# Patient Record
Sex: MALE | Race: WHITE | NOT HISPANIC OR LATINO | Employment: OTHER | URBAN - METROPOLITAN AREA
[De-identification: names, ages, dates, MRNs, and addresses within clinical notes are randomized per-mention and may not be internally consistent; named-entity substitution may affect disease eponyms.]

---

## 2019-05-13 ENCOUNTER — OFFICE VISIT (OUTPATIENT)
Dept: RETAIL CLINIC | Facility: CLINIC | Age: 69
End: 2019-05-13

## 2019-05-13 VITALS
TEMPERATURE: 98.6 F | DIASTOLIC BLOOD PRESSURE: 70 MMHG | OXYGEN SATURATION: 96 % | SYSTOLIC BLOOD PRESSURE: 118 MMHG | HEART RATE: 105 BPM

## 2019-05-13 DIAGNOSIS — B02.9 HERPES ZOSTER WITHOUT COMPLICATION: ICD-10-CM

## 2019-05-13 DIAGNOSIS — J40 BRONCHITIS: Primary | ICD-10-CM

## 2019-05-13 PROCEDURE — 94640 AIRWAY INHALATION TREATMENT: CPT | Performed by: NURSE PRACTITIONER

## 2019-05-13 PROCEDURE — 99213 OFFICE O/P EST LOW 20 MIN: CPT | Performed by: NURSE PRACTITIONER

## 2019-05-13 RX ORDER — IPRATROPIUM BROMIDE AND ALBUTEROL SULFATE 2.5; .5 MG/3ML; MG/3ML
3 SOLUTION RESPIRATORY (INHALATION)
Status: DISCONTINUED | OUTPATIENT
Start: 2019-05-13 | End: 2020-07-15

## 2019-05-13 RX ORDER — ACYCLOVIR 400 MG/1
400 TABLET ORAL 3 TIMES DAILY
Qty: 21 TABLET | Refills: 0 | Status: SHIPPED | OUTPATIENT
Start: 2019-05-13 | End: 2019-05-20

## 2019-05-13 RX ORDER — PREDNISONE 20 MG/1
20 TABLET ORAL 2 TIMES DAILY
Qty: 10 TABLET | Refills: 0 | Status: SHIPPED | OUTPATIENT
Start: 2019-05-13 | End: 2019-05-30

## 2019-05-13 RX ORDER — DOXYCYCLINE 100 MG/1
100 CAPSULE ORAL 2 TIMES DAILY
Qty: 10 CAPSULE | Refills: 0 | Status: SHIPPED | OUTPATIENT
Start: 2019-05-13 | End: 2019-05-30

## 2019-05-13 RX ORDER — GUAIFENESIN 600 MG/1
600 TABLET, EXTENDED RELEASE ORAL 2 TIMES DAILY
Qty: 28 TABLET | Refills: 0 | Status: SHIPPED | OUTPATIENT
Start: 2019-05-13 | End: 2019-05-27

## 2019-05-13 RX ADMIN — IPRATROPIUM BROMIDE AND ALBUTEROL SULFATE 3 ML: 2.5; .5 SOLUTION RESPIRATORY (INHALATION) at 16:25

## 2019-05-13 NOTE — PATIENT INSTRUCTIONS
Shingles  Shingles is an infection. It gives you a painful skin rash and blisters that have fluid in them. Shingles is caused by the same germ (virus) that causes chickenpox.  Shingles only happens in people who:  · Have had chickenpox.  · Have been given a shot of medicine (vaccine) to protect against chickenpox. Shingles is rare in this group.    The first symptoms of shingles may be itching, tingling, or pain in an area on your skin. A rash will show on your skin a few days or weeks later. The rash is likely to be on one side of your body. The rash usually has a shape like a belt or a band. Over time, the rash turns into fluid-filled blisters. The blisters will break open, change into scabs, and dry up. Medicines may:  · Help with pain and itching.  · Help you get better sooner.  · Help to prevent long-term problems.    Follow these instructions at home:  Medicines  · Take over-the-counter and prescription medicines only as told by your doctor.  · Put on an anti-itch cream or numbing cream where you have a rash, blisters, or scabs. Do this as told by your doctor.  Helping with itching and discomfort  · Put cold, wet cloths (cold compresses) on the area of the rash or blisters as told by your doctor.  · Cool baths can help you feel better. Try adding baking soda or dry oatmeal to the water to lessen itching. Do not bathe in hot water.  Blister and rash care  · Keep your rash covered with a loose bandage (dressing).  · Wear loose clothing that does not rub on your rash.  · Keep your rash and blisters clean. To do this, wash the area with mild soap and cool water as told by your doctor.  · Check your rash every day for signs of infection. Check for:  ? More redness, swelling, or pain.  ? Fluid or blood.  ? Warmth.  ? Pus or a bad smell.  · Do not scratch your rash. Do not pick at your blisters. To help you to not scratch:  ? Keep your fingernails clean and cut short.  ? Wear gloves or mittens when you sleep, if  scratching is a problem.  General instructions  · Rest as told by your doctor.  · Keep all follow-up visits as told by your doctor. This is important.  · Wash your hands often with soap and water. If soap and water are not available, use hand . Doing this lowers your chance of getting a skin infection caused by germs (bacteria).  · Your infection can cause chickenpox in people who have never had chickenpox or never got a shot of chickenpox vaccine. If you have blisters that did not change into scabs yet, try not to touch other people or be around other people, especially:  ? Babies.  ? Pregnant women.  ? Children who have areas of red, itchy, or rough skin (eczema).  ? Very old people who have transplants.  ? People who have a long-term (chronic) sickness, like cancer or AIDS.  Contact a doctor if:  · Your pain does not get better with medicine.  · Your pain does not get better after the rash heals.  · You have any signs of infection in the rash area. These signs include:  ? More redness, swelling, or pain around the rash.  ? Fluid or blood coming from the rash.  ? The rash area feeling warm to the touch.  ? Pus or a bad smell coming from the rash.  Get help right away if:  · The rash is on your face or nose.  · You have pain in your face or pain by your eye.  · You lose feeling on one side of your face.  · You have trouble seeing.  · You have ear pain, or you have ringing in your ear.  · You have a loss of taste.  · Your condition gets worse.  Summary  · Shingles gives you a painful skin rash and blisters that have fluid in them.  · Shingles is an infection. It is caused by the same germ (virus) that causes chickenpox.  · Keep your rash covered with a loose bandage (dressing). Wear loose clothing that does not rub on your rash.  · If you have blisters that did not change into scabs yet, try not to touch other people or be around people.  This information is not intended to replace advice given to you by  your health care provider. Make sure you discuss any questions you have with your health care provider.  Document Released: 06/05/2009 Document Revised: 08/22/2018 Document Reviewed: 08/22/2018  [employer] Interactive Patient Education © 2019 [employer]  Acute Bronchitis, Adult  Acute bronchitis is sudden (acute) swelling of the air tubes (bronchi) in the lungs. Acute bronchitis causes these tubes to fill with mucus, which can make it hard to breathe. It can also cause coughing or wheezing.  In adults, acute bronchitis usually goes away within 2 weeks. A cough caused by bronchitis may last up to 3 weeks. Smoking, allergies, and asthma can make the condition worse. Repeated episodes of bronchitis may cause further lung problems, such as chronic obstructive pulmonary disease (COPD).  What are the causes?  This condition can be caused by germs and by substances that irritate the lungs, including:  · Cold and flu viruses. This condition is most often caused by the same virus that causes a cold.  · Bacteria.  · Exposure to tobacco smoke, dust, fumes, and air pollution.    What increases the risk?  This condition is more likely to develop in people who:  · Have close contact with someone with acute bronchitis.  · Are exposed to lung irritants, such as tobacco smoke, dust, fumes, and vapors.  · Have a weak immune system.  · Have a respiratory condition such as asthma.    What are the signs or symptoms?  Symptoms of this condition include:  · A cough.  · Coughing up clear, yellow, or green mucus.  · Wheezing.  · Chest congestion.  · Shortness of breath.  · A fever.  · Body aches.  · Chills.  · A sore throat.    How is this diagnosed?  This condition is usually diagnosed with a physical exam. During the exam, your health care provider may order tests, such as chest X-rays, to rule out other conditions. He or she may also:  · Test a sample of your mucus for bacterial infection.  · Check the level of oxygen in your blood.  This is done to check for pneumonia.  · Do a chest X-ray or lung function testing to rule out pneumonia and other conditions.  · Perform blood tests.    Your health care provider will also ask about your symptoms and medical history.  How is this treated?  Most cases of acute bronchitis clear up over time without treatment. Your health care provider may recommend:  · Drinking more fluids. Drinking more makes your mucus thinner, which may make it easier to breathe.  · Taking a medicine for a fever or cough.  · Taking an antibiotic medicine.  · Using an inhaler to help improve shortness of breath and to control a cough.  · Using a cool mist vaporizer or humidifier to make it easier to breathe.    Follow these instructions at home:  Medicines  · Take over-the-counter and prescription medicines only as told by your health care provider.  · If you were prescribed an antibiotic, take it as told by your health care provider. Do not stop taking the antibiotic even if you start to feel better.  General instructions  · Get plenty of rest.  · Drink enough fluids to keep your urine pale yellow.  · Avoid smoking and secondhand smoke. Exposure to cigarette smoke or irritating chemicals will make bronchitis worse. If you smoke and you need help quitting, ask your health care provider. Quitting smoking will help your lungs heal faster.  · Use an inhaler, cool mist vaporizer, or humidifier as told by your health care provider.  · Keep all follow-up visits as told by your health care provider. This is important.  How is this prevented?  To lower your risk of getting this condition again:  · Wash your hands often with soap and water. If soap and water are not available, use hand .  · Avoid contact with people who have cold symptoms.  · Try not to touch your hands to your mouth, nose, or eyes.  · Make sure to get the flu shot every year.    Contact a health care provider if:  · Your symptoms do not improve in 2 weeks of  treatment.  Get help right away if:  · You cough up blood.  · You have chest pain.  · You have severe shortness of breath.  · You become dehydrated.  · You faint or keep feeling like you are going to faint.  · You keep vomiting.  · You have a severe headache.  · Your fever or chills gets worse.  This information is not intended to replace advice given to you by your health care provider. Make sure you discuss any questions you have with your health care provider.  Document Released: 01/25/2006 Document Revised: 08/01/2018 Document Reviewed: 06/07/2017  [employer] Interactive Patient Education © 2019 [employer]

## 2019-05-30 ENCOUNTER — OFFICE VISIT (OUTPATIENT)
Dept: FAMILY MEDICINE CLINIC | Facility: CLINIC | Age: 69
End: 2019-05-30

## 2019-05-30 VITALS
HEIGHT: 71 IN | WEIGHT: 179 LBS | BODY MASS INDEX: 25.06 KG/M2 | RESPIRATION RATE: 24 BRPM | OXYGEN SATURATION: 99 % | HEART RATE: 101 BPM | SYSTOLIC BLOOD PRESSURE: 118 MMHG | DIASTOLIC BLOOD PRESSURE: 66 MMHG

## 2019-05-30 DIAGNOSIS — R05.3 CHRONIC COUGH: ICD-10-CM

## 2019-05-30 DIAGNOSIS — E66.3 OVERWEIGHT: ICD-10-CM

## 2019-05-30 DIAGNOSIS — Z12.11 SCREENING FOR COLORECTAL CANCER: ICD-10-CM

## 2019-05-30 DIAGNOSIS — R04.2 HEMOPTYSIS: ICD-10-CM

## 2019-05-30 DIAGNOSIS — Z12.5 ENCOUNTER FOR SCREENING FOR MALIGNANT NEOPLASM OF PROSTATE: ICD-10-CM

## 2019-05-30 DIAGNOSIS — R63.4 WEIGHT LOSS, UNINTENTIONAL: ICD-10-CM

## 2019-05-30 DIAGNOSIS — Z72.0 TOBACCO ABUSE: Primary | ICD-10-CM

## 2019-05-30 DIAGNOSIS — Z12.12 SCREENING FOR COLORECTAL CANCER: ICD-10-CM

## 2019-05-30 DIAGNOSIS — R93.89 ABNORMAL CHEST X-RAY: ICD-10-CM

## 2019-05-30 PROCEDURE — 99204 OFFICE O/P NEW MOD 45 MIN: CPT | Performed by: NURSE PRACTITIONER

## 2019-05-30 NOTE — PROGRESS NOTES
Subjective   Julia Smith is a 69 y.o. male.     History of Present Illness   Julia Smith 69 y.o. male who presents today for a new patient appointment.    he has a history of There is no problem list on file for this patient.  .  he is here to [facility] care and to f/u from [facility] I reviewed the PFSH recorded today by my MA/LPN staff.   he   He has been feeling tired.    Patient was seen at [facility] a couple of weeks ago for bronchitis. He was given doxycycline, oral steroid and cough suppressant.  He states symptoms improved but he is still fatigue, having cough with occasional blood tinged sputum and has lost 30 lbs since December without trying.     Patient is current smoker. Has smoked 1.5 PPD for 40 years.    Denies FH of lung cancer, colorectal cancer or prostate cancer.   His father had COPD.   The following portions of the patient's history were reviewed and updated as appropriate: allergies, current medications, past family history, past medical history, past social history, past surgical history and problem list.    Review of Systems   Constitutional: Positive for fatigue and unexpected weight change. Negative for chills and fever.   HENT: Positive for congestion, postnasal drip and sinus pressure.    Eyes: Negative for visual disturbance.   Respiratory: Positive for cough and shortness of breath. Negative for chest tightness and wheezing.        Objective   Physical Exam   Constitutional: He is oriented to person, place, and time. He appears well-developed and well-nourished.   HENT:   Right Ear: Tympanic membrane, external ear and ear canal normal.   Left Ear: Tympanic membrane, external ear and ear canal normal.   Nose: Rhinorrhea present. Right sinus exhibits no maxillary sinus tenderness and no frontal sinus tenderness. Left sinus exhibits no maxillary sinus tenderness and no frontal sinus tenderness.   Mouth/Throat: Uvula is midline and oropharynx is clear and moist.   Cardiovascular:  Normal rate and regular rhythm.   Pulmonary/Chest: Effort normal and breath sounds normal.   Neurological: He is oriented to person, place, and time.   Skin: Skin is warm and dry.   Psychiatric: He has a normal mood and affect. His behavior is normal. Judgment and thought content normal.   Nursing note and vitals reviewed.  2 view chest xray ordered and reviewed by me. Right hilar fullness with probable mass in right middle lobe.  Also noted was widened mediastinum.  No comparison on file.     Assessment/Plan   Julia was seen today for establish care, follow-up, cough and weight loss.    Diagnoses and all orders for this visit:    Tobacco abuse  -     XR Chest PA & Lateral (In Office)  -     CT Chest With Contrast  -     Comprehensive metabolic panel  -     Lipid panel  -     CBC and Differential  -     TSH  -     PSA Screen  -     Ambulatory Referral to Pulmonology    Weight loss, unintentional  -     XR Chest PA & Lateral (In Office)  -     CT Chest With Contrast  -     Comprehensive metabolic panel  -     Lipid panel  -     CBC and Differential  -     TSH  -     PSA Screen  -     Ambulatory Referral to Pulmonology    Chronic cough  -     XR Chest PA & Lateral (In Office)  -     CT Chest With Contrast  -     Comprehensive metabolic panel  -     Lipid panel  -     CBC and Differential  -     TSH  -     PSA Screen  -     Ambulatory Referral to Pulmonology    Hemoptysis  -     CT Chest With Contrast  -     Comprehensive metabolic panel  -     Lipid panel  -     CBC and Differential  -     TSH  -     PSA Screen  -     Ambulatory Referral to Pulmonology    Screening for colorectal cancer  -     Ambulatory Referral For Screening Colonoscopy    Overweight   -     Lipid panel    Encounter for screening for malignant neoplasm of prostate   -     PSA Screen    Abnormal chest x-ray  -     Ambulatory Referral to Pulmonology

## 2019-05-31 LAB
ALBUMIN SERPL-MCNC: 3.9 G/DL (ref 3.5–5.2)
ALBUMIN/GLOB SERPL: 1 G/DL
ALP SERPL-CCNC: 83 U/L (ref 39–117)
ALT SERPL-CCNC: 20 U/L (ref 1–41)
AST SERPL-CCNC: 22 U/L (ref 1–40)
BASOPHILS # BLD AUTO: 0.08 10*3/MM3 (ref 0–0.2)
BASOPHILS NFR BLD AUTO: 0.9 % (ref 0–1.5)
BILIRUB SERPL-MCNC: 0.6 MG/DL (ref 0.2–1.2)
BUN SERPL-MCNC: 10 MG/DL (ref 8–23)
BUN/CREAT SERPL: 10.6 (ref 7–25)
CALCIUM SERPL-MCNC: 11.1 MG/DL (ref 8.6–10.5)
CHLORIDE SERPL-SCNC: 96 MMOL/L (ref 98–107)
CHOLEST SERPL-MCNC: 144 MG/DL (ref 0–200)
CO2 SERPL-SCNC: 26.2 MMOL/L (ref 22–29)
CREAT SERPL-MCNC: 0.94 MG/DL (ref 0.76–1.27)
EOSINOPHIL # BLD AUTO: 0.04 10*3/MM3 (ref 0–0.4)
EOSINOPHIL NFR BLD AUTO: 0.5 % (ref 0.3–6.2)
ERYTHROCYTE [DISTWIDTH] IN BLOOD BY AUTOMATED COUNT: 16.2 % (ref 12.3–15.4)
GLOBULIN SER CALC-MCNC: 4.1 GM/DL
GLUCOSE SERPL-MCNC: 92 MG/DL (ref 65–99)
HCT VFR BLD AUTO: 48 % (ref 37.5–51)
HDLC SERPL-MCNC: 44 MG/DL (ref 40–60)
HGB BLD-MCNC: 14.7 G/DL (ref 13–17.7)
IMM GRANULOCYTES # BLD AUTO: 0.05 10*3/MM3 (ref 0–0.05)
IMM GRANULOCYTES NFR BLD AUTO: 0.6 % (ref 0–0.5)
LDLC SERPL CALC-MCNC: 89 MG/DL (ref 0–100)
LYMPHOCYTES # BLD AUTO: 1.27 10*3/MM3 (ref 0.7–3.1)
LYMPHOCYTES NFR BLD AUTO: 14.5 % (ref 19.6–45.3)
MCH RBC QN AUTO: 28.3 PG (ref 26.6–33)
MCHC RBC AUTO-ENTMCNC: 30.6 G/DL (ref 31.5–35.7)
MCV RBC AUTO: 92.3 FL (ref 79–97)
MONOCYTES # BLD AUTO: 1.11 10*3/MM3 (ref 0.1–0.9)
MONOCYTES NFR BLD AUTO: 12.7 % (ref 5–12)
NEUTROPHILS # BLD AUTO: 6.22 10*3/MM3 (ref 1.7–7)
NEUTROPHILS NFR BLD AUTO: 70.8 % (ref 42.7–76)
NRBC BLD AUTO-RTO: 0 /100 WBC (ref 0–0.2)
PLATELET # BLD AUTO: 419 10*3/MM3 (ref 140–450)
POTASSIUM SERPL-SCNC: 5.6 MMOL/L (ref 3.5–5.2)
PROT SERPL-MCNC: 8 G/DL (ref 6–8.5)
PSA SERPL-MCNC: 3.27 NG/ML (ref 0–4)
RBC # BLD AUTO: 5.2 10*6/MM3 (ref 4.14–5.8)
SODIUM SERPL-SCNC: 136 MMOL/L (ref 136–145)
TRIGL SERPL-MCNC: 57 MG/DL (ref 0–150)
TSH SERPL DL<=0.005 MIU/L-ACNC: 0.92 MIU/ML (ref 0.27–4.2)
VLDLC SERPL CALC-MCNC: 11.4 MG/DL
WBC # BLD AUTO: 8.77 10*3/MM3 (ref 3.4–10.8)

## 2019-06-03 ENCOUNTER — PREP FOR SURGERY (OUTPATIENT)
Dept: OTHER | Facility: HOSPITAL | Age: 69
End: 2019-06-03

## 2019-06-03 DIAGNOSIS — Z12.11 ENCOUNTER FOR SCREENING FOR MALIGNANT NEOPLASM OF COLON: Primary | ICD-10-CM

## 2019-06-03 RX ORDER — SODIUM CHLORIDE, SODIUM LACTATE, POTASSIUM CHLORIDE, CALCIUM CHLORIDE 600; 310; 30; 20 MG/100ML; MG/100ML; MG/100ML; MG/100ML
30 INJECTION, SOLUTION INTRAVENOUS CONTINUOUS
Status: CANCELLED | OUTPATIENT
Start: 2019-07-02

## 2019-06-04 ENCOUNTER — HOSPITAL ENCOUNTER (OUTPATIENT)
Dept: CT IMAGING | Facility: HOSPITAL | Age: 69
Discharge: HOME | End: 2019-06-04
Admitting: NURSE PRACTITIONER

## 2019-06-04 ENCOUNTER — HOSPITAL ENCOUNTER (OUTPATIENT)
Facility: HOSPITAL | Age: 69
Setting detail: HOSPITAL OUTPATIENT SURGERY
End: 2019-06-04
Attending: INTERNAL MEDICINE | Admitting: INTERNAL MEDICINE

## 2019-06-04 ENCOUNTER — TELEPHONE (OUTPATIENT)
Dept: FAMILY MEDICINE CLINIC | Facility: CLINIC | Age: 69
End: 2019-06-04

## 2019-06-04 DIAGNOSIS — R91.8 LUNG MASS: Primary | ICD-10-CM

## 2019-06-04 PROBLEM — Z12.11 ENCOUNTER FOR SCREENING FOR MALIGNANT NEOPLASM OF COLON: Status: ACTIVE | Noted: 2019-06-04

## 2019-06-04 LAB — CREAT BLDA-MCNC: 0.9 MG/DL (ref 0.6–1.3)

## 2019-06-04 PROCEDURE — 82565 ASSAY OF CREATININE: CPT

## 2019-06-04 PROCEDURE — 25010000002 IOPAMIDOL 61 % SOLUTION: Performed by: NURSE PRACTITIONER

## 2019-06-04 PROCEDURE — 71260 CT THORAX DX C+: CPT

## 2019-06-04 RX ADMIN — IOPAMIDOL 75 ML: 612 INJECTION, SOLUTION INTRAVENOUS at 14:02

## 2019-06-04 NOTE — TELEPHONE ENCOUNTER
Spoke with radiologist Dr. Crow about patient's chest CT.  He has large chest mass that extends into RML and RLL. It also appears to be pressing on his SVC.  Need to make sure that patient gets in to see pulmonology ASAP and he is to go to ER if any worsening symptoms prior to appt. Can we see how soon her will be able to be seen as it does not appear appt has yet been scheduled?

## 2019-06-19 ENCOUNTER — ANESTHESIA (OUTPATIENT)
Dept: GASTROENTEROLOGY | Facility: HOSPITAL | Age: 69
End: 2019-06-19

## 2019-06-19 ENCOUNTER — ANESTHESIA EVENT (OUTPATIENT)
Dept: GASTROENTEROLOGY | Facility: HOSPITAL | Age: 69
End: 2019-06-19

## 2019-06-19 ENCOUNTER — HOSPITAL ENCOUNTER (OUTPATIENT)
Facility: HOSPITAL | Age: 69
Setting detail: HOSPITAL OUTPATIENT SURGERY
Discharge: HOME | End: 2019-06-19
Attending: INTERNAL MEDICINE | Admitting: INTERNAL MEDICINE

## 2019-06-19 VITALS
WEIGHT: 179.06 LBS | RESPIRATION RATE: 18 BRPM | TEMPERATURE: 98.1 F | HEART RATE: 89 BPM | OXYGEN SATURATION: 95 % | DIASTOLIC BLOOD PRESSURE: 71 MMHG | BODY MASS INDEX: 25.63 KG/M2 | SYSTOLIC BLOOD PRESSURE: 122 MMHG | HEIGHT: 70 IN

## 2019-06-19 DIAGNOSIS — R91.8 LUNG MASS: ICD-10-CM

## 2019-06-19 PROCEDURE — 88108 CYTOPATH CONCENTRATE TECH: CPT | Performed by: INTERNAL MEDICINE

## 2019-06-19 PROCEDURE — 88112 CYTOPATH CELL ENHANCE TECH: CPT | Performed by: INTERNAL MEDICINE

## 2019-06-19 PROCEDURE — 25010000002 PROPOFOL 10 MG/ML EMULSION: Performed by: NURSE ANESTHETIST, CERTIFIED REGISTERED

## 2019-06-19 PROCEDURE — 87102 FUNGUS ISOLATION CULTURE: CPT | Performed by: INTERNAL MEDICINE

## 2019-06-19 PROCEDURE — 87206 SMEAR FLUORESCENT/ACID STAI: CPT | Performed by: INTERNAL MEDICINE

## 2019-06-19 PROCEDURE — 88342 IMHCHEM/IMCYTCHM 1ST ANTB: CPT | Performed by: INTERNAL MEDICINE

## 2019-06-19 PROCEDURE — 87116 MYCOBACTERIA CULTURE: CPT | Performed by: INTERNAL MEDICINE

## 2019-06-19 PROCEDURE — C1726 CATH, BAL DIL, NON-VASCULAR: HCPCS | Performed by: INTERNAL MEDICINE

## 2019-06-19 PROCEDURE — 88360 TUMOR IMMUNOHISTOCHEM/MANUAL: CPT | Performed by: INTERNAL MEDICINE

## 2019-06-19 PROCEDURE — 88341 IMHCHEM/IMCYTCHM EA ADD ANTB: CPT | Performed by: INTERNAL MEDICINE

## 2019-06-19 PROCEDURE — 87070 CULTURE OTHR SPECIMN AEROBIC: CPT | Performed by: INTERNAL MEDICINE

## 2019-06-19 PROCEDURE — 88305 TISSUE EXAM BY PATHOLOGIST: CPT | Performed by: INTERNAL MEDICINE

## 2019-06-19 PROCEDURE — 25010000002 PROPOFOL 1000 MG/ML EMULSION: Performed by: NURSE ANESTHETIST, CERTIFIED REGISTERED

## 2019-06-19 PROCEDURE — 88173 CYTOPATH EVAL FNA REPORT: CPT | Performed by: INTERNAL MEDICINE

## 2019-06-19 PROCEDURE — 87205 SMEAR GRAM STAIN: CPT | Performed by: INTERNAL MEDICINE

## 2019-06-19 RX ORDER — PROPOFOL 10 MG/ML
VIAL (ML) INTRAVENOUS AS NEEDED
Status: DISCONTINUED | OUTPATIENT
Start: 2019-06-19 | End: 2019-06-19 | Stop reason: SURG

## 2019-06-19 RX ORDER — SODIUM CHLORIDE, SODIUM LACTATE, POTASSIUM CHLORIDE, CALCIUM CHLORIDE 600; 310; 30; 20 MG/100ML; MG/100ML; MG/100ML; MG/100ML
1000 INJECTION, SOLUTION INTRAVENOUS CONTINUOUS
Status: DISCONTINUED | OUTPATIENT
Start: 2019-06-19 | End: 2019-06-19 | Stop reason: HOSPADM

## 2019-06-19 RX ORDER — LIDOCAINE HYDROCHLORIDE 10 MG/ML
0.5 INJECTION, SOLUTION INFILTRATION; PERINEURAL ONCE AS NEEDED
Status: DISCONTINUED | OUTPATIENT
Start: 2019-06-19 | End: 2019-06-19 | Stop reason: HOSPADM

## 2019-06-19 RX ORDER — LIDOCAINE HYDROCHLORIDE 20 MG/ML
INJECTION, SOLUTION INFILTRATION; PERINEURAL AS NEEDED
Status: DISCONTINUED | OUTPATIENT
Start: 2019-06-19 | End: 2019-06-19 | Stop reason: SURG

## 2019-06-19 RX ORDER — SODIUM CHLORIDE 0.9 % (FLUSH) 0.9 %
3 SYRINGE (ML) INJECTION AS NEEDED
Status: DISCONTINUED | OUTPATIENT
Start: 2019-06-19 | End: 2019-06-19 | Stop reason: HOSPADM

## 2019-06-19 RX ORDER — LIDOCAINE HYDROCHLORIDE 10 MG/ML
INJECTION, SOLUTION EPIDURAL; INFILTRATION; INTRACAUDAL; PERINEURAL AS NEEDED
Status: DISCONTINUED | OUTPATIENT
Start: 2019-06-19 | End: 2019-06-19 | Stop reason: HOSPADM

## 2019-06-19 RX ADMIN — PROPOFOL 150 MG: 10 INJECTION, EMULSION INTRAVENOUS at 08:21

## 2019-06-19 RX ADMIN — PROPOFOL 140 MCG/KG/MIN: 10 INJECTION, EMULSION INTRAVENOUS at 08:21

## 2019-06-19 RX ADMIN — LIDOCAINE HYDROCHLORIDE 100 MG: 20 INJECTION, SOLUTION INFILTRATION; PERINEURAL at 08:21

## 2019-06-19 RX ADMIN — SODIUM CHLORIDE, POTASSIUM CHLORIDE, SODIUM LACTATE AND CALCIUM CHLORIDE 1000 ML: 600; 310; 30; 20 INJECTION, SOLUTION INTRAVENOUS at 07:44

## 2019-06-19 NOTE — ANESTHESIA POSTPROCEDURE EVALUATION
"Patient: Julia Ocasiokins III    Procedure Summary     Date:  06/19/19 Room / Location:  [facility] /  [facility]    Anesthesia Start:  0813 Anesthesia Stop:  0900    Procedure:  BRONCHOSCOPY WITH WASHINGS AND BIOPSY AND ENDOBRONCHIAL ULTRASOUND WITH FNA (N/A Bronchus) Diagnosis:      Surgeon:  Arslan Marquez MD Provider:  Liliya Gomez MD    Anesthesia Type:  general ASA Status:  2          Anesthesia Type: general  Last vitals  BP   122/71 (06/19/19 0919)   Temp   36.7 °C (98.1 °F) (06/19/19 0908)   Pulse   89 (06/19/19 0919)   Resp   18 (06/19/19 0919)     SpO2   95 % (06/19/19 0919)     Post Anesthesia Care and Evaluation    Patient location during evaluation: PACU  Patient participation: complete - patient participated  Level of consciousness: awake  Pain score: 0  Pain management: adequate  Airway patency: patent  Anesthetic complications: No anesthetic complications    Cardiovascular status: acceptable  Respiratory status: acceptable  Hydration status: acceptable    Comments: Blood pressure 122/71, pulse 89, temperature 36.7 °C (98.1 °F), temperature source Oral, resp. rate 18, height 177.8 cm (70\"), weight 81.2 kg (179 lb 1 oz), SpO2 95 %.    No anesthesia care post op    "

## 2019-06-19 NOTE — OP NOTE
Bronchoscopy Procedure Note    Procedure:  1. Bronchoscopy, Diagnostic    Pre-Operative Diagnosis: lung mass and lymphadenopathy    Post-Operative Diagnosis: Same    Indication: lung mass and lymphadenopathy    Anesthesia: Monitored Anesthesia Care (MAC)    Procedure Details: Patient was consented for the procedure with all risk and benefit of the procedure explained in detail.  Patient was given the opportunity to ask questions and all concerns were answered.  The bronchocope was inserted into the main airway via the LMA. An anatomical survey was done of the main airways and the subsegmental bronchus to at least the first subsegmental level of all five lobes of both lungs.  The findings are reported below.  A bronchialalveolar lavage was performed using aliquots of normal saline instilled into the airways then aspirated back.    Findings:  Bronchoscope passed through LMA to the level of the vocal cords.  Lidocaine used for local anesthetic over vocal cords.  Bronchoscope was passed between the vocal cords into the trachea.  All airways were visualized to at least the first subsegment level of all 5 lobes of both lungs.  Endobronchial lesion noted in right mainstem.      Endobronchial biopsies of endobronchial lesion x 3.    Bronchial washing of right mainstem performed with 120cc saline instilled and 65cc blood tinged return.    Standard white light bronchoscope removed and replaced with endobronchial ultrasound scope in order to accomplish maneuver of lymph node station visualization and to help with FNA biopsy.    EBUS guided transbronchial FNA biopsy of station 7 and 10R with 3 passes each.      JOVON: adequate cellularity with suspicion for NSCLCA    Estimated Blood Loss:  Minimal           Specimens:  As above                Complications:  None; patient tolerated the procedure well.           Disposition: PACU - hemodynamically stable.      Patient tolerated the procedure well.    Arslan Marquez,  MD  6/19/2019  8:39 AM

## 2019-06-19 NOTE — ANESTHESIA PROCEDURE NOTES
Airway  Urgency: elective    Airway not difficult    General Information and Staff    Patient location during procedure: OR  Anesthesiologist: Liliya Gomez MD  CRNA: Iveth Oden CRNA    Indications and Patient Condition    Preoxygenated: yes  Mask difficulty assessment: 0 - not attempted    Final Airway Details  Final airway type: supraglottic airway      Successful airway: classic  Size 4    Number of attempts at approach: 1    Additional Comments  Pt to Endo:. Monitors applied. PreO2: SIVI and easy insertion LMA. ETCO2 +, Equal and bilateral chest rise

## 2019-06-19 NOTE — ANESTHESIA PREPROCEDURE EVALUATION
Anesthesia Evaluation     Patient summary reviewed and Nursing notes reviewed   NPO Solid Status: > 8 hours  NPO Liquid Status: > 8 hours           Airway   Mallampati: IV  TM distance: >3 FB  Possible difficult intubation  Dental    (+) poor dentition    Pulmonary - normal exam   (+) a smoker Current Smoked day of surgery,     ROS comment: Chest Ct  mediastinal mass  Cardiovascular - normal exam        Neuro/Psych  GI/Hepatic/Renal/Endo      Musculoskeletal     Abdominal    Substance History      OB/GYN          Other                        Anesthesia Plan    ASA 2     general     Anesthetic plan, all risks, benefits, and alternatives have been provided, discussed and informed consent has been obtained with: patient and spouse/significant other.

## 2019-06-20 LAB
CYTO UR: NORMAL
LAB AP CASE REPORT: NORMAL
LAB AP DIAGNOSIS COMMENT: NORMAL
LAB AP NON-GYN SPECIMEN ADEQUACY: NORMAL
LAB AP SPECIAL STAINS: NORMAL
PATH REPORT.FINAL DX SPEC: NORMAL
PATH REPORT.GROSS SPEC: NORMAL

## 2019-06-21 LAB
BACTERIA SPEC RESP CULT: NORMAL
GRAM STN SPEC: NORMAL
GRAM STN SPEC: NORMAL

## 2019-06-26 ENCOUNTER — TRANSCRIBE ORDERS (OUTPATIENT)
Dept: ADMINISTRATIVE | Facility: HOSPITAL | Age: 69
End: 2019-06-26

## 2019-06-26 DIAGNOSIS — C34.11 SMALL CELL LUNG CANCER, RIGHT UPPER LOBE (HCC): Primary | ICD-10-CM

## 2019-06-28 ENCOUNTER — TELEPHONE (OUTPATIENT)
Dept: GASTROENTEROLOGY | Facility: CLINIC | Age: 69
End: 2019-06-28

## 2019-06-28 NOTE — TELEPHONE ENCOUNTER
Patient called canceled colonoscopy for 7-2-19. He has some other more serious health issues right now. He will call back if he wants to reschedule.

## 2019-07-01 ENCOUNTER — HOSPITAL ENCOUNTER (OUTPATIENT)
Dept: PET IMAGING | Facility: HOSPITAL | Age: 69
Discharge: HOME | End: 2019-07-01
Admitting: INTERNAL MEDICINE

## 2019-07-01 ENCOUNTER — HOSPITAL ENCOUNTER (OUTPATIENT)
Dept: PET IMAGING | Facility: HOSPITAL | Age: 69
Discharge: HOME | End: 2019-07-01

## 2019-07-01 DIAGNOSIS — C34.11 SMALL CELL LUNG CANCER, RIGHT UPPER LOBE (HCC): ICD-10-CM

## 2019-07-01 LAB — GLUCOSE BLDC GLUCOMTR-MCNC: 110 MG/DL (ref 70–130)

## 2019-07-01 PROCEDURE — 78815 PET IMAGE W/CT SKULL-THIGH: CPT

## 2019-07-01 PROCEDURE — A9552 F18 FDG: HCPCS | Performed by: INTERNAL MEDICINE

## 2019-07-01 PROCEDURE — 82962 GLUCOSE BLOOD TEST: CPT

## 2019-07-01 PROCEDURE — 0 FLUDEOXYGLUCOSE F18 SOLUTION: Performed by: INTERNAL MEDICINE

## 2019-07-01 RX ADMIN — FLUDEOXYGLUCOSE F18 1 DOSE: 300 INJECTION INTRAVENOUS at 12:57

## 2019-07-03 ENCOUNTER — APPOINTMENT (OUTPATIENT)
Dept: OTHER | Facility: HOSPITAL | Age: 69
End: 2019-07-03

## 2019-07-03 ENCOUNTER — OFFICE VISIT (OUTPATIENT)
Dept: OTHER | Facility: HOSPITAL | Age: 69
End: 2019-07-03

## 2019-07-03 ENCOUNTER — PREP FOR SURGERY (OUTPATIENT)
Dept: OTHER | Facility: HOSPITAL | Age: 69
End: 2019-07-03

## 2019-07-03 VITALS
DIASTOLIC BLOOD PRESSURE: 75 MMHG | WEIGHT: 173.7 LBS | HEIGHT: 70 IN | RESPIRATION RATE: 16 BRPM | HEART RATE: 95 BPM | BODY MASS INDEX: 24.87 KG/M2 | SYSTOLIC BLOOD PRESSURE: 126 MMHG | OXYGEN SATURATION: 96 % | TEMPERATURE: 97.6 F

## 2019-07-03 DIAGNOSIS — C34.90 SMALL CELL LUNG CANCER (HCC): Primary | ICD-10-CM

## 2019-07-03 DIAGNOSIS — R79.1 ABNORMAL COAGULATION PROFILE: ICD-10-CM

## 2019-07-03 DIAGNOSIS — C80.1 SMALL CELL CARCINOMA (HCC): Primary | ICD-10-CM

## 2019-07-03 PROCEDURE — G0463 HOSPITAL OUTPT CLINIC VISIT: HCPCS | Performed by: INTERNAL MEDICINE

## 2019-07-03 PROCEDURE — 99204 OFFICE O/P NEW MOD 45 MIN: CPT | Performed by: THORACIC SURGERY (CARDIOTHORACIC VASCULAR SURGERY)

## 2019-07-03 PROCEDURE — 99205 OFFICE O/P NEW HI 60 MIN: CPT | Performed by: INTERNAL MEDICINE

## 2019-07-03 RX ORDER — CEFAZOLIN SODIUM 2 G/100ML
2 INJECTION, SOLUTION INTRAVENOUS ONCE
Status: CANCELLED | OUTPATIENT
Start: 2019-07-09 | End: 2019-07-03

## 2019-07-03 RX ORDER — SODIUM CHLORIDE 0.9 % (FLUSH) 0.9 %
3 SYRINGE (ML) INJECTION EVERY 12 HOURS SCHEDULED
Status: CANCELLED | OUTPATIENT
Start: 2019-07-09

## 2019-07-03 RX ORDER — SODIUM CHLORIDE 0.9 % (FLUSH) 0.9 %
3-10 SYRINGE (ML) INJECTION AS NEEDED
Status: CANCELLED | OUTPATIENT
Start: 2019-07-09

## 2019-07-03 NOTE — PROGRESS NOTES
Subjective   Patient ID: Julia Smith III is a 69 y.o. male is being seen for consultation today at the request of Arslan Marquez MD    History of Present Illness  Dear Colleague,  Julia Smith III was seen in our multidisciplinary lung cancer clinic today consultation for newly diagnosed small cell lung cancer.  Mr. Smith is a pleasant 69-year-old gentleman who initially presented with some hemoptysis and was found to have a significant hilar mass on chest x-ray.  He was evaluated by Dr. Marquez who performed a bronchoscopy with biopsy which was consistent with small cell lung cancer.  Mr. Ochoa has had an approximately 30 pound weight loss since December.  He denies significant shortness of breath or dyspnea on exertion.    Mr. Smith is a current smoker has an approximately 60-pack-year history of tobacco use.  The following portions of the patient's history were reviewed and updated as appropriate: allergies, current medications, past family history, past medical history, past social history, past surgical history and problem list.  Review of Systems   Constitution: Negative.   HENT: Negative.    Eyes: Negative.    Cardiovascular: Negative.    Respiratory: Negative.    Endocrine: Negative.    Hematologic/Lymphatic: Negative.    Skin: Negative.    Musculoskeletal: Negative.    Gastrointestinal: Negative.    Genitourinary: Negative.    Neurological: Negative.    Psychiatric/Behavioral: Negative.    Allergic/Immunologic: Negative.      Patient Active Problem List   Diagnosis   • Encounter for screening for malignant neoplasm of colon   • Small cell lung cancer (CMS/HCC)   • Small cell carcinoma (CMS/HCC)     Past Medical History:   Diagnosis Date   • Hearing loss    • Mastoiditis      Past Surgical History:   Procedure Laterality Date   • BRONCHOSCOPY N/A 6/19/2019    Procedure: BRONCHOSCOPY WITH WASHINGS AND BIOPSY AND ENDOBRONCHIAL ULTRASOUND WITH FNA;  Surgeon: Arslan Marquez MD;  Location: [facility]  ENDOSCOPY;  Service: Pulmonary   • INNER EAR SURGERY Left    • MASTOIDECTOMY Left 1994     Family History   Problem Relation Age of Onset   • No Known Problems Mother    • COPD Father    • No Known Problems Sister    • Heart disease Brother    • No Known Problems Brother    • No Known Problems Brother    • No Known Problems Brother      Social History     Socioeconomic History   • Marital status:      Spouse name: Not on file   • Number of children: Not on file   • Years of education: Not on file   • Highest education level: Not on file   Tobacco Use   • Smoking status: Current Every Day Smoker     Packs/day: 1.50     Years: 40.00     Pack years: 60.00   • Smokeless tobacco: Never Used   Substance and Sexual Activity   • Alcohol use: Yes     Alcohol/week: 2.4 - 4.2 oz     Types: 4 - 7 Cans of beer per week     Comment: daily   • Drug use: Yes     Types: Marijuana   • Sexual activity: Not Currently     Partners: Female       Current Outpatient Medications:   •  aspirin 81 MG EC tablet, Take 81 mg by mouth Daily., Disp: , Rfl:     Current Facility-Administered Medications:   •  ipratropium-albuterol (DUO-NEB) nebulizer solution 3 mL, 3 mL, Nebulization, 4x Daily - RT, Roma Costa, APRN, 3 mL at 05/13/19 1625  No Known Allergies     Objective   There were no vitals filed for this visit.  Physical Exam   Constitutional: He is oriented to person, place, and time. He appears well-developed and well-nourished.   HENT:   Head: Normocephalic and atraumatic.   Nose: Nose normal.   Mouth/Throat: Oropharynx is clear and moist.   Eyes: Conjunctivae and EOM are normal. Pupils are equal, round, and reactive to light.   Neck: Normal range of motion. Neck supple.   Cardiovascular: Normal rate, regular rhythm, normal heart sounds and intact distal pulses.   Pulmonary/Chest: Effort normal and breath sounds normal.   Abdominal: Soft. Bowel sounds are normal.   Musculoskeletal: Normal range of motion.   Neurological: He is  alert and oriented to person, place, and time.   Skin: Skin is warm and dry. Capillary refill takes less than 2 seconds.   Psychiatric: He has a normal mood and affect. His behavior is normal. Judgment and thought content normal.   Nursing note and vitals reviewed.    Independent Review of Radiographic Studies:    Have independently reviewed the PET CT scan performed on 7/1/2019 which demonstrates a large perihilar mass with invasion into the mediastinum, right hilum and left hilum narrowing the right mainstem bronchus measuring approximately 11 cm that is intensely hypermetabolic.  There are multiple subcentimeter pulmonary nodules throughout the right lung.  There is a 1.1 cm nodule in the left parotid gland.  Assessment/Plan   Mr. Smith is a pleasant 69-year-old gentleman with a newly diagnosed small cell lung cancer.  He was seen in conjunction with Dr. Thai Morel today in multidisciplinary clinic.  He will need a port placement to facilitate chemotherapy treatment for this small cell.  We will plan to do that next week.  Risks and benefits of the port were discussed with . and Ms. Smith today.    Diagnoses and all orders for this visit:    Small cell lung cancer (CMS/HCC)

## 2019-07-03 NOTE — PROGRESS NOTES
REFERRING PROVIDER:    Paty Saldana, APRN  [address]  [address]  [place] [address]    REASON FOR CONSULTATION: Small cell lung cancer    HISTORY OF PRESENT ILLNESS:  Julia Smith III is a 69 y.o. male who is referred today small cell lung cancer.    He has a history of hearing loss at least in part secondary to the history of mastoiditis.  This is bilateral but left greater than right.  He has a history of some numbness in his toes dating back to the fall 2018.  Right greater than left.  Unknown etiology.    He developed worsening cough since around October 2018 that previously was associated with some hemoptysis but this has improved.  He has had worsening shortness of breath since that time as well.  He has lost about 30 pounds unintentionally during this time.  Previously he was smoking 1 to 2 packs/day but currently is smoking less than 1 pack/day.  He notes some mild blurred vision within the past few weeks but he denies any other neurologic symptoms.    He had a chest x-ray on 5/30/2019 that was abnormal showing widening of the mid lower mediastinum with masslike enlargement of the right hilum and right basilar pulmonary opacification.  Follow-up CT imaging was performed on 6/4/2019 showing a 13 cm right hilar/mediastinal mass with complete occlusion of the right interlobar bronchus as well as the right middle and lower lobe bronchi.  There is extensive lymphadenopathy extending to the left hilum as well as atelectasis of the right lower lobe and a 3 cm masslike airspace consolidation there as well.  Other smaller opacities were present and felt to be indeterminate.  Bulky mediastinal lymphadenopathy with mass-effect on the SVC, significantly narrowed.  Right chest wall collaterals were noted.    He was seen by pulmonology and on 6/19/2019 had bronchoscopy performed with pathology of a right mainstem endobronchial lesion showing small cell carcinoma.    A PET scan was performed  confirming a large FDG avid mass within the right perihilar lung with invasion of the mediastinum and left hilum.  FDG avid left infrahilar lymphadenopathy as well.  Scattered subcentimeter pulmonary nodules concerning but not FDG avid.  1.1 cm nodule within the left parotid gland.  Mild FDG avid nodule in the right parotid gland.        Past Medical History:   Diagnosis Date   • Hearing loss    • Mastoiditis        Past Surgical History:   Procedure Laterality Date   • BRONCHOSCOPY N/A 6/19/2019    Procedure: BRONCHOSCOPY WITH WASHINGS AND BIOPSY AND ENDOBRONCHIAL ULTRASOUND WITH FNA;  Surgeon: Arslan Marquez MD;  Location: [facility] ENDOSCOPY;  Service: Pulmonary   • INNER EAR SURGERY Left    • MASTOIDECTOMY Left 1994       SOCIAL HISTORY:   reports that he has been smoking.  He has a 60.00 pack-year smoking history. He has never used smokeless tobacco. He reports that he drinks about 2.4 - 4.2 oz of alcohol per week. He reports that he uses drugs. Drug: Marijuana.    FAMILY HISTORY:  family history includes COPD in his father; Heart disease in his brother; No Known Problems in his brother, brother, brother, mother, and sister.    ALLERGIES:  No Known Allergies    MEDICATIONS:  The medication list has been reviewed with the patient by the medical assistant, and the list has been updated in the electronic medical record, which I reviewed.  Medication dosages and frequencies were confirmed to be accurate.    Review of Systems   Review of Systems   Constitutional: Positive for fatigue. Negative for appetite change, chills, fever and unexpected weight change.   HENT: Negative for congestion, dental problem, ear pain, hearing loss, mouth sores, nosebleeds, postnasal drip, rhinorrhea, tinnitus and trouble swallowing.    Eyes: Negative.    Respiratory: Positive for cough and shortness of breath. Negative for chest tightness, wheezing and stridor.    Cardiovascular: Negative for chest pain, palpitations and leg  "swelling.   Gastrointestinal: Negative for abdominal distention, abdominal pain, blood in stool, constipation, diarrhea, nausea and vomiting.   Endocrine: Negative.    Genitourinary: Negative for decreased urine volume, difficulty urinating, dysuria, flank pain, frequency, hematuria, scrotal swelling, testicular pain and urgency.   Musculoskeletal: Negative for arthralgias, back pain and joint swelling.   Allergic/Immunologic: Negative.    Neurological: Negative for dizziness, seizures, syncope, weakness, light-headedness, numbness and headaches.   Hematological: Negative for adenopathy. Does not bruise/bleed easily.   Psychiatric/Behavioral: Negative for confusion and sleep disturbance. The patient is not nervous/anxious.    All other systems reviewed and are negative.      Vitals:    07/03/19 1030   BP: 126/75   Pulse: 95   Resp: 16   Temp: 97.6 °F (36.4 °C)   TempSrc: Oral   SpO2: 96%  Comment: room air   Weight: 78.8 kg (173 lb 11.2 oz)   Height: 177.8 cm (70\")       Physical Exam   Constitutional: He is oriented to person, place, and time. He appears well-developed and well-nourished. No distress.   HENT:   Head: Normocephalic and atraumatic. Hair is normal.   Mouth/Throat: Oropharynx is clear and moist.   Eyes: Conjunctivae, EOM and lids are normal. Pupils are equal.   Neck: Neck supple. No JVD present. No thyroid mass and no thyromegaly present.   Cardiovascular: Normal rate and regular rhythm. Exam reveals no gallop and no friction rub.   No murmur heard.  Pulmonary/Chest: Effort normal and breath sounds normal. No respiratory distress. He has no wheezes. He has no rales.   Abdominal: Soft. He exhibits no distension and no mass. There is no splenomegaly or hepatomegaly. There is no tenderness. There is no guarding.   Musculoskeletal: Normal range of motion. He exhibits no edema, tenderness or deformity.   Lymphadenopathy:     He has no cervical adenopathy.     He has no axillary adenopathy.        Right: No " inguinal and no supraclavicular adenopathy present.        Left: No inguinal and no supraclavicular adenopathy present.   Neurological: He is alert and oriented to person, place, and time. He has normal strength.   Skin: Skin is warm and dry. No rash noted.   Psychiatric: He has a normal mood and affect. His behavior is normal. Judgment and thought content normal. Cognition and memory are normal.   Nursing note and vitals reviewed.      DIAGNOSTIC DATA:    Results for orders placed or performed during the hospital encounter of 07/01/19   POC Glucose Once   Result Value Ref Range    Glucose 110 70 - 130 mg/dL       IMAGING:    PET scan from 7/1/2019 images personally reviewed.    IMPRESSION:  1.  Large intensely FDG avid mass centered within the right perihilar  lung with associated invasion at the mediastinum and left hilum and  causes severe to complete narrowing of portions of the entire right  bronchial tree as well as the proximal left main bronchus as above.  Intensely FDG avid left perihilar lung nodule likely represents  metastatic disease. Moderate to intensely FDG avid left infrahilar lymph  node also likely represents metastatic disease.  2.  Scattered subcentimeter pulmonary nodules are present within the  right lung and highly concerning for metastatic disease. There is  irregular pulmonary opacification extending from the mass into the right  middle and lower lobes which has worsened since 06/04/2019. Findings  represent atelectasis and/or lymphangitic spread of malignancy.  Continued close attention on follow-up is recommended. Pneumonia is felt  to be less likely given the relative lack of FDG uptake within these  areas.  3.  Intensely FDG avid 1.1 cm nodule within the left parotid gland.  Unfortunately these finding are nonspecific as they could represent  metastatic disease versus primary parotid neoplasm. This lesion is  amenable to percutaneous biopsy if clinically indicated. At least  continued  close attention on follow-up is recommended.   4.  Mild FDG avid subcentimeter nodule within the right parotid gland is  present and while findings are favored to represent a low-grade primary  parotid lesion, they are nonspecific and continued close attention on  follow-up of this area is recommended.  5.  Other findings as above.    ASSESSMENT:  This is a 69 y.o. male with:  1.  Small cell lung cancer originating in the right hilum: He has an extremely large mass there with concern for SVC compression on CT imaging from 6/4/2019.  There is some left hilar adenopathy.  No obvious distant metastatic disease but there are some parotid lesions which are concerning but indeterminate.  He will need a Mediport.  We will discuss his case at our multidisciplinary thoracic conference next week.  I will refer him to radiation oncology to see if radiation is appropriate due to the SVC narrowing.  Outside of the fact that his SVC is narrowed, my thought was to proceed with chemotherapy first and consider adding radiation after couple of cycles of therapy with carboplatin and etoposide.  We would certainly need to keep an eye on the parotid lesions and he has some other small areas in the lungs as well that are concerning.  Alternatively, if radiation is not going to be possible, we would then proceed with therapy with carboplatin, etoposide, and atezolizumab.    2.  Chronic hearing loss: History of mastoiditis.  He is not a candidate for cisplatin because of this.    PLAN:   1.  I have asked Dr. Gallardo to place a Mediport for us.  2.  Chemotherapy education at this point for carboplatin and etoposide but if he is not going to be a candidate for radiation we will add atezolizumab as well.  3.  Refer to radiation oncology  4.  I would like to get started with therapy soon, hopefully within 2 weeks.

## 2019-07-05 ENCOUNTER — HOSPITAL ENCOUNTER (INPATIENT)
Facility: HOSPITAL | Age: 69
LOS: 5 days | Discharge: HOME | End: 2019-07-10
Attending: EMERGENCY MEDICINE | Admitting: INTERNAL MEDICINE

## 2019-07-05 ENCOUNTER — APPOINTMENT (OUTPATIENT)
Dept: GENERAL RADIOLOGY | Facility: HOSPITAL | Age: 69
End: 2019-07-05

## 2019-07-05 ENCOUNTER — APPOINTMENT (OUTPATIENT)
Dept: CT IMAGING | Facility: HOSPITAL | Age: 69
End: 2019-07-05

## 2019-07-05 ENCOUNTER — APPOINTMENT (OUTPATIENT)
Dept: PREADMISSION TESTING | Facility: HOSPITAL | Age: 69
End: 2019-07-05

## 2019-07-05 VITALS
SYSTOLIC BLOOD PRESSURE: 110 MMHG | DIASTOLIC BLOOD PRESSURE: 68 MMHG | WEIGHT: 175 LBS | HEIGHT: 71 IN | OXYGEN SATURATION: 97 % | BODY MASS INDEX: 24.5 KG/M2 | TEMPERATURE: 98.2 F | HEART RATE: 92 BPM | RESPIRATION RATE: 20 BRPM

## 2019-07-05 DIAGNOSIS — C34.90 SMALL CELL LUNG CANCER (HCC): Chronic | ICD-10-CM

## 2019-07-05 DIAGNOSIS — C80.1 SMALL CELL CARCINOMA (HCC): ICD-10-CM

## 2019-07-05 DIAGNOSIS — R91.8 LUNG MASS: ICD-10-CM

## 2019-07-05 DIAGNOSIS — R79.1 ABNORMAL COAGULATION PROFILE: ICD-10-CM

## 2019-07-05 DIAGNOSIS — I48.91 ATRIAL FIBRILLATION WITH RVR (HCC): Primary | ICD-10-CM

## 2019-07-05 DIAGNOSIS — J18.9 PNEUMONIA OF RIGHT LUNG DUE TO INFECTIOUS ORGANISM, UNSPECIFIED PART OF LUNG: ICD-10-CM

## 2019-07-05 DIAGNOSIS — J40 BRONCHITIS: ICD-10-CM

## 2019-07-05 PROBLEM — J44.9 COPD (CHRONIC OBSTRUCTIVE PULMONARY DISEASE) (HCC): Chronic | Status: ACTIVE | Noted: 2019-07-05

## 2019-07-05 LAB
ABO GROUP BLD: NORMAL
ALBUMIN SERPL-MCNC: 3.5 G/DL (ref 3.5–5.2)
ALBUMIN/GLOB SERPL: 0.8 G/DL
ALP SERPL-CCNC: 69 U/L (ref 39–117)
ALT SERPL W P-5'-P-CCNC: 41 U/L (ref 1–41)
ANION GAP SERPL CALCULATED.3IONS-SCNC: 11.7 MMOL/L (ref 5–15)
ANION GAP SERPL CALCULATED.3IONS-SCNC: 12.6 MMOL/L (ref 5–15)
APTT PPP: 33 SECONDS (ref 22.7–35.4)
AST SERPL-CCNC: 31 U/L (ref 1–40)
BASOPHILS # BLD AUTO: 0.06 10*3/MM3 (ref 0–0.2)
BASOPHILS # BLD AUTO: 0.1 10*3/MM3 (ref 0–0.2)
BASOPHILS NFR BLD AUTO: 0.7 % (ref 0–1.5)
BASOPHILS NFR BLD AUTO: 1.2 % (ref 0–1.5)
BILIRUB SERPL-MCNC: 0.4 MG/DL (ref 0.2–1.2)
BLD GP AB SCN SERPL QL: NEGATIVE
BUN BLD-MCNC: 11 MG/DL (ref 8–23)
BUN BLD-MCNC: 11 MG/DL (ref 8–23)
BUN/CREAT SERPL: 11.3 (ref 7–25)
BUN/CREAT SERPL: 12.4 (ref 7–25)
CALCIUM SPEC-SCNC: 10.7 MG/DL (ref 8.6–10.5)
CALCIUM SPEC-SCNC: 10.7 MG/DL (ref 8.6–10.5)
CHLORIDE SERPL-SCNC: 94 MMOL/L (ref 98–107)
CHLORIDE SERPL-SCNC: 94 MMOL/L (ref 98–107)
CO2 SERPL-SCNC: 24.4 MMOL/L (ref 22–29)
CO2 SERPL-SCNC: 26.3 MMOL/L (ref 22–29)
CREAT BLD-MCNC: 0.89 MG/DL (ref 0.76–1.27)
CREAT BLD-MCNC: 0.97 MG/DL (ref 0.76–1.27)
DEPRECATED RDW RBC AUTO: 48.2 FL (ref 37–54)
DEPRECATED RDW RBC AUTO: 48.9 FL (ref 37–54)
EOSINOPHIL # BLD AUTO: 0.07 10*3/MM3 (ref 0–0.4)
EOSINOPHIL # BLD AUTO: 0.09 10*3/MM3 (ref 0–0.4)
EOSINOPHIL NFR BLD AUTO: 0.9 % (ref 0.3–6.2)
EOSINOPHIL NFR BLD AUTO: 1.1 % (ref 0.3–6.2)
ERYTHROCYTE [DISTWIDTH] IN BLOOD BY AUTOMATED COUNT: 15 % (ref 12.3–15.4)
ERYTHROCYTE [DISTWIDTH] IN BLOOD BY AUTOMATED COUNT: 15.1 % (ref 12.3–15.4)
GFR SERPL CREATININE-BSD FRML MDRD: 77 ML/MIN/1.73
GFR SERPL CREATININE-BSD FRML MDRD: 85 ML/MIN/1.73
GLOBULIN UR ELPH-MCNC: 4.6 GM/DL
GLUCOSE BLD-MCNC: 111 MG/DL (ref 65–99)
GLUCOSE BLD-MCNC: 150 MG/DL (ref 65–99)
HCT VFR BLD AUTO: 44.2 % (ref 37.5–51)
HCT VFR BLD AUTO: 45.5 % (ref 37.5–51)
HGB BLD-MCNC: 14.1 G/DL (ref 13–17.7)
HGB BLD-MCNC: 14.2 G/DL (ref 13–17.7)
HOLD SPECIMEN: NORMAL
HOLD SPECIMEN: NORMAL
IMM GRANULOCYTES # BLD AUTO: 0.03 10*3/MM3 (ref 0–0.05)
IMM GRANULOCYTES # BLD AUTO: 0.04 10*3/MM3 (ref 0–0.05)
IMM GRANULOCYTES NFR BLD AUTO: 0.4 % (ref 0–0.5)
IMM GRANULOCYTES NFR BLD AUTO: 0.5 % (ref 0–0.5)
INR PPP: 1.05 (ref 0.9–1.1)
LYMPHOCYTES # BLD AUTO: 1.02 10*3/MM3 (ref 0.7–3.1)
LYMPHOCYTES # BLD AUTO: 1.14 10*3/MM3 (ref 0.7–3.1)
LYMPHOCYTES NFR BLD AUTO: 12.6 % (ref 19.6–45.3)
LYMPHOCYTES NFR BLD AUTO: 13.8 % (ref 19.6–45.3)
MAGNESIUM SERPL-MCNC: 2.1 MG/DL (ref 1.6–2.4)
MCH RBC QN AUTO: 27.3 PG (ref 26.6–33)
MCH RBC QN AUTO: 28.3 PG (ref 26.6–33)
MCHC RBC AUTO-ENTMCNC: 31 G/DL (ref 31.5–35.7)
MCHC RBC AUTO-ENTMCNC: 32.1 G/DL (ref 31.5–35.7)
MCV RBC AUTO: 88 FL (ref 79–97)
MCV RBC AUTO: 88.2 FL (ref 79–97)
MONOCYTES # BLD AUTO: 0.97 10*3/MM3 (ref 0.1–0.9)
MONOCYTES # BLD AUTO: 1.3 10*3/MM3 (ref 0.1–0.9)
MONOCYTES NFR BLD AUTO: 11.7 % (ref 5–12)
MONOCYTES NFR BLD AUTO: 16 % (ref 5–12)
NEUTROPHILS # BLD AUTO: 5.63 10*3/MM3 (ref 1.7–7)
NEUTROPHILS # BLD AUTO: 5.93 10*3/MM3 (ref 1.7–7)
NEUTROPHILS NFR BLD AUTO: 69.4 % (ref 42.7–76)
NEUTROPHILS NFR BLD AUTO: 71.7 % (ref 42.7–76)
NRBC BLD AUTO-RTO: 0 /100 WBC (ref 0–0.2)
NRBC BLD AUTO-RTO: 0 /100 WBC (ref 0–0.2)
NT-PROBNP SERPL-MCNC: 545.7 PG/ML (ref 5–900)
PLATELET # BLD AUTO: 365 10*3/MM3 (ref 140–450)
PLATELET # BLD AUTO: 377 10*3/MM3 (ref 140–450)
PMV BLD AUTO: 10.2 FL (ref 6–12)
PMV BLD AUTO: 9.4 FL (ref 6–12)
POTASSIUM BLD-SCNC: 4.4 MMOL/L (ref 3.5–5.2)
POTASSIUM BLD-SCNC: 4.9 MMOL/L (ref 3.5–5.2)
PROCALCITONIN SERPL-MCNC: 0.06 NG/ML (ref 0.1–0.25)
PROT SERPL-MCNC: 8.1 G/DL (ref 6–8.5)
PROTHROMBIN TIME: 13.5 SECONDS (ref 11.7–14.2)
RBC # BLD AUTO: 5.02 10*6/MM3 (ref 4.14–5.8)
RBC # BLD AUTO: 5.16 10*6/MM3 (ref 4.14–5.8)
RH BLD: NEGATIVE
SODIUM BLD-SCNC: 131 MMOL/L (ref 136–145)
SODIUM BLD-SCNC: 132 MMOL/L (ref 136–145)
T&S EXPIRATION DATE: NORMAL
TROPONIN T SERPL-MCNC: <0.01 NG/ML (ref 0–0.03)
TROPONIN T SERPL-MCNC: <0.01 NG/ML (ref 0–0.03)
WBC NRBC COR # BLD: 8.11 10*3/MM3 (ref 3.4–10.8)
WBC NRBC COR # BLD: 8.27 10*3/MM3 (ref 3.4–10.8)
WHOLE BLOOD HOLD SPECIMEN: NORMAL
WHOLE BLOOD HOLD SPECIMEN: NORMAL

## 2019-07-05 PROCEDURE — 93005 ELECTROCARDIOGRAM TRACING: CPT | Performed by: INTERNAL MEDICINE

## 2019-07-05 PROCEDURE — 93010 ELECTROCARDIOGRAM REPORT: CPT | Performed by: INTERNAL MEDICINE

## 2019-07-05 PROCEDURE — 85025 COMPLETE CBC W/AUTO DIFF WBC: CPT | Performed by: THORACIC SURGERY (CARDIOTHORACIC VASCULAR SURGERY)

## 2019-07-05 PROCEDURE — 80053 COMPREHEN METABOLIC PANEL: CPT | Performed by: THORACIC SURGERY (CARDIOTHORACIC VASCULAR SURGERY)

## 2019-07-05 PROCEDURE — 87040 BLOOD CULTURE FOR BACTERIA: CPT | Performed by: EMERGENCY MEDICINE

## 2019-07-05 PROCEDURE — 93005 ELECTROCARDIOGRAM TRACING: CPT | Performed by: EMERGENCY MEDICINE

## 2019-07-05 PROCEDURE — 83735 ASSAY OF MAGNESIUM: CPT | Performed by: INTERNAL MEDICINE

## 2019-07-05 PROCEDURE — 25010000002 VANCOMYCIN 10 G RECONSTITUTED SOLUTION: Performed by: INTERNAL MEDICINE

## 2019-07-05 PROCEDURE — 86900 BLOOD TYPING SEROLOGIC ABO: CPT | Performed by: THORACIC SURGERY (CARDIOTHORACIC VASCULAR SURGERY)

## 2019-07-05 PROCEDURE — 71275 CT ANGIOGRAPHY CHEST: CPT

## 2019-07-05 PROCEDURE — 71046 X-RAY EXAM CHEST 2 VIEWS: CPT

## 2019-07-05 PROCEDURE — 85730 THROMBOPLASTIN TIME PARTIAL: CPT | Performed by: THORACIC SURGERY (CARDIOTHORACIC VASCULAR SURGERY)

## 2019-07-05 PROCEDURE — 93005 ELECTROCARDIOGRAM TRACING: CPT

## 2019-07-05 PROCEDURE — 25010000002 CEFTRIAXONE PER 250 MG: Performed by: EMERGENCY MEDICINE

## 2019-07-05 PROCEDURE — 25010000002 PIPERACILLIN SOD-TAZOBACTAM PER 1 G: Performed by: INTERNAL MEDICINE

## 2019-07-05 PROCEDURE — 36415 COLL VENOUS BLD VENIPUNCTURE: CPT

## 2019-07-05 PROCEDURE — 86901 BLOOD TYPING SEROLOGIC RH(D): CPT | Performed by: THORACIC SURGERY (CARDIOTHORACIC VASCULAR SURGERY)

## 2019-07-05 PROCEDURE — 87081 CULTURE SCREEN ONLY: CPT | Performed by: INTERNAL MEDICINE

## 2019-07-05 PROCEDURE — 85610 PROTHROMBIN TIME: CPT | Performed by: THORACIC SURGERY (CARDIOTHORACIC VASCULAR SURGERY)

## 2019-07-05 PROCEDURE — 86850 RBC ANTIBODY SCREEN: CPT | Performed by: THORACIC SURGERY (CARDIOTHORACIC VASCULAR SURGERY)

## 2019-07-05 PROCEDURE — 83880 ASSAY OF NATRIURETIC PEPTIDE: CPT | Performed by: EMERGENCY MEDICINE

## 2019-07-05 PROCEDURE — 84484 ASSAY OF TROPONIN QUANT: CPT | Performed by: INTERNAL MEDICINE

## 2019-07-05 PROCEDURE — 84145 PROCALCITONIN (PCT): CPT | Performed by: INTERNAL MEDICINE

## 2019-07-05 PROCEDURE — 85025 COMPLETE CBC W/AUTO DIFF WBC: CPT | Performed by: EMERGENCY MEDICINE

## 2019-07-05 PROCEDURE — 82565 ASSAY OF CREATININE: CPT | Performed by: INTERNAL MEDICINE

## 2019-07-05 PROCEDURE — 0 IOPAMIDOL PER 1 ML: Performed by: EMERGENCY MEDICINE

## 2019-07-05 PROCEDURE — 84484 ASSAY OF TROPONIN QUANT: CPT | Performed by: EMERGENCY MEDICINE

## 2019-07-05 PROCEDURE — 99285 EMERGENCY DEPT VISIT HI MDM: CPT

## 2019-07-05 RX ORDER — CALCIUM CARBONATE 200(500)MG
1 TABLET,CHEWABLE ORAL 2 TIMES DAILY PRN
Status: DISCONTINUED | OUTPATIENT
Start: 2019-07-05 | End: 2019-07-10 | Stop reason: HOSPADM

## 2019-07-05 RX ORDER — ALBUTEROL SULFATE 2.5 MG/3ML
2.5 SOLUTION RESPIRATORY (INHALATION) ONCE AS NEEDED
Status: COMPLETED | OUTPATIENT
Start: 2019-07-05 | End: 2019-07-10

## 2019-07-05 RX ORDER — SODIUM CHLORIDE 0.9 % (FLUSH) 0.9 %
3 SYRINGE (ML) INJECTION EVERY 12 HOURS SCHEDULED
Status: DISCONTINUED | OUTPATIENT
Start: 2019-07-05 | End: 2019-07-09

## 2019-07-05 RX ORDER — ALBUTEROL SULFATE 2.5 MG/3ML
2.5 SOLUTION RESPIRATORY (INHALATION) EVERY 4 HOURS PRN
Status: DISCONTINUED | OUTPATIENT
Start: 2019-07-05 | End: 2019-07-10 | Stop reason: HOSPADM

## 2019-07-05 RX ORDER — ONDANSETRON 4 MG/1
4 TABLET, FILM COATED ORAL EVERY 6 HOURS PRN
Status: DISCONTINUED | OUTPATIENT
Start: 2019-07-05 | End: 2019-07-10 | Stop reason: HOSPADM

## 2019-07-05 RX ORDER — SODIUM CHLORIDE 9 MG/ML
125 INJECTION, SOLUTION INTRAVENOUS CONTINUOUS
Status: ACTIVE | OUTPATIENT
Start: 2019-07-05 | End: 2019-07-06

## 2019-07-05 RX ORDER — CEFTRIAXONE SODIUM 1 G/50ML
1 INJECTION, SOLUTION INTRAVENOUS ONCE
Status: COMPLETED | OUTPATIENT
Start: 2019-07-05 | End: 2019-07-05

## 2019-07-05 RX ORDER — BISACODYL 10 MG
10 SUPPOSITORY, RECTAL RECTAL DAILY PRN
Status: DISCONTINUED | OUTPATIENT
Start: 2019-07-05 | End: 2019-07-10 | Stop reason: HOSPADM

## 2019-07-05 RX ORDER — SODIUM CHLORIDE 0.9 % (FLUSH) 0.9 %
3-10 SYRINGE (ML) INJECTION AS NEEDED
Status: DISCONTINUED | OUTPATIENT
Start: 2019-07-05 | End: 2019-07-10 | Stop reason: HOSPADM

## 2019-07-05 RX ORDER — DOCUSATE SODIUM 100 MG/1
100 CAPSULE, LIQUID FILLED ORAL 2 TIMES DAILY PRN
Status: DISCONTINUED | OUTPATIENT
Start: 2019-07-05 | End: 2019-07-10 | Stop reason: HOSPADM

## 2019-07-05 RX ORDER — IPRATROPIUM BROMIDE AND ALBUTEROL SULFATE 2.5; .5 MG/3ML; MG/3ML
3 SOLUTION RESPIRATORY (INHALATION)
Status: DISCONTINUED | OUTPATIENT
Start: 2019-07-05 | End: 2019-07-10 | Stop reason: HOSPADM

## 2019-07-05 RX ORDER — ONDANSETRON 2 MG/ML
4 INJECTION INTRAMUSCULAR; INTRAVENOUS EVERY 6 HOURS PRN
Status: DISCONTINUED | OUTPATIENT
Start: 2019-07-05 | End: 2019-07-10 | Stop reason: HOSPADM

## 2019-07-05 RX ORDER — NITROGLYCERIN 0.4 MG/1
0.4 TABLET SUBLINGUAL
Status: DISCONTINUED | OUTPATIENT
Start: 2019-07-05 | End: 2019-07-10 | Stop reason: HOSPADM

## 2019-07-05 RX ORDER — ACETAMINOPHEN 325 MG/1
650 TABLET ORAL EVERY 6 HOURS PRN
Status: DISCONTINUED | OUTPATIENT
Start: 2019-07-05 | End: 2019-07-10 | Stop reason: HOSPADM

## 2019-07-05 RX ORDER — NICOTINE 21 MG/24HR
1 PATCH, TRANSDERMAL 24 HOURS TRANSDERMAL EVERY 24 HOURS
Status: DISCONTINUED | OUTPATIENT
Start: 2019-07-05 | End: 2019-07-10 | Stop reason: HOSPADM

## 2019-07-05 RX ADMIN — METOPROLOL TARTRATE 25 MG: 25 TABLET ORAL at 15:37

## 2019-07-05 RX ADMIN — IOPAMIDOL 100 ML: 755 INJECTION, SOLUTION INTRAVENOUS at 12:37

## 2019-07-05 RX ADMIN — SODIUM CHLORIDE 1000 ML: 9 INJECTION, SOLUTION INTRAVENOUS at 11:36

## 2019-07-05 RX ADMIN — SODIUM CHLORIDE, PRESERVATIVE FREE 3 ML: 5 INJECTION INTRAVENOUS at 20:25

## 2019-07-05 RX ADMIN — CEFTRIAXONE SODIUM 1 G: 1 INJECTION, SOLUTION INTRAVENOUS at 15:38

## 2019-07-05 RX ADMIN — TAZOBACTAM SODIUM AND PIPERACILLIN SODIUM 3.38 G: 375; 3 INJECTION, SOLUTION INTRAVENOUS at 20:26

## 2019-07-05 RX ADMIN — VANCOMYCIN HYDROCHLORIDE 1500 MG: 10 INJECTION, POWDER, LYOPHILIZED, FOR SOLUTION INTRAVENOUS at 20:26

## 2019-07-05 RX ADMIN — SODIUM CHLORIDE 125 ML/HR: 9 INJECTION, SOLUTION INTRAVENOUS at 20:24

## 2019-07-05 RX ADMIN — METOPROLOL TARTRATE 5 MG: 5 INJECTION INTRAVENOUS at 11:26

## 2019-07-05 RX ADMIN — NICOTINE 1 PATCH: 21 PATCH, EXTENDED RELEASE TRANSDERMAL at 20:24

## 2019-07-05 NOTE — PROGRESS NOTES
"Pharmacokinetic Consult - Vancomycin & Zosyn Dosing (Initial Note)    Julia Smith III is a 69 y.o. male who is on day 1 pharmacy to dose vancomycin & Zosyn for PNA.  Pharmacy dosing per Dr. Garcia's request.   Goal vancomycin trough: 15-20 mg/L    Relevant clinical data and objective history reviewed:  172.7 cm (68\")  77.8 kg (171 lb 9.6 oz)  Body mass index is 26.09 kg/m².     He has a past medical history of Cancer (CMS/HCC) (06/2019), Chronic cough, Hearing loss, History of shingles, Lower back pain, and Mastoiditis.    Allergies as of 07/05/2019   • (No Known Allergies)     Vital Signs (last 24 hours)       07/04 0700  -  07/05 0659 07/05 0700  -  07/05 1833   Most Recent    Temp (°F)   97.6 -  98.2     97.7 (36.5)    Heart Rate   73 -  (!)179     74    Resp   14 -  20     16    BP   (!)77/51 -  120/72     120/71    SpO2 (%)   95 -  98     98        Estimated Creatinine Clearance: 88.6 mL/min (by C-G formula based on SCr of 0.89 mg/dL).  Results from last 7 days   Lab Units 07/05/19  1117 07/05/19  1028   CREATININE mg/dL 0.89 0.97     Results from last 7 days   Lab Units 07/05/19  1117 07/05/19  1028   WBC 10*3/mm3 8.11 8.27     Baseline culture/source/susceptibility:   SCx x1, BCx x2 ordered     Assessment/Plan  1) Vancomycin 1500 mg (20 mg/kg) IV q12h. Trough previously ordered for 30 minutes prior to the 4th overall dose on 7/7 at 0700.   2) Zosyn 3.375 g IV q8h infused over 4 hours.    3) Will monitor serum creatinine every 24 hours for the first 3 days then at least every 48 hours per dosing recommendations. Would recommend daily monitoring of SCr while on both vancomycin and Zosyn due to increased risk of nephrotoxicity while on both antibiotics.   4) Encourage hydration as allowed by MD to help prevent toxic accumulation; monitor for s/sxn of toxicity including increase in SCr and decrease in UOP.    Pharmacy will continue to follow daily while on vancomycin & Zosyn and adjust as needed.     Thank " you for this consult,    Ramsey Adam, PharmD, GUI, BCPS

## 2019-07-05 NOTE — DISCHARGE INSTRUCTIONS
Take the following medications the morning of surgery with a small sip of water:  NONE    ARRIVAL TIME 7:30 AM    General Instructions:  • Do not eat solid food after midnight the night before surgery.  • You may drink clear liquids day of surgery but must stop at least one hour before your hospital arrival time.  • It is beneficial for you to have a clear drink that contains carbohydrates the day of surgery.  We suggest a 12 to 20 ounce bottle of Gatorade or Powerade for non-diabetic patients or a 12 to 20 ounce bottle of G2 or Powerade Zero for diabetic patients. (Pediatric patients, are not advised to drink a 12 to 20 ounce carbohydrate drink)    Clear liquids are liquids you can see through.  Nothing red in color.     Plain water                               Sports drinks  Sodas                                   Gelatin (Jell-O)  Fruit juices without pulp such as white grape juice and apple juice  Popsicles that contain no fruit or yogurt  Tea or coffee (no cream or milk added)  Gatorade / Powerade  G2 / Powerade Zero    • Infants may have breast milk up to four hours before surgery.  • Infants drinking formula may drink formula up to six hours before surgery.   • Patients who avoid smoking, chewing tobacco and alcohol for 4 weeks prior to surgery have a reduced risk of post-operative complications.  Quit smoking as many days before surgery as you can.  • Do not smoke, use chewing tobacco or drink alcohol the day of surgery.   • If applicable bring your C-PAP/ BI-PAP machine.  • Bring any papers given to you in the doctor’s office.  • Wear clean comfortable clothes and socks.  • Do not wear contact lenses, false eyelashes or make-up.  Bring a case for your glasses.   • Bring crutches or walker if applicable.  • Remove all piercings.  Leave jewelry and any other valuables at home.  • Hair extensions with metal clips must be removed prior to surgery.  • The Pre-Admission Testing nurse will instruct you to bring  medications if unable to obtain an accurate list in Pre-Admission Testing.        If you were given a blood bank ID arm band remember to bring it with you the day of surgery.    Preventing a Surgical Site Infection:  • For 2 to 3 days before surgery, avoid shaving with a razor because the razor can irritate skin and make it easier to develop an infection.    • Any areas of open skin can increase the risk of a post-operative wound infection by allowing bacteria to enter and travel throughout the body.  Notify your surgeon if you have any skin wounds / rashes even if it is not near the expected surgical site.  The area will need assessed to determine if surgery should be delayed until it is healed.  • The night prior to surgery sleep in a clean bed with clean clothing.  Do not allow pets to sleep with you.  • Shower on the morning of surgery using a fresh bar of anti-bacterial soap (such as Dial) and clean washcloth.  Dry with a clean towel and dress in clean clothing.  • Ask your surgeon if you will be receiving antibiotics prior to surgery.  • Make sure you, your family, and all healthcare providers clean their hands with soap and water or an alcohol based hand  before caring for you or your wound.    Day of surgery:  Upon arrival, a Pre-op nurse and Anesthesiologist will review your health history, obtain vital signs, and answer questions you may have.  The only belongings needed at this time will be a list of your home medications and if applicable your C-PAP/BI-PAP machine.  If you are staying overnight your family can leave the rest of your belongings in the car and bring them to your room later.  A Pre-op nurse will start an IV and you may receive medication in preparation for surgery, including something to help you relax.  Your family will be able to see you in the Pre-op area.  While you are in surgery your family should notify the waiting room  if they leave the waiting room area and  provide a contact phone number.    Please be aware that surgery does come with discomfort.  We want to make every effort to control your discomfort so please discuss any uncontrolled symptoms with your nurse.   Your doctor will most likely have prescribed pain medications.      If you are going home after surgery you will receive individualized written care instructions before being discharged.  A responsible adult must drive you to and from the hospital on the day of your surgery and stay with you for 24 hours.    If you are staying overnight following surgery, you will be transported to your hospital room following the recovery period.  [facility] has all private rooms.    You have received a list of surgical assistants for your reference.  If you have any questions please call Pre-Admission Testing at [phone].  Deductibles and co-payments are collected on the day of service. Please be prepared to pay the required co-pay, deductible or deposit on the day of service as defined by your plan.

## 2019-07-05 NOTE — ED PROVIDER NOTES
EMERGENCY DEPARTMENT ENCOUNTER    Room Number:  11/11  PCP: Paty Saldana APRN  Historian: patient  History Limited By: the patient is a poor historian.      HPI  Chief Complaint: palpitations  Context: Julia Smith III is a 69 y.o. male who presents to the ED c/o palpitations (rapid HR) which nursing staff noticed while he was at [facility] for preoperative testing. The patient reports he has known hx of metastatic lung cancer and is scheduled to have a cath placed next week. The patient also complains of shortness of breath for the past 1.5 months but reports he was unaware of any palpitations or rapid heart rate earlier today. The patient denies chest pain, chest pressure, or chest tightness. The patient denies hx of hypertension, hyperlipidemia, and diabetes. The patient admits to smoking. There are no other complaints at this time. The patient's HR is 172 currently.      Location: cardiac  Character: palpitations (rapid HR)  Duration: noticed PTA  Severity: moderate  Progression: not specified  Aggravating Factors: none specified  Alleviating Factors: none specified        MEDICAL RECORD REVIEW    The patient has no pertinent recent records in [facility].          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     Resolved Ambulatory Problems     Diagnosis Date Noted   • No Resolved Ambulatory Problems     Past Medical History:   Diagnosis Date   • Cancer (CMS/HCC) 06/2019   • Chronic cough    • Hearing loss    • History of shingles    • Lower back pain    • Mastoiditis          PAST SURGICAL HISTORY  Past Surgical History:   Procedure Laterality Date   • BRONCHOSCOPY N/A 6/19/2019    Procedure: BRONCHOSCOPY WITH WASHINGS AND BIOPSY AND ENDOBRONCHIAL ULTRASOUND WITH FNA;  Surgeon: Arslan Marquez MD;  Location: [facility]  ENDOSCOPY;  Service: Pulmonary   • INNER EAR SURGERY Left    • MASTOIDECTOMY Left          FAMILY HISTORY  Family History   Problem Relation Age of Onset   • No Known Problems Mother    • COPD Father            • No Known Problems Sister    • Heart disease Brother    • No Known Problems Brother    • No Known Problems Brother    • No Known Problems Brother          SOCIAL HISTORY  Social History     Socioeconomic History   • Marital status:      Spouse name: Krystle   • Number of children: 2   • Years of education: Not on file   • Highest education level: Not on file   Occupational History   • Occupation: [employer]   Tobacco Use   • Smoking status: Current Every Day Smoker     Packs/day: 1.50     Years: 40.00     Pack years: 60.00     Types: Cigarettes   • Smokeless tobacco: Never Used   • Tobacco comment: Since age 32   Substance and Sexual Activity   • Alcohol use: Yes     Alcohol/week: 2.4 - 4.2 oz     Types: 4 - 7 Cans of beer per week     Comment: daily   • Drug use: Yes     Types: Marijuana   • Sexual activity: Not Currently     Partners: Female         ALLERGIES  Patient has no known allergies.        REVIEW OF SYSTEMS  Review of Systems   Constitutional: Negative for activity change, appetite change and fever.   HENT: Negative for congestion and sore throat.    Eyes: Negative.    Respiratory: Positive for shortness of breath. Negative for cough and chest tightness.    Cardiovascular: Positive for chest pain (rapid HR). Negative for leg swelling.   Gastrointestinal: Negative for abdominal pain, diarrhea and vomiting.   Endocrine: Negative.    Genitourinary: Negative for decreased urine volume and dysuria.   Musculoskeletal: Negative for neck pain.   Skin: Negative for rash and wound.   Allergic/Immunologic: Negative.    Neurological: Negative for weakness, numbness and headaches.   Hematological: Negative.    Psychiatric/Behavioral: Negative.    All other systems reviewed and are  negative.           PHYSICAL EXAM  ED Triage Vitals [07/05/19 1106]   Temp Heart Rate Resp BP SpO2   97.6 °F (36.4 °C) 82 18 -- 98 %      Temp src Heart Rate Source Patient Position BP Location FiO2 (%)   Tympanic Monitor -- -- --       Physical Exam   Constitutional: He is oriented to person, place, and time. No distress.   HENT:   Head: Normocephalic and atraumatic.   Eyes: EOM are normal. Pupils are equal, round, and reactive to light.   Neck: Normal range of motion. Neck supple.   Cardiovascular: Normal heart sounds. An irregular rhythm present. Tachycardia present. Exam reveals no gallop and no friction rub.   No murmur heard.  Pulmonary/Chest: Effort normal and breath sounds normal. No respiratory distress. He has no decreased breath sounds. He has no wheezes. He has no rhonchi. He has no rales. He exhibits no tenderness.   Abdominal: Soft. Bowel sounds are normal. He exhibits no distension and no mass. There is no tenderness. There is no rebound and no guarding.   Musculoskeletal: Normal range of motion. He exhibits no edema.   Neurological: He is alert and oriented to person, place, and time. He has normal sensation and normal strength.   Skin: Skin is warm and dry.   Psychiatric: Mood and affect normal.   Nursing note and vitals reviewed.          LAB RESULTS  Recent Results (from the past 24 hour(s))   Basic metabolic panel    Collection Time: 07/05/19 10:28 AM   Result Value Ref Range    Glucose 150 (H) 65 - 99 mg/dL    BUN 11 8 - 23 mg/dL    Creatinine 0.97 0.76 - 1.27 mg/dL    Sodium 131 (L) 136 - 145 mmol/L    Potassium 4.9 3.5 - 5.2 mmol/L    Chloride 94 (L) 98 - 107 mmol/L    CO2 24.4 22.0 - 29.0 mmol/L    Calcium 10.7 (H) 8.6 - 10.5 mg/dL    eGFR Non African Amer 77 >60 mL/min/1.73    BUN/Creatinine Ratio 11.3 7.0 - 25.0    Anion Gap 12.6 5.0 - 15.0 mmol/L   APTT    Collection Time: 07/05/19 10:28 AM   Result Value Ref Range    PTT 33.0 22.7 - 35.4 seconds   Protime-INR    Collection Time: 07/05/19  10:28 AM   Result Value Ref Range    Protime 13.5 11.7 - 14.2 Seconds    INR 1.05 0.90 - 1.10   Type and screen    Collection Time: 07/05/19 10:28 AM   Result Value Ref Range    ABO Type O     RH type Negative     Antibody Screen Negative     T&S Expiration Date 7/19/2019 11:59:00 PM    CBC Auto Differential    Collection Time: 07/05/19 10:28 AM   Result Value Ref Range    WBC 8.27 3.40 - 10.80 10*3/mm3    RBC 5.16 4.14 - 5.80 10*6/mm3    Hemoglobin 14.1 13.0 - 17.7 g/dL    Hematocrit 45.5 37.5 - 51.0 %    MCV 88.2 79.0 - 97.0 fL    MCH 27.3 26.6 - 33.0 pg    MCHC 31.0 (L) 31.5 - 35.7 g/dL    RDW 15.1 12.3 - 15.4 %    RDW-SD 48.9 37.0 - 54.0 fl    MPV 10.2 6.0 - 12.0 fL    Platelets 377 140 - 450 10*3/mm3    Neutrophil % 71.7 42.7 - 76.0 %    Lymphocyte % 13.8 (L) 19.6 - 45.3 %    Monocyte % 11.7 5.0 - 12.0 %    Eosinophil % 1.1 0.3 - 6.2 %    Basophil % 1.2 0.0 - 1.5 %    Immature Grans % 0.5 0.0 - 0.5 %    Neutrophils, Absolute 5.93 1.70 - 7.00 10*3/mm3    Lymphocytes, Absolute 1.14 0.70 - 3.10 10*3/mm3    Monocytes, Absolute 0.97 (H) 0.10 - 0.90 10*3/mm3    Eosinophils, Absolute 0.09 0.00 - 0.40 10*3/mm3    Basophils, Absolute 0.10 0.00 - 0.20 10*3/mm3    Immature Grans, Absolute 0.04 0.00 - 0.05 10*3/mm3    nRBC 0.0 0.0 - 0.2 /100 WBC   Light Blue Top    Collection Time: 07/05/19 11:17 AM   Result Value Ref Range    Extra Tube hold for add-on    Green Top (Gel)    Collection Time: 07/05/19 11:17 AM   Result Value Ref Range    Extra Tube Hold for add-ons.    Lavender Top    Collection Time: 07/05/19 11:17 AM   Result Value Ref Range    Extra Tube hold for add-on    Gold Top - SST    Collection Time: 07/05/19 11:17 AM   Result Value Ref Range    Extra Tube Hold for add-ons.    Comprehensive Metabolic Panel    Collection Time: 07/05/19 11:17 AM   Result Value Ref Range    Glucose 111 (H) 65 - 99 mg/dL    BUN 11 8 - 23 mg/dL    Creatinine 0.89 0.76 - 1.27 mg/dL    Sodium 132 (L) 136 - 145 mmol/L    Potassium 4.4 3.5  - 5.2 mmol/L    Chloride 94 (L) 98 - 107 mmol/L    CO2 26.3 22.0 - 29.0 mmol/L    Calcium 10.7 (H) 8.6 - 10.5 mg/dL    Total Protein 8.1 6.0 - 8.5 g/dL    Albumin 3.50 3.50 - 5.20 g/dL    ALT (SGPT) 41 1 - 41 U/L    AST (SGOT) 31 1 - 40 U/L    Alkaline Phosphatase 69 39 - 117 U/L    Total Bilirubin 0.4 0.2 - 1.2 mg/dL    eGFR Non African Amer 85 >60 mL/min/1.73    Globulin 4.6 gm/dL    A/G Ratio 0.8 g/dL    BUN/Creatinine Ratio 12.4 7.0 - 25.0    Anion Gap 11.7 5.0 - 15.0 mmol/L   Troponin    Collection Time: 07/05/19 11:17 AM   Result Value Ref Range    Troponin T <0.010 0.000 - 0.030 ng/mL   BNP    Collection Time: 07/05/19 11:17 AM   Result Value Ref Range    proBNP 545.7 5.0 - 900.0 pg/mL   CBC Auto Differential    Collection Time: 07/05/19 11:17 AM   Result Value Ref Range    WBC 8.11 3.40 - 10.80 10*3/mm3    RBC 5.02 4.14 - 5.80 10*6/mm3    Hemoglobin 14.2 13.0 - 17.7 g/dL    Hematocrit 44.2 37.5 - 51.0 %    MCV 88.0 79.0 - 97.0 fL    MCH 28.3 26.6 - 33.0 pg    MCHC 32.1 31.5 - 35.7 g/dL    RDW 15.0 12.3 - 15.4 %    RDW-SD 48.2 37.0 - 54.0 fl    MPV 9.4 6.0 - 12.0 fL    Platelets 365 140 - 450 10*3/mm3    Neutrophil % 69.4 42.7 - 76.0 %    Lymphocyte % 12.6 (L) 19.6 - 45.3 %    Monocyte % 16.0 (H) 5.0 - 12.0 %    Eosinophil % 0.9 0.3 - 6.2 %    Basophil % 0.7 0.0 - 1.5 %    Immature Grans % 0.4 0.0 - 0.5 %    Neutrophils, Absolute 5.63 1.70 - 7.00 10*3/mm3    Lymphocytes, Absolute 1.02 0.70 - 3.10 10*3/mm3    Monocytes, Absolute 1.30 (H) 0.10 - 0.90 10*3/mm3    Eosinophils, Absolute 0.07 0.00 - 0.40 10*3/mm3    Basophils, Absolute 0.06 0.00 - 0.20 10*3/mm3    Immature Grans, Absolute 0.03 0.00 - 0.05 10*3/mm3    nRBC 0.0 0.0 - 0.2 /100 WBC       Ordered the above labs and reviewed the results.        RADIOLOGY  XR Chest 2 View   Final Result   Minimal likely atelectasis at the right base.   Redemonstration of mediastinal/right hilar mass.       This report was finalized on 7/5/2019 12:25 PM by Dr. Umang WONG  KAELA Manuel          CT Angiogram Chest With Contrast    (Results Pending)        Ordered the above noted radiological studies. Reviewed by me in PACS.  Spoke with Dr. Oanh Crow (radiologist) regarding CT/MRI scan results.          PROCEDURES  Procedures      EKG:          EKG time: 1054  Rhythm/Rate: atrial flutter, 178  QRS, axis: RBBB   ST and T waves: ST depression in V1-V3     Interpreted Contemporaneously by me, independently viewed.  There is no prior for comparison.      EKG          EKG time: 1151  Rhythm/Rate: NSR, 74  P waves and AZ: nml; nml  QRS, axis: RBBB   ST and T waves: ST depression in V1-V3     Interpreted Contemporaneously by me, independently viewed  A flutter has resolved when compared to EKG from earlier today.      MEDICATIONS GIVEN IN ER  Medications   metoprolol tartrate (LOPRESSOR) tablet 25 mg (not administered)   cefTRIAXone (ROCEPHIN) IVPB 1 g (not administered)   azithromycin (ZITHROMAX) 500 mg 0.9% NaCl (Add-vantage) 250 mL (not administered)   metoprolol tartrate (LOPRESSOR) injection 5 mg (5 mg Intravenous Given 7/5/19 1126)   sodium chloride 0.9 % bolus 1,000 mL (0 mL Intravenous Stopped 7/5/19 1521)   iopamidol (ISOVUE-370) 76 % injection 100 mL (100 mL Intravenous Given by Other 7/5/19 1237)             PROGRESS AND CONSULTS  1122 Ordered blood work and CXR for further evaluation. Ordered 5 mg of Metoprolol to treat tachycardia.    1136 Ordered IV Fluids for hydration. Also ordered EKG for further evaluation.    1207 Ordered CTA Chest for further evaluation.    1219 The patient has received Metoprolol. HR is currently 73.    1356 Received a call from Dr. Oanh Crow (Radiology) who stated the CTA Chest shows a tumor which is probably new metastatic disease.    1357 Placed call to Cardiology for consult.    1442 Rechecked the patient who is resting comfortably and in NAD. Vital signs are stable.  BP- 98/64 HR- 78 Temp- 97.6 °F (36.4 °C) (Tympanic) O2 sat- 95%. Informed the  patient of his CTA Chest which shows a tumor that is probably new metastatic disease. Discussed the plan for admission for further evaluation and management. Pt understands and agrees with the plan, all questions answered.    1445 Received a call from Dr. Robel Aiken (Cardiology) and discussed pt's case. Dr. Robel Aiken recommended that I consult Oncology/Hematology.    1448 Placed call to Oncology/Hematology for consult.    1510 Received a call from Dr. Morel (Oncology) and discussed pt's case. Dr. Morel agreed with plan to admit.  Discussed with Dr. Garcia who will admit..      MEDICAL DECISION MAKING      MDM  Number of Diagnoses or Management Options     Amount and/or Complexity of Data Reviewed  Clinical lab tests: ordered and reviewed (Unremarkable. Troponin is <0.010.)  Tests in the radiology section of CPT®: ordered and reviewed (CXR shows minimal likely atelectasis at the right base. Redemonstration of mediastinal/right hilar mass. CTA Chest shows a tumor which is probably new metastatic disease.)  Tests in the medicine section of CPT®: ordered and reviewed (See procedure notes for EKG interpretation.)  Discussion of test results with the performing providers: yes (Dr. Oanh Crow (Radiology))  Decide to obtain previous medical records or to obtain history from someone other than the patient: yes (Epic)  Review and summarize past medical records: yes (The patient has no pertinent recent records in Epic.)  Discuss the patient with other providers: yes (Dr. Robel Aiken (Cardiology))  Independent visualization of images, tracings, or specimens: yes    Patient Progress  Patient progress: stable             DIAGNOSIS  Final diagnoses:   Atrial fibrillation with RVR (CMS/HCC)   Lung mass   Pneumonia of right lung due to infectious organism, unspecified part of lung           DISPOSITION  Admit        Latest Documented Vital Signs:  As of 3:25 PM  BP- 101/59 HR- 76 Temp- 97.6 °F (36.4 °C) (Tympanic) O2 sat-  95%        --  Documentation assistance provided by casey Handy for Dr. Marisol MD.  Information recorded by the scribe was done at my direction and has been verified and validated by me.       Lizzeth Handy  07/05/19 3524       Lorenzo Damon MD  07/05/19 5980

## 2019-07-05 NOTE — H&P
PCP: Paty Saldana APRN    Chief complaint   Chief Complaint   Patient presents with   • Rapid Heart Rate     Pt was supposed to have mediport placed next week and was having PAT. EKG shows rapid heart rate. Pt complains of SOA.       HPI  Patient is a 69 y.o. male presents with with a recent diagnosis of small cell lung cancer who was in an outpatient getting labs for a port placement where he is found to have a heart rate of 170 with blood pressures in the 70s. Transferred to the ER and given IV metoprolol where the patient converted to normal sinus rhythm.  Blood pressures improved when heart rate improved.  Patient states he did not even feel his heart rate going that fast.    Patient had a  bronchoscopy a couple weeks ago due to hemoptysis.  He also had a PET scan about a week ago.  Patient's wife says that he has a chronic cough but is been worse over the last week or so.  It had been bloody but also some yellow and thickened tinge.  Has not noticed himself wheezing but did go to the urgent care back in mid May and received steroids and a prescription for  mini nebs however he does not own a nebulizer machine.      PAST MEDICAL HISTORY  Past Medical History:   Diagnosis Date   • Cancer (CMS/HCC) 2019    Right lung   • Chronic cough    • Hearing loss    • History of shingles    • Lower back pain    • Mastoiditis        PAST SURGICAL HISTORY  Past Surgical History:   Procedure Laterality Date   • BRONCHOSCOPY N/A 2019    Procedure: BRONCHOSCOPY WITH WASHINGS AND BIOPSY AND ENDOBRONCHIAL ULTRASOUND WITH FNA;  Surgeon: Arslan Marquez MD;  Location: [facility] ENDOSCOPY;  Service: Pulmonary   • INNER EAR SURGERY Left    • MASTOIDECTOMY Left        FAMILY HISTORY  Family History   Problem Relation Age of Onset   • No Known Problems Mother    • COPD Father            • No Known Problems Sister    • Heart disease Brother    • No Known Problems Brother    • No Known Problems Brother   "  • No Known Problems Brother        SOCIAL HISTORY  Social History     Tobacco Use   • Smoking status: Current Every Day Smoker     Packs/day: 1.50     Years: 40.00     Pack years: 60.00     Types: Cigarettes   • Smokeless tobacco: Never Used   • Tobacco comment: Since age 32   Substance Use Topics   • Alcohol use: Yes     Alcohol/week: 2.4 - 4.2 oz     Types: 4 - 7 Cans of beer per week     Comment: daily   • Drug use: Yes     Types: Marijuana       MEDICATIONS:  Facility-Administered Medications Prior to Admission   Medication Dose Route Frequency Provider Last Rate Last Dose   • ipratropium-albuterol (DUO-NEB) nebulizer solution 3 mL  3 mL Nebulization 4x Daily - RT Igor RomaBERTO Durham   3 mL at 05/13/19 1625     No medications prior to admission.       Allergies:  Patient has no known allergies.    Review of Systems:  fatigue Negative for following (except as per HPI):  Constitution: chills, fevers,   Eyes: change of vision, loss of vision and discharge  ENT: ear drainage, ear ringing and facial trauma  Respiratory: cough, pleuritic pain, shortness of air  Cardiovascular: chest pressure, pain, lower extremity edema, palpitations  Gastrointestinal: constipation, diarrhea, nausea, vomiting, pain    Integument: rash and wound  Hematologic / Lymphatic: excessive bleeding and easy bruising  Musculoskeletal: joint pain, joint stiffness, joint swelling and muscle pain  Neurological: headaches, numbness, seizures and tremors  Behavioral / Psych: anxiety, depression and hallucinations         Vital Signs  Temp:  [97.6 °F (36.4 °C)-98.2 °F (36.8 °C)] 97.7 °F (36.5 °C)  Heart Rate:  [] 74  Resp:  [14-20] 16  BP: ()/(51-72) 120/71  Flowsheet Rows      First Filed Value   Admission Height  180.3 cm (71\") Documented at 07/05/2019 1106   Admission Weight  80.7 kg (178 lb) Documented at 07/05/2019 1121         Body mass index is 26.09 kg/m².    Physical Exam:  General Appearance:    Alert, cooperative, in no acute " distress   Head:    Normocephalic, without obvious abnormality, atraumatic   Eyes:         conjunctivae and sclerae normal, no icterus, PERRLA   ENT:    Ears grossly intact, oral mucosa moist,   Neck:   No adenopathy, supple, trachea midline,    Back:     Normal to inspection, range of motion normal   Lungs:     Decreased BS right, occas rhonichi respirations regular but tachypnic, even and                   unlabored    Heart:    Regular rhythm and normal rate currently,  no murmur, normal S1 and SPLIT S2,   Abdomen:     Normal bowel sounds, no masses,  soft non-tender, non-distended,    Extremities:   Moves all extremities well, no cyanosis, no edema,             Pulses:   Pulses palpable and equal bilaterally   Skin:   No bleeding, rash, bruising ; lipoma on back   Neurologic:    Psych:   Cranial nerves 2 - 12 grossly intact, sensation grossly intact,     Moves all extremities well, equal bilateral strength    Alert and Oriented x 3, Normal Affect   LABS:  Admission on 07/05/2019   Component Date Value Ref Range Status   • Extra Tube 07/05/2019 hold for add-on   Final    Auto resulted   • Extra Tube 07/05/2019 Hold for add-ons.   Final    Auto resulted.   • Extra Tube 07/05/2019 hold for add-on   Final    Auto resulted   • Extra Tube 07/05/2019 Hold for add-ons.   Final    Auto resulted.   • Glucose 07/05/2019 111* 65 - 99 mg/dL Final   • BUN 07/05/2019 11  8 - 23 mg/dL Final   • Creatinine 07/05/2019 0.89  0.76 - 1.27 mg/dL Final   • Sodium 07/05/2019 132* 136 - 145 mmol/L Final   • Potassium 07/05/2019 4.4  3.5 - 5.2 mmol/L Final   • Chloride 07/05/2019 94* 98 - 107 mmol/L Final   • CO2 07/05/2019 26.3  22.0 - 29.0 mmol/L Final   • Calcium 07/05/2019 10.7* 8.6 - 10.5 mg/dL Final   • Total Protein 07/05/2019 8.1  6.0 - 8.5 g/dL Final   • Albumin 07/05/2019 3.50  3.50 - 5.20 g/dL Final   • ALT (SGPT) 07/05/2019 41  1 - 41 U/L Final   • AST (SGOT) 07/05/2019 31  1 - 40 U/L Final   • Alkaline Phosphatase 07/05/2019  69  39 - 117 U/L Final   • Total Bilirubin 07/05/2019 0.4  0.2 - 1.2 mg/dL Final   • eGFR Non African Amer 07/05/2019 85  >60 mL/min/1.73 Final   • Globulin 07/05/2019 4.6  gm/dL Final   • A/G Ratio 07/05/2019 0.8  g/dL Final   • BUN/Creatinine Ratio 07/05/2019 12.4  7.0 - 25.0 Final   • Anion Gap 07/05/2019 11.7  5.0 - 15.0 mmol/L Final   • Troponin T 07/05/2019 <0.010  0.000 - 0.030 ng/mL Final   • proBNP 07/05/2019 545.7  5.0 - 900.0 pg/mL Final   • WBC 07/05/2019 8.11  3.40 - 10.80 10*3/mm3 Final   • RBC 07/05/2019 5.02  4.14 - 5.80 10*6/mm3 Final   • Hemoglobin 07/05/2019 14.2  13.0 - 17.7 g/dL Final   • Hematocrit 07/05/2019 44.2  37.5 - 51.0 % Final   • MCV 07/05/2019 88.0  79.0 - 97.0 fL Final   • MCH 07/05/2019 28.3  26.6 - 33.0 pg Final   • MCHC 07/05/2019 32.1  31.5 - 35.7 g/dL Final   • RDW 07/05/2019 15.0  12.3 - 15.4 % Final   • RDW-SD 07/05/2019 48.2  37.0 - 54.0 fl Final   • MPV 07/05/2019 9.4  6.0 - 12.0 fL Final   • Platelets 07/05/2019 365  140 - 450 10*3/mm3 Final   • Neutrophil % 07/05/2019 69.4  42.7 - 76.0 % Final   • Lymphocyte % 07/05/2019 12.6* 19.6 - 45.3 % Final   • Monocyte % 07/05/2019 16.0* 5.0 - 12.0 % Final   • Eosinophil % 07/05/2019 0.9  0.3 - 6.2 % Final   • Basophil % 07/05/2019 0.7  0.0 - 1.5 % Final   • Immature Grans % 07/05/2019 0.4  0.0 - 0.5 % Final   • Neutrophils, Absolute 07/05/2019 5.63  1.70 - 7.00 10*3/mm3 Final   • Lymphocytes, Absolute 07/05/2019 1.02  0.70 - 3.10 10*3/mm3 Final   • Monocytes, Absolute 07/05/2019 1.30* 0.10 - 0.90 10*3/mm3 Final   • Eosinophils, Absolute 07/05/2019 0.07  0.00 - 0.40 10*3/mm3 Final   • Basophils, Absolute 07/05/2019 0.06  0.00 - 0.20 10*3/mm3 Final   • Immature Grans, Absolute 07/05/2019 0.03  0.00 - 0.05 10*3/mm3 Final   • nRBC 07/05/2019 0.0  0.0 - 0.2 /100 WBC Final   Appointment on 07/05/2019   Component Date Value Ref Range Status   • Glucose 07/05/2019 150* 65 - 99 mg/dL Final   • BUN 07/05/2019 11  8 - 23 mg/dL Final   •  Creatinine 07/05/2019 0.97  0.76 - 1.27 mg/dL Final   • Sodium 07/05/2019 131* 136 - 145 mmol/L Final   • Potassium 07/05/2019 4.9  3.5 - 5.2 mmol/L Final   • Chloride 07/05/2019 94* 98 - 107 mmol/L Final   • CO2 07/05/2019 24.4  22.0 - 29.0 mmol/L Final   • Calcium 07/05/2019 10.7* 8.6 - 10.5 mg/dL Final   • eGFR Non African Amer 07/05/2019 77  >60 mL/min/1.73 Final   • BUN/Creatinine Ratio 07/05/2019 11.3  7.0 - 25.0 Final   • Anion Gap 07/05/2019 12.6  5.0 - 15.0 mmol/L Final   • PTT 07/05/2019 33.0  22.7 - 35.4 seconds Final   • Protime 07/05/2019 13.5  11.7 - 14.2 Seconds Final   • INR 07/05/2019 1.05  0.90 - 1.10 Final   • ABO Type 07/05/2019 O   Final   • RH type 07/05/2019 Negative   Final   • Antibody Screen 07/05/2019 Negative   Final   • T&S Expiration Date 07/05/2019 7/19/2019 11:59:00 PM   Final   • WBC 07/05/2019 8.27  3.40 - 10.80 10*3/mm3 Final   • RBC 07/05/2019 5.16  4.14 - 5.80 10*6/mm3 Final   • Hemoglobin 07/05/2019 14.1  13.0 - 17.7 g/dL Final   • Hematocrit 07/05/2019 45.5  37.5 - 51.0 % Final   • MCV 07/05/2019 88.2  79.0 - 97.0 fL Final   • MCH 07/05/2019 27.3  26.6 - 33.0 pg Final   • MCHC 07/05/2019 31.0* 31.5 - 35.7 g/dL Final   • RDW 07/05/2019 15.1  12.3 - 15.4 % Final   • RDW-SD 07/05/2019 48.9  37.0 - 54.0 fl Final   • MPV 07/05/2019 10.2  6.0 - 12.0 fL Final   • Platelets 07/05/2019 377  140 - 450 10*3/mm3 Final   • Neutrophil % 07/05/2019 71.7  42.7 - 76.0 % Final   • Lymphocyte % 07/05/2019 13.8* 19.6 - 45.3 % Final   • Monocyte % 07/05/2019 11.7  5.0 - 12.0 % Final   • Eosinophil % 07/05/2019 1.1  0.3 - 6.2 % Final   • Basophil % 07/05/2019 1.2  0.0 - 1.5 % Final   • Immature Grans % 07/05/2019 0.5  0.0 - 0.5 % Final   • Neutrophils, Absolute 07/05/2019 5.93  1.70 - 7.00 10*3/mm3 Final   • Lymphocytes, Absolute 07/05/2019 1.14  0.70 - 3.10 10*3/mm3 Final   • Monocytes, Absolute 07/05/2019 0.97* 0.10 - 0.90 10*3/mm3 Final   • Eosinophils, Absolute 07/05/2019 0.09  0.00 - 0.40  10*3/mm3 Final   • Basophils, Absolute 07/05/2019 0.10  0.00 - 0.20 10*3/mm3 Final   • Immature Grans, Absolute 07/05/2019 0.04  0.00 - 0.05 10*3/mm3 Final   • nRBC 07/05/2019 0.0  0.0 - 0.2 /100 WBC Final       DIAGNOSTICS:  Xr Chest 2 View    Result Date: 7/5/2019  Minimal likely atelectasis at the right base. Redemonstration of mediastinal/right hilar mass.  This report was finalized on 7/5/2019 12:25 PM by Dr. Umang Manuel M.D.      Ct Angiogram Chest With Contrast    Result Date: 7/5/2019  1. There is no convincing evidence for pulmonary thromboemboli. 2. There is no significant change in the approximately 12 cm right hilar/mediastinal mass narrowing the right mainstem bronchus, occluding the right intermediate bronchus, right middle and lower lobe bronchi. Narrowing of the chiquita and the proximal aspect of the left mainstem bronchus appears largely unchanged. The airspace consolidations at the right lower lobe and mild volume loss within the right middle lobe and right lower lobe appear largely unchanged. There are new small patchy ground glass opacities within the right middle lobe and right lower lobe and there has been increase in size of a 3.4 cm ground glass opacity at the right upper lobe. The ground glass opacities are of uncertain etiology. The appearance is not suggestive of postobstructive pneumonia or aspiration. The ground glass opacities may represent metastases. 3. The 1.5 cm perihilar left lower lobe pulmonary nodule is stable. The left hilar and mediastinal lymphadenopathy is stable as well.  Discussed with Dr. Damon.             Results Review:   I reviewed the patient's new clinical results.  Discussed with ER physician  I personally viewed and interpreted the patient's EKG/Telemetry data-A. fib with rapid ventricular rate, now normal sinus rhythm  Old records reviewed see above.    ASSESSMENT AND PLAN     + NEW onsetAtrial fibrillation with RVR with secondary hypotension  -Patient  has become stabilized.  Cardiology was called and recommended no anti-coagulation at this time and a low-dose beta-blocker for rate control.  --We will do serial cardiac enzymes and EKGs  -Telemetry  -Consult cardiology  -Check TSH in the morning and electrolytes  -Order echocardiogram    + small cell lung cancer that narrowing the right mainstem bronchus/Narrowing of the chiquita and the proximal aspect of the left mainstem bronchus/ atelectasis;   -Concerning due to it being close to the atria and possibly causing irritability/early SVC syndrome  -Consult oncology  -They are aware and have been in discussions with radiation oncology  -Needs a port placed.  Dr. Morel had scheduled for the ninth     +  Pneumonia  -I was told that the patient had postobstructive pneumonia and that there was a desire to treat with antibiotics-the patient was originally started on azithromycin and Rocephin however due to the patient having a bronchoscopy as well as having a history of COPD the patient is at risk for Pseudomonas.  -Bld cultures,  sputum cultures, IV antibiotics,    -Incentive spirometer  -pulm eval  -Final read on CT says that may be more mets than post obstructive    +COPD-patient's O2 sats around 94%, will start scheduled mini nebs.  Patient will need a walking oximetry before discharge, will likely need to have inhalers (or need to get a nebulizer machine beofre d/c)  -pulm can weight in on further tx    +DVT proph: scd's  +Medical decision maker:    I discussed the patients findings and my recommendations with the patient and/or family.  Please reference all orders placed.  Critical care time spend 42 min  Milka Garcia MD  07/05/19  6:26 PM

## 2019-07-06 ENCOUNTER — APPOINTMENT (OUTPATIENT)
Dept: CARDIOLOGY | Facility: HOSPITAL | Age: 69
End: 2019-07-06

## 2019-07-06 LAB
ANION GAP SERPL CALCULATED.3IONS-SCNC: 11 MMOL/L (ref 5–15)
AORTIC DIMENSIONLESS INDEX: 0.5 (DI)
BASOPHILS # BLD AUTO: 0.06 10*3/MM3 (ref 0–0.2)
BASOPHILS NFR BLD AUTO: 1 % (ref 0–1.5)
BH CV ECHO MEAS - ACS: 1.2 CM
BH CV ECHO MEAS - AO MAX PG (FULL): 9.6 MMHG
BH CV ECHO MEAS - AO MAX PG: 13.2 MMHG
BH CV ECHO MEAS - AO MEAN PG (FULL): 5 MMHG
BH CV ECHO MEAS - AO MEAN PG: 7 MMHG
BH CV ECHO MEAS - AO ROOT AREA (BSA CORRECTED): 1.8
BH CV ECHO MEAS - AO ROOT AREA: 10.2 CM^2
BH CV ECHO MEAS - AO ROOT DIAM: 3.6 CM
BH CV ECHO MEAS - AO V2 MAX: 182 CM/SEC
BH CV ECHO MEAS - AO V2 MEAN: 119 CM/SEC
BH CV ECHO MEAS - AO V2 VTI: 36.3 CM
BH CV ECHO MEAS - AVA(I,A): 1.7 CM^2
BH CV ECHO MEAS - AVA(I,D): 1.7 CM^2
BH CV ECHO MEAS - AVA(V,A): 1.8 CM^2
BH CV ECHO MEAS - AVA(V,D): 1.8 CM^2
BH CV ECHO MEAS - BSA(HAYCOCK): 2 M^2
BH CV ECHO MEAS - BSA: 2 M^2
BH CV ECHO MEAS - BZI_BMI: 27.4 KILOGRAMS/M^2
BH CV ECHO MEAS - BZI_METRIC_HEIGHT: 172.7 CM
BH CV ECHO MEAS - BZI_METRIC_WEIGHT: 81.6 KG
BH CV ECHO MEAS - EDV(CUBED): 140.6 ML
BH CV ECHO MEAS - EDV(MOD-SP2): 110 ML
BH CV ECHO MEAS - EDV(MOD-SP4): 140 ML
BH CV ECHO MEAS - EDV(TEICH): 129.5 ML
BH CV ECHO MEAS - EF(CUBED): 74.4 %
BH CV ECHO MEAS - EF(MOD-BP): 52 %
BH CV ECHO MEAS - EF(MOD-SP2): 51.8 %
BH CV ECHO MEAS - EF(MOD-SP4): 53.6 %
BH CV ECHO MEAS - EF(TEICH): 65.9 %
BH CV ECHO MEAS - ESV(CUBED): 35.9 ML
BH CV ECHO MEAS - ESV(MOD-SP2): 53 ML
BH CV ECHO MEAS - ESV(MOD-SP4): 65 ML
BH CV ECHO MEAS - ESV(TEICH): 44.1 ML
BH CV ECHO MEAS - FS: 36.5 %
BH CV ECHO MEAS - IVS/LVPW: 1
BH CV ECHO MEAS - IVSD: 1.2 CM
BH CV ECHO MEAS - LAT PEAK E' VEL: 14 CM/SEC
BH CV ECHO MEAS - LV DIASTOLIC VOL/BSA (35-75): 71.6 ML/M^2
BH CV ECHO MEAS - LV MASS(C)D: 248.8 GRAMS
BH CV ECHO MEAS - LV MASS(C)DI: 127.3 GRAMS/M^2
BH CV ECHO MEAS - LV MAX PG: 3.7 MMHG
BH CV ECHO MEAS - LV MEAN PG: 2 MMHG
BH CV ECHO MEAS - LV SYSTOLIC VOL/BSA (12-30): 33.3 ML/M^2
BH CV ECHO MEAS - LV V1 MAX: 96.1 CM/SEC
BH CV ECHO MEAS - LV V1 MEAN: 55 CM/SEC
BH CV ECHO MEAS - LV V1 VTI: 18 CM
BH CV ECHO MEAS - LVIDD: 5.2 CM
BH CV ECHO MEAS - LVIDS: 3.3 CM
BH CV ECHO MEAS - LVLD AP2: 8.1 CM
BH CV ECHO MEAS - LVLD AP4: 8.1 CM
BH CV ECHO MEAS - LVLS AP2: 7 CM
BH CV ECHO MEAS - LVLS AP4: 7.5 CM
BH CV ECHO MEAS - LVOT AREA (M): 3.5 CM^2
BH CV ECHO MEAS - LVOT AREA: 3.5 CM^2
BH CV ECHO MEAS - LVOT DIAM: 2.1 CM
BH CV ECHO MEAS - LVPWD: 1.2 CM
BH CV ECHO MEAS - MED PEAK E' VEL: 9 CM/SEC
BH CV ECHO MEAS - MV A DUR: 0.18 SEC
BH CV ECHO MEAS - MV A MAX VEL: 111 CM/SEC
BH CV ECHO MEAS - MV DEC SLOPE: 1154 CM/SEC^2
BH CV ECHO MEAS - MV DEC TIME: 0.16 SEC
BH CV ECHO MEAS - MV E MAX VEL: 122 CM/SEC
BH CV ECHO MEAS - MV E/A: 1.1
BH CV ECHO MEAS - MV MAX PG: 6.9 MMHG
BH CV ECHO MEAS - MV MEAN PG: 3 MMHG
BH CV ECHO MEAS - MV P1/2T MAX VEL: 131 CM/SEC
BH CV ECHO MEAS - MV P1/2T: 33.2 MSEC
BH CV ECHO MEAS - MV V2 MAX: 131 CM/SEC
BH CV ECHO MEAS - MV V2 MEAN: 87.1 CM/SEC
BH CV ECHO MEAS - MV V2 VTI: 30.5 CM
BH CV ECHO MEAS - MVA P1/2T LCG: 1.7 CM^2
BH CV ECHO MEAS - MVA(P1/2T): 6.6 CM^2
BH CV ECHO MEAS - MVA(VTI): 2 CM^2
BH CV ECHO MEAS - PA ACC TIME: 0.1 SEC
BH CV ECHO MEAS - PA MAX PG (FULL): 2.8 MMHG
BH CV ECHO MEAS - PA MAX PG: 4.1 MMHG
BH CV ECHO MEAS - PA PR(ACCEL): 36.3 MMHG
BH CV ECHO MEAS - PA V2 MAX: 101 CM/SEC
BH CV ECHO MEAS - PULM A REVS DUR: 0.13 SEC
BH CV ECHO MEAS - PULM A REVS VEL: 40.7 CM/SEC
BH CV ECHO MEAS - PULM DIAS VEL: 90.7 CM/SEC
BH CV ECHO MEAS - PULM S/D: 1.1
BH CV ECHO MEAS - PULM SYS VEL: 100 CM/SEC
BH CV ECHO MEAS - PVA(V,A): 3.2 CM^2
BH CV ECHO MEAS - PVA(V,D): 3.2 CM^2
BH CV ECHO MEAS - QP/QS: 0.8
BH CV ECHO MEAS - RAP SYSTOLE: 3 MMHG
BH CV ECHO MEAS - RV MAX PG: 1.3 MMHG
BH CV ECHO MEAS - RV MEAN PG: 1 MMHG
BH CV ECHO MEAS - RV V1 MAX: 56.9 CM/SEC
BH CV ECHO MEAS - RV V1 MEAN: 33.1 CM/SEC
BH CV ECHO MEAS - RV V1 VTI: 8.7 CM
BH CV ECHO MEAS - RVOT AREA: 5.7 CM^2
BH CV ECHO MEAS - RVOT DIAM: 2.7 CM
BH CV ECHO MEAS - RVSP: 18.5 MMHG
BH CV ECHO MEAS - SI(AO): 189.1 ML/M^2
BH CV ECHO MEAS - SI(CUBED): 53.6 ML/M^2
BH CV ECHO MEAS - SI(LVOT): 31.9 ML/M^2
BH CV ECHO MEAS - SI(MOD-SP2): 29.2 ML/M^2
BH CV ECHO MEAS - SI(MOD-SP4): 38.4 ML/M^2
BH CV ECHO MEAS - SI(TEICH): 43.7 ML/M^2
BH CV ECHO MEAS - SV(AO): 369.5 ML
BH CV ECHO MEAS - SV(CUBED): 104.7 ML
BH CV ECHO MEAS - SV(LVOT): 62.3 ML
BH CV ECHO MEAS - SV(MOD-SP2): 57 ML
BH CV ECHO MEAS - SV(MOD-SP4): 75 ML
BH CV ECHO MEAS - SV(RVOT): 49.9 ML
BH CV ECHO MEAS - SV(TEICH): 85.4 ML
BH CV ECHO MEAS - TAPSE (>1.6): 2 CM2
BH CV ECHO MEAS - TR MAX VEL: 197 CM/SEC
BH CV ECHO MEASUREMENTS AVERAGE E/E' RATIO: 10.61
BH CV VAS BP RIGHT ARM: NORMAL MMHG
BH CV XLRA - RV BASE: 2.9 CM
BH CV XLRA - TDI S': 12 CM/SEC
BUN BLD-MCNC: 11 MG/DL (ref 8–23)
BUN/CREAT SERPL: 14.7 (ref 7–25)
CALCIUM SPEC-SCNC: 9.4 MG/DL (ref 8.6–10.5)
CHLORIDE SERPL-SCNC: 102 MMOL/L (ref 98–107)
CO2 SERPL-SCNC: 26 MMOL/L (ref 22–29)
CREAT BLD-MCNC: 0.71 MG/DL (ref 0.76–1.27)
CREAT BLD-MCNC: 0.75 MG/DL (ref 0.76–1.27)
DEPRECATED RDW RBC AUTO: 48.1 FL (ref 37–54)
EOSINOPHIL # BLD AUTO: 0.17 10*3/MM3 (ref 0–0.4)
EOSINOPHIL NFR BLD AUTO: 2.7 % (ref 0.3–6.2)
ERYTHROCYTE [DISTWIDTH] IN BLOOD BY AUTOMATED COUNT: 15 % (ref 12.3–15.4)
GFR SERPL CREATININE-BSD FRML MDRD: 104 ML/MIN/1.73
GFR SERPL CREATININE-BSD FRML MDRD: 111 ML/MIN/1.73
GLUCOSE BLD-MCNC: 89 MG/DL (ref 65–99)
HCT VFR BLD AUTO: 38.1 % (ref 37.5–51)
HGB BLD-MCNC: 12 G/DL (ref 13–17.7)
IMM GRANULOCYTES # BLD AUTO: 0.03 10*3/MM3 (ref 0–0.05)
IMM GRANULOCYTES NFR BLD AUTO: 0.5 % (ref 0–0.5)
LEFT ATRIUM VOLUME INDEX: 28 ML/M2
LEFT ATRIUM VOLUME: 53 CM3
LYMPHOCYTES # BLD AUTO: 1.04 10*3/MM3 (ref 0.7–3.1)
LYMPHOCYTES NFR BLD AUTO: 16.5 % (ref 19.6–45.3)
MAGNESIUM SERPL-MCNC: 2.2 MG/DL (ref 1.6–2.4)
MAXIMAL PREDICTED HEART RATE: 153 BPM
MCH RBC QN AUTO: 27.6 PG (ref 26.6–33)
MCHC RBC AUTO-ENTMCNC: 31.5 G/DL (ref 31.5–35.7)
MCV RBC AUTO: 87.6 FL (ref 79–97)
MONOCYTES # BLD AUTO: 0.89 10*3/MM3 (ref 0.1–0.9)
MONOCYTES NFR BLD AUTO: 14.1 % (ref 5–12)
NEUTROPHILS # BLD AUTO: 4.12 10*3/MM3 (ref 1.7–7)
NEUTROPHILS NFR BLD AUTO: 65.2 % (ref 42.7–76)
NRBC BLD AUTO-RTO: 0 /100 WBC (ref 0–0.2)
PLATELET # BLD AUTO: 310 10*3/MM3 (ref 140–450)
PMV BLD AUTO: 9.7 FL (ref 6–12)
POTASSIUM BLD-SCNC: 4.7 MMOL/L (ref 3.5–5.2)
RBC # BLD AUTO: 4.35 10*6/MM3 (ref 4.14–5.8)
SODIUM BLD-SCNC: 139 MMOL/L (ref 136–145)
STRESS TARGET HR: 130 BPM
TROPONIN T SERPL-MCNC: <0.01 NG/ML (ref 0–0.03)
WBC NRBC COR # BLD: 6.31 10*3/MM3 (ref 3.4–10.8)

## 2019-07-06 PROCEDURE — 99232 SBSQ HOSP IP/OBS MODERATE 35: CPT | Performed by: INTERNAL MEDICINE

## 2019-07-06 PROCEDURE — 25010000002 VANCOMYCIN 10 G RECONSTITUTED SOLUTION: Performed by: INTERNAL MEDICINE

## 2019-07-06 PROCEDURE — 83735 ASSAY OF MAGNESIUM: CPT | Performed by: INTERNAL MEDICINE

## 2019-07-06 PROCEDURE — 94640 AIRWAY INHALATION TREATMENT: CPT

## 2019-07-06 PROCEDURE — 84484 ASSAY OF TROPONIN QUANT: CPT | Performed by: INTERNAL MEDICINE

## 2019-07-06 PROCEDURE — 93306 TTE W/DOPPLER COMPLETE: CPT | Performed by: INTERNAL MEDICINE

## 2019-07-06 PROCEDURE — 87205 SMEAR GRAM STAIN: CPT | Performed by: INTERNAL MEDICINE

## 2019-07-06 PROCEDURE — 93306 TTE W/DOPPLER COMPLETE: CPT

## 2019-07-06 PROCEDURE — 99222 1ST HOSP IP/OBS MODERATE 55: CPT | Performed by: INTERNAL MEDICINE

## 2019-07-06 PROCEDURE — 25010000002 PIPERACILLIN SOD-TAZOBACTAM PER 1 G: Performed by: INTERNAL MEDICINE

## 2019-07-06 PROCEDURE — 85025 COMPLETE CBC W/AUTO DIFF WBC: CPT | Performed by: INTERNAL MEDICINE

## 2019-07-06 PROCEDURE — 80048 BASIC METABOLIC PNL TOTAL CA: CPT | Performed by: INTERNAL MEDICINE

## 2019-07-06 PROCEDURE — 87070 CULTURE OTHR SPECIMN AEROBIC: CPT | Performed by: INTERNAL MEDICINE

## 2019-07-06 PROCEDURE — 93005 ELECTROCARDIOGRAM TRACING: CPT | Performed by: INTERNAL MEDICINE

## 2019-07-06 PROCEDURE — 94799 UNLISTED PULMONARY SVC/PX: CPT

## 2019-07-06 PROCEDURE — 93010 ELECTROCARDIOGRAM REPORT: CPT | Performed by: INTERNAL MEDICINE

## 2019-07-06 RX ORDER — AMOXICILLIN AND CLAVULANATE POTASSIUM 875; 125 MG/1; MG/1
1 TABLET, FILM COATED ORAL EVERY 12 HOURS SCHEDULED
Status: DISCONTINUED | OUTPATIENT
Start: 2019-07-06 | End: 2019-07-10 | Stop reason: HOSPADM

## 2019-07-06 RX ADMIN — SODIUM CHLORIDE, PRESERVATIVE FREE 3 ML: 5 INJECTION INTRAVENOUS at 09:03

## 2019-07-06 RX ADMIN — SODIUM CHLORIDE, PRESERVATIVE FREE 3 ML: 5 INJECTION INTRAVENOUS at 20:07

## 2019-07-06 RX ADMIN — SODIUM CHLORIDE 125 ML/HR: 9 INJECTION, SOLUTION INTRAVENOUS at 19:35

## 2019-07-06 RX ADMIN — AMOXICILLIN AND CLAVULANATE POTASSIUM 1 TABLET: 875; 125 TABLET, FILM COATED ORAL at 20:07

## 2019-07-06 RX ADMIN — IPRATROPIUM BROMIDE AND ALBUTEROL SULFATE 3 ML: 2.5; .5 SOLUTION RESPIRATORY (INHALATION) at 11:16

## 2019-07-06 RX ADMIN — NICOTINE 1 PATCH: 21 PATCH, EXTENDED RELEASE TRANSDERMAL at 17:21

## 2019-07-06 RX ADMIN — VANCOMYCIN HYDROCHLORIDE 1500 MG: 10 INJECTION, POWDER, LYOPHILIZED, FOR SOLUTION INTRAVENOUS at 06:37

## 2019-07-06 RX ADMIN — METOPROLOL TARTRATE 12.5 MG: 25 TABLET ORAL at 09:00

## 2019-07-06 RX ADMIN — TAZOBACTAM SODIUM AND PIPERACILLIN SODIUM 3.38 G: 375; 3 INJECTION, SOLUTION INTRAVENOUS at 01:07

## 2019-07-06 RX ADMIN — IPRATROPIUM BROMIDE AND ALBUTEROL SULFATE 3 ML: 2.5; .5 SOLUTION RESPIRATORY (INHALATION) at 19:51

## 2019-07-06 RX ADMIN — AMOXICILLIN AND CLAVULANATE POTASSIUM 1 TABLET: 875; 125 TABLET, FILM COATED ORAL at 14:03

## 2019-07-06 RX ADMIN — IPRATROPIUM BROMIDE AND ALBUTEROL SULFATE 3 ML: 2.5; .5 SOLUTION RESPIRATORY (INHALATION) at 07:48

## 2019-07-06 RX ADMIN — TAZOBACTAM SODIUM AND PIPERACILLIN SODIUM 3.38 G: 375; 3 INJECTION, SOLUTION INTRAVENOUS at 09:01

## 2019-07-06 RX ADMIN — SODIUM CHLORIDE 125 ML/HR: 9 INJECTION, SOLUTION INTRAVENOUS at 06:38

## 2019-07-06 RX ADMIN — METOPROLOL TARTRATE 12.5 MG: 25 TABLET ORAL at 20:07

## 2019-07-06 RX ADMIN — IPRATROPIUM BROMIDE AND ALBUTEROL SULFATE 3 ML: 2.5; .5 SOLUTION RESPIRATORY (INHALATION) at 16:09

## 2019-07-06 NOTE — PROGRESS NOTES
Subjective     REASON FOR CONSULTATION:     Small cell lung cancer      Provide an opinion on any further workup or treatment                             REQUESTING PHYSICIAN: Dr. Rosas    RECORDS OBTAINED:  Records of the patients history including those obtained from the referring provider were reviewed and summarized in detail.    HISTORY OF PRESENT ILLNESS:  The patient is a 69 y.o. year old male who is here for an opinion about the above issue.    History of Present Illness   HISTORY OF PRESENT ILLNESS:  Julia Smith III is a 69 y.o. male who is referred today small cell lung cancer.     He has a history of hearing loss at least in part secondary to the history of mastoiditis.  This is bilateral but left greater than right.  He has a history of some numbness in his toes dating back to the fall 2018.  Right greater than left.  Unknown etiology.     He developed worsening cough since around October 2018 that previously was associated with some hemoptysis but this has improved.  He has had worsening shortness of breath since that time as well.  He has lost about 30 pounds unintentionally during this time.  Previously he was smoking 1 to 2 packs/day but currently is smoking less than 1 pack/day.  He notes some mild blurred vision within the past few weeks but he denies any other neurologic symptoms.     He had a chest x-ray on 5/30/2019 that was abnormal showing widening of the mid lower mediastinum with masslike enlargement of the right hilum and right basilar pulmonary opacification.  Follow-up CT imaging was performed on 6/4/2019 showing a 13 cm right hilar/mediastinal mass with complete occlusion of the right interlobar bronchus as well as the right middle and lower lobe bronchi.  There is extensive lymphadenopathy extending to the left hilum as well as atelectasis of the right lower lobe and a 3 cm masslike airspace consolidation there as well.  Other smaller opacities were present and felt to be  indeterminate.  Bulky mediastinal lymphadenopathy with mass-effect on the SVC, significantly narrowed.  Right chest wall collaterals were noted.     He was seen by pulmonology and on 6/19/2019 had bronchoscopy performed with pathology of a right mainstem endobronchial lesion showing small cell carcinoma.     A PET scan was performed confirming a large FDG avid mass within the right perihilar lung with invasion of the mediastinum and left hilum.  FDG avid left infrahilar lymphadenopathy as well.  Scattered subcentimeter pulmonary nodules concerning but not FDG avid.  1.1 cm nodule within the left parotid gland.  Mild FDG avid nodule in the right parotid gland.    Patient had gone in the outpatient setting for port placement but he had a heart rate of 170 and blood pressure of 70 that is why he was transferred to the emergency room.  He was given metoprolol and subsequently converted into normal sinus rhythm.  He had come in with atrial fibrillation.  Agent also requires port placed.  Patient had a postobstructive pneumonia and is currently on azithromycin and Rocephin.  Cultures have been drawn.  Her oxygen saturation was 95% and she was started on mini nebs.     Dr. Leal  saw the patient and is considering switching to oral antibiotics.  Currently patient is on Augmentin and Zosyn    Once patient better we may need to transfer to [facility] to start chemotherapy      Past Medical History:   Diagnosis Date   • Cancer (CMS/HCC) 06/2019    Right lung   • Chronic cough    • Hearing loss    • History of shingles    • Lower back pain    • Mastoiditis         Past Surgical History:   Procedure Laterality Date   • BRONCHOSCOPY N/A 6/19/2019    Procedure: BRONCHOSCOPY WITH WASHINGS AND BIOPSY AND ENDOBRONCHIAL ULTRASOUND WITH FNA;  Surgeon: Arslan Marquez MD;  Location: [facility] ENDOSCOPY;  Service: Pulmonary   • INNER EAR SURGERY Left    • MASTOIDECTOMY Left 1994        No current facility-administered medications on file  prior to encounter.      No current outpatient medications on file prior to encounter.        ALLERGIES:  No Known Allergies     Social History     Socioeconomic History   • Marital status:      Spouse name: Krystle   • Number of children: 2   • Years of education: Not on file   • Highest education level: Not on file   Occupational History   • Occupation: [employer]   Tobacco Use   • Smoking status: Current Every Day Smoker     Packs/day: 1.50     Years: 40.00     Pack years: 60.00     Types: Cigarettes   • Smokeless tobacco: Never Used   • Tobacco comment: Since age 32   Substance and Sexual Activity   • Alcohol use: Yes     Alcohol/week: 2.4 - 4.2 oz     Types: 4 - 7 Cans of beer per week     Comment: daily   • Drug use: Yes     Types: Marijuana   • Sexual activity: Not Currently     Partners: Female        Family History   Problem Relation Age of Onset   • No Known Problems Mother    • COPD Father            • No Known Problems Sister    • Heart disease Brother    • No Known Problems Brother    • No Known Problems Brother    • No Known Problems Brother         Review of Systems   Constitutional: Positive for fatigue and unexpected weight change. Negative for appetite change, chills, diaphoresis and fever.   HENT: Negative for hearing loss, sore throat and trouble swallowing.    Respiratory: Positive for shortness of breath. Negative for cough, chest tightness and wheezing.    Cardiovascular: Negative for chest pain, palpitations and leg swelling.   Gastrointestinal: Negative for abdominal distention, abdominal pain, constipation, diarrhea, nausea and vomiting.   Genitourinary: Negative for dysuria, frequency, hematuria and urgency.   Musculoskeletal: Negative for joint swelling.        No muscle weakness.   Skin: Negative for rash and wound.   Neurological: Negative for seizures, syncope, speech difficulty, weakness, numbness and headaches.   Hematological: Negative for adenopathy. Does not  bruise/bleed easily.   Psychiatric/Behavioral: Negative for behavioral problems, confusion and suicidal ideas.        Objective     Vitals:    07/06/19 1123 07/06/19 1423 07/06/19 1609 07/06/19 1615   BP:  133/73     BP Location:  Right arm     Patient Position:  Lying     Pulse: 80 86 87 87   Resp: 16 16 16 16   Temp:  97.9 °F (36.6 °C)     TempSrc:  Oral     SpO2: 100% 98% 97% 100%   Weight:       Height:         No flowsheet data found.    Physical Exam      GENERAL:  Well-developed, well-nourished in no acute distress.   NECK:  Supple with good range of motion; no thyromegaly or masses, no JVD.  LYMPHATICS:  No cervical, supraclavicular, axillary or inguinal adenopathy.  CHEST:  Lungs clear to auscultation. Good airflow.  CARDIAC:  Regular rate and rhythm without murmurs, rubs or gallops. Normal S1,S2.  ABDOMEN:  Soft, nontender with no hepatosplenomegaly or masses.  EXTREMITIES:  No clubbing, cyanosis or edema.  NEUROLOGICAL:  Cranial Nerves II-XII grossly intact.  No focal neurological deficits.  PSYCHIATRIC:  Normal affect and mood.      RECENT LABS:  Hematology WBC   Date Value Ref Range Status   07/06/2019 6.31 3.40 - 10.80 10*3/mm3 Final     RBC   Date Value Ref Range Status   07/06/2019 4.35 4.14 - 5.80 10*6/mm3 Final     Hemoglobin   Date Value Ref Range Status   07/06/2019 12.0 (L) 13.0 - 17.7 g/dL Final     Hematocrit   Date Value Ref Range Status   07/06/2019 38.1 37.5 - 51.0 % Final     Platelets   Date Value Ref Range Status   07/06/2019 310 140 - 450 10*3/mm3 Final      CT angiogram of the chest  FINDINGS: There is no significant change in the approximately 12 x 9 cm  right hilar/mediastinal mass which markedly narrows the right mainstem  bronchus and occludes the right intermediate bronchus, right middle and  lower lobe bronchi. There is also narrowing of the chiquita and the  proximal aspect of the left mainstem bronchus. The mass encases and  narrows the right pulmonary artery. There is no  convincing evidence for  pulmonary thromboemboli. The bulky mass has mass effect on the left  atrium and has significant mass effect on the right pulmonary venous  trunks and the left lower lobe pulmonary vein. The opacified bronchi  within the right middle lobe and right lower lobe appear largely  unchanged. The peribronchial airspace consolidations at the right lower  lobe appear unchanged. There are a few new ill-defined ground glass  opacities within the right middle and lower lobes and there is a  perihilar ground glass opacity at the right upper lobe which is larger  than previously measuring approximately 3.4 cm, previously approximately  2 cm. There is a central nodular density within this ground glass  opacity which is not definitively seen previously. There is no  significant change in the cavitary 1.5 cm nodule in the perihilar region  of the left lower lobe. The left mediastinal and hilar lymphadenopathy  appear largely unchanged.      Assessment/Plan   1.  Small cell lung carcinoma,  originating in the right hilum: He has an extremely large mass there with concern for SVC compression on CT imaging from 6/4/2019.  There is some left hilar adenopathy.  No obvious distant metastatic disease but there are some parotid lesions which are concerning but indeterminate.  No evidence of distant mets.  Patient was seen July 3, 2019 for evaluation by Dr. Morel.  He had referred patient to radiation oncology to see if it is appropriate due to SVC narrowing.  Dr. Morel wanted to start chemotherapy with 2 cycles of etoposide carboplatinum.  Patient did have parotid lesions which were indeterminate.  He wanted to also offer radiation starting after couple of cycles of therapy.  But if radiation is not possible then he was considering carboplatinum etoposide and Tecentriq.  · Patient had gone for port placement at which time he was found to be having atrial fibrillation and got admitted to the hospital.  · Dr. Morel was  planning to give him carboplatinum and etoposide for a couple of cycles before adding radiation.  · We will need to transfer him to [facility] once he is more stable    2.  Chronic hearing loss, history of mastoiditis, not a candidate for cisplatin.    3.  Port placement Dr. Gallardo  was consulted for port placement    4.  Atrial fibrillation now under better control    5.  Pneumonia right lower lobe could be postobstructive on antibiotics, Zosyn and Augmentin    6.  SVC syndrome    Plan 1.  Continue metoprolol for A. Fib    2.  Port placement on Monday    3.  Continue antibiotics    4.  Transfer patient to [facility] when stable so he can get started on chemotherapy with carboplatinum etoposide.    5.  Patient to receive 2 cycles of chemotherapy upfront prior to adding radiation.    6.  We may need to consider doing MRI of the brain when more more stable    Ernestine Parmar MD

## 2019-07-06 NOTE — PROGRESS NOTES
"Pharmacokinetic Consult - Vancomycin    Julia Smith III is a 69 y.o. male who is on full day 1 pharmacy to dose vancomycin for PNA.  Pharmacy dosing per Dr. Garcia's request.   Goal vancomycin trough: 15-20 mg/L  Other antimicrobials: Azithromycin 500mg IV q24h & Zosyn 3.375 gram IV q8h EI    HPI per Dr. Garcia:  Patient is a 69 y.o. male presents with with a recent diagnosis of small cell lung cancer who was in an outpatient getting labs for a port placement where he is found to have a heart rate of 170 with blood pressures in the 70s. Transferred to the ER and given IV metoprolol where the patient converted to normal sinus rhythm.  Blood pressures improved when heart rate improved.  Patient states he did not even feel his heart rate going that fast.     Patient had a  bronchoscopy a couple weeks ago due to hemoptysis.  He also had a PET scan about a week ago.  Patient's wife says that he has a chronic cough but is been worse over the last week or so.  It had been bloody but also some yellow and thickened tinge.  Has not noticed himself wheezing but did go to the urgent care back in mid May and received steroids and a prescription for  mini nebs however he does not own a nebulizer machine.    Cardiology, Heme/Onc, and Pulmonology following    Relevant clinical data and objective history reviewed:  172.7 cm (68\")  81.7 kg (180 lb 1.6 oz)  Body mass index is 27.38 kg/m².     He has a past medical history of Cancer (CMS/[facility]) (06/2019), Chronic cough, Hearing loss, History of shingles, Lower back pain, and Mastoiditis.    Allergies as of 07/05/2019   • (No Known Allergies)     Vitals:    07/06/19 0641 07/06/19 0718 07/06/19 0750 07/06/19 0755   BP:  172/91     BP Location:  Right arm     Patient Position:  Lying     Pulse:  88 86 89   Resp:  18 16 16   Temp:  97.4 °F (36.3 °C)     TempSrc:  Oral     SpO2:  99% 96% 100%   Weight: 81.7 kg (180 lb 1.6 oz)      Height:         Estimated Creatinine Clearance: 103.5 " mL/min (A) (by C-G formula based on SCr of 0.75 mg/dL (L)).  Results from last 7 days   Lab Units 07/06/19  0557 07/05/19  1833 07/05/19  1117   CREATININE mg/dL 0.75* 0.71* 0.89     Results from last 7 days   Lab Units 07/06/19  0557 07/05/19  1117 07/05/19  1028   WBC 10*3/mm3 6.31 8.11 8.27     Baseline culture/source/susceptibility:   7/5 BC pending  7/5 MRSA nares screen pending  7/6 Sputum pending    7/5 PCT 0.06    Recent Vancomycin dosing history:  7/5 2026 1500mg IV x1 (~18mg/kg)  7/6 0637 1500mg IV q12h, trough planned before 4th total dose in AM 7/7    Assessment/Plan  1) Continue Vancomycin 1500mg IV q12h.  Trough level in AM.    2) Pharmacy will discontinue the Pharmacy to Dose consult for Zosyn at this time. Renal function will continue to be monitored and dosing adjustments will be made by pharmacy based on renal function if necessary  3) Daily BMP on order.  Would recommended continuing this as long as patient on Vancomycin + Zosyn.    4) Encourage hydration as allowed by MD to help prevent toxic accumulation; monitor for s/sxn of toxicity including increase in SCr and decrease in UOP.    Pharmacy will continue to follow daily while on vancomycin and adjust as needed.     Thank you for this consult,  Gumaro Dean, PharmD, BCPS

## 2019-07-06 NOTE — PLAN OF CARE
Problem: Pneumonia (Adult)  Goal: Signs and Symptoms of Listed Potential Problems Will be Absent, Minimized or Managed (Pneumonia)  Outcome: Ongoing (interventions implemented as appropriate)      Problem: Arrhythmia/Dysrhythmia (Symptomatic) (Adult)  Goal: Signs and Symptoms of Listed Potential Problems Will be Absent, Minimized or Managed (Arrhythmia/Dysrhythmia)  Outcome: Ongoing (interventions implemented as appropriate)      Problem: Patient Care Overview  Goal: Plan of Care Review  Outcome: Ongoing (interventions implemented as appropriate)   07/06/19 0619   Coping/Psychosocial   Plan of Care Reviewed With patient   Plan of Care Review   Progress no change   OTHER   Outcome Summary New admit, was receiving first chemo for lung mass, went into A-Fib RVR, responded well to medications, right lobe PNA in CXR, on IV antibiotics and fluids, up ad savanna, no c/o of pain or nausea, reg diet, sputum needed, MRSA swab taken, HEMO/ONC, Cardio, Pulm consulted called, VSS, will CTM

## 2019-07-06 NOTE — PROGRESS NOTES
LOS: 1 day     Name: Julia Smith III  Age/Sex: 69 y.o. male  :  1950        PCP: Paty Saldana APRN  Chief Complaint   Patient presents with   • Rapid Heart Rate     Pt was supposed to have mediport placed next week and was having PAT. EKG shows rapid heart rate. Pt complains of SOA.      Subjective   He is feeling okay denies any fevers chills nausea vomiting no chest pain palpitations or shortness of breath.  Feels back to his baseline today.  General: No Fever or Chills, Cardiac: No Chest Pain or Palpitations, Resp: No Cough or SOA, GI: No Nausea, Vomiting, or Diarrhea and Other: No bleeding      amoxicillin-clavulanate 1 tablet Oral Q12H   ipratropium-albuterol 3 mL Nebulization 4x Daily - RT   metoprolol tartrate 12.5 mg Oral Q12H   nicotine 1 patch Transdermal Q24H   sodium chloride 3 mL Intravenous Q12H       sodium chloride 125 mL/hr Last Rate: 125 mL/hr (19 0638)       Objective   Vital Signs  Temp:  [97.4 °F (36.3 °C)-97.9 °F (36.6 °C)] 97.9 °F (36.6 °C)  Heart Rate:  [74-89] 86  Resp:  [16-18] 16  BP: (120-172)/(71-91) 133/73  Body mass index is 27.37 kg/m².    Intake/Output Summary (Last 24 hours) at 2019 1450  Last data filed at 2019 0900  Gross per 24 hour   Intake 2569.17 ml   Output --   Net 2569.17 ml       Physical Exam   Constitutional: He is oriented to person, place, and time. He appears well-developed and well-nourished.   HENT:   Head: Normocephalic and atraumatic.   Cardiovascular: Normal rate, regular rhythm and normal heart sounds.   Pulmonary/Chest: Effort normal and breath sounds normal.   Abdominal: Soft. Bowel sounds are normal.   Neurological: He is alert and oriented to person, place, and time.   Nursing note and vitals reviewed.        Results Review:       I reviewed the patient's new clinical results.  Results from last 7 days   Lab Units 19  0557 19  1117 19  1028   WBC 10*3/mm3 6.31 8.11 8.27   HEMOGLOBIN g/dL 12.0* 14.2  14.1   PLATELETS 10*3/mm3 310 365 377     Results from last 7 days   Lab Units 07/06/19  0557 07/05/19  1833 07/05/19  1117 07/05/19  1028   SODIUM mmol/L 139  --  132* 131*   POTASSIUM mmol/L 4.7  --  4.4 4.9   CHLORIDE mmol/L 102  --  94* 94*   CO2 mmol/L 26.0  --  26.3 24.4   BUN mg/dL 11  --  11 11   CREATININE mg/dL 0.75* 0.71* 0.89 0.97   CALCIUM mg/dL 9.4  --  10.7* 10.7*   MAGNESIUM mg/dL 2.2 2.1  --   --    Estimated Creatinine Clearance: 103.4 mL/min (A) (by C-G formula based on SCr of 0.75 mg/dL (L)).      Assessment/Plan     Small cell lung cancer (CMS/HCC)    Atrial fibrillation with RVR (CMS/HCC)    Pneumonia    COPD (chronic obstructive pulmonary disease) (CMS/[facility])      PLAN  1. New onset atrial fibrillation: He is back in normal sinus rhythm now.  He is responded well to beta blockade.  Cardiology evaluated and looked at his echocardiogram which is fairly unremarkable.  2. Small cell lung cancer: Continue present management  3. Pneumonia: Agree with pulmonary assessment that this is probably not an active bacterial infection.  Agree with de-escalating antibiotic therapy quickly.  4. COPD: Continue supportive care  5. Anemia: Monitor hemoglobin    We need to decide whether or not he will need to stay to have his port placed or whether or not he will be able to go home tomorrow now that his rate is controlled.  Will defer that decision to oncology.    Disposition  Be determined      Garcia Garcia MD  [facility]  07/06/19  2:50 PM

## 2019-07-06 NOTE — CONSULTS
CONSULT NOTE    Patient Identification:  Julia Smith III  69 y.o.  male  1950  [id number]            Requesting physician: Dr Laurence Garcia    Reason for Consultation:  [place], small cell lung cancer, abnormal ct chest    CC: tachycardia    History of Present Illness:  Patient is a 69-year-old with a history of small cell lung cancer newly diagnosed.  Patient was found to be tachycardic tolerate in 170s during preoperative work-up for port placement.  He was evaluated in the emergency room given IV metoprolol and subsequently converted to a normal sinus rhythm.  Good blood pressure.      Upon interview the patient denies any worsening shortness of breath no palpitations no chest pain.  Said he had some sputum production with hemoptysis around his bronchoscopy however this has resolved.  He says he does have a chronic cough however this is been about the same if not better over the past couple days.  He denies any fevers or chills.  Denies any weakness.  Denies any hemoptysis.  States he would have never come in to the emergency room if he had been told to come in due to his heart rate being high.    Denies ever being on routine inhalers.    Review of Systems:  CONSTITUTIONAL:  Denies fevers or chills  EYE:  No new vision changes  EAR:  No change in hearing  CARDIAC:  No chest pain denies palpitations  PULMONARY:  No productive cough or shortness of breath  GI:  No diarrhea, hematemesis or hematochezia,  RENAL:  No dysuria or urinary frequency  MUSCULOSKELETAL:  No musculoskeletal complaints  ENDOCRINE:  No heat or cold intolerance  INTEGUMENTARY: No skin rashes  NEUROLOGICAL:  No dizziness or confusion.  No seizure activity  PSYCHIATRIC:  No new anxiety or depression  12 system review of systems performed and all else negative    Past Medical History:   Diagnosis Date   • Cancer (CMS/[facility]) 06/2019    Right lung   • Chronic cough    • Hearing loss    • History of shingles    • Lower back pain    •  "Mastoiditis        Past Surgical History:   Procedure Laterality Date   • BRONCHOSCOPY N/A 2019    Procedure: BRONCHOSCOPY WITH WASHINGS AND BIOPSY AND ENDOBRONCHIAL ULTRASOUND WITH FNA;  Surgeon: Arslan Marquez MD;  Location: [facility] ENDOSCOPY;  Service: Pulmonary   • INNER EAR SURGERY Left    • MASTOIDECTOMY Left         Facility-Administered Medications Prior to Admission   Medication Dose Route Frequency Provider Last Rate Last Dose   • ipratropium-albuterol (DUO-NEB) nebulizer solution 3 mL  3 mL Nebulization 4x Daily - RT Roma Costa, BERTO   3 mL at 19 1625     No medications prior to admission.       No Known Allergies    Social History     Socioeconomic History   • Marital status:      Spouse name: Krystle   • Number of children: 2   • Years of education: Not on file   • Highest education level: Not on file   Occupational History   • Occupation: [employer]   Tobacco Use   • Smoking status: Current Every Day Smoker     Packs/day: 1.50     Years: 40.00     Pack years: 60.00     Types: Cigarettes   • Smokeless tobacco: Never Used   • Tobacco comment: Since age 32   Substance and Sexual Activity   • Alcohol use: Yes     Alcohol/week: 2.4 - 4.2 oz     Types: 4 - 7 Cans of beer per week     Comment: daily   • Drug use: Yes     Types: Marijuana   • Sexual activity: Not Currently     Partners: Female       Family History   Problem Relation Age of Onset   • No Known Problems Mother    • COPD Father            • No Known Problems Sister    • Heart disease Brother    • No Known Problems Brother    • No Known Problems Brother    • No Known Problems Brother        Physical Exam:  /71 (BP Location: Right arm, Patient Position: Lying)   Pulse 74   Temp 97.6 °F (36.4 °C) (Oral)   Resp 16   Ht 172.7 cm (68\")   Wt 77.8 kg (171 lb 9.6 oz)   SpO2 98%   BMI 26.09 kg/m²   Body mass index is 26.09 kg/m².   General appearance: NAD, conversant   Eyes: anicteric sclerae, moist " conjunctivae; no lid-lag; PERRLA  HENT: Atraumatic; oropharynx clear with moist mucous membranes and no mucosal ulcerations; normal hard and soft palate  Neck: Trachea midline; FROM, supple, no thyromegaly or lymphadenopathy  Lungs: No wheezes no rhonchi with normal respiratory effort and no intercostal retractions  CV: RRR, no MRGs   Abdomen: Soft, non-tender; no masses or HSM  Extremities: No peripheral edema or extremity lymphadenopathy  Skin: Normal temperature, turgor and texture; no rash, ulcers or subcutaneous nodules  Psych: Appropriate affect, alert and oriented to person, place and time    LABS:  Results from last 7 days   Lab Units 07/05/19  1117 07/05/19  1028   WBC 10*3/mm3 8.11 8.27   HEMOGLOBIN g/dL 14.2 14.1   PLATELETS 10*3/mm3 365 377     Results from last 7 days   Lab Units 07/05/19  1833 07/05/19  1117 07/05/19  1028   SODIUM mmol/L  --  132* 131*   POTASSIUM mmol/L  --  4.4 4.9   CHLORIDE mmol/L  --  94* 94*   CO2 mmol/L  --  26.3 24.4   BUN mg/dL  --  11 11   CREATININE mg/dL  --  0.89 0.97   GLUCOSE mg/dL  --  111* 150*   CALCIUM mg/dL  --  10.7* 10.7*   MAGNESIUM mg/dL 2.1  --   --    Estimated Creatinine Clearance: 88.6 mL/min (by C-G formula based on SCr of 0.89 mg/dL).    Imaging: I personally visualized the images of scans/x-rays performed within last 3 days.  Imaging Results (most recent)     Procedure Component Value Units Date/Time    CT Angiogram Chest With Contrast [[id number]] Collected:  07/05/19 1532     Updated:  07/05/19 1532    Narrative:       CT ANGIOGRAM OF THE CHEST. MULTIPLE CORONAL, SAGITTAL, AND 3-D  RECONSTRUCTIONS.     HISTORY: 69-year-old male with shortness of breath. Right upper lobe  small cell lung cancer.     TECHNIQUE: Radiation dose reduction techniques were utilized, including  automated exposure control and exposure modulation based on body size.   CT angiogram of the chest was performed following the administration of  IV contrast. Multiple coronal, sagittal,  and 3-D reconstruction images  were obtained. Compared with PET/CT from 07/01/2019 and chest CT from  06/04/2019.     FINDINGS: There is no significant change in the approximately 12 x 9 cm  right hilar/mediastinal mass which markedly narrows the right mainstem  bronchus and occludes the right intermediate bronchus, right middle and  lower lobe bronchi. There is also narrowing of the chiquita and the  proximal aspect of the left mainstem bronchus. The mass encases and  narrows the right pulmonary artery. There is no convincing evidence for  pulmonary thromboemboli. The bulky mass has mass effect on the left  atrium and has significant mass effect on the right pulmonary venous  trunks and the left lower lobe pulmonary vein. The opacified bronchi  within the right middle lobe and right lower lobe appear largely  unchanged. The peribronchial airspace consolidations at the right lower  lobe appear unchanged. There are a few new ill-defined ground glass  opacities within the right middle and lower lobes and there is a  perihilar ground glass opacity at the right upper lobe which is larger  than previously measuring approximately 3.4 cm, previously approximately  2 cm. There is a central nodular density within this ground glass  opacity which is not definitively seen previously. There is no  significant change in the cavitary 1.5 cm nodule in the perihilar region  of the left lower lobe. The left mediastinal and hilar lymphadenopathy  appear largely unchanged.       Impression:       1. There is no convincing evidence for pulmonary thromboemboli.  2. There is no significant change in the approximately 12 cm right  hilar/mediastinal mass narrowing the right mainstem bronchus, occluding  the right intermediate bronchus, right middle and lower lobe bronchi.  Narrowing of the chiquita and the proximal aspect of the left mainstem  bronchus appears largely unchanged. The airspace consolidations at the  right lower lobe and mild  volume loss within the right middle lobe and  right lower lobe appear largely unchanged. There are new small patchy  ground glass opacities within the right middle lobe and right lower lobe  and there has been increase in size of a 3.4 cm ground glass opacity at  the right upper lobe. The ground glass opacities are of uncertain  etiology. The appearance is not suggestive of postobstructive pneumonia  or aspiration. The ground glass opacities may represent metastases.  3. The 1.5 cm perihilar left lower lobe pulmonary nodule is stable. The  left hilar and mediastinal lymphadenopathy is stable as well.     Discussed with Dr. Damon.       XR Chest 2 View [[id number]] Collected:  07/05/19 1222     Updated:  07/05/19 1228    Narrative:       XR CHEST 2 VW-     HISTORY: Male who is 69 years-old,  short of breath     TECHNIQUE: Frontal and lateral views of the chest     COMPARISON: 05/30/2019     FINDINGS: Heart size is normal. Redemonstration of mediastinal/right  hilar mass. Minimal likely atelectasis at the right base. No pleural  effusion, or pneumothorax. No acute osseous process.       Impression:       Minimal likely atelectasis at the right base.  Redemonstration of mediastinal/right hilar mass.     This report was finalized on 7/5/2019 12:25 PM by Dr. Umang Manuel M.D.           PET 7/1/2019:  IMPRESSION:  1.  Large intensely FDG avid mass centered within the right perihilar  lung with associated invasion at the mediastinum and left hilum and  causes severe to complete narrowing of portions of the entire right  bronchial tree as well as the proximal left main bronchus as above.  Intensely FDG avid left perihilar lung nodule likely represents  metastatic disease. Moderate to intensely FDG avid left infrahilar lymph  node also likely represents metastatic disease.  2.  Scattered subcentimeter pulmonary nodules are present within the  right lung and highly concerning for metastatic disease. There  is  irregular pulmonary opacification extending from the mass into the right  middle and lower lobes which has worsened since 06/04/2019. Findings  represent atelectasis and/or lymphangitic spread of malignancy.  Continued close attention on follow-up is recommended. Pneumonia is felt  to be less likely given the relative lack of FDG uptake within these  areas.  3.  Intensely FDG avid 1.1 cm nodule within the left parotid gland.  Unfortunately these finding are nonspecific as they could represent  metastatic disease versus primary parotid neoplasm. This lesion is  amenable to percutaneous biopsy if clinically indicated. At least  continued close attention on follow-up is recommended.   4.  Mild FDG avid subcentimeter nodule within the right parotid gland is  present and while findings are favored to represent a low-grade primary  parotid lesion, they are nonspecific and continued close attention on  follow-up of this area is recommended.  5.  Other findings as above.    Assessment / Recommendations:  Tobacco abuse  Small Cell lung cancer  Afib RVR  COPD - likely moderate FEV1 53% technically his ratio is not below normal however given that he has some endobronchial obstruction I suspect that his FVC is reduced and there is coexisting obstructive lung disease.  He is asymptomatic at present no soa no increased cough      Patient has a normal white blood cell count no left shift procalcitonin is low no fever no increased cough no increased shortness of breath.  His CT scan likely reflects changes of atelectasis and metastatic lung cancer.  Not quite convinced that we have ongoing pneumonia here.  Patient is completely asymptomatic on my interview.  Laboratory investigations encouraging.  I will recheck a procalcitonin the morning would consider stopping antibiotic therapy.  I suspect his tachycardia is secondary to his tumor burden.    Oncology consultation is noted.  Patient has no shortness of breath at present.  I  reviewed his outpatient pulmonary function testing.  I reviewed his bronchoscopy report his CT imaging personally and his PET scan imaging from July 1.  I will order laboratory investigations.    Oncology outpatient clinic notes reviewed, outpatient LPC notes reviewed.  Continue antibiotics for now however I feel as though we can likely stop these in the near future and less lavatory investigations change her clinical status changes.    Thanks for allowing us to participate in the care of this patient.  LPC is always available to discuss any concerns, questions or to help coordinate the patient's care.      Yang Jones MD  [facility]  07/05/19  9:55 PM

## 2019-07-06 NOTE — CONSULTS
Date of Hospital Visit:19  Encounter Provider: Luci Fu, RN EXTENDER  Place of Service: [facility] CARDIOLOGY  Patient Name: Julia Smith III  :1950  Referral Provider: Milka Garcia,*    Chief complaint: new Afib    History of Present Illness:  Mr. Smith is a 69 year old man with history of small cell lung cancer and tobacco abuse. He was in PAT preparing for mediport placement next week when he was noted to be in rapid Afib/ Aflutter with . He was transported to ED. His troponin was <0.01 and proBNP 545. He was treated with IV and oral metoprolol. He converted to sinus rhythm. His repeat troponin was <0.01. We've been asked to see for new onset Afib.  He was completely asymptomatic with this event and has had no chest pain or pressure and no change in his shortness of breath but denies any history of rheumatic fever, heart attack, heart failure, or heart murmur.    Past Medical History:   Diagnosis Date   • Cancer (CMS/HCC) 2019    Right lung   • Chronic cough    • Hearing loss    • History of shingles    • Lower back pain    • Mastoiditis        Past Surgical History:   Procedure Laterality Date   • BRONCHOSCOPY N/A 2019    Procedure: BRONCHOSCOPY WITH WASHINGS AND BIOPSY AND ENDOBRONCHIAL ULTRASOUND WITH FNA;  Surgeon: Arslan Marquez MD;  Location: [facility] ENDOSCOPY;  Service: Pulmonary   • INNER EAR SURGERY Left    • MASTOIDECTOMY Left        No current facility-administered medications on file prior to encounter.      No current outpatient medications on file prior to encounter.       Social History     Socioeconomic History   • Marital status:      Spouse name: Krystle   • Number of children: 2   • Years of education: Not on file   • Highest education level: Not on file   Occupational History   • Occupation: [employer]   Tobacco Use   • Smoking status: Current Every Day Smoker     Packs/day: 1.50     Years:  "40.00     Pack years: 60.00     Types: Cigarettes   • Smokeless tobacco: Never Used   • Tobacco comment: Since age 32   Substance and Sexual Activity   • Alcohol use: Yes     Alcohol/week: 2.4 - 4.2 oz     Types: 4 - 7 Cans of beer per week     Comment: daily   • Drug use: Yes     Types: Marijuana   • Sexual activity: Not Currently     Partners: Female       Family History   Problem Relation Age of Onset   • No Known Problems Mother    • COPD Father            • No Known Problems Sister    • Heart disease Brother    • No Known Problems Brother    • No Known Problems Brother    • No Known Problems Brother         REVIEW OF SYSTEMS:   All ROS was performed and is Negative except as outlined in HPI     Objective:     Vitals:    19 0641 19 0718 19 0750 19 0755   BP:  172/91     BP Location:  Right arm     Patient Position:  Lying     Pulse:  88 86 89   Resp:  18 16 16   Temp:  97.4 °F (36.3 °C)     TempSrc:  Oral     SpO2:  99% 96% 100%   Weight: 81.7 kg (180 lb 1.6 oz)      Height:         Body mass index is 27.38 kg/m².  Flowsheet Rows      First Filed Value   Admission Height  180.3 cm (71\") Documented at 2019 1106   Admission Weight  80.7 kg (178 lb) Documented at 2019 1121          Physical Exam   Physical Exam   Constitutional: He is oriented to person, place, and time. He appears well-developed and well-nourished. No distress.   HENT:   Head: Normocephalic.   Eyes: Conjunctivae are normal. Pupils are equal, round, and reactive to light. No scleral icterus.   Neck: Normal carotid pulses, no hepatojugular reflux and no JVD present. Carotid bruit is not present. No tracheal deviation, no edema and no erythema present. No thyromegaly present.   Cardiovascular: Normal rate, regular rhythm, S1 normal, S2 normal, normal heart sounds and intact distal pulses.  No extrasystoles are present. PMI is not displaced. Exam reveals no gallop, no distant heart sounds and no friction " rub.   No murmur heard.  Pulses:       Carotid pulses are 2+ on the right side, and 2+ on the left side.       Radial pulses are 2+ on the right side, and 2+ on the left side.        Femoral pulses are 2+ on the right side, and 2+ on the left side.       Dorsalis pedis pulses are 2+ on the right side, and 2+ on the left side.        Posterior tibial pulses are 2+ on the right side, and 2+ on the left side.   Pulmonary/Chest: Effort normal and breath sounds normal. No respiratory distress. He has no decreased breath sounds. He has no wheezes. He has no rhonchi. He has no rales. He exhibits no tenderness.   Abdominal: Soft. Bowel sounds are normal. He exhibits no distension and no mass. There is no hepatosplenomegaly. There is no tenderness. There is no rebound and no guarding.   Musculoskeletal: He exhibits no edema, tenderness or deformity.   Neurological: He is alert and oriented to person, place, and time.   Skin: Skin is warm and dry. No rash noted. He is not diaphoretic. No cyanosis or erythema. No pallor. Nails show no clubbing.   Psychiatric: He has a normal mood and affect. His speech is normal and behavior is normal. Judgment and thought content normal.       Lab Review:                Results from last 7 days   Lab Units 07/06/19  0557   SODIUM mmol/L 139   POTASSIUM mmol/L 4.7   CHLORIDE mmol/L 102   CO2 mmol/L 26.0   BUN mg/dL 11   CREATININE mg/dL 0.75*   GLUCOSE mg/dL 89   CALCIUM mg/dL 9.4     Results from last 7 days   Lab Units 07/06/19  0012 07/05/19  1833 07/05/19  1117   TROPONIN T ng/mL <0.010 <0.010 <0.010     Results from last 7 days   Lab Units 07/06/19  0557   WBC 10*3/mm3 6.31   HEMOGLOBIN g/dL 12.0*   HEMATOCRIT % 38.1   PLATELETS 10*3/mm3 310     Results from last 7 days   Lab Units 07/05/19  1028   INR  1.05   APTT seconds 33.0         Results from last 7 days   Lab Units 07/06/19  0557   MAGNESIUM mg/dL 2.2             Telemetry:          EKG:                    I personally viewed and  interpreted the patient's EKG/Telemetry data     Assessment:   1.  Paroxysmal atrial fibrillation.  2 Vascor of just 1.  Back in sinus rhythm.  At this time his echocardiogram looks unremarkable.  He is in sinus rhythm would continue the low-dose metoprolol would not anticoagulate him further than aspirin have him see us in the office.  2.  Cancer lung cancer metastatic.  To have port placed to be treated.                  Plan:

## 2019-07-06 NOTE — CONSULTS
"Adult Nutrition  Assessment/PES    Patient Name:  Julia Smith III  YOB: 1950  MRN: [id number]  Admit Date:  7/5/2019    Assessment Date:  7/6/2019    Comments:  Nutrition assessment completed per  MST 5. Pt tolerating diet with good po at this time. Will honor food pref's.    Reason for Assessment     Row Name 07/06/19 1125          Reason for Assessment    Reason For Assessment  identified at risk by screening criteria     Diagnosis  -- lung cancer, AFIB, Pnu           Anthropometrics     Row Name 07/06/19 1126 07/06/19 0930       Anthropometrics    Height  --  172.7 cm (68\")    Weight  --  81.6 kg (180 lb)       Ideal Body Weight (IBW)    Ideal Body Weight (IBW) (kg)  --  70.89    % Ideal Body Weight  --  115.17       Usual Body Weight (UBW)    Weight Loss Time Frame  15lb wt loss x 1 year, wt stable x 2 mo  --       Body Mass Index (BMI)    BMI (kg/m2)  --  27.43    BMI Assessment  BMI 25-29.9: overweight  --       IBW Adjustment, Para/Tetraplegia    5% Adjustment, Para (IBW)  --  67.35    10% Adjustment, Para (IBW)  --  63.8    10% Adjustment, Tetra (IBW)  --  63.8    15% Adjustment, Tetra (IBW)  --  60.26    Row Name 07/06/19 0641          Anthropometrics    Weight  81.7 kg (180 lb 1.6 oz)         Labs/Tests/Procedures/Meds     Row Name 07/06/19 1126          Labs/Procedures/Meds    Lab Results Reviewed  reviewed        Diagnostic Tests/Procedures    Diagnostic Test/Procedure Reviewed  reviewed        Medications    Pertinent Medications Reviewed  reviewed     Pertinent Medications Comments  abx,         Physical Findings     Row Name 07/06/19 1127          Physical Findings    Skin  other (see comments) sabine 22         Estimated/Assessed Needs     Row Name 07/06/19 1127 07/06/19 0930       Calculation Measurements    Weight Used For Calculations  81.6 kg (179 lb 14.3 oz)  --    Height  --  172.7 cm (68\")       Estimated/Assessed Needs    Additional Documentation  KCAL/KG (Group);Protein " "Requirements (Group);Fluid Requirements (Group)  --       KCAL/KG    KCAL/KG  30 Kcal/Kg (kcal)  --    30 Kcal/Kg (kcal)  2448  --       Protein Requirements    Est Protein Requirement Amount (gms/kg)  1.2 gm protein  --    Estimated Protein Requirements (gms/day)  97.92  --       Fluid Requirements    Estimated Fluid Requirements (mL/day)  2450  --    RDA Method (mL)  2450  --    [place] Method (over 20 kg)  3132  --        Nutrition Prescription Ordered     Row Name 07/06/19 1128          Nutrition Prescription PO    Current PO Diet  Regular     Common Modifiers  Consistent Carbohydrate;Cardiac         Evaluation of Received Nutrient/Fluid Intake     Row Name 07/06/19 1128 07/06/19 1127       Calculation Measurements    Weight Used For Calculations  --  81.6 kg (179 lb 14.3 oz)       PO Evaluation    % PO Intake  100%  --    Row Name 07/06/19 0930          Calculation Measurements    Height  172.7 cm (68\")         Evaluation of Prescribed Nutrient/Fluid Intake     Row Name 07/06/19 1127 07/06/19 0930       Calculation Measurements    Weight Used For Calculations  81.6 kg (179 lb 14.3 oz)  --    Height  --  172.7 cm (68\")            Problem/Interventions:  Problem 1     Row Name 07/06/19 1129          Nutrition Diagnoses Problem 1    Problem 1  Nutrition Appropriate for Condition at this Time     Etiology (related to)  Medical Diagnosis                 Intervention Goal     Row Name 07/06/19 1129          Intervention Goal    General  Maintain nutrition     PO  PO intake (%);Maintain intake     PO Intake %  80 %     Weight  Maintain weight         Nutrition Intervention     Row Name 07/06/19 1129          Nutrition Intervention    RD/Tech Action  Care plan reviewd;Follow Tx progress;Interview for preference           Education/Evaluation     Row Name 07/06/19 1129          Education    Education  Will Instruct as appropriate        Monitor/Evaluation    Monitor  Per protocol           Electronically signed " by:  Alessandra Blood RD  07/06/19 11:29 AM

## 2019-07-06 NOTE — PROGRESS NOTES
LOS: 1 day   Patient Care Team:  Paty Saldana APRN as PCP - General (Family Medicine)  Ramila Gallardo MD as Referring Physician (Thoracic Surgery)  Annabelle Giang MD as Consulting Physician (Radiation Oncology)  Thai Morel MD as Consulting Physician (Hematology and Oncology)    Subjective     Patient does not particularly feel short of breath on room air no chest pain he does have a little bit of cough a little bit of brown mucus which apparently he has been coughing up for a while.  No fevers chills sweats no sore throat or runny nose    Review of Systems:          Objective     Vital Signs  Vital Sign Min/Max for last 24 hours  Temp  Min: 97.4 °F (36.3 °C)  Max: 97.7 °F (36.5 °C)   BP  Min: 98/64  Max: 172/91   Pulse  Min: 74  Max: 89   Resp  Min: 16  Max: 18   SpO2  Min: 95 %  Max: 100 %   No Data Recorded   Weight  Min: 77.8 kg (171 lb 9.6 oz)  Max: 81.7 kg (180 lb 1.6 oz)        Ventilator/Non-Invasive Ventilation Settings (From admission, onward)    None                       Body mass index is 27.37 kg/m².  I/O last 3 completed shifts:  In: 2279.2 [I.V.:1279.2; IV Piggyback:1000]  Out: -   I/O this shift:  In: 290 [P.O.:240; IV Piggyback:50]  Out: -         Physical Exam:  General Appearance: Well-developed white male resting comfortably in bed does not appear in any acute distress  Eyes: Conjunctive are clear and anicteric  ENT: Mucous membranes are moist no erythema or exudates  Neck: No adenopathy or thyromegaly no jugular venous distention trachea midline  Lungs: He has some coarse breath sounds on the right some coarse wheezes and coarse rhonchi.  The left is clear chest expansion is symmetric and nonlabored  Cardiac: Regular rate rhythm no murmur it looks to be sinus rhythm on the monitor  Abdomen: Soft nontender no palpable organomegaly or masses  : Not examined  Musculoskeletal: Grossly normal specifically there is no lower extremity edema no palpable cords or calf  tenderness  Skin: No jaundice no petechiae noted and skin is warm and dry to touch  Neuro: He is alert oriented cooperative following commands grossly intact  Extremities/P Vascular: No clubbing no cyanosis no edema palpable radial dorsalis pedis pulses bilaterally  MSE: He seems to be in reasonably good spirits       Labs:  Results from last 7 days   Lab Units 07/06/19  0557 07/05/19  1833 07/05/19  1117 07/05/19  1028   GLUCOSE mg/dL 89  --  111* 150*   SODIUM mmol/L 139  --  132* 131*   POTASSIUM mmol/L 4.7  --  4.4 4.9   MAGNESIUM mg/dL 2.2 2.1  --   --    CO2 mmol/L 26.0  --  26.3 24.4   CHLORIDE mmol/L 102  --  94* 94*   ANION GAP mmol/L 11.0  --  11.7 12.6   CREATININE mg/dL 0.75* 0.71* 0.89 0.97   BUN mg/dL 11  --  11 11   BUN / CREAT RATIO  14.7  --  12.4 11.3   CALCIUM mg/dL 9.4  --  10.7* 10.7*   EGFR IF NONAFRICN AM mL/min/1.73 104 111 85 77   ALK PHOS U/L  --   --  69  --    TOTAL PROTEIN g/dL  --   --  8.1  --    ALT (SGPT) U/L  --   --  41  --    AST (SGOT) U/L  --   --  31  --    BILIRUBIN mg/dL  --   --  0.4  --    ALBUMIN g/dL  --   --  3.50  --    GLOBULIN gm/dL  --   --  4.6  --      Estimated Creatinine Clearance: 103.4 mL/min (A) (by C-G formula based on SCr of 0.75 mg/dL (L)).      Results from last 7 days   Lab Units 07/06/19 0557 07/05/19  1117 07/05/19  1028   WBC 10*3/mm3 6.31 8.11 8.27   RBC 10*6/mm3 4.35 5.02 5.16   HEMOGLOBIN g/dL 12.0* 14.2 14.1   HEMATOCRIT % 38.1 44.2 45.5   MCV fL 87.6 88.0 88.2   MCH pg 27.6 28.3 27.3   MCHC g/dL 31.5 32.1 31.0*   RDW % 15.0 15.0 15.1   RDW-SD fl 48.1 48.2 48.9   MPV fL 9.7 9.4 10.2   PLATELETS 10*3/mm3 310 365 377   NEUTROPHIL % % 65.2 69.4 71.7   LYMPHOCYTE % % 16.5* 12.6* 13.8*   MONOCYTES % % 14.1* 16.0* 11.7   EOSINOPHIL % % 2.7 0.9 1.1   BASOPHIL % % 1.0 0.7 1.2   IMM GRAN % % 0.5 0.4 0.5   NEUTROS ABS 10*3/mm3 4.12 5.63 5.93   LYMPHS ABS 10*3/mm3 1.04 1.02 1.14   MONOS ABS 10*3/mm3 0.89 1.30* 0.97*   EOS ABS 10*3/mm3 0.17 0.07 0.09   BASOS  ABS 10*3/mm3 0.06 0.06 0.10   IMMATURE GRANS (ABS) 10*3/mm3 0.03 0.03 0.04   NRBC /100 WBC 0.0 0.0 0.0         Results from last 7 days   Lab Units 07/06/19  0012 07/05/19  1833 07/05/19  1117   TROPONIN T ng/mL <0.010 <0.010 <0.010     Results from last 7 days   Lab Units 07/05/19  1117   PROBNP pg/mL 545.7         Results from last 7 days   Lab Units 07/05/19  1833   PROCALCITONIN ng/mL 0.06*     Results from last 7 days   Lab Units 07/05/19  1028   INR  1.05     Microbiology Results (last 10 days)     ** No results found for the last 240 hours. **                azithromycin 500 mg Intravenous Q24H   ipratropium-albuterol 3 mL Nebulization 4x Daily - RT   metoprolol tartrate 12.5 mg Oral Q12H   nicotine 1 patch Transdermal Q24H   piperacillin-tazobactam 3.375 g Intravenous Q8H   sodium chloride 3 mL Intravenous Q12H   vancomycin 20 mg/kg Intravenous Q12H       Pharmacy to dose vancomycin     sodium chloride 125 mL/hr Last Rate: 125 mL/hr (07/06/19 0638)       Diagnostics:  Xr Chest 2 View    Result Date: 7/5/2019  XR CHEST 2 VW-  HISTORY: Male who is 69 years-old,  short of breath  TECHNIQUE: Frontal and lateral views of the chest  COMPARISON: 05/30/2019  FINDINGS: Heart size is normal. Redemonstration of mediastinal/right hilar mass. Minimal likely atelectasis at the right base. No pleural effusion, or pneumothorax. No acute osseous process.      Minimal likely atelectasis at the right base. Redemonstration of mediastinal/right hilar mass.  This report was finalized on 7/5/2019 12:25 PM by Dr. Umang Manuel M.D.      Ct Angiogram Chest With Contrast    Result Date: 7/5/2019  CT ANGIOGRAM OF THE CHEST. MULTIPLE CORONAL, SAGITTAL, AND 3-D RECONSTRUCTIONS.  HISTORY: 69-year-old male with shortness of breath. Right upper lobe small cell lung cancer.  TECHNIQUE: Radiation dose reduction techniques were utilized, including automated exposure control and exposure modulation based on body size. CT angiogram of the  chest was performed following the administration of IV contrast. Multiple coronal, sagittal, and 3-D reconstruction images were obtained. Compared with PET/CT from 07/01/2019 and chest CT from 06/04/2019.  FINDINGS: There is no significant change in the approximately 12 x 9 cm right hilar/mediastinal mass which markedly narrows the right mainstem bronchus and occludes the right intermediate bronchus, right middle and lower lobe bronchi. There is also narrowing of the chiquita and the proximal aspect of the left mainstem bronchus. The mass encases and narrows the right pulmonary artery. There is no convincing evidence for pulmonary thromboemboli. The bulky mass has mass effect on the left atrium and has significant mass effect on the right pulmonary venous trunks and the left lower lobe pulmonary vein. The opacified bronchi within the right middle lobe and right lower lobe appear largely unchanged. The peribronchial airspace consolidations at the right lower lobe appear unchanged. There are a few new ill-defined ground glass opacities within the right middle and lower lobes and there is a perihilar ground glass opacity at the right upper lobe which is larger than previously measuring approximately 3.4 cm, previously approximately 2 cm. There is a central nodular density within this ground glass opacity which is not definitively seen previously. There is no significant change in the cavitary 1.5 cm nodule in the perihilar region of the left lower lobe. The left mediastinal and hilar lymphadenopathy appear largely unchanged.      1. There is no convincing evidence for pulmonary thromboemboli. 2. There is no significant change in the approximately 12 cm right hilar/mediastinal mass narrowing the right mainstem bronchus, occluding the right intermediate bronchus, right middle and lower lobe bronchi. Narrowing of the chiquita and the proximal aspect of the left mainstem bronchus appears largely unchanged. The airspace  consolidations at the right lower lobe and mild volume loss within the right middle lobe and right lower lobe appear largely unchanged. There are new small patchy ground glass opacities within the right middle lobe and right lower lobe and there has been increase in size of a 3.4 cm ground glass opacity at the right upper lobe. The ground glass opacities are of uncertain etiology. The appearance is not suggestive of postobstructive pneumonia or aspiration. The ground glass opacities may represent metastases. 3. The 1.5 cm perihilar left lower lobe pulmonary nodule is stable. The left hilar and mediastinal lymphadenopathy is stable as well.  Discussed with Dr. Damon.      Nm Pet Skull Base To Mid Thigh    Result Date: 7/3/2019  F-18 FDG PET FROM SKULL BASE TO MID THIGH WITH PET/CT FUSION  HISTORY: Right upper lobe small cell lung cancer.  TECHNIQUE: Radiation dose reduction techniques were utilized, including automated exposure control and exposure modulation based on body size. Blood glucose level at time of injection was 110 mg/dL.  6.2 mCi of F-18 FDG were injected and PET was performed from skull base to mid thigh. CT was obtained for localization and attenuation correction. Time at injection 1248 hours. PET start time 1408 hours.  Compared with chest CT 06/04/2019.  FINDINGS:  1.1 cm nodule within the left parotid gland demonstrates intense FDG uptake with a maximum SUV of 8.2. There is also a 0.7 cm soft tissue nodule within the right parotid gland demonstrating FDG uptake which is mildly above that of blood pool at 3.9.  The submandibular and thyroid glands demonstrate symmetric FDG uptake.   There is a large intensely FDG avid mass centered within the right hilum with associated invasion into the mediastinum with extension to the left hilum. It demonstrates a maximum SUV of 22.9 and measures approximately 11.5 cm in greatest axial dimension. There are areas of irregular pulmonary opacification extending  from this mass into the right middle and lower lobes which has worsened since 06/04/2019 in terms of increasing intensity and extent. Tree-in-bud nodularity is present within the right lower lobe and has worsened since 06/04/2019. The mass causes long segments of complete and near complete stenosis of the right main bronchus, right upper lobe and middle lobe bronchus, bronchus intermedius and right basilar bronchi. The mass causes a short segment of high-grade stenosis of the proximal left main bronchus. There are scattered subcentimeter pulmonary nodules within the right lung, the largest within the right upper lobe and measuring 0.8 cm. They are below PET resolution.  There is a moderate to intensely FDG avid left infrahilar lymph node measuring 1.1 cm in short axis dimension with a max SUV of 6.3. Right supraclavicular adenopathy measures 1 cm in short axis dimension with a max SUV of 3.7.  An intensely FDG avid left perihilar lung nodule demonstrates a max SUV of 13.6 and measures 1.4 cm.  Enlarged lymph node adjacent to the GE junction measures 1.2 cm and demonstrates SUV uptake below blood pool.  There are no findings of small bowel obstruction.  No focal FDG abnormality is visualized within the liver, gallbladder, pancreas, spleen, adrenal glands or kidneys. Cystic density lesions within the bilateral kidneys are present and do not demonstrate FDG uptake above that of blood pool. There is no hydronephrosis.  No abdominopelvic adenopathy demonstrates demonstrates FDG uptake above that of blood pool. Infrarenal dilation of the abdominal aorta measures up to 3.2 cm. Moderate-to-severe atherosclerotic calcification of the abdominal aorta and its major branches is present.  Colonic diverticulosis is present.  The appendix is unremarkable.  There is no free intraperitoneal air or fluid. No suspicious FDG avid lytic or blastic osseous lesions are present. Partial left mastoidectomy has been performed.      1.  Large  intensely FDG avid mass centered within the right perihilar lung with associated invasion at the mediastinum and left hilum and causes severe to complete narrowing of portions of the entire right bronchial tree as well as the proximal left main bronchus as above. Intensely FDG avid left perihilar lung nodule likely represents metastatic disease. Moderate to intensely FDG avid left infrahilar lymph node also likely represents metastatic disease. 2.  Scattered subcentimeter pulmonary nodules are present within the right lung and highly concerning for metastatic disease. There is irregular pulmonary opacification extending from the mass into the right middle and lower lobes which has worsened since 06/04/2019. Findings represent atelectasis and/or lymphangitic spread of malignancy. Continued close attention on follow-up is recommended. Pneumonia is felt to be less likely given the relative lack of FDG uptake within these areas. 3.  Intensely FDG avid 1.1 cm nodule within the left parotid gland. Unfortunately these finding are nonspecific as they could represent metastatic disease versus primary parotid neoplasm. This lesion is amenable to percutaneous biopsy if clinically indicated. At least continued close attention on follow-up is recommended. 4.  Mild FDG avid subcentimeter nodule within the right parotid gland is present and while findings are favored to represent a low-grade primary parotid lesion, they are nonspecific and continued close attention on follow-up of this area is recommended. 5.  Other findings as above.  This report was finalized on 7/3/2019 7:44 AM by Dr. Homero Goodman M.D.      Results for orders placed during the hospital encounter of 07/05/19   Adult Transthoracic Echo Complete With Contrast if Necessary Per Protocol    Narrative · Left ventricular systolic function is normal. Calculated EF = 52.0%.   Estimated EF was in agreement with the calculated EF. Normal left   ventricular cavity size  and wall thickness noted.  · The following segments are hypokinetic: basal anteroseptal and basal   inferoseptal.  · Left ventricular diastolic function is normal.  · There is mild calcification of the aortic valve.  · Mild MAC is present.  · Mild mitral valve regurgitation is present          I personally reviewed reviewed the CT scan done yesterday there are couple areas of groundglass infiltrates that the right upper lobe is a little more prominent right middle and lower lobe are newer but looks similar of course there is a large right hilar mass with narrowing of the airways on the right    Active Hospital Problems    Diagnosis  POA   • Atrial fibrillation with RVR (CMS/HCC) [I48.91]  Yes   • Pneumonia [J18.9]  Yes   • COPD (chronic obstructive pulmonary disease) (CMS/HCC) [J44.9]  Unknown   • Small cell lung cancer (CMS/HCC) [C34.90]  Yes      Resolved Hospital Problems   No resolved problems to display.         Assessment/Plan     1. Recently diagnosed small cell carcinoma of the lung  2. Paroxysmal atrial fibrillation with rapid ventricular response back in normal sinus rhythm  3. COPD by pulmonary function testing  4. Tobacco abuse  5. Rule out pneumonia procalcitonin is normal white count remains normal this morning.  CT is hard to exclude of course is tumor could appear this way as well.  Clinically he does not have symptoms of pneumonia either.  I think we could probably stop antibiotics or may be we taking down just to oral antibiotics some Augmentin for for 5 more days to be certain    Plan for disposition: I think he could be discharged from a pulmonary standpoint when otherwise stable    Wu Leal MD  07/06/19  12:34 PM    Time:

## 2019-07-07 LAB
ALBUMIN SERPL-MCNC: 2.8 G/DL (ref 3.5–5.2)
ALBUMIN/GLOB SERPL: 0.9 G/DL
ALP SERPL-CCNC: 49 U/L (ref 39–117)
ALT SERPL W P-5'-P-CCNC: 21 U/L (ref 1–41)
ANION GAP SERPL CALCULATED.3IONS-SCNC: 8.1 MMOL/L (ref 5–15)
AST SERPL-CCNC: 18 U/L (ref 1–40)
BASOPHILS # BLD AUTO: 0.03 10*3/MM3 (ref 0–0.2)
BASOPHILS NFR BLD AUTO: 0.5 % (ref 0–1.5)
BILIRUB SERPL-MCNC: 0.3 MG/DL (ref 0.2–1.2)
BUN BLD-MCNC: 5 MG/DL (ref 8–23)
BUN/CREAT SERPL: 8.2 (ref 7–25)
CALCIUM SPEC-SCNC: 8.5 MG/DL (ref 8.6–10.5)
CHLORIDE SERPL-SCNC: 107 MMOL/L (ref 98–107)
CO2 SERPL-SCNC: 23.9 MMOL/L (ref 22–29)
CREAT BLD-MCNC: 0.61 MG/DL (ref 0.76–1.27)
DEPRECATED RDW RBC AUTO: 46.7 FL (ref 37–54)
EOSINOPHIL # BLD AUTO: 0.24 10*3/MM3 (ref 0–0.4)
EOSINOPHIL NFR BLD AUTO: 3.8 % (ref 0.3–6.2)
ERYTHROCYTE [DISTWIDTH] IN BLOOD BY AUTOMATED COUNT: 14.8 % (ref 12.3–15.4)
GFR SERPL CREATININE-BSD FRML MDRD: 132 ML/MIN/1.73
GLOBULIN UR ELPH-MCNC: 3 GM/DL
GLUCOSE BLD-MCNC: 78 MG/DL (ref 65–99)
HCT VFR BLD AUTO: 33.6 % (ref 37.5–51)
HGB BLD-MCNC: 10.8 G/DL (ref 13–17.7)
IMM GRANULOCYTES # BLD AUTO: 0.02 10*3/MM3 (ref 0–0.05)
IMM GRANULOCYTES NFR BLD AUTO: 0.3 % (ref 0–0.5)
LYMPHOCYTES # BLD AUTO: 0.82 10*3/MM3 (ref 0.7–3.1)
LYMPHOCYTES NFR BLD AUTO: 13 % (ref 19.6–45.3)
MAGNESIUM SERPL-MCNC: 1.8 MG/DL (ref 1.6–2.4)
MCH RBC QN AUTO: 27.4 PG (ref 26.6–33)
MCHC RBC AUTO-ENTMCNC: 32.1 G/DL (ref 31.5–35.7)
MCV RBC AUTO: 85.3 FL (ref 79–97)
MONOCYTES # BLD AUTO: 0.91 10*3/MM3 (ref 0.1–0.9)
MONOCYTES NFR BLD AUTO: 14.4 % (ref 5–12)
NEUTROPHILS # BLD AUTO: 4.3 10*3/MM3 (ref 1.7–7)
NEUTROPHILS NFR BLD AUTO: 68 % (ref 42.7–76)
NRBC BLD AUTO-RTO: 0 /100 WBC (ref 0–0.2)
PHOSPHATE SERPL-MCNC: 3.3 MG/DL (ref 2.5–4.5)
PLATELET # BLD AUTO: 308 10*3/MM3 (ref 140–450)
PMV BLD AUTO: 9.5 FL (ref 6–12)
POTASSIUM BLD-SCNC: 3.5 MMOL/L (ref 3.5–5.2)
PROT SERPL-MCNC: 5.8 G/DL (ref 6–8.5)
RBC # BLD AUTO: 3.94 10*6/MM3 (ref 4.14–5.8)
SODIUM BLD-SCNC: 139 MMOL/L (ref 136–145)
WBC NRBC COR # BLD: 6.32 10*3/MM3 (ref 3.4–10.8)

## 2019-07-07 PROCEDURE — 84100 ASSAY OF PHOSPHORUS: CPT | Performed by: HOSPITALIST

## 2019-07-07 PROCEDURE — 97161 PT EVAL LOW COMPLEX 20 MIN: CPT | Performed by: PHYSICAL THERAPIST

## 2019-07-07 PROCEDURE — 97110 THERAPEUTIC EXERCISES: CPT | Performed by: PHYSICAL THERAPIST

## 2019-07-07 PROCEDURE — 85025 COMPLETE CBC W/AUTO DIFF WBC: CPT | Performed by: INTERNAL MEDICINE

## 2019-07-07 PROCEDURE — 94799 UNLISTED PULMONARY SVC/PX: CPT

## 2019-07-07 PROCEDURE — 99232 SBSQ HOSP IP/OBS MODERATE 35: CPT | Performed by: INTERNAL MEDICINE

## 2019-07-07 PROCEDURE — 83735 ASSAY OF MAGNESIUM: CPT | Performed by: INTERNAL MEDICINE

## 2019-07-07 PROCEDURE — 80053 COMPREHEN METABOLIC PANEL: CPT | Performed by: INTERNAL MEDICINE

## 2019-07-07 RX ORDER — POTASSIUM CHLORIDE 750 MG/1
40 CAPSULE, EXTENDED RELEASE ORAL ONCE
Status: COMPLETED | OUTPATIENT
Start: 2019-07-07 | End: 2019-07-07

## 2019-07-07 RX ADMIN — NICOTINE 1 PATCH: 21 PATCH, EXTENDED RELEASE TRANSDERMAL at 22:47

## 2019-07-07 RX ADMIN — IPRATROPIUM BROMIDE AND ALBUTEROL SULFATE 3 ML: 2.5; .5 SOLUTION RESPIRATORY (INHALATION) at 11:39

## 2019-07-07 RX ADMIN — IPRATROPIUM BROMIDE AND ALBUTEROL SULFATE 3 ML: 2.5; .5 SOLUTION RESPIRATORY (INHALATION) at 08:23

## 2019-07-07 RX ADMIN — IPRATROPIUM BROMIDE AND ALBUTEROL SULFATE 3 ML: 2.5; .5 SOLUTION RESPIRATORY (INHALATION) at 15:40

## 2019-07-07 RX ADMIN — METOPROLOL TARTRATE 12.5 MG: 25 TABLET ORAL at 08:17

## 2019-07-07 RX ADMIN — AMOXICILLIN AND CLAVULANATE POTASSIUM 1 TABLET: 875; 125 TABLET, FILM COATED ORAL at 22:47

## 2019-07-07 RX ADMIN — AMOXICILLIN AND CLAVULANATE POTASSIUM 1 TABLET: 875; 125 TABLET, FILM COATED ORAL at 08:17

## 2019-07-07 RX ADMIN — METOPROLOL TARTRATE 12.5 MG: 25 TABLET ORAL at 22:46

## 2019-07-07 RX ADMIN — POTASSIUM CHLORIDE 40 MEQ: 750 CAPSULE, EXTENDED RELEASE ORAL at 13:27

## 2019-07-07 RX ADMIN — SODIUM CHLORIDE, PRESERVATIVE FREE 3 ML: 5 INJECTION INTRAVENOUS at 08:18

## 2019-07-07 RX ADMIN — SODIUM CHLORIDE, PRESERVATIVE FREE 3 ML: 5 INJECTION INTRAVENOUS at 22:49

## 2019-07-07 RX ADMIN — IPRATROPIUM BROMIDE AND ALBUTEROL SULFATE 3 ML: 2.5; .5 SOLUTION RESPIRATORY (INHALATION) at 20:36

## 2019-07-07 NOTE — PLAN OF CARE
Problem: Pneumonia (Adult)  Goal: Signs and Symptoms of Listed Potential Problems Will be Absent, Minimized or Managed (Pneumonia)  Outcome: Ongoing (interventions implemented as appropriate)      Problem: Arrhythmia/Dysrhythmia (Symptomatic) (Adult)  Goal: Signs and Symptoms of Listed Potential Problems Will be Absent, Minimized or Managed (Arrhythmia/Dysrhythmia)  Outcome: Ongoing (interventions implemented as appropriate)      Problem: Patient Care Overview  Goal: Plan of Care Review  Outcome: Ongoing (interventions implemented as appropriate)   07/07/19 1416   Coping/Psychosocial   Plan of Care Reviewed With patient;spouse   Plan of Care Review   Progress no change   OTHER   Outcome Summary Pt transferred from . Heart monitor d/c. Up ad savanna. R/A. A/O. No complaints of pain. BRP. IV saline locked. VSS. Plan for PORT placement on Monday 7/8/19     Goal: Individualization and Mutuality  Outcome: Ongoing (interventions implemented as appropriate)

## 2019-07-07 NOTE — PLAN OF CARE
Problem: Patient Care Overview  Goal: Plan of Care Review   07/07/19 1543   Coping/Psychosocial   Plan of Care Reviewed With patient   OTHER   Outcome Summary Discussed need to prevent atelectasis and pna-c/db encouraged.

## 2019-07-07 NOTE — PROGRESS NOTES
LOS: 2 days   Patient Care Team:  Paty Saldana APRN as PCP - General (Family Medicine)  Ramila Gallardo MD as Referring Physician (Thoracic Surgery)  Annabelle Giang MD as Consulting Physician (Radiation Oncology)  Thai Morel MD as Consulting Physician (Hematology and Oncology)    Subjective     Patient is on room air denies shortness of breath cough he says he gets up a little bit of mucus at times not a whole lot no hemoptysis not having any fevers chills or sweats.    Review of Systems:          Objective     Vital Signs  Vital Sign Min/Max for last 24 hours  Temp  Min: 97 °F (36.1 °C)  Max: 99.3 °F (37.4 °C)   BP  Min: 127/66  Max: 157/77   Pulse  Min: 79  Max: 95   Resp  Min: 14  Max: 20   SpO2  Min: 95 %  Max: 99 %   No Data Recorded   No Data Recorded        Ventilator/Non-Invasive Ventilation Settings (From admission, onward)    None                       Body mass index is 27.37 kg/m².  I/O last 3 completed shifts:  In: 2549.2 [P.O.:240; I.V.:2259.2; IV Piggyback:50]  Out: -   No intake/output data recorded.        Physical Exam:  General Appearance: Well-developed white male resting comfortably in bed does not appear in any acute distress  Eyes: Conjunctive are clear and anicteric  ENT: Mucous membranes are moist no erythema or exudates  Neck: No adenopathy or thyromegaly no jugular venous distention trachea midline  Lungs: He has some coarse breath sounds on the right some coarse wheezes and coarse rhonchi, is are still present today been a little less than yesterday the left is clear chest expansion is symmetric and nonlabored  Cardiac: Regular rate rhythm no murmur it looks to be sinus rhythm on the monitor  Abdomen: Soft nontender no palpable organomegaly or masses  : Not examined  Musculoskeletal: Grossly normal specifically there is no lower extremity edema no palpable cords or calf tenderness  Skin: No jaundice no petechiae noted and skin is warm and dry to touch  Neuro:  He is alert oriented cooperative following commands grossly intact  Extremities/P Vascular: No clubbing no cyanosis no edema palpable radial dorsalis pedis pulses bilaterally  MSE: He seems to be in reasonably good spirits       Labs:  Results from last 7 days   Lab Units 07/07/19 0559 07/06/19 0557 07/05/19  1833 07/05/19  1117 07/05/19  1028   GLUCOSE mg/dL 78 89  --  111* 150*   SODIUM mmol/L 139 139  --  132* 131*   POTASSIUM mmol/L 3.5 4.7  --  4.4 4.9   MAGNESIUM mg/dL 1.8 2.2 2.1  --   --    CO2 mmol/L 23.9 26.0  --  26.3 24.4   CHLORIDE mmol/L 107 102  --  94* 94*   ANION GAP mmol/L 8.1 11.0  --  11.7 12.6   CREATININE mg/dL 0.61* 0.75* 0.71* 0.89 0.97   BUN mg/dL 5* 11  --  11 11   BUN / CREAT RATIO  8.2 14.7  --  12.4 11.3   CALCIUM mg/dL 8.5* 9.4  --  10.7* 10.7*   EGFR IF NONAFRICN AM mL/min/1.73 132 104 111 85 77   ALK PHOS U/L 49  --   --  69  --    TOTAL PROTEIN g/dL 5.8*  --   --  8.1  --    ALT (SGPT) U/L 21  --   --  41  --    AST (SGOT) U/L 18  --   --  31  --    BILIRUBIN mg/dL 0.3  --   --  0.4  --    ALBUMIN g/dL 2.80*  --   --  3.50  --    GLOBULIN gm/dL 3.0  --   --  4.6  --      Estimated Creatinine Clearance: 103.4 mL/min (A) (by C-G formula based on SCr of 0.61 mg/dL (L)).      Results from last 7 days   Lab Units 07/07/19 0559 07/06/19 0557 07/05/19  1117 07/05/19  1028   WBC 10*3/mm3 6.32 6.31 8.11 8.27   RBC 10*6/mm3 3.94* 4.35 5.02 5.16   HEMOGLOBIN g/dL 10.8* 12.0* 14.2 14.1   HEMATOCRIT % 33.6* 38.1 44.2 45.5   MCV fL 85.3 87.6 88.0 88.2   MCH pg 27.4 27.6 28.3 27.3   MCHC g/dL 32.1 31.5 32.1 31.0*   RDW % 14.8 15.0 15.0 15.1   RDW-SD fl 46.7 48.1 48.2 48.9   MPV fL 9.5 9.7 9.4 10.2   PLATELETS 10*3/mm3 308 310 365 377   NEUTROPHIL % % 68.0 65.2 69.4 71.7   LYMPHOCYTE % % 13.0* 16.5* 12.6* 13.8*   MONOCYTES % % 14.4* 14.1* 16.0* 11.7   EOSINOPHIL % % 3.8 2.7 0.9 1.1   BASOPHIL % % 0.5 1.0 0.7 1.2   IMM GRAN % % 0.3 0.5 0.4 0.5   NEUTROS ABS 10*3/mm3 4.30 4.12 5.63 5.93   LYMPHS  ABS 10*3/mm3 0.82 1.04 1.02 1.14   MONOS ABS 10*3/mm3 0.91* 0.89 1.30* 0.97*   EOS ABS 10*3/mm3 0.24 0.17 0.07 0.09   BASOS ABS 10*3/mm3 0.03 0.06 0.06 0.10   IMMATURE GRANS (ABS) 10*3/mm3 0.02 0.03 0.03 0.04   NRBC /100 WBC 0.0 0.0 0.0 0.0         Results from last 7 days   Lab Units 07/06/19  0012 07/05/19  1833 07/05/19  1117   TROPONIN T ng/mL <0.010 <0.010 <0.010     Results from last 7 days   Lab Units 07/05/19  1117   PROBNP pg/mL 545.7         Results from last 7 days   Lab Units 07/05/19  1833   PROCALCITONIN ng/mL 0.06*     Results from last 7 days   Lab Units 07/05/19  1028   INR  1.05     Microbiology Results (last 10 days)     Procedure Component Value - Date/Time    Respiratory Culture - Sputum, Cough [[id number]] Collected:  07/06/19 0637    Lab Status:  Preliminary result Specimen:  Sputum from Cough Updated:  07/07/19 1304     Respiratory Culture Light growth (2+) Normal Respiratory Miri     Gram Stain Rare (1+) WBCs per low power field      Rare (1+) Epithelial cells per low power field      Rare (1+) Mixed bacterial morphotypes seen on Gram Stain    MRSA Screen Culture - Swab, Nares [[id number]]  (Normal) Collected:  07/05/19 2357    Lab Status:  Preliminary result Specimen:  Swab from Nares Updated:  07/07/19 1057     MRSA SCREEN CX No Methicillin Resistant Staphylococcus aureus isolated    Blood Culture - Blood, Arm, Left [[id number]] Collected:  07/05/19 1532    Lab Status:  Preliminary result Specimen:  Blood from Arm, Left Updated:  07/07/19 1545     Blood Culture No growth at 2 days    Blood Culture - Blood, Hand, Left [[id number]] Collected:  07/05/19 1532    Lab Status:  Preliminary result Specimen:  Blood from Hand, Left Updated:  07/07/19 1545     Blood Culture No growth at 2 days                amoxicillin-clavulanate 1 tablet Oral Q12H   ipratropium-albuterol 3 mL Nebulization 4x Daily - RT   metoprolol tartrate 12.5 mg Oral Q12H   nicotine 1 patch Transdermal Q24H   sodium chloride 3 mL  Intravenous Q12H          Diagnostics:  Xr Chest 2 View    Result Date: 7/5/2019  XR CHEST 2 VW-  HISTORY: Male who is 69 years-old,  short of breath  TECHNIQUE: Frontal and lateral views of the chest  COMPARISON: 05/30/2019  FINDINGS: Heart size is normal. Redemonstration of mediastinal/right hilar mass. Minimal likely atelectasis at the right base. No pleural effusion, or pneumothorax. No acute osseous process.      Minimal likely atelectasis at the right base. Redemonstration of mediastinal/right hilar mass.  This report was finalized on 7/5/2019 12:25 PM by Dr. Umang Manuel M.D.      Ct Angiogram Chest With Contrast    Result Date: 7/5/2019  CT ANGIOGRAM OF THE CHEST. MULTIPLE CORONAL, SAGITTAL, AND 3-D RECONSTRUCTIONS.  HISTORY: 69-year-old male with shortness of breath. Right upper lobe small cell lung cancer.  TECHNIQUE: Radiation dose reduction techniques were utilized, including automated exposure control and exposure modulation based on body size. CT angiogram of the chest was performed following the administration of IV contrast. Multiple coronal, sagittal, and 3-D reconstruction images were obtained. Compared with PET/CT from 07/01/2019 and chest CT from 06/04/2019.  FINDINGS: There is no significant change in the approximately 12 x 9 cm right hilar/mediastinal mass which markedly narrows the right mainstem bronchus and occludes the right intermediate bronchus, right middle and lower lobe bronchi. There is also narrowing of the chiquita and the proximal aspect of the left mainstem bronchus. The mass encases and narrows the right pulmonary artery. There is no convincing evidence for pulmonary thromboemboli. The bulky mass has mass effect on the left atrium and has significant mass effect on the right pulmonary venous trunks and the left lower lobe pulmonary vein. The opacified bronchi within the right middle lobe and right lower lobe appear largely unchanged. The peribronchial airspace consolidations  at the right lower lobe appear unchanged. There are a few new ill-defined ground glass opacities within the right middle and lower lobes and there is a perihilar ground glass opacity at the right upper lobe which is larger than previously measuring approximately 3.4 cm, previously approximately 2 cm. There is a central nodular density within this ground glass opacity which is not definitively seen previously. There is no significant change in the cavitary 1.5 cm nodule in the perihilar region of the left lower lobe. The left mediastinal and hilar lymphadenopathy appear largely unchanged.      1. There is no convincing evidence for pulmonary thromboemboli. 2. There is no significant change in the approximately 12 cm right hilar/mediastinal mass narrowing the right mainstem bronchus, occluding the right intermediate bronchus, right middle and lower lobe bronchi. Narrowing of the chiquita and the proximal aspect of the left mainstem bronchus appears largely unchanged. The airspace consolidations at the right lower lobe and mild volume loss within the right middle lobe and right lower lobe appear largely unchanged. There are new small patchy ground glass opacities within the right middle lobe and right lower lobe and there has been increase in size of a 3.4 cm ground glass opacity at the right upper lobe. The ground glass opacities are of uncertain etiology. The appearance is not suggestive of postobstructive pneumonia or aspiration. The ground glass opacities may represent metastases. 3. The 1.5 cm perihilar left lower lobe pulmonary nodule is stable. The left hilar and mediastinal lymphadenopathy is stable as well.  Discussed with Dr. Damon.      Nm Pet Skull Base To Mid Thigh    Result Date: 7/3/2019  F-18 FDG PET FROM SKULL BASE TO MID THIGH WITH PET/CT FUSION  HISTORY: Right upper lobe small cell lung cancer.  TECHNIQUE: Radiation dose reduction techniques were utilized, including automated exposure control and  exposure modulation based on body size. Blood glucose level at time of injection was 110 mg/dL.  6.2 mCi of F-18 FDG were injected and PET was performed from skull base to mid thigh. CT was obtained for localization and attenuation correction. Time at injection 1248 hours. PET start time 1408 hours.  Compared with chest CT 06/04/2019.  FINDINGS:  1.1 cm nodule within the left parotid gland demonstrates intense FDG uptake with a maximum SUV of 8.2. There is also a 0.7 cm soft tissue nodule within the right parotid gland demonstrating FDG uptake which is mildly above that of blood pool at 3.9.  The submandibular and thyroid glands demonstrate symmetric FDG uptake.   There is a large intensely FDG avid mass centered within the right hilum with associated invasion into the mediastinum with extension to the left hilum. It demonstrates a maximum SUV of 22.9 and measures approximately 11.5 cm in greatest axial dimension. There are areas of irregular pulmonary opacification extending from this mass into the right middle and lower lobes which has worsened since 06/04/2019 in terms of increasing intensity and extent. Tree-in-bud nodularity is present within the right lower lobe and has worsened since 06/04/2019. The mass causes long segments of complete and near complete stenosis of the right main bronchus, right upper lobe and middle lobe bronchus, bronchus intermedius and right basilar bronchi. The mass causes a short segment of high-grade stenosis of the proximal left main bronchus. There are scattered subcentimeter pulmonary nodules within the right lung, the largest within the right upper lobe and measuring 0.8 cm. They are below PET resolution.  There is a moderate to intensely FDG avid left infrahilar lymph node measuring 1.1 cm in short axis dimension with a max SUV of 6.3. Right supraclavicular adenopathy measures 1 cm in short axis dimension with a max SUV of 3.7.  An intensely FDG avid left perihilar lung nodule  demonstrates a max SUV of 13.6 and measures 1.4 cm.  Enlarged lymph node adjacent to the GE junction measures 1.2 cm and demonstrates SUV uptake below blood pool.  There are no findings of small bowel obstruction.  No focal FDG abnormality is visualized within the liver, gallbladder, pancreas, spleen, adrenal glands or kidneys. Cystic density lesions within the bilateral kidneys are present and do not demonstrate FDG uptake above that of blood pool. There is no hydronephrosis.  No abdominopelvic adenopathy demonstrates demonstrates FDG uptake above that of blood pool. Infrarenal dilation of the abdominal aorta measures up to 3.2 cm. Moderate-to-severe atherosclerotic calcification of the abdominal aorta and its major branches is present.  Colonic diverticulosis is present.  The appendix is unremarkable.  There is no free intraperitoneal air or fluid. No suspicious FDG avid lytic or blastic osseous lesions are present. Partial left mastoidectomy has been performed.      1.  Large intensely FDG avid mass centered within the right perihilar lung with associated invasion at the mediastinum and left hilum and causes severe to complete narrowing of portions of the entire right bronchial tree as well as the proximal left main bronchus as above. Intensely FDG avid left perihilar lung nodule likely represents metastatic disease. Moderate to intensely FDG avid left infrahilar lymph node also likely represents metastatic disease. 2.  Scattered subcentimeter pulmonary nodules are present within the right lung and highly concerning for metastatic disease. There is irregular pulmonary opacification extending from the mass into the right middle and lower lobes which has worsened since 06/04/2019. Findings represent atelectasis and/or lymphangitic spread of malignancy. Continued close attention on follow-up is recommended. Pneumonia is felt to be less likely given the relative lack of FDG uptake within these areas. 3.  Intensely  FDG avid 1.1 cm nodule within the left parotid gland. Unfortunately these finding are nonspecific as they could represent metastatic disease versus primary parotid neoplasm. This lesion is amenable to percutaneous biopsy if clinically indicated. At least continued close attention on follow-up is recommended. 4.  Mild FDG avid subcentimeter nodule within the right parotid gland is present and while findings are favored to represent a low-grade primary parotid lesion, they are nonspecific and continued close attention on follow-up of this area is recommended. 5.  Other findings as above.  This report was finalized on 7/3/2019 7:44 AM by Dr. Homero Goodman M.D.      Results for orders placed during the hospital encounter of 07/05/19   Adult Transthoracic Echo Complete With Contrast if Necessary Per Protocol    Narrative · Left ventricular systolic function is normal. Calculated EF = 52.0%.   Estimated EF was in agreement with the calculated EF. Normal left   ventricular cavity size and wall thickness noted.  · The following segments are hypokinetic: basal anteroseptal and basal   inferoseptal.  · Left ventricular diastolic function is normal.  · There is mild calcification of the aortic valve.  · Mild MAC is present.  · Mild mitral valve regurgitation is present            Active Hospital Problems    Diagnosis  POA   • Atrial fibrillation with RVR (CMS/HCC) [I48.91]  Yes   • Pneumonia [J18.9]  Yes   • COPD (chronic obstructive pulmonary disease) (CMS/HCC) [J44.9]  Unknown   • Small cell lung cancer (CMS/HCC) [C34.90]  Yes      Resolved Hospital Problems   No resolved problems to display.         Assessment/Plan     1. Recently diagnosed small cell carcinoma of the lung, patient is to start chemotherapy  2. Paroxysmal atrial fibrillation with rapid ventricular response back in normal sinus rhythm  3. COPD by pulmonary function testing  4. Tobacco abuse  5. Rule out pneumonia procalcitonin is normal white count remains  normal this morning.  CT is hard to exclude of course is tumor could appear this way as well.  Clinically he does not have symptoms of pneumonia .  Since so difficult clinic in ahead and complete a 7-day course of antibiotics he is on Augmentin to complete this course now for a couple days of IV Zosyn.    Plan for disposition:     Wu Leal MD  07/07/19  5:32 PM    Time:

## 2019-07-07 NOTE — PROGRESS NOTES
Subjective     REASON FOR CONSULTATION:     Small cell lung cancer, limited      Provide an opinion on any further workup or treatment                             REQUESTING PHYSICIAN: Dr. Rosas    RECORDS OBTAINED:  Records of the patients history including those obtained from the referring provider were reviewed and summarized in detail.    HISTORY OF PRESENT ILLNESS:  The patient is a 69 y.o. year old male who is here for an opinion about the above issue.    History of Present Illness   HISTORY OF PRESENT ILLNESS:  Julia Smith III is a 69 y.o. male who is referred today small cell lung cancer.     He has a history of hearing loss at least in part secondary to the history of mastoiditis.  This is bilateral but left greater than right.  He has a history of some numbness in his toes dating back to the fall 2018.  Right greater than left.  Unknown etiology.     He developed worsening cough since around October 2018 that previously was associated with some hemoptysis but this has improved.  He has had worsening shortness of breath since that time as well.  He has lost about 30 pounds unintentionally during this time.  Previously he was smoking 1 to 2 packs/day but currently is smoking less than 1 pack/day.  He notes some mild blurred vision within the past few weeks but he denies any other neurologic symptoms.     He had a chest x-ray on 5/30/2019 that was abnormal showing widening of the mid lower mediastinum with masslike enlargement of the right hilum and right basilar pulmonary opacification.  Follow-up CT imaging was performed on 6/4/2019 showing a 13 cm right hilar/mediastinal mass with complete occlusion of the right interlobar bronchus as well as the right middle and lower lobe bronchi.  There is extensive lymphadenopathy extending to the left hilum as well as atelectasis of the right lower lobe and a 3 cm masslike airspace consolidation there as well.  Other smaller opacities were present and felt to be  indeterminate.  Bulky mediastinal lymphadenopathy with mass-effect on the SVC, significantly narrowed.  Right chest wall collaterals were noted.     He was seen by pulmonology and on 6/19/2019 had bronchoscopy performed with pathology of a right mainstem endobronchial lesion showing small cell carcinoma.     A PET scan was performed confirming a large FDG avid mass within the right perihilar lung with invasion of the mediastinum and left hilum.  FDG avid left infrahilar lymphadenopathy as well.  Scattered subcentimeter pulmonary nodules concerning but not FDG avid.  1.1 cm nodule within the left parotid gland.  Mild FDG avid nodule in the right parotid gland.    Patient had gone in the outpatient setting for port placement but he had a heart rate of 170 and blood pressure of 70 that is why he was transferred to the emergency room.  He was given metoprolol and subsequently converted into normal sinus rhythm.  He had come in with atrial fibrillation.  Agent also requires port placed.  Patient had a postobstructive pneumonia and is currently on azithromycin and Rocephin.  Cultures have been drawn.  Her oxygen saturation was 95% and she was started on mini nebs.     Dr. Leal  saw the patient and is considering switching to oral antibiotics.  Currently patient is on Augmentin and Zosyn    Once patient better we may need to transfer to [facility] to start chemotherapy    Interval history:    July 7, 2019:  Patient's A. fib is under control.  He is being transferred to [facility] for starting carboplatin and etoposide/atezolizumab  for small cell lung cancer.  I doubt if he will be able to get atezolizumab inpatient.  This is limited small cell lung cancer and the plan is to do at least 2 cycles following which radiation will be added.  He does have an SVC syndrome.  He currently is being treated for pneumonia as well.  Once he is stable he can start chemotherapy.  He will require chemo education.  Patient is to have a Mediport  placed on Monday.    Past Medical History:   Diagnosis Date   • Cancer (CMS/HCC) 2019    Right lung   • Chronic cough    • Hearing loss    • History of shingles    • Lower back pain    • Mastoiditis         Past Surgical History:   Procedure Laterality Date   • BRONCHOSCOPY N/A 2019    Procedure: BRONCHOSCOPY WITH WASHINGS AND BIOPSY AND ENDOBRONCHIAL ULTRASOUND WITH FNA;  Surgeon: Arslan Marquez MD;  Location: [facility] ENDOSCOPY;  Service: Pulmonary   • INNER EAR SURGERY Left    • MASTOIDECTOMY Left         No current facility-administered medications on file prior to encounter.      No current outpatient medications on file prior to encounter.        ALLERGIES:  No Known Allergies     Social History     Socioeconomic History   • Marital status:      Spouse name: Krystle   • Number of children: 2   • Years of education: Not on file   • Highest education level: Not on file   Occupational History   • Occupation: [employer]   Tobacco Use   • Smoking status: Current Every Day Smoker     Packs/day: 1.50     Years: 40.00     Pack years: 60.00     Types: Cigarettes   • Smokeless tobacco: Never Used   • Tobacco comment: Since age 32   Substance and Sexual Activity   • Alcohol use: Yes     Alcohol/week: 2.4 - 4.2 oz     Types: 4 - 7 Cans of beer per week     Comment: daily   • Drug use: Yes     Types: Marijuana   • Sexual activity: Not Currently     Partners: Female        Family History   Problem Relation Age of Onset   • No Known Problems Mother    • COPD Father            • No Known Problems Sister    • Heart disease Brother    • No Known Problems Brother    • No Known Problems Brother    • No Known Problems Brother         Review of Systems   Constitutional: Positive for fatigue and unexpected weight change. Negative for appetite change, chills, diaphoresis and fever.   HENT: Negative for hearing loss, sore throat and trouble swallowing.    Respiratory: Positive for shortness of  breath. Negative for cough, chest tightness and wheezing.    Cardiovascular: Negative for chest pain, palpitations and leg swelling.   Gastrointestinal: Negative for abdominal distention, abdominal pain, constipation, diarrhea, nausea and vomiting.   Genitourinary: Negative for dysuria, frequency, hematuria and urgency.   Musculoskeletal: Negative for joint swelling.        No muscle weakness.   Skin: Negative for rash and wound.   Neurological: Negative for seizures, syncope, speech difficulty, weakness, numbness and headaches.   Hematological: Negative for adenopathy. Does not bruise/bleed easily.   Psychiatric/Behavioral: Negative for behavioral problems, confusion and suicidal ideas.        Objective     Vitals:    07/07/19 1305 07/07/19 1347 07/07/19 1510 07/07/19 1540   BP: 127/66 140/75 151/81    BP Location: Right arm Left arm Left arm    Patient Position: Lying Lying Lying    Pulse: 84 91 88 86   Resp: 16 16 14 20   Temp: 99.3 °F (37.4 °C) 97 °F (36.1 °C) 97.5 °F (36.4 °C)    TempSrc: Oral Oral Oral    SpO2: 96% 98% 96% 97%   Weight:       Height:         No flowsheet data found.    Physical Exam      GENERAL:  Well-developed, well-nourished in no acute distress.   NECK:  Supple with good range of motion; no thyromegaly or masses, no JVD.  LYMPHATICS:  No cervical, supraclavicular, axillary or inguinal adenopathy.  CHEST:  Lungs clear to auscultation. Good airflow.  CARDIAC:  Regular rate and rhythm without murmurs, rubs or gallops. Normal S1,S2.  ABDOMEN:  Soft, nontender with no hepatosplenomegaly or masses.  EXTREMITIES:  No clubbing, cyanosis or edema.  NEUROLOGICAL:  Cranial Nerves II-XII grossly intact.  No focal neurological deficits.  PSYCHIATRIC:  Normal affect and mood.      RECENT LABS:  Hematology WBC   Date Value Ref Range Status   07/07/2019 6.32 3.40 - 10.80 10*3/mm3 Final     RBC   Date Value Ref Range Status   07/07/2019 3.94 (L) 4.14 - 5.80 10*6/mm3 Final     Hemoglobin   Date Value Ref  Range Status   07/07/2019 10.8 (L) 13.0 - 17.7 g/dL Final     Hematocrit   Date Value Ref Range Status   07/07/2019 33.6 (L) 37.5 - 51.0 % Final     Platelets   Date Value Ref Range Status   07/07/2019 308 140 - 450 10*3/mm3 Final      CT angiogram of the chest  FINDINGS: There is no significant change in the approximately 12 x 9 cm  right hilar/mediastinal mass which markedly narrows the right mainstem  bronchus and occludes the right intermediate bronchus, right middle and  lower lobe bronchi. There is also narrowing of the chiquita and the  proximal aspect of the left mainstem bronchus. The mass encases and  narrows the right pulmonary artery. There is no convincing evidence for  pulmonary thromboemboli. The bulky mass has mass effect on the left  atrium and has significant mass effect on the right pulmonary venous  trunks and the left lower lobe pulmonary vein. The opacified bronchi  within the right middle lobe and right lower lobe appear largely  unchanged. The peribronchial airspace consolidations at the right lower  lobe appear unchanged. There are a few new ill-defined ground glass  opacities within the right middle and lower lobes and there is a  perihilar ground glass opacity at the right upper lobe which is larger  than previously measuring approximately 3.4 cm, previously approximately  2 cm. There is a central nodular density within this ground glass  opacity which is not definitively seen previously. There is no  significant change in the cavitary 1.5 cm nodule in the perihilar region  of the left lower lobe. The left mediastinal and hilar lymphadenopathy  appear largely unchanged.      Assessment/Plan   1.  Small cell lung carcinoma,  originating in the right hilum: He has an extremely large mass there with concern for SVC compression on CT imaging from 6/4/2019.  There is some left hilar adenopathy.  No obvious distant metastatic disease but there are some parotid lesions which are concerning but  indeterminate.  No evidence of distant mets.  Patient was seen July 3, 2019 for evaluation by Dr. Morel.  He had referred patient to radiation oncology to see if it is appropriate due to SVC narrowing.  Dr. Morel wanted to start chemotherapy with 2 cycles of etoposide carboplatinum.  Patient did have parotid lesions which were indeterminate.  He wanted to also offer radiation starting after couple of cycles of therapy.  But if radiation is not possible then he was considering carboplatinum etoposide and Tecentriq.  · Patient had gone for port placement at which time he was found to be having atrial fibrillation and got admitted to the hospital.  · Dr. Morel was planning to give him carboplatinum and etoposide/atezolizumab for a couple of cycles before adding radiation.  · [place] [facility] [place]    2.  Chronic hearing loss, history of mastoiditis, not a candidate for cisplatin.    3.  Port placement Dr. Gallardo  was consulted for port placement    4.  Atrial fibrillation now under better control    5.  Pneumonia right lower lobe could be postobstructive on antibiotics, Zosyn and Augmentin    6.  SVC syndrome    Plan 1.  Continue metoprolol for A. Fib    2.  Port placement on Monday    3.  Continue antibiotics    4.  [place] [facility] [place] so he can get started on chemotherapy with carboplatinum etoposide.    5.  Patient to receive 2 cycles of chemotherapy upfront prior to adding radiation.    6.  We may need to consider doing MRI of the brain tomorrow    Ernestine Parmar MD

## 2019-07-07 NOTE — PLAN OF CARE
Problem: Patient Care Overview  Goal: Plan of Care Review   07/07/19 0906   OTHER   Outcome Summary Evaluation for skilled therapy services complete. Patient independent with all bed mobility, transfers, and gait. No need for skilled therapy services at this time. Ambulated in hallway two laps around [facility] without assist.

## 2019-07-07 NOTE — PROGRESS NOTES
LOS: 2 days     Name: Julia Smith III  Age/Sex: 69 y.o. male  :  1950        PCP: Paty Saldana APRN  Chief Complaint   Patient presents with   • Rapid Heart Rate     Pt was supposed to have mediport placed next week and was having PAT. EKG shows rapid heart rate. Pt complains of SOA.      Subjective   He is feeling okay denies any fevers chills nausea vomiting no chest pain palpitations or shortness of breath.  Feels back to his baseline today.  General: No Fever or Chills, Cardiac: No Chest Pain or Palpitations, Resp: No Cough or SOA, GI: No Nausea, Vomiting, or Diarrhea and Other: No bleeding      amoxicillin-clavulanate 1 tablet Oral Q12H   ipratropium-albuterol 3 mL Nebulization 4x Daily - RT   metoprolol tartrate 12.5 mg Oral Q12H   nicotine 1 patch Transdermal Q24H   sodium chloride 3 mL Intravenous Q12H          Objective   Vital Signs  Temp:  [97.8 °F (36.6 °C)-98.5 °F (36.9 °C)] 97.8 °F (36.6 °C)  Heart Rate:  [79-95] 86  Resp:  [16] 16  BP: (133-157)/(73-92) 157/77  Body mass index is 27.37 kg/m².    Intake/Output Summary (Last 24 hours) at 2019 0818  Last data filed at 2019 1935  Gross per 24 hour   Intake 1270 ml   Output --   Net 1270 ml       Physical Exam   Constitutional: He is oriented to person, place, and time. He appears well-developed and well-nourished.   HENT:   Head: Normocephalic and atraumatic.   Cardiovascular: Normal rate, regular rhythm and normal heart sounds.   Pulmonary/Chest: Effort normal and breath sounds normal.   Abdominal: Bowel sounds are normal.   Neurological: He is alert and oriented to person, place, and time.   Nursing note and vitals reviewed.        Results Review:       I reviewed the patient's new clinical results.  Results from last 7 days   Lab Units 19  0559 19  0557 19  1117 19  1028   WBC 10*3/mm3 6.32 6.31 8.11 8.27   HEMOGLOBIN g/dL 10.8* 12.0* 14.2 14.1   PLATELETS 10*3/mm3 308 310 365 377     Results  from last 7 days   Lab Units 07/07/19  0559 07/06/19  0557 07/05/19  1833 07/05/19  1117 07/05/19  1028   SODIUM mmol/L 139 139  --  132* 131*   POTASSIUM mmol/L 3.5 4.7  --  4.4 4.9   CHLORIDE mmol/L 107 102  --  94* 94*   CO2 mmol/L 23.9 26.0  --  26.3 24.4   BUN mg/dL 5* 11  --  11 11   CREATININE mg/dL 0.61* 0.75* 0.71* 0.89 0.97   CALCIUM mg/dL 8.5* 9.4  --  10.7* 10.7*   MAGNESIUM mg/dL 1.8 2.2 2.1  --   --    PHOSPHORUS mg/dL 3.3  --   --   --   --    Estimated Creatinine Clearance: 103.4 mL/min (A) (by C-G formula based on SCr of 0.61 mg/dL (L)).      Assessment/Plan     Small cell lung cancer (CMS/[facility])    Atrial fibrillation with RVR (CMS/[facility])    Pneumonia    COPD (chronic obstructive pulmonary disease) (CMS/[facility])      PLAN  1. New onset atrial fibrillation: He is back in normal sinus rhythm now.  He is responded well to beta blockade.  Cardiology evaluated and looked at his echocardiogram which is fairly unremarkable.  Remains sinus rhythm with rate controlled.  2. Small cell lung cancer: Continue present management, plan to initiate chemotherapy here in the hospital.  Thoracic surgery has been consulted for port placement.  Oncology would prefer to have this done prior to discharge from the hospital  3. Pneumonia: Agree with pulmonary assessment that this is probably not an active bacterial infection.  Agree with de-escalating antibiotic therapy quickly.  4. COPD: Continue supportive care  5. Anemia: Monitor hemoglobin    Disposition  Be determined      Garcia Garcia MD  [facility]  07/07/19  8:18 AM

## 2019-07-07 NOTE — THERAPY DISCHARGE NOTE
Acute Care - Physical Therapy Initial Eval/Discharge  [facility]     Patient Name: Julia Smith III  : 1950  MRN: [id number]  Today's Date: 2019         Referring Physician: Jose      Admit Date: 2019    Visit Dx:    ICD-10-CM ICD-9-CM   1. Atrial fibrillation with RVR (CMS/HCC) I48.91 427.31   2. Lung mass R91.8 786.6   3. Pneumonia of right lung due to infectious organism, unspecified part of lung J18.9 483.8   4. Bronchitis J40 490     Patient Active Problem List   Diagnosis   • Encounter for screening for malignant neoplasm of colon   • Small cell lung cancer (CMS/HCC)   • Small cell carcinoma (CMS/HCC)   • Atrial fibrillation with RVR (CMS/HCC)   • Pneumonia   • COPD (chronic obstructive pulmonary disease) (CMS/HCC)     Past Medical History:   Diagnosis Date   • Cancer (CMS/HCC) 2019    Right lung   • Chronic cough    • Hearing loss    • History of shingles    • Lower back pain    • Mastoiditis      Past Surgical History:   Procedure Laterality Date   • BRONCHOSCOPY N/A 2019    Procedure: BRONCHOSCOPY WITH WASHINGS AND BIOPSY AND ENDOBRONCHIAL ULTRASOUND WITH FNA;  Surgeon: Arslan Marquez MD;  Location: [facility] ENDOSCOPY;  Service: Pulmonary   • INNER EAR SURGERY Left    • MASTOIDECTOMY Left           PT ASSESSMENT (last 12 hours)      Physical Therapy Evaluation     Row Name 19 0823          PT Evaluation Time/Intention    Subjective Information  no complaints  -CJ     Document Type  evaluation;discharge treatment  -CJ     Mode of Treatment  individual therapy;physical therapy  -CJ     Total Evaluation Minutes, Physical Therapy  15  -CJ     Patient Effort  excellent  -     Symptoms Noted During/After Treatment  none  -CJ     Row Name 19 0823          General Information    Patient Profile Reviewed?  yes  -CJ     Referring Physician  Jose  -     Patient Observations  alert;cooperative;agree to therapy  -     General Observations of Patient  supine in  bed, no acute distress  -     Prior Level of Function  independent:  -CJ     Equipment Currently Used at Home  none  -CJ     Row Name 07/07/19 0823          Relationship/Environment    Primary Source of Support/Comfort  child(tamiko);spouse  -CJ     Lives With  spouse  -     Row Name 07/07/19 0823          Resource/Environmental Concerns    Current Living Arrangements  home/apartment/condo  -CJ     Resource/Environmental Concerns  none  -CJ     Row Name 07/07/19 0823          Home Main Entrance    Number of Stairs, Main Entrance  one  -CJ     Row Name 07/07/19 0823          Stairs Within Home, Primary    Stairs, Within Home, Primary  12 upstairs and then down  -CJ     Row Name 07/07/19 0823          Cognitive Assessment/Intervention- PT/OT    Orientation Status (Cognition)  oriented x 4  -CJ     Follows Commands (Cognition)  WNL  -     Row Name 07/07/19 0823          Bed Mobility Assessment/Treatment    Bed Mobility Assessment/Treatment  bed mobility (all) activities  -     [place] Level (Bed Mobility)  independent  -     Row Name 07/07/19 0823          Transfer Assessment/Treatment    Transfer Assessment/Treatment  sit-stand transfer;stand-sit transfer  -CJ     Sit-Stand [place] (Transfers)  independent  -     Stand-Sit [place] (Transfers)  independent  -     Row Name 07/07/19 0823          Gait/Stairs Assessment/Training    Gait/Stairs Assessment/Training  gait/ambulation independence  -     [place] Level (Gait)  independent  -     Distance in Feet (Gait)  600  -     Pattern (Gait)  step-through  -     Comment (Gait/Stairs)  No deviations noted  -     Row Name 07/07/19 0823          General ROM    GENERAL ROM COMMENTS  BUE/BLE AROM full  -     Row Name 07/07/19 0823          MMT (Manual Muscle Testing)    General MMT Comments  BUE/BLE MMT screen 5/5  -     Row Name 07/07/19 0823          Vision Assessment/Intervention    Visual Impairment/Limitations  WNL  -     Row  Name 07/07/19 0823          Pain Assessment    Additional Documentation  Pain Scale: Numbers Pre/Post-Treatment (Group)  -     Row Name 07/07/19 0823          Pain Scale: Numbers Pre/Post-Treatment    Pain Scale: Numbers, Pretreatment  0/10 - no pain  -     Row Name 07/07/19 0823          Physical Therapy Clinical Impression    Criteria for Skilled Interventions Met (PT Clinical Impression)  no problems identified which require skilled intervention;current level of function same as previous level of function  -     Rehab Potential (PT Clinical Summary)  good, to achieve stated therapy goals  -     Row Name 07/07/19 0823          Positioning and Restraints    Pre-Treatment Position  in bed  -     Post Treatment Position  bed  -CJ     In Bed  supine  -     Row Name 07/07/19 0823          Living Environment    Home Accessibility  stairs to enter home;stairs within home  -       User Key  (r) = Recorded By, (t) = Taken By, (c) = Cosigned By    Initials Name Provider Type    Hasmukh Hendrickson, PT Physical Therapist              PT Recommendation and Plan  Therapy Frequency (PT Clinical Impression): evaluation only  Outcome Summary: Evaluation for skilled therapy services complete. Patient independent with all bed mobility, transfers, and gait. No need for skilled therapy services at this time. Ambulated in hallway two laps around [facility] without assist.     Outcome Measures     Row Name 07/07/19 0900             How much help from another person do you currently need...    Turning from your back to your side while in flat bed without using bedrails?  4  -CJ      Moving from lying on back to sitting on the side of a flat bed without bedrails?  4  -CJ      Moving to and from a bed to a chair (including a wheelchair)?  4  -CJ      Standing up from a chair using your arms (e.g., wheelchair, bedside chair)?  4  -CJ      Climbing 3-5 steps with a railing?  4  -CJ      To walk in hospital room?  4  -CJ      AM-PAC 6  Clicks Score (PT)  24  -         Functional Assessment    Outcome Measure Options  AM-PAC 6 Clicks Basic Mobility (PT)  -        User Key  (r) = Recorded By, (t) = Taken By, (c) = Cosigned By    Initials Name Provider Type    Hasmukh Hendrickson, PT Physical Therapist           Time Calculation:   PT Charges     Row Name 07/07/19 0909             Time Calculation    Start Time  0830  -      Stop Time  0850  -      Time Calculation (min)  20 min  -      PT Received On  07/07/19  -         Time Calculation- PT    Total Timed Code Minutes- PT  20 minute(s)  -        User Key  (r) = Recorded By, (t) = Taken By, (c) = Cosigned By    Initials Name Provider Type    Hasmukh Hendrickson, PT Physical Therapist        Therapy Charges for Today     Code Description Service Date Service Provider Modifiers Qty    96160127256 HC PT EVAL LOW COMPLEXITY 1 7/7/2019 Hasmukh Brooks, PT GP 1    66503827825 HC PT THER PROC EA 15 MIN 7/7/2019 Hasmukh Brooks, PT GP 1          PT G-Codes  Outcome Measure Options: AM-PAC 6 Clicks Basic Mobility (PT)  AM-PAC 6 Clicks Score (PT): 24         Hasmukh Brooks PT  7/7/2019

## 2019-07-08 ENCOUNTER — APPOINTMENT (OUTPATIENT)
Dept: ULTRASOUND IMAGING | Facility: HOSPITAL | Age: 69
End: 2019-07-08

## 2019-07-08 PROBLEM — I48.0 PAF (PAROXYSMAL ATRIAL FIBRILLATION): Status: ACTIVE | Noted: 2019-07-08

## 2019-07-08 LAB
ANION GAP SERPL CALCULATED.3IONS-SCNC: 11.1 MMOL/L (ref 5–15)
BACTERIA SPEC RESP CULT: NORMAL
BASOPHILS # BLD AUTO: 0.03 10*3/MM3 (ref 0–0.2)
BASOPHILS NFR BLD AUTO: 0.4 % (ref 0–1.5)
BUN BLD-MCNC: 6 MG/DL (ref 8–23)
BUN/CREAT SERPL: 9.4 (ref 7–25)
CALCIUM SPEC-SCNC: 10 MG/DL (ref 8.6–10.5)
CHLORIDE SERPL-SCNC: 102 MMOL/L (ref 98–107)
CO2 SERPL-SCNC: 24.9 MMOL/L (ref 22–29)
CREAT BLD-MCNC: 0.64 MG/DL (ref 0.76–1.27)
DEPRECATED RDW RBC AUTO: 46.5 FL (ref 37–54)
EOSINOPHIL # BLD AUTO: 0.26 10*3/MM3 (ref 0–0.4)
EOSINOPHIL NFR BLD AUTO: 3.7 % (ref 0.3–6.2)
ERYTHROCYTE [DISTWIDTH] IN BLOOD BY AUTOMATED COUNT: 15.1 % (ref 12.3–15.4)
GFR SERPL CREATININE-BSD FRML MDRD: 125 ML/MIN/1.73
GLUCOSE BLD-MCNC: 89 MG/DL (ref 65–99)
GRAM STN SPEC: NORMAL
HCT VFR BLD AUTO: 38.7 % (ref 37.5–51)
HGB BLD-MCNC: 12.6 G/DL (ref 13–17.7)
IMM GRANULOCYTES # BLD AUTO: 0.03 10*3/MM3 (ref 0–0.05)
IMM GRANULOCYTES NFR BLD AUTO: 0.4 % (ref 0–0.5)
LYMPHOCYTES # BLD AUTO: 1.05 10*3/MM3 (ref 0.7–3.1)
LYMPHOCYTES NFR BLD AUTO: 14.9 % (ref 19.6–45.3)
MAGNESIUM SERPL-MCNC: 2.2 MG/DL (ref 1.6–2.4)
MCH RBC QN AUTO: 27.5 PG (ref 26.6–33)
MCHC RBC AUTO-ENTMCNC: 32.6 G/DL (ref 31.5–35.7)
MCV RBC AUTO: 84.5 FL (ref 79–97)
MONOCYTES # BLD AUTO: 0.87 10*3/MM3 (ref 0.1–0.9)
MONOCYTES NFR BLD AUTO: 12.4 % (ref 5–12)
MRSA SPEC QL CULT: NORMAL
NEUTROPHILS # BLD AUTO: 4.8 10*3/MM3 (ref 1.7–7)
NEUTROPHILS NFR BLD AUTO: 68.2 % (ref 42.7–76)
NRBC BLD AUTO-RTO: 0 /100 WBC (ref 0–0.2)
PLATELET # BLD AUTO: 358 10*3/MM3 (ref 140–450)
PMV BLD AUTO: 9.5 FL (ref 6–12)
POTASSIUM BLD-SCNC: 4.5 MMOL/L (ref 3.5–5.2)
RBC # BLD AUTO: 4.58 10*6/MM3 (ref 4.14–5.8)
SODIUM BLD-SCNC: 138 MMOL/L (ref 136–145)
WBC NRBC COR # BLD: 7.04 10*3/MM3 (ref 3.4–10.8)

## 2019-07-08 PROCEDURE — 94799 UNLISTED PULMONARY SVC/PX: CPT

## 2019-07-08 PROCEDURE — 25010000002 ETOPOSIDE 500 MG/25ML SOLUTION 25 ML VIAL: Performed by: INTERNAL MEDICINE

## 2019-07-08 PROCEDURE — 85025 COMPLETE CBC W/AUTO DIFF WBC: CPT | Performed by: INTERNAL MEDICINE

## 2019-07-08 PROCEDURE — 88305 TISSUE EXAM BY PATHOLOGIST: CPT | Performed by: INTERNAL MEDICINE

## 2019-07-08 PROCEDURE — 25010000002 DEXAMETHASONE SODIUM PHOSPHATE 20 MG/5ML SOLUTION 5 ML VIAL: Performed by: INTERNAL MEDICINE

## 2019-07-08 PROCEDURE — 25010000002 ONDANSETRON PER 1 MG: Performed by: INTERNAL MEDICINE

## 2019-07-08 PROCEDURE — 0CBJ3ZX EXCISION OF MINOR SALIVARY GLAND, PERCUTANEOUS APPROACH, DIAGNOSTIC: ICD-10-PCS | Performed by: RADIOLOGY

## 2019-07-08 PROCEDURE — 99233 SBSQ HOSP IP/OBS HIGH 50: CPT | Performed by: INTERNAL MEDICINE

## 2019-07-08 PROCEDURE — 25010000002 FOSAPREPITANT PER 1 MG: Performed by: INTERNAL MEDICINE

## 2019-07-08 PROCEDURE — 80048 BASIC METABOLIC PNL TOTAL CA: CPT | Performed by: INTERNAL MEDICINE

## 2019-07-08 PROCEDURE — 25010000002 CARBOPLATIN PER 50 MG: Performed by: INTERNAL MEDICINE

## 2019-07-08 PROCEDURE — 83735 ASSAY OF MAGNESIUM: CPT | Performed by: INTERNAL MEDICINE

## 2019-07-08 PROCEDURE — 25010000003 LIDOCAINE 1 % SOLUTION: Performed by: RADIOLOGY

## 2019-07-08 PROCEDURE — 99222 1ST HOSP IP/OBS MODERATE 55: CPT | Performed by: NURSE PRACTITIONER

## 2019-07-08 RX ORDER — SODIUM CHLORIDE 9 MG/ML
250 INJECTION, SOLUTION INTRAVENOUS ONCE
Status: COMPLETED | OUTPATIENT
Start: 2019-07-08 | End: 2019-07-08

## 2019-07-08 RX ORDER — LIDOCAINE HYDROCHLORIDE 10 MG/ML
20 INJECTION, SOLUTION INFILTRATION; PERINEURAL ONCE
Status: COMPLETED | OUTPATIENT
Start: 2019-07-08 | End: 2019-07-08

## 2019-07-08 RX ORDER — FAMOTIDINE 10 MG/ML
20 INJECTION, SOLUTION INTRAVENOUS AS NEEDED
Status: DISCONTINUED | OUTPATIENT
Start: 2019-07-08 | End: 2019-07-10 | Stop reason: HOSPADM

## 2019-07-08 RX ORDER — PALONOSETRON 0.05 MG/ML
0.25 INJECTION, SOLUTION INTRAVENOUS ONCE
Status: DISCONTINUED | OUTPATIENT
Start: 2019-07-08 | End: 2019-07-08 | Stop reason: ALTCHOICE

## 2019-07-08 RX ORDER — DIPHENHYDRAMINE HYDROCHLORIDE 50 MG/ML
50 INJECTION INTRAMUSCULAR; INTRAVENOUS AS NEEDED
Status: DISCONTINUED | OUTPATIENT
Start: 2019-07-08 | End: 2019-07-10 | Stop reason: HOSPADM

## 2019-07-08 RX ORDER — ONDANSETRON HYDROCHLORIDE 8 MG/1
8 TABLET, FILM COATED ORAL 3 TIMES DAILY PRN
Qty: 30 TABLET | Refills: 5 | Status: SHIPPED | OUTPATIENT
Start: 2019-07-08 | End: 2020-05-26 | Stop reason: SDDI

## 2019-07-08 RX ADMIN — IPRATROPIUM BROMIDE AND ALBUTEROL SULFATE 3 ML: 2.5; .5 SOLUTION RESPIRATORY (INHALATION) at 11:03

## 2019-07-08 RX ADMIN — SODIUM CHLORIDE, PRESERVATIVE FREE 3 ML: 5 INJECTION INTRAVENOUS at 08:48

## 2019-07-08 RX ADMIN — IPRATROPIUM BROMIDE AND ALBUTEROL SULFATE 3 ML: 2.5; .5 SOLUTION RESPIRATORY (INHALATION) at 20:33

## 2019-07-08 RX ADMIN — NICOTINE 1 PATCH: 21 PATCH, EXTENDED RELEASE TRANSDERMAL at 18:24

## 2019-07-08 RX ADMIN — AMOXICILLIN AND CLAVULANATE POTASSIUM 1 TABLET: 875; 125 TABLET, FILM COATED ORAL at 08:44

## 2019-07-08 RX ADMIN — CARBOPLATIN 640 MG: 10 INJECTION, SOLUTION INTRAVENOUS at 17:33

## 2019-07-08 RX ADMIN — DEXAMETHASONE SODIUM PHOSPHATE 12 MG: 4 INJECTION, SOLUTION INTRAMUSCULAR; INTRAVENOUS at 16:32

## 2019-07-08 RX ADMIN — SODIUM CHLORIDE, PRESERVATIVE FREE 3 ML: 5 INJECTION INTRAVENOUS at 21:28

## 2019-07-08 RX ADMIN — IPRATROPIUM BROMIDE AND ALBUTEROL SULFATE 3 ML: 2.5; .5 SOLUTION RESPIRATORY (INHALATION) at 07:37

## 2019-07-08 RX ADMIN — AMOXICILLIN AND CLAVULANATE POTASSIUM 1 TABLET: 875; 125 TABLET, FILM COATED ORAL at 21:28

## 2019-07-08 RX ADMIN — ONDANSETRON 16 MG: 2 INJECTION INTRAMUSCULAR; INTRAVENOUS at 16:58

## 2019-07-08 RX ADMIN — SODIUM CHLORIDE 150 MG: 9 INJECTION, SOLUTION INTRAVENOUS at 15:46

## 2019-07-08 RX ADMIN — LIDOCAINE HYDROCHLORIDE 6 ML: 10 INJECTION, SOLUTION INFILTRATION; PERINEURAL at 14:42

## 2019-07-08 RX ADMIN — METOPROLOL TARTRATE 12.5 MG: 25 TABLET ORAL at 21:27

## 2019-07-08 RX ADMIN — IPRATROPIUM BROMIDE AND ALBUTEROL SULFATE 3 ML: 2.5; .5 SOLUTION RESPIRATORY (INHALATION) at 16:37

## 2019-07-08 RX ADMIN — ETOPOSIDE 200 MG: 20 INJECTION, SOLUTION, CONCENTRATE INTRAVENOUS at 18:16

## 2019-07-08 RX ADMIN — METOPROLOL TARTRATE 12.5 MG: 25 TABLET ORAL at 08:44

## 2019-07-08 RX ADMIN — SODIUM CHLORIDE 250 ML: 9 INJECTION, SOLUTION INTRAVENOUS at 15:46

## 2019-07-08 NOTE — PROGRESS NOTES
"     LOS: 3 days   Primary Care Physician: Paty Saldana APRN     Subjective   Sleeping but awakens easily.  Tired.  Coughing.    Vital Signs  Body mass index is 27.37 kg/m².  Temp:  [97 °F (36.1 °C)-99.3 °F (37.4 °C)] 97 °F (36.1 °C)  Heart Rate:  [75-91] 75  Resp:  [14-20] 16  BP: (127-159)/(66-91) 159/91      Objective:  General Appearance:  In no acute distress.    Vital signs: (most recent): Blood pressure 159/91, pulse 75, temperature 97 °F (36.1 °C), temperature source Oral, resp. rate 16, height 172.7 cm (68\"), weight 81.6 kg (180 lb), SpO2 100 %.    HEENT: (No thrush.  Neck is supple.)    Lungs:  He is not in respiratory distress.  No stridor.  There are decreased breath sounds and rhonchi.  No wheezes.    Heart: Normal rate.  Regular rhythm.  No murmur.   Abdomen: Abdomen is soft and non-distended.  Bowel sounds are normal.   There is no abdominal tenderness.   There is no splenomegaly. There is no hepatomegaly.   Extremities: There is no effusion, dependent edema or local swelling.    Neurological: Patient is alert and oriented to person, place and time.    Skin:  Warm and dry.  No rash or cyanosis.         Results Review:    I reviewed the patient's new clinical results.    Results from last 7 days   Lab Units 07/08/19  0550 07/07/19  0559   WBC 10*3/mm3 7.04 6.32   HEMOGLOBIN g/dL 12.6* 10.8*   PLATELETS 10*3/mm3 358 308     Results from last 7 days   Lab Units 07/08/19  0550 07/07/19  0559   SODIUM mmol/L 138 139   POTASSIUM mmol/L 4.5 3.5   CHLORIDE mmol/L 102 107   CO2 mmol/L 24.9 23.9   BUN mg/dL 6* 5*   CREATININE mg/dL 0.64* 0.61*   CALCIUM mg/dL 10.0 8.5*   GLUCOSE mg/dL 89 78     Results from last 7 days   Lab Units 07/05/19  1028   INR  1.05     Hemoglobin A1C:No results found for: HGBA1C    Glucose Range:No results found for: POCGLU    No results found for: [id number]    Lab Results   Component Value Date    TSH 0.915 05/30/2019       Assessment & Plan      Medication Review: " Yes    Active Hospital Problems    Diagnosis  POA   • **Small cell lung cancer (CMS/HCC) [C34.90]  Yes   • PAF (paroxysmal atrial fibrillation) (CMS/HCC) [I48.0]  Unknown   • Atrial fibrillation with RVR (CMS/HCC) [I48.91]  Yes   • Pneumonia [J18.9]  Yes   • COPD (chronic obstructive pulmonary disease) (CMS/HCC) [J44.9]  Unknown      Resolved Hospital Problems   No resolved problems to display.       Assessment/Plan  1.  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 He is not on the OR schedule today for the port.  Discussed with Dr. Morel.  I placed consult for Dr. Gallardo to see him.  Nurse will see if procedure can still be done today.  Will need chemotherapy started after that.  2.  Paroxysmal atrial fibrillation, quickly reverted to sinus.  No anticoagulation needed per cardiology.  Echo unremarkable.  TSH okay  3.  Possible pneumonia, clinically does not have symptoms but pulmonary recommended completing a 7-day course of antibiotics.  Today is day 3  4.  Parotid lesion, discussed with Dr. Morel.  He will request biopsy.    Yane Rodriguez MD  07/08/19  10:07 AM

## 2019-07-08 NOTE — PROGRESS NOTES
[facility] INPATIENT PROGRESS NOTE    Length of Stay:  3 days    CHIEF COMPLAINT/REASON FOR VISIT:  1.  Small cell lung cancer  2.  Afib    SUBJECTIVE:  Complains of cough which is improving.  No palpitations.  Fatigue.  Afebrile.     ROS:  Positive for -  Cough   Negative for - Fever, bleeding    OBJECTIVE:  Vitals:    07/08/19 0439 07/08/19 0724 07/08/19 0737 07/08/19 0742   BP: 147/90 159/91     BP Location: Left arm Left arm     Patient Position: Lying Lying     Pulse: 85 82 79 75   Resp: 16 16 16 16   Temp: 97.2 °F (36.2 °C) 97 °F (36.1 °C)     TempSrc: Oral Oral     SpO2: 96% 95% 96% 100%   Weight:       Height:             PHYSICAL EXAMINATION:  General:  NAD, alert/oriented  Lymph:  L parotid nodule, small, about 1 cm  Chest/Lungs:  CTAB  Heart:  RRR no m/r/g  Abdomen/GI:  Soft, NT, ND, NABS  Extremities:  WWP, no edema    DIAGNOSTIC DATA:  Results Review:     I reviewed the patient's new clinical results.    Results from last 7 days   Lab Units 07/08/19  0550   WBC 10*3/mm3 7.04   HEMOGLOBIN g/dL 12.6*   HEMATOCRIT % 38.7   PLATELETS 10*3/mm3 358     Lab Results   Component Value Date    NEUTROABS 4.80 07/08/2019     Results from last 7 days   Lab Units 07/08/19  0550   SODIUM mmol/L 138   POTASSIUM mmol/L 4.5   CHLORIDE mmol/L 102   CO2 mmol/L 24.9   BUN mg/dL 6*   CREATININE mg/dL 0.64*   GLUCOSE mg/dL 89   CALCIUM mg/dL 10.0     Results from last 7 days   Lab Units 07/05/19  1028   INR  1.05   APTT seconds 33.0     Results from last 7 days   Lab Units 07/08/19  0550   MAGNESIUM mg/dL 2.2           IMAGING:    PET scan from 7/1/2019 images personally reviewed.     IMPRESSION:  1.  Large intensely FDG avid mass centered within the right perihilar  lung with associated invasion at the mediastinum and left hilum and  causes severe to complete narrowing of portions of the entire right  bronchial tree as well as the proximal left main bronchus as above.  Intensely FDG avid left perihilar lung  nodule likely represents  metastatic disease. Moderate to intensely FDG avid left infrahilar lymph  node also likely represents metastatic disease.  2.  Scattered subcentimeter pulmonary nodules are present within the  right lung and highly concerning for metastatic disease. There is  irregular pulmonary opacification extending from the mass into the right  middle and lower lobes which has worsened since 06/04/2019. Findings  represent atelectasis and/or lymphangitic spread of malignancy.  Continued close attention on follow-up is recommended. Pneumonia is felt  to be less likely given the relative lack of FDG uptake within these  areas.  3.  Intensely FDG avid 1.1 cm nodule within the left parotid gland.  Unfortunately these finding are nonspecific as they could represent  metastatic disease versus primary parotid neoplasm. This lesion is  amenable to percutaneous biopsy if clinically indicated. At least  continued close attention on follow-up is recommended.   4.  Mild FDG avid subcentimeter nodule within the right parotid gland is  present and while findings are favored to represent a low-grade primary  parotid lesion, they are nonspecific and continued close attention on  follow-up of this area is recommended.  5.  Other findings as above.        Assessment/Plan   ASSESSMENT/PLAN:  This is a 69 y.o. male with:     * Small cell lung cancer originating in the right hilum:   · He has an extremely large mass there with concern for SVC compression on CT imaging from 6/4/2019.  There is some left hilar adenopathy.  · Some parotid lesions which are concerning but indeterminate, left greater than right.  · Will plan for left parotid biopsy as this will .  · If no evidence for distant metastatic disease, will plan chemotherapy and radiation with radiation to be added later.  · If distant mets such as in the parotid, plan carbo/etoposide/atezolizumab but will plan for just carbo/etoposide inpatient as we  can't administer atezo inpatient.  · We will discuss his case at our multidisciplinary thoracic conference this week.    · Lung lesion too large to radiate in spite of the SVC narrowing.  Plan to start with chemotherapy inpatient since he has been admitted  · Mediport while inpatient and start chemotherapy while admitted     * Chronic hearing loss: History of mastoiditis.  He is not a candidate for cisplatin because of this.    * Atrial fibrillation  · He is on metoprolol 12.5 mg q12h     * Possible pneumonia  · He is on Augmentin    * Tobacco use  · He has a Nicoderm patch    * SVC syndrome    PLAN:   1.  Consult Dr. Gallardo for a mediport while he is inpatient  2.  Biopsy left parotid lesion ordered  2.  Start chemotherapy with carboplatin/etoposide inpatient once we have the port in place.  Plan carboplatin/VP16 inpatient.  If distant metastatic disease in the parotid is proven, add atezolizumab for outpatient use with cycle #2.  Unclear at this point how radiation may be utilized.       ADDENDUM:  After discussion with thoracic surgery, there may be some difficulty getting a port in.  Therefore, proceed today with cycle #1 carboplatin AUC 5 and VP16 100 mg/m2.  Port placement hopefully prior to cycle 2.             Thai Morel MD

## 2019-07-08 NOTE — PLAN OF CARE
Problem: Pneumonia (Adult)  Goal: Signs and Symptoms of Listed Potential Problems Will be Absent, Minimized or Managed (Pneumonia)  Outcome: Ongoing (interventions implemented as appropriate)      Problem: Arrhythmia/Dysrhythmia (Symptomatic) (Adult)  Goal: Signs and Symptoms of Listed Potential Problems Will be Absent, Minimized or Managed (Arrhythmia/Dysrhythmia)  Outcome: Ongoing (interventions implemented as appropriate)      Problem: Patient Care Overview  Goal: Plan of Care Review  Outcome: Ongoing (interventions implemented as appropriate)   07/08/19 3489   Coping/Psychosocial   Plan of Care Reviewed With patient;spouse   Plan of Care Review   Progress no change   OTHER   Outcome Summary Pt went down for salivary gland biopsy, small bandaid over site. No complaints when returned. Awaiting cycle 1 of chemo to possibly place port due to tumor size. Cycle 1 starting today of etoposide and carboplatin. Wife at bedside. Nicoderm patch in place. Up ad savanna. No complaints and in good spirits. Will continue to monitor.      Goal: Individualization and Mutuality  Outcome: Ongoing (interventions implemented as appropriate)    Goal: Discharge Needs Assessment  Outcome: Ongoing (interventions implemented as appropriate)    Goal: Interprofessional Rounds/Family Conf  Outcome: Ongoing (interventions implemented as appropriate)

## 2019-07-08 NOTE — PROGRESS NOTES
"CC: Atrial fibrillation    Interval History:   No complaints    Vital Signs  Temp:  [97 °F (36.1 °C)-99.3 °F (37.4 °C)] 97.2 °F (36.2 °C)  Heart Rate:  [76-91] 85  Resp:  [14-20] 16  BP: (127-157)/(66-90) 147/90  No intake or output data in the 24 hours ending 07/08/19 0654  Flowsheet Rows      First Filed Value   Admission Height  180.3 cm (71\") Documented at 07/05/2019 1106   Admission Weight  80.7 kg (178 lb) Documented at 07/05/2019 1121          PHYSICAL EXAM:  General: No acute distress  Resp:NL Rate, unlabored, clear  CV:NL rate and rhythm, NL PMI, Nl S1 and S2, no Murmur, no gallop, no rub, No JVD. Normal pedal pulses  ABD:Nl sounds, no masses or tenderness, nondistended, no guarding or rebound  Neuro: alert,cooperative and oriented  Extr: No edema or cyanosis, moves all extremities      Results Review:    Results from last 7 days   Lab Units 07/07/19  0559   SODIUM mmol/L 139   POTASSIUM mmol/L 3.5   CHLORIDE mmol/L 107   CO2 mmol/L 23.9   BUN mg/dL 5*   CREATININE mg/dL 0.61*   GLUCOSE mg/dL 78   CALCIUM mg/dL 8.5*     Results from last 7 days   Lab Units 07/06/19  0012 07/05/19  1833 07/05/19  1117   TROPONIN T ng/mL <0.010 <0.010 <0.010     Results from last 7 days   Lab Units 07/08/19  0550   WBC 10*3/mm3 7.04   HEMOGLOBIN g/dL 12.6*   HEMATOCRIT % 38.7   PLATELETS 10*3/mm3 358     Results from last 7 days   Lab Units 07/05/19  1028   INR  1.05   APTT seconds 33.0         Results from last 7 days   Lab Units 07/07/19  0559   MAGNESIUM mg/dL 1.8         I reviewed the patient's new clinical results.  I personally viewed and interpreted the patient's EKG/Telemetry data        Medication Review:   Meds reviewed         Assessment/Plan  1.  Paroxysmal atrial fibrillation.  CHADS 2 Vasc of just 1.  Back in sinus rhythm.  At this time his echocardiogram looks unremarkable.  He is in sinus rhythm would continue the low-dose metoprolol would not anticoagulate him further than aspirin have him see us in the " office.  2.  Cancer lung cancer metastatic.  To have port placed to be treated.  3.  Regional wall motion abnormality noted on echocardiogram just mild no cardiac symptoms at all.  Could have underlying coronary disease but still would treat medically.            Ramsey Aiken MD  07/08/19  6:54 AM

## 2019-07-08 NOTE — CONSULTS
Inpatient Thoracic Surgery Consult  Consult performed by: Elvia Heck APRN  Consult ordered by: Yane Rodriguez MD          Patient Care Team:  Paty Saldana APRN as PCP - General (Family Medicine)  Ramila Gallardo MD as Referring Physician (Thoracic Surgery)  Annabelle Giang MD as Consulting Physician (Radiation Oncology)  Thai Morel MD as Consulting Physician (Hematology and Oncology)    Chief Complaint   Patient presents with   • Rapid Heart Rate     Pt was supposed to have mediport placed next week and was having PAT. EKG shows rapid heart rate. Pt complains of SOA.       Subjective     History of Present Illness     Julia Smith is a pleasant 69 year-old male who was seen in multidisciplinary lung care clinic by Dr. Ramila Gallardo and Dr. Thai Morel in consultation for a newly diagnosed small cell lung cancer. The patient initially presented with hemoptysis and was found to have a significant mass on a chest x-ray. He was referred to pulmonary medicine and evaluated by Dr. Arslan Marquez who performed a bronchoscopy with biopsy which was indicative of small cell lung cancer. The patient reports an approximate 30 lbs weight loss since December. He has some shortness of air, but this is improved with his heart rate stabilization.  The patient is a current smoker with an approximate 60 pack year history of tobacco use.   The patient will need chemotherapy treatment for his small cell lung cancer and will need port placement to facilitate his treatment. Dr. Gallardo planned to do this procedure as an outpatient this week, but the patient has been admitted in the interim and we have been asked to see him to facilitate this while the patient is hospitalized.    Review of Systems   Constitutional: Negative for fever.   HENT: Negative for trouble swallowing.    Eyes: Negative.    Respiratory: Positive for cough and shortness of breath.    Cardiovascular: Negative.    Gastrointestinal: Negative.     Endocrine: Negative.    Genitourinary: Negative.    Musculoskeletal: Negative.    Skin: Negative.    Allergic/Immunologic: Negative.    Neurological: Negative.    Psychiatric/Behavioral: Negative.         Patient Active Problem List   Diagnosis   • Encounter for screening for malignant neoplasm of colon   • Small cell lung cancer (CMS/HCC)   • Small cell carcinoma (CMS/HCC)   • Atrial fibrillation with RVR (CMS/HCC)   • Pneumonia   • COPD (chronic obstructive pulmonary disease) (CMS/HCC)   • PAF (paroxysmal atrial fibrillation) (CMS/HCC)     Past Medical History:   Diagnosis Date   • Cancer (CMS/HCC) 2019    Right lung   • Chronic cough    • Hearing loss    • History of shingles    • Lower back pain    • Mastoiditis      Past Surgical History:   Procedure Laterality Date   • BRONCHOSCOPY N/A 2019    Procedure: BRONCHOSCOPY WITH WASHINGS AND BIOPSY AND ENDOBRONCHIAL ULTRASOUND WITH FNA;  Surgeon: Arslan Marquez MD;  Location: [facility] ENDOSCOPY;  Service: Pulmonary   • INNER EAR SURGERY Left    • MASTOIDECTOMY Left      Family History   Problem Relation Age of Onset   • No Known Problems Mother    • COPD Father            • No Known Problems Sister    • Heart disease Brother    • No Known Problems Brother    • No Known Problems Brother    • No Known Problems Brother      Social History     Socioeconomic History   • Marital status:      Spouse name: Krystle   • Number of children: 2   • Years of education: Not on file   • Highest education level: Not on file   Occupational History   • Occupation: [employer]   Tobacco Use   • Smoking status: Current Every Day Smoker     Packs/day: 1.50     Years: 40.00     Pack years: 60.00     Types: Cigarettes   • Smokeless tobacco: Never Used   • Tobacco comment: Since age 32   Substance and Sexual Activity   • Alcohol use: Yes     Alcohol/week: 2.4 - 4.2 oz     Types: 4 - 7 Cans of beer per week     Comment: daily   • Drug use: Yes     Types:  Marijuana   • Sexual activity: Not Currently     Partners: Female     Facility-Administered Medications Prior to Admission   Medication Dose Route Frequency Provider Last Rate Last Dose   • ipratropium-albuterol (DUO-NEB) nebulizer solution 3 mL  3 mL Nebulization 4x Daily - RT Roma Costa APRN   3 mL at 05/13/19 0048     No medications prior to admission.     No Known Allergies    Objective      Vital Signs  Temp:  [97 °F (36.1 °C)-99.3 °F (37.4 °C)] 97 °F (36.1 °C)  Heart Rate:  [72-91] 72  Resp:  [14-20] 16  BP: (127-159)/(66-91) 159/91    Intake & Output (last day)       07/07 0701 - 07/08 0700 07/08 0701 - 07/09 0700    P.O.      I.V. (mL/kg)      IV Piggyback      Total Intake(mL/kg)      Net            Urine Unmeasured Occurrence 4 x     Stool Unmeasured Occurrence 1 x           Physical Exam   Constitutional: He is oriented to person, place, and time. He appears well-developed and well-nourished.   HENT:   Head: Normocephalic and atraumatic.   Nose: Nose normal.   Eyes: Conjunctivae are normal. No scleral icterus.   Neck: No tracheal deviation present.   Cardiovascular: Normal rate, regular rhythm, normal heart sounds and intact distal pulses.   No murmur heard.  Pulmonary/Chest: Effort normal and breath sounds normal. No respiratory distress.   Abdominal: Soft. Bowel sounds are normal. He exhibits no distension and no mass.   Musculoskeletal: Normal range of motion.   Neurological: He is alert and oriented to person, place, and time.   Skin: Skin is warm and dry.   Psychiatric: He has a normal mood and affect. His behavior is normal. Judgment and thought content normal.   Nursing note and vitals reviewed.      Results Review:    I reviewed the patient's new clinical results.  I reviewed the patient's new imaging results and agree with the interpretation.  I reviewed the patient's other test results and agree with the interpretation  Discussed with patient, RN, Dr. Morel and Dr. Gallardo.    Imaging Results  (last 24 hours)     Procedure Component Value Units Date/Time    CT Angiogram Chest With Contrast [[id number]] Collected:  07/05/19 1532     Updated:  07/08/19 1111    Narrative:       CT ANGIOGRAM OF THE CHEST. MULTIPLE CORONAL, SAGITTAL, AND 3-D  RECONSTRUCTIONS.     HISTORY: 69-year-old male with shortness of breath. Right upper lobe  small cell lung cancer.     TECHNIQUE: Radiation dose reduction techniques were utilized, including  automated exposure control and exposure modulation based on body size.   CT angiogram of the chest was performed following the administration of  IV contrast. Multiple coronal, sagittal, and 3-D reconstruction images  were obtained. Compared with PET/CT from 07/01/2019 and chest CT from  06/04/2019.     FINDINGS: There is no significant change in the approximately 12 x 9 cm  right hilar/mediastinal mass which markedly narrows the right mainstem  bronchus and occludes the right intermediate bronchus, right middle and  lower lobe bronchi. There is also narrowing of the chiquita and the  proximal aspect of the left mainstem bronchus. The mass encases and  narrows the right pulmonary artery. There is no convincing evidence for  pulmonary thromboemboli. The bulky mass has mass effect on the left  atrium and has significant mass effect on the right pulmonary venous  trunks and the left lower lobe pulmonary vein. The opacified bronchi  within the right middle lobe and right lower lobe appear largely  unchanged. The peribronchial airspace consolidations at the right lower  lobe appear unchanged. There are a few new ill-defined ground glass  opacities within the right middle and lower lobes and there is a  perihilar ground glass opacity at the right upper lobe which is larger  than previously measuring approximately 3.4 cm, previously approximately  2 cm. There is a central nodular density within this ground glass  opacity which is not definitively seen previously. There is no  significant  change in the cavitary 1.5 cm nodule in the perihilar region  of the left lower lobe. The left mediastinal and hilar lymphadenopathy  appear largely unchanged.       Impression:       1. There is no convincing evidence for pulmonary thromboemboli.  2. There is no significant change in the approximately 12 cm right  hilar/mediastinal mass narrowing the right mainstem bronchus, occluding  the right intermediate bronchus, right middle and lower lobe bronchi.  Narrowing of the chiquita and the proximal aspect of the left mainstem  bronchus appears largely unchanged. The airspace consolidations at the  right lower lobe and mild volume loss within the right middle lobe and  right lower lobe appear largely unchanged. There are new small patchy  ground glass opacities within the right middle lobe and right lower lobe  and there has been increase in size of a 3.4 cm ground glass opacity at  the right upper lobe. The ground glass opacities are of uncertain  etiology. The appearance is not suggestive of postobstructive pneumonia  or aspiration. The ground glass opacities may represent metastases.  3. The 1.5 cm perihilar left lower lobe pulmonary nodule is stable. The  left hilar and mediastinal lymphadenopathy is stable as well.     Discussed with Dr. Damon.     This report was finalized on 7/8/2019 11:08 AM by Dr. Oanh Crow M.D.             Lab Results:  Lab Results (last 24 hours)     Procedure Component Value Units Date/Time    MRSA Screen Culture - Swab, Nares [[id number]]  (Normal) Collected:  07/05/19 2357    Specimen:  Swab from Nares Updated:  07/08/19 1008     MRSA SCREEN CX No Methicillin Resistant Staphylococcus aureus isolated    Basic Metabolic Panel [[id number]]  (Abnormal) Collected:  07/08/19 0550    Specimen:  Blood Updated:  07/08/19 0702     Glucose 89 mg/dL      BUN 6 mg/dL      Creatinine 0.64 mg/dL      Sodium 138 mmol/L      Potassium 4.5 mmol/L      Chloride 102 mmol/L      CO2 24.9 mmol/L       Calcium 10.0 mg/dL      eGFR Non African Amer 125 mL/min/1.73      BUN/Creatinine Ratio 9.4     Anion Gap 11.1 mmol/L     Narrative:       GFR Normal >60  Chronic Kidney Disease <60  Kidney Failure <15    Magnesium [[id number]]  (Normal) Collected:  07/08/19 0550    Specimen:  Blood Updated:  07/08/19 0659     Magnesium 2.2 mg/dL     CBC & Differential [[id number]] Collected:  07/08/19 0550    Specimen:  Blood Updated:  07/08/19 0631    Narrative:       The following orders were created for panel order CBC & Differential.  Procedure                               Abnormality         Status                     ---------                               -----------         ------                     CBC Auto Differential[[id number]]        Abnormal            Final result                 Please view results for these tests on the individual orders.    CBC Auto Differential [[id number]]  (Abnormal) Collected:  07/08/19 0550    Specimen:  Blood Updated:  07/08/19 0631     WBC 7.04 10*3/mm3      RBC 4.58 10*6/mm3      Hemoglobin 12.6 g/dL      Hematocrit 38.7 %      MCV 84.5 fL      MCH 27.5 pg      MCHC 32.6 g/dL      RDW 15.1 %      RDW-SD 46.5 fl      MPV 9.5 fL      Platelets 358 10*3/mm3      Neutrophil % 68.2 %      Lymphocyte % 14.9 %      Monocyte % 12.4 %      Eosinophil % 3.7 %      Basophil % 0.4 %      Immature Grans % 0.4 %      Neutrophils, Absolute 4.80 10*3/mm3      Lymphocytes, Absolute 1.05 10*3/mm3      Monocytes, Absolute 0.87 10*3/mm3      Eosinophils, Absolute 0.26 10*3/mm3      Basophils, Absolute 0.03 10*3/mm3      Immature Grans, Absolute 0.03 10*3/mm3      nRBC 0.0 /100 WBC     Blood Culture - Blood, Arm, Left [[id number]] Collected:  07/05/19 1532    Specimen:  Blood from Arm, Left Updated:  07/07/19 1545     Blood Culture No growth at 2 days    Blood Culture - Blood, Hand, Left [[id number]] Collected:  07/05/19 1532    Specimen:  Blood from Hand, Left Updated:  07/07/19 1545     Blood Culture No growth  at 2 days    Respiratory Culture - Sputum, Cough [[id number]] Collected:  07/06/19 0637    Specimen:  Sputum from Cough Updated:  07/07/19 1304     Respiratory Culture Light growth (2+) Normal Respiratory Miri     Gram Stain Rare (1+) WBCs per low power field      Rare (1+) Epithelial cells per low power field      Rare (1+) Mixed bacterial morphotypes seen on Gram Stain              Assessment/Plan       Small cell lung cancer (CMS/HCC)    Atrial fibrillation with RVR (CMS/HCC)    Pneumonia    COPD (chronic obstructive pulmonary disease) (CMS/HCC)    PAF (paroxysmal atrial fibrillation) (CMS/HCC)      Assessment:    Condition: In stable condition.       The initial plan was for outpatient placement of a medi-port this week. The patient has been admitted in the interim and we have been consulted for port placement while the patient is admitted.    I personally reviewed the images from Mr. Smith' CT angiogram performed 7/5/2019.  The right hilar/mediastinal mass is resulting in narrowing of the right mainstem bronchus occluding the right intermediate bronchus, right middle and lower lobe bronchi.  The mass encases and narrows the right pulmonary artery.  There is mass-effect on the left atrium and on the right pulmonary venous trunks and left lower lobe pulmonary vein.  There are consolidations of the right lower lobe which are stable in comparison to previous imaging.  There is also new patchy groundglass opacity within the right middle lobe and right lower lobe and there is been an increase in size of a 3.4 cm groundglass opacity in the right upper lobe.  The 1.5 perihilar left lower lobe pulmonary nodule is stable.     Due to SVC syndrome, Dr. Gallardo believes placement of a intravenous catheter would be difficult.  I have discussed this with Dr. Morel who recommends initiation of chemotherapy with peripheral access with the patient is admitted and then consideration for port placement if the SVC syndrome improves  with treatment. We concur.    I discussed the patients findings and our recommendations with patient, nursing staff and Dr. Thai Morel and Dr. Ramila Gallardo.    Thank you for this consult and allowing us to participate in the care of your patient.  We will follow along with you during this hospitalization.       BERTO Bishop  [facility]  07/08/19  11:55 AM

## 2019-07-08 NOTE — H&P (VIEW-ONLY)
[facility] INPATIENT PROGRESS NOTE    Length of Stay:  3 days    CHIEF COMPLAINT/REASON FOR VISIT:  1.  Small cell lung cancer  2.  Afib    SUBJECTIVE:  Complains of cough which is improving.  No palpitations.  Fatigue.  Afebrile.     ROS:  Positive for -  Cough   Negative for - Fever, bleeding    OBJECTIVE:  Vitals:    07/08/19 0439 07/08/19 0724 07/08/19 0737 07/08/19 0742   BP: 147/90 159/91     BP Location: Left arm Left arm     Patient Position: Lying Lying     Pulse: 85 82 79 75   Resp: 16 16 16 16   Temp: 97.2 °F (36.2 °C) 97 °F (36.1 °C)     TempSrc: Oral Oral     SpO2: 96% 95% 96% 100%   Weight:       Height:             PHYSICAL EXAMINATION:  General:  NAD, alert/oriented  Lymph:  L parotid nodule, small, about 1 cm  Chest/Lungs:  CTAB  Heart:  RRR no m/r/g  Abdomen/GI:  Soft, NT, ND, NABS  Extremities:  WWP, no edema    DIAGNOSTIC DATA:  Results Review:     I reviewed the patient's new clinical results.    Results from last 7 days   Lab Units 07/08/19  0550   WBC 10*3/mm3 7.04   HEMOGLOBIN g/dL 12.6*   HEMATOCRIT % 38.7   PLATELETS 10*3/mm3 358     Lab Results   Component Value Date    NEUTROABS 4.80 07/08/2019     Results from last 7 days   Lab Units 07/08/19  0550   SODIUM mmol/L 138   POTASSIUM mmol/L 4.5   CHLORIDE mmol/L 102   CO2 mmol/L 24.9   BUN mg/dL 6*   CREATININE mg/dL 0.64*   GLUCOSE mg/dL 89   CALCIUM mg/dL 10.0     Results from last 7 days   Lab Units 07/05/19  1028   INR  1.05   APTT seconds 33.0     Results from last 7 days   Lab Units 07/08/19  0550   MAGNESIUM mg/dL 2.2           IMAGING:    PET scan from 7/1/2019 images personally reviewed.     IMPRESSION:  1.  Large intensely FDG avid mass centered within the right perihilar  lung with associated invasion at the mediastinum and left hilum and  causes severe to complete narrowing of portions of the entire right  bronchial tree as well as the proximal left main bronchus as above.  Intensely FDG avid left perihilar lung  nodule likely represents  metastatic disease. Moderate to intensely FDG avid left infrahilar lymph  node also likely represents metastatic disease.  2.  Scattered subcentimeter pulmonary nodules are present within the  right lung and highly concerning for metastatic disease. There is  irregular pulmonary opacification extending from the mass into the right  middle and lower lobes which has worsened since 06/04/2019. Findings  represent atelectasis and/or lymphangitic spread of malignancy.  Continued close attention on follow-up is recommended. Pneumonia is felt  to be less likely given the relative lack of FDG uptake within these  areas.  3.  Intensely FDG avid 1.1 cm nodule within the left parotid gland.  Unfortunately these finding are nonspecific as they could represent  metastatic disease versus primary parotid neoplasm. This lesion is  amenable to percutaneous biopsy if clinically indicated. At least  continued close attention on follow-up is recommended.   4.  Mild FDG avid subcentimeter nodule within the right parotid gland is  present and while findings are favored to represent a low-grade primary  parotid lesion, they are nonspecific and continued close attention on  follow-up of this area is recommended.  5.  Other findings as above.        Assessment/Plan   ASSESSMENT/PLAN:  This is a 69 y.o. male with:     * Small cell lung cancer originating in the right hilum:   · He has an extremely large mass there with concern for SVC compression on CT imaging from 6/4/2019.  There is some left hilar adenopathy.  · Some parotid lesions which are concerning but indeterminate, left greater than right.  · Will plan for left parotid biopsy as this will .  · If no evidence for distant metastatic disease, will plan chemotherapy and radiation with radiation to be added later.  · If distant mets such as in the parotid, plan carbo/etoposide/atezolizumab but will plan for just carbo/etoposide inpatient as we  can't administer atezo inpatient.  · We will discuss his case at our multidisciplinary thoracic conference this week.    · Lung lesion too large to radiate in spite of the SVC narrowing.  Plan to start with chemotherapy inpatient since he has been admitted  · Mediport while inpatient and start chemotherapy while admitted     * Chronic hearing loss: History of mastoiditis.  He is not a candidate for cisplatin because of this.    * Atrial fibrillation  · He is on metoprolol 12.5 mg q12h     * Possible pneumonia  · He is on Augmentin    * Tobacco use  · He has a Nicoderm patch    * SVC syndrome    PLAN:   1.  Consult Dr. Gallardo for a mediport while he is inpatient  2.  Biopsy left parotid lesion ordered  2.  Start chemotherapy with carboplatin/etoposide inpatient once we have the port in place.  Plan carboplatin/VP16 inpatient.  If distant metastatic disease in the parotid is proven, add atezolizumab for outpatient use with cycle #2.  Unclear at this point how radiation may be utilized.       ADDENDUM:  After discussion with thoracic surgery, there may be some difficulty getting a port in.  Therefore, proceed today with cycle #1 carboplatin AUC 5 and VP16 100 mg/m2.  Port placement hopefully prior to cycle 2.             Thai Morel MD

## 2019-07-09 LAB
ANION GAP SERPL CALCULATED.3IONS-SCNC: 14.6 MMOL/L (ref 5–15)
BASOPHILS # BLD AUTO: 0 10*3/MM3 (ref 0–0.2)
BASOPHILS NFR BLD AUTO: 0 % (ref 0–1.5)
BUN BLD-MCNC: 11 MG/DL (ref 8–23)
BUN/CREAT SERPL: 15.1 (ref 7–25)
CALCIUM SPEC-SCNC: 10.2 MG/DL (ref 8.6–10.5)
CHLORIDE SERPL-SCNC: 96 MMOL/L (ref 98–107)
CO2 SERPL-SCNC: 20.4 MMOL/L (ref 22–29)
CREAT BLD-MCNC: 0.73 MG/DL (ref 0.76–1.27)
CYTO UR: NORMAL
DEPRECATED RDW RBC AUTO: 46.5 FL (ref 37–54)
EOSINOPHIL # BLD AUTO: 0 10*3/MM3 (ref 0–0.4)
EOSINOPHIL NFR BLD AUTO: 0 % (ref 0.3–6.2)
ERYTHROCYTE [DISTWIDTH] IN BLOOD BY AUTOMATED COUNT: 14.9 % (ref 12.3–15.4)
GFR SERPL CREATININE-BSD FRML MDRD: 107 ML/MIN/1.73
GLUCOSE BLD-MCNC: 172 MG/DL (ref 65–99)
HCT VFR BLD AUTO: 42.9 % (ref 37.5–51)
HGB BLD-MCNC: 13.7 G/DL (ref 13–17.7)
IMM GRANULOCYTES # BLD AUTO: 0.05 10*3/MM3 (ref 0–0.05)
IMM GRANULOCYTES NFR BLD AUTO: 0.7 % (ref 0–0.5)
LAB AP CASE REPORT: NORMAL
LAB AP DIAGNOSIS COMMENT: NORMAL
LDH SERPL-CCNC: 313 U/L (ref 135–225)
LYMPHOCYTES # BLD AUTO: 0.57 10*3/MM3 (ref 0.7–3.1)
LYMPHOCYTES NFR BLD AUTO: 8 % (ref 19.6–45.3)
MAGNESIUM SERPL-MCNC: 2.2 MG/DL (ref 1.6–2.4)
MCH RBC QN AUTO: 27.3 PG (ref 26.6–33)
MCHC RBC AUTO-ENTMCNC: 31.9 G/DL (ref 31.5–35.7)
MCV RBC AUTO: 85.6 FL (ref 79–97)
MONOCYTES # BLD AUTO: 0.21 10*3/MM3 (ref 0.1–0.9)
MONOCYTES NFR BLD AUTO: 2.9 % (ref 5–12)
NEUTROPHILS # BLD AUTO: 6.29 10*3/MM3 (ref 1.7–7)
NEUTROPHILS NFR BLD AUTO: 88.4 % (ref 42.7–76)
NRBC BLD AUTO-RTO: 0 /100 WBC (ref 0–0.2)
PATH REPORT.FINAL DX SPEC: NORMAL
PATH REPORT.GROSS SPEC: NORMAL
PLATELET # BLD AUTO: 424 10*3/MM3 (ref 140–450)
PMV BLD AUTO: 9.3 FL (ref 6–12)
POTASSIUM BLD-SCNC: 4.9 MMOL/L (ref 3.5–5.2)
RBC # BLD AUTO: 5.01 10*6/MM3 (ref 4.14–5.8)
SODIUM BLD-SCNC: 131 MMOL/L (ref 136–145)
URATE SERPL-MCNC: 4.8 MG/DL (ref 3.4–7)
WBC NRBC COR # BLD: 7.12 10*3/MM3 (ref 3.4–10.8)

## 2019-07-09 PROCEDURE — 83615 LACTATE (LD) (LDH) ENZYME: CPT | Performed by: INTERNAL MEDICINE

## 2019-07-09 PROCEDURE — 25010000002 DEXAMETHASONE SODIUM PHOSPHATE 20 MG/5ML SOLUTION 5 ML VIAL: Performed by: INTERNAL MEDICINE

## 2019-07-09 PROCEDURE — 25010000002 ETOPOSIDE 500 MG/25ML SOLUTION 25 ML VIAL: Performed by: INTERNAL MEDICINE

## 2019-07-09 PROCEDURE — 99233 SBSQ HOSP IP/OBS HIGH 50: CPT | Performed by: INTERNAL MEDICINE

## 2019-07-09 PROCEDURE — 83735 ASSAY OF MAGNESIUM: CPT | Performed by: INTERNAL MEDICINE

## 2019-07-09 PROCEDURE — 85025 COMPLETE CBC W/AUTO DIFF WBC: CPT | Performed by: INTERNAL MEDICINE

## 2019-07-09 PROCEDURE — 94799 UNLISTED PULMONARY SVC/PX: CPT

## 2019-07-09 PROCEDURE — 84550 ASSAY OF BLOOD/URIC ACID: CPT | Performed by: INTERNAL MEDICINE

## 2019-07-09 PROCEDURE — 80048 BASIC METABOLIC PNL TOTAL CA: CPT | Performed by: INTERNAL MEDICINE

## 2019-07-09 RX ORDER — SODIUM CHLORIDE 9 MG/ML
250 INJECTION, SOLUTION INTRAVENOUS ONCE
Status: COMPLETED | OUTPATIENT
Start: 2019-07-09 | End: 2019-07-09

## 2019-07-09 RX ADMIN — IPRATROPIUM BROMIDE AND ALBUTEROL SULFATE 3 ML: 2.5; .5 SOLUTION RESPIRATORY (INHALATION) at 13:36

## 2019-07-09 RX ADMIN — SODIUM CHLORIDE 250 ML: 9 INJECTION, SOLUTION INTRAVENOUS at 17:16

## 2019-07-09 RX ADMIN — METOPROLOL TARTRATE 12.5 MG: 25 TABLET ORAL at 20:17

## 2019-07-09 RX ADMIN — NICOTINE 1 PATCH: 21 PATCH, EXTENDED RELEASE TRANSDERMAL at 18:15

## 2019-07-09 RX ADMIN — AMOXICILLIN AND CLAVULANATE POTASSIUM 1 TABLET: 875; 125 TABLET, FILM COATED ORAL at 20:17

## 2019-07-09 RX ADMIN — AMOXICILLIN AND CLAVULANATE POTASSIUM 1 TABLET: 875; 125 TABLET, FILM COATED ORAL at 09:14

## 2019-07-09 RX ADMIN — IPRATROPIUM BROMIDE AND ALBUTEROL SULFATE 3 ML: 2.5; .5 SOLUTION RESPIRATORY (INHALATION) at 17:23

## 2019-07-09 RX ADMIN — METOPROLOL TARTRATE 12.5 MG: 25 TABLET ORAL at 09:14

## 2019-07-09 RX ADMIN — DEXAMETHASONE SODIUM PHOSPHATE 12 MG: 4 INJECTION, SOLUTION INTRAMUSCULAR; INTRAVENOUS at 15:20

## 2019-07-09 RX ADMIN — IPRATROPIUM BROMIDE AND ALBUTEROL SULFATE 3 ML: 2.5; .5 SOLUTION RESPIRATORY (INHALATION) at 19:16

## 2019-07-09 RX ADMIN — IPRATROPIUM BROMIDE AND ALBUTEROL SULFATE 3 ML: 2.5; .5 SOLUTION RESPIRATORY (INHALATION) at 07:20

## 2019-07-09 RX ADMIN — ETOPOSIDE 200 MG: 20 INJECTION, SOLUTION, CONCENTRATE INTRAVENOUS at 17:45

## 2019-07-09 NOTE — PROGRESS NOTES
[facility] GROUP INPATIENT PROGRESS NOTE    Length of Stay:  4 days    CHIEF COMPLAINT/REASON FOR VISIT:  1.  Small cell lung cancer  2.  Afib    SUBJECTIVE:  Started therapy with carboplatin/VP16 yesterday.  Biopsy of the left parotid gland done with pathology pending.  Tolerated chemotherapy very well.  Dyspnea improved.  No palpitations.      ROS:  Positive for -  Cough, improved  Negative for - Fever, bleeding    OBJECTIVE:  Vitals:    07/09/19 0340 07/09/19 0720 07/09/19 0726 07/09/19 0737   BP: 128/73   127/82   BP Location: Right arm   Right arm   Patient Position: Lying   Sitting   Pulse: 83 87 87    Resp: 16 20 20    Temp: 97.2 °F (36.2 °C)   97.4 °F (36.3 °C)   TempSrc: Oral      SpO2: 96% 97%     Weight:       Height:             PHYSICAL EXAMINATION:  General:  NAD, alert/oriented  Lymph:  L parotid bandaged today  Chest/Lungs:  CTAB  Heart:  RRR no m/r/g  Abdomen/GI:  Soft, NT, ND, NABS  Extremities:  WWP, no edema    DIAGNOSTIC DATA:  Results Review:     I reviewed the patient's new clinical results.    Results from last 7 days   Lab Units 07/09/19  0616   WBC 10*3/mm3 7.12   HEMOGLOBIN g/dL 13.7   HEMATOCRIT % 42.9   PLATELETS 10*3/mm3 424     Lab Results   Component Value Date    NEUTROABS 6.29 07/09/2019     Results from last 7 days   Lab Units 07/09/19  0616   SODIUM mmol/L 131*   POTASSIUM mmol/L 4.9   CHLORIDE mmol/L 96*   CO2 mmol/L 20.4*   BUN mg/dL 11   CREATININE mg/dL 0.73*   GLUCOSE mg/dL 172*   CALCIUM mg/dL 10.2     Results from last 7 days   Lab Units 07/05/19  1028   INR  1.05   APTT seconds 33.0     Results from last 7 days   Lab Units 07/09/19  0616   MAGNESIUM mg/dL 2.2           IMAGING:    PET scan from 7/1/2019:     IMPRESSION:  1.  Large intensely FDG avid mass centered within the right perihilar  lung with associated invasion at the mediastinum and left hilum and  causes severe to complete narrowing of portions of the entire right  bronchial tree as well as the proximal  left main bronchus as above.  Intensely FDG avid left perihilar lung nodule likely represents  metastatic disease. Moderate to intensely FDG avid left infrahilar lymph  node also likely represents metastatic disease.  2.  Scattered subcentimeter pulmonary nodules are present within the  right lung and highly concerning for metastatic disease. There is  irregular pulmonary opacification extending from the mass into the right  middle and lower lobes which has worsened since 06/04/2019. Findings  represent atelectasis and/or lymphangitic spread of malignancy.  Continued close attention on follow-up is recommended. Pneumonia is felt  to be less likely given the relative lack of FDG uptake within these  areas.  3.  Intensely FDG avid 1.1 cm nodule within the left parotid gland.  Unfortunately these finding are nonspecific as they could represent  metastatic disease versus primary parotid neoplasm. This lesion is  amenable to percutaneous biopsy if clinically indicated. At least  continued close attention on follow-up is recommended.   4.  Mild FDG avid subcentimeter nodule within the right parotid gland is  present and while findings are favored to represent a low-grade primary  parotid lesion, they are nonspecific and continued close attention on  follow-up of this area is recommended.  5.  Other findings as above.        Assessment/Plan   ASSESSMENT/PLAN:  This is a 69 y.o. male with:     * Small cell lung cancer originating in the right hilum:   · He has an extremely large mass there with concern for SVC compression on CT imaging from 6/4/2019.  There is some left hilar adenopathy.  · Some parotid lesions which are concerning but indeterminate, left greater than right.  · Will plan for left parotid biopsy as this will .  · If no evidence for distant metastatic disease, will plan chemotherapy and radiation with radiation to be added later.  · If distant mets such as in the parotid, plan  carbo/etoposide/atezolizumab but will plan for just carbo/etoposide inpatient as we can't administer atezo inpatient.  · We will discuss his case at our multidisciplinary thoracic conference this week.    · Lung lesion too large to radiate in spite of the SVC narrowing.    · On 7/8/2019 we started carboplatin AUC 5 and VP16 100 mg/m2 through peripheral IVs  · Plan outpatient mediport once the large tumor mass has decreased in size.        * Chronic hearing loss: History of mastoiditis.  He is not a candidate for cisplatin because of this.    * Atrial fibrillation  · He is on metoprolol 12.5 mg q12h     * Possible pneumonia  · He is on Augmentin, stop 7/11.    * Tobacco use  · He has a Nicoderm patch    * SVC syndrome    * Hyponatremia:  Will follow    PLAN:   1.  F/u L parotid bx result  2.  Cycle 1 day 2 VP16 today.  Day 3 tomorrow.  If doing well he can be discharged after that with close follow up in the office.             Thai Morel MD

## 2019-07-09 NOTE — PLAN OF CARE
Problem: Patient Care Overview  Goal: Plan of Care Review   07/09/19 1734   Coping/Psychosocial   Plan of Care Reviewed With patient   Plan of Care Review   Progress improving   OTHER   Outcome Summary Continue care.

## 2019-07-09 NOTE — PLAN OF CARE
Problem: Pneumonia (Adult)  Goal: Signs and Symptoms of Listed Potential Problems Will be Absent, Minimized or Managed (Pneumonia)  Outcome: Ongoing (interventions implemented as appropriate)      Problem: Arrhythmia/Dysrhythmia (Symptomatic) (Adult)  Goal: Signs and Symptoms of Listed Potential Problems Will be Absent, Minimized or Managed (Arrhythmia/Dysrhythmia)  Outcome: Ongoing (interventions implemented as appropriate)      Problem: Patient Care Overview  Goal: Plan of Care Review  Outcome: Ongoing (interventions implemented as appropriate)   07/09/19 4781   Coping/Psychosocial   Plan of Care Reviewed With patient   Plan of Care Review   Progress no change   OTHER   Outcome Summary Bandaid over salivary gland from BX, C/O pain, N/V, had first cycle day one of VP16 & carbo, hoping to place port after first cycle, Nicoderm patch in place

## 2019-07-09 NOTE — PROGRESS NOTES
Discharge Planning Assessment  [facility]     Patient Name: Julia Smith III  MRN: [id number]  Today's Date: 7/9/2019    Admit Date: 7/5/2019    Discharge Needs Assessment     Row Name 07/09/19 1735       Living Environment    Lives With  spouse    Name(s) of Who Lives With Patient  Patience Smith/spouse  [phone]    Current Living Arrangements  home/apartment/condo    Primary Care Provided by  self    Provides Primary Care For  no one    Family Caregiver if Needed  spouse    Family Caregiver Names  Patience Smith/spouse  [phone]    Quality of Family Relationships  helpful;involved;supportive    Able to Return to Prior Arrangements  yes    Living Arrangement Comments  Lives with spouse in a ranch-style house with walkout basement. Three steps to enter/no handrails       Resource/Environmental Concerns    Resource/Environmental Concerns  none       Transition Planning    Patient/Family Anticipates Transition to  home with family    Patient/Family Anticipated Services at Transition  none    Transportation Anticipated  family or friend will provide       Discharge Needs Assessment    Concerns to be Addressed  no discharge needs identified;denies needs/concerns at this time    Equipment Currently Used at Home  none    Anticipated Changes Related to Illness  none    Equipment Needed After Discharge  none    Offered/Gave Vendor List  no    Patient's Choice of Community Agency(s)  Denies using HH/SNF in the past.     Discharge Coordination/Progress  Spoke with patient at bedside. Confirmed information on facesheet. IMM 7/5/19.        Discharge Plan     Row Name 07/09/19 8393       Plan    Plan  Plans dc home with assist of spouse. No needs noted. Continue to follow.    Patient/Family in Agreement with Plan  yes        Destination      No service coordination in this encounter.      Durable Medical Equipment      No service coordination in this encounter.      Dialysis/Infusion      No service coordination in this  encounter.      Home Medical Care      No service coordination in this encounter.      Therapy      No service coordination in this encounter.      Community Resources      No service coordination in this encounter.          Demographic Summary     Row Name 07/09/19 1731       General Information    Admission Type  inpatient    Referral Source  admission list    Reason for Consult  discharge planning        Functional Status     Row Name 07/09/19 1732       Functional Status    Usual Activity Tolerance  good    Current Activity Tolerance  good       Functional Status, IADL    Medications  independent    Meal Preparation  independent    Housekeeping  independent    Laundry  independent    Shopping  independent        Psychosocial    No documentation.       Abuse/Neglect    No documentation.       Legal    No documentation.       Substance Abuse    No documentation.       Patient Forms    No documentation.           Violet Sifuentes RN

## 2019-07-09 NOTE — PLAN OF CARE
Problem: Patient Care Overview  Goal: Plan of Care Review  Outcome: Ongoing (interventions implemented as appropriate)   07/09/19 Diana   Coping/Psychosocial   Plan of Care Reviewed With patient   Plan of Care Review   Progress no change   OTHER   Outcome Summary bandaid over left salivary gland Bx site, no c/o pain, lost IV site and had a second placed but no blood return and pain with flush will await IV team to place new IV before starting chemo, Oanh (Pharmacy aware) will retime when IV is restarted

## 2019-07-09 NOTE — PROGRESS NOTES
Counseled patient and his wife on Carboplatin and etoposide. Went over infection risk and bleeding risk. Explained importance of hand washing and use of hand . Also, discussed that if patient ever has a fever greater than 100 or he experiences chills that he needs to notify his doctor. Also that patient needs to be careful of any falls.     Specific side effects discussed where N/V and that it is highly expected to occur with Carboplatin. Explained that he would be receiving 3 antiemetic medications to try and help prevent the N/V.     Discussed mucositis and the use of baking soda and salt rinse to help prevent this. Also, explained that good oral hygiene. Told them that if he wants to use a mouthrinse it needs to be alcohol free. Patient asked about normal alcohol consumption. Explained that minimal consumption is preferred.   Finally I discussed the possibility of taste changes (i.e. metallic taste) explained that this is normal but to let us know if it becomes problematic.     Ene Selby, PharmD Candidate

## 2019-07-10 ENCOUNTER — APPOINTMENT (OUTPATIENT)
Dept: MRI IMAGING | Facility: HOSPITAL | Age: 69
End: 2019-07-10

## 2019-07-10 VITALS
HEART RATE: 80 BPM | BODY MASS INDEX: 26.37 KG/M2 | RESPIRATION RATE: 16 BRPM | OXYGEN SATURATION: 97 % | HEIGHT: 68 IN | WEIGHT: 174 LBS | TEMPERATURE: 97.1 F | DIASTOLIC BLOOD PRESSURE: 73 MMHG | SYSTOLIC BLOOD PRESSURE: 133 MMHG

## 2019-07-10 LAB
ANION GAP SERPL CALCULATED.3IONS-SCNC: 9 MMOL/L (ref 5–15)
BACTERIA SPEC AEROBE CULT: NORMAL
BACTERIA SPEC AEROBE CULT: NORMAL
BASOPHILS # BLD AUTO: 0.01 10*3/MM3 (ref 0–0.2)
BASOPHILS NFR BLD AUTO: 0.1 % (ref 0–1.5)
BUN BLD-MCNC: 17 MG/DL (ref 8–23)
BUN/CREAT SERPL: 23.6 (ref 7–25)
CALCIUM SPEC-SCNC: 9.8 MG/DL (ref 8.6–10.5)
CHLORIDE SERPL-SCNC: 98 MMOL/L (ref 98–107)
CO2 SERPL-SCNC: 25 MMOL/L (ref 22–29)
CREAT BLD-MCNC: 0.72 MG/DL (ref 0.76–1.27)
DEPRECATED RDW RBC AUTO: 46.8 FL (ref 37–54)
EOSINOPHIL # BLD AUTO: 0 10*3/MM3 (ref 0–0.4)
EOSINOPHIL NFR BLD AUTO: 0 % (ref 0.3–6.2)
ERYTHROCYTE [DISTWIDTH] IN BLOOD BY AUTOMATED COUNT: 14.9 % (ref 12.3–15.4)
GFR SERPL CREATININE-BSD FRML MDRD: 109 ML/MIN/1.73
GLUCOSE BLD-MCNC: 108 MG/DL (ref 65–99)
HCT VFR BLD AUTO: 38.7 % (ref 37.5–51)
HGB BLD-MCNC: 12.7 G/DL (ref 13–17.7)
IMM GRANULOCYTES # BLD AUTO: 0.09 10*3/MM3 (ref 0–0.05)
IMM GRANULOCYTES NFR BLD AUTO: 0.8 % (ref 0–0.5)
LDH SERPL-CCNC: 264 U/L (ref 135–225)
LYMPHOCYTES # BLD AUTO: 0.78 10*3/MM3 (ref 0.7–3.1)
LYMPHOCYTES NFR BLD AUTO: 6.5 % (ref 19.6–45.3)
MCH RBC QN AUTO: 28 PG (ref 26.6–33)
MCHC RBC AUTO-ENTMCNC: 32.8 G/DL (ref 31.5–35.7)
MCV RBC AUTO: 85.2 FL (ref 79–97)
MONOCYTES # BLD AUTO: 0.88 10*3/MM3 (ref 0.1–0.9)
MONOCYTES NFR BLD AUTO: 7.4 % (ref 5–12)
NEUTROPHILS # BLD AUTO: 10.21 10*3/MM3 (ref 1.7–7)
NEUTROPHILS NFR BLD AUTO: 85.2 % (ref 42.7–76)
NRBC BLD AUTO-RTO: 0 /100 WBC (ref 0–0.2)
PLATELET # BLD AUTO: 391 10*3/MM3 (ref 140–450)
PMV BLD AUTO: 9.3 FL (ref 6–12)
POTASSIUM BLD-SCNC: 4.7 MMOL/L (ref 3.5–5.2)
RBC # BLD AUTO: 4.54 10*6/MM3 (ref 4.14–5.8)
SODIUM BLD-SCNC: 132 MMOL/L (ref 136–145)
URATE SERPL-MCNC: 4.7 MG/DL (ref 3.4–7)
WBC NRBC COR # BLD: 11.97 10*3/MM3 (ref 3.4–10.8)

## 2019-07-10 PROCEDURE — 85025 COMPLETE CBC W/AUTO DIFF WBC: CPT | Performed by: INTERNAL MEDICINE

## 2019-07-10 PROCEDURE — 25010000002 ETOPOSIDE 500 MG/25ML SOLUTION 25 ML VIAL: Performed by: INTERNAL MEDICINE

## 2019-07-10 PROCEDURE — 25010000002 DEXAMETHASONE SODIUM PHOSPHATE 20 MG/5ML SOLUTION 5 ML VIAL: Performed by: INTERNAL MEDICINE

## 2019-07-10 PROCEDURE — A9577 INJ MULTIHANCE: HCPCS | Performed by: INTERNAL MEDICINE

## 2019-07-10 PROCEDURE — 83615 LACTATE (LD) (LDH) ENZYME: CPT | Performed by: INTERNAL MEDICINE

## 2019-07-10 PROCEDURE — 0 GADOBENATE DIMEGLUMINE 529 MG/ML SOLUTION: Performed by: INTERNAL MEDICINE

## 2019-07-10 PROCEDURE — 84550 ASSAY OF BLOOD/URIC ACID: CPT | Performed by: INTERNAL MEDICINE

## 2019-07-10 PROCEDURE — 80048 BASIC METABOLIC PNL TOTAL CA: CPT | Performed by: INTERNAL MEDICINE

## 2019-07-10 PROCEDURE — 99233 SBSQ HOSP IP/OBS HIGH 50: CPT | Performed by: INTERNAL MEDICINE

## 2019-07-10 PROCEDURE — 94799 UNLISTED PULMONARY SVC/PX: CPT

## 2019-07-10 PROCEDURE — 70553 MRI BRAIN STEM W/O & W/DYE: CPT

## 2019-07-10 RX ORDER — AMOXICILLIN AND CLAVULANATE POTASSIUM 875; 125 MG/1; MG/1
1 TABLET, FILM COATED ORAL EVERY 12 HOURS SCHEDULED
Qty: 3 TABLET | Refills: 0 | Status: SHIPPED | OUTPATIENT
Start: 2019-07-10 | End: 2019-07-12

## 2019-07-10 RX ORDER — SODIUM CHLORIDE 9 MG/ML
250 INJECTION, SOLUTION INTRAVENOUS ONCE
Status: COMPLETED | OUTPATIENT
Start: 2019-07-10 | End: 2019-07-10

## 2019-07-10 RX ADMIN — IPRATROPIUM BROMIDE AND ALBUTEROL SULFATE 3 ML: 2.5; .5 SOLUTION RESPIRATORY (INHALATION) at 12:00

## 2019-07-10 RX ADMIN — AMOXICILLIN AND CLAVULANATE POTASSIUM 1 TABLET: 875; 125 TABLET, FILM COATED ORAL at 08:14

## 2019-07-10 RX ADMIN — DEXAMETHASONE SODIUM PHOSPHATE 12 MG: 4 INJECTION, SOLUTION INTRAMUSCULAR; INTRAVENOUS at 16:17

## 2019-07-10 RX ADMIN — IPRATROPIUM BROMIDE AND ALBUTEROL SULFATE 3 ML: 2.5; .5 SOLUTION RESPIRATORY (INHALATION) at 07:29

## 2019-07-10 RX ADMIN — GADOBENATE DIMEGLUMINE 16 ML: 529 INJECTION, SOLUTION INTRAVENOUS at 15:48

## 2019-07-10 RX ADMIN — IPRATROPIUM BROMIDE AND ALBUTEROL SULFATE 3 ML: 2.5; .5 SOLUTION RESPIRATORY (INHALATION) at 16:26

## 2019-07-10 RX ADMIN — METOPROLOL TARTRATE 12.5 MG: 25 TABLET ORAL at 08:14

## 2019-07-10 RX ADMIN — ETOPOSIDE 200 MG: 20 INJECTION, SOLUTION, CONCENTRATE INTRAVENOUS at 17:05

## 2019-07-10 RX ADMIN — SODIUM CHLORIDE 250 ML: 9 INJECTION, SOLUTION INTRAVENOUS at 16:17

## 2019-07-10 RX ADMIN — SODIUM CHLORIDE, PRESERVATIVE FREE 10 ML: 5 INJECTION INTRAVENOUS at 08:15

## 2019-07-10 RX ADMIN — ALBUTEROL SULFATE 2.5 MG: 2.5 SOLUTION RESPIRATORY (INHALATION) at 12:00

## 2019-07-10 NOTE — PLAN OF CARE
Problem: Patient Care Overview  Goal: Plan of Care Review  Outcome: Ongoing (interventions implemented as appropriate)   07/10/19 0506   Coping/Psychosocial   Plan of Care Reviewed With patient   Plan of Care Review   Progress improving   OTHER   Outcome Summary VSS. Ad savanna. A&Ox4. Difficulty sleeping. Ambulated soria x 4 during shift. No c/o pain or n/v. Day 3 of VP16 today. Possible d/c if tolerating well per Dr. Morel's notes. Cont to monitor.

## 2019-07-10 NOTE — PROGRESS NOTES
[facility] GROUP INPATIENT PROGRESS NOTE    Length of Stay:  5 days    CHIEF COMPLAINT/REASON FOR VISIT:  1.  Small cell lung cancer  2.  Afib    SUBJECTIVE:  Afebrile.  Didn't sleep well overnight.  No new respiratory symptoms.  Eating well.  No BM x 2 days.      ROS:  Positive for -  Cough, improved  Negative for - Fever, bleeding    OBJECTIVE:  Vitals:    07/10/19 0430 07/10/19 0656 07/10/19 0729 07/10/19 0742   BP: 127/80   131/71   BP Location: Left arm   Left arm   Patient Position: Lying   Lying   Pulse: 79  80    Resp: 18  16    Temp: 97 °F (36.1 °C)   97 °F (36.1 °C)   TempSrc: Oral      SpO2: 97%   97%   Weight:  78.9 kg (174 lb)     Height:             PHYSICAL EXAMINATION:  General:  NAD, alert/oriented  Lymph:  No palpable LAD  Chest/Lungs:  CTAB  Heart:  RRR no m/r/g  Abdomen/GI:  Soft, NT, ND, NABS  Extremities:  WWP, no edema    DIAGNOSTIC DATA:  Results Review:     I reviewed the patient's new clinical results.    Results from last 7 days   Lab Units 07/10/19  0704   WBC 10*3/mm3 11.97*   HEMOGLOBIN g/dL 12.7*   HEMATOCRIT % 38.7   PLATELETS 10*3/mm3 391     Lab Results   Component Value Date    NEUTROABS 10.21 (H) 07/10/2019     Results from last 7 days   Lab Units 07/10/19  0704   SODIUM mmol/L 132*   POTASSIUM mmol/L 4.7   CHLORIDE mmol/L 98   CO2 mmol/L 25.0   BUN mg/dL 17   CREATININE mg/dL 0.72*   GLUCOSE mg/dL 108*   CALCIUM mg/dL 9.8     Results from last 7 days   Lab Units 07/05/19  1028   INR  1.05   APTT seconds 33.0     Results from last 7 days   Lab Units 07/09/19  0616   MAGNESIUM mg/dL 2.2           IMAGING:    PET scan from 7/1/2019:     IMPRESSION:  1.  Large intensely FDG avid mass centered within the right perihilar  lung with associated invasion at the mediastinum and left hilum and  causes severe to complete narrowing of portions of the entire right  bronchial tree as well as the proximal left main bronchus as above.  Intensely FDG avid left perihilar lung nodule likely  represents  metastatic disease. Moderate to intensely FDG avid left infrahilar lymph  node also likely represents metastatic disease.  2.  Scattered subcentimeter pulmonary nodules are present within the  right lung and highly concerning for metastatic disease. There is  irregular pulmonary opacification extending from the mass into the right  middle and lower lobes which has worsened since 06/04/2019. Findings  represent atelectasis and/or lymphangitic spread of malignancy.  Continued close attention on follow-up is recommended. Pneumonia is felt  to be less likely given the relative lack of FDG uptake within these  areas.  3.  Intensely FDG avid 1.1 cm nodule within the left parotid gland.  Unfortunately these finding are nonspecific as they could represent  metastatic disease versus primary parotid neoplasm. This lesion is  amenable to percutaneous biopsy if clinically indicated. At least  continued close attention on follow-up is recommended.   4.  Mild FDG avid subcentimeter nodule within the right parotid gland is  present and while findings are favored to represent a low-grade primary  parotid lesion, they are nonspecific and continued close attention on  follow-up of this area is recommended.  5.  Other findings as above.        Assessment/Plan   ASSESSMENT/PLAN:  This is a 69 y.o. male with:     * Small cell lung cancer originating in the right hilum:   · He has an extremely large mass there with concern for SVC compression on CT imaging from 6/4/2019.  There is some left hilar adenopathy.  · Some parotid lesions which are concerning but indeterminate, left greater than right.  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  · We will discuss his case at our multidisciplinary thoracic conference this week.    · Lung lesion too large to radiate in  spite of the SVC narrowing.    · On 7/8/2019 we started carboplatin AUC 5 and VP16 100 mg/m2 through peripheral IVs  · Plan outpatient mediport once the large tumor mass has decreased in size.        * Chronic hearing loss: History of mastoiditis.  He is not a candidate for cisplatin because of this.    * Atrial fibrillation  · He is on metoprolol 12.5 mg q12h     * Possible pneumonia  · He is on Augmentin, stop 7/11.    * Tobacco use  · He has a Nicoderm patch    * SVC syndrome    * Hyponatremia:  Will follow.  Improving    PLAN:   1.  Cycle 1 day 3 VP16 today.  2.  He never had his outpatient brain MRI so we will get this done prior to discharge. \  3.  OK for d/c today from my standpoint.  NP visit on Mondays in 1 and 2 weeks.  Will arrange f/u on 7/29 for cycle 2 of therapy.  Ideally he'll have a mediport prior to that.  I'll communicate with Dr. Gallardo to see if she needs a CT scan of his chest prior to that.  Typically we would CT after two cycles of therapy.  He will be discussed at the thoracic conference tomorrow as well.       ADDENDUM:  MRI brain negative for metastatic disease.    I communicated with Dr. Gallardo with thoracic surgery.  She prefers to wait until after cycle 2 of therapy following scans to consider placing a mediport.  Therefore, we will try to complete cycle 2 through peripheral IVs as well.      To be discharged following chemotherapy today.           Thai Morel MD

## 2019-07-10 NOTE — DISCHARGE SUMMARY
Date of Admission: 7/5/2019  Date of Discharge:  7/10/2019  Primary Care Physician: Paty Saldana APRN     Discharge Diagnosis:  Active Hospital Problems    Diagnosis  POA   • **Small cell lung cancer (CMS/HCC) [C34.90]  Yes   • PAF (paroxysmal atrial fibrillation) (CMS/HCC) [I48.0]  Unknown   • Atrial fibrillation with RVR (CMS/HCC) [I48.91]  Yes   • Pneumonia [J18.9]  Yes   • COPD (chronic obstructive pulmonary disease) (CMS/HCC) [J44.9]  Unknown      Resolved Hospital Problems   No resolved problems to display.       Presenting Problem/History of Present Illness:  Lung mass [R91.8]  Atrial fibrillation with RVR (CMS/HCC) [I48.91]  Pneumonia of right lung due to infectious organism, unspecified part of lung [J18.9]     Hospital Course:  The patient is a 69 y.o. man with small cell carcinoma who had atrial fibrillation with rapid ventricular rate in the preop clinic when he was to have his port placed.  He was of course admitted to our service from the emergency room.  He converted with a dose of metoprolol.  He was seen by cardiology.  Echocardiogram was okay.  He was kept on low-dose metoprolol.  Anticoagulation was not indicated.    There was some concern about his chest x-ray.  Pneumonia could not be excluded and because he was to start chemo, a course of Augmentin was recommended by pulmonary.  Oncology started the patient on chemotherapy. He will get his last dose today and can go home after that.  He will follow-up with oncology.       Stable condition; fair prognosis    Exam Today: Feels the same.  Tolerated diet.  Has been up and about.  Vital signs noted.  No distress.  Heart is regular, no murmur.  Lungs with decreased breath sounds.  Abdomen nontender.  Extremities no edema    Procedures Performed:  Xr Chest 2 View    Result Date: 7/5/2019  Minimal likely atelectasis at the right base. Redemonstration of mediastinal/right hilar mass.  This report was finalized on 7/5/2019 12:25 PM by   Umang Manuel M.D.      Mri Brain With & Without Contrast    Result Date: 7/10/2019  1. No evidence of acute infarction, hemorrhage or abnormal enhancement. 2. Mild changes of bilateral small vessel white matter ischemic disease and small old right parietal lobe infarct.  This report was finalized on 7/10/2019 4:43 PM by Dr. Ricardo Dueñas M.D.      Ct Angiogram Chest With Contrast    Result Date: 7/8/2019  1. There is no convincing evidence for pulmonary thromboemboli. 2. There is no significant change in the approximately 12 cm right hilar/mediastinal mass narrowing the right mainstem bronchus, occluding the right intermediate bronchus, right middle and lower lobe bronchi. Narrowing of the chiquita and the proximal aspect of the left mainstem bronchus appears largely unchanged. The airspace consolidations at the right lower lobe and mild volume loss within the right middle lobe and right lower lobe appear largely unchanged. There are new small patchy ground glass opacities within the right middle lobe and right lower lobe and there has been increase in size of a 3.4 cm ground glass opacity at the right upper lobe. The ground glass opacities are of uncertain etiology. The appearance is not suggestive of postobstructive pneumonia or aspiration. The ground glass opacities may represent metastases. 3. The 1.5 cm perihilar left lower lobe pulmonary nodule is stable. The left hilar and mediastinal lymphadenopathy is stable as well.  Discussed with Dr. Damon.  This report was finalized on 7/8/2019 11:08 AM by Dr. Onah Crow M.D.           Labs:  Lab Results   Component Value Date    WBC 11.97 (H) 07/10/2019    HGB 12.7 (L) 07/10/2019    HCT 38.7 07/10/2019     07/10/2019     Lab Results   Component Value Date     (L) 07/10/2019    K 4.7 07/10/2019    CL 98 07/10/2019    CO2 25.0 07/10/2019    BUN 17 07/10/2019    CREATININE 0.72 (L) 07/10/2019    GLUCOSE 108 (H) 07/10/2019       Results from last  7 days   Lab Units 07/06/19  0637 07/05/19  2357 07/05/19  1532   BLOODCX   --   --  No growth at 5 days  No growth at 5 days   RESPCX  Light growth (2+) Normal Respiratory Miri  --   --    MRSA SCREEN CX   --  No Methicillin Resistant Staphylococcus aureus isolated  --        Consults:   Dr. Yang Gallardo    Discharge Disposition:  Home or Self Care    Discharge Medications:     Discharge Medications      New Medications      Instructions Start Date   amoxicillin-clavulanate 875-125 MG per tablet  Commonly known as:  AUGMENTIN   1 tablet, Oral, Every 12 Hours Scheduled      metoprolol tartrate 25 MG tablet  Commonly known as:  LOPRESSOR   12.5 mg, Oral, Every 12 Hours Scheduled      ondansetron 8 MG tablet  Commonly known as:  ZOFRAN   8 mg, Oral, 3 Times Daily PRN             Discharge Diet:   Diet Instructions     Diet: Regular      Discharge Diet:  Regular          Activity at Discharge:   Activity Instructions     Activity as Tolerated            Follow-up Appointments:  Future Appointments   Date Time Provider Department Center   7/18/2019  6:45 PM [facility] MRI 2  [facility] MRI [facility]   7/23/2019 10:30 AM Uyen Samuel APRN [facility] None     Follow-up Information     Uyen Samuel APRN Follow up in 2 week(s).    Specialties:  Cardiology, Nurse Practitioner  Contact information:  [address]  [place] [id number] [phone]             Paty Saldana APRN Follow up in 3 month(s).    Specialty:  Family Medicine  Contact information:  [address]  [facility] [id number] [phone]             Thai Morel MD Follow up in 1 week(s).    Specialties:  Hematology and Oncology, Oncology, Hematology  Contact information:  [address]  [place] [address]  [phone]                     Test Results Pending at Discharge:none       Yane Rodriguez MD  07/10/19  5:42 PM    Time Spent on Discharge Activities: 35 minutes.  Discussed  with Dr. Morel

## 2019-07-10 NOTE — PROGRESS NOTES
"     LOS: 4 days   Primary Care Physician: Paty Saldana APRN     Subjective   Tired.  Coughing.  Not much energy.  Appetite is fine.  Back does not hurt or itch    Vital Signs  Body mass index is 27.37 kg/m².  Temp:  [97.2 °F (36.2 °C)-98.5 °F (36.9 °C)] 97.5 °F (36.4 °C)  Heart Rate:  [52-94] 94  Resp:  [16-20] 18  BP: (112-147)/(62-82) 126/67      Objective:  General Appearance:  In no acute distress.    Vital signs: (most recent): Blood pressure 126/67, pulse 94, temperature 97.5 °F (36.4 °C), temperature source Oral, resp. rate 18, height 172.7 cm (68\"), weight 81.6 kg (180 lb), SpO2 95 %.    Lungs:  He is not in respiratory distress.  No stridor.  There are decreased breath sounds.  No rales, wheezes or rhonchi.    Heart: Normal rate.  Regular rhythm.  No murmur.   Abdomen: Abdomen is soft and non-distended.  Bowel sounds are normal.   There is no mass.   Extremities: There is no dependent edema.    Neurological: Patient is alert.    Skin:  Warm.  (Erythema upper and mid back.  No vesicles)        Results Review:    I reviewed the patient's new clinical results.    Results from last 7 days   Lab Units 07/09/19  0616 07/08/19  0550   WBC 10*3/mm3 7.12 7.04   HEMOGLOBIN g/dL 13.7 12.6*   PLATELETS 10*3/mm3 424 358     Results from last 7 days   Lab Units 07/09/19  0616 07/08/19  0550   SODIUM mmol/L 131* 138   POTASSIUM mmol/L 4.9 4.5   CHLORIDE mmol/L 96* 102   CO2 mmol/L 20.4* 24.9   BUN mg/dL 11 6*   CREATININE mg/dL 0.73* 0.64*   CALCIUM mg/dL 10.2 10.0   GLUCOSE mg/dL 172* 89     Results from last 7 days   Lab Units 07/05/19  1028   INR  1.05     Hemoglobin A1C:No results found for: HGBA1C    Glucose Range:No results found for: POCGLU    No results found for: ARPXFDIS24    Lab Results   Component Value Date    TSH 0.915 05/30/2019       Assessment & Plan      Medication Review: Yes    Active Hospital Problems    Diagnosis  POA   • **Small cell lung cancer (CMS/HCC) [C34.90]  Yes   • PAF " (paroxysmal atrial fibrillation) (CMS/HCC) [I48.0]  Unknown   • Atrial fibrillation with RVR (CMS/HCC) [I48.91]  Yes   • Pneumonia [J18.9]  Yes   • COPD (chronic obstructive pulmonary disease) (CMS/HCC) [J44.9]  Unknown      Resolved Hospital Problems   No resolved problems to display.       Assessment/Plan  1.  Small cell lung cancer with SVC syndrome, day 2 of chemo.  He did not have chemo today until 6 PM.  He is to have another day of chemo tomorrow.  If he is not ready for discharge tomorrow, will transfer to oncology service  2.  Hyponatremia, secondary to lung process.  Mild and asymptomatic  3.  PAF.  No recurrence.  No anticoagulation.  Continue metoprolol  4.  Possible pneumonia, day 4 of 7 antibiotic  5.  Parotid lesion, benign by pathology.  Discussed with patient    Yane Rodriguez MD  07/09/19  9:10 PM

## 2019-07-11 ENCOUNTER — READMISSION MANAGEMENT (OUTPATIENT)
Dept: CALL CENTER | Facility: HOSPITAL | Age: 69
End: 2019-07-11

## 2019-07-11 DIAGNOSIS — C34.90 SMALL CELL LUNG CANCER (HCC): Primary | ICD-10-CM

## 2019-07-11 NOTE — PROGRESS NOTES
His case was presented at our multidisciplinary thoracic conference this morning.  We will plan to add Tecentriq to his therapy treating for T4 N3 disease.  There is concern for involvement of the right supraclavicular region and obviously the left hilum.  Radiation can be added for a residual mediastinal mass if necessary.

## 2019-07-11 NOTE — OUTREACH NOTE
Prep Survey      Responses   Facility patient discharged from?  [facility]   Is patient eligible?  Yes   Discharge diagnosis  Small cell lung cancer, PAF, Afib with RVR, pneumonia, COPD   Does the patient have one of the following disease processes/diagnoses(primary or secondary)?  COPD/Pneumonia   Does the patient have Home health ordered?  No   Is there a DME ordered?  No   Comments regarding appointments  See AVS   General alerts for this patient  Started chemo this admission   Prep survey completed?  Yes          Nery Brar RN

## 2019-07-12 ENCOUNTER — READMISSION MANAGEMENT (OUTPATIENT)
Dept: CALL CENTER | Facility: HOSPITAL | Age: 69
End: 2019-07-12

## 2019-07-13 NOTE — OUTREACH NOTE
COPD/PN Week 1 Survey      Responses   Facility patient discharged from?  [facility]   Does the patient have one of the following disease processes/diagnoses(primary or secondary)?  COPD/Pneumonia   Is there a successful TCM telephone encounter documented?  No   Was the primary reason for admission:  COPD exacerbation   Week 1 attempt successful?  Yes   Call start time  1956   Call end time  2005   Discharge diagnosis  Small cell lung cancer, PAF, Afib with RVR, pneumonia, COPD   Is patient permission given to speak with other caregiver?  Yes   List who call center can speak with  Patience   [facility] reviewed with patient/caregiver?  Yes   Is the patient having any side effects they believe may be caused by any medication additions or changes?  No   Does the patient have all medications ordered at discharge?  Yes   Is the patient taking all medications as directed (includes completed medication regime)?  Yes   Does the patient have a primary care provider?   Yes   Does the patient have an appointment with their PCP or pulmonologist within 7 days of discharge?  Yes   Comments regarding PCP  Paty Saldana-PCP   Has the patient kept scheduled appointments due by today?  N/A   Psychosocial issues?  No   Did the patient receive a copy of their discharge instructions?  Yes   Nursing interventions  Reviewed instructions with patient   What is the patient's perception of their health status since discharge?  Improving   Nursing Interventions  Nurse provided patient education   Are the patient's immunizations up to date?   Yes   Nursing interventions  Educated on importance of maintaining up to date immunizations as advised by provider   If the patient is a current smoker, are they able to teach back resources for cessation?  Smoking cessation medications [smoker]   Is the patient/caregiver able to teach back the hierarchy of who to call/visit for symptoms/problems? PCP, Specialist, Home health nurse, Urgent Care, ED, 911  Yes   Is  the patient able to teach back COPD zones?  Yes   Nursing interventions  Education provided on various zones   Patient reports what zone on this call?  Green Zone   Green Zone  Reports doing well, Breathing without shortness of breath, Usual activity and exercise level, Usual amount of phlegm/mucus without difficulty coughing up, Sleeping well, Appetite is good   Green Zone interventions:  Take daily medications, Continue regular exercise/diet plan, Do not smoke, Avoid indoor/outdoor triggers, Use oxygen as prescribed   Is the patient/caregiver able to teach back signs and symptoms of worsening condition:  Fever/chills, Shortness of breath, Chest pain   Is the patient/caregiver able to teach back importance of completing antibiotic course of treatment?  Yes   Week 1 call completed?  Yes          Zoie Anand RN

## 2019-07-15 ENCOUNTER — APPOINTMENT (OUTPATIENT)
Dept: ONCOLOGY | Facility: HOSPITAL | Age: 69
End: 2019-07-15

## 2019-07-15 ENCOUNTER — LAB (OUTPATIENT)
Dept: LAB | Facility: HOSPITAL | Age: 69
End: 2019-07-15

## 2019-07-15 ENCOUNTER — OFFICE VISIT (OUTPATIENT)
Dept: ONCOLOGY | Facility: CLINIC | Age: 69
End: 2019-07-15

## 2019-07-15 VITALS
BODY MASS INDEX: 26.9 KG/M2 | SYSTOLIC BLOOD PRESSURE: 158 MMHG | HEART RATE: 78 BPM | WEIGHT: 177.5 LBS | RESPIRATION RATE: 16 BRPM | HEIGHT: 68 IN | OXYGEN SATURATION: 97 % | TEMPERATURE: 98.2 F | DIASTOLIC BLOOD PRESSURE: 65 MMHG

## 2019-07-15 DIAGNOSIS — C34.90 SMALL CELL LUNG CANCER (HCC): ICD-10-CM

## 2019-07-15 DIAGNOSIS — I48.91 ATRIAL FIBRILLATION WITH RVR (HCC): ICD-10-CM

## 2019-07-15 DIAGNOSIS — C34.90 SMALL CELL LUNG CANCER (HCC): Primary | Chronic | ICD-10-CM

## 2019-07-15 LAB
ALBUMIN SERPL-MCNC: 4 G/DL (ref 3.5–5.2)
ALBUMIN/GLOB SERPL: 1.3 G/DL (ref 1.1–2.4)
ALP SERPL-CCNC: 77 U/L (ref 38–116)
ALT SERPL W P-5'-P-CCNC: 28 U/L (ref 0–41)
ANION GAP SERPL CALCULATED.3IONS-SCNC: 13.6 MMOL/L (ref 5–15)
AST SERPL-CCNC: 28 U/L (ref 0–40)
BASOPHILS # BLD AUTO: 0.14 10*3/MM3 (ref 0–0.2)
BASOPHILS NFR BLD AUTO: 2.4 % (ref 0–1.5)
BILIRUB SERPL-MCNC: 0.3 MG/DL (ref 0.2–1.2)
BUN BLD-MCNC: 14 MG/DL (ref 6–20)
BUN/CREAT SERPL: 17.5 (ref 7.3–30)
CALCIUM SPEC-SCNC: 9.3 MG/DL (ref 8.5–10.2)
CHLORIDE SERPL-SCNC: 99 MMOL/L (ref 98–107)
CO2 SERPL-SCNC: 24.4 MMOL/L (ref 22–29)
CREAT BLD-MCNC: 0.8 MG/DL (ref 0.7–1.3)
DEPRECATED RDW RBC AUTO: 49 FL (ref 37–54)
EOSINOPHIL # BLD AUTO: 0.08 10*3/MM3 (ref 0–0.4)
EOSINOPHIL NFR BLD AUTO: 1.4 % (ref 0.3–6.2)
ERYTHROCYTE [DISTWIDTH] IN BLOOD BY AUTOMATED COUNT: 14.8 % (ref 12.3–15.4)
GFR SERPL CREATININE-BSD FRML MDRD: 96 ML/MIN/1.73
GLOBULIN UR ELPH-MCNC: 3.1 GM/DL (ref 1.8–3.5)
GLUCOSE BLD-MCNC: 112 MG/DL (ref 74–124)
HCT VFR BLD AUTO: 40.9 % (ref 37.5–51)
HGB BLD-MCNC: 12.9 G/DL (ref 13–17.7)
IMM GRANULOCYTES # BLD AUTO: 0.11 10*3/MM3 (ref 0–0.05)
IMM GRANULOCYTES NFR BLD AUTO: 1.9 % (ref 0–0.5)
LDH SERPL-CCNC: 314 U/L (ref 99–259)
LYMPHOCYTES # BLD AUTO: 0.89 10*3/MM3 (ref 0.7–3.1)
LYMPHOCYTES NFR BLD AUTO: 15.1 % (ref 19.6–45.3)
MCH RBC QN AUTO: 28.2 PG (ref 26.6–33)
MCHC RBC AUTO-ENTMCNC: 31.5 G/DL (ref 31.5–35.7)
MCV RBC AUTO: 89.5 FL (ref 79–97)
MONOCYTES # BLD AUTO: 0.17 10*3/MM3 (ref 0.1–0.9)
MONOCYTES NFR BLD AUTO: 2.9 % (ref 5–12)
NEUTROPHILS # BLD AUTO: 4.51 10*3/MM3 (ref 1.7–7)
NEUTROPHILS NFR BLD AUTO: 76.3 % (ref 42.7–76)
NRBC BLD AUTO-RTO: 0 /100 WBC (ref 0–0.2)
PLATELET # BLD AUTO: 228 10*3/MM3 (ref 140–450)
PMV BLD AUTO: 9.6 FL (ref 6–12)
POTASSIUM BLD-SCNC: 4.3 MMOL/L (ref 3.5–4.7)
PROT SERPL-MCNC: 7.1 G/DL (ref 6.3–8)
RBC # BLD AUTO: 4.57 10*6/MM3 (ref 4.14–5.8)
SODIUM BLD-SCNC: 137 MMOL/L (ref 134–145)
WBC NRBC COR # BLD: 5.9 10*3/MM3 (ref 3.4–10.8)

## 2019-07-15 PROCEDURE — 99214 OFFICE O/P EST MOD 30 MIN: CPT | Performed by: NURSE PRACTITIONER

## 2019-07-15 PROCEDURE — 80053 COMPREHEN METABOLIC PANEL: CPT | Performed by: INTERNAL MEDICINE

## 2019-07-15 PROCEDURE — 83615 LACTATE (LD) (LDH) ENZYME: CPT | Performed by: INTERNAL MEDICINE

## 2019-07-15 PROCEDURE — 85025 COMPLETE CBC W/AUTO DIFF WBC: CPT | Performed by: INTERNAL MEDICINE

## 2019-07-15 PROCEDURE — 36415 COLL VENOUS BLD VENIPUNCTURE: CPT | Performed by: INTERNAL MEDICINE

## 2019-07-16 ENCOUNTER — APPOINTMENT (OUTPATIENT)
Dept: LAB | Facility: HOSPITAL | Age: 69
End: 2019-07-16

## 2019-07-16 ENCOUNTER — APPOINTMENT (OUTPATIENT)
Dept: ONCOLOGY | Facility: CLINIC | Age: 69
End: 2019-07-16

## 2019-07-16 ENCOUNTER — APPOINTMENT (OUTPATIENT)
Dept: ONCOLOGY | Facility: HOSPITAL | Age: 69
End: 2019-07-16

## 2019-07-16 NOTE — PROGRESS NOTES
REASON FOR FOLLOW UP: Small cell lung cancer    HISTORY OF PRESENT ILLNESS:  Julia Smith III is a 69 y.o. male with the above-mentioned history, who returns the office today for hospital follow-up and lab review.  Recently seen in consultation by Dr. Morel for newly diagnosed small cell lung cancer.  He was referred for port placement with anticipation of beginning carboplatin -16.  When seen 7/5/2019 for port placement, unfortunately, he was in atrial fibrillation with RVR.  He was therefore admitted for further evaluation.  He remained inpatient 7/5/2018 through 7/10/2019.  Ultimately, he did start chemotherapy 7/8/2019 which was given through peripheral IV.   Returning to the office today, 7/15/2019, the patient reports he is overall feeling well.  He denies significant nausea vomiting.  He reports he is eating and drinking adequately.  He does report some fatigue though otherwise is feeling well.  He denies any neuropathy. He continues to have shortness of breath, particularly on exertion which is unchanged. His cough remains unchanged.  He denies chest pain or feeling as though his heart is racing.    Past Medical History:   Diagnosis Date   • Cancer (CMS/HCC) 06/2019    Right lung   • Chronic cough    • COPD (chronic obstructive pulmonary disease) (CMS/HCC)    • Hearing loss    • History of shingles    • Lower back pain    • Mastoiditis    • PAF (paroxysmal atrial fibrillation) (CMS/HCC)    • Pneumonia        Past Surgical History:   Procedure Laterality Date   • BRONCHOSCOPY N/A 6/19/2019    Procedure: BRONCHOSCOPY WITH WASHINGS AND BIOPSY AND ENDOBRONCHIAL ULTRASOUND WITH FNA;  Surgeon: Arslan Marquez MD;  Location: [facility] ENDOSCOPY;  Service: Pulmonary   • INNER EAR SURGERY Left    • MASTOIDECTOMY Left 1994       SOCIAL HISTORY:   reports that he has been smoking cigarettes.  He has a 60.00 pack-year smoking history. He has never used smokeless tobacco. He reports that he drinks about 2.4 - 4.2  oz of alcohol per week. He reports that he uses drugs. Drug: Marijuana.    FAMILY HISTORY:  family history includes COPD in his father; Heart disease in his brother; No Known Problems in his brother, brother, brother, mother, and sister.    ALLERGIES:  No Known Allergies    MEDICATIONS:  The medication list has been reviewed with the patient by the medical assistant, and the list has been updated in the electronic medical record, which I reviewed.  Medication dosages and frequencies were confirmed to be accurate.    I have reviewed the patient's medical history in detail and updated the computerized patient record.    Review of Systems   Review of Systems   Constitutional: Positive for fatigue. Negative for appetite change, chills, fever and unexpected weight change.   HENT: Negative for congestion, dental problem, ear pain, hearing loss, mouth sores, nosebleeds, postnasal drip, rhinorrhea, tinnitus and trouble swallowing.    Eyes: Negative.    Respiratory: Positive for cough and shortness of breath. Negative for chest tightness, wheezing and stridor.    Cardiovascular: Negative for chest pain, palpitations and leg swelling.   Gastrointestinal: Negative for abdominal distention, abdominal pain, blood in stool, constipation, diarrhea, nausea and vomiting.   Endocrine: Negative.    Genitourinary: Negative for decreased urine volume, difficulty urinating, dysuria, flank pain, frequency, hematuria, scrotal swelling, testicular pain and urgency.   Musculoskeletal: Negative for arthralgias, back pain and joint swelling.   Allergic/Immunologic: Negative.    Neurological: Negative for dizziness, seizures, syncope, weakness, light-headedness, numbness and headaches.   Hematological: Negative for adenopathy. Does not bruise/bleed easily.   Psychiatric/Behavioral: Negative for confusion and sleep disturbance. The patient is not nervous/anxious.    All other systems reviewed and are negative.  Review of systems 07/15/2019  "unchanged from previous office visit except as updated.     Vitals:    07/15/19 1611   BP: 158/65   Pulse: 78   Resp: 16   Temp: 98.2 °F (36.8 °C)   TempSrc: Oral   SpO2: 97%   Weight: 80.5 kg (177 lb 8 oz)   Height: 172.7 cm (67.99\")   PainSc: 0-No pain       Physical Exam   Constitutional: He is oriented to person, place, and time. He appears well-developed and well-nourished. No distress.   Strong odor of smoke.    HENT:   Head: Normocephalic and atraumatic. Hair is normal.   Mouth/Throat: Oropharynx is clear and moist.   Eyes: Conjunctivae, EOM and lids are normal. Pupils are equal.   Neck: Neck supple. No thyroid mass present.   Cardiovascular: Normal rate and regular rhythm. Exam reveals no gallop and no friction rub.   No murmur heard.  Pulmonary/Chest: Effort normal and breath sounds normal. No respiratory distress. He has no wheezes. He has no rales.   Abdominal: Soft. He exhibits no distension and no mass. There is no splenomegaly or hepatomegaly. There is no tenderness. There is no guarding.   Musculoskeletal: Normal range of motion. He exhibits no edema, tenderness or deformity.   Lymphadenopathy:     He has no cervical adenopathy.     He has no axillary adenopathy.        Right: No supraclavicular adenopathy present.        Left: No supraclavicular adenopathy present.   Neurological: He is alert and oriented to person, place, and time. He has normal strength.   Skin: Skin is warm and dry. No rash noted.   Psychiatric: He has a normal mood and affect. His behavior is normal. Judgment and thought content normal. Cognition and memory are normal.   Nursing note and vitals reviewed.  Physical exam 07/15/2019 unchanged from previous office visit except as updated.     DIAGNOSTIC DATA:    Results for orders placed or performed in visit on 07/15/19   Comprehensive Metabolic Panel   Result Value Ref Range    Glucose 112 74 - 124 mg/dL    BUN 14 6 - 20 mg/dL    Creatinine 0.80 0.70 - 1.30 mg/dL    Sodium 137 134 " - 145 mmol/L    Potassium 4.3 3.5 - 4.7 mmol/L    Chloride 99 98 - 107 mmol/L    CO2 24.4 22.0 - 29.0 mmol/L    Calcium 9.3 8.5 - 10.2 mg/dL    Total Protein 7.1 6.3 - 8.0 g/dL    Albumin 4.00 3.50 - 5.20 g/dL    ALT (SGPT) 28 0 - 41 U/L    AST (SGOT) 28 0 - 40 U/L    Alkaline Phosphatase 77 38 - 116 U/L    Total Bilirubin 0.3 0.2 - 1.2 mg/dL    eGFR Non African Amer 96 >60 mL/min/1.73    Globulin 3.1 1.8 - 3.5 gm/dL    A/G Ratio 1.3 1.1 - 2.4 g/dL    BUN/Creatinine Ratio 17.5 7.3 - 30.0    Anion Gap 13.6 5.0 - 15.0 mmol/L   Lactate Dehydrogenase   Result Value Ref Range     (H) 99 - 259 U/L   CBC Auto Differential   Result Value Ref Range    WBC 5.90 3.40 - 10.80 10*3/mm3    RBC 4.57 4.14 - 5.80 10*6/mm3    Hemoglobin 12.9 (L) 13.0 - 17.7 g/dL    Hematocrit 40.9 37.5 - 51.0 %    MCV 89.5 79.0 - 97.0 fL    MCH 28.2 26.6 - 33.0 pg    MCHC 31.5 31.5 - 35.7 g/dL    RDW 14.8 12.3 - 15.4 %    RDW-SD 49.0 37.0 - 54.0 fl    MPV 9.6 6.0 - 12.0 fL    Platelets 228 140 - 450 10*3/mm3    Neutrophil % 76.3 (H) 42.7 - 76.0 %    Lymphocyte % 15.1 (L) 19.6 - 45.3 %    Monocyte % 2.9 (L) 5.0 - 12.0 %    Eosinophil % 1.4 0.3 - 6.2 %    Basophil % 2.4 (H) 0.0 - 1.5 %    Immature Grans % 1.9 (H) 0.0 - 0.5 %    Neutrophils, Absolute 4.51 1.70 - 7.00 10*3/mm3    Lymphocytes, Absolute 0.89 0.70 - 3.10 10*3/mm3    Monocytes, Absolute 0.17 0.10 - 0.90 10*3/mm3    Eosinophils, Absolute 0.08 0.00 - 0.40 10*3/mm3    Basophils, Absolute 0.14 0.00 - 0.20 10*3/mm3    Immature Grans, Absolute 0.11 (H) 0.00 - 0.05 10*3/mm3    nRBC 0.0 0.0 - 0.2 /100 WBC       ASSESSMENT:  This is a 69 y.o. male with:  1.   Small cell lung cancer originating in the right hilum:   · He has an extremely large mass there with concern for SVC compression on CT imaging from 6/4/2019.  There is some left hilar adenopathy.  · Some parotid lesions which are concerning but indeterminate, left greater than right.  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  · Staging MRI of the brain 7/10/2019 negative for metastatic disease  · He was discussed at multidisciplinary thoracic conference, with his T4 and 3 disease, concern for involvement of the right supraclavicular region and obvious left hilum, plans to add atezolizumab with cycle 2, we can consider future radiation to the residual mediastinal mass if necessary.  · Lung lesion too large to radiate in spite of the SVC narrowing.    · On 7/8/2019 we started carboplatin AUC 5 and VP16 100 mg/m2 through peripheral IVs  · Plan outpatient mediport once the large tumor mass has decreased in size.    · Will repeat CT scans following cycle 2.  · atezolizumab will be added with cycle 2.  This was discussed with the patient today, he is agreeable to this plan    2.  Chronic hearing loss: History of mastoiditis.  He is not a candidate for cisplatin because of this.     3.  Atrial fibrillation.  He was initiated on metoprolol 12.5 mg twice daily.  This did delay port placement.    4.  Tobacco use.  He did utilize a NicoDerm patch while inpatient, though has continued smoking since discharge.  We discussed smoking cessation today.    5.  SVC syndrome.  We will evaluate CT scans following cycle 2 to determine if the patient is a candidate for Mediport placement.    PLAN:   1. Continue metoprolol as prescribed while inpatient.  2. We discussed smoking cessation today along with decreasing alcohol use.  3. Return in 1 week for CBC, CMP, nurse practitioner evaluation and toxicity check.  4. Return in 2 weeks for CBC, CMP, MD follow-up with Dr. Parmar and cycle 2 carboplatin -16 with plans to add atezolizumab.  5. Following cycle 2, patient will undergo CT scans to evaluate disease response.  6. Pending response, we will refer the patient to Dr. Gallardo for mediport  placement.     Total of 25 minutes spent with the patient, 23 minutes spent in face-to-face counseling and education, reviewing hospitalization, reviewing presentation at thoracic clinic and plans to add atezolizumab to cycle 2 and delay the initiation of radiation pending response.  The patient is agreeable to this plan.  Plan was discussed reviewed with Dr. Morel who is in agreement.    Danielle Murguia, APRN  07/15/2019

## 2019-07-17 ENCOUNTER — APPOINTMENT (OUTPATIENT)
Dept: ONCOLOGY | Facility: HOSPITAL | Age: 69
End: 2019-07-17

## 2019-07-17 LAB — FUNGUS WND CULT: NORMAL

## 2019-07-18 ENCOUNTER — APPOINTMENT (OUTPATIENT)
Dept: ONCOLOGY | Facility: HOSPITAL | Age: 69
End: 2019-07-18

## 2019-07-19 ENCOUNTER — READMISSION MANAGEMENT (OUTPATIENT)
Dept: CALL CENTER | Facility: HOSPITAL | Age: 69
End: 2019-07-19

## 2019-07-19 NOTE — OUTREACH NOTE
COPD/PN Week 2 Survey      Responses   Facility patient discharged from?  [facility]   Does the patient have one of the following disease processes/diagnoses(primary or secondary)?  COPD/Pneumonia   Was the primary reason for admission:  COPD exacerbation   Week 2 attempt successful?  Yes   Call start time  1146   Call end time  1148   General alerts for this patient  Started chemo this admission   Discharge diagnosis  Small cell lung cancer, PAF, Afib with RVR, pneumonia, COPD   Meds reviewed with patient/caregiver?  Yes   Is the patient taking all medications as directed (includes completed medication regime)?  Yes   Has the patient kept scheduled appointments due by today?  Yes   Has home health visited the patient within 72 hours of discharge?  N/A   What is the patient's perception of their health status since discharge?  Improving   Is the patient able to teach back COPD zones?  Yes   Patient reports what zone on this call?  Green Zone   Green Zone  Reports doing well, Breathing without shortness of breath, Usual activity and exercise level, Usual amount of phlegm/mucus without difficulty coughing up, Sleeping well, Appetite is good   Week 2 call completed?  Yes   Revoked  No further contact(revokes)-requires comment   Graduated/Revoked comments  Goals met, patient states he did not have pneumonia   Wrap up additional comments  States he gets tired but otherwise is feeling ok. Has seen doctor and has medications.          Milka Tracey RN

## 2019-07-22 ENCOUNTER — LAB (OUTPATIENT)
Dept: LAB | Facility: HOSPITAL | Age: 69
End: 2019-07-22

## 2019-07-22 ENCOUNTER — OFFICE VISIT (OUTPATIENT)
Dept: ONCOLOGY | Facility: CLINIC | Age: 69
End: 2019-07-22

## 2019-07-22 VITALS
DIASTOLIC BLOOD PRESSURE: 66 MMHG | HEART RATE: 135 BPM | SYSTOLIC BLOOD PRESSURE: 97 MMHG | TEMPERATURE: 98 F | WEIGHT: 176.7 LBS | OXYGEN SATURATION: 97 % | RESPIRATION RATE: 16 BRPM | HEIGHT: 68 IN | BODY MASS INDEX: 26.78 KG/M2

## 2019-07-22 DIAGNOSIS — R00.0 TACHYCARDIA: Primary | ICD-10-CM

## 2019-07-22 DIAGNOSIS — C34.90 SMALL CELL LUNG CANCER (HCC): Primary | ICD-10-CM

## 2019-07-22 DIAGNOSIS — I48.0 PAF (PAROXYSMAL ATRIAL FIBRILLATION) (HCC): ICD-10-CM

## 2019-07-22 DIAGNOSIS — C34.90 SMALL CELL LUNG CANCER (HCC): Chronic | ICD-10-CM

## 2019-07-22 LAB
BASOPHILS # BLD AUTO: 0.07 10*3/MM3 (ref 0–0.2)
BASOPHILS NFR BLD AUTO: 1.6 % (ref 0–1.5)
DEPRECATED RDW RBC AUTO: 46.3 FL (ref 37–54)
EOSINOPHIL # BLD AUTO: 0.05 10*3/MM3 (ref 0–0.4)
EOSINOPHIL NFR BLD AUTO: 1.2 % (ref 0.3–6.2)
ERYTHROCYTE [DISTWIDTH] IN BLOOD BY AUTOMATED COUNT: 15.9 % (ref 12.3–15.4)
HCT VFR BLD AUTO: 43.6 % (ref 37.5–51)
HGB BLD-MCNC: 14.5 G/DL (ref 13–17.7)
IMM GRANULOCYTES # BLD AUTO: 0.02 10*3/MM3 (ref 0–0.05)
IMM GRANULOCYTES NFR BLD AUTO: 0.5 % (ref 0–0.5)
LYMPHOCYTES # BLD AUTO: 1.49 10*3/MM3 (ref 0.7–3.1)
LYMPHOCYTES NFR BLD AUTO: 34.8 % (ref 19.6–45.3)
MCH RBC QN AUTO: 28.2 PG (ref 26.6–33)
MCHC RBC AUTO-ENTMCNC: 33.3 G/DL (ref 31.5–35.7)
MCV RBC AUTO: 84.8 FL (ref 79–97)
MONOCYTES # BLD AUTO: 0.69 10*3/MM3 (ref 0.1–0.9)
MONOCYTES NFR BLD AUTO: 16.1 % (ref 5–12)
NEUTROPHILS # BLD AUTO: 1.96 10*3/MM3 (ref 1.7–7)
NEUTROPHILS NFR BLD AUTO: 45.8 % (ref 42.7–76)
NRBC BLD AUTO-RTO: 0 /100 WBC (ref 0–0.2)
PLATELET # BLD AUTO: 144 10*3/MM3 (ref 140–450)
PMV BLD AUTO: 9.1 FL (ref 6–12)
RBC # BLD AUTO: 5.14 10*6/MM3 (ref 4.14–5.8)
WBC NRBC COR # BLD: 4.28 10*3/MM3 (ref 3.4–10.8)

## 2019-07-22 PROCEDURE — 99215 OFFICE O/P EST HI 40 MIN: CPT | Performed by: NURSE PRACTITIONER

## 2019-07-22 PROCEDURE — 85025 COMPLETE CBC W/AUTO DIFF WBC: CPT | Performed by: NURSE PRACTITIONER

## 2019-07-22 PROCEDURE — 36416 COLLJ CAPILLARY BLOOD SPEC: CPT | Performed by: NURSE PRACTITIONER

## 2019-07-22 NOTE — PROGRESS NOTES
REASON FOR FOLLOW UP: Small cell lung cancer    HISTORY OF PRESENT ILLNESS:  Julia Smith III is a 69 y.o. male with the above-mentioned history, who returns to the office today for 2-week lab review following initiation of chemotherapy.  He initiated carboplatin -16 while inpatient for his small cell lung cancer.  Thankfully, he tolerated chemotherapy remarkably well.  He denies nausea vomiting.  His hair has started falling out.  He reports he is eating and drinking adequately.   The patient reports today, he is without shortness of breath or chest pain.  He does not have palpitations or feels though his heart is racing.  He is noted to be exceptionally tachycardic in the office today.  Upon further discussion, he reports he has not been taking his metoprolol as prescribed at discharge from the hospital.  He reports this made him feel fatigued and lethargic therefore he discontinued this on Friday, 7/19/2019.    Past Medical History:   Diagnosis Date   • Cancer (CMS/[facility]) 06/2019    Right lung   • Chronic cough    • COPD (chronic obstructive pulmonary disease) (CMS/[facility])    • Hearing loss    • History of shingles    • Lower back pain    • Mastoiditis    • PAF (paroxysmal atrial fibrillation) (CMS/[facility])    • Pneumonia        Past Surgical History:   Procedure Laterality Date   • BRONCHOSCOPY N/A 6/19/2019    Procedure: BRONCHOSCOPY WITH WASHINGS AND BIOPSY AND ENDOBRONCHIAL ULTRASOUND WITH FNA;  Surgeon: Arslan Marquez MD;  Location: [facility];  Service: Pulmonary   • INNER EAR SURGERY Left    • MASTOIDECTOMY Left 1994       SOCIAL HISTORY:   reports that he has been smoking cigarettes.  He has a 60.00 pack-year smoking history. He has never used smokeless tobacco. He reports that he drinks about 2.4 - 4.2 oz of alcohol per week. He reports that he uses drugs. Drug: Marijuana.    FAMILY HISTORY:  family history includes COPD in his father; Heart disease in his brother; No Known Problems in his  brother, brother, brother, mother, and sister.    ALLERGIES:  No Known Allergies    MEDICATIONS:  The medication list has been reviewed with the patient by the medical assistant, and the list has been updated in the electronic medical record, which I reviewed.  Medication dosages and frequencies were confirmed to be accurate.    I have reviewed the patient's medical history in detail and updated the computerized patient record.    Review of Systems   Review of Systems   Constitutional: Positive for fatigue. Negative for appetite change, chills, fever and unexpected weight change.   HENT: Negative for congestion, dental problem, ear pain, hearing loss, mouth sores, nosebleeds, postnasal drip, rhinorrhea, tinnitus and trouble swallowing.    Eyes: Negative.    Respiratory: Positive for shortness of breath. Negative for cough, chest tightness, wheezing and stridor.    Cardiovascular: Negative for chest pain, palpitations and leg swelling.   Gastrointestinal: Negative for abdominal distention, abdominal pain, blood in stool, constipation, diarrhea, nausea and vomiting.   Endocrine: Negative.    Genitourinary: Negative for decreased urine volume, difficulty urinating, dysuria, flank pain, frequency, hematuria, scrotal swelling, testicular pain and urgency.   Musculoskeletal: Negative for arthralgias, back pain and joint swelling.   Allergic/Immunologic: Negative.    Neurological: Negative for dizziness, seizures, syncope, weakness, light-headedness, numbness and headaches.   Hematological: Negative for adenopathy. Does not bruise/bleed easily.   Psychiatric/Behavioral: Negative for confusion and sleep disturbance. The patient is not nervous/anxious.    All other systems reviewed and are negative.  Review of systems 07/22/2019 unchanged from previous office visit except as updated.     Vitals:    07/22/19 1441   BP: 97/66   Pulse: (!) 135   Resp: 16   Temp: 98 °F (36.7 °C)   TempSrc: Oral   SpO2: 97%   Weight: 80.2 kg (176  "lb 11.2 oz)   Height: 172.7 cm (67.99\")   PainSc: 0-No pain       Physical Exam   Constitutional: He is oriented to person, place, and time. He appears well-developed and well-nourished. No distress.   Strong odor of smoke.    HENT:   Head: Normocephalic and atraumatic. Hair is normal.   Mouth/Throat: Oropharynx is clear and moist.   Eyes: Conjunctivae, EOM and lids are normal. Pupils are equal.   Neck: Neck supple. No thyroid mass present.   Cardiovascular: An irregular rhythm present. Tachycardia present. Exam reveals no gallop and no friction rub.   No murmur heard.  Pulmonary/Chest: Effort normal and breath sounds normal. No respiratory distress. He has no wheezes. He has no rales.   Abdominal: Soft. He exhibits no distension and no mass. There is no splenomegaly or hepatomegaly. There is no tenderness. There is no guarding.   Musculoskeletal: Normal range of motion. He exhibits no edema, tenderness or deformity.   Neurological: He is alert and oriented to person, place, and time. He has normal strength.   Skin: Skin is warm and dry. No rash noted.   Psychiatric: He has a normal mood and affect. His behavior is normal. Judgment and thought content normal. Cognition and memory are normal.   Nursing note and vitals reviewed.  Physical exam 07/22/2019 unchanged from previous office visit except as updated.     DIAGNOSTIC DATA:  Results from last 7 days   Lab Units 07/22/19  1342   WBC 10*3/mm3 4.28   NEUTROS ABS 10*3/mm3 1.96   HEMOGLOBIN g/dL 14.5   HEMATOCRIT % 43.6   PLATELETS 10*3/mm3 144               EKG performed in the office today.  I reviewed this with Dr. Morel.  Patient does have atrial fibrillation/a flutter with tachycardia.  He is asymptomatic.    ASSESSMENT:  This is a 69 y.o. male with:  1.   Small cell lung cancer originating in the right hilum:   · He has an extremely large mass there with concern for SVC compression on CT imaging from 6/4/2019.  There is some left hilar adenopathy.  · Some " parotid lesions which are concerning but indeterminate, left greater than right.  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  · Staging MRI of the brain 7/10/2019 negative for metastatic disease  · He was discussed at multidisciplinary thoracic conference, with his T4 and 3 disease, concern for involvement of the right supraclavicular region and obvious left hilum, plans to add atezolizumab with cycle 2, we can consider future radiation to the residual mediastinal mass if necessary.  · Lung lesion too large to radiate in spite of the SVC narrowing.    · On 7/8/2019 we started carboplatin AUC 5 and VP16 100 mg/m2 through peripheral IVs  · Plan outpatient mediport once the large tumor mass has decreased in size.    · Will repeat CT scans following cycle 2.  · atezolizumab will be added with cycle 2.  This was discussed with the patient, he is agreeable to this plan.  She will be due for cycle 2 7/29/2019    2.  Chronic hearing loss: History of mastoiditis.  He is not a candidate for cisplatin because of this.     3.  Atrial fibrillation.  He was initiated on metoprolol 12.5 mg twice daily.  This did delay port placement.  Reports today he has not been taking metoprolol since 7/19/2019 due to feeling poorly.  He is quite tachycardic with a regular rhythm and rate in the office today.  EKG performed in the office today as outlined above.  Per cardiology, the patient will resume metoprolol as prescribed and follow-up with cardiology tomorrow morning.    4.  Tobacco use.  He did utilize a NicoDerm patch while inpatient, though has continued smoking since discharge.  We discussed smoking cessation.    5.  SVC syndrome.  We will evaluate CT scans following cycle 2 to determine if the patient is a candidate for Mediport placement.    PLAN:   1. EKG performed in  the office today as outlined above  2. I have called and spoken with cardiology regarding the patient's tachycardia.  Given that he is asymptomatic, per Dr. Mckeon, he will follow-up with cardiology tomorrow morning at 1030.  Patient was instructed to take his dose of metoprolol 12.5 mg tonight and tomorrow morning.  He understands to present to the emergency department if he were to develop any new or worsening symptoms.  3. Return in 1 week for CBC, CMP, MD follow-up with Dr. Parmar and cycle 2 carboplatin -16 with plans to add atezolizumab.  4. Following cycle 2, patient will undergo CT scans to evaluate disease response.  5. Pending response, we will refer the patient to Dr. Gallardo for mediport placement.     Today's plan was discussed reviewed with Dr. Morel who is in agreement to follow cardiology's recommendations in regards to tachycardia and atrial fibrillation.    Danielle Murguia, APRN  07/22/2019

## 2019-07-23 ENCOUNTER — OFFICE VISIT (OUTPATIENT)
Dept: CARDIOLOGY | Facility: CLINIC | Age: 69
End: 2019-07-23

## 2019-07-23 VITALS
DIASTOLIC BLOOD PRESSURE: 94 MMHG | WEIGHT: 179.2 LBS | BODY MASS INDEX: 25.09 KG/M2 | SYSTOLIC BLOOD PRESSURE: 162 MMHG | OXYGEN SATURATION: 98 % | HEART RATE: 73 BPM | HEIGHT: 71 IN

## 2019-07-23 DIAGNOSIS — C80.1 SMALL CELL CARCINOMA (HCC): ICD-10-CM

## 2019-07-23 DIAGNOSIS — I34.0 MILD MITRAL REGURGITATION: ICD-10-CM

## 2019-07-23 DIAGNOSIS — I48.91 ATRIAL FIBRILLATION WITH RVR (HCC): ICD-10-CM

## 2019-07-23 DIAGNOSIS — I48.0 PAF (PAROXYSMAL ATRIAL FIBRILLATION) (HCC): Primary | ICD-10-CM

## 2019-07-23 DIAGNOSIS — R53.83 OTHER FATIGUE: ICD-10-CM

## 2019-07-23 DIAGNOSIS — R29.818 SUSPECTED SLEEP APNEA: ICD-10-CM

## 2019-07-23 DIAGNOSIS — Z72.0 TOBACCO ABUSE: ICD-10-CM

## 2019-07-23 PROCEDURE — 99214 OFFICE O/P EST MOD 30 MIN: CPT | Performed by: NURSE PRACTITIONER

## 2019-07-23 PROCEDURE — 93000 ELECTROCARDIOGRAM COMPLETE: CPT | Performed by: NURSE PRACTITIONER

## 2019-07-23 NOTE — PROGRESS NOTES
Date of Office Visit: 2019  Encounter Provider: BERTO Tidwell  Place of Service: [facility] CARDIOLOGY  Patient Name: Julia Smith III  :1950      Subjective:     Chief Complaint:  Hospital follow-up visit for rapid atrial fibrillation/flutter    History of Present Illness:  Julia Smith III is a pleasant 69 y.o. male who is new to me.  Outside records have been obtained and reviewed by me.     This is a patient with history of small cell lung cancer, tobacco abuse, rapid atrial fibrillation/flutter.    On 2019 he was in preadmission testing preparing for Mediport placement the following week when he was noted to be in rapid atrial fibrillation/flutter with heart rate in the 170s.  He was transferred to the emergency department.  Troponin was less than 0.01, proBNP 545.  He was treated with IV and oral metoprolol.  He converted to sinus rhythm.  Repeat troponin was still less than 0.01.  Cardiology was consulted.  He was completely asymptomatic with event.  His [id number] was just 1.  His echocardiogram showed EF of 52%, normal LV diastolic function, normal LV cavity size wall thickness, mild MAC, mild mitral regurgitation, mild calcification of aortic valve, hypokinesis of basal anteroseptal and basal inferoseptal walls.  It was felt he could possibly have underlying coronary disease which should just be treated medically.   Dr. Aiken did not want to anticoagulate him further than aspirin.    At a 2019 office visit with heme/onc he was tachycardic and reported he had stopped his metoprolol tartrate on 2019 due to reported fatigue and lethargy.  Because his heart rate was fast he was advised to go home and take the metoprolol.    He is presenting for hospital follow-up.  At today's visit he reports that he has not had any new problems since he was in the hospital.  Ports he stopped the metoprolol on 2019 because it was making him feel bad  mostly stomach issues and nausea.  He has not had any vomiting, abdominal pain or change in bowel habits however.  He also reports that his symptoms started shortly after he was recently started on IV chemotherapy in the last couple weeks so was unsure if it was the metoprolol causing his symptoms.  He took a dose last night and a dose about an hour prior to his appointment today.  He was asymptomatic and did not have any palpitations, chest pain or worsening shortness of breath when he was told his heart rate was elevated in the hematology/oncology office yesterday.  He reports chronic shortness of breath with exertion that is not worse than his baseline, denies exertional chest pain, lower extremity edema, dizziness, lightness, syncope, near syncope, PND, orthopnea, headaches or changes in vision.  He reports he believes he may snore but is unsure and does have daytime fatigue.  His blood pressure is elevated today but he has not been checking it at home but does have a blood pressure cuff.  He is back in normal sinus rhythm with bifascicular block which is unchanged from previous EKG.  He does admit to drinking 2-4 beers daily, 2 to 3 cups of coffee in the morning as well as smokes daily which now he is back off a little bit and is less than 1 pack/day.      Past Medical History:   Diagnosis Date   • Atrial fibrillation with RVR (CMS/HCC)    • Cancer (CMS/HCC) 06/2019    Right lung   • Chronic cough    • COPD (chronic obstructive pulmonary disease) (CMS/HCC)    • Hearing loss    • History of shingles    • Lower back pain    • Mastoiditis    • PAF (paroxysmal atrial fibrillation) (CMS/HCC)    • Pneumonia      Past Surgical History:   Procedure Laterality Date   • BRONCHOSCOPY N/A 6/19/2019    Procedure: BRONCHOSCOPY WITH WASHINGS AND BIOPSY AND ENDOBRONCHIAL ULTRASOUND WITH FNA;  Surgeon: Arslan Marquez MD;  Location: [facility] ENDOSCOPY;  Service: Pulmonary   • INNER EAR SURGERY Left    • MASTOIDECTOMY Left       Outpatient Medications Prior to Visit   Medication Sig Dispense Refill   • ondansetron (ZOFRAN) 8 MG tablet Take 1 tablet by mouth 3 (Three) Times a Day As Needed for Nausea or Vomiting. 30 tablet 5   • metoprolol tartrate (LOPRESSOR) 25 MG tablet Take 0.5 tablets by mouth Every 12 (Twelve) Hours. (Patient taking differently: Take 12.5 mg by mouth Every 12 (Twelve) Hours. Makes patient feel sick.  Quit medicaiton per patient) 30 tablet 0     Facility-Administered Medications Prior to Visit   Medication Dose Route Frequency Provider Last Rate Last Dose   • ipratropium-albuterol (DUO-NEB) nebulizer solution 3 mL  3 mL Nebulization 4x Daily - RT Roma Costa APRN   3 mL at 19 1625       Allergies as of 2019   • (No Known Allergies)     Social History     Socioeconomic History   • Marital status:      Spouse name: Krystle   • Number of children: 2   • Years of education: Not on file   • Highest education level: Not on file   Occupational History   • Occupation: [employer]     Employer: RETIRED   Tobacco Use   • Smoking status: Current Every Day Smoker     Packs/day: 1.50     Years: 40.00     Pack years: 60.00     Types: Cigarettes   • Smokeless tobacco: Never Used   • Tobacco comment: Since age 32   Substance and Sexual Activity   • Alcohol use: Yes     Alcohol/week: 2.4 - 4.2 oz     Types: 4 - 7 Cans of beer per week     Comment: daily/caffeine use   • Drug use: Yes     Types: Marijuana   • Sexual activity: Not Currently     Partners: Female     Family History   Problem Relation Age of Onset   • No Known Problems Mother    • COPD Father            • No Known Problems Sister    • Heart disease Brother    • No Known Problems Brother    • No Known Problems Brother    • No Known Problems Brother      Review of Systems   Constitution: Positive for malaise/fatigue and weight loss (30 lbs). Negative for chills and fever.   HENT: Positive for hearing loss. Negative for ear pain,  "nosebleeds and sore throat.    Eyes: Negative for double vision, pain, vision loss in left eye and vision loss in right eye.   Cardiovascular: Negative for chest pain, claudication, dyspnea on exertion, irregular heartbeat, leg swelling, near-syncope, orthopnea, palpitations, paroxysmal nocturnal dyspnea and syncope.        Pain in legs with walking   Respiratory: Positive for shortness of breath. Negative for cough, snoring and wheezing.    Endocrine: Negative for cold intolerance and heat intolerance.   Hematologic/Lymphatic: Negative for bleeding problem.   Skin: Negative for color change, itching, rash and unusual hair distribution.   Musculoskeletal: Negative for joint pain and joint swelling.   Gastrointestinal: Negative for abdominal pain, diarrhea, hematochezia, melena, nausea and vomiting.   Genitourinary: Positive for decreased libido. Negative for frequency, hematuria, hesitancy and incomplete emptying.   Neurological: Positive for excessive daytime sleepiness and numbness. Negative for dizziness, headaches, light-headedness, loss of balance, paresthesias and seizures.   Psychiatric/Behavioral: Negative for depression.          Objective:     Vitals:    07/23/19 1034 07/23/19 1124   BP: 170/82 162/94   BP Location: Left arm Left arm   Patient Position: Sitting    Pulse: 75 73   SpO2:  98%   Weight: 81.3 kg (179 lb 3.2 oz)    Height: 180.3 cm (71\")      Body mass index is 24.99 kg/m².    PHYSICAL EXAM:  Physical Exam   Constitutional: He is oriented to person, place, and time. He appears well-developed and well-nourished. No distress.   HENT:   Head: Normocephalic and atraumatic.   Eyes: Conjunctivae and EOM are normal. Pupils are equal, round, and reactive to light.   Neck: No JVD present. Carotid bruit is not present.   Cardiovascular: Normal rate, regular rhythm, normal heart sounds and intact distal pulses.   No murmur heard.  Pulses:       Radial pulses are 2+ on the right side, and 2+ on the left " side.   Varicosities of lower extremity veins, no lower extremity edema   Pulmonary/Chest: Effort normal. No accessory muscle usage. No tachypnea. No respiratory distress. He has no decreased breath sounds. He has wheezes (scattered expiratory). He has no rhonchi. He has no rales. He exhibits no tenderness.   Abdominal: Soft. Bowel sounds are normal. He exhibits no distension. There is no tenderness. There is no rebound and no guarding.   Musculoskeletal: Normal range of motion. He exhibits no edema.   Neurological: He is alert and oriented to person, place, and time.   Skin: Skin is warm, dry and intact. He is not diaphoretic. No erythema.   Psychiatric: His speech is normal and behavior is normal. Judgment and thought content normal. Cognition and memory are normal.   Flat affect   Nursing note and vitals reviewed.        ECG 12 Lead  Date/Time: 7/23/2019 10:37 AM  Performed by: Yana Lira APRN  Authorized by: Yana Lira APRN   Comparison: compared with previous ECG from 7/6/2019  Similar to previous ECG  Rhythm: sinus rhythm  Rate: normal  BPM: 75  Conduction: right bundle branch block and left anterior fascicular block  QRS axis: left    Clinical impression: abnormal EKG  Comments: Indication: Paroxysmal atrial fibrillation            Assessment:       Diagnosis Plan   1. PAF (paroxysmal atrial fibrillation) (CMS/HCC)  ECG 12 Lead    Ambulatory Referral to Sleep Medicine   2. Atrial fibrillation with RVR (CMS/HCC)  ECG 12 Lead   3. Small cell carcinoma (CMS/HCC)  ECG 12 Lead   4. Tobacco abuse  ECG 12 Lead   5. Mild mitral regurgitation  ECG 12 Lead   6. Other fatigue  Ambulatory Referral to Sleep Medicine   7. Suspected sleep apnea  Ambulatory Referral to Sleep Medicine       Plan:     1.  Paroxysmal atrial fibrillation:  7/2019.  Found to be in rapid A. fib/flutter 7/5/2019 during preadmission testing.  Transferred to ER.  Given IV and oral metoprolol and converted back in sinus rhythm.  At this  time his echocardiogram uremarkable. Dr. Aiken did not want  anticoagulate him further than aspirin. Normal left and right atrial size/volume noted.  Stopped metoprolol tartrate due to reported nausea and was found to have elevated heart rate 130s at 7/22/2019 office visit with hematology/oncology.  He is back on his metoprolol tartrate 12.5 mg twice daily for the last 2 doses and is in sinus rhythm with a normal heart rate today without recurrence of nausea.  No increase in shortness of breath or wheezing reported with the metoprolol.  Normal TSH in May. 2019, normal magnesium     2.  Mitral regurgitation: Mild with mild MAC 7/6/19 echo    3.  Regional wall motion abnormality noted on echocardiogram just mild: No cardiac symptoms at all.   Felt he could have underlying coronary disease but still would treat medically at this time.  He denies any chest pain or worsening shortness of breath with exertion.  4. Aortic valve calcification: Mild 7/5/19 echo  5. Cancer lung cancer metastatic: Not yet received port placement but states he has started IV chemotherapy.  6.  Bifascicular block: Unchanged today compared to previous EKG.  7.  Tobacco abuse: Patient was advised that he needs to discontinue.  We discussed his risk factors for cardiac and vascular disease and the fact that he has underlying lung disease and lung cancer.   8.  EtOH use: Drinks 2-4 beers daily.  I recommended that he not drink more than 1 alcoholic beverage per day as this increases risk for the atrial fibrillation.  9. Lipid panel under good control May 2019.  10.  Hypertension: Asymptomatic with elevated blood pressure today 170/82 with recheck 162/94. However looks like his blood pressure was documented as low 97/66  with an elevated heart rate of 135 yesterday 7/22/19. He does not check his blood pressure at home but has a means to do so.  I have asked that he monitor his blood pressure at home.  He is going to come back in 1 week for blood  pressure check so I can review his log and verify his cuff for accuracy.  I will likely anticipate increasing his metoprolol tartrate dosage to 25 mg twice daily if he is still hypertensive.  11.  Suspected sleep apnea: He has daytime fatigue and questionable snoring.  I will refer him to sleep medicine for evaluation with his atrial fibrillation.    Atrial Fibrillation and Atrial Flutter  Assessment  • The patient has paroxysmal atrial fibrillation and paroxysmal atrial flutter  • The patient's CHADS2-VASc score is 1  • A PIQ3SS3-LNSh score of 1 is considered an intermediate risk for a thromboembolic event    Plan  • Attempt to maintain sinus rhythm  • Continue beta blocker for rhythm control    We discussed minimizing his EtOH use, caffeine intake and discontinuing tobacco.  We also discussed referral to sleep medicine for possible sleep apnea as well as monitoring his blood pressure at home. If patient has recurrence of nausea once routinely taking his metoprolol tartrate could consider trying diltiazem instead.    Follow up with Dr. Aiken in 4 weeks, unless otherwise needed sooner.  I advised the patient to contact our office with any questions or concerns.       It has been a pleasure to participate in this patient's care. Please feel free to contact me with any questions or concerns.     Yana Lira, APRN  07/23/2019            Your medication list           Accurate as of 7/23/19  1:39 PM. If you have any questions, ask your nurse or doctor.               CHANGE how you take these medications      Instructions Last Dose Given Next Dose Due   metoprolol tartrate 25 MG tablet  Commonly known as:  LOPRESSOR  What changed:  additional instructions      Take 0.5 tablets by mouth Every 12 (Twelve) Hours.          CONTINUE taking these medications      Instructions Last Dose Given Next Dose Due   ondansetron 8 MG tablet  Commonly known as:  ZOFRAN      Take 1 tablet by mouth 3 (Three) Times a Day As Needed for  Nausea or Vomiting.             Where to Get Your Medications      These medications were sent to [facility] [address] [place], [place] - [address] [facility] - [phone] [facility] [phone]   [address] [facility] [address]    Phone:  [phone]   · metoprolol tartrate 25 MG tablet         The above medication changes may not have been made by this provider.  Medication list was updated to reflect medications patient is currently taking including medication changes and discontinuations made by other healthcare providers.     Dictated utilizing Dragon Dictation System.

## 2019-07-26 DIAGNOSIS — Z79.899 HIGH RISK MEDICATION USE: ICD-10-CM

## 2019-07-26 DIAGNOSIS — C34.90 SMALL CELL LUNG CANCER (HCC): ICD-10-CM

## 2019-07-29 ENCOUNTER — HOSPITAL ENCOUNTER (OUTPATIENT)
Dept: CARDIOLOGY | Facility: HOSPITAL | Age: 69
End: 2019-07-29

## 2019-07-29 ENCOUNTER — INFUSION (OUTPATIENT)
Dept: ONCOLOGY | Facility: HOSPITAL | Age: 69
End: 2019-07-29

## 2019-07-29 ENCOUNTER — OFFICE VISIT (OUTPATIENT)
Dept: ONCOLOGY | Facility: CLINIC | Age: 69
End: 2019-07-29

## 2019-07-29 ENCOUNTER — TELEPHONE (OUTPATIENT)
Dept: CARDIOLOGY | Facility: CLINIC | Age: 69
End: 2019-07-29

## 2019-07-29 VITALS
BODY MASS INDEX: 25.58 KG/M2 | WEIGHT: 182.7 LBS | OXYGEN SATURATION: 98 % | HEART RATE: 154 BPM | HEIGHT: 71 IN | SYSTOLIC BLOOD PRESSURE: 145 MMHG | TEMPERATURE: 98.3 F | DIASTOLIC BLOOD PRESSURE: 94 MMHG | RESPIRATION RATE: 16 BRPM

## 2019-07-29 DIAGNOSIS — C34.90 SMALL CELL LUNG CANCER (HCC): ICD-10-CM

## 2019-07-29 DIAGNOSIS — Z79.899 HIGH RISK MEDICATION USE: ICD-10-CM

## 2019-07-29 DIAGNOSIS — C34.90 SMALL CELL LUNG CANCER (HCC): Primary | ICD-10-CM

## 2019-07-29 LAB
ALBUMIN SERPL-MCNC: 4.2 G/DL (ref 3.5–5.2)
ALBUMIN/GLOB SERPL: 1.2 G/DL (ref 1.1–2.4)
ALP SERPL-CCNC: 91 U/L (ref 38–116)
ALT SERPL W P-5'-P-CCNC: 19 U/L (ref 0–41)
ANION GAP SERPL CALCULATED.3IONS-SCNC: 14 MMOL/L (ref 5–15)
AST SERPL-CCNC: 21 U/L (ref 0–40)
BASOPHILS # BLD AUTO: 0.05 10*3/MM3 (ref 0–0.2)
BASOPHILS NFR BLD AUTO: 1.3 % (ref 0–1.5)
BILIRUB SERPL-MCNC: 0.2 MG/DL (ref 0.2–1.2)
BUN BLD-MCNC: 9 MG/DL (ref 6–20)
BUN/CREAT SERPL: 7.9 (ref 7.3–30)
CALCIUM SPEC-SCNC: 9.7 MG/DL (ref 8.5–10.2)
CHLORIDE SERPL-SCNC: 101 MMOL/L (ref 98–107)
CO2 SERPL-SCNC: 24 MMOL/L (ref 22–29)
CREAT BLD-MCNC: 1.14 MG/DL (ref 0.7–1.3)
DEPRECATED RDW RBC AUTO: 57.9 FL (ref 37–54)
EOSINOPHIL # BLD AUTO: 0.1 10*3/MM3 (ref 0–0.4)
EOSINOPHIL NFR BLD AUTO: 2.7 % (ref 0.3–6.2)
ERYTHROCYTE [DISTWIDTH] IN BLOOD BY AUTOMATED COUNT: 18.2 % (ref 12.3–15.4)
GFR SERPL CREATININE-BSD FRML MDRD: 64 ML/MIN/1.73
GLOBULIN UR ELPH-MCNC: 3.5 GM/DL (ref 1.8–3.5)
GLUCOSE BLD-MCNC: 112 MG/DL (ref 74–124)
HCT VFR BLD AUTO: 45 % (ref 37.5–51)
HGB BLD-MCNC: 14.3 G/DL (ref 13–17.7)
IMM GRANULOCYTES # BLD AUTO: 0.01 10*3/MM3 (ref 0–0.05)
IMM GRANULOCYTES NFR BLD AUTO: 0.3 % (ref 0–0.5)
LYMPHOCYTES # BLD AUTO: 1.51 10*3/MM3 (ref 0.7–3.1)
LYMPHOCYTES NFR BLD AUTO: 40.5 % (ref 19.6–45.3)
MCH RBC QN AUTO: 28.8 PG (ref 26.6–33)
MCHC RBC AUTO-ENTMCNC: 31.8 G/DL (ref 31.5–35.7)
MCV RBC AUTO: 90.7 FL (ref 79–97)
MONOCYTES # BLD AUTO: 0.84 10*3/MM3 (ref 0.1–0.9)
MONOCYTES NFR BLD AUTO: 22.5 % (ref 5–12)
NEUTROPHILS # BLD AUTO: 1.22 10*3/MM3 (ref 1.7–7)
NEUTROPHILS NFR BLD AUTO: 32.7 % (ref 42.7–76)
NRBC BLD AUTO-RTO: 0 /100 WBC (ref 0–0.2)
PLATELET # BLD AUTO: 195 10*3/MM3 (ref 140–450)
PMV BLD AUTO: 9.2 FL (ref 6–12)
POTASSIUM BLD-SCNC: 4.4 MMOL/L (ref 3.5–4.7)
PROT SERPL-MCNC: 7.7 G/DL (ref 6.3–8)
RBC # BLD AUTO: 4.96 10*6/MM3 (ref 4.14–5.8)
SODIUM BLD-SCNC: 139 MMOL/L (ref 134–145)
T3FREE SERPL-MCNC: 3.74 PG/ML (ref 2–4.4)
T4 FREE SERPL-MCNC: 1.03 NG/DL (ref 0.93–1.7)
TSH SERPL DL<=0.05 MIU/L-ACNC: 1.62 MIU/ML (ref 0.27–4.2)
WBC NRBC COR # BLD: 3.73 10*3/MM3 (ref 3.4–10.8)

## 2019-07-29 PROCEDURE — 84443 ASSAY THYROID STIM HORMONE: CPT | Performed by: INTERNAL MEDICINE

## 2019-07-29 PROCEDURE — 80053 COMPREHEN METABOLIC PANEL: CPT | Performed by: INTERNAL MEDICINE

## 2019-07-29 PROCEDURE — 85025 COMPLETE CBC W/AUTO DIFF WBC: CPT | Performed by: INTERNAL MEDICINE

## 2019-07-29 PROCEDURE — 99215 OFFICE O/P EST HI 40 MIN: CPT | Performed by: INTERNAL MEDICINE

## 2019-07-29 PROCEDURE — 84439 ASSAY OF FREE THYROXINE: CPT | Performed by: INTERNAL MEDICINE

## 2019-07-29 PROCEDURE — 84481 FREE ASSAY (FT-3): CPT | Performed by: INTERNAL MEDICINE

## 2019-07-29 PROCEDURE — 36415 COLL VENOUS BLD VENIPUNCTURE: CPT | Performed by: INTERNAL MEDICINE

## 2019-07-29 NOTE — PROGRESS NOTES
REASON FOR FOLLOW UP: T4N3 Small cell lung cancer    HISTORY OF PRESENT ILLNESS:  Julia Smith III is a 69 y.o. male with the above-mentioned history, who returns to the office today for 2-week lab review following initiation of chemotherapy.  He initiated carboplatin -16 while inpatient for his small cell lung cancer. He tolerated it well. He denies any nausea or vomiting following the treatment. He experienced some fatigue but he states that is unchanged from baseline. His pulse today is elevated at 154 and he states he had 3 cups of coffee this morning. He states he usually takes metoprolol   half a pill at 11am and 11pm. He has not had his morning dose of metoprolol today yet. He states he feels well overall and denies any shortness of breath. He denies having a sore throat.   His ANC today was 1.22 and his monocyte was 22.5.  He was discussed in the multidisciplinary thoracic conference.  The plan was to add Tecentriq to his chemotherapy starting today.  But patient has significant tachycardia.  Also discussion was done of radiation to be added for residual disease.    I did discuss with cardiology Dr. Aiken's nurse practitioner and they are willing to see the patient today in their office.  Patient will require port placement as well and we will need to refer him to Dr. Ramila Hancock prior to cycle 3.      Past Medical History:   Diagnosis Date   • Atrial fibrillation with RVR (CMS/HCC)    • Cancer (CMS/HCC) 06/2019    Right lung   • Chronic cough    • COPD (chronic obstructive pulmonary disease) (CMS/HCC)    • Hearing loss    • History of shingles    • Lower back pain    • Mastoiditis    • PAF (paroxysmal atrial fibrillation) (CMS/HCC)    • Pneumonia        Past Surgical History:   Procedure Laterality Date   • BRONCHOSCOPY N/A 6/19/2019    Procedure: BRONCHOSCOPY WITH WASHINGS AND BIOPSY AND ENDOBRONCHIAL ULTRASOUND WITH FNA;  Surgeon: Arslan Marquez MD;  Location: [facility] ENDOSCOPY;  Service:  "Pulmonary   • INNER EAR SURGERY Left    • MASTOIDECTOMY Left 1994       SOCIAL HISTORY:   reports that he has been smoking cigarettes.  He has a 60.00 pack-year smoking history. He has never used smokeless tobacco. He reports that he drinks about 2.4 - 4.2 oz of alcohol per week. He reports that he uses drugs. Drug: Marijuana.    FAMILY HISTORY:  family history includes COPD in his father; Heart disease in his brother; No Known Problems in his brother, brother, brother, mother, and sister.    ALLERGIES:  No Known Allergies    MEDICATIONS:  The medication list has been reviewed with the patient by the medical assistant, and the list has been updated in the electronic medical record, which I reviewed.  Medication dosages and frequencies were confirmed to be accurate.    Review of Systems   Review of Systems   Constitutional: Negative for appetite change, chills, diaphoresis, fatigue, fever and unexpected weight change.   HENT: Negative for hearing loss, sore throat and trouble swallowing.    Respiratory: Negative for cough, chest tightness, shortness of breath and wheezing.    Cardiovascular: Negative for chest pain, palpitations and leg swelling.   Gastrointestinal: Negative for abdominal distention, abdominal pain, constipation, diarrhea, nausea and vomiting.   Genitourinary: Negative for dysuria, frequency, hematuria and urgency.   Musculoskeletal: Negative for joint swelling.        No muscle weakness.   Skin: Negative for rash and wound.   Neurological: Negative for seizures, syncope, speech difficulty, weakness, numbness and headaches.   Hematological: Negative for adenopathy. Does not bruise/bleed easily.   Psychiatric/Behavioral: Negative for behavioral problems, confusion and suicidal ideas.       Vitals:    07/29/19 0820   BP: 145/94   Pulse: (!) 154   Resp: 16   Temp: 98.3 °F (36.8 °C)   TempSrc: Oral   SpO2: 98%   Weight: 82.9 kg (182 lb 11.2 oz)   Height: 180.3 cm (70.98\")   PainSc: 0-No pain "         GENERAL:  Well-developed, well-nourished in no acute distress.   SKIN:  Warm, dry without rashes, purpura or petechiae.  NECK:  Supple with good range of motion; no thyromegaly or masses, no JVD.  LYMPHATICS:  No cervical, supraclavicular, axillary or inguinal adenopathy.  CHEST:  Lungs clear to auscultation. Good airflow.  CARDIAC: Tachycardia   aBDOMEN:  Soft, nontender with no hepatosplenomegaly or masses.  EXTREMITIES:  No clubbing, cyanosis or edema.  NEUROLOGICAL:  Cranial Nerves II-XII grossly intact.  No focal neurological deficits.  PSYCHIATRIC:  Normal affect and mood.        DIAGNOSTIC DATA:  Results from last 7 days   Lab Units 07/29/19  0806   WBC 10*3/mm3 3.73   NEUTROS ABS 10*3/mm3 1.22*   HEMOGLOBIN g/dL 14.3   HEMATOCRIT % 45.0   PLATELETS 10*3/mm3 195     Results from last 7 days   Lab Units 07/29/19  0806   SODIUM mmol/L 139   POTASSIUM mmol/L 4.4   CHLORIDE mmol/L 101   CO2 mmol/L 24.0   BUN mg/dL 9   CREATININE mg/dL 1.14   CALCIUM mg/dL 9.7   ALBUMIN g/dL 4.20   BILIRUBIN mg/dL 0.2   ALK PHOS U/L 91   ALT (SGPT) U/L 19   AST (SGOT) U/L 21   GLUCOSE mg/dL 112         ASSESSMENT:  This is a 69 y.o. male with:  1.   Small cell lung cancer originating in the right hilum:   · He has an extremely large mass there with concern for SVC compression on CT imaging from 6/4/2019.  There is some left hilar adenopathy.  · Some parotid lesions which are concerning but indeterminate, left greater than right.  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  · Staging MRI of the brain 7/10/2019 negative for metastatic disease  · He was discussed at multidisciplinary thoracic conference, with his T4 and 3 disease, concern for involvement of the right supraclavicular region and obvious left hilum, plans to add atezolizumab with cycle 2, we can  consider future radiation to the residual mediastinal mass if necessary.  · Lung lesion too large to radiate in spite of the SVC narrowing.    · On 7/8/2019 we started carboplatin AUC 5 and VP16 100 mg/m2 through peripheral IVs  · Plan outpatient mediport once the large tumor mass has decreased in size.    · Will repeat CT scans following cycle 2.  · atezolizumab will be added with cycle 2.  This was discussed with the patient, he is agreeable to this plan.  He will be due for cycle 2 7/29/2019  · Cycle 2 postponed on 7/29/2019 due to heart rate of 154    2.  Chronic hearing loss: History of mastoiditis.  He is not a candidate for cisplatin because of this.     3.  Atrial fibrillation.  He was initiated on metoprolol 12.5 mg twice daily.  This did delay port placement.  Reports today he has not been taking metoprolol since 7/19/2019 due to feeling poorly.  He is quite tachycardic with a regular rhythm and rate in the office today.  EKG performed in the office today as outlined above.  Per cardiology, the patient will resume metoprolol as prescribed and follow-up with cardiology tomorrow morning.    4.  Tobacco use.  He did utilize a NicoDerm patch while inpatient, though has continued smoking since discharge.  We discussed smoking cessation.    5.  SVC syndrome.  We will evaluate CT scans following cycle 2 to determine if the patient is a candidate for Mediport placement.    6. Tachycardia  · Heart rate on 7/29/19 was 154, he will be following-up with cardiologist today for further evaluation.  · Discussed with nurse practitioner of Dr. Aiken and the plan to see him in the office today and see if his heart rate can be stabilized    PLAN:   1. We will hold cycle 2 carboplatin -16 secondary to significant tachycardia..   2. He is going to the Cardiologist today for further evaluation of his significant tachycardia, atrial fibrillation.  Discussed with Dr. Aiken's nurse practitioner today.  3. Reviewed  side-effects of atezolizumab with patient today. Will plan to add atezolizumab.  4. Follow-up this Wednesday to start chemotherapy with carbo -16/Tecentriq.  He will see nurse practitioner at that time.  Nurse practitioner to discuss with me.  5. Patient to receive weekly CBC, CMP.  Nurse practitioner to follow-up in 2 weeks again to make sure patient is doing well given he is getting into issues with atrial fibrillation  6. Patient to have CT scan of the chest abdomen pelvis in 2 weeks  7. Follow-up in 3 weeks  with Dr. Morel after CT scan obtained and also for chemotherapy with cycle 3 carboplatinum/-16/centric  8. Follow-up with Dr. Ramila Nj after 2 weeks to receive port placement      I, Danielle Erwin, acted as scribe for Ernestine Parmar MD  in documenting the service or procedure. To the best of my knowledge, I recorded what was dictated by MD Ernestine Mendoza MD

## 2019-07-29 NOTE — TELEPHONE ENCOUNTER
Spoke with Dr. Galvez.  Patient in their office today for chemotherapy second cycle and heart rates in the 150s.  He is pretty asymptomatic but she would like him seen today since they cannot feed with chemotherapy.  Discussed with Dr. Aiken and will have the patient come to the [facility]. [facility] and Dr. Galvez's office was notified to have the patient come in.

## 2019-07-30 ENCOUNTER — APPOINTMENT (OUTPATIENT)
Dept: ONCOLOGY | Facility: HOSPITAL | Age: 69
End: 2019-07-30

## 2019-07-30 DIAGNOSIS — C34.90 SMALL CELL LUNG CANCER (HCC): Primary | ICD-10-CM

## 2019-07-31 ENCOUNTER — INFUSION (OUTPATIENT)
Dept: ONCOLOGY | Facility: HOSPITAL | Age: 69
End: 2019-07-31

## 2019-07-31 ENCOUNTER — APPOINTMENT (OUTPATIENT)
Dept: ONCOLOGY | Facility: HOSPITAL | Age: 69
End: 2019-07-31

## 2019-07-31 ENCOUNTER — OFFICE VISIT (OUTPATIENT)
Dept: ONCOLOGY | Facility: CLINIC | Age: 69
End: 2019-07-31

## 2019-07-31 VITALS
HEART RATE: 82 BPM | SYSTOLIC BLOOD PRESSURE: 138 MMHG | DIASTOLIC BLOOD PRESSURE: 76 MMHG | TEMPERATURE: 97.8 F | BODY MASS INDEX: 25.37 KG/M2 | HEIGHT: 71 IN | WEIGHT: 181.2 LBS | RESPIRATION RATE: 18 BRPM | OXYGEN SATURATION: 97 %

## 2019-07-31 DIAGNOSIS — C34.90 SMALL CELL LUNG CANCER (HCC): ICD-10-CM

## 2019-07-31 DIAGNOSIS — C34.90 SMALL CELL LUNG CANCER (HCC): Primary | Chronic | ICD-10-CM

## 2019-07-31 DIAGNOSIS — Z79.899 HIGH RISK MEDICATION USE: Primary | ICD-10-CM

## 2019-07-31 DIAGNOSIS — I48.91 ATRIAL FIBRILLATION WITH RVR (HCC): ICD-10-CM

## 2019-07-31 LAB
ALBUMIN SERPL-MCNC: 4.2 G/DL (ref 3.5–5.2)
ALBUMIN/GLOB SERPL: 1.2 G/DL (ref 1.1–2.4)
ALP SERPL-CCNC: 92 U/L (ref 38–116)
ALT SERPL W P-5'-P-CCNC: 16 U/L (ref 0–41)
ANION GAP SERPL CALCULATED.3IONS-SCNC: 13.6 MMOL/L (ref 5–15)
AST SERPL-CCNC: 17 U/L (ref 0–40)
BASOPHILS # BLD AUTO: 0.07 10*3/MM3 (ref 0–0.2)
BASOPHILS NFR BLD AUTO: 1.4 % (ref 0–1.5)
BILIRUB SERPL-MCNC: 0.2 MG/DL (ref 0.2–1.2)
BUN BLD-MCNC: 16 MG/DL (ref 6–20)
BUN/CREAT SERPL: 14 (ref 7.3–30)
CALCIUM SPEC-SCNC: 9.7 MG/DL (ref 8.5–10.2)
CHLORIDE SERPL-SCNC: 100 MMOL/L (ref 98–107)
CO2 SERPL-SCNC: 24.4 MMOL/L (ref 22–29)
CREAT BLD-MCNC: 1.14 MG/DL (ref 0.7–1.3)
DEPRECATED RDW RBC AUTO: 57.5 FL (ref 37–54)
EOSINOPHIL # BLD AUTO: 0.09 10*3/MM3 (ref 0–0.4)
EOSINOPHIL NFR BLD AUTO: 1.8 % (ref 0.3–6.2)
ERYTHROCYTE [DISTWIDTH] IN BLOOD BY AUTOMATED COUNT: 18.6 % (ref 12.3–15.4)
GFR SERPL CREATININE-BSD FRML MDRD: 64 ML/MIN/1.73
GLOBULIN UR ELPH-MCNC: 3.6 GM/DL (ref 1.8–3.5)
GLUCOSE BLD-MCNC: 129 MG/DL (ref 74–124)
HCT VFR BLD AUTO: 45.4 % (ref 37.5–51)
HGB BLD-MCNC: 14.7 G/DL (ref 13–17.7)
IMM GRANULOCYTES # BLD AUTO: 0.02 10*3/MM3 (ref 0–0.05)
IMM GRANULOCYTES NFR BLD AUTO: 0.4 % (ref 0–0.5)
LYMPHOCYTES # BLD AUTO: 1.62 10*3/MM3 (ref 0.7–3.1)
LYMPHOCYTES NFR BLD AUTO: 31.8 % (ref 19.6–45.3)
MCH RBC QN AUTO: 28.7 PG (ref 26.6–33)
MCHC RBC AUTO-ENTMCNC: 32.4 G/DL (ref 31.5–35.7)
MCV RBC AUTO: 88.5 FL (ref 79–97)
MONOCYTES # BLD AUTO: 0.88 10*3/MM3 (ref 0.1–0.9)
MONOCYTES NFR BLD AUTO: 17.3 % (ref 5–12)
MYCOBACTERIUM SPEC CULT: NORMAL
NEUTROPHILS # BLD AUTO: 2.41 10*3/MM3 (ref 1.7–7)
NEUTROPHILS NFR BLD AUTO: 47.3 % (ref 42.7–76)
NIGHT BLUE STAIN TISS: NORMAL
NRBC BLD AUTO-RTO: 0 /100 WBC (ref 0–0.2)
PLATELET # BLD AUTO: 239 10*3/MM3 (ref 140–450)
PMV BLD AUTO: 9.4 FL (ref 6–12)
POTASSIUM BLD-SCNC: 4.4 MMOL/L (ref 3.5–4.7)
PROT SERPL-MCNC: 7.8 G/DL (ref 6.3–8)
RBC # BLD AUTO: 5.13 10*6/MM3 (ref 4.14–5.8)
SODIUM BLD-SCNC: 138 MMOL/L (ref 134–145)
WBC NRBC COR # BLD: 5.09 10*3/MM3 (ref 3.4–10.8)

## 2019-07-31 PROCEDURE — 80053 COMPREHEN METABOLIC PANEL: CPT

## 2019-07-31 PROCEDURE — 25010000002 CARBOPLATIN PER 50 MG: Performed by: NURSE PRACTITIONER

## 2019-07-31 PROCEDURE — 25010000002 PALONOSETRON PER 25 MCG: Performed by: NURSE PRACTITIONER

## 2019-07-31 PROCEDURE — 25010000002 ETOPOSIDE 1 GM/50ML SOLUTION 50 ML VIAL: Performed by: NURSE PRACTITIONER

## 2019-07-31 PROCEDURE — 96413 CHEMO IV INFUSION 1 HR: CPT | Performed by: NURSE PRACTITIONER

## 2019-07-31 PROCEDURE — 25010000002 ATEZOLIZUMAB 1200 MG/20ML SOLUTION 20 ML VIAL: Performed by: NURSE PRACTITIONER

## 2019-07-31 PROCEDURE — 25010000002 DEXAMETHASONE SODIUM PHOSPHATE 100 MG/10ML SOLUTION: Performed by: NURSE PRACTITIONER

## 2019-07-31 PROCEDURE — 96375 TX/PRO/DX INJ NEW DRUG ADDON: CPT | Performed by: NURSE PRACTITIONER

## 2019-07-31 PROCEDURE — 25010000002 FOSAPREPITANT PER 1 MG: Performed by: NURSE PRACTITIONER

## 2019-07-31 PROCEDURE — 96417 CHEMO IV INFUS EACH ADDL SEQ: CPT | Performed by: NURSE PRACTITIONER

## 2019-07-31 PROCEDURE — 99214 OFFICE O/P EST MOD 30 MIN: CPT | Performed by: NURSE PRACTITIONER

## 2019-07-31 PROCEDURE — 85025 COMPLETE CBC W/AUTO DIFF WBC: CPT

## 2019-07-31 PROCEDURE — 96367 TX/PROPH/DG ADDL SEQ IV INF: CPT | Performed by: NURSE PRACTITIONER

## 2019-07-31 RX ORDER — SODIUM CHLORIDE 9 MG/ML
250 INJECTION, SOLUTION INTRAVENOUS ONCE
Status: CANCELLED | OUTPATIENT
Start: 2019-08-01

## 2019-07-31 RX ORDER — SODIUM CHLORIDE 9 MG/ML
250 INJECTION, SOLUTION INTRAVENOUS ONCE
Status: CANCELLED | OUTPATIENT
Start: 2019-08-02

## 2019-07-31 RX ORDER — FAMOTIDINE 10 MG/ML
20 INJECTION, SOLUTION INTRAVENOUS AS NEEDED
Status: CANCELLED | OUTPATIENT
Start: 2019-07-31

## 2019-07-31 RX ORDER — DIPHENHYDRAMINE HYDROCHLORIDE 50 MG/ML
50 INJECTION INTRAMUSCULAR; INTRAVENOUS AS NEEDED
Status: DISCONTINUED | OUTPATIENT
Start: 2019-07-31 | End: 2019-07-31

## 2019-07-31 RX ORDER — PROCHLORPERAZINE MALEATE 10 MG
10 TABLET ORAL ONCE
Status: CANCELLED | OUTPATIENT
Start: 2019-08-01 | End: 2019-08-01

## 2019-07-31 RX ORDER — PROCHLORPERAZINE MALEATE 10 MG
10 TABLET ORAL ONCE
Status: CANCELLED | OUTPATIENT
Start: 2019-08-02 | End: 2019-08-02

## 2019-07-31 RX ORDER — FAMOTIDINE 10 MG/ML
20 INJECTION, SOLUTION INTRAVENOUS AS NEEDED
Status: DISCONTINUED | OUTPATIENT
Start: 2019-07-31 | End: 2019-07-31

## 2019-07-31 RX ORDER — PALONOSETRON 0.05 MG/ML
0.25 INJECTION, SOLUTION INTRAVENOUS ONCE
Status: COMPLETED | OUTPATIENT
Start: 2019-07-31 | End: 2019-07-31

## 2019-07-31 RX ORDER — DIPHENHYDRAMINE HYDROCHLORIDE 50 MG/ML
50 INJECTION INTRAMUSCULAR; INTRAVENOUS AS NEEDED
Status: CANCELLED | OUTPATIENT
Start: 2019-07-31

## 2019-07-31 RX ORDER — SODIUM CHLORIDE 9 MG/ML
250 INJECTION, SOLUTION INTRAVENOUS ONCE
Status: CANCELLED | OUTPATIENT
Start: 2019-07-31

## 2019-07-31 RX ORDER — SODIUM CHLORIDE 9 MG/ML
250 INJECTION, SOLUTION INTRAVENOUS ONCE
Status: COMPLETED | OUTPATIENT
Start: 2019-07-31 | End: 2019-07-31

## 2019-07-31 RX ORDER — PALONOSETRON 0.05 MG/ML
0.25 INJECTION, SOLUTION INTRAVENOUS ONCE
Status: CANCELLED | OUTPATIENT
Start: 2019-07-31

## 2019-07-31 RX ADMIN — SODIUM CHLORIDE 250 ML: 900 INJECTION, SOLUTION INTRAVENOUS at 09:19

## 2019-07-31 RX ADMIN — ATEZOLIZUMAB 1200 MG: 1200 INJECTION, SOLUTION INTRAVENOUS at 09:31

## 2019-07-31 RX ADMIN — CARBOPLATIN 490 MG: 10 INJECTION, SOLUTION INTRAVENOUS at 11:21

## 2019-07-31 RX ADMIN — SODIUM CHLORIDE 100 ML: 9 INJECTION, SOLUTION INTRAVENOUS at 10:33

## 2019-07-31 RX ADMIN — ETOPOSIDE 190 MG: 20 INJECTION, SOLUTION, CONCENTRATE INTRAVENOUS at 12:00

## 2019-07-31 RX ADMIN — DEXAMETHASONE SODIUM PHOSPHATE 12 MG: 10 INJECTION, SOLUTION INTRAMUSCULAR; INTRAVENOUS at 11:01

## 2019-07-31 RX ADMIN — PALONOSETRON HYDROCHLORIDE 0.25 MG: 0.25 INJECTION, SOLUTION INTRAVENOUS at 09:26

## 2019-07-31 NOTE — PROGRESS NOTES
REASON FOR FOLLOW UP: T4N3 Small cell lung cancer    HISTORY OF PRESENT ILLNESS:  Julai Smith III is a 69 y.o. male with the above-mentioned history, who returns the office today in anticipation of his second cycle of chemotherapy with carboplatin -16.  We do plan to add atezolizumab cycle 2.  The patient was evaluated by Dr. Parmar 2 days ago, 7/29/2019 in anticipation of his second cycle of treatment.  At that time, he was noted to be tachycardic with heart rate of 150, therefore chemotherapy was delayed.  He was seen by cardiology with instructions to increase his metoprolol to 25 mg twice daily.  The patient remained asymptomatic of his tachycardia.   Return to the office today, 7/31/2019, the patient reports he is feeling well.  He denies pain.  His shortness of breath remains unchanged.  He has an occasional cough though feels this is improving.  He denies headaches or blurred vision.  He is eager to proceed with treatment today.    Past Medical History:   Diagnosis Date   • Atrial fibrillation with RVR (CMS/HCC)    • Cancer (CMS/HCC) 06/2019    Right lung   • Chronic cough    • COPD (chronic obstructive pulmonary disease) (CMS/HCC)    • Hearing loss    • History of shingles    • Lower back pain    • Mastoiditis    • PAF (paroxysmal atrial fibrillation) (CMS/HCC)    • Pneumonia        Past Surgical History:   Procedure Laterality Date   • BRONCHOSCOPY N/A 6/19/2019    Procedure: BRONCHOSCOPY WITH WASHINGS AND BIOPSY AND ENDOBRONCHIAL ULTRASOUND WITH FNA;  Surgeon: Arslan Marquez MD;  Location: [facility];  Service: Pulmonary   • INNER EAR SURGERY Left    • MASTOIDECTOMY Left 1994       SOCIAL HISTORY:   reports that he has been smoking cigarettes.  He has a 60.00 pack-year smoking history. He has never used smokeless tobacco. He reports that he drinks about 2.4 - 4.2 oz of alcohol per week. He reports that he uses drugs. Drug: Marijuana.    FAMILY HISTORY:  family history includes COPD in  "his father; Heart disease in his brother; No Known Problems in his brother, brother, brother, mother, and sister.    ALLERGIES:  No Known Allergies    MEDICATIONS:  The medication list has been reviewed with the patient by the medical assistant, and the list has been updated in the electronic medical record, which I reviewed.  Medication dosages and frequencies were confirmed to be accurate.    I have reviewed the patient's medical history in detail and updated the computerized patient record.    Review of Systems   Review of Systems   Constitutional: Negative for appetite change, chills, diaphoresis, fatigue, fever and unexpected weight change.   HENT: Negative for hearing loss, sore throat and trouble swallowing.    Respiratory: Positive for shortness of breath (stable.). Negative for cough (improved.), chest tightness and wheezing.    Cardiovascular: Negative for chest pain, palpitations and leg swelling.   Gastrointestinal: Negative for abdominal distention, abdominal pain, constipation, diarrhea, nausea and vomiting.   Genitourinary: Negative for dysuria, frequency, hematuria and urgency.   Musculoskeletal: Negative for joint swelling.        No muscle weakness.   Skin: Negative for rash and wound.   Neurological: Negative for seizures, syncope, speech difficulty, weakness, numbness and headaches.   Hematological: Negative for adenopathy. Does not bruise/bleed easily.   Psychiatric/Behavioral: Negative for behavioral problems, confusion and suicidal ideas.   Review of systems 07/31/2019  unchanged from previous office visit except as updated.       Vitals:    07/31/19 0851   BP: 138/76   Pulse: 82   Resp: 18   Temp: 97.8 °F (36.6 °C)   SpO2: 97%   Weight: 82.2 kg (181 lb 3.2 oz)   Height: 180.3 cm (70.98\")   PainSc: 0-No pain     PHYSICAL EXAM:  GENERAL:  Well-developed, well-nourished in no acute distress.   SKIN:  Warm, dry without rashes, purpura or petechiae.  NECK:  Supple with good range of motion; no  " masses, no JVD.  LYMPHATICS:  No cervical, supraclavicular, axillary adenopathy.  CHEST:  Lungs clear to auscultation. Good airflow.  CARDIAC: Regular rate and rhythm today, no murmurs noted  aBDOMEN:  Soft, nontender with no hepatosplenomegaly or masses.  No sounds present  EXTREMITIES:  No clubbing, cyanosis or edema.  NEUROLOGICAL:  Cranial Nerves II-XII grossly intact.  No focal neurological deficits.  PSYCHIATRIC:  Normal affect and mood.    Physical exam 07/31/2019  unchanged from previous office visit except as updated.       DIAGNOSTIC DATA:  Results from last 7 days   Lab Units 07/31/19  0828 07/29/19  0806   WBC 10*3/mm3 5.09 3.73   NEUTROS ABS 10*3/mm3 2.41 1.22*   HEMOGLOBIN g/dL 14.7 14.3   HEMATOCRIT % 45.4 45.0   PLATELETS 10*3/mm3 239 195     Results from last 7 days   Lab Units 07/31/19  0847 07/29/19  0806   SODIUM mmol/L 138 139   POTASSIUM mmol/L 4.4 4.4   CHLORIDE mmol/L 100 101   CO2 mmol/L 24.4 24.0   BUN mg/dL 16 9   CREATININE mg/dL 1.14 1.14   CALCIUM mg/dL 9.7 9.7   ALBUMIN g/dL 4.20 4.20   BILIRUBIN mg/dL 0.2 0.2   ALK PHOS U/L 92 91   ALT (SGPT) U/L 16 19   AST (SGOT) U/L 17 21   GLUCOSE mg/dL 129* 112         ASSESSMENT:  This is a 69 y.o. male with:  1.   Small cell lung cancer originating in the right hilum:   · He has an extremely large mass there with concern for SVC compression on CT imaging from 6/4/2019.  There is some left hilar adenopathy.  · Some parotid lesions which are concerning but indeterminate, left greater than right.  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  · Staging MRI of the brain 7/10/2019 negative for metastatic disease  · He was discussed at multidisciplinary thoracic conference, with his T4 and 3 disease, concern for involvement of the right supraclavicular region and obvious left hilum, plans to  add atezolizumab with cycle 2, we can consider future radiation to the residual mediastinal mass if necessary.  · Lung lesion too large to radiate in spite of the SVC narrowing.    · On 7/8/2019 we started carboplatin AUC 5 and VP16 100 mg/m2 through peripheral IVs  · Plan outpatient mediport once the large tumor mass has decreased in size.    · Will repeat CT scans following cycle 2.  · atezolizumab will be added with cycle 2.    · Cycle 2 postponed on 7/29/2019 due to heart rate of 154.  · We will proceed with cycle 2 7/31/2019 with atezolizumab added.     2.  Chronic hearing loss: History of mastoiditis.  He is not a candidate for cisplatin because of this.     3.  Atrial fibrillation.  He was initiated on metoprolol 12.5 mg twice daily.  This did delay port placement.  The patient was taking his metoprolol inconsistently.  For cycle 2 was delayed due to heart rate of 150 7/29/2019.  He was seen by cardiology with instructions to increase his metoprolol to 25 mg twice daily.  Heart rate improved today.    4.  Tobacco use.  He did utilize a NicoDerm patch while inpatient, though has continued smoking since discharge.  We discussed smoking cessation.    5.  SVC syndrome.  We will evaluate CT scans following cycle 2 to determine if the patient is a candidate for Mediport placement.  CT scan scheduled for 8/14/2019    6. Tachycardia related to atrial fibrillation as outlined above  · Heart rate on 7/29/19 was 154.  Cycle 2 was delayed.  · Metoprolol increased to 25 mg twice daily with improvement in heart rate today, we will proceed with chemotherapy.    PLAN:   1. Received with cycle 2 carboplatin -16 with the assist addition atezolizumab.  2. Return tomorrow, 8/1/2019 for day 2 -16.  3. Return 8/2/2019 for day 3 -16.  4. Continue metoprolol 25 mg twice daily  5. Follow-up with cardiology as scheduled  6. Return in 1 week for CBC, CMP with nurse review  7. Nurse practitioner follow-up in 2 weeks with CBC, CMP,  toxicity check.   8. CT scans in 2 weeks to evaluate response to 2 cycles of therapy.  9. MD follow-up with Dr. Morel in 3 weeks to review CT scans and cycle 3 carboplatin, -16, atezolizumab   10. Follow up with thoracic surgery, Dr. Gallardo in 2 weeks for possible port placement prior to cycle 3    Danielle Murguia, APRN  07/31/2019

## 2019-08-01 ENCOUNTER — DOCUMENTATION (OUTPATIENT)
Dept: NUTRITION | Facility: HOSPITAL | Age: 69
End: 2019-08-01

## 2019-08-01 ENCOUNTER — INFUSION (OUTPATIENT)
Dept: ONCOLOGY | Facility: HOSPITAL | Age: 69
End: 2019-08-01

## 2019-08-01 VITALS
BODY MASS INDEX: 25.4 KG/M2 | WEIGHT: 182 LBS | RESPIRATION RATE: 16 BRPM | OXYGEN SATURATION: 97 % | HEART RATE: 77 BPM | TEMPERATURE: 98.4 F | SYSTOLIC BLOOD PRESSURE: 150 MMHG | DIASTOLIC BLOOD PRESSURE: 74 MMHG

## 2019-08-01 DIAGNOSIS — Z79.899 HIGH RISK MEDICATION USE: Primary | ICD-10-CM

## 2019-08-01 DIAGNOSIS — C34.90 SMALL CELL LUNG CANCER (HCC): ICD-10-CM

## 2019-08-01 PROCEDURE — 63710000001 PROCHLORPERAZINE MALEATE PER 5 MG: Performed by: NURSE PRACTITIONER

## 2019-08-01 PROCEDURE — 96413 CHEMO IV INFUSION 1 HR: CPT | Performed by: NURSE PRACTITIONER

## 2019-08-01 PROCEDURE — 25010000002 ETOPOSIDE 1 GM/50ML SOLUTION 50 ML VIAL: Performed by: NURSE PRACTITIONER

## 2019-08-01 RX ORDER — PROCHLORPERAZINE MALEATE 5 MG/1
10 TABLET ORAL ONCE
Status: COMPLETED | OUTPATIENT
Start: 2019-08-01 | End: 2019-08-01

## 2019-08-01 RX ORDER — SODIUM CHLORIDE 9 MG/ML
250 INJECTION, SOLUTION INTRAVENOUS ONCE
Status: COMPLETED | OUTPATIENT
Start: 2019-08-01 | End: 2019-08-01

## 2019-08-01 RX ADMIN — SODIUM CHLORIDE 250 ML: 9 INJECTION, SOLUTION INTRAVENOUS at 13:27

## 2019-08-01 RX ADMIN — PROCHLORPERAZINE MALEATE 10 MG: 5 TABLET ORAL at 13:27

## 2019-08-01 RX ADMIN — ETOPOSIDE 190 MG: 20 INJECTION, SOLUTION, CONCENTRATE INTRAVENOUS at 13:30

## 2019-08-01 NOTE — PROGRESS NOTES
"Oncology Nutrition     Patient Name:  Julai Smith III  YOB: 1950  MRN: [id number]  Date:  08/01/19  Physician:  Ghulam    Type of Cancer Treatment:   Chemotherapy:  Carbo/VP16    Patient Active Problem List   Diagnosis   • Encounter for screening for malignant neoplasm of colon   • Small cell lung cancer (CMS/HCC)   • Small cell carcinoma (CMS/HCC)   • Atrial fibrillation with RVR (CMS/HCC)   • Pneumonia   • COPD (chronic obstructive pulmonary disease) (CMS/HCC)   • PAF (paroxysmal atrial fibrillation) (CMS/HCC)   • High risk medication use   • Tobacco abuse   • Mild mitral regurgitation       Current Outpatient Medications   Medication Sig Dispense Refill   • metoprolol tartrate (LOPRESSOR) 25 MG tablet Take 0.5 tablets by mouth Every 12 (Twelve) Hours. 30 tablet 0   • ondansetron (ZOFRAN) 8 MG tablet Take 1 tablet by mouth 3 (Three) Times a Day As Needed for Nausea or Vomiting. 30 tablet 5     Current Facility-Administered Medications   Medication Dose Route Frequency Provider Last Rate Last Dose   • ipratropium-albuterol (DUO-NEB) nebulizer solution 3 mL  3 mL Nebulization 4x Daily - RT Roma Costa APRN   3 mL at 05/13/19 1625       Weight:   Height: 70\"  Weight: 182 lbs.    BMI: 25.4  Weight has been stable    Oral Food Intake:  Regular Diet - No Restrictions  Compared to normal intake, current food intake is more than normal    Hydration Status:   How many 8 ounce glass of water of fluid do you drink per day?  unsure      Nutrition Symptoms:   No Problems with Eating    Activity:   Normal with no limitations     reports that he has been smoking cigarettes.  He has a 60.00 pack-year smoking history. He has never used smokeless tobacco. He reports that he drinks about 2.4 - 4.2 oz of alcohol per week. He reports that he uses drugs. Drug: Marijuana.    Evaluation of Nutritional Risk:   Patient identified to be at nutritional risk and/or for nutritional consultation; will follow patient " through course of treatment.   Patient Education    Saw patient in CBC today. Reports he is eating well, appetite increase which he attributes to his use of marijuana.   He has gained about 9# of the weight he had lost. Encouraged hydration, provided him with nutrition during cancer treatment booklet and my contact info if he needs it. Will follow and be available as needed.     Electronically signed by:  Amairani Fair RD, LD  2:53 PM

## 2019-08-01 NOTE — PROGRESS NOTES
PT'S VEIN ABOVE IV SITE FEELING HARD TO TOUCH. WENT AHEAD AND DC'D LINE. SLIGHT REDNESS NOTED AT SITE. PT DOES HAVE HARD VEINS ON BOTH ARMS. ABLE TO FIND A SOFT ONE TO STICK ON L ARM FOR TREATMENT TODAY. ADVISED PT TO APPLY WARM COMPRESSES AT HOME.

## 2019-08-02 ENCOUNTER — INFUSION (OUTPATIENT)
Dept: ONCOLOGY | Facility: HOSPITAL | Age: 69
End: 2019-08-02

## 2019-08-02 VITALS
BODY MASS INDEX: 25.73 KG/M2 | WEIGHT: 184.4 LBS | OXYGEN SATURATION: 97 % | DIASTOLIC BLOOD PRESSURE: 67 MMHG | HEART RATE: 127 BPM | SYSTOLIC BLOOD PRESSURE: 118 MMHG | TEMPERATURE: 98.1 F

## 2019-08-02 DIAGNOSIS — C34.90 SMALL CELL LUNG CANCER (HCC): ICD-10-CM

## 2019-08-02 DIAGNOSIS — Z79.899 HIGH RISK MEDICATION USE: Primary | ICD-10-CM

## 2019-08-02 PROCEDURE — 96413 CHEMO IV INFUSION 1 HR: CPT | Performed by: NURSE PRACTITIONER

## 2019-08-02 PROCEDURE — 25010000002 ETOPOSIDE 1 GM/50ML SOLUTION 50 ML VIAL: Performed by: NURSE PRACTITIONER

## 2019-08-02 PROCEDURE — 63710000001 PROCHLORPERAZINE MALEATE PER 5 MG: Performed by: NURSE PRACTITIONER

## 2019-08-02 RX ORDER — SODIUM CHLORIDE 9 MG/ML
250 INJECTION, SOLUTION INTRAVENOUS ONCE
Status: COMPLETED | OUTPATIENT
Start: 2019-08-02 | End: 2019-08-02

## 2019-08-02 RX ORDER — PROCHLORPERAZINE MALEATE 5 MG/1
10 TABLET ORAL ONCE
Status: COMPLETED | OUTPATIENT
Start: 2019-08-02 | End: 2019-08-02

## 2019-08-02 RX ADMIN — ETOPOSIDE 190 MG: 20 INJECTION, SOLUTION, CONCENTRATE INTRAVENOUS at 14:43

## 2019-08-02 RX ADMIN — SODIUM CHLORIDE 250 ML: 9 INJECTION, SOLUTION INTRAVENOUS at 14:31

## 2019-08-02 RX ADMIN — PROCHLORPERAZINE MALEATE 10 MG: 5 TABLET ORAL at 14:31

## 2019-08-05 ENCOUNTER — OFFICE VISIT (OUTPATIENT)
Dept: CARDIOLOGY | Facility: CLINIC | Age: 69
End: 2019-08-05

## 2019-08-05 VITALS
WEIGHT: 180.8 LBS | HEIGHT: 71 IN | SYSTOLIC BLOOD PRESSURE: 158 MMHG | BODY MASS INDEX: 25.31 KG/M2 | DIASTOLIC BLOOD PRESSURE: 96 MMHG | HEART RATE: 76 BPM

## 2019-08-05 DIAGNOSIS — I48.91 ATRIAL FIBRILLATION WITH RVR (HCC): ICD-10-CM

## 2019-08-05 DIAGNOSIS — I48.0 PAF (PAROXYSMAL ATRIAL FIBRILLATION) (HCC): Primary | ICD-10-CM

## 2019-08-05 DIAGNOSIS — Z78.9 DAILY CONSUMPTION OF ALCOHOL: ICD-10-CM

## 2019-08-05 DIAGNOSIS — Z72.0 TOBACCO ABUSE: ICD-10-CM

## 2019-08-05 DIAGNOSIS — I34.0 MILD MITRAL REGURGITATION: ICD-10-CM

## 2019-08-05 DIAGNOSIS — F17.210 CIGARETTE NICOTINE DEPENDENCE WITHOUT COMPLICATION: ICD-10-CM

## 2019-08-05 PROCEDURE — 99406 BEHAV CHNG SMOKING 3-10 MIN: CPT | Performed by: NURSE PRACTITIONER

## 2019-08-05 PROCEDURE — 93000 ELECTROCARDIOGRAM COMPLETE: CPT | Performed by: NURSE PRACTITIONER

## 2019-08-05 PROCEDURE — 99214 OFFICE O/P EST MOD 30 MIN: CPT | Performed by: NURSE PRACTITIONER

## 2019-08-05 NOTE — PROGRESS NOTES
Date of Office Visit: 2019  Encounter Provider: BERTO Tidwell  Place of Service: [facility] CARDIOLOGY  Patient Name: Julia Smith III  :1950      Subjective:     Chief Complaint:  Hospital follow-up visit for rapid atrial fibrillation/flutter    History of Present Illness:  Julia Smith III is a pleasant 69 y.o. male who I have seen once before.   Outside records have been obtained and reviewed by me.     This is a patient with history of small cell lung cancer, tobacco abuse, rapid atrial fibrillation/flutter.    On 2019 he was in preadmission testing preparing for Mediport placement the following week when he was noted to be in rapid atrial fibrillation/flutter with heart rate in the 170s.  He was transferred to the emergency department.  Troponin was less than 0.01, proBNP 545.  He was treated with IV and oral metoprolol.  He converted to sinus rhythm.  Repeat troponin was still less than 0.01.  Cardiology was consulted.  He was completely asymptomatic with event.  His [id number] was just 1.  His echocardiogram showed EF of 52%, normal LV diastolic function, normal LV cavity size wall thickness, mild MAC, mild mitral regurgitation, mild calcification of aortic valve, hypokinesis of basal anteroseptal and basal inferoseptal walls.  It was felt he could possibly have underlying coronary disease which should just be treated medically.   Dr. Aiken did not want to anticoagulate him further than aspirin.    At a 2019 office visit with heme/onc he was tachycardic and reported he had stopped his metoprolol tartrate on 2019 due to reported fatigue and lethargy.  Because his heart rate was fast he was advised to go home and take the metoprolol.    I saw him for the first time on 2019 for hospital follow-up.  He was then sinus rhythm with bifascicular block which was unchanged from previous ECG.  He had not had any new problems since hospitalization.   He stated he stopped metoprolol 7/19/2019 because it was making him feel bad with nausea.  He had not had any vomiting, abdominal pain or change in bowel habits.  He was also recently started on IV chemotherapy in the last couple weeks so he was unsure if the metoprolol was actually causing his symptoms.  He restarted his metoprolol the night prior to his appointment with me and took his a.m. dose about an hour prior to his appointment.  He was in sinus rhythm for me.  He admitted to drinking 2-4 beers daily, 2 to 3 cups of coffee in the morning and was smoking daily.  He did cut back his cigarette intake to a little bit less than 1 pack/day.  He had been asymptomatic with atrial fibrillation/atrial flutter episodes.  He reported chronic shortness of breath and fatigue but no chest pain. His blood pressure was high in the office for me that day but it was noted to be low at hematology office today before.  He was not monitoring his blood pressure at home which I advised him to do so.  I asked that he monitor his blood pressure and heart rate for the next week and come in for a blood pressure check for home monitor verification.  I was likely going to anticipate increasing his metoprolol tartrate from 12.5 mg twice daily to 25 mg twice daily. I advised him to cut back on EtOH, caffeine intake and to discontinue cigarettes.    7/29/2019 Dr. Galvez called because the patient's heart rate was in the 150s and they were unable to do a second cycle of chemotherapy.  Apparently he was asymptomatic but document problem requested to be seen today.  Dr. Aiken advised that he can come to the [facility].  By the time he arrived in the [facility] was in sinus rhythm and sent home.    He is presenting back for follow-up after going to the [facility].  Looks like his heart rate was in the 120s on 8/2/2019.      Past Medical History:   Diagnosis Date   • Atrial fibrillation with RVR (CMS/HCC)    • Cancer (CMS/HCC) 06/2019    Right lung   • Chronic  cough    • COPD (chronic obstructive pulmonary disease) (CMS/Formerly [facility])    • Hearing loss    • History of shingles    • Lower back pain    • Mastoiditis    • Mild mitral regurgitation    • Other fatigue    • PAF (paroxysmal atrial fibrillation) (CMS/Formerly [facility])    • Pneumonia      Past Surgical History:   Procedure Laterality Date   • BRONCHOSCOPY N/A 6/19/2019    Procedure: BRONCHOSCOPY WITH WASHINGS AND BIOPSY AND ENDOBRONCHIAL ULTRASOUND WITH FNA;  Surgeon: Arslan Marquez MD;  Location: [facility] ENDOSCOPY;  Service: Pulmonary   • INNER EAR SURGERY Left    • MASTOIDECTOMY Left 1994     Outpatient Medications Prior to Visit   Medication Sig Dispense Refill   • ondansetron (ZOFRAN) 8 MG tablet Take 1 tablet by mouth 3 (Three) Times a Day As Needed for Nausea or Vomiting. 30 tablet 5   • metoprolol tartrate (LOPRESSOR) 25 MG tablet Take 0.5 tablets by mouth Every 12 (Twelve) Hours. (Patient taking differently: Take 25 mg by mouth 2 (Two) Times a Day.) 30 tablet 0     Facility-Administered Medications Prior to Visit   Medication Dose Route Frequency Provider Last Rate Last Dose   • ipratropium-albuterol (DUO-NEB) nebulizer solution 3 mL  3 mL Nebulization 4x Daily - RT Roma Costa, BERTO   3 mL at 05/13/19 1625       Allergies as of 08/05/2019   • (No Known Allergies)     Social History     Socioeconomic History   • Marital status:      Spouse name: Krystle   • Number of children: 2   • Years of education: Not on file   • Highest education level: Not on file   Occupational History   • Occupation: [employer]     Employer: RETIRED   Tobacco Use   • Smoking status: Current Every Day Smoker     Packs/day: 1.50     Years: 40.00     Pack years: 60.00     Types: Cigarettes   • Smokeless tobacco: Never Used   • Tobacco comment: Since age 32   Substance and Sexual Activity   • Alcohol use: Yes     Alcohol/week: 2.4 - 4.2 oz     Types: 4 - 7 Cans of beer per week     Comment: daily/caffeine use   • Drug use: Yes     Types:  "Marijuana   • Sexual activity: Not Currently     Partners: Female     Family History   Problem Relation Age of Onset   • No Known Problems Mother    • COPD Father            • No Known Problems Sister    • Heart disease Brother    • No Known Problems Brother    • No Known Problems Brother    • No Known Problems Brother      Review of Systems   Constitution: Positive for malaise/fatigue and weight gain (4 lbs). Negative for chills, fever and weight loss (30 lbs).   HENT: Negative for ear pain, hearing loss, nosebleeds and sore throat.    Eyes: Negative for double vision, pain, vision loss in left eye and vision loss in right eye.   Cardiovascular: Positive for dyspnea on exertion (chronic unchanged). Negative for chest pain, claudication, irregular heartbeat, leg swelling, near-syncope, orthopnea, palpitations, paroxysmal nocturnal dyspnea and syncope.        Pain in legs with walking   Respiratory: Positive for shortness of breath. Negative for cough, snoring and wheezing.    Endocrine: Negative for cold intolerance and heat intolerance.   Hematologic/Lymphatic: Negative for bleeding problem.   Skin: Negative for color change, itching, rash and unusual hair distribution.   Musculoskeletal: Negative for joint pain and joint swelling.   Gastrointestinal: Negative for abdominal pain, diarrhea, hematochezia, melena, nausea and vomiting.   Genitourinary: Positive for decreased libido. Negative for frequency, hematuria, hesitancy and incomplete emptying.   Neurological: Positive for excessive daytime sleepiness and numbness. Negative for dizziness, headaches, light-headedness, loss of balance, paresthesias and seizures.   Psychiatric/Behavioral: Positive for depression (mild- no SI/HI).          Objective:     Vitals:    19 1341   BP: 158/96   BP Location: Right arm   Patient Position: Sitting   Pulse: 76   Weight: 82 kg (180 lb 12.8 oz)   Height: 180.3 cm (71\")     Body mass index is 25.22 " kg/m².    PHYSICAL EXAM:  Physical Exam   Constitutional: He is oriented to person, place, and time. He appears well-developed and well-nourished. No distress.   HENT:   Head: Normocephalic and atraumatic.   Eyes: Conjunctivae and EOM are normal. Pupils are equal, round, and reactive to light.   Neck: No JVD present. Carotid bruit is not present.   Cardiovascular: Normal rate, regular rhythm, normal heart sounds and intact distal pulses.   No murmur heard.  Pulses:       Radial pulses are 2+ on the right side, and 2+ on the left side.        Dorsalis pedis pulses are 2+ on the right side, and 2+ on the left side.        Posterior tibial pulses are 2+ on the right side, and 2+ on the left side.   Varicosities of lower extremity veins, no lower extremity edema   Pulmonary/Chest: Effort normal. No accessory muscle usage. No tachypnea. No respiratory distress. He has no decreased breath sounds. He has no wheezes. He has no rhonchi. He has no rales. He exhibits no tenderness.   Abdominal: Soft. Bowel sounds are normal. He exhibits no distension. There is no tenderness. There is no rebound and no guarding.   Musculoskeletal: Normal range of motion. He exhibits no edema.   Neurological: He is alert and oriented to person, place, and time.   Skin: Skin is warm, dry and intact. He is not diaphoretic. No erythema.   Psychiatric: His speech is normal and behavior is normal. Judgment and thought content normal. Cognition and memory are normal.   Flat affect   Nursing note and vitals reviewed.        ECG 12 Lead  Date/Time: 8/5/2019 1:42 PM  Performed by: Yana Lira APRN  Authorized by: Yana Lira APRN   Comparison: compared with previous ECG from 7/23/2019  Similar to previous ECG  Rhythm: sinus rhythm  Rate: normal  BPM: 76  Conduction: right bundle branch block  QRS axis: left    Clinical impression: abnormal EKG  Comments: Indication: Hx of atrial fibrillation/flutter            Assessment:       Diagnosis Plan    1. PAF (paroxysmal atrial fibrillation) (CMS/HCC)     2. Atrial fibrillation with RVR (CMS/HCC)     3. Tobacco abuse     4. Mild mitral regurgitation     5. Daily consumption of alcohol     6. Cigarette nicotine dependence without complication         Plan:     1.  Paroxysmal atrial fibrillation:  Presumed new onset 7/2019.  Found to be in rapid A. fib/flutter 7/5/2019 during preadmission testing.  Transferred to ER.  Given IV and oral metoprolol and converted back in sinus rhythm.  His echo was uremarkable. Dr. Aiken did not want  anticoagulate him further than aspirin. Normal left and right atrial size/volume noted.  Patient self discontinued metoprolol tartrate due to reported nausea and was found to have elevated heart rate 130s at 7/22/2019 office visit with hematology/oncology.  Now back on metoprolol tartrate 25 mg twice daily without any side effects.  Will continue on current dose for now.    2.  Mitral regurgitation: Mild with mild MAC 7/6/19 echo    3.  Regional wall motion abnormality noted on echocardiogram just mild: No cardiac symptoms however.  Felt he could have underlying coronary disease but still would treat medically at this time.  He denies any chest pain or worsening shortness of breath with exertion.  4. Aortic valve calcification: Mild 7/5/19 echo  5. Cancer lung cancer metastatic: Not yet received port placement but states he has started IV chemotherapy 2 rounds.  6.  Bifascicular block/Right BBB: Unchanged today compared to previous EKG.  7.  Tobacco abuse: Patient has cut back but is still smoking at least a pack per day.  Patient was advised that he needs to discontinue altogether.  We discussed his risk factors for cardiac and vascular disease and the fact that he has underlying lung disease and lung cancer.   8.  EtOH use: Drinks 2-4 beers daily.  I again recommended that he not drink more than 1 alcoholic beverage per day as this increases risk the atrial fibrillation.  9. Lipid  panel under good control May 2019.  10.  Hypertension: Asymptomatic with elevated blood pressure today 158/98.  He has been monitoring his blood pressure at home via wrist cuff but forgot his list at home.  He states he has not been having high readings at home like b/p is today in office. He was advised to come back in for a blood pressure check this week to verify his cuff for accuracy and bring his log his home blood pressure readings that he forgot to bring in so I can make further recommendations.  11.  Suspected sleep apnea: He has daytime fatigue and questionable snoring.  Referred him to sleep medicine and he had an appointment on 8/22/2019 but canceled this.  I recommend that he reschedule his appointment.  12. Depression: Recommended routine/structured exercise.  Defer further to PCP.  Denies suicidal or homicidal ideations.      Atrial Fibrillation and Atrial Flutter  Assessment  • The patient has paroxysmal atrial fibrillation and paroxysmal atrial flutter  • The patient's CHADS2-VASc score is 1  • A [id number] score of 1 is considered an intermediate risk for a thromboembolic event    Plan  • Attempt to maintain sinus rhythm  • Continue beta blocker for rhythm control    I again strongly recommended that he minimize his daily alcohol use, decrease his caffeine intake and discontinuing smoking.  He is having some depression issues with his new medical issues/cancer requiring treatment.  We have set a goal for his next appointment with Dr. Aiken to have decreased by 4 cigarettes/day and decrease by 1 beer per day.  I have also recommended that he increase physical activity and try to do some light walking per day to see if this increases his mood.      Follow up with Dr. Aiken on 9/11/19, unless otherwise needed sooner.  I advised the patient to contact our office with any questions or concerns.         It has been a pleasure to participate in this patient's care. Please feel free to contact me  with any questions or concerns.     Yana Lira, APRN  08/05/2019           Your medication list           Accurate as of 8/5/19  5:01 PM. If you have any questions, ask your nurse or doctor.               CONTINUE taking these medications      Instructions Last Dose Given Next Dose Due   metoprolol tartrate 25 MG tablet  Commonly known as:  LOPRESSOR      Take 1 tablet by mouth 2 (Two) Times a Day.       ondansetron 8 MG tablet  Commonly known as:  ZOFRAN      Take 1 tablet by mouth 3 (Three) Times a Day As Needed for Nausea or Vomiting.             Where to Get Your Medications      Information about where to get these medications is not yet available    Ask your nurse or doctor about these medications  · metoprolol tartrate 25 MG tablet         The above medication changes may not have been made by this provider.  Medication list was updated to reflect medications patient is currently taking including medication changes and discontinuations made by other healthcare providers.     Dictated utilizing Dragon Dictation System.

## 2019-08-07 ENCOUNTER — TELEPHONE (OUTPATIENT)
Dept: SURGERY | Facility: CLINIC | Age: 69
End: 2019-08-07

## 2019-08-07 ENCOUNTER — LAB (OUTPATIENT)
Dept: LAB | Facility: HOSPITAL | Age: 69
End: 2019-08-07

## 2019-08-07 ENCOUNTER — CLINICAL SUPPORT (OUTPATIENT)
Dept: ONCOLOGY | Facility: HOSPITAL | Age: 69
End: 2019-08-07

## 2019-08-07 ENCOUNTER — APPOINTMENT (OUTPATIENT)
Dept: ONCOLOGY | Facility: HOSPITAL | Age: 69
End: 2019-08-07

## 2019-08-07 VITALS — HEART RATE: 73 BPM | OXYGEN SATURATION: 99 %

## 2019-08-07 DIAGNOSIS — C34.90 SMALL CELL LUNG CANCER (HCC): ICD-10-CM

## 2019-08-07 LAB
ALBUMIN SERPL-MCNC: 4.1 G/DL (ref 3.5–5.2)
ALBUMIN/GLOB SERPL: 1.3 G/DL (ref 1.1–2.4)
ALP SERPL-CCNC: 89 U/L (ref 38–116)
ALT SERPL W P-5'-P-CCNC: 31 U/L (ref 0–41)
ANION GAP SERPL CALCULATED.3IONS-SCNC: 12.9 MMOL/L (ref 5–15)
AST SERPL-CCNC: 30 U/L (ref 0–40)
BASOPHILS # BLD AUTO: 0.08 10*3/MM3 (ref 0–0.2)
BASOPHILS NFR BLD AUTO: 2 % (ref 0–1.5)
BILIRUB SERPL-MCNC: 0.3 MG/DL (ref 0.2–1.2)
BUN BLD-MCNC: 18 MG/DL (ref 6–20)
BUN/CREAT SERPL: 20.5 (ref 7.3–30)
CALCIUM SPEC-SCNC: 9.8 MG/DL (ref 8.5–10.2)
CHLORIDE SERPL-SCNC: 99 MMOL/L (ref 98–107)
CO2 SERPL-SCNC: 25.1 MMOL/L (ref 22–29)
CREAT BLD-MCNC: 0.88 MG/DL (ref 0.7–1.3)
DEPRECATED RDW RBC AUTO: 59.9 FL (ref 37–54)
EOSINOPHIL # BLD AUTO: 0.02 10*3/MM3 (ref 0–0.4)
EOSINOPHIL NFR BLD AUTO: 0.5 % (ref 0.3–6.2)
ERYTHROCYTE [DISTWIDTH] IN BLOOD BY AUTOMATED COUNT: 17.8 % (ref 12.3–15.4)
GFR SERPL CREATININE-BSD FRML MDRD: 86 ML/MIN/1.73
GLOBULIN UR ELPH-MCNC: 3.2 GM/DL (ref 1.8–3.5)
GLUCOSE BLD-MCNC: 128 MG/DL (ref 74–124)
HCT VFR BLD AUTO: 42 % (ref 37.5–51)
HGB BLD-MCNC: 13.4 G/DL (ref 13–17.7)
IMM GRANULOCYTES # BLD AUTO: 0.05 10*3/MM3 (ref 0–0.05)
IMM GRANULOCYTES NFR BLD AUTO: 1.3 % (ref 0–0.5)
LYMPHOCYTES # BLD AUTO: 0.76 10*3/MM3 (ref 0.7–3.1)
LYMPHOCYTES NFR BLD AUTO: 19.1 % (ref 19.6–45.3)
MCH RBC QN AUTO: 29.5 PG (ref 26.6–33)
MCHC RBC AUTO-ENTMCNC: 31.9 G/DL (ref 31.5–35.7)
MCV RBC AUTO: 92.5 FL (ref 79–97)
MONOCYTES # BLD AUTO: 0.13 10*3/MM3 (ref 0.1–0.9)
MONOCYTES NFR BLD AUTO: 3.3 % (ref 5–12)
NEUTROPHILS # BLD AUTO: 2.94 10*3/MM3 (ref 1.7–7)
NEUTROPHILS NFR BLD AUTO: 73.8 % (ref 42.7–76)
NRBC BLD AUTO-RTO: 0 /100 WBC (ref 0–0.2)
PLATELET # BLD AUTO: 166 10*3/MM3 (ref 140–450)
PMV BLD AUTO: 9.4 FL (ref 6–12)
POTASSIUM BLD-SCNC: 5.3 MMOL/L (ref 3.5–4.7)
PROT SERPL-MCNC: 7.3 G/DL (ref 6.3–8)
RBC # BLD AUTO: 4.54 10*6/MM3 (ref 4.14–5.8)
SODIUM BLD-SCNC: 137 MMOL/L (ref 134–145)
WBC NRBC COR # BLD: 3.98 10*3/MM3 (ref 3.4–10.8)

## 2019-08-07 PROCEDURE — 36415 COLL VENOUS BLD VENIPUNCTURE: CPT

## 2019-08-07 PROCEDURE — 85025 COMPLETE CBC W/AUTO DIFF WBC: CPT

## 2019-08-07 PROCEDURE — 80053 COMPREHEN METABOLIC PANEL: CPT

## 2019-08-07 NOTE — PROGRESS NOTES
Lab Results   Component Value Date    WBC 3.98 08/07/2019    HGB 13.4 08/07/2019    HCT 42.0 08/07/2019    MCV 92.5 08/07/2019     08/07/2019   Pt is here for lab with RN review.  CBC reviewed with pt, counts are stable for this pt at this time. Pt has no complaints.  Copy of labs given to pt and f/u appt reviewed. Pt is instructed to call the office with any concerns or new symptoms prior to next visit. Pt vu    Pt is scheduled for port placement with Paulina on Friday 8/9/19.  His CT is 8/14/19.  He thought he would have the port placed after CT just to make sure tumor is smaller before placing port. I s/w Dr. Morel. He is sending message to Paulina to make sure this is what she wants and that pt wasn't scheduled by mistake for port before scan.  I told pt to leave all appts as is, unless he heard back from us that things were changing.  Dr. Morel will let me know if he hears back from Paulina.    K 5.3 today.  Per med list, pt not on oral potassium. Informed Dr. Morel of this level as well and no new orders given at this time.  Should not effect possible port placement Friday per Dr. Morel

## 2019-08-07 NOTE — TELEPHONE ENCOUNTER
8/7/19  I called Mr. Ochoa today and canceled with him his port placement for Friday, 8-9.  I explained to him that Dr. Gallardo needs for him to get his CT scan on 8/14/19 that the CBC group ordered and then see her on the same day to discuss the port.  He is in agreement.  I asked him to call our office if he has any questions regarding the appointment for 8/14/19.  gb 8/7/19   Critical Care Progress Note      08/26/2017    Name: Niko Mireles MRN#: [id number]   Age: 55 year old YOB: 1962     Hsptl Day# 5  ICU DAY #3    MV DAY #3             Problem List:   Active Problems:    Community acquired bacterial pneumonia    ARDS (adult respiratory distress syndrome) (H)    Parapneumonic effusion    Encephalopathy    Non morbid obesity due to excess calories           Summary/Hospital Course:     Niko Mireles is a 55 year old male with PMHx of HTN, obesity who presents on 8/22 with shortness or breath, concern for PE vs CAP. Per chart review, the patient has had a gradual increase in SOB, pleuritic chest pain and left lower quadrant abdominal pain which he initially attributed to his diverticulitis however this moved more to his chest which prompted him to seek evaluation. On admission, CXR showed diffuse infiltrates L >R. CT abdomen was unremarkable. Labs remarkable for DINORA and leukocytosis. CT scan was not ordered given the patients renal impairment. V/Q flores was indeterminate. Patient treated with abx and heparin gtt empirically.        His respiratory status continued to decline on the floor, ABG with significant severe P/F ratio prompting initiation of BiPAP. Pulmonary was consulted on the floor, however CT chest with evidence of large L sided complex pleural effusion, bilateral infiltrates  prompting admission to the ICU.       On arrival to ICU patient in mild distress but speaking in moderate to full sentences, browsing his phone. Bedside eval with US demonstrated limited windows of fluid on the left but pocket available for tap in posterior field.      8/24  - worsening resp status thus intubated , bronch, lined PES placed and titrated PEEP adjusted  Still desat thus paralyzed and proned     8/25 - stable resp status, FiO2 40% while proned, mild elevation in CK and TG        Assessment and plan :     Niko Mireles is a 55 year old male with PMHx of HTN who  presents on 8/22 with shortness or breath, concern for PE vs CAP. Respiratory status continued to decline on the floor, CT chest with evidence of large L sided complex pleural effusion and bilateral infiltrates - combination pneumonia and effusion prompting admission to the ICU.  I have personally reviewed the daily labs, imaging studies, cultures and discussed the case with referring physician and consulting physicians.     My assessment and plan by system for this patient is as follows:    Neurology/Psychiatry:   1. Encephalopathy - 2/2 acute illness  2. Analgesia/Anxiety  Plan  - propofol gtt fentanyl gtt for sedation analgesia  - goal RASS -4 to 5 given need for paralysis  - paralysis with vec to allow for synchrony and proning   - Continue propofol for now, monitor CK and TG, if continuing to elevate may need to change sedation    Cardiovascular:   1.Tachyarrthymia - noted LBBB Morphology - ECHO with no RWMA and preserved EF Grad 1 DD   2. Currently hemodynamically appropriate    Plan  - follow hemodynamics, goal MAP >65, follow UOP LA     Pulmonary/Ventilator Management:   1. Acute hypoxemic Respiratory failure with ARDS  2/2 pneumonia requiring paralysis and proning   2. Complicated para pneumonic effusion - s/p thoracentesis   Plan  - LTV ventilatory strategy - 6ml /kg ~ 480 goal  , goal PaO2 >65, sats 88%, accept mild hypercarbia ph ~>7.2  - Wean PEEP today, consider supine later today  - serial ABG and CXR as indicated  - FUP thoracentesis results and bronch  - broad spectrum abx   - full strength flolan      GI and Nutrition :   1. Per echo report - questionable flow around liver - unclear , no obvious correlate on recent CT scan  2. Abd Ultrasound showed no focal lesion, GB sludge, R kidney cyst  Plan  - continue NPO while prone until NJ tube can be placed, hopefully tomorrow    Renal/Fluids/Electrolytes:   1. DINORA, mild cr 1.4-1.5, good urine output overnight   2. Electrolyte abnormality  -2/2 critical  illness  3. Acid/base status - metabolic acidosis improved, permissive hypercapnia  4. Volume status - difficult to assess   Plan  - maintain hemodynamics   - monitor function and electrolytes as needed with replacement per ICU protocols  - generally avoid nephrotoxic agents such as NSAID, IV contrast unless specifically required  - adjust medications as needed for renal clearance  - follow I/O's as appropriate.    Infectious Disease:   1. Bilateral pneumonia with parapneumonic effusion   Plan  - fup bronch and pleural cultures, no + cs as of AM 8/25  - broad spectrum abx with vanz/zosyn and azithro for atypicals  - repeat US of chest -re eval pocket for effusion and tap     Endocrine:   1. Stress hyperglycemia   Plan  - ICU insulin protocol, goal sugar <180    Hematology/Oncology:   1. WBC normal  2. Anemia, no signs, symptoms of active blood loss  Plan  - serial CBC, coags     IV/Access:   1. Venous access - right IJ  2. Arterial access -  Radial   Plan  - central access required and necessary      ICU Prophylaxis:   1. DVT: Hep Subq/mechanical  2. VAP: HOB 30 degrees, chlorhexidine rinse  3. Stress Ulcer: PPI  4. Restraints: Nonviolent soft two point restraints required and necessary for patient safety and continued cares and good effect as patient continues to pull at necessary lines, tubes despite education and distraction. Will readdress daily.   5. IV Access - central access required and necessary for continued patient cares  6. Feeding - NPO -current OG leaking - will need to replace    7. Family Update: at bedside   8. Disposition - ICU        Key goals for next 24 hours:   1. Keep prone for now, wean PEEP  2. Follow pO2  3. Consider supine later today  4. Follow CK and TG             Interim History:   - intubated - bronched  - proned in afternoon, some acidosis improved with vent adjustment   - TLC and art line placed  - ECHO obtained - images demonstrated abnormal blood flow around liver             Key  Medications:       azithromycin  250 mg Intravenous Q24H     vancomycin (VANCOCIN) IV  1,500 mg Intravenous Q12H     piperacillin-tazobactam  3.375 g Intravenous Q6H     epoprostenol (FLOLAN) syringe change   Inhalation Q8H     heparin  5,000 Units Subcutaneous Q8H     pantoprazole  40 mg Intravenous QAM AC     chlorhexidine  15 mL Mouth/Throat Q12H     ipratropium - albuterol 0.5 mg/2.5 mg/3 mL  3 mL Nebulization Q4H While awake     sodium chloride (PF)  3 mL Intracatheter Q8H     lidocaine  10 mL Subcutaneous Once     fluticasone  2 spray Both Nostrils Daily     mirtazapine (REMERON) tablet TABS 15 mg  15 mg Oral At Bedtime       epoprostenol (FLOLAN) 20 mcg/mL in glycine diluent inhalation solution 20 ng/kg/min (08/26/17 0719)     propofol (DIPRIVAN) infusion 60 mcg/kg/min (08/26/17 0938)     fentaNYL 75 mcg/hr (08/26/17 0936)     vecuronium (NORCURON) infusion ADULT 0.4 mcg/kg/min (08/26/17 0800)     - MEDICATION INSTRUCTIONS -       NaCl 10 mL/hr at 08/25/17 2340     - MEDICATION INSTRUCTIONS -                 Physical Examination:   Temp:  [98.6  F (37  C)-100.4  F (38  C)] 100.4  F (38  C)  Heart Rate:  [85-96] 88  Resp:  [13-26] 25  MAP:  [64 mmHg-268 mmHg] 81 mmHg  Arterial Line BP: ()/() 131/60  FiO2 (%):  [40 %-60 %] 40 %  SpO2:  [90 %-98 %] 96 %      Intake/Output Summary (Last 24 hours) at 08/26/17 0951  Last data filed at 08/26/17 0800   Gross per 24 hour   Intake          2798.11 ml   Output             1660 ml   Net          1138.11 ml           Wt Readings from Last 4 Encounters:   08/25/17 114.2 kg (251 lb 12.3 oz)     Arterial Line BP: ()/() 131/60  MAP:  [64 mmHg-268 mmHg] 81 mmHg  CVP:  [13 mmHg-22 mmHg] 15 mmHg  Ventilation Mode: CMV/AC  FiO2 (%): 40 %  Rate Set (breaths/minute): 26 breaths/min  Tidal Volume Set (mL): 480 mL  PEEP (cm H2O): 15 cmH2O  Oxygen Concentration (%): 40 %  Resp: 25    Recent Labs  Lab 08/25/17 2010 08/25/17  1436 08/25/17  0823 08/25/17  0540   08/24/17  0752  08/23/17  1715 08/23/17  1244   PH 7.22* 7.22* 7.22* 7.21*  < > 7.24*  < > 7.30* 7.28*   PCO2 50* 49* 50* 53*  < > 50*  < > 40 43   PO2 111* 113* 159* 84  < > 61*  < > 59* 88   HCO3 20* 20* 21 21  < > 21  < > 20* 20*   O2PER Ventilator  --   --   --   --  100%  --  BIPAP 95   < > = values in this interval not displayed.    GEN:  intubated sedate  HEENT: head ncat, sclera anicteric, OP patent, trachea midline ett in place   PULM: synchronous with vent  clear anteriorly  diminished at bases L>R   CV/COR:  regular S1S2 no gallop,  No rub, no murmur  ABD: pt proned  EXT:  trace edema, warm  NEURO: limited by sedation paralysis GCS 3 RASS -5    SKIN: no obvious rash  LINES: clean, dry intact         Data:   All data and imaging reviewed     ROUTINE ICU LABS (Last four results)  CMP    Recent Labs  Lab 08/26/17  0400 08/25/17  0540 08/24/17  1605 08/24/17  0520 08/23/17  1609    139 141 140 138   POTASSIUM 3.7 4.6 5.0 4.7 4.7   CHLORIDE 115* 111* 112* 110* 107   CO2 21 22 23 20 22   ANIONGAP 7 6 6 10 9   * 140* 127* 122* 108*   BUN 30 29 28 25 23   CR 1.44* 1.50* 1.54* 1.32* 1.42*   GFRESTIMATED 51* 49* 47* 57* 52*   GFRESTBLACK 62 59* 57* 69 63   BENOIT 8.6 8.2* 8.3* 8.2* 8.5   PROTTOTAL  --  6.1* 6.4*  --  6.8   ALBUMIN  --  1.7* 1.9*  --  2.3*   BILITOTAL  --  1.0 1.0  --  1.5*   ALKPHOS  --  83 87  --  104   AST  --  33 34  --  56*   ALT  --  34 39  --  54     CBC    Recent Labs  Lab 08/26/17  0400 08/25/17  0540 08/25/17  0430 08/24/17  1605   WBC 7.8 9.4 10.7 11.8*   RBC 3.10* 3.27* 3.35* 3.53*   HGB 9.3* 9.8* 10.2* 10.7*   HCT 28.7* 30.8* 31.6* 33.0*   MCV 93 94 94 94   MCH 30.0 30.0 30.4 30.3   MCHC 32.4 31.8 32.3 32.4   RDW 15.0 14.8 14.8 14.6    274 299 283     INR    Recent Labs  Lab 08/26/17  0400 08/25/17  0540 08/24/17  1605 08/24/17  0520   INR 1.24* 1.12 1.32* 1.23*     Arterial Blood Gas  Recent Labs  Lab 08/25/17 2010 08/25/17  1436 08/25/17  0823 08/25/17  0540   08/24/17  0752  08/23/17  1715 08/23/17  1244   PH 7.22* 7.22* 7.22* 7.21*  < > 7.24*  < > 7.30* 7.28*   PCO2 50* 49* 50* 53*  < > 50*  < > 40 43   PO2 111* 113* 159* 84  < > 61*  < > 59* 88   HCO3 20* 20* 21 21  < > 21  < > 20* 20*   O2PER Ventilator  --   --   --   --  100%  --  BIPAP 95   < > = values in this interval not displayed.    All cultures:    Recent Labs  Lab 08/24/17  0930 08/23/17  1542 08/23/17  0730 08/22/17  0308 08/22/17  0305   CULT Culture negative monitoring continues  Culture negative monitoring continues  Culture negative monitoring continues  Culture negative monitoring continues  No growth after 18 hours  No growth after 18 hours  Culture negative after 18 hours  Culture negative after 18 hours  Culture negative monitoring continues  Canceled, Test creditedInappropriate specimen typeRESPIRATORY SOURCES ARE NOT APPROPRIATE FOR ANAEROBIC CULTURENotification of test cancellation was given toDr Humberto Wilkins Culture negative after 2 days  Culture negative monitoring continues  Culture negative monitoring continues Canceled, Test credited>10 Squamous epithelial cells/low power field indicates oral contamination. Please recollect.* No growth after 4 days No growth after 4 days     Recent Results (from the past 24 hour(s))   XR Chest 1 View    Narrative    XR CHEST 1 VW 8/25/2017 11:20 AM    HISTORY: Intubation.    COMPARISON: August 24, 2017.      Impression    IMPRESSION: Tubes are unchanged in position. Diffuse bilateral  pulmonary opacities as before, consistent with diffuse lung disease.  No pneumothorax.    RADHA DAMON MD   US Chest Pleural Effusion Imaging    Narrative    ULTRASOUND CHEST/PLEURAL EFFUSION IMAGING  8/25/2017 12:36 PM    HISTORY: Evaluate for volume of pleural fluid.    FINDINGS: Trace amount of left pleural fluid visible. Therefore, no  plan for subsequent chest tube or thoracentesis.    XAVIER BOWLES MD   US Abdomen Limited    Narrative    ULTRASOUND  ABDOMEN LIMITED 8/25/2017 12:43 PM    HISTORY:  55-year-old patient with question of liver lesions on  echocardiac and ultrasound exam. Request made for formal ultrasound  evaluation. Of note, echocardiac images not available for review.    FINDINGS: Liver size is upper limit of normal at 18.4 cm. No focal  liver lesion. Sludge noted within the gallbladder lumen, though no  gallbladder wall thickening, pericholecystic fluid, and negative  sonographic [place] sign. Small simple cyst in the cortex and superior  pole of the right kidney measuring 1.3 x 1.5 x 1.3 cm.      Impression    IMPRESSION:  1. No focal liver lesion identified.  2. Sludge in the gallbladder lumen, though no complicating additional  features such as wall thickening.   3. Simple cyst in the superior pole of the right kidney.    XAVIER BOWLES MD     ECHO Interpretation Summary     The rhythm was sinus with wide QRS/LBBB.  The left ventricle is normal in size.  The left ventricular ejection fraction is normal.  The visual ejection fraction is estimated at 55-60%.  Septal motion is consistent with conduction abnormality.  Grade I or early diastolic dysfunction.  The right ventricular systolic function is normal.  Imaging of all four valves was difficult but they appear grossly normal in  structure and function.     There are sclerotic lesions in the liver the seem to be intra vascular. There  is blood flow in and around these lesions, significance unclear. Reported to  the ICU.      Billing: This patient is critically ill: Yes. Total critical care time today 40 min excluding procedures

## 2019-08-09 NOTE — TELEPHONE ENCOUNTER
Pt's wife called stating that maria dolores has not received the pts metoprolol I resent the medication to the pharmacy and notified the pt trg

## 2019-08-12 ENCOUNTER — CLINICAL SUPPORT (OUTPATIENT)
Dept: CARDIOLOGY | Facility: CLINIC | Age: 69
End: 2019-08-12

## 2019-08-12 VITALS — SYSTOLIC BLOOD PRESSURE: 128 MMHG | DIASTOLIC BLOOD PRESSURE: 72 MMHG

## 2019-08-12 NOTE — PROGRESS NOTES
I agree with the previous documentation by Juany WHALEY MA.  He should notify us if he has systolic readings sustaining greater than 140 or diastolic readings sustaining greater than 90.  He should also follow-up with his oncologist to discuss side effects related to his chemotherapy. - Torey, APRN 2:08 PM  8/12/19

## 2019-08-12 NOTE — PROGRESS NOTES
Patient here for repeat BP check after restarting the Metoprolol 25mg po BID.  Pt is also taking zofran for nausea from the chemo.  He is currently undergoing for lung cancer.  Pt denies CP or headache.  He does have some SOA, nausea and lightheadedness, although he is unsure if the origin is cardiac or chemo related.    Pt did present w BP list and home cuff.  His BP in office with adult large on Rt arm is 128/72. With his cuff, a wrist one on Lt wrist, 136/70.    Per VO, CB, pts BP is ok.  If his systolic stays consistently above 140's pt is to call to office.  Pt notified and voiced understanding. [facility]

## 2019-08-13 ENCOUNTER — LAB (OUTPATIENT)
Dept: LAB | Facility: HOSPITAL | Age: 69
End: 2019-08-13

## 2019-08-13 ENCOUNTER — OFFICE VISIT (OUTPATIENT)
Dept: ONCOLOGY | Facility: CLINIC | Age: 69
End: 2019-08-13

## 2019-08-13 ENCOUNTER — APPOINTMENT (OUTPATIENT)
Dept: ONCOLOGY | Facility: HOSPITAL | Age: 69
End: 2019-08-13

## 2019-08-13 VITALS
HEIGHT: 71 IN | HEART RATE: 80 BPM | TEMPERATURE: 98.2 F | OXYGEN SATURATION: 98 % | BODY MASS INDEX: 25.27 KG/M2 | SYSTOLIC BLOOD PRESSURE: 146 MMHG | RESPIRATION RATE: 16 BRPM | DIASTOLIC BLOOD PRESSURE: 72 MMHG | WEIGHT: 180.5 LBS

## 2019-08-13 DIAGNOSIS — C34.90 SMALL CELL LUNG CANCER (HCC): ICD-10-CM

## 2019-08-13 DIAGNOSIS — C34.90 SMALL CELL LUNG CANCER (HCC): Primary | Chronic | ICD-10-CM

## 2019-08-13 LAB
ALBUMIN SERPL-MCNC: 4.2 G/DL (ref 3.5–5.2)
ALBUMIN/GLOB SERPL: 1.2 G/DL (ref 1.1–2.4)
ALP SERPL-CCNC: 85 U/L (ref 38–116)
ALT SERPL W P-5'-P-CCNC: 23 U/L (ref 0–41)
ANION GAP SERPL CALCULATED.3IONS-SCNC: 12.2 MMOL/L (ref 5–15)
AST SERPL-CCNC: 22 U/L (ref 0–40)
BASOPHILS # BLD AUTO: 0.05 10*3/MM3 (ref 0–0.2)
BASOPHILS NFR BLD AUTO: 1.6 % (ref 0–1.5)
BILIRUB SERPL-MCNC: 0.3 MG/DL (ref 0.2–1.2)
BUN BLD-MCNC: 10 MG/DL (ref 6–20)
BUN/CREAT SERPL: 10.5 (ref 7.3–30)
CALCIUM SPEC-SCNC: 9.7 MG/DL (ref 8.5–10.2)
CHLORIDE SERPL-SCNC: 100 MMOL/L (ref 98–107)
CO2 SERPL-SCNC: 25.8 MMOL/L (ref 22–29)
CREAT BLD-MCNC: 0.95 MG/DL (ref 0.7–1.3)
DEPRECATED RDW RBC AUTO: 59.2 FL (ref 37–54)
EOSINOPHIL # BLD AUTO: 0.04 10*3/MM3 (ref 0–0.4)
EOSINOPHIL NFR BLD AUTO: 1.3 % (ref 0.3–6.2)
ERYTHROCYTE [DISTWIDTH] IN BLOOD BY AUTOMATED COUNT: 18.2 % (ref 12.3–15.4)
GFR SERPL CREATININE-BSD FRML MDRD: 79 ML/MIN/1.73
GLOBULIN UR ELPH-MCNC: 3.4 GM/DL (ref 1.8–3.5)
GLUCOSE BLD-MCNC: 99 MG/DL (ref 74–124)
HCT VFR BLD AUTO: 41 % (ref 37.5–51)
HGB BLD-MCNC: 13.1 G/DL (ref 13–17.7)
IMM GRANULOCYTES # BLD AUTO: 0 10*3/MM3 (ref 0–0.05)
IMM GRANULOCYTES NFR BLD AUTO: 0 % (ref 0–0.5)
LYMPHOCYTES # BLD AUTO: 1.21 10*3/MM3 (ref 0.7–3.1)
LYMPHOCYTES NFR BLD AUTO: 38.3 % (ref 19.6–45.3)
MCH RBC QN AUTO: 29.8 PG (ref 26.6–33)
MCHC RBC AUTO-ENTMCNC: 32 G/DL (ref 31.5–35.7)
MCV RBC AUTO: 93.2 FL (ref 79–97)
MONOCYTES # BLD AUTO: 0.41 10*3/MM3 (ref 0.1–0.9)
MONOCYTES NFR BLD AUTO: 13 % (ref 5–12)
NEUTROPHILS # BLD AUTO: 1.45 10*3/MM3 (ref 1.7–7)
NEUTROPHILS NFR BLD AUTO: 45.8 % (ref 42.7–76)
NRBC BLD AUTO-RTO: 0 /100 WBC (ref 0–0.2)
PLATELET # BLD AUTO: 103 10*3/MM3 (ref 140–450)
PMV BLD AUTO: 9.2 FL (ref 6–12)
POTASSIUM BLD-SCNC: 4.8 MMOL/L (ref 3.5–4.7)
PROT SERPL-MCNC: 7.6 G/DL (ref 6.3–8)
RBC # BLD AUTO: 4.4 10*6/MM3 (ref 4.14–5.8)
SODIUM BLD-SCNC: 138 MMOL/L (ref 134–145)
WBC NRBC COR # BLD: 3.16 10*3/MM3 (ref 3.4–10.8)

## 2019-08-13 PROCEDURE — 36415 COLL VENOUS BLD VENIPUNCTURE: CPT

## 2019-08-13 PROCEDURE — 85025 COMPLETE CBC W/AUTO DIFF WBC: CPT

## 2019-08-13 PROCEDURE — 99213 OFFICE O/P EST LOW 20 MIN: CPT | Performed by: NURSE PRACTITIONER

## 2019-08-13 PROCEDURE — 80053 COMPREHEN METABOLIC PANEL: CPT

## 2019-08-13 NOTE — PROGRESS NOTES
REASON FOR FOLLOW UP: T4N3 Small cell lung cancer    HISTORY OF PRESENT ILLNESS:  Julia Smith III is a 69 y.o. male with the above-mentioned history returns today for toxicity check after receiving his second cycle of chemotherapy with carboplatin, -16, and the addition of atezolizumab.  He reports that he has been feeling okay.  He does have some knee pain, which can be intermittently chronic for him.  He denies issues with nausea, vomiting, diarrhea, or constipation.  He denies any increase in shortness of breath or cough.  Patient feels like he has a good appetite, but he attributes this to marijuana usage.  He has no other complaints or concerns today.    Past Medical History:   Diagnosis Date   • Atrial fibrillation with RVR (CMS/HCC)    • Cancer (CMS/HCC) 06/2019    Right lung   • Chronic cough    • COPD (chronic obstructive pulmonary disease) (CMS/HCC)    • Hearing loss    • History of shingles    • Lower back pain    • Mastoiditis    • Mild mitral regurgitation    • Other fatigue    • PAF (paroxysmal atrial fibrillation) (CMS/[facility])    • Pneumonia        Past Surgical History:   Procedure Laterality Date   • BRONCHOSCOPY N/A 6/19/2019    Procedure: BRONCHOSCOPY WITH WASHINGS AND BIOPSY AND ENDOBRONCHIAL ULTRASOUND WITH FNA;  Surgeon: Arslan Marquez MD;  Location: [facility] ENDOSCOPY;  Service: Pulmonary   • INNER EAR SURGERY Left    • MASTOIDECTOMY Left 1994       SOCIAL HISTORY:   reports that he has been smoking cigarettes.  He has a 60.00 pack-year smoking history. He has never used smokeless tobacco. He reports that he drinks about 2.4 - 4.2 oz of alcohol per week. He reports that he uses drugs. Drug: Marijuana.    FAMILY HISTORY:  family history includes COPD in his father; Heart disease in his brother; No Known Problems in his brother, brother, brother, mother, and sister.    ALLERGIES:  No Known Allergies    MEDICATIONS:  The medication list has been reviewed with the patient by the medical  "assistant, and the list has been updated in the electronic medical record, which I reviewed.  Medication dosages and frequencies were confirmed to be accurate.    I have reviewed the patient's medical history in detail and updated the computerized patient record.    Review of Systems   Review of Systems   Constitutional: Negative for activity change, appetite change, chills, fatigue and fever.   HENT: Negative for mouth sores, nosebleeds and trouble swallowing.    Respiratory: Positive for shortness of breath (unchanged). Negative for cough.    Cardiovascular: Negative for chest pain and leg swelling.   Gastrointestinal: Negative for abdominal pain, constipation, diarrhea, nausea and vomiting.   Genitourinary: Negative for difficulty urinating.   Musculoskeletal:        Knee pain   Skin: Negative for rash.   Neurological: Negative for dizziness, weakness and numbness.   Hematological: Negative for adenopathy. Does not bruise/bleed easily.   Psychiatric/Behavioral: Negative for sleep disturbance.       Vitals:    08/13/19 0949   BP: 146/72   Pulse: 80   Resp: 16   Temp: 98.2 °F (36.8 °C)   TempSrc: Oral   SpO2: 98%   Weight: 81.9 kg (180 lb 8 oz)   Height: 180.3 cm (70.98\")   PainSc:   7   PainLoc: Knee     Physical Exam   Constitutional: He is oriented to person, place, and time. He appears well-developed and well-nourished. No distress.   HENT:   Head: Normocephalic and atraumatic.   Mouth/Throat: Oropharynx is clear and moist and mucous membranes are normal. No oropharyngeal exudate.   Eyes: Pupils are equal, round, and reactive to light.   Neck: Normal range of motion.   Cardiovascular: Normal rate, regular rhythm and normal heart sounds.   No murmur heard.  Pulmonary/Chest: Effort normal. No respiratory distress. He has no wheezes. He has rhonchi in the left lower field. He has no rales.   Abdominal: Soft. Normal appearance and bowel sounds are normal. He exhibits no distension. There is no hepatosplenomegaly. "   Musculoskeletal: Normal range of motion. He exhibits no edema.   Neurological: He is alert and oriented to person, place, and time.   Skin: Skin is warm and dry. No rash noted.   Psychiatric: He has a normal mood and affect.   Vitals reviewed.      DIAGNOSTIC DATA:  Results from last 7 days   Lab Units 08/13/19  0912 08/07/19  0920   WBC 10*3/mm3 3.16* 3.98   NEUTROS ABS 10*3/mm3 1.45* 2.94   HEMOGLOBIN g/dL 13.1 13.4   HEMATOCRIT % 41.0 42.0   PLATELETS 10*3/mm3 103* 166     Results from last 7 days   Lab Units 08/13/19  0912 08/07/19  0920   SODIUM mmol/L 138 137   POTASSIUM mmol/L 4.8* 5.3*   CHLORIDE mmol/L 100 99   CO2 mmol/L 25.8 25.1   BUN mg/dL 10 18   CREATININE mg/dL 0.95 0.88   CALCIUM mg/dL 9.7 9.8   ALBUMIN g/dL 4.20 4.10   BILIRUBIN mg/dL 0.3 0.3   ALK PHOS U/L 85 89   ALT (SGPT) U/L 23 31   AST (SGOT) U/L 22 30   GLUCOSE mg/dL 99 128*         ASSESSMENT:  This is a 69 y.o. male with:  1.   Small cell lung cancer originating in the right hilum:   · He has an extremely large mass there with concern for SVC compression on CT imaging from 6/4/2019.  There is some left hilar adenopathy.  · Some parotid lesions which are concerning but indeterminate, left greater than right.  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  · Staging MRI of the brain 7/10/2019 negative for metastatic disease  · He was discussed at multidisciplinary thoracic conference, with his T4 and 3 disease, concern for involvement of the right supraclavicular region and obvious left hilum, plans to add atezolizumab with cycle 2, we can consider future radiation to the residual mediastinal mass if necessary.  · Lung lesion too large to radiate in spite of the SVC narrowing.    · On 7/8/2019 we started carboplatin AUC 5 and VP16 100 mg/m2 through peripheral IVs  · Plan outpatient  mediport once the large tumor mass has decreased in size.    · Will repeat CT scans following cycle 2.  · atezolizumab will be added with cycle 2.    · Cycle 2 postponed on 7/29/2019 due to heart rate of 154.  · We will proceed with cycle 2 7/31/2019 carboplatin, -16, with atezolizumab added.     2.  Chronic hearing loss: History of mastoiditis.  He is not a candidate for cisplatin because of this.     3.  Atrial fibrillation.  He was initiated on metoprolol 12.5 mg twice daily.  This did delay port placement.  The patient was taking his metoprolol inconsistently.  For cycle 2 was delayed due to heart rate of 150 7/29/2019.  He was seen by cardiology with instructions to increase his metoprolol to 25 mg twice daily.  Heart rate improved today at 80.    4.  Tobacco use.  He did utilize a NicoDerm patch while inpatient, though has continued smoking since discharge.  We discussed smoking cessation.    5.  SVC syndrome.  We will evaluate CT scans following cycle 2 to determine if the patient is a candidate for Mediport placement.  CT scan scheduled for 8/14/2019    6. Tachycardia related to atrial fibrillation as outlined above  · Heart rate on 7/29/19 was 154.  Cycle 2 was delayed.  · Metoprolol increased to 25 mg twice daily with improvement in heart rate.      PLAN:   1. Patient will return in 1 week for follow-up visit with Dr. Morel for reevaluation prior to his third cycle of carboplatin, -16, atezolizumab.  2. Continue to follow-up with cardiology.  3. Continue to follow-up with Dr. Gallardo as scheduled tomorrow for possible port placement prior to cycle 3, with repeat CT scan.      BERTO Francis

## 2019-08-14 ENCOUNTER — OFFICE VISIT (OUTPATIENT)
Dept: SURGERY | Facility: CLINIC | Age: 69
End: 2019-08-14

## 2019-08-14 ENCOUNTER — HOSPITAL ENCOUNTER (OUTPATIENT)
Dept: PET IMAGING | Facility: HOSPITAL | Age: 69
Discharge: HOME | End: 2019-08-14
Admitting: INTERNAL MEDICINE

## 2019-08-14 VITALS
DIASTOLIC BLOOD PRESSURE: 82 MMHG | WEIGHT: 180 LBS | SYSTOLIC BLOOD PRESSURE: 132 MMHG | HEART RATE: 76 BPM | HEIGHT: 71 IN | OXYGEN SATURATION: 98 % | BODY MASS INDEX: 25.2 KG/M2

## 2019-08-14 DIAGNOSIS — C34.90 SMALL CELL LUNG CANCER (HCC): Primary | ICD-10-CM

## 2019-08-14 DIAGNOSIS — C34.90 SMALL CELL LUNG CANCER (HCC): ICD-10-CM

## 2019-08-14 DIAGNOSIS — Z79.899 HIGH RISK MEDICATION USE: ICD-10-CM

## 2019-08-14 LAB — CREAT BLDA-MCNC: 0.9 MG/DL (ref 0.6–1.3)

## 2019-08-14 PROCEDURE — 82565 ASSAY OF CREATININE: CPT

## 2019-08-14 PROCEDURE — 74177 CT ABD & PELVIS W/CONTRAST: CPT

## 2019-08-14 PROCEDURE — 25010000002 IOPAMIDOL 61 % SOLUTION: Performed by: INTERNAL MEDICINE

## 2019-08-14 PROCEDURE — 99214 OFFICE O/P EST MOD 30 MIN: CPT | Performed by: THORACIC SURGERY (CARDIOTHORACIC VASCULAR SURGERY)

## 2019-08-14 PROCEDURE — 71260 CT THORAX DX C+: CPT

## 2019-08-14 PROCEDURE — 0 DIATRIZOATE MEGLUMINE & SODIUM PER 1 ML: Performed by: INTERNAL MEDICINE

## 2019-08-14 RX ADMIN — IOPAMIDOL 85 ML: 612 INJECTION, SOLUTION INTRAVENOUS at 09:50

## 2019-08-14 RX ADMIN — DIATRIZOATE MEGLUMINE AND DIATRIZOATE SODIUM 30 ML: 660; 100 LIQUID ORAL; RECTAL at 09:01

## 2019-08-14 NOTE — H&P (VIEW-ONLY)
Subjective   Patient ID: Julia Smith III is a 69 y.o. male is here today for follow-up.    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  Mr. Smith was recently diagnosed with small cell lung cancer and SVC syndrome.  He was evaluated in the hospital and we were unable to place a port secondary to the SVC syndrome.  He presents today with a CT of his chest in order to discuss port placement.  He has complaints of cough and shortness of breath which are unchanged from prior to his diagnosis.  He states that he is feeling better with the chemotherapy.  The following portions of the patient's history were reviewed and updated as appropriate: allergies, current medications, past family history, past medical history, past social history, past surgical history and problem list.  Review of Systems   Constitution: Negative.   HENT: Negative.    Eyes: Negative.    Cardiovascular: Negative.    Respiratory: Positive for cough and shortness of breath.    Endocrine: Negative.    Hematologic/Lymphatic: Negative.    Skin: Negative.    Musculoskeletal: Negative.    Gastrointestinal: Negative.    Genitourinary: Negative.    Neurological: Negative.    Psychiatric/Behavioral: Negative.    Allergic/Immunologic: Negative.      Patient Active Problem List   Diagnosis   • Encounter for screening for malignant neoplasm of colon   • Small cell lung cancer (CMS/HCC)   • Small cell carcinoma (CMS/HCC)   • Atrial fibrillation with RVR (CMS/HCC)   • Pneumonia   • COPD (chronic obstructive pulmonary disease) (CMS/HCC)   • PAF (paroxysmal atrial fibrillation) (CMS/HCC)   • High risk medication use   • Tobacco abuse   • Mild mitral regurgitation   • Daily consumption of alcohol   • Cigarette nicotine dependence without complication     Past Medical History:   Diagnosis Date   • Atrial fibrillation with RVR  (CMS/[facility])    • Cancer (CMS/[facility]) 2019    Right lung   • Chronic cough    • COPD (chronic obstructive pulmonary disease) (CMS/[facility])    • Hearing loss    • History of shingles    • Lower back pain    • Mastoiditis    • Mild mitral regurgitation    • Other fatigue    • PAF (paroxysmal atrial fibrillation) (CMS/[facility])    • Pneumonia      Past Surgical History:   Procedure Laterality Date   • BRONCHOSCOPY N/A 2019    Procedure: BRONCHOSCOPY WITH WASHINGS AND BIOPSY AND ENDOBRONCHIAL ULTRASOUND WITH FNA;  Surgeon: Arslan Marquez MD;  Location: [facility] ENDOSCOPY;  Service: Pulmonary   • INNER EAR SURGERY Left    • MASTOIDECTOMY Left      Family History   Problem Relation Age of Onset   • No Known Problems Mother    • COPD Father            • No Known Problems Sister    • Heart disease Brother    • No Known Problems Brother    • No Known Problems Brother    • No Known Problems Brother      Social History     Socioeconomic History   • Marital status:      Spouse name: Krystle   • Number of children: 2   • Years of education: Not on file   • Highest education level: Not on file   Occupational History   • Occupation: [employer]     Employer: RETIRED   Tobacco Use   • Smoking status: Current Every Day Smoker     Packs/day: 1.50     Years: 40.00     Pack years: 60.00     Types: Cigarettes   • Smokeless tobacco: Never Used   • Tobacco comment: Since age 32   Substance and Sexual Activity   • Alcohol use: Yes     Alcohol/week: 2.4 - 4.2 oz     Types: 4 - 7 Cans of beer per week     Comment: daily/caffeine use   • Drug use: Yes     Types: Marijuana   • Sexual activity: Not Currently     Partners: Female       Current Outpatient Medications:   •  metoprolol tartrate (LOPRESSOR) 25 MG tablet, Take 1 tablet by mouth 2 (Two) Times a Day., Disp: 60 tablet, Rfl: 1  •  ondansetron (ZOFRAN) 8 MG tablet, Take 1 tablet by mouth 3 (Three) Times a Day As Needed for Nausea or Vomiting., Disp: 30 tablet, Rfl:  5    Current Facility-Administered Medications:   •  ipratropium-albuterol (DUO-NEB) nebulizer solution 3 mL, 3 mL, Nebulization, 4x Daily - RT, Roma Costa, APRN, 3 mL at 05/13/19 1625  No Known Allergies     Objective   Vitals:    08/14/19 1310   BP: 132/82   Pulse: 76   SpO2: 98%     Physical Exam   Constitutional: He is oriented to person, place, and time. He appears well-developed and well-nourished.   HENT:   Head: Normocephalic and atraumatic.   Nose: Nose normal.   Mouth/Throat: Oropharynx is clear and moist.   Eyes: Conjunctivae and EOM are normal. Pupils are equal, round, and reactive to light.   Neck: Normal range of motion. Neck supple.   Cardiovascular: Normal rate, regular rhythm, normal heart sounds and intact distal pulses.   Pulmonary/Chest: Effort normal and breath sounds normal.   Abdominal: Soft. Bowel sounds are normal.   Musculoskeletal: Normal range of motion.   Neurological: He is alert and oriented to person, place, and time.   Skin: Skin is warm and dry. Capillary refill takes less than 2 seconds.   Psychiatric: He has a normal mood and affect. His behavior is normal. Judgment and thought content normal.   Nursing note and vitals reviewed.    Independent Review of Radiographic Studies:    I have independently reviewed the CT chest which was performed today which demonstrates a significant decrease in the bulky mediastinal mass from 12 cm to 6 cm.  The superior vena cava appears to be patent.  Assessment/Plan   Mr. Smith is a pleasant 69-year-old gentleman with small cell lung cancer who is shown significant improvement with chemotherapy treatment over the past 2 months in his cancer.  His superior vena cava is now open therefore we will plan to proceed with port placement next week.  Risks and benefits were discussed with Mr. Smith today in clinic and he would like to proceed.    Diagnoses and all orders for this visit:    Small cell lung cancer (CMS/HCC)

## 2019-08-14 NOTE — PROGRESS NOTES
Subjective   Patient ID: Julia Smith III is a 69 y.o. male is here today for follow-up.    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  Mr. Smith was recently diagnosed with small cell lung cancer and SVC syndrome.  He was evaluated in the hospital and we were unable to place a port secondary to the SVC syndrome.  He presents today with a CT of his chest in order to discuss port placement.  He has complaints of cough and shortness of breath which are unchanged from prior to his diagnosis.  He states that he is feeling better with the chemotherapy.  The following portions of the patient's history were reviewed and updated as appropriate: allergies, current medications, past family history, past medical history, past social history, past surgical history and problem list.  Review of Systems   Constitution: Negative.   HENT: Negative.    Eyes: Negative.    Cardiovascular: Negative.    Respiratory: Positive for cough and shortness of breath.    Endocrine: Negative.    Hematologic/Lymphatic: Negative.    Skin: Negative.    Musculoskeletal: Negative.    Gastrointestinal: Negative.    Genitourinary: Negative.    Neurological: Negative.    Psychiatric/Behavioral: Negative.    Allergic/Immunologic: Negative.      Patient Active Problem List   Diagnosis   • Encounter for screening for malignant neoplasm of colon   • Small cell lung cancer (CMS/HCC)   • Small cell carcinoma (CMS/HCC)   • Atrial fibrillation with RVR (CMS/HCC)   • Pneumonia   • COPD (chronic obstructive pulmonary disease) (CMS/HCC)   • PAF (paroxysmal atrial fibrillation) (CMS/HCC)   • High risk medication use   • Tobacco abuse   • Mild mitral regurgitation   • Daily consumption of alcohol   • Cigarette nicotine dependence without complication     Past Medical History:   Diagnosis Date   • Atrial fibrillation with RVR  (CMS/[facility])    • Cancer (CMS/[facility]) 2019    Right lung   • Chronic cough    • COPD (chronic obstructive pulmonary disease) (CMS/[facility])    • Hearing loss    • History of shingles    • Lower back pain    • Mastoiditis    • Mild mitral regurgitation    • Other fatigue    • PAF (paroxysmal atrial fibrillation) (CMS/[facility])    • Pneumonia      Past Surgical History:   Procedure Laterality Date   • BRONCHOSCOPY N/A 2019    Procedure: BRONCHOSCOPY WITH WASHINGS AND BIOPSY AND ENDOBRONCHIAL ULTRASOUND WITH FNA;  Surgeon: Arslan Marquez MD;  Location: [facility] ENDOSCOPY;  Service: Pulmonary   • INNER EAR SURGERY Left    • MASTOIDECTOMY Left      Family History   Problem Relation Age of Onset   • No Known Problems Mother    • COPD Father            • No Known Problems Sister    • Heart disease Brother    • No Known Problems Brother    • No Known Problems Brother    • No Known Problems Brother      Social History     Socioeconomic History   • Marital status:      Spouse name: Krystle   • Number of children: 2   • Years of education: Not on file   • Highest education level: Not on file   Occupational History   • Occupation: [employer]     Employer: RETIRED   Tobacco Use   • Smoking status: Current Every Day Smoker     Packs/day: 1.50     Years: 40.00     Pack years: 60.00     Types: Cigarettes   • Smokeless tobacco: Never Used   • Tobacco comment: Since age 32   Substance and Sexual Activity   • Alcohol use: Yes     Alcohol/week: 2.4 - 4.2 oz     Types: 4 - 7 Cans of beer per week     Comment: daily/caffeine use   • Drug use: Yes     Types: Marijuana   • Sexual activity: Not Currently     Partners: Female       Current Outpatient Medications:   •  metoprolol tartrate (LOPRESSOR) 25 MG tablet, Take 1 tablet by mouth 2 (Two) Times a Day., Disp: 60 tablet, Rfl: 1  •  ondansetron (ZOFRAN) 8 MG tablet, Take 1 tablet by mouth 3 (Three) Times a Day As Needed for Nausea or Vomiting., Disp: 30 tablet, Rfl:  5    Current Facility-Administered Medications:   •  ipratropium-albuterol (DUO-NEB) nebulizer solution 3 mL, 3 mL, Nebulization, 4x Daily - RT, Roma Costa, APRN, 3 mL at 05/13/19 1625  No Known Allergies     Objective   Vitals:    08/14/19 1310   BP: 132/82   Pulse: 76   SpO2: 98%     Physical Exam   Constitutional: He is oriented to person, place, and time. He appears well-developed and well-nourished.   HENT:   Head: Normocephalic and atraumatic.   Nose: Nose normal.   Mouth/Throat: Oropharynx is clear and moist.   Eyes: Conjunctivae and EOM are normal. Pupils are equal, round, and reactive to light.   Neck: Normal range of motion. Neck supple.   Cardiovascular: Normal rate, regular rhythm, normal heart sounds and intact distal pulses.   Pulmonary/Chest: Effort normal and breath sounds normal.   Abdominal: Soft. Bowel sounds are normal.   Musculoskeletal: Normal range of motion.   Neurological: He is alert and oriented to person, place, and time.   Skin: Skin is warm and dry. Capillary refill takes less than 2 seconds.   Psychiatric: He has a normal mood and affect. His behavior is normal. Judgment and thought content normal.   Nursing note and vitals reviewed.    Independent Review of Radiographic Studies:    I have independently reviewed the CT chest which was performed today which demonstrates a significant decrease in the bulky mediastinal mass from 12 cm to 6 cm.  The superior vena cava appears to be patent.  Assessment/Plan   Mr. Smith is a pleasant 69-year-old gentleman with small cell lung cancer who is shown significant improvement with chemotherapy treatment over the past 2 months in his cancer.  His superior vena cava is now open therefore we will plan to proceed with port placement next week.  Risks and benefits were discussed with Mr. Smith today in clinic and he would like to proceed.    Diagnoses and all orders for this visit:    Small cell lung cancer (CMS/HCC)

## 2019-08-20 ENCOUNTER — OFFICE VISIT (OUTPATIENT)
Dept: ONCOLOGY | Facility: CLINIC | Age: 69
End: 2019-08-20

## 2019-08-20 ENCOUNTER — APPOINTMENT (OUTPATIENT)
Dept: LAB | Facility: HOSPITAL | Age: 69
End: 2019-08-20

## 2019-08-20 ENCOUNTER — INFUSION (OUTPATIENT)
Dept: ONCOLOGY | Facility: HOSPITAL | Age: 69
End: 2019-08-20

## 2019-08-20 VITALS
SYSTOLIC BLOOD PRESSURE: 150 MMHG | HEART RATE: 87 BPM | OXYGEN SATURATION: 96 % | DIASTOLIC BLOOD PRESSURE: 81 MMHG | HEIGHT: 71 IN | TEMPERATURE: 98.4 F | WEIGHT: 186.3 LBS | RESPIRATION RATE: 16 BRPM | BODY MASS INDEX: 26.08 KG/M2

## 2019-08-20 DIAGNOSIS — Z79.899 HIGH RISK MEDICATION USE: ICD-10-CM

## 2019-08-20 DIAGNOSIS — C34.90 SMALL CELL LUNG CANCER (HCC): Primary | ICD-10-CM

## 2019-08-20 DIAGNOSIS — C34.90 SMALL CELL LUNG CANCER (HCC): Primary | Chronic | ICD-10-CM

## 2019-08-20 DIAGNOSIS — C34.90 SMALL CELL LUNG CANCER (HCC): ICD-10-CM

## 2019-08-20 LAB
ALBUMIN SERPL-MCNC: 4.1 G/DL (ref 3.5–5.2)
ALBUMIN/GLOB SERPL: 1.2 G/DL (ref 1.1–2.4)
ALP SERPL-CCNC: 80 U/L (ref 38–116)
ALT SERPL W P-5'-P-CCNC: 18 U/L (ref 0–41)
ANION GAP SERPL CALCULATED.3IONS-SCNC: 13 MMOL/L (ref 5–15)
AST SERPL-CCNC: 22 U/L (ref 0–40)
BASOPHILS # BLD AUTO: 0.04 10*3/MM3 (ref 0–0.2)
BASOPHILS NFR BLD AUTO: 1.4 % (ref 0–1.5)
BILIRUB SERPL-MCNC: 0.2 MG/DL (ref 0.2–1.2)
BUN BLD-MCNC: 11 MG/DL (ref 6–20)
BUN/CREAT SERPL: 11.2 (ref 7.3–30)
CALCIUM SPEC-SCNC: 9.5 MG/DL (ref 8.5–10.2)
CHLORIDE SERPL-SCNC: 100 MMOL/L (ref 98–107)
CO2 SERPL-SCNC: 24 MMOL/L (ref 22–29)
CREAT BLD-MCNC: 0.98 MG/DL (ref 0.7–1.3)
DEPRECATED RDW RBC AUTO: 67.6 FL (ref 37–54)
EOSINOPHIL # BLD AUTO: 0.07 10*3/MM3 (ref 0–0.4)
EOSINOPHIL NFR BLD AUTO: 2.5 % (ref 0.3–6.2)
ERYTHROCYTE [DISTWIDTH] IN BLOOD BY AUTOMATED COUNT: 20.2 % (ref 12.3–15.4)
GFR SERPL CREATININE-BSD FRML MDRD: 76 ML/MIN/1.73
GLOBULIN UR ELPH-MCNC: 3.4 GM/DL (ref 1.8–3.5)
GLUCOSE BLD-MCNC: 104 MG/DL (ref 74–124)
HCT VFR BLD AUTO: 41.6 % (ref 37.5–51)
HGB BLD-MCNC: 13.5 G/DL (ref 13–17.7)
IMM GRANULOCYTES # BLD AUTO: 0.02 10*3/MM3 (ref 0–0.05)
IMM GRANULOCYTES NFR BLD AUTO: 0.7 % (ref 0–0.5)
LYMPHOCYTES # BLD AUTO: 0.95 10*3/MM3 (ref 0.7–3.1)
LYMPHOCYTES NFR BLD AUTO: 33.8 % (ref 19.6–45.3)
MCH RBC QN AUTO: 30.3 PG (ref 26.6–33)
MCHC RBC AUTO-ENTMCNC: 32.5 G/DL (ref 31.5–35.7)
MCV RBC AUTO: 93.3 FL (ref 79–97)
MONOCYTES # BLD AUTO: 0.68 10*3/MM3 (ref 0.1–0.9)
MONOCYTES NFR BLD AUTO: 24.2 % (ref 5–12)
NEUTROPHILS # BLD AUTO: 1.05 10*3/MM3 (ref 1.7–7)
NEUTROPHILS NFR BLD AUTO: 37.4 % (ref 42.7–76)
NRBC BLD AUTO-RTO: 0 /100 WBC (ref 0–0.2)
PLATELET # BLD AUTO: 176 10*3/MM3 (ref 140–450)
PMV BLD AUTO: 9.3 FL (ref 6–12)
POTASSIUM BLD-SCNC: 4.6 MMOL/L (ref 3.5–4.7)
PROT SERPL-MCNC: 7.5 G/DL (ref 6.3–8)
RBC # BLD AUTO: 4.46 10*6/MM3 (ref 4.14–5.8)
SODIUM BLD-SCNC: 137 MMOL/L (ref 134–145)
WBC NRBC COR # BLD: 2.81 10*3/MM3 (ref 3.4–10.8)

## 2019-08-20 PROCEDURE — 80053 COMPREHEN METABOLIC PANEL: CPT

## 2019-08-20 PROCEDURE — 99215 OFFICE O/P EST HI 40 MIN: CPT | Performed by: INTERNAL MEDICINE

## 2019-08-20 PROCEDURE — 85025 COMPLETE CBC W/AUTO DIFF WBC: CPT

## 2019-08-20 NOTE — PROGRESS NOTES
REASON FOR FOLLOW UP: T4N3 Small cell lung cancer    HISTORY OF PRESENT ILLNESS:  Julia Smith III is a 69 y.o. male with the above-mentioned history returns today for follow-up, anticipating cycle #3 of therapy with carboplatin, -16, atezolizumab.  He has been tolerating therapy very well.  He is gaining weight and eating well.  No fevers or chills.  No rash.  No nausea vomiting or diarrhea.  Energy level has been reasonable.  He denies shortness of breath or cough.    Past Medical History:   Diagnosis Date   • Atrial fibrillation with RVR (CMS/HCC)    • Cancer (CMS/HCC) 06/2019    Right lung   • Chronic cough    • COPD (chronic obstructive pulmonary disease) (CMS/HCC)    • Hearing loss    • History of shingles    • Lower back pain    • Mastoiditis    • Mild mitral regurgitation    • Other fatigue    • PAF (paroxysmal atrial fibrillation) (CMS/HCC)    • Pneumonia        Past Surgical History:   Procedure Laterality Date   • BRONCHOSCOPY N/A 6/19/2019    Procedure: BRONCHOSCOPY WITH WASHINGS AND BIOPSY AND ENDOBRONCHIAL ULTRASOUND WITH FNA;  Surgeon: Arslan Marquez MD;  Location: [facility] ENDOSCOPY;  Service: Pulmonary   • INNER EAR SURGERY Left    • MASTOIDECTOMY Left 1994       SOCIAL HISTORY:   reports that he has been smoking cigarettes.  He has a 60.00 pack-year smoking history. He has never used smokeless tobacco. He reports that he drinks about 2.4 - 4.2 oz of alcohol per week. He reports that he uses drugs. Drug: Marijuana.    FAMILY HISTORY:  family history includes COPD in his father; Heart disease in his brother; No Known Problems in his brother, brother, brother, mother, and sister.    ALLERGIES:  No Known Allergies    MEDICATIONS:  The medication list has been reviewed with the patient by the medical assistant, and the list has been updated in the electronic medical record, which I reviewed.  Medication dosages and frequencies were confirmed to be accurate.    I have reviewed the patient's  "medical history in detail and updated the computerized patient record.    Review of Systems   Review of Systems   Constitutional: Negative for activity change, appetite change, chills, fatigue and fever.   HENT: Negative for mouth sores, nosebleeds and trouble swallowing.    Respiratory: Positive for shortness of breath (Improved). Negative for cough.    Cardiovascular: Negative for chest pain and leg swelling.   Gastrointestinal: Negative for abdominal pain, constipation, diarrhea, nausea and vomiting.   Genitourinary: Negative for difficulty urinating.   Musculoskeletal:        Knee pain   Skin: Negative for rash.   Neurological: Negative for dizziness, weakness and numbness.   Hematological: Negative for adenopathy. Does not bruise/bleed easily.   Psychiatric/Behavioral: Negative for sleep disturbance.       Vitals:    08/20/19 1054   BP: 150/81   Pulse: 87   Resp: 16   Temp: 98.4 °F (36.9 °C)   TempSrc: Oral   SpO2: 96%   Weight: 84.5 kg (186 lb 4.8 oz)   Height: 180.3 cm (70.98\")   PainSc: 0-No pain     Physical Exam   Constitutional: He is oriented to person, place, and time. He appears well-developed and well-nourished. No distress.   HENT:   Head: Normocephalic and atraumatic.   Mouth/Throat: Oropharynx is clear and moist and mucous membranes are normal. No oropharyngeal exudate.   Eyes: Pupils are equal, round, and reactive to light.   Neck: Normal range of motion.   Cardiovascular: Normal rate, regular rhythm and normal heart sounds.   No murmur heard.  Pulmonary/Chest: Effort normal. No respiratory distress. He has no wheezes. He has no rhonchi. He has no rales.   Abdominal: Soft. Normal appearance and bowel sounds are normal. He exhibits no distension. There is no hepatosplenomegaly.   Musculoskeletal: Normal range of motion. He exhibits no edema.   Neurological: He is alert and oriented to person, place, and time.   Skin: Skin is warm and dry. No rash noted.   Psychiatric: He has a normal mood and " affect.   Vitals reviewed.      DIAGNOSTIC DATA:  Results from last 7 days   Lab Units 08/20/19  1035   WBC 10*3/mm3 2.81*   NEUTROS ABS 10*3/mm3 1.05*   HEMOGLOBIN g/dL 13.5   HEMATOCRIT % 41.6   PLATELETS 10*3/mm3 176     Results from last 7 days   Lab Units 08/20/19  1035 08/14/19  0924   SODIUM mmol/L 137  --    POTASSIUM mmol/L 4.6  --    CHLORIDE mmol/L 100  --    CO2 mmol/L 24.0  --    BUN mg/dL 11  --    CREATININE mg/dL 0.98 0.90   CALCIUM mg/dL 9.5  --    ALBUMIN g/dL 4.10  --    BILIRUBIN mg/dL 0.2  --    ALK PHOS U/L 80  --    ALT (SGPT) U/L 18  --    AST (SGOT) U/L 22  --    GLUCOSE mg/dL 104  --            IMAGING:  I personally reviewed CT images of the chest abdomen and pelvis from 8/14/2019 which show significant improvement in the lymphadenopathy and in the right hilar/mediastinal mass.    ASSESSMENT:  This is a 69 y.o. male with:  1.   Small cell lung cancer originating in the right hilum:   · He has an extremely large mass there with concern for SVC compression on CT imaging from 6/4/2019.  There is some left hilar adenopathy.  · Some parotid lesions which are concerning but indeterminate, left greater than right.  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  · Staging MRI of the brain 7/10/2019 negative for metastatic disease  · He was discussed at multidisciplinary thoracic conference, with his T4 and 3 disease, concern for involvement of the right supraclavicular region and obvious left hilum, plans to add atezolizumab with cycle 2, we can consider future radiation to the residual mediastinal mass if necessary.  · Lung lesion too large to radiate in spite of the SVC narrowing.    · On 7/8/2019 we started carboplatin AUC 5 and VP16 100 mg/m2 through peripheral IVs  · Cycle 2 postponed on 7/29/2019 due to heart rate of 154.   · CT  imaging on 8/14/2019 cycles of therapy shows significant improvement.  · Mediport anticipated by Dr. Gallardo on 8/22/2019.  · As of 8/20/2019 he is neutropenic and cycle #3 will be delayed until after his Mediport is placed.  We will see if we can get Neulasta approved for him.    2.  Chronic hearing loss: History of mastoiditis.  He is not a candidate for cisplatin because of this.     3.  Atrial fibrillation.  He was initiated on metoprolol 12.5 mg twice daily.  This did delay port placement.  The patient was taking his metoprolol inconsistently.  For cycle 2 was delayed due to heart rate of 150 7/29/2019.  He was seen by cardiology with instructions to increase his metoprolol to 25 mg twice daily.  Heart rate improved today at 87.    4.  Tobacco use.  He did utilize a NicoDerm patch while inpatient, though has continued smoking since discharge.  We discussed smoking cessation.    5.  SVC syndrome.  Improved after 2 cycles of chemotherapy.    6. Tachycardia related to atrial fibrillation as outlined above  · Heart rate on 7/29/19 was 154.  Cycle 2 was delayed.  · Metoprolol increased to 25 mg twice daily with improvement in heart rate.      PLAN:   1. Mediport plan by Dr. Gallardo on Thursday  2. Delay chemotherapy until next week.  3. We will see if we can get Neulasta approved for use  4. I will see him back for cycle #4 of therapy      Thai Morel MD

## 2019-08-21 ENCOUNTER — ANESTHESIA EVENT (OUTPATIENT)
Dept: PERIOP | Facility: HOSPITAL | Age: 69
End: 2019-08-21

## 2019-08-21 ENCOUNTER — APPOINTMENT (OUTPATIENT)
Dept: ONCOLOGY | Facility: HOSPITAL | Age: 69
End: 2019-08-21

## 2019-08-22 ENCOUNTER — APPOINTMENT (OUTPATIENT)
Dept: GENERAL RADIOLOGY | Facility: HOSPITAL | Age: 69
End: 2019-08-22

## 2019-08-22 ENCOUNTER — APPOINTMENT (OUTPATIENT)
Dept: ONCOLOGY | Facility: HOSPITAL | Age: 69
End: 2019-08-22

## 2019-08-22 ENCOUNTER — HOSPITAL ENCOUNTER (OUTPATIENT)
Facility: HOSPITAL | Age: 69
Setting detail: HOSPITAL OUTPATIENT SURGERY
Discharge: HOME | End: 2019-08-22
Attending: THORACIC SURGERY (CARDIOTHORACIC VASCULAR SURGERY) | Admitting: THORACIC SURGERY (CARDIOTHORACIC VASCULAR SURGERY)

## 2019-08-22 ENCOUNTER — ANESTHESIA (OUTPATIENT)
Dept: PERIOP | Facility: HOSPITAL | Age: 69
End: 2019-08-22

## 2019-08-22 VITALS
WEIGHT: 184.56 LBS | DIASTOLIC BLOOD PRESSURE: 82 MMHG | HEIGHT: 71 IN | HEART RATE: 80 BPM | RESPIRATION RATE: 18 BRPM | SYSTOLIC BLOOD PRESSURE: 157 MMHG | OXYGEN SATURATION: 97 % | TEMPERATURE: 97.6 F | BODY MASS INDEX: 25.84 KG/M2

## 2019-08-22 DIAGNOSIS — C80.1 SMALL CELL CARCINOMA (HCC): ICD-10-CM

## 2019-08-22 PROCEDURE — 76000 FLUOROSCOPY <1 HR PHYS/QHP: CPT

## 2019-08-22 PROCEDURE — 77001 FLUOROGUIDE FOR VEIN DEVICE: CPT | Performed by: THORACIC SURGERY (CARDIOTHORACIC VASCULAR SURGERY)

## 2019-08-22 PROCEDURE — 25010000003 LIDOCAINE 1 % SOLUTION 20 ML VIAL: Performed by: THORACIC SURGERY (CARDIOTHORACIC VASCULAR SURGERY)

## 2019-08-22 PROCEDURE — C1788 PORT, INDWELLING, IMP: HCPCS | Performed by: THORACIC SURGERY (CARDIOTHORACIC VASCULAR SURGERY)

## 2019-08-22 PROCEDURE — 25010000003 CEFAZOLIN IN DEXTROSE 2-4 GM/100ML-% SOLUTION: Performed by: THORACIC SURGERY (CARDIOTHORACIC VASCULAR SURGERY)

## 2019-08-22 PROCEDURE — 25010000002 MIDAZOLAM PER 1 MG: Performed by: ANESTHESIOLOGY

## 2019-08-22 PROCEDURE — 25010000002 PROPOFOL 10 MG/ML EMULSION: Performed by: NURSE ANESTHETIST, CERTIFIED REGISTERED

## 2019-08-22 PROCEDURE — 36561 INSERT TUNNELED CV CATH: CPT | Performed by: THORACIC SURGERY (CARDIOTHORACIC VASCULAR SURGERY)

## 2019-08-22 DEVICE — POWERPORT M.R.I. IMPLANTABLE PORT WITH ATTACHABLE 8F CHRONOFLEX OPEN-ENDED SINGLE-LUMEN VENOUS CATHETER INTERMEDIATE KIT (WITH SUTURE PLUGS)
Type: IMPLANTABLE DEVICE | Site: CHEST | Status: FUNCTIONAL
Brand: POWERPORT M.R.I., CHRONOFLEX

## 2019-08-22 RX ORDER — LIDOCAINE HYDROCHLORIDE 10 MG/ML
0.5 INJECTION, SOLUTION EPIDURAL; INFILTRATION; INTRACAUDAL; PERINEURAL ONCE AS NEEDED
Status: DISCONTINUED | OUTPATIENT
Start: 2019-08-22 | End: 2019-08-22 | Stop reason: HOSPADM

## 2019-08-22 RX ORDER — ONDANSETRON 2 MG/ML
4 INJECTION INTRAMUSCULAR; INTRAVENOUS ONCE AS NEEDED
Status: CANCELLED | OUTPATIENT
Start: 2019-08-22

## 2019-08-22 RX ORDER — CEFAZOLIN SODIUM 2 G/100ML
2 INJECTION, SOLUTION INTRAVENOUS ONCE
Status: COMPLETED | OUTPATIENT
Start: 2019-08-22 | End: 2019-08-22

## 2019-08-22 RX ORDER — DIPHENHYDRAMINE HCL 25 MG
25 CAPSULE ORAL
Status: CANCELLED | OUTPATIENT
Start: 2019-08-22

## 2019-08-22 RX ORDER — SODIUM CHLORIDE 0.9 % (FLUSH) 0.9 %
3 SYRINGE (ML) INJECTION EVERY 12 HOURS SCHEDULED
Status: DISCONTINUED | OUTPATIENT
Start: 2019-08-22 | End: 2019-08-22 | Stop reason: HOSPADM

## 2019-08-22 RX ORDER — HYDROMORPHONE HYDROCHLORIDE 1 MG/ML
0.25 INJECTION, SOLUTION INTRAMUSCULAR; INTRAVENOUS; SUBCUTANEOUS
Status: CANCELLED | OUTPATIENT
Start: 2019-08-22

## 2019-08-22 RX ORDER — PROMETHAZINE HYDROCHLORIDE 25 MG/1
25 TABLET ORAL ONCE AS NEEDED
Status: CANCELLED | OUTPATIENT
Start: 2019-08-22

## 2019-08-22 RX ORDER — HYDROCODONE BITARTRATE AND ACETAMINOPHEN 5; 325 MG/1; MG/1
1-2 TABLET ORAL EVERY 4 HOURS PRN
Qty: 10 TABLET | Refills: 0 | Status: SHIPPED | OUTPATIENT
Start: 2019-08-22 | End: 2019-10-08

## 2019-08-22 RX ORDER — HYDROCODONE BITARTRATE AND ACETAMINOPHEN 7.5; 325 MG/1; MG/1
1 TABLET ORAL ONCE AS NEEDED
Status: CANCELLED | OUTPATIENT
Start: 2019-08-22

## 2019-08-22 RX ORDER — PROPOFOL 10 MG/ML
VIAL (ML) INTRAVENOUS CONTINUOUS PRN
Status: DISCONTINUED | OUTPATIENT
Start: 2019-08-22 | End: 2019-08-22 | Stop reason: SURG

## 2019-08-22 RX ORDER — FLUMAZENIL 0.1 MG/ML
0.2 INJECTION INTRAVENOUS AS NEEDED
Status: CANCELLED | OUTPATIENT
Start: 2019-08-22

## 2019-08-22 RX ORDER — EPHEDRINE SULFATE 50 MG/ML
5 INJECTION, SOLUTION INTRAVENOUS ONCE AS NEEDED
Status: CANCELLED | OUTPATIENT
Start: 2019-08-22

## 2019-08-22 RX ORDER — SODIUM CHLORIDE, SODIUM LACTATE, POTASSIUM CHLORIDE, CALCIUM CHLORIDE 600; 310; 30; 20 MG/100ML; MG/100ML; MG/100ML; MG/100ML
9 INJECTION, SOLUTION INTRAVENOUS CONTINUOUS
Status: DISCONTINUED | OUTPATIENT
Start: 2019-08-22 | End: 2019-08-22 | Stop reason: HOSPADM

## 2019-08-22 RX ORDER — FAMOTIDINE 10 MG/ML
20 INJECTION, SOLUTION INTRAVENOUS ONCE
Status: COMPLETED | OUTPATIENT
Start: 2019-08-22 | End: 2019-08-22

## 2019-08-22 RX ORDER — ACETAMINOPHEN 325 MG/1
650 TABLET ORAL ONCE AS NEEDED
Status: CANCELLED | OUTPATIENT
Start: 2019-08-22

## 2019-08-22 RX ORDER — NALOXONE HCL 0.4 MG/ML
0.2 VIAL (ML) INJECTION AS NEEDED
Status: CANCELLED | OUTPATIENT
Start: 2019-08-22

## 2019-08-22 RX ORDER — PROPOFOL 10 MG/ML
VIAL (ML) INTRAVENOUS AS NEEDED
Status: DISCONTINUED | OUTPATIENT
Start: 2019-08-22 | End: 2019-08-22 | Stop reason: SURG

## 2019-08-22 RX ORDER — SODIUM CHLORIDE 0.9 % (FLUSH) 0.9 %
3-10 SYRINGE (ML) INJECTION AS NEEDED
Status: DISCONTINUED | OUTPATIENT
Start: 2019-08-22 | End: 2019-08-22 | Stop reason: HOSPADM

## 2019-08-22 RX ORDER — PROMETHAZINE HYDROCHLORIDE 25 MG/ML
6.25 INJECTION, SOLUTION INTRAMUSCULAR; INTRAVENOUS
Status: CANCELLED | OUTPATIENT
Start: 2019-08-22

## 2019-08-22 RX ORDER — ALBUTEROL SULFATE 2.5 MG/3ML
2.5 SOLUTION RESPIRATORY (INHALATION) ONCE AS NEEDED
Status: CANCELLED | OUTPATIENT
Start: 2019-08-22

## 2019-08-22 RX ORDER — FENTANYL CITRATE 50 UG/ML
50 INJECTION, SOLUTION INTRAMUSCULAR; INTRAVENOUS
Status: DISCONTINUED | OUTPATIENT
Start: 2019-08-22 | End: 2019-08-22 | Stop reason: HOSPADM

## 2019-08-22 RX ORDER — MIDAZOLAM HYDROCHLORIDE 1 MG/ML
1 INJECTION INTRAMUSCULAR; INTRAVENOUS
Status: DISCONTINUED | OUTPATIENT
Start: 2019-08-22 | End: 2019-08-22 | Stop reason: HOSPADM

## 2019-08-22 RX ORDER — SODIUM CHLORIDE 0.9 % (FLUSH) 0.9 %
1-10 SYRINGE (ML) INJECTION AS NEEDED
Status: DISCONTINUED | OUTPATIENT
Start: 2019-08-22 | End: 2019-08-22 | Stop reason: HOSPADM

## 2019-08-22 RX ORDER — MIDAZOLAM HYDROCHLORIDE 1 MG/ML
2 INJECTION INTRAMUSCULAR; INTRAVENOUS
Status: DISCONTINUED | OUTPATIENT
Start: 2019-08-22 | End: 2019-08-22 | Stop reason: HOSPADM

## 2019-08-22 RX ORDER — HYDRALAZINE HYDROCHLORIDE 20 MG/ML
5 INJECTION INTRAMUSCULAR; INTRAVENOUS
Status: CANCELLED | OUTPATIENT
Start: 2019-08-22

## 2019-08-22 RX ORDER — OXYCODONE AND ACETAMINOPHEN 7.5; 325 MG/1; MG/1
1 TABLET ORAL ONCE AS NEEDED
Status: CANCELLED | OUTPATIENT
Start: 2019-08-22

## 2019-08-22 RX ORDER — PROMETHAZINE HYDROCHLORIDE 25 MG/1
25 SUPPOSITORY RECTAL ONCE AS NEEDED
Status: CANCELLED | OUTPATIENT
Start: 2019-08-22

## 2019-08-22 RX ORDER — LIDOCAINE HYDROCHLORIDE 20 MG/ML
INJECTION, SOLUTION INFILTRATION; PERINEURAL AS NEEDED
Status: DISCONTINUED | OUTPATIENT
Start: 2019-08-22 | End: 2019-08-22 | Stop reason: SURG

## 2019-08-22 RX ORDER — DIPHENHYDRAMINE HYDROCHLORIDE 50 MG/ML
12.5 INJECTION INTRAMUSCULAR; INTRAVENOUS
Status: CANCELLED | OUTPATIENT
Start: 2019-08-22

## 2019-08-22 RX ORDER — MAGNESIUM HYDROXIDE 1200 MG/15ML
LIQUID ORAL AS NEEDED
Status: DISCONTINUED | OUTPATIENT
Start: 2019-08-22 | End: 2019-08-22 | Stop reason: HOSPADM

## 2019-08-22 RX ORDER — FENTANYL CITRATE 50 UG/ML
25 INJECTION, SOLUTION INTRAMUSCULAR; INTRAVENOUS
Status: CANCELLED | OUTPATIENT
Start: 2019-08-22

## 2019-08-22 RX ORDER — PROMETHAZINE HYDROCHLORIDE 25 MG/ML
12.5 INJECTION, SOLUTION INTRAMUSCULAR; INTRAVENOUS ONCE AS NEEDED
Status: CANCELLED | OUTPATIENT
Start: 2019-08-22

## 2019-08-22 RX ADMIN — PROPOFOL 80 MG: 10 INJECTION, EMULSION INTRAVENOUS at 14:28

## 2019-08-22 RX ADMIN — LIDOCAINE HYDROCHLORIDE 80 MG: 20 INJECTION, SOLUTION INFILTRATION; PERINEURAL at 14:29

## 2019-08-22 RX ADMIN — FAMOTIDINE 20 MG: 10 INJECTION INTRAVENOUS at 11:57

## 2019-08-22 RX ADMIN — MIDAZOLAM 1 MG: 1 INJECTION INTRAMUSCULAR; INTRAVENOUS at 11:57

## 2019-08-22 RX ADMIN — CEFAZOLIN SODIUM 2 G: 2 INJECTION, SOLUTION INTRAVENOUS at 14:27

## 2019-08-22 RX ADMIN — PROPOFOL 120 MCG/KG/MIN: 10 INJECTION, EMULSION INTRAVENOUS at 14:30

## 2019-08-22 RX ADMIN — SODIUM CHLORIDE, POTASSIUM CHLORIDE, SODIUM LACTATE AND CALCIUM CHLORIDE 9 ML/HR: 600; 310; 30; 20 INJECTION, SOLUTION INTRAVENOUS at 11:57

## 2019-08-22 RX ADMIN — MIDAZOLAM 1 MG: 1 INJECTION INTRAMUSCULAR; INTRAVENOUS at 13:46

## 2019-08-22 NOTE — ANESTHESIA POSTPROCEDURE EVALUATION
Patient: Julia NIELSEN Smith III    Procedure Summary     Date:  08/22/19 Room / Location:  [facility] OR 09 / [facility] MAIN OR    Anesthesia Start:  1423 Anesthesia Stop:  1510    Procedure:  INSERTION OF PORTACATH (Right ) Diagnosis:       Small cell carcinoma (CMS/HCC)      (Small cell carcinoma (CMS/HCC) [C80.1])    Surgeon:  Ramila Gallardo MD Provider:  Pardeep Rodriguez MD    Anesthesia Type:  MAC ASA Status:  4          Anesthesia Type: MAC  Last vitals  BP   138/80 (08/22/19 1137)   Temp   36.4 °C (97.6 °F) (08/22/19 1137)   Pulse   77 (08/22/19 1350)   Resp   18 (08/22/19 1137)     SpO2   97 %(post sedation) (08/22/19 1350)     Post Anesthesia Care and Evaluation    Patient location during evaluation: bedside  Patient participation: complete - patient participated  Level of consciousness: awake and alert  Pain management: adequate  Airway patency: patent  Anesthetic complications: No anesthetic complications  PONV Status: controlled  Cardiovascular status: blood pressure returned to baseline and acceptable  Respiratory status: acceptable  Hydration status: acceptable

## 2019-08-22 NOTE — ANESTHESIA PREPROCEDURE EVALUATION
Anesthesia Evaluation     Patient summary reviewed and Nursing notes reviewed                Airway   Mallampati: II  Dental    (+) poor dentition        Pulmonary    (+) pneumonia , a smoker Current, lung cancer, COPD,   Cardiovascular     ECG reviewed  Rhythm: regular  Rate: normal    (+) valvular problems/murmurs MR, dysrhythmias Paroxysmal Atrial Fib,       Neuro/Psych- negative ROS  GI/Hepatic/Renal/Endo - negative ROS     Musculoskeletal (-) negative ROS    Abdominal    Substance History - negative use     OB/GYN negative ob/gyn ROS         Other      history of cancer active                    Anesthesia Plan    ASA 4     MAC   (Very poor dentition with several very loose teeth  Patient notified of possible tooth(teeth) dislodgement during procedure secondary exstensive poor baseline dentition and   Heavy smoking history  Presently in sinus rhythm with history of PAF)  intravenous induction   Anesthetic plan, all risks, benefits, and alternatives have been provided, discussed and informed consent has been obtained with: patient.

## 2019-08-22 NOTE — DISCHARGE INSTRUCTIONS
POST OP RECOMMENDATIONS  Dr. Ramila Gallardo  [phone]  Discharge Instructions for Port Placement    1. Go home, rest and take it easy today; however, you should get up and move about several times today to reduce the risk of developing a blood clot in your legs.   2. You may experience some dizziness or memory loss from the anesthesia. This may last for the next 24 hours. Someone should plan on staying with you for the first 24 hours for your safety.   3. Do not make any important legal decisions or sign any legal papers for the next 24 hours.   4. Eat and drink lightly today. Start off with liquids, jello, soup, crackers or other bland foods at first. You may advance your diet tomorrow as tolerated as long as you do not experience any nausea or vomiting.   5. You may notice some bleeding/drainage.  A little bloody drainage is normal. If the bleeding/drainage is such that the bandage cannot absorb it, remove the dressing, apply clean gauze and apply firm pressure for a full 15 minutes. If the bleeding continues, please call me.   6. You may shower tomorrow. No tub baths until your incisions are completely healed.   7. You have received a prescription for a narcotic pain medicine, as you will have some pain following surgery.  You will not be totally pain free, but your pain medicine should make the pain tolerable. Please take your pain medicine as prescribed and always take your pills with food to prevent nausea. If you are having severe pain that cannot be controlled by the pain medicine, please contact me.   8. You have also received a prescription for an anti-nausea medicine. Please take this as prescribed for any nausea or vomiting. Nausea could be a result of the anesthesia or a result of the narcotic pain medicine. If you experience severe nausea and vomiting that cannot be controlled by the nausea medicine, please call me.   9. No driving for 24 hours and for as long as you are taking your prescription pain  medicine.   10. If the port is to be used within 10-14 days of placement, no surgical follow-up is needed. Otherwise, you should call the office at [phone] to schedule a follow-up in about 1 week.   11. Remember to contact me for any of the following:   • Fever > 101 degrees  • Severe pain that cannot be controlled by taking your pain pills  • Severe nausea or vomiting that cannot be controlled by taking your nausea pills  • Significant bleeding of your incisions  • Drainage that has a bad smell or is yellow or green in appearance  • Any other questions or concerns

## 2019-08-22 NOTE — OP NOTE
Julia Smith III was identified in the preoperative holding area and again his consent for the procedure was verified.  He was transported to the operating room and placed on the operating room table in supine position.  A MAC was administered without difficulty.  A timeout was performed to verify correct patient, correct procedure, and correct administration of IV antibiotics per [facility] protocol.  The patient was prepped and draped in standard sterile fashion.  An ultrasound probe was utilized to access the right IJ vein with an 18-gauge needle and a wire was placed into the vein.  Fluoroscopy was used to confirm placement.  Lidocaine was instilled into the incision site.  An approximately 2 cm incision was made 2 finger breaths below the right clavicle and dissection was carried down to the fascia.  A pocket was constructed using blunt dissection to be big enough for the PowerPort.  A small puncture was made at theIJ insertion site.  The tunneler was used to connect the infraclavicular port site to the right internal jugular puncture site.  The catheter and port were connected and flushed.  The port was secured to the underlying fascia with a silk suture.  The catheter was measured using fluoroscopy.  The introducer trocar was inserted over the wire via Seldinger technique.  The catheter was placed into the trocar under fluoroscopic guidance and the outer sheath of the trocar was removed.  The positioning was confirmed with fluoroscopy and the catheter tip was at the SVC/right atrial junction.  The port was flushed with heparinized saline and the incisions were closed with Vicryl and Monocryl in standard fashion.  Dermabond was applied as dressing and the counts were correct at the end of the case.  The patient tolerated this procedure well, was extubated and transported to the recovery room in stable condition.

## 2019-08-27 ENCOUNTER — APPOINTMENT (OUTPATIENT)
Dept: ONCOLOGY | Facility: HOSPITAL | Age: 69
End: 2019-08-27

## 2019-08-27 ENCOUNTER — INFUSION (OUTPATIENT)
Dept: ONCOLOGY | Facility: HOSPITAL | Age: 69
End: 2019-08-27

## 2019-08-27 ENCOUNTER — APPOINTMENT (OUTPATIENT)
Dept: ONCOLOGY | Facility: CLINIC | Age: 69
End: 2019-08-27

## 2019-08-27 ENCOUNTER — OFFICE VISIT (OUTPATIENT)
Dept: ONCOLOGY | Facility: CLINIC | Age: 69
End: 2019-08-27

## 2019-08-27 VITALS
OXYGEN SATURATION: 96 % | SYSTOLIC BLOOD PRESSURE: 127 MMHG | WEIGHT: 186 LBS | DIASTOLIC BLOOD PRESSURE: 80 MMHG | HEIGHT: 69 IN | RESPIRATION RATE: 16 BRPM | HEART RATE: 80 BPM | TEMPERATURE: 98.2 F | BODY MASS INDEX: 27.55 KG/M2

## 2019-08-27 DIAGNOSIS — C80.1 SMALL CELL CARCINOMA (HCC): Primary | ICD-10-CM

## 2019-08-27 DIAGNOSIS — Z45.2 ENCOUNTER FOR FITTING AND ADJUSTMENT OF VASCULAR CATHETER: ICD-10-CM

## 2019-08-27 DIAGNOSIS — C34.90 SMALL CELL LUNG CANCER (HCC): ICD-10-CM

## 2019-08-27 DIAGNOSIS — Z79.899 HIGH RISK MEDICATION USE: Primary | ICD-10-CM

## 2019-08-27 LAB
ALBUMIN SERPL-MCNC: 4.1 G/DL (ref 3.5–5.2)
ALBUMIN/GLOB SERPL: 1.2 G/DL (ref 1.1–2.4)
ALP SERPL-CCNC: 78 U/L (ref 38–116)
ALT SERPL W P-5'-P-CCNC: 11 U/L (ref 0–41)
ANION GAP SERPL CALCULATED.3IONS-SCNC: 12.7 MMOL/L (ref 5–15)
AST SERPL-CCNC: 18 U/L (ref 0–40)
BASOPHILS # BLD AUTO: 0.05 10*3/MM3 (ref 0–0.2)
BASOPHILS NFR BLD AUTO: 0.7 % (ref 0–1.5)
BILIRUB SERPL-MCNC: 0.3 MG/DL (ref 0.2–1.2)
BUN BLD-MCNC: 14 MG/DL (ref 6–20)
BUN/CREAT SERPL: 14.1 (ref 7.3–30)
CALCIUM SPEC-SCNC: 9.3 MG/DL (ref 8.5–10.2)
CHLORIDE SERPL-SCNC: 101 MMOL/L (ref 98–107)
CO2 SERPL-SCNC: 24.3 MMOL/L (ref 22–29)
CREAT BLD-MCNC: 0.99 MG/DL (ref 0.7–1.3)
DEPRECATED RDW RBC AUTO: 71.3 FL (ref 37–54)
EOSINOPHIL # BLD AUTO: 0.04 10*3/MM3 (ref 0–0.4)
EOSINOPHIL NFR BLD AUTO: 0.6 % (ref 0.3–6.2)
ERYTHROCYTE [DISTWIDTH] IN BLOOD BY AUTOMATED COUNT: 21 % (ref 12.3–15.4)
GFR SERPL CREATININE-BSD FRML MDRD: 75 ML/MIN/1.73
GLOBULIN UR ELPH-MCNC: 3.3 GM/DL (ref 1.8–3.5)
GLUCOSE BLD-MCNC: 122 MG/DL (ref 74–124)
HCT VFR BLD AUTO: 39.6 % (ref 37.5–51)
HGB BLD-MCNC: 13 G/DL (ref 13–17.7)
IMM GRANULOCYTES # BLD AUTO: 0.02 10*3/MM3 (ref 0–0.05)
IMM GRANULOCYTES NFR BLD AUTO: 0.3 % (ref 0–0.5)
LYMPHOCYTES # BLD AUTO: 1.25 10*3/MM3 (ref 0.7–3.1)
LYMPHOCYTES NFR BLD AUTO: 18.2 % (ref 19.6–45.3)
MCH RBC QN AUTO: 31 PG (ref 26.6–33)
MCHC RBC AUTO-ENTMCNC: 32.8 G/DL (ref 31.5–35.7)
MCV RBC AUTO: 94.3 FL (ref 79–97)
MONOCYTES # BLD AUTO: 0.87 10*3/MM3 (ref 0.1–0.9)
MONOCYTES NFR BLD AUTO: 12.6 % (ref 5–12)
NEUTROPHILS # BLD AUTO: 4.65 10*3/MM3 (ref 1.7–7)
NEUTROPHILS NFR BLD AUTO: 67.6 % (ref 42.7–76)
NRBC BLD AUTO-RTO: 0 /100 WBC (ref 0–0.2)
PLATELET # BLD AUTO: 191 10*3/MM3 (ref 140–450)
PMV BLD AUTO: 8.8 FL (ref 6–12)
POTASSIUM BLD-SCNC: 4.5 MMOL/L (ref 3.5–4.7)
PROT SERPL-MCNC: 7.4 G/DL (ref 6.3–8)
RBC # BLD AUTO: 4.2 10*6/MM3 (ref 4.14–5.8)
SODIUM BLD-SCNC: 138 MMOL/L (ref 134–145)
WBC NRBC COR # BLD: 6.88 10*3/MM3 (ref 3.4–10.8)

## 2019-08-27 PROCEDURE — 25010000002 ETOPOSIDE 1 GM/50ML SOLUTION 50 ML VIAL: Performed by: NURSE PRACTITIONER

## 2019-08-27 PROCEDURE — 99214 OFFICE O/P EST MOD 30 MIN: CPT | Performed by: NURSE PRACTITIONER

## 2019-08-27 PROCEDURE — 96413 CHEMO IV INFUSION 1 HR: CPT | Performed by: NURSE PRACTITIONER

## 2019-08-27 PROCEDURE — 25010000002 ATEZOLIZUMAB 1200 MG/20ML SOLUTION 20 ML VIAL: Performed by: NURSE PRACTITIONER

## 2019-08-27 PROCEDURE — 96375 TX/PRO/DX INJ NEW DRUG ADDON: CPT | Performed by: NURSE PRACTITIONER

## 2019-08-27 PROCEDURE — 25010000002 CARBOPLATIN PER 50 MG: Performed by: NURSE PRACTITIONER

## 2019-08-27 PROCEDURE — 96367 TX/PROPH/DG ADDL SEQ IV INF: CPT | Performed by: NURSE PRACTITIONER

## 2019-08-27 PROCEDURE — 25010000002 PALONOSETRON PER 25 MCG: Performed by: NURSE PRACTITIONER

## 2019-08-27 PROCEDURE — 96417 CHEMO IV INFUS EACH ADDL SEQ: CPT | Performed by: NURSE PRACTITIONER

## 2019-08-27 PROCEDURE — 80053 COMPREHEN METABOLIC PANEL: CPT

## 2019-08-27 PROCEDURE — 85025 COMPLETE CBC W/AUTO DIFF WBC: CPT

## 2019-08-27 PROCEDURE — 25010000002 FOSAPREPITANT PER 1 MG: Performed by: NURSE PRACTITIONER

## 2019-08-27 PROCEDURE — 25010000002 DEXAMETHASONE SODIUM PHOSPHATE 100 MG/10ML SOLUTION: Performed by: NURSE PRACTITIONER

## 2019-08-27 RX ORDER — SODIUM CHLORIDE 9 MG/ML
250 INJECTION, SOLUTION INTRAVENOUS ONCE
Status: CANCELLED | OUTPATIENT
Start: 2019-08-28

## 2019-08-27 RX ORDER — FAMOTIDINE 10 MG/ML
20 INJECTION, SOLUTION INTRAVENOUS AS NEEDED
Status: CANCELLED | OUTPATIENT
Start: 2019-08-27

## 2019-08-27 RX ORDER — PROCHLORPERAZINE MALEATE 10 MG
10 TABLET ORAL ONCE
Status: CANCELLED | OUTPATIENT
Start: 2019-08-29 | End: 2019-08-29

## 2019-08-27 RX ORDER — DIPHENHYDRAMINE HYDROCHLORIDE 50 MG/ML
50 INJECTION INTRAMUSCULAR; INTRAVENOUS AS NEEDED
Status: CANCELLED | OUTPATIENT
Start: 2019-08-27

## 2019-08-27 RX ORDER — SODIUM CHLORIDE 9 MG/ML
250 INJECTION, SOLUTION INTRAVENOUS ONCE
Status: CANCELLED | OUTPATIENT
Start: 2019-08-29

## 2019-08-27 RX ORDER — SODIUM CHLORIDE 9 MG/ML
250 INJECTION, SOLUTION INTRAVENOUS ONCE
Status: CANCELLED | OUTPATIENT
Start: 2019-08-27

## 2019-08-27 RX ORDER — SODIUM CHLORIDE 0.9 % (FLUSH) 0.9 %
10 SYRINGE (ML) INJECTION AS NEEDED
Status: CANCELLED | OUTPATIENT
Start: 2019-08-27

## 2019-08-27 RX ORDER — SODIUM CHLORIDE 9 MG/ML
250 INJECTION, SOLUTION INTRAVENOUS ONCE
Status: COMPLETED | OUTPATIENT
Start: 2019-08-27 | End: 2019-08-27

## 2019-08-27 RX ORDER — PALONOSETRON 0.05 MG/ML
0.25 INJECTION, SOLUTION INTRAVENOUS ONCE
Status: COMPLETED | OUTPATIENT
Start: 2019-08-27 | End: 2019-08-27

## 2019-08-27 RX ORDER — SODIUM CHLORIDE 0.9 % (FLUSH) 0.9 %
10 SYRINGE (ML) INJECTION AS NEEDED
Status: DISCONTINUED | OUTPATIENT
Start: 2019-08-27 | End: 2019-08-27 | Stop reason: HOSPADM

## 2019-08-27 RX ORDER — PROCHLORPERAZINE MALEATE 10 MG
10 TABLET ORAL ONCE
Status: CANCELLED | OUTPATIENT
Start: 2019-08-28 | End: 2019-08-28

## 2019-08-27 RX ORDER — PALONOSETRON 0.05 MG/ML
0.25 INJECTION, SOLUTION INTRAVENOUS ONCE
Status: CANCELLED | OUTPATIENT
Start: 2019-08-27

## 2019-08-27 RX ADMIN — Medication 500 UNITS: at 16:19

## 2019-08-27 RX ADMIN — SODIUM CHLORIDE 250 ML: 9 INJECTION, SOLUTION INTRAVENOUS at 12:55

## 2019-08-27 RX ADMIN — ATEZOLIZUMAB 1200 MG: 1200 INJECTION, SOLUTION INTRAVENOUS at 13:09

## 2019-08-27 RX ADMIN — DEXAMETHASONE SODIUM PHOSPHATE 12 MG: 10 INJECTION, SOLUTION INTRAMUSCULAR; INTRAVENOUS at 14:22

## 2019-08-27 RX ADMIN — PALONOSETRON HYDROCHLORIDE 0.25 MG: 0.25 INJECTION, SOLUTION INTRAVENOUS at 12:55

## 2019-08-27 RX ADMIN — CARBOPLATIN 560 MG: 10 INJECTION, SOLUTION INTRAVENOUS at 14:38

## 2019-08-27 RX ADMIN — SODIUM CHLORIDE 100 ML: 9 INJECTION, SOLUTION INTRAVENOUS at 13:49

## 2019-08-27 RX ADMIN — ETOPOSIDE 190 MG: 20 INJECTION, SOLUTION, CONCENTRATE INTRAVENOUS at 15:16

## 2019-08-28 ENCOUNTER — INFUSION (OUTPATIENT)
Dept: ONCOLOGY | Facility: HOSPITAL | Age: 69
End: 2019-08-28

## 2019-08-28 VITALS
SYSTOLIC BLOOD PRESSURE: 152 MMHG | WEIGHT: 189 LBS | RESPIRATION RATE: 16 BRPM | BODY MASS INDEX: 27.68 KG/M2 | HEART RATE: 90 BPM | TEMPERATURE: 98.1 F | OXYGEN SATURATION: 97 % | DIASTOLIC BLOOD PRESSURE: 72 MMHG

## 2019-08-28 DIAGNOSIS — Z45.2 ENCOUNTER FOR FITTING AND ADJUSTMENT OF VASCULAR CATHETER: ICD-10-CM

## 2019-08-28 DIAGNOSIS — C34.90 SMALL CELL LUNG CANCER (HCC): ICD-10-CM

## 2019-08-28 DIAGNOSIS — Z79.899 HIGH RISK MEDICATION USE: Primary | ICD-10-CM

## 2019-08-28 PROCEDURE — 63710000001 PROCHLORPERAZINE MALEATE PER 5 MG: Performed by: NURSE PRACTITIONER

## 2019-08-28 PROCEDURE — 96413 CHEMO IV INFUSION 1 HR: CPT | Performed by: NURSE PRACTITIONER

## 2019-08-28 PROCEDURE — 25010000002 ETOPOSIDE 1 GM/50ML SOLUTION 50 ML VIAL: Performed by: NURSE PRACTITIONER

## 2019-08-28 RX ORDER — SODIUM CHLORIDE 9 MG/ML
250 INJECTION, SOLUTION INTRAVENOUS ONCE
Status: COMPLETED | OUTPATIENT
Start: 2019-08-28 | End: 2019-08-28

## 2019-08-28 RX ORDER — PROCHLORPERAZINE MALEATE 5 MG/1
10 TABLET ORAL ONCE
Status: COMPLETED | OUTPATIENT
Start: 2019-08-28 | End: 2019-08-28

## 2019-08-28 RX ORDER — SODIUM CHLORIDE 0.9 % (FLUSH) 0.9 %
10 SYRINGE (ML) INJECTION AS NEEDED
Status: CANCELLED | OUTPATIENT
Start: 2019-08-28

## 2019-08-28 RX ORDER — SODIUM CHLORIDE 0.9 % (FLUSH) 0.9 %
10 SYRINGE (ML) INJECTION AS NEEDED
Status: DISCONTINUED | OUTPATIENT
Start: 2019-08-28 | End: 2019-08-28 | Stop reason: HOSPADM

## 2019-08-28 RX ADMIN — Medication 500 UNITS: at 16:13

## 2019-08-28 RX ADMIN — SODIUM CHLORIDE, PRESERVATIVE FREE 10 ML: 5 INJECTION INTRAVENOUS at 16:13

## 2019-08-28 RX ADMIN — SODIUM CHLORIDE 250 ML: 9 INJECTION, SOLUTION INTRAVENOUS at 14:49

## 2019-08-28 RX ADMIN — ETOPOSIDE 190 MG: 20 INJECTION, SOLUTION, CONCENTRATE INTRAVENOUS at 15:11

## 2019-08-28 RX ADMIN — PROCHLORPERAZINE MALEATE 10 MG: 5 TABLET ORAL at 14:49

## 2019-08-28 NOTE — PROGRESS NOTES
REASON FOR FOLLOW UP: T4N3 Small cell lung cancer    HISTORY OF PRESENT ILLNESS:  Julia Smith III is a 69 y.o. male with the above-mentioned history returns today for follow-up, anticipating cycle #3 of therapy with carboplatin, -16, atezolizumab. He was held secondary to neutropenia last week and Neulasta has been added to his careplan with hoped of preventing further dose delays.    He reports mild fatigue but denies nausea, vomiting, diarrhea, bleeding, bruising or swelling.  He is actually tolerating treatment quite well.  He had his port placed on 8/22/2019 and this has been accessed without issue today.  It is healing nicely        Past Medical History:   Diagnosis Date   • Atrial fibrillation with RVR (CMS/HCC)    • Cancer (CMS/HCC) 06/2019    Right lung   • Chronic cough    • COPD (chronic obstructive pulmonary disease) (CMS/HCC)    • Hearing loss    • History of shingles    • Lower back pain    • Mastoiditis    • Mild mitral regurgitation    • Other fatigue    • PAF (paroxysmal atrial fibrillation) (CMS/HCC)    • Pneumonia        Past Surgical History:   Procedure Laterality Date   • BRONCHOSCOPY N/A 6/19/2019    Procedure: BRONCHOSCOPY WITH WASHINGS AND BIOPSY AND ENDOBRONCHIAL ULTRASOUND WITH FNA;  Surgeon: Arslan Marquez MD;  Location: [facility] ENDOSCOPY;  Service: Pulmonary   • INNER EAR SURGERY Left    • MASTOIDECTOMY Left 1994   • VENOUS ACCESS DEVICE (PORT) INSERTION Right 8/22/2019    Procedure: INSERTION OF PORTACATH;  Surgeon: Ramila Gallardo MD;  Location: [facility] OR;  Service: Cardiothoracic       SOCIAL HISTORY:   reports that he has been smoking cigarettes.  He has a 60.00 pack-year smoking history. He has never used smokeless tobacco. He reports that he drinks about 2.4 - 4.2 oz of alcohol per week. He reports that he uses drugs. Drug: Marijuana.    FAMILY HISTORY:  family history includes COPD in his father; Heart disease in his brother; No Known Problems in his brother,  "brother, brother, mother, and sister.    ALLERGIES:  No Known Allergies    MEDICATIONS:  The medication list has been reviewed with the patient by the medical assistant, and the list has been updated in the electronic medical record, which I reviewed.  Medication dosages and frequencies were confirmed to be accurate.    I have reviewed the patient's medical history in detail and updated the computerized patient record.    Review of Systems   Review of Systems   Constitutional: Negative for activity change, appetite change, chills, fatigue and fever.   HENT: Negative for mouth sores, nosebleeds and trouble swallowing.    Respiratory: Negative for cough.    Cardiovascular: Negative for chest pain and leg swelling.   Gastrointestinal: Negative for abdominal pain, constipation, diarrhea, nausea and vomiting.   Genitourinary: Negative for difficulty urinating.   Musculoskeletal:        Knee pain - chronic stable   Skin: Negative for rash.   Neurological: Negative for dizziness, weakness and numbness.   Hematological: Negative for adenopathy. Does not bruise/bleed easily.   Psychiatric/Behavioral: Negative for sleep disturbance.       Vitals:    08/27/19 1202   BP: 127/80   Pulse: 80   Resp: 16   Temp: 98.2 °F (36.8 °C)   TempSrc: Oral   SpO2: 96%   Weight: 84.4 kg (186 lb)   Height: 176 cm (69.29\")  Comment: NEW HEIGHT   PainSc: 0-No pain     Physical Exam   Constitutional: He is oriented to person, place, and time. He appears well-developed and well-nourished. No distress.   HENT:   Head: Normocephalic and atraumatic.   Mouth/Throat: Oropharynx is clear and moist and mucous membranes are normal. No oropharyngeal exudate.   Eyes: Pupils are equal, round, and reactive to light.   Neck: Normal range of motion.   Cardiovascular: Normal rate and regular rhythm.   Pulmonary/Chest: Effort normal. He has no rhonchi.   Abdominal: Soft. Normal appearance and bowel sounds are normal. He exhibits no distension. There is no " hepatosplenomegaly.   Musculoskeletal: Normal range of motion. He exhibits no edema.   Neurological: He is alert and oriented to person, place, and time.   Skin: Skin is warm and dry. No rash noted.   Psychiatric: He has a normal mood and affect.   Vitals reviewed.      DIAGNOSTIC DATA:  Results from last 7 days   Lab Units 08/27/19  1125   WBC 10*3/mm3 6.88   NEUTROS ABS 10*3/mm3 4.65   HEMOGLOBIN g/dL 13.0   HEMATOCRIT % 39.6   PLATELETS 10*3/mm3 191     Results from last 7 days   Lab Units 08/27/19  1132   SODIUM mmol/L 138   POTASSIUM mmol/L 4.5   CHLORIDE mmol/L 101   CO2 mmol/L 24.3   BUN mg/dL 14   CREATININE mg/dL 0.99   CALCIUM mg/dL 9.3   ALBUMIN g/dL 4.10   BILIRUBIN mg/dL 0.3   ALK PHOS U/L 78   ALT (SGPT) U/L 11   AST (SGOT) U/L 18   GLUCOSE mg/dL 122           IMAGING:  I personally reviewed CT images of the chest abdomen and pelvis from 8/14/2019 which show significant improvement in the lymphadenopathy and in the right hilar/mediastinal mass.    ASSESSMENT:  This is a 69 y.o. male with:  1.   Small cell lung cancer originating in the right hilum:   · He has an extremely large mass there with concern for SVC compression on CT imaging from 6/4/2019.  There is some left hilar adenopathy.  · Some parotid lesions which are concerning but indeterminate, left greater than right.  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  · Staging MRI of the brain 7/10/2019 negative for metastatic disease  · He was discussed at multidisciplinary thoracic conference, with his T4 and 3 disease, concern for involvement of the right supraclavicular region and obvious left hilum, plans to add atezolizumab with cycle 2, we can consider future radiation to the residual mediastinal mass if necessary.  · Lung lesion too large to radiate in spite of the SVC narrowing.     · On 7/8/2019 we started carboplatin AUC 5 and VP16 100 mg/m2 through peripheral IVs  · Cycle 2 postponed on 7/29/2019 due to heart rate of 154.   · CT imaging on 8/14/2019 cycles of therapy shows significant improvement.  · Mediport anticipated by Dr. Gallardo on 8/22/2019.  · On  8/20/2019 cycle 3 was delayed due to neutropenia with cycle 3 plans to incorporate Neulasta.  · He is here today for scheduled cycle 3.  His counts have improved.    2.  Chronic hearing loss: History of mastoiditis.  He is not a candidate for cisplatin because of this.     3.  Atrial fibrillation.  He was initiated on metoprolol 12.5 mg twice daily.  This did delay port placement.  The patient was taking his metoprolol inconsistently.  For cycle 2 was delayed due to heart rate of 150 7/29/2019.  He was seen by cardiology with instructions to increase his metoprolol to 25 mg twice daily.  Heart rate improved today at 80.    4.  Tobacco use.  He did utilize a NicoDerm patch while inpatient, though has continued smoking since discharge.  We discussed smoking cessation again.    5.  SVC syndrome.  Improved after 2 cycles of chemotherapy.    6. Tachycardia related to atrial fibrillation as outlined above  · Heart rate on 7/29/19 was 154.  Cycle 2 was delayed.  · Metoprolol increased to 25 mg twice daily with improvement in heart rate.  7.  Neutropenia related to chemotherapy.  This did cause a dose delay with cycle 3 last week.  We have added Neulasta and this has been approved by his insurance.      PLAN:   1. Proceed with treatment as scheduled today.  He will return tomorrow and the following day for -16.  He will receive on body Neulasta on day 3 as well.  He has been educated about potential body aches laded to Neulasta.  Also discussed the purpose of incorporating this medication.  2. Will return as already scheduled on 9/17/2019 to see Dr. Morel in anticipation of his next cycle.I have asked the patient to call the office with any new  or worsening symptoms before the scheduled date.      Shandra Liz, APRN

## 2019-08-29 ENCOUNTER — INFUSION (OUTPATIENT)
Dept: ONCOLOGY | Facility: HOSPITAL | Age: 69
End: 2019-08-29

## 2019-08-29 VITALS
HEART RATE: 80 BPM | DIASTOLIC BLOOD PRESSURE: 87 MMHG | SYSTOLIC BLOOD PRESSURE: 164 MMHG | RESPIRATION RATE: 16 BRPM | OXYGEN SATURATION: 99 % | WEIGHT: 188.4 LBS | TEMPERATURE: 97.8 F | BODY MASS INDEX: 27.59 KG/M2

## 2019-08-29 DIAGNOSIS — C34.90 SMALL CELL LUNG CANCER (HCC): ICD-10-CM

## 2019-08-29 DIAGNOSIS — Z79.899 HIGH RISK MEDICATION USE: Primary | ICD-10-CM

## 2019-08-29 DIAGNOSIS — Z45.2 ENCOUNTER FOR FITTING AND ADJUSTMENT OF VASCULAR CATHETER: ICD-10-CM

## 2019-08-29 PROCEDURE — 25010000002 ETOPOSIDE 1 GM/50ML SOLUTION 50 ML VIAL: Performed by: NURSE PRACTITIONER

## 2019-08-29 PROCEDURE — 96377 APPLICATON ON-BODY INJECTOR: CPT | Performed by: NURSE PRACTITIONER

## 2019-08-29 PROCEDURE — 25010000002 PEGFILGRASTIM 6 MG/0.6ML PREFILLED SYRINGE KIT: Performed by: NURSE PRACTITIONER

## 2019-08-29 PROCEDURE — 63710000001 PROCHLORPERAZINE MALEATE PER 5 MG: Performed by: NURSE PRACTITIONER

## 2019-08-29 PROCEDURE — 96413 CHEMO IV INFUSION 1 HR: CPT | Performed by: NURSE PRACTITIONER

## 2019-08-29 RX ORDER — SODIUM CHLORIDE 0.9 % (FLUSH) 0.9 %
10 SYRINGE (ML) INJECTION AS NEEDED
Status: CANCELLED | OUTPATIENT
Start: 2019-08-29

## 2019-08-29 RX ORDER — SODIUM CHLORIDE 9 MG/ML
250 INJECTION, SOLUTION INTRAVENOUS ONCE
Status: COMPLETED | OUTPATIENT
Start: 2019-08-29 | End: 2019-08-29

## 2019-08-29 RX ORDER — PROCHLORPERAZINE MALEATE 5 MG/1
10 TABLET ORAL ONCE
Status: COMPLETED | OUTPATIENT
Start: 2019-08-29 | End: 2019-08-29

## 2019-08-29 RX ORDER — SODIUM CHLORIDE 0.9 % (FLUSH) 0.9 %
10 SYRINGE (ML) INJECTION AS NEEDED
Status: DISCONTINUED | OUTPATIENT
Start: 2019-08-29 | End: 2019-08-29 | Stop reason: HOSPADM

## 2019-08-29 RX ADMIN — PROCHLORPERAZINE MALEATE 10 MG: 5 TABLET ORAL at 14:57

## 2019-08-29 RX ADMIN — PEGFILGRASTIM 6 MG: KIT SUBCUTANEOUS at 16:26

## 2019-08-29 RX ADMIN — SODIUM CHLORIDE 250 ML: 9 INJECTION, SOLUTION INTRAVENOUS at 14:57

## 2019-08-29 RX ADMIN — ETOPOSIDE 190 MG: 20 INJECTION, SOLUTION, CONCENTRATE INTRAVENOUS at 15:03

## 2019-08-29 NOTE — PROGRESS NOTES
Neulasta OnBody added to this treatment cycle. Pt questioned why this was necessary. Explained why Neulasta is used and how it will benefit the patient. Pt watched educational video which was reinforced. Pt was given a time in which Neulasta should be complete at which time can be removed. Pt denies any questions and and v/u.

## 2019-09-11 ENCOUNTER — OFFICE VISIT (OUTPATIENT)
Dept: CARDIOLOGY | Facility: CLINIC | Age: 69
End: 2019-09-11

## 2019-09-11 VITALS
SYSTOLIC BLOOD PRESSURE: 110 MMHG | BODY MASS INDEX: 25.76 KG/M2 | HEART RATE: 81 BPM | WEIGHT: 184 LBS | DIASTOLIC BLOOD PRESSURE: 64 MMHG | HEIGHT: 71 IN

## 2019-09-11 DIAGNOSIS — I34.0 MILD MITRAL REGURGITATION: ICD-10-CM

## 2019-09-11 DIAGNOSIS — I48.0 PAF (PAROXYSMAL ATRIAL FIBRILLATION) (HCC): Primary | ICD-10-CM

## 2019-09-11 PROCEDURE — 99213 OFFICE O/P EST LOW 20 MIN: CPT | Performed by: INTERNAL MEDICINE

## 2019-09-11 PROCEDURE — 93000 ELECTROCARDIOGRAM COMPLETE: CPT | Performed by: INTERNAL MEDICINE

## 2019-09-11 NOTE — PROGRESS NOTES
Date of Office Visit: 19  Encounter Provider: Ramsey Aiken MD  Place of Service: [facility] CARDIOLOGY  Patient Name: Julia Smith III  :1950  Referral Provider:No ref. provider found      Chief Complaint   Patient presents with   • Atrial Fibrillation     History of Present Illness  Mr Smith is a 68 yo gentleman with history of lung cancer, COPD, and paroxysmal atrial fibrillation.  He was found to have small Cell cancer of the lung as well as being a rapid H fibrillation.  Echocardiogram showed normal left ventricular ejection fraction but basal anteroseptal and inferoseptal wall hypokinesis no real significant valvular disease.  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  Ultimately had a port placed started on chemotherapy.  He did convert back to sinus rhythm and was not anticoagulated at that time other than aspirin since he was in atrial fibrillation for such a short period of time as well as is metastatic cancer.  He now comes in for follow-up he is been doing reasonably well he does have some shortness of breath but better.  No chest pain or pressure no palpitations no near syncope or syncope.  He did receive chemo and is continued to do that.  He does feel little down and depressed.          Past Medical History:   Diagnosis Date   • Atrial fibrillation with RVR (CMS/HCC)    • Cancer (CMS/HCC) 2019    Right lung   • Chronic cough    • COPD (chronic obstructive pulmonary disease) (CMS/HCC)    • Hearing loss    • History of shingles    • Lower back pain    • Mastoiditis    • Mild mitral regurgitation    • Other fatigue    • PAF (paroxysmal atrial fibrillation) (CMS/HCC)    • Pneumonia          Past Surgical History:   Procedure Laterality Date   • BRONCHOSCOPY N/A 2019    Procedure: BRONCHOSCOPY WITH WASHINGS AND BIOPSY AND ENDOBRONCHIAL ULTRASOUND WITH FNA;  Surgeon: Arslan Marquez MD;  Location: [facility] ENDOSCOPY;   Service: Pulmonary   • INNER EAR SURGERY Left    • MASTOIDECTOMY Left    • VENOUS ACCESS DEVICE (PORT) INSERTION Right 2019    Procedure: INSERTION OF PORTACATH;  Surgeon: Ramila Gallardo MD;  Location: [facility]  Service: Cardiothoracic         Current Outpatient Medications on File Prior to Visit   Medication Sig Dispense Refill   • metoprolol tartrate (LOPRESSOR) 25 MG tablet Take one tablet by mouth twice daily (Patient taking differently: Taking 1/2 tablet every morning and 1 whole tablet every evening.) 60 tablet 0   • ondansetron (ZOFRAN) 8 MG tablet Take 1 tablet by mouth 3 (Three) Times a Day As Needed for Nausea or Vomiting. 30 tablet 5   • HYDROcodone-acetaminophen (NORCO) 5-325 MG per tablet Take 1-2 tablets by mouth Every 4 (Four) Hours As Needed (Pain). 10 tablet 0     Current Facility-Administered Medications on File Prior to Visit   Medication Dose Route Frequency Provider Last Rate Last Dose   • ipratropium-albuterol (DUO-NEB) nebulizer solution 3 mL  3 mL Nebulization 4x Daily - RT Roma Costa APRN   3 mL at 19 1625         Social History     Socioeconomic History   • Marital status:      Spouse name: Krystle   • Number of children: 2   • Years of education: Not on file   • Highest education level: Not on file   Occupational History   • Occupation: [employer]     Employer: RETIRED   Tobacco Use   • Smoking status: Current Every Day Smoker     Packs/day: 1.50     Years: 40.00     Pack years: 60.00     Types: Cigarettes   • Smokeless tobacco: Never Used   • Tobacco comment: Since age 32   Substance and Sexual Activity   • Alcohol use: Yes     Alcohol/week: 2.4 - 4.2 oz     Types: 4 - 7 Cans of beer per week     Comment: daily/caffeine use   • Drug use: Yes     Types: Marijuana   • Sexual activity: Not Currently     Partners: Female       Family History   Problem Relation Age of Onset   • No Known Problems Mother    • COPD Father            • No Known  "Problems Sister    • Heart disease Brother    • No Known Problems Brother    • No Known Problems Brother    • No Known Problems Brother          Review of Systems   Constitution: Positive for malaise/fatigue. Negative for decreased appetite, diaphoresis, fever, weakness, weight gain and weight loss.   HENT: Negative for congestion, hearing loss, nosebleeds and tinnitus.    Eyes: Negative for blurred vision, double vision, vision loss in left eye, vision loss in right eye and visual disturbance.   Cardiovascular:        As noted in HPI   Respiratory:        As noted HPI   Endocrine: Negative for cold intolerance and heat intolerance.   Hematologic/Lymphatic: Negative for bleeding problem. Does not bruise/bleed easily.   Skin: Negative for color change, flushing, itching and rash.   Musculoskeletal: Negative for arthritis, back pain, joint pain, joint swelling, muscle weakness and myalgias.   Gastrointestinal: Negative for bloating, abdominal pain, constipation, diarrhea, dysphagia, heartburn, hematemesis, hematochezia, melena, nausea and vomiting.   Genitourinary: Negative for bladder incontinence, dysuria, frequency, nocturia and urgency.   Neurological: Negative for dizziness, focal weakness, headaches, light-headedness, loss of balance, numbness, paresthesias and vertigo.   Psychiatric/Behavioral: Positive for depression. Negative for memory loss and substance abuse.       Procedures      ECG 12 Lead  Date/Time: 9/11/2019 1:27 PM  Performed by: Ramsey Aiken MD  Authorized by: Ramsey Aiken MD   Comparison: compared with previous ECG   Similar to previous ECG  Rhythm: sinus rhythm  Rate: normal  Conduction: right bundle branch block  QRS axis: normal                  Objective:    /64 (BP Location: Right arm, Patient Position: Sitting)   Pulse 81   Ht 180.3 cm (71\")   Wt 83.5 kg (184 lb)   BMI 25.66 kg/m²        Physical Exam  Physical Exam   Constitutional: He is oriented to person, place, and " time. He appears well-developed and well-nourished. No distress.   HENT:   Head: Normocephalic.   Eyes: Conjunctivae are normal. Pupils are equal, round, and reactive to light. No scleral icterus.   Neck: Normal carotid pulses, no hepatojugular reflux and no JVD present. Carotid bruit is not present. No tracheal deviation, no edema and no erythema present. No thyromegaly present.   Cardiovascular: Normal rate, regular rhythm, S1 normal, S2 normal, normal heart sounds and intact distal pulses.  No extrasystoles are present. PMI is not displaced. Exam reveals no gallop, no distant heart sounds and no friction rub.   No murmur heard.  Pulses:       Carotid pulses are 2+ on the right side, and 2+ on the left side.       Radial pulses are 2+ on the right side, and 2+ on the left side.        Femoral pulses are 2+ on the right side, and 2+ on the left side.       Dorsalis pedis pulses are 2+ on the right side, and 2+ on the left side.        Posterior tibial pulses are 2+ on the right side, and 2+ on the left side.   Pulmonary/Chest: Effort normal and breath sounds normal. No respiratory distress. He has no decreased breath sounds. He has no wheezes. He has no rhonchi. He has no rales. He exhibits no tenderness.   Abdominal: Soft. Bowel sounds are normal. He exhibits no distension and no mass. There is no hepatosplenomegaly. There is no tenderness. There is no rebound and no guarding.   Musculoskeletal: He exhibits no edema, tenderness or deformity.   Neurological: He is alert and oriented to person, place, and time.   Skin: Skin is warm and dry. No rash noted. He is not diaphoretic. No cyanosis or erythema. No pallor. Nails show no clubbing.   Psychiatric: He has a normal mood and affect. His speech is normal and behavior is normal. Judgment and thought content normal.           Assessment:   1.  69-year-old gentleman with paroxysmal atrial fibrillation.    Atrial Fibrillation and Atrial Flutter  Assessment  • The  patient has paroxysmal atrial fibrillation  • This is non-valvular in etiology  • The patient's CHADS2-VASc score is 1  • A [id number] score of 1 is considered an intermediate risk for a thromboembolic event  • Aspirin prescribed    Plan  • Attempt to maintain sinus rhythm  • Continue aspirin for antithrombotic therapy, bleeding issues discussed  • Continue beta blocker for rate control     Remaining in sinus rhythm.  Would continue the same but would like to have him on his regular dose of metoprolol he cut that back because he was going to run out.  If stable he will see our nurse practitioner in 3 months we will see me 6 months after that.  2.  Metastatic squamous cell cancer of the lung following with oncology.  3.  Depression I think it would benefit from seeing health psychology.         Plan:

## 2019-09-12 DIAGNOSIS — C34.90 SMALL CELL LUNG CANCER (HCC): ICD-10-CM

## 2019-09-12 DIAGNOSIS — Z79.899 HIGH RISK MEDICATION USE: ICD-10-CM

## 2019-09-17 ENCOUNTER — OFFICE VISIT (OUTPATIENT)
Dept: ONCOLOGY | Facility: CLINIC | Age: 69
End: 2019-09-17

## 2019-09-17 ENCOUNTER — INFUSION (OUTPATIENT)
Dept: ONCOLOGY | Facility: HOSPITAL | Age: 69
End: 2019-09-17

## 2019-09-17 VITALS
SYSTOLIC BLOOD PRESSURE: 110 MMHG | OXYGEN SATURATION: 98 % | BODY MASS INDEX: 27.4 KG/M2 | HEIGHT: 69 IN | TEMPERATURE: 98 F | HEART RATE: 76 BPM | DIASTOLIC BLOOD PRESSURE: 66 MMHG | WEIGHT: 185 LBS | RESPIRATION RATE: 16 BRPM

## 2019-09-17 DIAGNOSIS — C34.90 SMALL CELL LUNG CANCER (HCC): ICD-10-CM

## 2019-09-17 DIAGNOSIS — Z79.899 HIGH RISK MEDICATION USE: ICD-10-CM

## 2019-09-17 DIAGNOSIS — Z79.899 HIGH RISK MEDICATION USE: Primary | ICD-10-CM

## 2019-09-17 DIAGNOSIS — C7A.8 OTHER MALIGNANT NEUROENDOCRINE TUMORS (HCC): ICD-10-CM

## 2019-09-17 LAB
ALBUMIN SERPL-MCNC: 3.6 G/DL (ref 3.5–5.2)
ALBUMIN/GLOB SERPL: 0.9 G/DL (ref 1.1–2.4)
ALP SERPL-CCNC: 76 U/L (ref 38–116)
ALT SERPL W P-5'-P-CCNC: 26 U/L (ref 0–41)
ANION GAP SERPL CALCULATED.3IONS-SCNC: 12.8 MMOL/L (ref 5–15)
AST SERPL-CCNC: 21 U/L (ref 0–40)
BASOPHILS # BLD AUTO: 0.09 10*3/MM3 (ref 0–0.2)
BASOPHILS NFR BLD AUTO: 1 % (ref 0–1.5)
BILIRUB SERPL-MCNC: 0.2 MG/DL (ref 0.2–1.2)
BUN BLD-MCNC: 12 MG/DL (ref 6–20)
BUN/CREAT SERPL: 12.4 (ref 7.3–30)
CALCIUM SPEC-SCNC: 9.3 MG/DL (ref 8.5–10.2)
CHLORIDE SERPL-SCNC: 98 MMOL/L (ref 98–107)
CO2 SERPL-SCNC: 25.2 MMOL/L (ref 22–29)
CREAT BLD-MCNC: 0.97 MG/DL (ref 0.7–1.3)
DEPRECATED RDW RBC AUTO: 69.7 FL (ref 37–54)
EOSINOPHIL # BLD AUTO: 0.06 10*3/MM3 (ref 0–0.4)
EOSINOPHIL NFR BLD AUTO: 0.6 % (ref 0.3–6.2)
ERYTHROCYTE [DISTWIDTH] IN BLOOD BY AUTOMATED COUNT: 19.9 % (ref 12.3–15.4)
GFR SERPL CREATININE-BSD FRML MDRD: 77 ML/MIN/1.73
GLOBULIN UR ELPH-MCNC: 4 GM/DL (ref 1.8–3.5)
GLUCOSE BLD-MCNC: 143 MG/DL (ref 74–124)
HCT VFR BLD AUTO: 33.6 % (ref 37.5–51)
HGB BLD-MCNC: 11 G/DL (ref 13–17.7)
IMM GRANULOCYTES # BLD AUTO: 0.06 10*3/MM3 (ref 0–0.05)
IMM GRANULOCYTES NFR BLD AUTO: 0.6 % (ref 0–0.5)
LYMPHOCYTES # BLD AUTO: 1.14 10*3/MM3 (ref 0.7–3.1)
LYMPHOCYTES NFR BLD AUTO: 12.3 % (ref 19.6–45.3)
MCH RBC QN AUTO: 31.3 PG (ref 26.6–33)
MCHC RBC AUTO-ENTMCNC: 32.7 G/DL (ref 31.5–35.7)
MCV RBC AUTO: 95.7 FL (ref 79–97)
MONOCYTES # BLD AUTO: 1.08 10*3/MM3 (ref 0.1–0.9)
MONOCYTES NFR BLD AUTO: 11.7 % (ref 5–12)
NEUTROPHILS # BLD AUTO: 6.83 10*3/MM3 (ref 1.7–7)
NEUTROPHILS NFR BLD AUTO: 73.8 % (ref 42.7–76)
NRBC BLD AUTO-RTO: 0 /100 WBC (ref 0–0.2)
PLATELET # BLD AUTO: 323 10*3/MM3 (ref 140–450)
PMV BLD AUTO: 8.8 FL (ref 6–12)
POTASSIUM BLD-SCNC: 4.3 MMOL/L (ref 3.5–4.7)
PROT SERPL-MCNC: 7.6 G/DL (ref 6.3–8)
RBC # BLD AUTO: 3.51 10*6/MM3 (ref 4.14–5.8)
SODIUM BLD-SCNC: 136 MMOL/L (ref 134–145)
T3FREE SERPL-MCNC: 2.8 PG/ML (ref 2–4.4)
T4 FREE SERPL-MCNC: 1 NG/DL (ref 0.93–1.7)
TSH SERPL DL<=0.05 MIU/L-ACNC: 1.42 UIU/ML (ref 0.27–4.2)
WBC NRBC COR # BLD: 9.26 10*3/MM3 (ref 3.4–10.8)

## 2019-09-17 PROCEDURE — 25010000002 ATEZOLIZUMAB 1200 MG/20ML SOLUTION 20 ML VIAL: Performed by: INTERNAL MEDICINE

## 2019-09-17 PROCEDURE — 25010000002 DEXAMETHASONE SODIUM PHOSPHATE 100 MG/10ML SOLUTION: Performed by: INTERNAL MEDICINE

## 2019-09-17 PROCEDURE — 96367 TX/PROPH/DG ADDL SEQ IV INF: CPT | Performed by: INTERNAL MEDICINE

## 2019-09-17 PROCEDURE — 25010000002 FOSAPREPITANT PER 1 MG: Performed by: INTERNAL MEDICINE

## 2019-09-17 PROCEDURE — 84439 ASSAY OF FREE THYROXINE: CPT | Performed by: INTERNAL MEDICINE

## 2019-09-17 PROCEDURE — 96375 TX/PRO/DX INJ NEW DRUG ADDON: CPT | Performed by: INTERNAL MEDICINE

## 2019-09-17 PROCEDURE — 25010000002 ETOPOSIDE 1 GM/50ML SOLUTION 50 ML VIAL: Performed by: INTERNAL MEDICINE

## 2019-09-17 PROCEDURE — 99215 OFFICE O/P EST HI 40 MIN: CPT | Performed by: INTERNAL MEDICINE

## 2019-09-17 PROCEDURE — 80053 COMPREHEN METABOLIC PANEL: CPT

## 2019-09-17 PROCEDURE — 84481 FREE ASSAY (FT-3): CPT | Performed by: INTERNAL MEDICINE

## 2019-09-17 PROCEDURE — 96417 CHEMO IV INFUS EACH ADDL SEQ: CPT | Performed by: INTERNAL MEDICINE

## 2019-09-17 PROCEDURE — 84443 ASSAY THYROID STIM HORMONE: CPT | Performed by: INTERNAL MEDICINE

## 2019-09-17 PROCEDURE — 25010000002 CARBOPLATIN PER 50 MG: Performed by: INTERNAL MEDICINE

## 2019-09-17 PROCEDURE — 96413 CHEMO IV INFUSION 1 HR: CPT | Performed by: INTERNAL MEDICINE

## 2019-09-17 PROCEDURE — 25010000002 PALONOSETRON PER 25 MCG: Performed by: INTERNAL MEDICINE

## 2019-09-17 PROCEDURE — 85025 COMPLETE CBC W/AUTO DIFF WBC: CPT

## 2019-09-17 RX ORDER — SODIUM CHLORIDE 9 MG/ML
250 INJECTION, SOLUTION INTRAVENOUS ONCE
Status: COMPLETED | OUTPATIENT
Start: 2019-09-17 | End: 2019-09-17

## 2019-09-17 RX ORDER — PALONOSETRON 0.05 MG/ML
0.25 INJECTION, SOLUTION INTRAVENOUS ONCE
Status: CANCELLED | OUTPATIENT
Start: 2019-09-17

## 2019-09-17 RX ORDER — PROCHLORPERAZINE MALEATE 10 MG
10 TABLET ORAL ONCE
Status: CANCELLED | OUTPATIENT
Start: 2019-09-19 | End: 2019-09-19

## 2019-09-17 RX ORDER — SODIUM CHLORIDE 9 MG/ML
250 INJECTION, SOLUTION INTRAVENOUS ONCE
Status: CANCELLED | OUTPATIENT
Start: 2019-09-19

## 2019-09-17 RX ORDER — DIPHENHYDRAMINE HYDROCHLORIDE 50 MG/ML
50 INJECTION INTRAMUSCULAR; INTRAVENOUS AS NEEDED
Status: CANCELLED | OUTPATIENT
Start: 2019-09-17

## 2019-09-17 RX ORDER — PALONOSETRON 0.05 MG/ML
0.25 INJECTION, SOLUTION INTRAVENOUS ONCE
Status: COMPLETED | OUTPATIENT
Start: 2019-09-17 | End: 2019-09-17

## 2019-09-17 RX ORDER — PROCHLORPERAZINE MALEATE 10 MG
10 TABLET ORAL ONCE
Status: CANCELLED | OUTPATIENT
Start: 2019-09-18 | End: 2019-09-18

## 2019-09-17 RX ORDER — SODIUM CHLORIDE 9 MG/ML
250 INJECTION, SOLUTION INTRAVENOUS ONCE
Status: CANCELLED | OUTPATIENT
Start: 2019-09-17

## 2019-09-17 RX ORDER — FAMOTIDINE 10 MG/ML
20 INJECTION, SOLUTION INTRAVENOUS AS NEEDED
Status: CANCELLED | OUTPATIENT
Start: 2019-09-17

## 2019-09-17 RX ORDER — SODIUM CHLORIDE 9 MG/ML
250 INJECTION, SOLUTION INTRAVENOUS ONCE
Status: CANCELLED | OUTPATIENT
Start: 2019-09-18

## 2019-09-17 RX ADMIN — SODIUM CHLORIDE 250 ML: 9 INJECTION, SOLUTION INTRAVENOUS at 09:07

## 2019-09-17 RX ADMIN — DEXAMETHASONE SODIUM PHOSPHATE 12 MG: 10 INJECTION, SOLUTION INTRAMUSCULAR; INTRAVENOUS at 10:17

## 2019-09-17 RX ADMIN — ATEZOLIZUMAB 1200 MG: 1200 INJECTION, SOLUTION INTRAVENOUS at 09:41

## 2019-09-17 RX ADMIN — CARBOPLATIN 560 MG: 10 INJECTION, SOLUTION INTRAVENOUS at 10:37

## 2019-09-17 RX ADMIN — SODIUM CHLORIDE 100 ML: 9 INJECTION, SOLUTION INTRAVENOUS at 09:09

## 2019-09-17 RX ADMIN — ETOPOSIDE 190 MG: 20 INJECTION, SOLUTION, CONCENTRATE INTRAVENOUS at 11:09

## 2019-09-17 RX ADMIN — PALONOSETRON 0.25 MG: 0.05 INJECTION, SOLUTION INTRAVENOUS at 09:07

## 2019-09-17 NOTE — PROGRESS NOTES
REASON FOR FOLLOW UP:   1.  T4N3 Small cell lung cancer  2.  SVC syndrome  3.  Palliative therapy with carboplatin, -16 initiated on 7/8/2019.  Cycle 1 was initiated during a hospitalization.  Atezolizumab was added with cycle #2.  4.  Port placed on 8/22/2019, delayed due to SVC syndrome  5.  Neutropenia related to therapy requiring the addition of Neulasta with cycle #3.      HISTORY OF PRESENT ILLNESS:  Julia Smith III is a 69 y.o. male with the above-mentioned history returns today for follow-up, anticipating cycle #4 of therapy with carboplatin, -16, atezolizumab. Cycle #3 was delayed secondary to neutropenia and Neulasta was added to his careplan.      He continues to tolerate therapy well.  He is a little depressed under the circumstances.  However, he does not wish to talk to anybody about this and he does not desire any medication at this point.  First thing in the morning yesterday he did have some vertigo associated with vomiting.  This passed quickly.  He has a history of vertigo about 20 years ago.  He has been lightheaded at times otherwise but this was transient in the past.  He denies any syncope.  No other neurologic symptoms.  I advised him to notify us if the vertigo persists and we will request an MRI of his brain.    He reports some pain in his calves when he walks.  He reports fatigue.  He denies any dyspnea on exertion.        Past Medical History:   Diagnosis Date   • Atrial fibrillation with RVR (CMS/HCC)    • Cancer (CMS/[facility]) 06/2019    Right lung   • Chronic cough    • COPD (chronic obstructive pulmonary disease) (CMS/[facility])    • Hearing loss    • History of shingles    • Lower back pain    • Mastoiditis    • Mild mitral regurgitation    • Other fatigue    • PAF (paroxysmal atrial fibrillation) (CMS/[facility])    • Pneumonia        Past Surgical History:   Procedure Laterality Date   • BRONCHOSCOPY N/A 6/19/2019    Procedure: BRONCHOSCOPY WITH WASHINGS AND BIOPSY AND ENDOBRONCHIAL  ULTRASOUND WITH FNA;  Surgeon: Arslan Marquez MD;  Location: [facility] ENDOSCOPY;  Service: Pulmonary   • INNER EAR SURGERY Left    • MASTOIDECTOMY Left 1994   • VENOUS ACCESS DEVICE (PORT) INSERTION Right 8/22/2019    Procedure: INSERTION OF PORTACATH;  Surgeon: Ramila Gallardo MD;  Location: [facility] MAIN OR;  Service: Cardiothoracic       SOCIAL HISTORY:   reports that he has been smoking cigarettes.  He has a 60.00 pack-year smoking history. He has never used smokeless tobacco. He reports that he drinks about 2.4 - 4.2 oz of alcohol per week. He reports that he uses drugs. Drug: Marijuana.    FAMILY HISTORY:  family history includes COPD in his father; Heart disease in his brother; No Known Problems in his brother, brother, brother, mother, and sister.    ALLERGIES:  No Known Allergies    MEDICATIONS:  The medication list has been reviewed with the patient by the medical assistant, and the list has been updated in the electronic medical record, which I reviewed.  Medication dosages and frequencies were confirmed to be accurate.    I have reviewed the patient's medical history in detail and updated the computerized patient record.    Review of Systems   Review of Systems   Constitutional: Positive for fatigue. Negative for activity change, appetite change, chills and fever.   HENT: Negative for mouth sores, nosebleeds and trouble swallowing.    Respiratory: Negative for cough.    Cardiovascular: Negative for chest pain and leg swelling.   Gastrointestinal: Negative for abdominal pain, constipation, diarrhea, nausea and vomiting.   Genitourinary: Negative for difficulty urinating.   Musculoskeletal:        Knee pain - chronic stable   Skin: Negative for rash.   Neurological: Positive for dizziness and weakness (Bilateral leg). Negative for numbness.   Hematological: Negative for adenopathy. Does not bruise/bleed easily.   Psychiatric/Behavioral: Negative for sleep disturbance.       Vitals:    09/17/19 0826  "  BP: 110/66   Pulse: 76   Resp: 16   Temp: 98 °F (36.7 °C)   SpO2: 98%   Weight: 83.9 kg (185 lb)   Height: 176 cm (69.29\")   PainSc:   4   PainLoc: Comment: rt. arm     Physical Exam   Constitutional: He is oriented to person, place, and time. He appears well-developed and well-nourished. No distress.   HENT:   Head: Normocephalic and atraumatic.   Mouth/Throat: Oropharynx is clear and moist and mucous membranes are normal. No oropharyngeal exudate.   Eyes: Pupils are equal, round, and reactive to light.   Neck: Normal range of motion.   Cardiovascular: Normal rate and regular rhythm.   Pulmonary/Chest: Effort normal. He has no rhonchi.   Abdominal: Soft. Normal appearance and bowel sounds are normal. He exhibits no distension. There is no hepatosplenomegaly.   Musculoskeletal: Normal range of motion. He exhibits no edema.   Neurological: He is alert and oriented to person, place, and time.   Skin: Skin is warm and dry. No rash noted.   Psychiatric: He has a normal mood and affect.   Vitals reviewed.      DIAGNOSTIC DATA:  Results from last 7 days   Lab Units 09/17/19  0751   WBC 10*3/mm3 9.26   NEUTROS ABS 10*3/mm3 6.83   HEMOGLOBIN g/dL 11.0*   HEMATOCRIT % 33.6*   PLATELETS 10*3/mm3 323     Results from last 7 days   Lab Units 09/17/19  0758   SODIUM mmol/L 136   POTASSIUM mmol/L 4.3   CHLORIDE mmol/L 98   CO2 mmol/L 25.2   BUN mg/dL 12   CREATININE mg/dL 0.97   CALCIUM mg/dL 9.3   ALBUMIN g/dL 3.60   BILIRUBIN mg/dL 0.2   ALK PHOS U/L 76   ALT (SGPT) U/L 26   AST (SGOT) U/L 21   GLUCOSE mg/dL 143*           IMAGING:  CT images of the chest abdomen and pelvis from 8/14/2019 which show significant improvement in the lymphadenopathy and in the right hilar/mediastinal mass.    ASSESSMENT:  This is a 69 y.o. male with:  1.   Small cell lung cancer originating in the right hilum:   · He has an extremely large mass there with concern for SVC compression on CT imaging from 6/4/2019.  There is some left hilar " adenopathy.  · Some parotid lesions which are concerning but indeterminate, left greater than right.  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  · Staging MRI of the brain 7/10/2019 negative for metastatic disease  · He was discussed at multidisciplinary thoracic conference, with his T4 and 3 disease, concern for involvement of the right supraclavicular region and obvious left hilum, plans to add atezolizumab with cycle 2, we can consider future radiation to the residual mediastinal mass if necessary.  · Lung lesion too large to radiate in spite of the SVC narrowing.    · On 7/8/2019 we started carboplatin AUC 5 and VP16 100 mg/m2 through peripheral IVs  · Cycle 2 postponed on 7/29/2019 due to heart rate of 154.  Atezolizumab added with cycle #2.  · Radiation can be added for a residual mediastinal mass if necessary  · CT imaging on 8/14/2019 after two cycles of therapy shows significant improvement.  · Mediport anticipated by Dr. Gallardo on 8/22/2019.  · On  8/20/2019 cycle 3 was delayed due to neutropenia and with cycle 3 we did incorporate Neulasta.  · He is here today for scheduled cycle #4 (third cycle with atezolizumab).  Proceed with treatment today and over the next 2 days.    2.  Chronic hearing loss: History of mastoiditis.  He is not a candidate for cisplatin because of this.     3.  Atrial fibrillation.  He was initiated on metoprolol 12.5 mg twice daily.  This did delay port placement.  The patient was taking his metoprolol inconsistently.  For cycle 2 was delayed due to heart rate of 150 7/29/2019.  He was seen by cardiology with instructions to increase his metoprolol to 25 mg twice daily.  Heart rate improved today at 76.    4.  Tobacco use.  He did utilize a NicoDerm patch while inpatient, though has continued smoking since discharge.   We discussed smoking cessation again.    5.  SVC syndrome.  Improved after 2 cycles of chemotherapy.    6. Tachycardia related to atrial fibrillation as outlined above  · Heart rate on 7/29/19 was 154.  Cycle 2 was delayed.  · Metoprolol increased to 25 mg twice daily with improvement in heart rate.    7.  Neutropenia related to chemotherapy.  This did cause a dose delay with cycle 3.  We have added Neulasta and this has been approved by his insurance.    8.  Depression: I offered to start him on some medication for this today or refer him to our  for an assessment but he does not desire any of these measures at this point and believes that his depression will improve.  I advised him that we can help with this at any point.  New issue discussed today.    9.  Vertigo: This was transient.  He has a history of this in the past.  I discussed with him the possibility of brain metastases today and I advised him that if his symptoms recur he needs to notify us immediately and we will request an MRI of his brain.  He had a negative MRI of his brain on 7/10/2019.      PLAN:   1. Proceed with cycle #4 as scheduled today.  He will return tomorrow and the following day for -16.  He will receive on body Neulasta on day 3 as well.  2. PET scan in about 2 weeks  3. Return in 3 weeks for follow-up with a nurse practitioner to review imaging.  If he continues to have a residual mediastinal mass, we will plan to refer him to radiation oncology to consider radiation per our discussion at our multidisciplinary thoracic conference.  We will plan to proceed with atezolizumab alone that day as he has had 4 cycles of carboplatin -16.  4. I will see him back in 6 weeks for follow-up with atezolizumab.      Thai Morel MD

## 2019-09-18 ENCOUNTER — INFUSION (OUTPATIENT)
Dept: ONCOLOGY | Facility: HOSPITAL | Age: 69
End: 2019-09-18

## 2019-09-18 VITALS
OXYGEN SATURATION: 96 % | WEIGHT: 185.2 LBS | RESPIRATION RATE: 16 BRPM | HEART RATE: 83 BPM | TEMPERATURE: 97.7 F | SYSTOLIC BLOOD PRESSURE: 154 MMHG | DIASTOLIC BLOOD PRESSURE: 77 MMHG | BODY MASS INDEX: 27.12 KG/M2

## 2019-09-18 DIAGNOSIS — Z79.899 HIGH RISK MEDICATION USE: Primary | ICD-10-CM

## 2019-09-18 DIAGNOSIS — C34.90 SMALL CELL LUNG CANCER (HCC): ICD-10-CM

## 2019-09-18 PROCEDURE — 96413 CHEMO IV INFUSION 1 HR: CPT | Performed by: INTERNAL MEDICINE

## 2019-09-18 PROCEDURE — 63710000001 PROCHLORPERAZINE MALEATE PER 5 MG: Performed by: INTERNAL MEDICINE

## 2019-09-18 PROCEDURE — 25010000002 ALTEPLASE 2 MG RECONSTITUTED SOLUTION: Performed by: INTERNAL MEDICINE

## 2019-09-18 PROCEDURE — 25010000002 ETOPOSIDE 1 GM/50ML SOLUTION 50 ML VIAL: Performed by: INTERNAL MEDICINE

## 2019-09-18 RX ORDER — PROCHLORPERAZINE MALEATE 5 MG/1
10 TABLET ORAL ONCE
Status: COMPLETED | OUTPATIENT
Start: 2019-09-18 | End: 2019-09-18

## 2019-09-18 RX ORDER — SODIUM CHLORIDE 9 MG/ML
250 INJECTION, SOLUTION INTRAVENOUS ONCE
Status: COMPLETED | OUTPATIENT
Start: 2019-09-18 | End: 2019-09-18

## 2019-09-18 RX ADMIN — ETOPOSIDE 190 MG: 20 INJECTION, SOLUTION, CONCENTRATE INTRAVENOUS at 14:43

## 2019-09-18 RX ADMIN — ALTEPLASE: 2.2 INJECTION, POWDER, LYOPHILIZED, FOR SOLUTION INTRAVENOUS at 14:20

## 2019-09-18 RX ADMIN — SODIUM CHLORIDE 250 ML: 9 INJECTION, SOLUTION INTRAVENOUS at 13:53

## 2019-09-18 RX ADMIN — PROCHLORPERAZINE MALEATE 10 MG: 5 TABLET ORAL at 13:52

## 2019-09-19 ENCOUNTER — INFUSION (OUTPATIENT)
Dept: ONCOLOGY | Facility: HOSPITAL | Age: 69
End: 2019-09-19

## 2019-09-19 VITALS
BODY MASS INDEX: 27.53 KG/M2 | RESPIRATION RATE: 14 BRPM | DIASTOLIC BLOOD PRESSURE: 78 MMHG | SYSTOLIC BLOOD PRESSURE: 158 MMHG | OXYGEN SATURATION: 97 % | WEIGHT: 188 LBS | HEART RATE: 74 BPM | TEMPERATURE: 98.2 F

## 2019-09-19 DIAGNOSIS — C34.90 SMALL CELL LUNG CANCER (HCC): ICD-10-CM

## 2019-09-19 DIAGNOSIS — Z45.2 ENCOUNTER FOR FITTING AND ADJUSTMENT OF VASCULAR CATHETER: ICD-10-CM

## 2019-09-19 DIAGNOSIS — Z79.899 HIGH RISK MEDICATION USE: Primary | ICD-10-CM

## 2019-09-19 PROCEDURE — 25010000002 ETOPOSIDE 1 GM/50ML SOLUTION 50 ML VIAL: Performed by: INTERNAL MEDICINE

## 2019-09-19 PROCEDURE — 25010000002 PEGFILGRASTIM 6 MG/0.6ML PREFILLED SYRINGE KIT: Performed by: INTERNAL MEDICINE

## 2019-09-19 PROCEDURE — 96413 CHEMO IV INFUSION 1 HR: CPT | Performed by: INTERNAL MEDICINE

## 2019-09-19 PROCEDURE — 63710000001 PROCHLORPERAZINE MALEATE PER 5 MG: Performed by: INTERNAL MEDICINE

## 2019-09-19 PROCEDURE — 96377 APPLICATON ON-BODY INJECTOR: CPT | Performed by: INTERNAL MEDICINE

## 2019-09-19 RX ORDER — SODIUM CHLORIDE 0.9 % (FLUSH) 0.9 %
10 SYRINGE (ML) INJECTION AS NEEDED
Status: CANCELLED | OUTPATIENT
Start: 2019-09-19

## 2019-09-19 RX ORDER — SODIUM CHLORIDE 9 MG/ML
250 INJECTION, SOLUTION INTRAVENOUS ONCE
Status: COMPLETED | OUTPATIENT
Start: 2019-09-19 | End: 2019-09-19

## 2019-09-19 RX ORDER — PROCHLORPERAZINE MALEATE 5 MG/1
10 TABLET ORAL ONCE
Status: COMPLETED | OUTPATIENT
Start: 2019-09-19 | End: 2019-09-19

## 2019-09-19 RX ORDER — SODIUM CHLORIDE 0.9 % (FLUSH) 0.9 %
10 SYRINGE (ML) INJECTION AS NEEDED
Status: DISCONTINUED | OUTPATIENT
Start: 2019-09-19 | End: 2019-09-19 | Stop reason: HOSPADM

## 2019-09-19 RX ADMIN — ETOPOSIDE 190 MG: 20 INJECTION, SOLUTION, CONCENTRATE INTRAVENOUS at 14:09

## 2019-09-19 RX ADMIN — PEGFILGRASTIM 6 MG: KIT SUBCUTANEOUS at 14:48

## 2019-09-19 RX ADMIN — PROCHLORPERAZINE MALEATE 10 MG: 5 TABLET ORAL at 13:43

## 2019-09-19 RX ADMIN — SODIUM CHLORIDE 250 ML: 9 INJECTION, SOLUTION INTRAVENOUS at 13:42

## 2019-09-19 RX ADMIN — SODIUM CHLORIDE, PRESERVATIVE FREE 10 ML: 5 INJECTION INTRAVENOUS at 15:11

## 2019-09-19 RX ADMIN — Medication 500 UNITS: at 15:11

## 2019-10-01 ENCOUNTER — HOSPITAL ENCOUNTER (OUTPATIENT)
Dept: PET IMAGING | Facility: HOSPITAL | Age: 69
Discharge: HOME | End: 2019-10-01
Admitting: INTERNAL MEDICINE

## 2019-10-01 ENCOUNTER — HOSPITAL ENCOUNTER (OUTPATIENT)
Dept: MRI IMAGING | Facility: HOSPITAL | Age: 69
Discharge: HOME | End: 2019-10-01

## 2019-10-01 ENCOUNTER — HOSPITAL ENCOUNTER (OUTPATIENT)
Dept: PET IMAGING | Facility: HOSPITAL | Age: 69
Discharge: HOME | End: 2019-10-01

## 2019-10-01 DIAGNOSIS — R42 VERTIGO: ICD-10-CM

## 2019-10-01 DIAGNOSIS — C34.90 SMALL CELL LUNG CANCER (HCC): ICD-10-CM

## 2019-10-01 DIAGNOSIS — C7A.8 OTHER MALIGNANT NEUROENDOCRINE TUMORS (HCC): ICD-10-CM

## 2019-10-01 DIAGNOSIS — R42 VERTIGO: Primary | ICD-10-CM

## 2019-10-01 LAB — GLUCOSE BLDC GLUCOMTR-MCNC: 112 MG/DL (ref 70–130)

## 2019-10-01 PROCEDURE — 70553 MRI BRAIN STEM W/O & W/DYE: CPT

## 2019-10-01 PROCEDURE — A9552 F18 FDG: HCPCS | Performed by: INTERNAL MEDICINE

## 2019-10-01 PROCEDURE — 82962 GLUCOSE BLOOD TEST: CPT

## 2019-10-01 PROCEDURE — A9577 INJ MULTIHANCE: HCPCS | Performed by: NURSE PRACTITIONER

## 2019-10-01 PROCEDURE — 0 GADOBENATE DIMEGLUMINE 529 MG/ML SOLUTION: Performed by: NURSE PRACTITIONER

## 2019-10-01 PROCEDURE — 0 FLUDEOXYGLUCOSE F18 SOLUTION: Performed by: INTERNAL MEDICINE

## 2019-10-01 PROCEDURE — 78815 PET IMAGE W/CT SKULL-THIGH: CPT

## 2019-10-01 RX ADMIN — FLUDEOXYGLUCOSE F18 1 DOSE: 300 INJECTION INTRAVENOUS at 07:46

## 2019-10-01 RX ADMIN — GADOBENATE DIMEGLUMINE 17 ML: 529 INJECTION, SOLUTION INTRAVENOUS at 13:30

## 2019-10-08 ENCOUNTER — INFUSION (OUTPATIENT)
Dept: ONCOLOGY | Facility: HOSPITAL | Age: 69
End: 2019-10-08

## 2019-10-08 ENCOUNTER — OFFICE VISIT (OUTPATIENT)
Dept: ONCOLOGY | Facility: CLINIC | Age: 69
End: 2019-10-08

## 2019-10-08 VITALS
HEART RATE: 90 BPM | DIASTOLIC BLOOD PRESSURE: 89 MMHG | TEMPERATURE: 98.2 F | SYSTOLIC BLOOD PRESSURE: 171 MMHG | OXYGEN SATURATION: 100 % | BODY MASS INDEX: 27.96 KG/M2 | WEIGHT: 188.8 LBS | HEIGHT: 69 IN | RESPIRATION RATE: 16 BRPM

## 2019-10-08 DIAGNOSIS — Z79.899 HIGH RISK MEDICATION USE: Primary | ICD-10-CM

## 2019-10-08 DIAGNOSIS — C34.90 SMALL CELL LUNG CANCER (HCC): Primary | Chronic | ICD-10-CM

## 2019-10-08 DIAGNOSIS — C34.90 SMALL CELL LUNG CANCER (HCC): ICD-10-CM

## 2019-10-08 DIAGNOSIS — Z79.899 HIGH RISK MEDICATION USE: ICD-10-CM

## 2019-10-08 LAB
ALBUMIN SERPL-MCNC: 4.2 G/DL (ref 3.5–5.2)
ALBUMIN/GLOB SERPL: 1.1 G/DL (ref 1.1–2.4)
ALP SERPL-CCNC: 93 U/L (ref 38–116)
ALT SERPL W P-5'-P-CCNC: 15 U/L (ref 0–41)
ANION GAP SERPL CALCULATED.3IONS-SCNC: 12.2 MMOL/L (ref 5–15)
AST SERPL-CCNC: 22 U/L (ref 0–40)
BASOPHILS # BLD AUTO: 0.1 10*3/MM3 (ref 0–0.2)
BASOPHILS NFR BLD AUTO: 1.1 % (ref 0–1.5)
BILIRUB SERPL-MCNC: 0.3 MG/DL (ref 0.2–1.2)
BUN BLD-MCNC: 9 MG/DL (ref 6–20)
BUN/CREAT SERPL: 9.9 (ref 7.3–30)
CALCIUM SPEC-SCNC: 9.6 MG/DL (ref 8.5–10.2)
CHLORIDE SERPL-SCNC: 101 MMOL/L (ref 98–107)
CO2 SERPL-SCNC: 26.8 MMOL/L (ref 22–29)
CREAT BLD-MCNC: 0.91 MG/DL (ref 0.7–1.3)
DEPRECATED RDW RBC AUTO: 80.2 FL (ref 37–54)
EOSINOPHIL # BLD AUTO: 0.1 10*3/MM3 (ref 0–0.4)
EOSINOPHIL NFR BLD AUTO: 1.1 % (ref 0.3–6.2)
ERYTHROCYTE [DISTWIDTH] IN BLOOD BY AUTOMATED COUNT: 20.8 % (ref 12.3–15.4)
GFR SERPL CREATININE-BSD FRML MDRD: 83 ML/MIN/1.73
GLOBULIN UR ELPH-MCNC: 3.7 GM/DL (ref 1.8–3.5)
GLUCOSE BLD-MCNC: 94 MG/DL (ref 74–124)
HCT VFR BLD AUTO: 37.9 % (ref 37.5–51)
HGB BLD-MCNC: 11.8 G/DL (ref 13–17.7)
IMM GRANULOCYTES # BLD AUTO: 0.07 10*3/MM3 (ref 0–0.05)
IMM GRANULOCYTES NFR BLD AUTO: 0.8 % (ref 0–0.5)
LYMPHOCYTES # BLD AUTO: 1.35 10*3/MM3 (ref 0.7–3.1)
LYMPHOCYTES NFR BLD AUTO: 14.8 % (ref 19.6–45.3)
MCH RBC QN AUTO: 32.4 PG (ref 26.6–33)
MCHC RBC AUTO-ENTMCNC: 31.1 G/DL (ref 31.5–35.7)
MCV RBC AUTO: 104.1 FL (ref 79–97)
MONOCYTES # BLD AUTO: 1.22 10*3/MM3 (ref 0.1–0.9)
MONOCYTES NFR BLD AUTO: 13.4 % (ref 5–12)
NEUTROPHILS # BLD AUTO: 6.26 10*3/MM3 (ref 1.7–7)
NEUTROPHILS NFR BLD AUTO: 68.8 % (ref 42.7–76)
NRBC BLD AUTO-RTO: 0 /100 WBC (ref 0–0.2)
PLATELET # BLD AUTO: 201 10*3/MM3 (ref 140–450)
PMV BLD AUTO: 8.7 FL (ref 6–12)
POTASSIUM BLD-SCNC: 5.1 MMOL/L (ref 3.5–4.7)
PROT SERPL-MCNC: 7.9 G/DL (ref 6.3–8)
RBC # BLD AUTO: 3.64 10*6/MM3 (ref 4.14–5.8)
SODIUM BLD-SCNC: 140 MMOL/L (ref 134–145)
WBC NRBC COR # BLD: 9.1 10*3/MM3 (ref 3.4–10.8)

## 2019-10-08 PROCEDURE — 25010000002 ALTEPLASE 2 MG RECONSTITUTED SOLUTION: Performed by: NURSE PRACTITIONER

## 2019-10-08 PROCEDURE — 96375 TX/PRO/DX INJ NEW DRUG ADDON: CPT | Performed by: NURSE PRACTITIONER

## 2019-10-08 PROCEDURE — 25010000002 ONDANSETRON PER 1 MG: Performed by: NURSE PRACTITIONER

## 2019-10-08 PROCEDURE — 96413 CHEMO IV INFUSION 1 HR: CPT | Performed by: NURSE PRACTITIONER

## 2019-10-08 PROCEDURE — 85025 COMPLETE CBC W/AUTO DIFF WBC: CPT

## 2019-10-08 PROCEDURE — 25010000002 ATEZOLIZUMAB 1200 MG/20ML SOLUTION 20 ML VIAL: Performed by: NURSE PRACTITIONER

## 2019-10-08 PROCEDURE — 80053 COMPREHEN METABOLIC PANEL: CPT

## 2019-10-08 PROCEDURE — 99214 OFFICE O/P EST MOD 30 MIN: CPT | Performed by: NURSE PRACTITIONER

## 2019-10-08 RX ORDER — SODIUM CHLORIDE 9 MG/ML
250 INJECTION, SOLUTION INTRAVENOUS ONCE
Status: COMPLETED | OUTPATIENT
Start: 2019-10-08 | End: 2019-10-08

## 2019-10-08 RX ORDER — SODIUM CHLORIDE 9 MG/ML
250 INJECTION, SOLUTION INTRAVENOUS ONCE
Status: CANCELLED | OUTPATIENT
Start: 2019-10-08

## 2019-10-08 RX ADMIN — SODIUM CHLORIDE 250 ML: 9 INJECTION, SOLUTION INTRAVENOUS at 15:52

## 2019-10-08 RX ADMIN — ALTEPLASE: 2.2 INJECTION, POWDER, LYOPHILIZED, FOR SOLUTION INTRAVENOUS at 14:10

## 2019-10-08 RX ADMIN — ATEZOLIZUMAB 1200 MG: 1200 INJECTION, SOLUTION INTRAVENOUS at 16:12

## 2019-10-08 RX ADMIN — ONDANSETRON 16 MG: 2 INJECTION, SOLUTION INTRAMUSCULAR; INTRAVENOUS at 15:52

## 2019-10-09 NOTE — PROGRESS NOTES
REASON FOR FOLLOW UP:   1.  T4N3 Small cell lung cancer  2.  SVC syndrome  3.  Palliative therapy with carboplatin, -16 initiated on 7/8/2019.  Cycle 1 was initiated during a hospitalization.  Atezolizumab was added with cycle #2.  4.  Port placed on 8/22/2019, delayed due to SVC syndrome  5.  Neutropenia related to therapy requiring the addition of Neulasta with cycle #3.  6.  Single agent atezolizumab initiated 10/8/2019.  Scan on 10/1/2019 revealed good disease response disease.  There is an area of concern on the adrenal gland which we will continue to monitor.  MRI shows no evidence of metastatic disease.    HISTORY OF PRESENT ILLNESS:  Julia Smith III is a 69 y.o. male the above medical history here today to start single agent atezolizumab.  Is completed 4 cycles of bind therapy.  His scan performed on October 1 reveals good response of known disease, there is a small area of hypermetabolic activity in the adrenal gland that we will continue to monitor.  He did have an MRI of the brain due to his persistent vertigo which was negative for metastatic disease.    Reports mild improvement of vertigo.  His vertigo is rather unpredictable it is not aggravated with position change or relieved with position change.  He denies vision changes such as double vision.  He denies nausea or vomiting.      He does report fatigue but his appetite is good.  CBC is good.  His vital signs are stable.  He denies new symptoms.        Past Medical History:   Diagnosis Date   • Atrial fibrillation with RVR (CMS/HCC)    • Cancer (CMS/HCC) 06/2019    Right lung   • Chronic cough    • COPD (chronic obstructive pulmonary disease) (CMS/HCC)    • Hearing loss    • History of shingles    • Lower back pain    • Mastoiditis    • Mild mitral regurgitation    • Other fatigue    • PAF (paroxysmal atrial fibrillation) (CMS/HCC)    • Pneumonia        Past Surgical History:   Procedure Laterality Date   • BRONCHOSCOPY N/A 6/19/2019     Procedure: BRONCHOSCOPY WITH WASHINGS AND BIOPSY AND ENDOBRONCHIAL ULTRASOUND WITH FNA;  Surgeon: Arslan Marquez MD;  Location: [facility] ENDOSCOPY;  Service: Pulmonary   • INNER EAR SURGERY Left    • MASTOIDECTOMY Left 1994   • VENOUS ACCESS DEVICE (PORT) INSERTION Right 8/22/2019    Procedure: INSERTION OF PORTACATH;  Surgeon: Ramila Gallardo MD;  Location: [facility] OR;  Service: Cardiothoracic       SOCIAL HISTORY:   reports that he has been smoking cigarettes.  He has a 60.00 pack-year smoking history. He has never used smokeless tobacco. He reports that he drinks about 2.4 - 4.2 oz of alcohol per week. He reports that he uses drugs. Drug: Marijuana.    FAMILY HISTORY:  family history includes COPD in his father; Heart disease in his brother; No Known Problems in his brother, brother, brother, mother, and sister.    ALLERGIES:  No Known Allergies    MEDICATIONS:  The medication list has been reviewed with the patient by the medical assistant, and the list has been updated in the electronic medical record, which I reviewed.  Medication dosages and frequencies were confirmed to be accurate.    I have reviewed the patient's medical history in detail and updated the computerized patient record.    Review of Systems   Review of Systems   Constitutional: Positive for fatigue. Negative for activity change, appetite change, chills and fever.   HENT: Negative for mouth sores, nosebleeds and trouble swallowing.    Respiratory: Negative for cough.    Cardiovascular: Negative for chest pain and leg swelling.   Gastrointestinal: Negative for abdominal pain, constipation, diarrhea, nausea and vomiting.   Genitourinary: Negative for difficulty urinating.   Musculoskeletal:        Knee pain - chronic stable   Skin: Negative for rash.   Neurological: Positive for dizziness (see hpi). Negative for numbness.   Hematological: Negative for adenopathy. Does not bruise/bleed easily.   Psychiatric/Behavioral: Negative for sleep  "disturbance.       Vitals:    10/08/19 1419   BP: 171/89   Pulse: 90   Resp: 16   Temp: 98.2 °F (36.8 °C)   SpO2: 100%   Weight: 85.6 kg (188 lb 12.8 oz)   Height: 176 cm (69.29\")   PainSc: 0-No pain     Physical Exam   Constitutional: He is oriented to person, place, and time. He appears well-developed and well-nourished. No distress.   HENT:   Head: Normocephalic and atraumatic.   Mouth/Throat: Oropharynx is clear and moist and mucous membranes are normal. No oropharyngeal exudate.   Eyes: Pupils are equal, round, and reactive to light.   Neck: Normal range of motion.   Cardiovascular: Normal rate and regular rhythm.   Pulmonary/Chest: Effort normal. He has no rhonchi.   Abdominal: Soft. Normal appearance and bowel sounds are normal. He exhibits no distension. There is no hepatosplenomegaly.   Musculoskeletal: Normal range of motion. He exhibits no edema.   Neurological: He is alert and oriented to person, place, and time.   Skin: Skin is warm and dry. No rash noted.   Psychiatric: He has a normal mood and affect.   Vitals reviewed.  Unchanged since prior exam    DIAGNOSTIC DATA:  Results from last 7 days   Lab Units 10/08/19  1354   WBC 10*3/mm3 9.10   NEUTROS ABS 10*3/mm3 6.26   HEMOGLOBIN g/dL 11.8*   HEMATOCRIT % 37.9   PLATELETS 10*3/mm3 201     Results from last 7 days   Lab Units 10/08/19  1354   SODIUM mmol/L 140   POTASSIUM mmol/L 5.1*   CHLORIDE mmol/L 101   CO2 mmol/L 26.8   BUN mg/dL 9   CREATININE mg/dL 0.91   CALCIUM mg/dL 9.6   ALBUMIN g/dL 4.20   BILIRUBIN mg/dL 0.3   ALK PHOS U/L 93   ALT (SGPT) U/L 15   AST (SGOT) U/L 22   GLUCOSE mg/dL 94           IMAGING:  CT images of the chest abdomen and pelvis from 8/14/2019 which show significant improvement in the lymphadenopathy and in the right hilar/mediastinal mass.    ASSESSMENT:  This is a 69 y.o. male with:  1.   Small cell lung cancer originating in the right hilum:   · He has an extremely large mass there with concern for SVC compression on CT " imaging from 6/4/2019.  There is some left hilar adenopathy.  · Some parotid lesions which are concerning but indeterminate, left greater than right.  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  · Staging MRI of the brain 7/10/2019 negative for metastatic disease  · He was discussed at multidisciplinary thoracic conference, with his T4 and 3 disease, concern for involvement of the right supraclavicular region and obvious left hilum, plans to add atezolizumab with cycle 2, we can consider future radiation to the residual mediastinal mass if necessary.  · Lung lesion too large to radiate in spite of the SVC narrowing.    · On 7/8/2019 we started carboplatin AUC 5 and VP16 100 mg/m2 through peripheral IVs  · Cycle 2 postponed on 7/29/2019 due to heart rate of 154.  Atezolizumab added with cycle #2.  · Radiation can be added for a residual mediastinal mass if necessary  · CT imaging on 8/14/2019 after two cycles of therapy shows significant improvement.  · Mediport anticipated by Dr. Gallardo on 8/22/2019.  · On  8/20/2019 cycle 3 was delayed due to neutropenia and with cycle 3 we did incorporate Neulasta.  · He is here today for scheduled cycle #4 (third cycle with atezolizumab).  Proceed with treatment today and over the next 2 days.  · He is here today, 10/08/2019 to initiate single agent atezolizumab.  PET scan reveal disease response to therapy, he does have an area of hypermetabolic activity in the adrenal gland which is small and did not show up on prior exam.  We will continue to monitor this.  His MRI is also negative for metastatic disease.    2.  Chronic hearing loss: History of mastoiditis.  He is not a candidate for cisplatin because of this.  Stable.    3.  Atrial fibrillation.  He was initiated on metoprolol 12.5 mg twice daily.  This did delay  port placement.  The patient was taking his metoprolol inconsistently.  For cycle 2 was delayed due to heart rate of 150 7/29/2019.  He was seen by cardiology with instructions to increase his metoprolol to 25 mg twice daily.  Heart rate improved today at 76.    4.  Tobacco use.  He did utilize a NicoDerm patch while inpatient, though has continued smoking since discharge.  We discussed smoking cessation again.    5.  SVC syndrome.  Improved after 2 cycles of chemotherapy.    6. Tachycardia related to atrial fibrillation as outlined above  · Heart rate on 7/29/19 was 154.  Cycle 2 was delayed.  · Metoprolol increased to 25 mg twice daily with improvement in heart rate.  · His heart rate today is within normal limits    7.  Neutropenia related to chemotherapy.  This did cause a dose delay with cycle 3.  We have added Neulasta.  Neutropenia has resolved.    8.  Depression: Slightly improved.  He looks forward to single agent therapy.  He is also pleased with the results of his scans.    9.  Vertigo: This was transient.  Brain MRI on 10/1/2019 was negative.  His vertigo is stable and occasional.      PLAN:   1. Seed with treatment as scheduled today.  I have reviewed his scans with Dr. Morel and there is no need at this time to refer the patient to radiation oncology.  2. He will return as already scheduled on 10/30/2019 to see Dr. Morel in anticipation of his second cycle of single agent atezolizumab.  3. I have asked the patient to call the office with any new or worsening symptoms before his next scheduled visit.      BERTO Lovell

## 2019-10-25 DIAGNOSIS — Z79.899 HIGH RISK MEDICATION USE: ICD-10-CM

## 2019-10-25 DIAGNOSIS — C34.90 SMALL CELL LUNG CANCER (HCC): ICD-10-CM

## 2019-10-29 ENCOUNTER — OFFICE VISIT (OUTPATIENT)
Dept: ONCOLOGY | Facility: CLINIC | Age: 69
End: 2019-10-29

## 2019-10-29 ENCOUNTER — INFUSION (OUTPATIENT)
Dept: ONCOLOGY | Facility: HOSPITAL | Age: 69
End: 2019-10-29

## 2019-10-29 VITALS
TEMPERATURE: 98.1 F | DIASTOLIC BLOOD PRESSURE: 88 MMHG | HEART RATE: 75 BPM | WEIGHT: 195.2 LBS | RESPIRATION RATE: 16 BRPM | BODY MASS INDEX: 28.91 KG/M2 | HEIGHT: 69 IN | OXYGEN SATURATION: 97 % | SYSTOLIC BLOOD PRESSURE: 160 MMHG

## 2019-10-29 DIAGNOSIS — Z79.899 HIGH RISK MEDICATION USE: ICD-10-CM

## 2019-10-29 DIAGNOSIS — C34.90 SMALL CELL LUNG CANCER (HCC): Primary | Chronic | ICD-10-CM

## 2019-10-29 DIAGNOSIS — Z79.899 HIGH RISK MEDICATION USE: Primary | ICD-10-CM

## 2019-10-29 DIAGNOSIS — C34.90 SMALL CELL LUNG CANCER (HCC): ICD-10-CM

## 2019-10-29 LAB
ALBUMIN SERPL-MCNC: 4.1 G/DL (ref 3.5–5.2)
ALBUMIN/GLOB SERPL: 1.2 G/DL (ref 1.1–2.4)
ALP SERPL-CCNC: 78 U/L (ref 38–116)
ALT SERPL W P-5'-P-CCNC: 18 U/L (ref 0–41)
ANION GAP SERPL CALCULATED.3IONS-SCNC: 10.9 MMOL/L (ref 5–15)
AST SERPL-CCNC: 25 U/L (ref 0–40)
BASOPHILS # BLD AUTO: 0.08 10*3/MM3 (ref 0–0.2)
BASOPHILS NFR BLD AUTO: 1.4 % (ref 0–1.5)
BILIRUB SERPL-MCNC: 0.4 MG/DL (ref 0.2–1.2)
BUN BLD-MCNC: 11 MG/DL (ref 6–20)
BUN/CREAT SERPL: 12.4 (ref 7.3–30)
CALCIUM SPEC-SCNC: 9.3 MG/DL (ref 8.5–10.2)
CHLORIDE SERPL-SCNC: 101 MMOL/L (ref 98–107)
CO2 SERPL-SCNC: 25.1 MMOL/L (ref 22–29)
CREAT BLD-MCNC: 0.89 MG/DL (ref 0.7–1.3)
DEPRECATED RDW RBC AUTO: 62.3 FL (ref 37–54)
EOSINOPHIL # BLD AUTO: 0.2 10*3/MM3 (ref 0–0.4)
EOSINOPHIL NFR BLD AUTO: 3.4 % (ref 0.3–6.2)
ERYTHROCYTE [DISTWIDTH] IN BLOOD BY AUTOMATED COUNT: 15.9 % (ref 12.3–15.4)
GFR SERPL CREATININE-BSD FRML MDRD: 85 ML/MIN/1.73
GLOBULIN UR ELPH-MCNC: 3.5 GM/DL (ref 1.8–3.5)
GLUCOSE BLD-MCNC: 85 MG/DL (ref 74–124)
HCT VFR BLD AUTO: 39.5 % (ref 37.5–51)
HGB BLD-MCNC: 12.9 G/DL (ref 13–17.7)
IMM GRANULOCYTES # BLD AUTO: 0.02 10*3/MM3 (ref 0–0.05)
IMM GRANULOCYTES NFR BLD AUTO: 0.3 % (ref 0–0.5)
LYMPHOCYTES # BLD AUTO: 0.98 10*3/MM3 (ref 0.7–3.1)
LYMPHOCYTES NFR BLD AUTO: 16.6 % (ref 19.6–45.3)
MCH RBC QN AUTO: 34.6 PG (ref 26.6–33)
MCHC RBC AUTO-ENTMCNC: 32.7 G/DL (ref 31.5–35.7)
MCV RBC AUTO: 105.9 FL (ref 79–97)
MONOCYTES # BLD AUTO: 0.71 10*3/MM3 (ref 0.1–0.9)
MONOCYTES NFR BLD AUTO: 12 % (ref 5–12)
NEUTROPHILS # BLD AUTO: 3.92 10*3/MM3 (ref 1.7–7)
NEUTROPHILS NFR BLD AUTO: 66.3 % (ref 42.7–76)
NRBC BLD AUTO-RTO: 0 /100 WBC (ref 0–0.2)
PLATELET # BLD AUTO: 145 10*3/MM3 (ref 140–450)
PMV BLD AUTO: 9.4 FL (ref 6–12)
POTASSIUM BLD-SCNC: 4.4 MMOL/L (ref 3.5–4.7)
PROT SERPL-MCNC: 7.6 G/DL (ref 6.3–8)
RBC # BLD AUTO: 3.73 10*6/MM3 (ref 4.14–5.8)
SODIUM BLD-SCNC: 137 MMOL/L (ref 134–145)
T3FREE SERPL-MCNC: 3.19 PG/ML (ref 2–4.4)
T4 FREE SERPL-MCNC: 0.94 NG/DL (ref 0.93–1.7)
TSH SERPL DL<=0.05 MIU/L-ACNC: 1.29 UIU/ML (ref 0.27–4.2)
WBC NRBC COR # BLD: 5.91 10*3/MM3 (ref 3.4–10.8)

## 2019-10-29 PROCEDURE — 85025 COMPLETE CBC W/AUTO DIFF WBC: CPT

## 2019-10-29 PROCEDURE — 84443 ASSAY THYROID STIM HORMONE: CPT | Performed by: INTERNAL MEDICINE

## 2019-10-29 PROCEDURE — 84481 FREE ASSAY (FT-3): CPT | Performed by: INTERNAL MEDICINE

## 2019-10-29 PROCEDURE — 96413 CHEMO IV INFUSION 1 HR: CPT | Performed by: INTERNAL MEDICINE

## 2019-10-29 PROCEDURE — 80053 COMPREHEN METABOLIC PANEL: CPT

## 2019-10-29 PROCEDURE — 25010000002 ONDANSETRON PER 1 MG: Performed by: INTERNAL MEDICINE

## 2019-10-29 PROCEDURE — 25010000002 ATEZOLIZUMAB 1200 MG/20ML SOLUTION 20 ML VIAL: Performed by: INTERNAL MEDICINE

## 2019-10-29 PROCEDURE — 96375 TX/PRO/DX INJ NEW DRUG ADDON: CPT | Performed by: INTERNAL MEDICINE

## 2019-10-29 PROCEDURE — 84439 ASSAY OF FREE THYROXINE: CPT | Performed by: INTERNAL MEDICINE

## 2019-10-29 PROCEDURE — 99214 OFFICE O/P EST MOD 30 MIN: CPT | Performed by: INTERNAL MEDICINE

## 2019-10-29 RX ORDER — SODIUM CHLORIDE 9 MG/ML
250 INJECTION, SOLUTION INTRAVENOUS ONCE
Status: COMPLETED | OUTPATIENT
Start: 2019-10-29 | End: 2019-10-29

## 2019-10-29 RX ORDER — SODIUM CHLORIDE 9 MG/ML
250 INJECTION, SOLUTION INTRAVENOUS ONCE
Status: CANCELLED | OUTPATIENT
Start: 2019-11-19

## 2019-10-29 RX ORDER — SODIUM CHLORIDE 9 MG/ML
250 INJECTION, SOLUTION INTRAVENOUS ONCE
Status: CANCELLED | OUTPATIENT
Start: 2019-10-29

## 2019-10-29 RX ADMIN — SODIUM CHLORIDE 250 ML: 9 INJECTION, SOLUTION INTRAVENOUS at 14:55

## 2019-10-29 RX ADMIN — ATEZOLIZUMAB 1200 MG: 1200 INJECTION, SOLUTION INTRAVENOUS at 15:22

## 2019-10-29 RX ADMIN — ONDANSETRON 16 MG: 2 INJECTION, SOLUTION INTRAMUSCULAR; INTRAVENOUS at 14:55

## 2019-10-29 NOTE — PROGRESS NOTES
REASON FOR FOLLOW UP:   1.  T4N3 Small cell lung cancer  2.  SVC syndrome  3.  Palliative therapy with carboplatin, -16 initiated on 7/8/2019.  Cycle 1 was initiated during a hospitalization.  Atezolizumab was added with cycle #2.  4 cycles of chemotherapy complete as of 9/19/2019.  4.  Port placed on 8/22/2019, delayed due to SVC syndrome  5.  Neutropenia related to therapy requiring the addition of Neulasta with cycle #3.  6.  Single agent atezolizumab initiated 10/8/2019.  Scan on 10/1/2019 revealed good disease response disease.  There is an area of concern on the adrenal gland which we will continue to monitor.  MRI brain shows no evidence of metastatic disease.    HISTORY OF PRESENT ILLNESS:  Julia Smith III is a 69 y.o. male the above medical history here today for follow-up continuing maintenance atezolizumab.    Overall he feels better.  Energy level is improving.  Shortness of breath is improving.  His appetite is adequate and he is gaining weight.  No fevers or chills.  He did not complain of any vertigo today.      Past Medical History:   Diagnosis Date   • Atrial fibrillation with RVR (CMS/HCC)    • Cancer (CMS/HCC) 06/2019    Right lung   • Chronic cough    • COPD (chronic obstructive pulmonary disease) (CMS/HCC)    • Hearing loss    • History of shingles    • Lower back pain    • Mastoiditis    • Mild mitral regurgitation    • Other fatigue    • PAF (paroxysmal atrial fibrillation) (CMS/HCC)    • Pneumonia        Past Surgical History:   Procedure Laterality Date   • BRONCHOSCOPY N/A 6/19/2019    Procedure: BRONCHOSCOPY WITH WASHINGS AND BIOPSY AND ENDOBRONCHIAL ULTRASOUND WITH FNA;  Surgeon: Arslan Marquez MD;  Location: [facility] ENDOSCOPY;  Service: Pulmonary   • INNER EAR SURGERY Left    • MASTOIDECTOMY Left 1994   • VENOUS ACCESS DEVICE (PORT) INSERTION Right 8/22/2019    Procedure: INSERTION OF PORTACATH;  Surgeon: Ramila Gallardo MD;  Location: [facility] OR;  Service: Cardiothoracic  "      SOCIAL HISTORY:   reports that he has been smoking cigarettes.  He has a 60.00 pack-year smoking history. He has never used smokeless tobacco. He reports that he drinks about 2.4 - 4.2 oz of alcohol per week. He reports that he uses drugs. Drug: Marijuana.    FAMILY HISTORY:  family history includes COPD in his father; Heart disease in his brother; No Known Problems in his brother, brother, brother, mother, and sister.    ALLERGIES:  No Known Allergies    MEDICATIONS:  The medication list has been reviewed with the patient by the medical assistant, and the list has been updated in the electronic medical record, which I reviewed.  Medication dosages and frequencies were confirmed to be accurate.    I have reviewed the patient's medical history in detail and updated the computerized patient record.    Review of Systems   Review of Systems   Constitutional: Positive for fatigue. Negative for activity change, appetite change, chills and fever.   HENT: Negative for mouth sores, nosebleeds and trouble swallowing.    Respiratory: Negative for cough.    Cardiovascular: Negative for chest pain and leg swelling.   Gastrointestinal: Negative for abdominal pain, constipation, diarrhea, nausea and vomiting.   Genitourinary: Negative for difficulty urinating.   Musculoskeletal:        Knee pain - chronic stable   Skin: Negative for rash.   Neurological: Negative for dizziness and numbness.   Hematological: Negative for adenopathy. Does not bruise/bleed easily.   Psychiatric/Behavioral: Negative for sleep disturbance.       Vitals:    10/29/19 1403   BP: 160/88   Pulse: 75   Resp: 16   Temp: 98.1 °F (36.7 °C)   TempSrc: Oral   SpO2: 97%   Weight: 88.5 kg (195 lb 3.2 oz)   Height: 176 cm (69.29\")   PainSc: 0-No pain  Comment: lung cancer     Physical Exam   Constitutional: He is oriented to person, place, and time. He appears well-developed and well-nourished. No distress.   HENT:   Head: Normocephalic and atraumatic. "   Mouth/Throat: Oropharynx is clear and moist and mucous membranes are normal. No oropharyngeal exudate.   Eyes: Pupils are equal, round, and reactive to light.   Neck: Normal range of motion.   Cardiovascular: Normal rate and regular rhythm.   Pulmonary/Chest: Effort normal. He has no rhonchi.   Abdominal: Soft. Normal appearance and bowel sounds are normal. He exhibits no distension. There is no hepatosplenomegaly.   Musculoskeletal: Normal range of motion. He exhibits no edema.   Neurological: He is alert and oriented to person, place, and time.   Skin: Skin is warm and dry. No rash noted.   Psychiatric: He has a normal mood and affect.   Vitals reviewed.  Unchanged since prior exam    DIAGNOSTIC DATA:  Results from last 7 days   Lab Units 10/29/19  1401   WBC 10*3/mm3 5.91   NEUTROS ABS 10*3/mm3 3.92   HEMOGLOBIN g/dL 12.9*   HEMATOCRIT % 39.5   PLATELETS 10*3/mm3 145     Results from last 7 days   Lab Units 10/29/19  1401   SODIUM mmol/L 137   POTASSIUM mmol/L 4.4   CHLORIDE mmol/L 101   CO2 mmol/L 25.1   BUN mg/dL 11   CREATININE mg/dL 0.89   CALCIUM mg/dL 9.3   ALBUMIN g/dL 4.10   BILIRUBIN mg/dL 0.4   ALK PHOS U/L 78   ALT (SGPT) U/L 18   AST (SGOT) U/L 25   GLUCOSE mg/dL 85           IMAGING:  CT images of the chest abdomen and pelvis from 8/14/2019 show significant improvement in the lymphadenopathy and in the right hilar/mediastinal mass.    ASSESSMENT:  This is a 69 y.o. male with:  1.   Small cell lung cancer originating in the right hilum:   · He has an extremely large mass there with concern for SVC compression on CT imaging from 6/4/2019.  There is some left hilar adenopathy.  · Some parotid lesions which are concerning but indeterminate, left greater than right.  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  · Staging MRI of the brain  7/10/2019 negative for metastatic disease  · He was discussed at multidisciplinary thoracic conference, with his T4 and 3 disease, concern for involvement of the right supraclavicular region and obvious left hilum, plans to add atezolizumab with cycle 2, we can consider future radiation to the residual mediastinal mass if necessary.  · Lung lesion too large to radiate in spite of the SVC narrowing.    · On 7/8/2019 we started carboplatin AUC 5 and VP16 100 mg/m2 through peripheral IVs  · Cycle 2 postponed on 7/29/2019 due to heart rate of 154.  Atezolizumab added with cycle #2.  · Radiation can be added for a residual mediastinal mass if necessary  · CT imaging on 8/14/2019 after two cycles of therapy shows significant improvement.  · Mediport anticipated by Dr. Gallardo on 8/22/2019.  · On  8/20/2019 cycle 3 was delayed due to neutropenia and with cycle 3 we did incorporate Neulasta.  · He is here today for scheduled cycle #4 (third cycle with atezolizumab).  Proceed with treatment today and over the next 2 days.  · Maintenance atezolizumab initiated 10/8/2019.  PET scan reveal disease response to therapy, he does have an area of hypermetabolic activity in the adrenal gland which is small and did not show up on prior exam.  We will continue to monitor this.  His MRI of the brain is also negative for metastatic disease.    2.  Chronic hearing loss: History of mastoiditis.  He is not a candidate for cisplatin because of this.  Stable.    3.  Atrial fibrillation.  He was initiated on metoprolol 12.5 mg twice daily.  This did delay port placement.  The patient was taking his metoprolol inconsistently.  For cycle 2 was delayed due to heart rate of 150 7/29/2019.  He was seen by cardiology with instructions to increase his metoprolol to 25 mg twice daily.  Heart rate improved today at 76.    4.  Tobacco use.  He did utilize a NicoDerm patch while inpatient, though has continued smoking since discharge.  We discussed smoking  cessation again.    5.  SVC syndrome.  Improved after 2 cycles of chemotherapy.    6. Tachycardia related to atrial fibrillation as outlined above  · Heart rate on 7/29/19 was 154.  Cycle 2 was delayed.  · Metoprolol increased to 25 mg twice daily with improvement in heart rate.  · His heart rate today is within normal limits    7.  Neutropenia related to chemotherapy.  This did cause a dose delay with cycle 3.  We added Neulasta.  Neutropenia has resolved.    8.  Depression: Slightly improved.      9.  Vertigo: This was transient.  Brain MRI on 10/1/2019 was negative.  His vertigo is stable and occasional.      PLAN:   1. Proceed with maintenance atezolizumab today and every 3 weeks  2. We do not plan for radiation therapy at this time  3. I have asked the patient to call the office with any new or worsening symptoms before his next scheduled visit.  4. I will see him back in 6 weeks for follow-up  5. Follow-up imaging around the first of the year      Thai Morel MD

## 2019-10-31 ENCOUNTER — APPOINTMENT (OUTPATIENT)
Dept: ONCOLOGY | Facility: HOSPITAL | Age: 69
End: 2019-10-31

## 2019-11-19 ENCOUNTER — INFUSION (OUTPATIENT)
Dept: ONCOLOGY | Facility: HOSPITAL | Age: 69
End: 2019-11-19

## 2019-11-19 VITALS
OXYGEN SATURATION: 96 % | WEIGHT: 199 LBS | DIASTOLIC BLOOD PRESSURE: 82 MMHG | SYSTOLIC BLOOD PRESSURE: 167 MMHG | BODY MASS INDEX: 29.14 KG/M2 | HEART RATE: 91 BPM | TEMPERATURE: 98 F

## 2019-11-19 DIAGNOSIS — C34.90 SMALL CELL LUNG CANCER (HCC): Primary | ICD-10-CM

## 2019-11-19 DIAGNOSIS — Z79.899 HIGH RISK MEDICATION USE: ICD-10-CM

## 2019-11-19 DIAGNOSIS — Z45.2 ENCOUNTER FOR FITTING AND ADJUSTMENT OF VASCULAR CATHETER: ICD-10-CM

## 2019-11-19 LAB
ALBUMIN SERPL-MCNC: 4.2 G/DL (ref 3.5–5.2)
ALBUMIN/GLOB SERPL: 1.3 G/DL (ref 1.1–2.4)
ALP SERPL-CCNC: 82 U/L (ref 38–116)
ALT SERPL W P-5'-P-CCNC: 18 U/L (ref 0–41)
ANION GAP SERPL CALCULATED.3IONS-SCNC: 10.7 MMOL/L (ref 5–15)
AST SERPL-CCNC: 23 U/L (ref 0–40)
BASOPHILS # BLD AUTO: 0.07 10*3/MM3 (ref 0–0.2)
BASOPHILS NFR BLD AUTO: 1.2 % (ref 0–1.5)
BILIRUB SERPL-MCNC: 0.4 MG/DL (ref 0.2–1.2)
BUN BLD-MCNC: 11 MG/DL (ref 6–20)
BUN/CREAT SERPL: 11.3 (ref 7.3–30)
CALCIUM SPEC-SCNC: 9.3 MG/DL (ref 8.5–10.2)
CHLORIDE SERPL-SCNC: 101 MMOL/L (ref 98–107)
CO2 SERPL-SCNC: 26.3 MMOL/L (ref 22–29)
CREAT BLD-MCNC: 0.97 MG/DL (ref 0.7–1.3)
DEPRECATED RDW RBC AUTO: 53.4 FL (ref 37–54)
EOSINOPHIL # BLD AUTO: 0.12 10*3/MM3 (ref 0–0.4)
EOSINOPHIL NFR BLD AUTO: 2.1 % (ref 0.3–6.2)
ERYTHROCYTE [DISTWIDTH] IN BLOOD BY AUTOMATED COUNT: 13.5 % (ref 12.3–15.4)
GFR SERPL CREATININE-BSD FRML MDRD: 77 ML/MIN/1.73
GLOBULIN UR ELPH-MCNC: 3.3 GM/DL (ref 1.8–3.5)
GLUCOSE BLD-MCNC: 86 MG/DL (ref 74–124)
HCT VFR BLD AUTO: 41.7 % (ref 37.5–51)
HGB BLD-MCNC: 13.7 G/DL (ref 13–17.7)
IMM GRANULOCYTES # BLD AUTO: 0.02 10*3/MM3 (ref 0–0.05)
IMM GRANULOCYTES NFR BLD AUTO: 0.3 % (ref 0–0.5)
LYMPHOCYTES # BLD AUTO: 1.23 10*3/MM3 (ref 0.7–3.1)
LYMPHOCYTES NFR BLD AUTO: 21.4 % (ref 19.6–45.3)
MCH RBC QN AUTO: 35.1 PG (ref 26.6–33)
MCHC RBC AUTO-ENTMCNC: 32.9 G/DL (ref 31.5–35.7)
MCV RBC AUTO: 106.9 FL (ref 79–97)
MONOCYTES # BLD AUTO: 0.66 10*3/MM3 (ref 0.1–0.9)
MONOCYTES NFR BLD AUTO: 11.5 % (ref 5–12)
NEUTROPHILS # BLD AUTO: 3.64 10*3/MM3 (ref 1.7–7)
NEUTROPHILS NFR BLD AUTO: 63.5 % (ref 42.7–76)
NRBC BLD AUTO-RTO: 0 /100 WBC (ref 0–0.2)
PLATELET # BLD AUTO: 177 10*3/MM3 (ref 140–450)
PMV BLD AUTO: 8.9 FL (ref 6–12)
POTASSIUM BLD-SCNC: 4.4 MMOL/L (ref 3.5–4.7)
PROT SERPL-MCNC: 7.5 G/DL (ref 6.3–8)
RBC # BLD AUTO: 3.9 10*6/MM3 (ref 4.14–5.8)
SODIUM BLD-SCNC: 138 MMOL/L (ref 134–145)
WBC NRBC COR # BLD: 5.74 10*3/MM3 (ref 3.4–10.8)

## 2019-11-19 PROCEDURE — 80053 COMPREHEN METABOLIC PANEL: CPT | Performed by: INTERNAL MEDICINE

## 2019-11-19 PROCEDURE — 96375 TX/PRO/DX INJ NEW DRUG ADDON: CPT | Performed by: INTERNAL MEDICINE

## 2019-11-19 PROCEDURE — 96413 CHEMO IV INFUSION 1 HR: CPT | Performed by: INTERNAL MEDICINE

## 2019-11-19 PROCEDURE — 85025 COMPLETE CBC W/AUTO DIFF WBC: CPT | Performed by: INTERNAL MEDICINE

## 2019-11-19 PROCEDURE — 25010000002 ATEZOLIZUMAB 1200 MG/20ML SOLUTION 20 ML VIAL: Performed by: INTERNAL MEDICINE

## 2019-11-19 PROCEDURE — 25010000002 ONDANSETRON PER 1 MG: Performed by: INTERNAL MEDICINE

## 2019-11-19 RX ORDER — SODIUM CHLORIDE 9 MG/ML
250 INJECTION, SOLUTION INTRAVENOUS ONCE
Status: COMPLETED | OUTPATIENT
Start: 2019-11-19 | End: 2019-11-19

## 2019-11-19 RX ORDER — SODIUM CHLORIDE 0.9 % (FLUSH) 0.9 %
10 SYRINGE (ML) INJECTION AS NEEDED
Status: CANCELLED | OUTPATIENT
Start: 2019-11-19

## 2019-11-19 RX ORDER — SODIUM CHLORIDE 0.9 % (FLUSH) 0.9 %
10 SYRINGE (ML) INJECTION AS NEEDED
Status: DISCONTINUED | OUTPATIENT
Start: 2019-11-19 | End: 2019-11-19 | Stop reason: HOSPADM

## 2019-11-19 RX ADMIN — ONDANSETRON 16 MG: 2 INJECTION, SOLUTION INTRAMUSCULAR; INTRAVENOUS at 15:16

## 2019-11-19 RX ADMIN — ATEZOLIZUMAB 1200 MG: 1200 INJECTION, SOLUTION INTRAVENOUS at 15:35

## 2019-11-19 RX ADMIN — SODIUM CHLORIDE 250 ML: 9 INJECTION, SOLUTION INTRAVENOUS at 15:17

## 2019-12-10 ENCOUNTER — INFUSION (OUTPATIENT)
Dept: ONCOLOGY | Facility: HOSPITAL | Age: 69
End: 2019-12-10

## 2019-12-10 ENCOUNTER — OFFICE VISIT (OUTPATIENT)
Dept: ONCOLOGY | Facility: CLINIC | Age: 69
End: 2019-12-10

## 2019-12-10 VITALS
SYSTOLIC BLOOD PRESSURE: 155 MMHG | BODY MASS INDEX: 28.63 KG/M2 | OXYGEN SATURATION: 98 % | RESPIRATION RATE: 16 BRPM | HEART RATE: 83 BPM | TEMPERATURE: 98 F | DIASTOLIC BLOOD PRESSURE: 81 MMHG | HEIGHT: 69 IN | WEIGHT: 193.3 LBS

## 2019-12-10 DIAGNOSIS — Z45.2 ENCOUNTER FOR FITTING AND ADJUSTMENT OF VASCULAR CATHETER: ICD-10-CM

## 2019-12-10 DIAGNOSIS — Z79.899 HIGH RISK MEDICATION USE: ICD-10-CM

## 2019-12-10 DIAGNOSIS — C34.90 SMALL CELL LUNG CANCER (HCC): Primary | Chronic | ICD-10-CM

## 2019-12-10 DIAGNOSIS — C34.90 SMALL CELL LUNG CANCER (HCC): ICD-10-CM

## 2019-12-10 DIAGNOSIS — Z79.899 HIGH RISK MEDICATION USE: Primary | ICD-10-CM

## 2019-12-10 LAB
ALBUMIN SERPL-MCNC: 4.2 G/DL (ref 3.5–5.2)
ALBUMIN/GLOB SERPL: 1.2 G/DL (ref 1.1–2.4)
ALP SERPL-CCNC: 82 U/L (ref 38–116)
ALT SERPL W P-5'-P-CCNC: 21 U/L (ref 0–41)
ANION GAP SERPL CALCULATED.3IONS-SCNC: 12.2 MMOL/L (ref 5–15)
AST SERPL-CCNC: 23 U/L (ref 0–40)
BASOPHILS # BLD AUTO: 0.06 10*3/MM3 (ref 0–0.2)
BASOPHILS NFR BLD AUTO: 0.9 % (ref 0–1.5)
BILIRUB SERPL-MCNC: 0.4 MG/DL (ref 0.2–1.2)
BUN BLD-MCNC: 13 MG/DL (ref 6–20)
BUN/CREAT SERPL: 12 (ref 7.3–30)
CALCIUM SPEC-SCNC: 9.3 MG/DL (ref 8.5–10.2)
CHLORIDE SERPL-SCNC: 99 MMOL/L (ref 98–107)
CO2 SERPL-SCNC: 25.8 MMOL/L (ref 22–29)
CREAT BLD-MCNC: 1.08 MG/DL (ref 0.7–1.3)
DEPRECATED RDW RBC AUTO: 49.4 FL (ref 37–54)
EOSINOPHIL # BLD AUTO: 0.12 10*3/MM3 (ref 0–0.4)
EOSINOPHIL NFR BLD AUTO: 1.8 % (ref 0.3–6.2)
ERYTHROCYTE [DISTWIDTH] IN BLOOD BY AUTOMATED COUNT: 12.7 % (ref 12.3–15.4)
GFR SERPL CREATININE-BSD FRML MDRD: 68 ML/MIN/1.73
GLOBULIN UR ELPH-MCNC: 3.5 GM/DL (ref 1.8–3.5)
GLUCOSE BLD-MCNC: 108 MG/DL (ref 74–124)
HCT VFR BLD AUTO: 44.2 % (ref 37.5–51)
HGB BLD-MCNC: 14.6 G/DL (ref 13–17.7)
IMM GRANULOCYTES # BLD AUTO: 0.01 10*3/MM3 (ref 0–0.05)
IMM GRANULOCYTES NFR BLD AUTO: 0.2 % (ref 0–0.5)
LYMPHOCYTES # BLD AUTO: 1.27 10*3/MM3 (ref 0.7–3.1)
LYMPHOCYTES NFR BLD AUTO: 19.1 % (ref 19.6–45.3)
MCH RBC QN AUTO: 34.7 PG (ref 26.6–33)
MCHC RBC AUTO-ENTMCNC: 33 G/DL (ref 31.5–35.7)
MCV RBC AUTO: 105 FL (ref 79–97)
MONOCYTES # BLD AUTO: 0.89 10*3/MM3 (ref 0.1–0.9)
MONOCYTES NFR BLD AUTO: 13.4 % (ref 5–12)
NEUTROPHILS # BLD AUTO: 4.29 10*3/MM3 (ref 1.7–7)
NEUTROPHILS NFR BLD AUTO: 64.6 % (ref 42.7–76)
NRBC BLD AUTO-RTO: 0 /100 WBC (ref 0–0.2)
PLATELET # BLD AUTO: 165 10*3/MM3 (ref 140–450)
PMV BLD AUTO: 9.1 FL (ref 6–12)
POTASSIUM BLD-SCNC: 4.3 MMOL/L (ref 3.5–4.7)
PROT SERPL-MCNC: 7.7 G/DL (ref 6.3–8)
RBC # BLD AUTO: 4.21 10*6/MM3 (ref 4.14–5.8)
SODIUM BLD-SCNC: 137 MMOL/L (ref 134–145)
T3FREE SERPL-MCNC: 3.24 PG/ML (ref 2–4.4)
T4 FREE SERPL-MCNC: 0.94 NG/DL (ref 0.93–1.7)
TSH SERPL DL<=0.05 MIU/L-ACNC: 1.01 UIU/ML (ref 0.27–4.2)
WBC NRBC COR # BLD: 6.64 10*3/MM3 (ref 3.4–10.8)

## 2019-12-10 PROCEDURE — 25010000002 ATEZOLIZUMAB 1200 MG/20ML SOLUTION 20 ML VIAL: Performed by: INTERNAL MEDICINE

## 2019-12-10 PROCEDURE — 84481 FREE ASSAY (FT-3): CPT | Performed by: INTERNAL MEDICINE

## 2019-12-10 PROCEDURE — 25010000002 ONDANSETRON PER 1 MG: Performed by: INTERNAL MEDICINE

## 2019-12-10 PROCEDURE — 85025 COMPLETE CBC W/AUTO DIFF WBC: CPT

## 2019-12-10 PROCEDURE — 99214 OFFICE O/P EST MOD 30 MIN: CPT | Performed by: INTERNAL MEDICINE

## 2019-12-10 PROCEDURE — 84439 ASSAY OF FREE THYROXINE: CPT | Performed by: INTERNAL MEDICINE

## 2019-12-10 PROCEDURE — 80053 COMPREHEN METABOLIC PANEL: CPT

## 2019-12-10 PROCEDURE — 84443 ASSAY THYROID STIM HORMONE: CPT | Performed by: INTERNAL MEDICINE

## 2019-12-10 PROCEDURE — 96375 TX/PRO/DX INJ NEW DRUG ADDON: CPT

## 2019-12-10 PROCEDURE — 96413 CHEMO IV INFUSION 1 HR: CPT

## 2019-12-10 RX ORDER — SODIUM CHLORIDE 0.9 % (FLUSH) 0.9 %
10 SYRINGE (ML) INJECTION AS NEEDED
Status: DISCONTINUED | OUTPATIENT
Start: 2019-12-10 | End: 2019-12-10 | Stop reason: HOSPADM

## 2019-12-10 RX ORDER — SODIUM CHLORIDE 0.9 % (FLUSH) 0.9 %
10 SYRINGE (ML) INJECTION AS NEEDED
Status: CANCELLED | OUTPATIENT
Start: 2019-12-10

## 2019-12-10 RX ORDER — SODIUM CHLORIDE 9 MG/ML
250 INJECTION, SOLUTION INTRAVENOUS ONCE
Status: CANCELLED | OUTPATIENT
Start: 2020-01-21

## 2019-12-10 RX ORDER — SODIUM CHLORIDE 9 MG/ML
250 INJECTION, SOLUTION INTRAVENOUS ONCE
Status: COMPLETED | OUTPATIENT
Start: 2019-12-10 | End: 2019-12-10

## 2019-12-10 RX ORDER — SODIUM CHLORIDE 9 MG/ML
250 INJECTION, SOLUTION INTRAVENOUS ONCE
Status: CANCELLED | OUTPATIENT
Start: 2019-12-10

## 2019-12-10 RX ORDER — SODIUM CHLORIDE 9 MG/ML
250 INJECTION, SOLUTION INTRAVENOUS ONCE
Status: CANCELLED | OUTPATIENT
Start: 2019-12-31

## 2019-12-10 RX ADMIN — Medication 10 ML: at 15:58

## 2019-12-10 RX ADMIN — SODIUM CHLORIDE, PRESERVATIVE FREE 500 UNITS: 5 INJECTION INTRAVENOUS at 15:59

## 2019-12-10 RX ADMIN — ONDANSETRON 16 MG: 2 INJECTION, SOLUTION INTRAMUSCULAR; INTRAVENOUS at 15:05

## 2019-12-10 RX ADMIN — ATEZOLIZUMAB 1200 MG: 1200 INJECTION, SOLUTION INTRAVENOUS at 15:23

## 2019-12-10 RX ADMIN — SODIUM CHLORIDE 250 ML: 9 INJECTION, SOLUTION INTRAVENOUS at 15:05

## 2019-12-10 NOTE — PROGRESS NOTES
REASON FOR FOLLOW UP:   1.  T4N3 Small cell lung cancer  2.  SVC syndrome  3.  Palliative therapy with carboplatin, -16 initiated on 7/8/2019.  Cycle 1 was initiated during a hospitalization.  Atezolizumab was added with cycle #2.  4 cycles of chemotherapy complete as of 9/19/2019.  4.  Port placed on 8/22/2019, delayed due to SVC syndrome  5.  Neutropenia related to therapy requiring the addition of Neulasta with cycle #3.  6.  Single agent atezolizumab initiated 10/8/2019.  Scan on 10/1/2019 revealed good disease response disease.  There is an area of concern on the adrenal gland which we will continue to monitor.  MRI brain shows no evidence of metastatic disease.    HISTORY OF PRESENT ILLNESS:  Julia Smith III is a 69 y.o. male the above medical history here today for follow-up continuing maintenance atezolizumab.    Overall he feels better.  Energy level is improving.  He did not complain of any shortness of breath or new respiratory symptoms today.  He has cut back on smoking but does continue to smoke a little bit.  No fevers or chills.  No neurologic symptoms.      Past Medical History:   Diagnosis Date   • Atrial fibrillation with RVR (CMS/HCC)    • Cancer (CMS/HCC) 06/2019    Right lung   • Chronic cough    • COPD (chronic obstructive pulmonary disease) (CMS/HCC)    • Hearing loss    • History of shingles    • Lower back pain    • Mastoiditis    • Mild mitral regurgitation    • Other fatigue    • PAF (paroxysmal atrial fibrillation) (CMS/HCC)    • Pneumonia        Past Surgical History:   Procedure Laterality Date   • BRONCHOSCOPY N/A 6/19/2019    Procedure: BRONCHOSCOPY WITH WASHINGS AND BIOPSY AND ENDOBRONCHIAL ULTRASOUND WITH FNA;  Surgeon: Arslan Marquez MD;  Location: [facility] ENDOSCOPY;  Service: Pulmonary   • INNER EAR SURGERY Left    • MASTOIDECTOMY Left 1994   • VENOUS ACCESS DEVICE (PORT) INSERTION Right 8/22/2019    Procedure: INSERTION OF PORTACATH;  Surgeon: Ramila Gallardo MD;   "Location: [facility];  Service: Cardiothoracic       SOCIAL HISTORY:   reports that he has been smoking cigarettes. He has a 60.00 pack-year smoking history. He has never used smokeless tobacco. He reports that he drinks about 4.0 - 7.0 standard drinks of alcohol per week. He reports that he has current or past drug history. Drug: Marijuana.    FAMILY HISTORY:  family history includes COPD in his father; Heart disease in his brother; No Known Problems in his brother, brother, brother, mother, and sister.    ALLERGIES:  No Known Allergies    MEDICATIONS:  The medication list has been reviewed with the patient by the medical assistant, and the list has been updated in the electronic medical record, which I reviewed.  Medication dosages and frequencies were confirmed to be accurate.    I have reviewed the patient's medical history in detail and updated the computerized patient record.    Review of Systems   Review of Systems   Constitutional: Positive for fatigue. Negative for activity change, appetite change, chills and fever.   HENT: Negative for mouth sores, nosebleeds and trouble swallowing.    Respiratory: Negative for cough.    Cardiovascular: Negative for chest pain and leg swelling.   Gastrointestinal: Negative for abdominal pain, constipation, diarrhea, nausea and vomiting.   Genitourinary: Negative for difficulty urinating.   Musculoskeletal:        Knee pain - chronic stable   Skin: Negative for rash.   Neurological: Negative for dizziness and numbness.   Hematological: Negative for adenopathy. Does not bruise/bleed easily.   Psychiatric/Behavioral: Negative for sleep disturbance.       Vitals:    12/10/19 1404   BP: 155/81   Pulse: 83   Resp: 16   Temp: 98 °F (36.7 °C)   TempSrc: Oral   SpO2: 98%   Weight: 87.7 kg (193 lb 4.8 oz)   Height: 176 cm (69.29\")   PainSc: 0-No pain  Comment: lung cancer     Physical Exam   Constitutional: He is oriented to person, place, and time. He appears well-developed and " well-nourished. No distress.   HENT:   Head: Normocephalic and atraumatic.   Mouth/Throat: Oropharynx is clear and moist and mucous membranes are normal. No oropharyngeal exudate.   Eyes: Pupils are equal, round, and reactive to light.   Neck: Normal range of motion.   Cardiovascular: Normal rate and regular rhythm.   Pulmonary/Chest: Effort normal. He has no rhonchi.   Abdominal: Soft. Normal appearance and bowel sounds are normal. He exhibits no distension. There is no hepatosplenomegaly.   Musculoskeletal: Normal range of motion. He exhibits no edema.   Neurological: He is alert and oriented to person, place, and time.   Skin: Skin is warm and dry. No rash noted.   Psychiatric: He has a normal mood and affect.   Vitals reviewed.  Unchanged since prior exam except as noted    DIAGNOSTIC DATA:  Results from last 7 days   Lab Units 12/10/19  1403   WBC 10*3/mm3 6.64   NEUTROS ABS 10*3/mm3 4.29   HEMOGLOBIN g/dL 14.6   HEMATOCRIT % 44.2   PLATELETS 10*3/mm3 165               IMAGING:  CT images of the chest abdomen and pelvis from 8/14/2019 show significant improvement in the lymphadenopathy and in the right hilar/mediastinal mass.    ASSESSMENT:  This is a 69 y.o. male with:  1.   Small cell lung cancer originating in the right hilum:   · He has an extremely large mass there with concern for SVC compression on CT imaging from 6/4/2019.  There is some left hilar adenopathy.  · Some parotid lesions which are concerning but indeterminate, left greater than right.  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  · Staging MRI of the brain 7/10/2019 negative for metastatic disease  · He was discussed at multidisciplinary thoracic conference, with his T4 and 3 disease, concern for involvement of the right supraclavicular region and obvious left hilum, plans to  add atezolizumab with cycle 2, we can consider future radiation to the residual mediastinal mass if necessary.  · Lung lesion too large to radiate in spite of the SVC narrowing.    · On 7/8/2019 we started carboplatin AUC 5 and VP16 100 mg/m2 through peripheral IVs  · Cycle 2 postponed on 7/29/2019 due to heart rate of 154.  Atezolizumab added with cycle #2.  · Radiation can be added for a residual mediastinal mass if necessary  · CT imaging on 8/14/2019 after two cycles of therapy shows significant improvement.  · Mediport anticipated by Dr. Gallardo on 8/22/2019.  · On  8/20/2019 cycle 3 was delayed due to neutropenia and with cycle 3 we did incorporate Neulasta.  · He is here today for scheduled cycle #4 (third cycle with atezolizumab).  Proceed with treatment today and over the next 2 days.  · Maintenance atezolizumab initiated 10/8/2019.  PET scan revealed disease response to therapy, he does have an area of hypermetabolic activity in the adrenal gland which is small and did not show up on prior exam.  We will continue to monitor this.  His MRI of the brain is also negative for metastatic disease.    2.  Chronic hearing loss: History of mastoiditis.  He is not a candidate for cisplatin because of this.  Stable.    3.  Atrial fibrillation.  He was initiated on metoprolol 12.5 mg twice daily.  This did delay port placement.  The patient was taking his metoprolol inconsistently.  For cycle 2 was delayed due to heart rate of 150 7/29/2019.  He was seen by cardiology with instructions to increase his metoprolol to 25 mg twice daily.  Heart rate stable today.    4.  Tobacco use.  He did utilize a NicoDerm patch while inpatient, though has continued smoking since discharge.  We discussed smoking cessation again.    5.  SVC syndrome.  Improved after 2 cycles of chemotherapy.    6. Tachycardia related to atrial fibrillation as outlined above  · Heart rate on 7/29/19 was 154.  Cycle 2 was delayed.  · Metoprolol increased to  25 mg twice daily with improvement in heart rate.  · His heart rate today is within normal limits    7.  Neutropenia related to chemotherapy.  This did cause a dose delay with cycle 3.  We added Neulasta.  Neutropenia has resolved.    8.  Depression: Slightly improved.      9.  Vertigo: This was transient.  Brain MRI on 10/1/2019 was negative.  His vertigo is stable and occasional.      PLAN:   1. Proceed with maintenance atezolizumab today and every 3 weeks  2. We do not plan for radiation therapy at this time  3. Return for treatment in 3 in 6 weeks.  4. CT imaging of the chest abdomen and pelvis in 8 weeks  5. I will see him back in 9 weeks for follow-up to review imaging and hopefully to proceed with further atezolizumab at that time.      Thai Morel MD

## 2019-12-16 ENCOUNTER — OFFICE VISIT (OUTPATIENT)
Dept: CARDIOLOGY | Facility: CLINIC | Age: 69
End: 2019-12-16

## 2019-12-16 VITALS
DIASTOLIC BLOOD PRESSURE: 70 MMHG | HEIGHT: 71 IN | BODY MASS INDEX: 27.86 KG/M2 | SYSTOLIC BLOOD PRESSURE: 152 MMHG | WEIGHT: 199 LBS | HEART RATE: 79 BPM

## 2019-12-16 DIAGNOSIS — R09.89 BRUIT OF LEFT CAROTID ARTERY: Primary | ICD-10-CM

## 2019-12-16 DIAGNOSIS — I48.0 PAF (PAROXYSMAL ATRIAL FIBRILLATION) (HCC): ICD-10-CM

## 2019-12-16 DIAGNOSIS — Z72.0 TOBACCO ABUSE: ICD-10-CM

## 2019-12-16 DIAGNOSIS — R03.0 TRANSIENT ELEVATED BLOOD PRESSURE: ICD-10-CM

## 2019-12-16 PROCEDURE — 93000 ELECTROCARDIOGRAM COMPLETE: CPT | Performed by: NURSE PRACTITIONER

## 2019-12-16 PROCEDURE — 99214 OFFICE O/P EST MOD 30 MIN: CPT | Performed by: NURSE PRACTITIONER

## 2019-12-16 NOTE — PROGRESS NOTES
Date of Office Visit: 19  Encounter Provider: BERTO Turpin  Place of Service: [facility] CARDIOLOGY  Patient Name: Julia Smith III  :1950    Chief Complaint   Patient presents with   • Atrial Fibrillation   • Follow-up   :     HPI: Julia Smith III is a 69 y.o. male  with history of lung cancer, COPD, and paroxysmal atrial fibrillation.     Greater than 20 years ago he had a left ear procedure and was told that part of his carotid artery was reconstructed.       He was found to have small Cell cancer of the lung as well as being a rapid  Atrial fibrillation as part of preoperative evaluation to have Mediport placed.  He had and Echocardiogram showed normal left ventricular ejection fraction but basal anteroseptal and inferoseptal wall hypokinesis no real significant valvular disease.  He also had superior vena cava syndrome. He did convert back to sinus rhythm and was not anticoagulated at that time other than aspirin since he was in atrial fibrillation for such a short period of time but in addition due to his metastatic cancer.  He ultimately had a right subclavian Mediport placed and started chemotherapy..    He presents today for 3-month follow-up unfortunately continues to smoke 1 pack of cigarettes daily.  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  His occasional palpitations and occasional lightheadedness as well as occasional fatigue.  He has no chest pain tightness pressure, edema, orthopnea or PND.          No Known Allergies    Past Medical History:   Diagnosis Date   • Atrial fibrillation with RVR (CMS/HCC)    • Cancer (CMS/[facility]) 2019    Right lung   • Chronic cough    • COPD (chronic obstructive pulmonary disease) (CMS/HCC)    • Hearing loss    • History of shingles    • Lower back pain    • Mastoiditis    • Mild mitral regurgitation    • Other fatigue    • PAF (paroxysmal atrial fibrillation) (CMS/HCC)    • Pneumonia        Past  "Surgical History:   Procedure Laterality Date   • BRONCHOSCOPY N/A 6/19/2019    Procedure: BRONCHOSCOPY WITH WASHINGS AND BIOPSY AND ENDOBRONCHIAL ULTRASOUND WITH FNA;  Surgeon: Arslan Marquez MD;  Location: [facility] ENDOSCOPY;  Service: Pulmonary   • INNER EAR SURGERY Left    • MASTOIDECTOMY Left 1994   • VENOUS ACCESS DEVICE (PORT) INSERTION Right 8/22/2019    Procedure: INSERTION OF PORTACATH;  Surgeon: Ramila Gallardo MD;  Location: [facility] OR;  Service: Cardiothoracic         Family and social history reviewed.     Review of Systems   Constitution: Positive for malaise/fatigue.   Cardiovascular: Positive for dyspnea on exertion and palpitations.   Neurological: Positive for light-headedness.     All other systems were reviewed and are negative          Objective:     Vitals:    12/16/19 1508   BP: 152/70   BP Location: Left arm   Patient Position: Sitting   Pulse: 79   Weight: 90.3 kg (199 lb)   Height: 180.3 cm (71\")     Body mass index is 27.75 kg/m².    PHYSICAL EXAM:  Physical Exam   Constitutional: He is oriented to person, place, and time. He appears well-developed and well-nourished. No distress.   HENT:   Head: Normocephalic.   Eyes: Conjunctivae are normal.   Neck: Normal range of motion. No JVD present.   Cardiovascular: Normal rate, regular rhythm, normal heart sounds and intact distal pulses.   No murmur heard.  Pulses:       Carotid pulses are 2+ on the right side, and 2+ on the left side with bruit.       Radial pulses are 2+ on the right side, and 2+ on the left side.        Posterior tibial pulses are 2+ on the right side, and 2+ on the left side.   Pulmonary/Chest: Effort normal and breath sounds normal. No respiratory distress. He has no wheezes. He has no rhonchi. He has no rales. He exhibits no tenderness.   Abdominal: Soft. Bowel sounds are normal. He exhibits no distension.   Musculoskeletal: Normal range of motion. He exhibits no edema.   Neurological: He is alert and oriented to " person, place, and time.   Skin: Skin is warm, dry and intact. No rash noted. He is not diaphoretic. No cyanosis.   Psychiatric: He has a normal mood and affect. His behavior is normal. Judgment and thought content normal.         ECG 12 Lead  Date/Time: 12/16/2019 5:25 PM  Performed by: Uyen Samuel APRN  Authorized by: Uyen Samuel APRN   Comparison: compared with previous ECG   Similar to previous ECG  Rhythm: sinus rhythm  Conduction: right bundle branch block  QRS axis: left    Clinical impression: abnormal EKG            Current Outpatient Medications   Medication Sig Dispense Refill   • metoprolol tartrate (LOPRESSOR) 25 MG tablet Take one tablet by mouth twice daily 180 tablet 1   • ondansetron (ZOFRAN) 8 MG tablet Take 1 tablet by mouth 3 (Three) Times a Day As Needed for Nausea or Vomiting. 30 tablet 5   • aspirin 81 MG tablet Take 1 tablet by mouth Daily. 30 tablet 11     Current Facility-Administered Medications   Medication Dose Route Frequency Provider Last Rate Last Dose   • ipratropium-albuterol (DUO-NEB) nebulizer solution 3 mL  3 mL Nebulization 4x Daily - RT Roma Costa APRN   3 mL at 05/13/19 1625     Assessment:       Diagnosis Plan   1. Bruit of left carotid artery  Duplex Carotid Ultrasound CAR   2. PAF (paroxysmal atrial fibrillation) (CMS/HCC)     3. Tobacco abuse          No orders of the defined types were placed in this encounter.        Plan:     1.  69-year-old gentleman with paroxysmal atrial fibrillation.    Atrial Fibrillation and Atrial Flutter  Assessment  • The patient has paroxysmal atrial fibrillation  • This is non-valvular in etiology  • The patient's CHADS2-VASc score is 1  • A [id number] score of 1 is considered an intermediate risk for a thromboembolic event  • Aspirin prescribed    Plan  • Attempt to maintain sinus rhythm  • Continue aspirin for antithrombotic therapy, bleeding issues discussed  • Continue beta blocker for rate control  He appears to be  maintaining normal sinus rhythm we will continue the same.  He has been off aspirin for unknown reason.  I have advised that he get back on that.      2.  Metastatic squamous cell cancer of the lung now with right subclavian Mediport on chemotherapy following with oncology.  3.  Hypertension-blood pressure is elevated today he did have a cigarette coming into the office.  When he was last here systolic value was 110 I have asked him to start following this at home and notify us if elevated  4.  Abnormal ECG with right bundle branch block and left anterior fascicular block no new symptoms follow clinically for now  5.  History of left ear surgery was devang he had reconstruction to the left carotid artery.  Mild bruit noted today he will have bilateral carotid duplex when he returns in 6 months  6.  Bilateral lower extremity leg pain-this is not necessarily worsened with exertion he is to follow this no testing at this time  7.Depression- he would benefit from seeing health psychology.  8. Tobacco abuse- unfortunately he has no desire to stop    Follow-up in 6 months call with questions or concerns.            It has been a pleasure to participate in this patient's care.      Thank you,  BERTO Turpin      **I used Dragon to dictate this note:**

## 2019-12-31 ENCOUNTER — INFUSION (OUTPATIENT)
Dept: ONCOLOGY | Facility: HOSPITAL | Age: 69
End: 2019-12-31

## 2019-12-31 VITALS
BODY MASS INDEX: 27.89 KG/M2 | HEART RATE: 92 BPM | OXYGEN SATURATION: 96 % | WEIGHT: 200 LBS | DIASTOLIC BLOOD PRESSURE: 84 MMHG | TEMPERATURE: 97.5 F | SYSTOLIC BLOOD PRESSURE: 157 MMHG

## 2019-12-31 DIAGNOSIS — Z45.2 ENCOUNTER FOR FITTING AND ADJUSTMENT OF VASCULAR CATHETER: ICD-10-CM

## 2019-12-31 DIAGNOSIS — Z79.899 HIGH RISK MEDICATION USE: ICD-10-CM

## 2019-12-31 DIAGNOSIS — C34.90 SMALL CELL LUNG CANCER (HCC): Primary | ICD-10-CM

## 2019-12-31 LAB
ALBUMIN SERPL-MCNC: 4.2 G/DL (ref 3.5–5.2)
ALBUMIN/GLOB SERPL: 1.3 G/DL (ref 1.1–2.4)
ALP SERPL-CCNC: 79 U/L (ref 38–116)
ALT SERPL W P-5'-P-CCNC: 20 U/L (ref 0–41)
ANION GAP SERPL CALCULATED.3IONS-SCNC: 10.6 MMOL/L (ref 5–15)
AST SERPL-CCNC: 21 U/L (ref 0–40)
BASOPHILS # BLD AUTO: 0.05 10*3/MM3 (ref 0–0.2)
BASOPHILS NFR BLD AUTO: 0.6 % (ref 0–1.5)
BILIRUB SERPL-MCNC: 0.4 MG/DL (ref 0.2–1.2)
BUN BLD-MCNC: 12 MG/DL (ref 6–20)
BUN/CREAT SERPL: 12 (ref 7.3–30)
CALCIUM SPEC-SCNC: 9.3 MG/DL (ref 8.5–10.2)
CHLORIDE SERPL-SCNC: 102 MMOL/L (ref 98–107)
CO2 SERPL-SCNC: 26.4 MMOL/L (ref 22–29)
CREAT BLD-MCNC: 1 MG/DL (ref 0.7–1.3)
DEPRECATED RDW RBC AUTO: 48.2 FL (ref 37–54)
EOSINOPHIL # BLD AUTO: 0.18 10*3/MM3 (ref 0–0.4)
EOSINOPHIL NFR BLD AUTO: 2.2 % (ref 0.3–6.2)
ERYTHROCYTE [DISTWIDTH] IN BLOOD BY AUTOMATED COUNT: 12.5 % (ref 12.3–15.4)
GFR SERPL CREATININE-BSD FRML MDRD: 75 ML/MIN/1.73
GLOBULIN UR ELPH-MCNC: 3.3 GM/DL (ref 1.8–3.5)
GLUCOSE BLD-MCNC: 92 MG/DL (ref 74–124)
HCT VFR BLD AUTO: 45.7 % (ref 37.5–51)
HGB BLD-MCNC: 15.3 G/DL (ref 13–17.7)
IMM GRANULOCYTES # BLD AUTO: 0.01 10*3/MM3 (ref 0–0.05)
IMM GRANULOCYTES NFR BLD AUTO: 0.1 % (ref 0–0.5)
LYMPHOCYTES # BLD AUTO: 1.33 10*3/MM3 (ref 0.7–3.1)
LYMPHOCYTES NFR BLD AUTO: 16.4 % (ref 19.6–45.3)
MCH RBC QN AUTO: 34.8 PG (ref 26.6–33)
MCHC RBC AUTO-ENTMCNC: 33.5 G/DL (ref 31.5–35.7)
MCV RBC AUTO: 103.9 FL (ref 79–97)
MONOCYTES # BLD AUTO: 0.94 10*3/MM3 (ref 0.1–0.9)
MONOCYTES NFR BLD AUTO: 11.6 % (ref 5–12)
NEUTROPHILS # BLD AUTO: 5.58 10*3/MM3 (ref 1.7–7)
NEUTROPHILS NFR BLD AUTO: 69.1 % (ref 42.7–76)
NRBC BLD AUTO-RTO: 0 /100 WBC (ref 0–0.2)
PLATELET # BLD AUTO: 156 10*3/MM3 (ref 140–450)
PMV BLD AUTO: 9.7 FL (ref 6–12)
POTASSIUM BLD-SCNC: 4.3 MMOL/L (ref 3.5–4.7)
PROT SERPL-MCNC: 7.5 G/DL (ref 6.3–8)
RBC # BLD AUTO: 4.4 10*6/MM3 (ref 4.14–5.8)
SODIUM BLD-SCNC: 139 MMOL/L (ref 134–145)
WBC NRBC COR # BLD: 8.09 10*3/MM3 (ref 3.4–10.8)

## 2019-12-31 PROCEDURE — 85025 COMPLETE CBC W/AUTO DIFF WBC: CPT | Performed by: INTERNAL MEDICINE

## 2019-12-31 PROCEDURE — 96375 TX/PRO/DX INJ NEW DRUG ADDON: CPT | Performed by: INTERNAL MEDICINE

## 2019-12-31 PROCEDURE — 96413 CHEMO IV INFUSION 1 HR: CPT | Performed by: INTERNAL MEDICINE

## 2019-12-31 PROCEDURE — 25010000002 ATEZOLIZUMAB 1200 MG/20ML SOLUTION 20 ML VIAL: Performed by: INTERNAL MEDICINE

## 2019-12-31 PROCEDURE — 25010000002 ONDANSETRON PER 1 MG: Performed by: INTERNAL MEDICINE

## 2019-12-31 PROCEDURE — 80053 COMPREHEN METABOLIC PANEL: CPT | Performed by: INTERNAL MEDICINE

## 2019-12-31 RX ORDER — SODIUM CHLORIDE 9 MG/ML
250 INJECTION, SOLUTION INTRAVENOUS ONCE
Status: COMPLETED | OUTPATIENT
Start: 2019-12-31 | End: 2019-12-31

## 2019-12-31 RX ORDER — SODIUM CHLORIDE 0.9 % (FLUSH) 0.9 %
10 SYRINGE (ML) INJECTION AS NEEDED
Status: CANCELLED | OUTPATIENT
Start: 2019-12-31

## 2019-12-31 RX ORDER — HEPARIN SODIUM (PORCINE) LOCK FLUSH IV SOLN 100 UNIT/ML 100 UNIT/ML
500 SOLUTION INTRAVENOUS AS NEEDED
Status: CANCELLED | OUTPATIENT
Start: 2019-12-31

## 2019-12-31 RX ORDER — SODIUM CHLORIDE 0.9 % (FLUSH) 0.9 %
10 SYRINGE (ML) INJECTION AS NEEDED
Status: DISCONTINUED | OUTPATIENT
Start: 2019-12-31 | End: 2019-12-31 | Stop reason: HOSPADM

## 2019-12-31 RX ADMIN — ONDANSETRON 16 MG: 2 INJECTION, SOLUTION INTRAMUSCULAR; INTRAVENOUS at 15:06

## 2019-12-31 RX ADMIN — SODIUM CHLORIDE, PRESERVATIVE FREE 10 ML: 5 INJECTION INTRAVENOUS at 16:02

## 2019-12-31 RX ADMIN — SODIUM CHLORIDE 250 ML: 9 INJECTION, SOLUTION INTRAVENOUS at 15:06

## 2019-12-31 RX ADMIN — ATEZOLIZUMAB 1200 MG: 1200 INJECTION, SOLUTION INTRAVENOUS at 15:29

## 2019-12-31 RX ADMIN — Medication 500 UNITS: at 16:02

## 2020-01-21 ENCOUNTER — INFUSION (OUTPATIENT)
Dept: ONCOLOGY | Facility: HOSPITAL | Age: 70
End: 2020-01-21

## 2020-01-21 VITALS
HEART RATE: 93 BPM | WEIGHT: 198 LBS | BODY MASS INDEX: 27.62 KG/M2 | SYSTOLIC BLOOD PRESSURE: 151 MMHG | OXYGEN SATURATION: 95 % | DIASTOLIC BLOOD PRESSURE: 86 MMHG | TEMPERATURE: 96 F

## 2020-01-21 DIAGNOSIS — Z45.2 ENCOUNTER FOR FITTING AND ADJUSTMENT OF VASCULAR CATHETER: ICD-10-CM

## 2020-01-21 DIAGNOSIS — C34.90 SMALL CELL LUNG CANCER (HCC): Primary | ICD-10-CM

## 2020-01-21 DIAGNOSIS — Z79.899 HIGH RISK MEDICATION USE: ICD-10-CM

## 2020-01-21 LAB
ALBUMIN SERPL-MCNC: 4.1 G/DL (ref 3.5–5.2)
ALBUMIN/GLOB SERPL: 1.1 G/DL (ref 1.1–2.4)
ALP SERPL-CCNC: 74 U/L (ref 38–116)
ALT SERPL W P-5'-P-CCNC: 16 U/L (ref 0–41)
ANION GAP SERPL CALCULATED.3IONS-SCNC: 12.4 MMOL/L (ref 5–15)
AST SERPL-CCNC: 19 U/L (ref 0–40)
BASOPHILS # BLD AUTO: 0.07 10*3/MM3 (ref 0–0.2)
BASOPHILS NFR BLD AUTO: 1.1 % (ref 0–1.5)
BILIRUB SERPL-MCNC: 0.4 MG/DL (ref 0.2–1.2)
BUN BLD-MCNC: 17 MG/DL (ref 6–20)
BUN/CREAT SERPL: 17.5 (ref 7.3–30)
CALCIUM SPEC-SCNC: 9.4 MG/DL (ref 8.5–10.2)
CHLORIDE SERPL-SCNC: 98 MMOL/L (ref 98–107)
CO2 SERPL-SCNC: 25.6 MMOL/L (ref 22–29)
CREAT BLD-MCNC: 0.97 MG/DL (ref 0.7–1.3)
DEPRECATED RDW RBC AUTO: 47 FL (ref 37–54)
EOSINOPHIL # BLD AUTO: 0.19 10*3/MM3 (ref 0–0.4)
EOSINOPHIL NFR BLD AUTO: 3 % (ref 0.3–6.2)
ERYTHROCYTE [DISTWIDTH] IN BLOOD BY AUTOMATED COUNT: 12.4 % (ref 12.3–15.4)
GFR SERPL CREATININE-BSD FRML MDRD: 77 ML/MIN/1.73
GLOBULIN UR ELPH-MCNC: 3.6 GM/DL (ref 1.8–3.5)
GLUCOSE BLD-MCNC: 134 MG/DL (ref 74–124)
HCT VFR BLD AUTO: 43.7 % (ref 37.5–51)
HGB BLD-MCNC: 14.7 G/DL (ref 13–17.7)
IMM GRANULOCYTES # BLD AUTO: 0.03 10*3/MM3 (ref 0–0.05)
IMM GRANULOCYTES NFR BLD AUTO: 0.5 % (ref 0–0.5)
LYMPHOCYTES # BLD AUTO: 1.23 10*3/MM3 (ref 0.7–3.1)
LYMPHOCYTES NFR BLD AUTO: 19.2 % (ref 19.6–45.3)
MCH RBC QN AUTO: 34.2 PG (ref 26.6–33)
MCHC RBC AUTO-ENTMCNC: 33.6 G/DL (ref 31.5–35.7)
MCV RBC AUTO: 101.6 FL (ref 79–97)
MONOCYTES # BLD AUTO: 0.78 10*3/MM3 (ref 0.1–0.9)
MONOCYTES NFR BLD AUTO: 12.2 % (ref 5–12)
NEUTROPHILS # BLD AUTO: 4.11 10*3/MM3 (ref 1.7–7)
NEUTROPHILS NFR BLD AUTO: 64 % (ref 42.7–76)
NRBC BLD AUTO-RTO: 0 /100 WBC (ref 0–0.2)
PLATELET # BLD AUTO: 154 10*3/MM3 (ref 140–450)
PMV BLD AUTO: 9.5 FL (ref 6–12)
POTASSIUM BLD-SCNC: 4.3 MMOL/L (ref 3.5–4.7)
PROT SERPL-MCNC: 7.7 G/DL (ref 6.3–8)
RBC # BLD AUTO: 4.3 10*6/MM3 (ref 4.14–5.8)
SODIUM BLD-SCNC: 136 MMOL/L (ref 134–145)
T3FREE SERPL-MCNC: 2.99 PG/ML (ref 2–4.4)
T4 FREE SERPL-MCNC: 1.03 NG/DL (ref 0.93–1.7)
TSH SERPL DL<=0.05 MIU/L-ACNC: 1.23 UIU/ML (ref 0.27–4.2)
WBC NRBC COR # BLD: 6.41 10*3/MM3 (ref 3.4–10.8)

## 2020-01-21 PROCEDURE — 84481 FREE ASSAY (FT-3): CPT | Performed by: INTERNAL MEDICINE

## 2020-01-21 PROCEDURE — 96375 TX/PRO/DX INJ NEW DRUG ADDON: CPT | Performed by: INTERNAL MEDICINE

## 2020-01-21 PROCEDURE — 85025 COMPLETE CBC W/AUTO DIFF WBC: CPT | Performed by: INTERNAL MEDICINE

## 2020-01-21 PROCEDURE — 96413 CHEMO IV INFUSION 1 HR: CPT | Performed by: INTERNAL MEDICINE

## 2020-01-21 PROCEDURE — 25010000002 ONDANSETRON PER 1 MG: Performed by: INTERNAL MEDICINE

## 2020-01-21 PROCEDURE — 84439 ASSAY OF FREE THYROXINE: CPT | Performed by: INTERNAL MEDICINE

## 2020-01-21 PROCEDURE — 84443 ASSAY THYROID STIM HORMONE: CPT | Performed by: INTERNAL MEDICINE

## 2020-01-21 PROCEDURE — 25010000002 ATEZOLIZUMAB 1200 MG/20ML SOLUTION 20 ML VIAL: Performed by: INTERNAL MEDICINE

## 2020-01-21 PROCEDURE — 25010000003 HEPARIN LOCK FLUCH PER 10 UNITS: Performed by: INTERNAL MEDICINE

## 2020-01-21 PROCEDURE — 80053 COMPREHEN METABOLIC PANEL: CPT | Performed by: INTERNAL MEDICINE

## 2020-01-21 RX ORDER — HEPARIN SODIUM (PORCINE) LOCK FLUSH IV SOLN 100 UNIT/ML 100 UNIT/ML
500 SOLUTION INTRAVENOUS AS NEEDED
Status: CANCELLED | OUTPATIENT
Start: 2020-01-21

## 2020-01-21 RX ORDER — SODIUM CHLORIDE 9 MG/ML
250 INJECTION, SOLUTION INTRAVENOUS ONCE
Status: COMPLETED | OUTPATIENT
Start: 2020-01-21 | End: 2020-01-21

## 2020-01-21 RX ORDER — SODIUM CHLORIDE 0.9 % (FLUSH) 0.9 %
10 SYRINGE (ML) INJECTION AS NEEDED
Status: DISCONTINUED | OUTPATIENT
Start: 2020-01-21 | End: 2020-01-21 | Stop reason: HOSPADM

## 2020-01-21 RX ORDER — HEPARIN SODIUM (PORCINE) LOCK FLUSH IV SOLN 100 UNIT/ML 100 UNIT/ML
500 SOLUTION INTRAVENOUS AS NEEDED
Status: DISCONTINUED | OUTPATIENT
Start: 2020-01-21 | End: 2020-01-21 | Stop reason: HOSPADM

## 2020-01-21 RX ORDER — SODIUM CHLORIDE 0.9 % (FLUSH) 0.9 %
10 SYRINGE (ML) INJECTION AS NEEDED
Status: CANCELLED | OUTPATIENT
Start: 2020-01-21

## 2020-01-21 RX ADMIN — SODIUM CHLORIDE, PRESERVATIVE FREE 10 ML: 5 INJECTION INTRAVENOUS at 16:43

## 2020-01-21 RX ADMIN — Medication 500 UNITS: at 16:44

## 2020-01-21 RX ADMIN — ATEZOLIZUMAB 1200 MG: 1200 INJECTION, SOLUTION INTRAVENOUS at 16:11

## 2020-01-21 RX ADMIN — SODIUM CHLORIDE 250 ML: 9 INJECTION, SOLUTION INTRAVENOUS at 15:20

## 2020-01-21 RX ADMIN — ONDANSETRON HYDROCHLORIDE 16 MG: 2 INJECTION, SOLUTION INTRAMUSCULAR; INTRAVENOUS at 15:48

## 2020-02-04 ENCOUNTER — INFUSION (OUTPATIENT)
Dept: ONCOLOGY | Facility: HOSPITAL | Age: 70
End: 2020-02-04

## 2020-02-04 ENCOUNTER — HOSPITAL ENCOUNTER (OUTPATIENT)
Dept: PET IMAGING | Facility: HOSPITAL | Age: 70
Discharge: HOME | End: 2020-02-04
Admitting: INTERNAL MEDICINE

## 2020-02-04 DIAGNOSIS — C34.90 SMALL CELL LUNG CANCER (HCC): Chronic | ICD-10-CM

## 2020-02-04 DIAGNOSIS — Z79.899 HIGH RISK MEDICATION USE: ICD-10-CM

## 2020-02-04 DIAGNOSIS — C34.90 SMALL CELL LUNG CANCER (HCC): ICD-10-CM

## 2020-02-04 LAB
ALBUMIN SERPL-MCNC: 4.2 G/DL (ref 3.5–5.2)
ALBUMIN/GLOB SERPL: 1.4 G/DL (ref 1.1–2.4)
ALP SERPL-CCNC: 75 U/L (ref 38–116)
ALT SERPL W P-5'-P-CCNC: 16 U/L (ref 0–41)
ANION GAP SERPL CALCULATED.3IONS-SCNC: 11.7 MMOL/L (ref 5–15)
AST SERPL-CCNC: 22 U/L (ref 0–40)
BASOPHILS # BLD AUTO: 0.08 10*3/MM3 (ref 0–0.2)
BASOPHILS NFR BLD AUTO: 1.3 % (ref 0–1.5)
BILIRUB SERPL-MCNC: 0.2 MG/DL (ref 0.2–1.2)
BUN BLD-MCNC: 15 MG/DL (ref 6–20)
BUN/CREAT SERPL: 13.5 (ref 7.3–30)
CALCIUM SPEC-SCNC: 9.2 MG/DL (ref 8.5–10.2)
CHLORIDE SERPL-SCNC: 104 MMOL/L (ref 98–107)
CO2 SERPL-SCNC: 24.3 MMOL/L (ref 22–29)
CREAT BLD-MCNC: 1.11 MG/DL (ref 0.7–1.3)
DEPRECATED RDW RBC AUTO: 47.9 FL (ref 37–54)
EOSINOPHIL # BLD AUTO: 0.28 10*3/MM3 (ref 0–0.4)
EOSINOPHIL NFR BLD AUTO: 4.4 % (ref 0.3–6.2)
ERYTHROCYTE [DISTWIDTH] IN BLOOD BY AUTOMATED COUNT: 12.7 % (ref 12.3–15.4)
GFR SERPL CREATININE-BSD FRML MDRD: 66 ML/MIN/1.73
GLOBULIN UR ELPH-MCNC: 3.1 GM/DL (ref 1.8–3.5)
GLUCOSE BLD-MCNC: 97 MG/DL (ref 74–124)
HCT VFR BLD AUTO: 42.3 % (ref 37.5–51)
HGB BLD-MCNC: 13.9 G/DL (ref 13–17.7)
IMM GRANULOCYTES # BLD AUTO: 0.02 10*3/MM3 (ref 0–0.05)
IMM GRANULOCYTES NFR BLD AUTO: 0.3 % (ref 0–0.5)
LYMPHOCYTES # BLD AUTO: 1.6 10*3/MM3 (ref 0.7–3.1)
LYMPHOCYTES NFR BLD AUTO: 25.4 % (ref 19.6–45.3)
MCH RBC QN AUTO: 33.6 PG (ref 26.6–33)
MCHC RBC AUTO-ENTMCNC: 32.9 G/DL (ref 31.5–35.7)
MCV RBC AUTO: 102.2 FL (ref 79–97)
MONOCYTES # BLD AUTO: 0.74 10*3/MM3 (ref 0.1–0.9)
MONOCYTES NFR BLD AUTO: 11.7 % (ref 5–12)
NEUTROPHILS # BLD AUTO: 3.58 10*3/MM3 (ref 1.7–7)
NEUTROPHILS NFR BLD AUTO: 56.9 % (ref 42.7–76)
NRBC BLD AUTO-RTO: 0 /100 WBC (ref 0–0.2)
PLATELET # BLD AUTO: 180 10*3/MM3 (ref 140–450)
PMV BLD AUTO: 9.8 FL (ref 6–12)
POTASSIUM BLD-SCNC: 4.8 MMOL/L (ref 3.5–4.7)
PROT SERPL-MCNC: 7.3 G/DL (ref 6.3–8)
RBC # BLD AUTO: 4.14 10*6/MM3 (ref 4.14–5.8)
SODIUM BLD-SCNC: 140 MMOL/L (ref 134–145)
WBC NRBC COR # BLD: 6.3 10*3/MM3 (ref 3.4–10.8)

## 2020-02-04 PROCEDURE — 25010000002 IOPAMIDOL 61 % SOLUTION: Performed by: INTERNAL MEDICINE

## 2020-02-04 PROCEDURE — 85025 COMPLETE CBC W/AUTO DIFF WBC: CPT

## 2020-02-04 PROCEDURE — 71260 CT THORAX DX C+: CPT

## 2020-02-04 PROCEDURE — 80053 COMPREHEN METABOLIC PANEL: CPT

## 2020-02-04 PROCEDURE — 74177 CT ABD & PELVIS W/CONTRAST: CPT

## 2020-02-04 PROCEDURE — 0 DIATRIZOATE MEGLUMINE & SODIUM PER 1 ML: Performed by: INTERNAL MEDICINE

## 2020-02-04 RX ADMIN — IOPAMIDOL 85 ML: 612 INJECTION, SOLUTION INTRAVENOUS at 12:04

## 2020-02-04 RX ADMIN — DIATRIZOATE MEGLUMINE AND DIATRIZOATE SODIUM 30 ML: 660; 100 LIQUID ORAL; RECTAL at 11:10

## 2020-02-11 ENCOUNTER — OFFICE VISIT (OUTPATIENT)
Dept: ONCOLOGY | Facility: CLINIC | Age: 70
End: 2020-02-11

## 2020-02-11 ENCOUNTER — INFUSION (OUTPATIENT)
Dept: ONCOLOGY | Facility: HOSPITAL | Age: 70
End: 2020-02-11

## 2020-02-11 VITALS
SYSTOLIC BLOOD PRESSURE: 153 MMHG | TEMPERATURE: 98.2 F | BODY MASS INDEX: 29.62 KG/M2 | HEART RATE: 82 BPM | OXYGEN SATURATION: 96 % | HEIGHT: 69 IN | WEIGHT: 200 LBS | RESPIRATION RATE: 16 BRPM | DIASTOLIC BLOOD PRESSURE: 83 MMHG

## 2020-02-11 DIAGNOSIS — Z79.899 HIGH RISK MEDICATION USE: ICD-10-CM

## 2020-02-11 DIAGNOSIS — C34.90 SMALL CELL LUNG CANCER (HCC): Primary | ICD-10-CM

## 2020-02-11 DIAGNOSIS — C34.90 SMALL CELL LUNG CANCER (HCC): Primary | Chronic | ICD-10-CM

## 2020-02-11 DIAGNOSIS — C34.90 SMALL CELL LUNG CANCER (HCC): ICD-10-CM

## 2020-02-11 LAB
ALBUMIN SERPL-MCNC: 4.4 G/DL (ref 3.5–5.2)
ALBUMIN/GLOB SERPL: 1.3 G/DL (ref 1.1–2.4)
ALP SERPL-CCNC: 81 U/L (ref 38–116)
ALT SERPL W P-5'-P-CCNC: 22 U/L (ref 0–41)
ANION GAP SERPL CALCULATED.3IONS-SCNC: 10.5 MMOL/L (ref 5–15)
AST SERPL-CCNC: 24 U/L (ref 0–40)
BASOPHILS # BLD AUTO: 0.11 10*3/MM3 (ref 0–0.2)
BASOPHILS NFR BLD AUTO: 1.6 % (ref 0–1.5)
BILIRUB SERPL-MCNC: 0.5 MG/DL (ref 0.2–1.2)
BUN BLD-MCNC: 15 MG/DL (ref 6–20)
BUN/CREAT SERPL: 14 (ref 7.3–30)
CALCIUM SPEC-SCNC: 9.6 MG/DL (ref 8.5–10.2)
CHLORIDE SERPL-SCNC: 100 MMOL/L (ref 98–107)
CO2 SERPL-SCNC: 27.5 MMOL/L (ref 22–29)
CREAT BLD-MCNC: 1.07 MG/DL (ref 0.7–1.3)
DEPRECATED RDW RBC AUTO: 47.8 FL (ref 37–54)
EOSINOPHIL # BLD AUTO: 0.3 10*3/MM3 (ref 0–0.4)
EOSINOPHIL NFR BLD AUTO: 4.3 % (ref 0.3–6.2)
ERYTHROCYTE [DISTWIDTH] IN BLOOD BY AUTOMATED COUNT: 13 % (ref 12.3–15.4)
GFR SERPL CREATININE-BSD FRML MDRD: 69 ML/MIN/1.73
GLOBULIN UR ELPH-MCNC: 3.4 GM/DL (ref 1.8–3.5)
GLUCOSE BLD-MCNC: 94 MG/DL (ref 74–124)
HCT VFR BLD AUTO: 45.4 % (ref 37.5–51)
HGB BLD-MCNC: 15.1 G/DL (ref 13–17.7)
IMM GRANULOCYTES # BLD AUTO: 0.02 10*3/MM3 (ref 0–0.05)
IMM GRANULOCYTES NFR BLD AUTO: 0.3 % (ref 0–0.5)
LYMPHOCYTES # BLD AUTO: 1.47 10*3/MM3 (ref 0.7–3.1)
LYMPHOCYTES NFR BLD AUTO: 21 % (ref 19.6–45.3)
MCH RBC QN AUTO: 33.2 PG (ref 26.6–33)
MCHC RBC AUTO-ENTMCNC: 33.3 G/DL (ref 31.5–35.7)
MCV RBC AUTO: 99.8 FL (ref 79–97)
MONOCYTES # BLD AUTO: 0.91 10*3/MM3 (ref 0.1–0.9)
MONOCYTES NFR BLD AUTO: 13 % (ref 5–12)
NEUTROPHILS # BLD AUTO: 4.19 10*3/MM3 (ref 1.7–7)
NEUTROPHILS NFR BLD AUTO: 59.8 % (ref 42.7–76)
NRBC BLD AUTO-RTO: 0 /100 WBC (ref 0–0.2)
PLATELET # BLD AUTO: 188 10*3/MM3 (ref 140–450)
PMV BLD AUTO: 9.6 FL (ref 6–12)
POTASSIUM BLD-SCNC: 5.1 MMOL/L (ref 3.5–4.7)
PROT SERPL-MCNC: 7.8 G/DL (ref 6.3–8)
RBC # BLD AUTO: 4.55 10*6/MM3 (ref 4.14–5.8)
SODIUM BLD-SCNC: 138 MMOL/L (ref 134–145)
WBC NRBC COR # BLD: 7 10*3/MM3 (ref 3.4–10.8)

## 2020-02-11 PROCEDURE — 36593 DECLOT VASCULAR DEVICE: CPT

## 2020-02-11 PROCEDURE — 25010000002 ALTEPLASE 2 MG RECONSTITUTED SOLUTION: Performed by: INTERNAL MEDICINE

## 2020-02-11 PROCEDURE — 96375 TX/PRO/DX INJ NEW DRUG ADDON: CPT

## 2020-02-11 PROCEDURE — 25010000002 ATEZOLIZUMAB 1200 MG/20ML SOLUTION 20 ML VIAL: Performed by: INTERNAL MEDICINE

## 2020-02-11 PROCEDURE — 99214 OFFICE O/P EST MOD 30 MIN: CPT | Performed by: INTERNAL MEDICINE

## 2020-02-11 PROCEDURE — 25010000002 ONDANSETRON PER 1 MG: Performed by: INTERNAL MEDICINE

## 2020-02-11 PROCEDURE — 80053 COMPREHEN METABOLIC PANEL: CPT

## 2020-02-11 PROCEDURE — 85025 COMPLETE CBC W/AUTO DIFF WBC: CPT

## 2020-02-11 PROCEDURE — 96413 CHEMO IV INFUSION 1 HR: CPT

## 2020-02-11 RX ORDER — SODIUM CHLORIDE 9 MG/ML
250 INJECTION, SOLUTION INTRAVENOUS ONCE
Status: CANCELLED | OUTPATIENT
Start: 2020-03-24

## 2020-02-11 RX ORDER — SODIUM CHLORIDE 9 MG/ML
250 INJECTION, SOLUTION INTRAVENOUS ONCE
Status: COMPLETED | OUTPATIENT
Start: 2020-02-11 | End: 2020-02-11

## 2020-02-11 RX ORDER — SODIUM CHLORIDE 9 MG/ML
250 INJECTION, SOLUTION INTRAVENOUS ONCE
Status: CANCELLED | OUTPATIENT
Start: 2020-02-11

## 2020-02-11 RX ORDER — SODIUM CHLORIDE 9 MG/ML
250 INJECTION, SOLUTION INTRAVENOUS ONCE
Status: CANCELLED | OUTPATIENT
Start: 2020-03-03

## 2020-02-11 RX ADMIN — ALTEPLASE: 2.2 INJECTION, POWDER, LYOPHILIZED, FOR SOLUTION INTRAVENOUS at 12:22

## 2020-02-11 RX ADMIN — ATEZOLIZUMAB 1200 MG: 1200 INJECTION, SOLUTION INTRAVENOUS at 13:45

## 2020-02-11 RX ADMIN — SODIUM CHLORIDE 250 ML: 9 INJECTION, SOLUTION INTRAVENOUS at 13:45

## 2020-02-11 RX ADMIN — ONDANSETRON HYDROCHLORIDE 16 MG: 2 INJECTION, SOLUTION INTRAMUSCULAR; INTRAVENOUS at 13:27

## 2020-02-11 NOTE — PROGRESS NOTES
REASON FOR FOLLOW UP:   1.  T4N3 Small cell lung cancer  2.  SVC syndrome  3.  Palliative therapy with carboplatin, -16 initiated on 7/8/2019.  Cycle 1 was initiated during a hospitalization.  Atezolizumab was added with cycle #2.  4 cycles of chemotherapy complete as of 9/19/2019.  4.  Port placed on 8/22/2019, delayed due to SVC syndrome  5.  Neutropenia related to therapy requiring the addition of Neulasta with cycle #3.  6.  Single agent atezolizumab initiated 10/8/2019.  Scan on 10/1/2019 revealed good disease response disease.  There is an area of concern on the adrenal gland which we will continue to monitor.  MRI brain shows no evidence of metastatic disease.    HISTORY OF PRESENT ILLNESS:  Julia Smith III is a 69 y.o. male the above medical history here today for follow-up continuing maintenance atezolizumab.    He continues to tolerate therapy well.  No rash.  No nausea vomiting or diarrhea.  He has some mild numbness in his toes.  He does continue to smoke and he continues to have some dyspnea on exertion.      Past Medical History:   Diagnosis Date   • Atrial fibrillation with RVR (CMS/HCC)    • Cancer (CMS/HCC) 06/2019    Right lung   • Chronic cough    • COPD (chronic obstructive pulmonary disease) (CMS/HCC)    • Hearing loss    • History of shingles    • Lower back pain    • Mastoiditis    • Mild mitral regurgitation    • Other fatigue    • PAF (paroxysmal atrial fibrillation) (CMS/HCC)    • Pneumonia        Past Surgical History:   Procedure Laterality Date   • BRONCHOSCOPY N/A 6/19/2019    Procedure: BRONCHOSCOPY WITH WASHINGS AND BIOPSY AND ENDOBRONCHIAL ULTRASOUND WITH FNA;  Surgeon: Arslan Marquez MD;  Location: [facility] ENDOSCOPY;  Service: Pulmonary   • INNER EAR SURGERY Left    • MASTOIDECTOMY Left 1994   • VENOUS ACCESS DEVICE (PORT) INSERTION Right 8/22/2019    Procedure: INSERTION OF PORTACATH;  Surgeon: Ramila Gallardo MD;  Location: [facility] OR;  Service: Cardiothoracic  "      SOCIAL HISTORY:   reports that he has been smoking cigarettes. He has a 40.00 pack-year smoking history. He has never used smokeless tobacco. He reports that he drinks about 4.0 - 6.0 standard drinks of alcohol per week. He reports that he has current or past drug history. Drug: Marijuana.    FAMILY HISTORY:  family history includes COPD in his father; Heart disease in his brother; No Known Problems in his brother, brother, brother, mother, and sister.    ALLERGIES:  No Known Allergies    MEDICATIONS:  The medication list has been reviewed with the patient by the medical assistant, and the list has been updated in the electronic medical record, which I reviewed.  Medication dosages and frequencies were confirmed to be accurate.    I have reviewed the patient's medical history in detail and updated the computerized patient record.    Review of Systems   Review of Systems   Constitutional: Positive for fatigue. Negative for activity change, appetite change, chills and fever.   HENT: Negative for mouth sores, nosebleeds and trouble swallowing.    Respiratory: Negative for cough.    Cardiovascular: Negative for chest pain and leg swelling.   Gastrointestinal: Negative for abdominal pain, constipation, diarrhea, nausea and vomiting.   Genitourinary: Negative for difficulty urinating.   Musculoskeletal:        Knee pain - chronic stable   Skin: Negative for rash.   Neurological: Negative for dizziness and numbness.   Hematological: Negative for adenopathy. Does not bruise/bleed easily.   Psychiatric/Behavioral: Negative for sleep disturbance.       Vitals:    02/11/20 1226   BP: 153/83   Pulse: 82   Resp: 16   Temp: 98.2 °F (36.8 °C)   TempSrc: Oral   SpO2: 96%   Weight: 90.7 kg (200 lb)   Height: 176 cm (69.29\")   PainSc: 0-No pain  Comment: lung cancer     Physical Exam   Constitutional: He is oriented to person, place, and time. He appears well-developed and well-nourished. No distress.   HENT:   Head: " Normocephalic and atraumatic.   Mouth/Throat: Oropharynx is clear and moist and mucous membranes are normal. No oropharyngeal exudate.   Cardiovascular: Normal rate and regular rhythm.   Pulmonary/Chest: Effort normal. He has wheezes (Scattered). He has no rhonchi.   Abdominal: Soft. Normal appearance and bowel sounds are normal. He exhibits no distension. There is no hepatosplenomegaly.   Musculoskeletal: He exhibits no edema.   Neurological: He is alert and oriented to person, place, and time.   Skin: Skin is warm and dry. No rash noted.   Psychiatric: He has a normal mood and affect.   Vitals reviewed.  Unchanged since prior exam except as noted    DIAGNOSTIC DATA:  Results from last 7 days   Lab Units 02/11/20  1210   WBC 10*3/mm3 7.00   NEUTROS ABS 10*3/mm3 4.19   HEMOGLOBIN g/dL 15.1   HEMATOCRIT % 45.4   PLATELETS 10*3/mm3 188               IMAGING:  CT images of the chest abdomen and pelvis from 2/4/2020 with ongoing improvement.  Images personally reviewed.    IMPRESSION:  1. Further interval decrease in size of the mediastinal and right hilar  ill-defined mass since the previous study of 08/14/2019 as discussed  above.  2. Stable size of the adrenal glands. The left adrenal gland  demonstrated some mild increased activity on the PET/CT scan of  10/01/2019.  3. No new evidence of metastatic disease.    ASSESSMENT:  This is a 69 y.o. male with:  1.   Small cell lung cancer originating in the right hilum:   · He has an extremely large mass there with concern for SVC compression on CT imaging from 6/4/2019.  There is some left hilar adenopathy.  · Some parotid lesions which are concerning but indeterminate, left greater than right.  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  · Staging MRI of the brain 7/10/2019 negative for metastatic  disease  · He was discussed at multidisciplinary thoracic conference, with his T4 and 3 disease, concern for involvement of the right supraclavicular region and obvious left hilum, plans to add atezolizumab with cycle 2, we can consider future radiation to the residual mediastinal mass if necessary.  · Lung lesion too large to radiate in spite of the SVC narrowing.    · On 7/8/2019 we started carboplatin AUC 5 and VP16 100 mg/m2 through peripheral IVs  · Cycle 2 postponed on 7/29/2019 due to heart rate of 154.  Atezolizumab added with cycle #2.  · Radiation can be added for a residual mediastinal mass if necessary  · CT imaging on 8/14/2019 after two cycles of therapy shows significant improvement.  · Mediport anticipated by Dr. Gallardo on 8/22/2019.  · On  8/20/2019 cycle 3 was delayed due to neutropenia and with cycle 3 we did incorporate Neulasta.  · He is here today for scheduled cycle #4 (third cycle with atezolizumab).  Proceed with treatment today and over the next 2 days.  · Maintenance atezolizumab initiated 10/8/2019.  PET scan revealed disease response to therapy, he does have an area of hypermetabolic activity in the adrenal gland which is small and did not show up on prior exam.  We will continue to monitor this.  His MRI of the brain is also negative for metastatic disease.  · CT 2/4/2020 with ongoing improvement    2.  Chronic hearing loss: History of mastoiditis.  He is not a candidate for cisplatin because of this.  Stable.    3.  Atrial fibrillation.  He was initiated on metoprolol 12.5 mg twice daily.  This did delay port placement.  The patient was taking his metoprolol inconsistently.  For cycle 2 was delayed due to heart rate of 150 7/29/2019.  He was seen by cardiology with instructions to increase his metoprolol to 25 mg twice daily.  Heart rate stable today.    4.  Tobacco use.  He did utilize a NicoDerm patch while inpatient, though has continued smoking since discharge.  We discussed smoking  cessation again today.    5.  SVC syndrome.  Improved after 2 cycles of chemotherapy.    6. Tachycardia related to atrial fibrillation as outlined above  · Heart rate on 7/29/19 was 154.  Cycle 2 was delayed.  · Metoprolol increased to 25 mg twice daily with improvement in heart rate.  · His heart rate today is within normal limits      PLAN:   1. Proceed with maintenance atezolizumab today and every 3 weeks with his last cycle scheduled to begin on 7/7/2020  2. No need for radiation therapy at this time  3. Return for treatment in 3 in 6 weeks.  4. I will see him back in 9 weeks for follow-up  5. Advised smoking cessation again today      Thai Morel MD

## 2020-03-03 ENCOUNTER — INFUSION (OUTPATIENT)
Dept: ONCOLOGY | Facility: HOSPITAL | Age: 70
End: 2020-03-03

## 2020-03-03 VITALS
BODY MASS INDEX: 28.85 KG/M2 | TEMPERATURE: 98.1 F | SYSTOLIC BLOOD PRESSURE: 138 MMHG | OXYGEN SATURATION: 93 % | WEIGHT: 197 LBS | DIASTOLIC BLOOD PRESSURE: 80 MMHG | HEART RATE: 76 BPM

## 2020-03-03 DIAGNOSIS — C34.90 SMALL CELL LUNG CANCER (HCC): Primary | ICD-10-CM

## 2020-03-03 DIAGNOSIS — Z45.2 ENCOUNTER FOR FITTING AND ADJUSTMENT OF VASCULAR CATHETER: ICD-10-CM

## 2020-03-03 DIAGNOSIS — Z79.899 HIGH RISK MEDICATION USE: ICD-10-CM

## 2020-03-03 LAB
ALBUMIN SERPL-MCNC: 4.4 G/DL (ref 3.5–5.2)
ALBUMIN/GLOB SERPL: 1.3 G/DL (ref 1.1–2.4)
ALP SERPL-CCNC: 81 U/L (ref 38–116)
ALT SERPL W P-5'-P-CCNC: 18 U/L (ref 0–41)
ANION GAP SERPL CALCULATED.3IONS-SCNC: 11.2 MMOL/L (ref 5–15)
AST SERPL-CCNC: 22 U/L (ref 0–40)
BASOPHILS # BLD AUTO: 0.08 10*3/MM3 (ref 0–0.2)
BASOPHILS NFR BLD AUTO: 1.1 % (ref 0–1.5)
BILIRUB SERPL-MCNC: 0.5 MG/DL (ref 0.2–1.2)
BUN BLD-MCNC: 14 MG/DL (ref 6–20)
BUN/CREAT SERPL: 12.5 (ref 7.3–30)
CALCIUM SPEC-SCNC: 9.5 MG/DL (ref 8.5–10.2)
CHLORIDE SERPL-SCNC: 99 MMOL/L (ref 98–107)
CO2 SERPL-SCNC: 26.8 MMOL/L (ref 22–29)
CREAT BLD-MCNC: 1.12 MG/DL (ref 0.7–1.3)
DEPRECATED RDW RBC AUTO: 48.3 FL (ref 37–54)
EOSINOPHIL # BLD AUTO: 0.15 10*3/MM3 (ref 0–0.4)
EOSINOPHIL NFR BLD AUTO: 2 % (ref 0.3–6.2)
ERYTHROCYTE [DISTWIDTH] IN BLOOD BY AUTOMATED COUNT: 13.1 % (ref 12.3–15.4)
GFR SERPL CREATININE-BSD FRML MDRD: 65 ML/MIN/1.73
GLOBULIN UR ELPH-MCNC: 3.4 GM/DL (ref 1.8–3.5)
GLUCOSE BLD-MCNC: 105 MG/DL (ref 74–124)
HCT VFR BLD AUTO: 45.9 % (ref 37.5–51)
HGB BLD-MCNC: 15.2 G/DL (ref 13–17.7)
IMM GRANULOCYTES # BLD AUTO: 0.02 10*3/MM3 (ref 0–0.05)
IMM GRANULOCYTES NFR BLD AUTO: 0.3 % (ref 0–0.5)
LYMPHOCYTES # BLD AUTO: 1.31 10*3/MM3 (ref 0.7–3.1)
LYMPHOCYTES NFR BLD AUTO: 17.7 % (ref 19.6–45.3)
MCH RBC QN AUTO: 33.3 PG (ref 26.6–33)
MCHC RBC AUTO-ENTMCNC: 33.1 G/DL (ref 31.5–35.7)
MCV RBC AUTO: 100.4 FL (ref 79–97)
MONOCYTES # BLD AUTO: 0.92 10*3/MM3 (ref 0.1–0.9)
MONOCYTES NFR BLD AUTO: 12.4 % (ref 5–12)
NEUTROPHILS # BLD AUTO: 4.94 10*3/MM3 (ref 1.7–7)
NEUTROPHILS NFR BLD AUTO: 66.5 % (ref 42.7–76)
NRBC BLD AUTO-RTO: 0 /100 WBC (ref 0–0.2)
PLATELET # BLD AUTO: 195 10*3/MM3 (ref 140–450)
PMV BLD AUTO: 9.6 FL (ref 6–12)
POTASSIUM BLD-SCNC: 5.1 MMOL/L (ref 3.5–4.7)
PROT SERPL-MCNC: 7.8 G/DL (ref 6.3–8)
RBC # BLD AUTO: 4.57 10*6/MM3 (ref 4.14–5.8)
SODIUM BLD-SCNC: 137 MMOL/L (ref 134–145)
T3FREE SERPL-MCNC: 3.09 PG/ML (ref 2–4.4)
T4 FREE SERPL-MCNC: 1.1 NG/DL (ref 0.93–1.7)
TSH SERPL DL<=0.05 MIU/L-ACNC: 1.2 UIU/ML (ref 0.27–4.2)
WBC NRBC COR # BLD: 7.42 10*3/MM3 (ref 3.4–10.8)

## 2020-03-03 PROCEDURE — 84443 ASSAY THYROID STIM HORMONE: CPT | Performed by: INTERNAL MEDICINE

## 2020-03-03 PROCEDURE — 96375 TX/PRO/DX INJ NEW DRUG ADDON: CPT

## 2020-03-03 PROCEDURE — 25010000002 ATEZOLIZUMAB 1200 MG/20ML SOLUTION 20 ML VIAL: Performed by: INTERNAL MEDICINE

## 2020-03-03 PROCEDURE — 84481 FREE ASSAY (FT-3): CPT | Performed by: INTERNAL MEDICINE

## 2020-03-03 PROCEDURE — 96413 CHEMO IV INFUSION 1 HR: CPT

## 2020-03-03 PROCEDURE — 25010000003 HEPARIN LOCK FLUCH PER 10 UNITS: Performed by: INTERNAL MEDICINE

## 2020-03-03 PROCEDURE — 25010000002 ONDANSETRON PER 1 MG: Performed by: INTERNAL MEDICINE

## 2020-03-03 PROCEDURE — 85025 COMPLETE CBC W/AUTO DIFF WBC: CPT

## 2020-03-03 PROCEDURE — 36593 DECLOT VASCULAR DEVICE: CPT

## 2020-03-03 PROCEDURE — 80053 COMPREHEN METABOLIC PANEL: CPT

## 2020-03-03 PROCEDURE — 25010000002 ALTEPLASE 2 MG RECONSTITUTED SOLUTION: Performed by: INTERNAL MEDICINE

## 2020-03-03 PROCEDURE — 84439 ASSAY OF FREE THYROXINE: CPT | Performed by: INTERNAL MEDICINE

## 2020-03-03 RX ORDER — SODIUM CHLORIDE 9 MG/ML
250 INJECTION, SOLUTION INTRAVENOUS ONCE
Status: COMPLETED | OUTPATIENT
Start: 2020-03-03 | End: 2020-03-03

## 2020-03-03 RX ORDER — HEPARIN SODIUM (PORCINE) LOCK FLUSH IV SOLN 100 UNIT/ML 100 UNIT/ML
500 SOLUTION INTRAVENOUS AS NEEDED
Status: CANCELLED | OUTPATIENT
Start: 2020-03-03

## 2020-03-03 RX ORDER — HEPARIN SODIUM (PORCINE) LOCK FLUSH IV SOLN 100 UNIT/ML 100 UNIT/ML
500 SOLUTION INTRAVENOUS AS NEEDED
Status: DISCONTINUED | OUTPATIENT
Start: 2020-03-03 | End: 2020-03-03 | Stop reason: HOSPADM

## 2020-03-03 RX ORDER — SODIUM CHLORIDE 0.9 % (FLUSH) 0.9 %
10 SYRINGE (ML) INJECTION AS NEEDED
Status: CANCELLED | OUTPATIENT
Start: 2020-03-03

## 2020-03-03 RX ORDER — SODIUM CHLORIDE 0.9 % (FLUSH) 0.9 %
10 SYRINGE (ML) INJECTION AS NEEDED
Status: DISCONTINUED | OUTPATIENT
Start: 2020-03-03 | End: 2020-03-03 | Stop reason: HOSPADM

## 2020-03-03 RX ADMIN — ALTEPLASE: 2.2 INJECTION, POWDER, LYOPHILIZED, FOR SOLUTION INTRAVENOUS at 14:44

## 2020-03-03 RX ADMIN — ONDANSETRON 16 MG: 2 INJECTION, SOLUTION INTRAMUSCULAR; INTRAVENOUS at 15:35

## 2020-03-03 RX ADMIN — SODIUM CHLORIDE 250 ML: 9 INJECTION, SOLUTION INTRAVENOUS at 15:34

## 2020-03-03 RX ADMIN — ATEZOLIZUMAB 1200 MG: 1200 INJECTION, SOLUTION INTRAVENOUS at 15:54

## 2020-03-03 RX ADMIN — SODIUM CHLORIDE, PRESERVATIVE FREE 10 ML: 5 INJECTION INTRAVENOUS at 16:27

## 2020-03-03 RX ADMIN — Medication 500 UNITS: at 16:27

## 2020-03-24 ENCOUNTER — INFUSION (OUTPATIENT)
Dept: ONCOLOGY | Facility: HOSPITAL | Age: 70
End: 2020-03-24

## 2020-03-24 VITALS
SYSTOLIC BLOOD PRESSURE: 160 MMHG | WEIGHT: 198 LBS | HEART RATE: 83 BPM | BODY MASS INDEX: 28.99 KG/M2 | OXYGEN SATURATION: 96 % | TEMPERATURE: 98.2 F | DIASTOLIC BLOOD PRESSURE: 88 MMHG

## 2020-03-24 DIAGNOSIS — Z79.899 HIGH RISK MEDICATION USE: ICD-10-CM

## 2020-03-24 DIAGNOSIS — C34.90 SMALL CELL LUNG CANCER (HCC): Primary | ICD-10-CM

## 2020-03-24 DIAGNOSIS — Z45.2 ENCOUNTER FOR FITTING AND ADJUSTMENT OF VASCULAR CATHETER: ICD-10-CM

## 2020-03-24 LAB
ALBUMIN SERPL-MCNC: 4.3 G/DL (ref 3.5–5.2)
ALBUMIN/GLOB SERPL: 1.3 G/DL (ref 1.1–2.4)
ALP SERPL-CCNC: 83 U/L (ref 38–116)
ALT SERPL W P-5'-P-CCNC: 15 U/L (ref 0–41)
ANION GAP SERPL CALCULATED.3IONS-SCNC: 11.6 MMOL/L (ref 5–15)
AST SERPL-CCNC: 20 U/L (ref 0–40)
BASOPHILS # BLD AUTO: 0.1 10*3/MM3 (ref 0–0.2)
BASOPHILS NFR BLD AUTO: 1.5 % (ref 0–1.5)
BILIRUB SERPL-MCNC: 0.6 MG/DL (ref 0.2–1.2)
BUN BLD-MCNC: 14 MG/DL (ref 6–20)
BUN/CREAT SERPL: 12.6 (ref 7.3–30)
CALCIUM SPEC-SCNC: 9.6 MG/DL (ref 8.5–10.2)
CHLORIDE SERPL-SCNC: 99 MMOL/L (ref 98–107)
CO2 SERPL-SCNC: 26.4 MMOL/L (ref 22–29)
CREAT BLD-MCNC: 1.11 MG/DL (ref 0.7–1.3)
DEPRECATED RDW RBC AUTO: 48.6 FL (ref 37–54)
EOSINOPHIL # BLD AUTO: 0.23 10*3/MM3 (ref 0–0.4)
EOSINOPHIL NFR BLD AUTO: 3.4 % (ref 0.3–6.2)
ERYTHROCYTE [DISTWIDTH] IN BLOOD BY AUTOMATED COUNT: 13.1 % (ref 12.3–15.4)
GFR SERPL CREATININE-BSD FRML MDRD: 66 ML/MIN/1.73
GLOBULIN UR ELPH-MCNC: 3.4 GM/DL (ref 1.8–3.5)
GLUCOSE BLD-MCNC: 93 MG/DL (ref 74–124)
HCT VFR BLD AUTO: 45.4 % (ref 37.5–51)
HGB BLD-MCNC: 14.7 G/DL (ref 13–17.7)
IMM GRANULOCYTES # BLD AUTO: 0.02 10*3/MM3 (ref 0–0.05)
IMM GRANULOCYTES NFR BLD AUTO: 0.3 % (ref 0–0.5)
LYMPHOCYTES # BLD AUTO: 1.34 10*3/MM3 (ref 0.7–3.1)
LYMPHOCYTES NFR BLD AUTO: 19.7 % (ref 19.6–45.3)
MCH RBC QN AUTO: 32.7 PG (ref 26.6–33)
MCHC RBC AUTO-ENTMCNC: 32.4 G/DL (ref 31.5–35.7)
MCV RBC AUTO: 100.9 FL (ref 79–97)
MONOCYTES # BLD AUTO: 0.84 10*3/MM3 (ref 0.1–0.9)
MONOCYTES NFR BLD AUTO: 12.4 % (ref 5–12)
NEUTROPHILS # BLD AUTO: 4.27 10*3/MM3 (ref 1.7–7)
NEUTROPHILS NFR BLD AUTO: 62.7 % (ref 42.7–76)
NRBC BLD AUTO-RTO: 0 /100 WBC (ref 0–0.2)
PLATELET # BLD AUTO: 181 10*3/MM3 (ref 140–450)
PMV BLD AUTO: 9.5 FL (ref 6–12)
POTASSIUM BLD-SCNC: 5.3 MMOL/L (ref 3.5–4.7)
PROT SERPL-MCNC: 7.7 G/DL (ref 6.3–8)
RBC # BLD AUTO: 4.5 10*6/MM3 (ref 4.14–5.8)
SODIUM BLD-SCNC: 137 MMOL/L (ref 134–145)
WBC NRBC COR # BLD: 6.8 10*3/MM3 (ref 3.4–10.8)

## 2020-03-24 PROCEDURE — 25010000002 ATEZOLIZUMAB 1200 MG/20ML SOLUTION 20 ML VIAL: Performed by: INTERNAL MEDICINE

## 2020-03-24 PROCEDURE — 80053 COMPREHEN METABOLIC PANEL: CPT

## 2020-03-24 PROCEDURE — 25010000003 HEPARIN LOCK FLUCH PER 10 UNITS: Performed by: INTERNAL MEDICINE

## 2020-03-24 PROCEDURE — 25010000002 ALTEPLASE 2 MG RECONSTITUTED SOLUTION: Performed by: NURSE PRACTITIONER

## 2020-03-24 PROCEDURE — 36593 DECLOT VASCULAR DEVICE: CPT

## 2020-03-24 PROCEDURE — 96375 TX/PRO/DX INJ NEW DRUG ADDON: CPT

## 2020-03-24 PROCEDURE — 85025 COMPLETE CBC W/AUTO DIFF WBC: CPT

## 2020-03-24 PROCEDURE — 25010000002 ONDANSETRON PER 1 MG: Performed by: INTERNAL MEDICINE

## 2020-03-24 PROCEDURE — 96413 CHEMO IV INFUSION 1 HR: CPT

## 2020-03-24 RX ORDER — SODIUM CHLORIDE 0.9 % (FLUSH) 0.9 %
10 SYRINGE (ML) INJECTION AS NEEDED
Status: DISCONTINUED | OUTPATIENT
Start: 2020-03-24 | End: 2020-03-24 | Stop reason: HOSPADM

## 2020-03-24 RX ORDER — HEPARIN SODIUM (PORCINE) LOCK FLUSH IV SOLN 100 UNIT/ML 100 UNIT/ML
500 SOLUTION INTRAVENOUS AS NEEDED
Status: DISCONTINUED | OUTPATIENT
Start: 2020-03-24 | End: 2020-03-24 | Stop reason: HOSPADM

## 2020-03-24 RX ORDER — HEPARIN SODIUM (PORCINE) LOCK FLUSH IV SOLN 100 UNIT/ML 100 UNIT/ML
500 SOLUTION INTRAVENOUS AS NEEDED
Status: CANCELLED | OUTPATIENT
Start: 2020-03-24

## 2020-03-24 RX ORDER — SODIUM CHLORIDE 9 MG/ML
250 INJECTION, SOLUTION INTRAVENOUS ONCE
Status: COMPLETED | OUTPATIENT
Start: 2020-03-24 | End: 2020-03-24

## 2020-03-24 RX ORDER — SODIUM CHLORIDE 0.9 % (FLUSH) 0.9 %
10 SYRINGE (ML) INJECTION AS NEEDED
Status: CANCELLED | OUTPATIENT
Start: 2020-03-24

## 2020-03-24 RX ADMIN — ONDANSETRON 16 MG: 2 INJECTION, SOLUTION INTRAMUSCULAR; INTRAVENOUS at 15:27

## 2020-03-24 RX ADMIN — Medication 500 UNITS: at 16:20

## 2020-03-24 RX ADMIN — ALTEPLASE: 2.2 INJECTION, POWDER, LYOPHILIZED, FOR SOLUTION INTRAVENOUS at 14:45

## 2020-03-24 RX ADMIN — SODIUM CHLORIDE, PRESERVATIVE FREE 10 ML: 5 INJECTION INTRAVENOUS at 16:20

## 2020-03-24 RX ADMIN — SODIUM CHLORIDE 100 ML: 9 INJECTION, SOLUTION INTRAVENOUS at 15:26

## 2020-03-24 RX ADMIN — ATEZOLIZUMAB 1200 MG: 1200 INJECTION, SOLUTION INTRAVENOUS at 15:48

## 2020-04-14 ENCOUNTER — OFFICE VISIT (OUTPATIENT)
Dept: ONCOLOGY | Facility: CLINIC | Age: 70
End: 2020-04-14

## 2020-04-14 ENCOUNTER — INFUSION (OUTPATIENT)
Dept: ONCOLOGY | Facility: HOSPITAL | Age: 70
End: 2020-04-14

## 2020-04-14 DIAGNOSIS — C34.90 SMALL CELL LUNG CANCER (HCC): ICD-10-CM

## 2020-04-14 DIAGNOSIS — Z79.899 HIGH RISK MEDICATION USE: Primary | ICD-10-CM

## 2020-04-14 DIAGNOSIS — C34.90 SMALL CELL LUNG CANCER (HCC): Primary | Chronic | ICD-10-CM

## 2020-04-14 DIAGNOSIS — Z79.899 HIGH RISK MEDICATION USE: ICD-10-CM

## 2020-04-14 DIAGNOSIS — Z45.2 ENCOUNTER FOR FITTING AND ADJUSTMENT OF VASCULAR CATHETER: ICD-10-CM

## 2020-04-14 LAB
ALBUMIN SERPL-MCNC: 4 G/DL (ref 3.5–5.2)
ALBUMIN/GLOB SERPL: 1.1 G/DL (ref 1.1–2.4)
ALP SERPL-CCNC: 77 U/L (ref 38–116)
ALT SERPL W P-5'-P-CCNC: 15 U/L (ref 0–41)
ANION GAP SERPL CALCULATED.3IONS-SCNC: 12.5 MMOL/L (ref 5–15)
AST SERPL-CCNC: 26 U/L (ref 0–40)
BASOPHILS # BLD AUTO: 0.11 10*3/MM3 (ref 0–0.2)
BASOPHILS NFR BLD AUTO: 1.7 % (ref 0–1.5)
BILIRUB SERPL-MCNC: 0.4 MG/DL (ref 0.2–1.2)
BUN BLD-MCNC: 10 MG/DL (ref 6–20)
BUN/CREAT SERPL: 9.1 (ref 7.3–30)
CALCIUM SPEC-SCNC: 9.3 MG/DL (ref 8.5–10.2)
CHLORIDE SERPL-SCNC: 97 MMOL/L (ref 98–107)
CO2 SERPL-SCNC: 25.5 MMOL/L (ref 22–29)
CREAT BLD-MCNC: 1.1 MG/DL (ref 0.7–1.3)
DEPRECATED RDW RBC AUTO: 48 FL (ref 37–54)
EOSINOPHIL # BLD AUTO: 0.37 10*3/MM3 (ref 0–0.4)
EOSINOPHIL NFR BLD AUTO: 5.8 % (ref 0.3–6.2)
ERYTHROCYTE [DISTWIDTH] IN BLOOD BY AUTOMATED COUNT: 13.2 % (ref 12.3–15.4)
GFR SERPL CREATININE-BSD FRML MDRD: 67 ML/MIN/1.73
GLOBULIN UR ELPH-MCNC: 3.5 GM/DL (ref 1.8–3.5)
GLUCOSE BLD-MCNC: 103 MG/DL (ref 74–124)
HCT VFR BLD AUTO: 45.3 % (ref 37.5–51)
HGB BLD-MCNC: 14.9 G/DL (ref 13–17.7)
IMM GRANULOCYTES # BLD AUTO: 0.01 10*3/MM3 (ref 0–0.05)
IMM GRANULOCYTES NFR BLD AUTO: 0.2 % (ref 0–0.5)
LYMPHOCYTES # BLD AUTO: 1.42 10*3/MM3 (ref 0.7–3.1)
LYMPHOCYTES NFR BLD AUTO: 22.2 % (ref 19.6–45.3)
MCH RBC QN AUTO: 32.5 PG (ref 26.6–33)
MCHC RBC AUTO-ENTMCNC: 32.9 G/DL (ref 31.5–35.7)
MCV RBC AUTO: 98.9 FL (ref 79–97)
MONOCYTES # BLD AUTO: 0.75 10*3/MM3 (ref 0.1–0.9)
MONOCYTES NFR BLD AUTO: 11.7 % (ref 5–12)
NEUTROPHILS # BLD AUTO: 3.73 10*3/MM3 (ref 1.7–7)
NEUTROPHILS NFR BLD AUTO: 58.4 % (ref 42.7–76)
NRBC BLD AUTO-RTO: 0 /100 WBC (ref 0–0.2)
PLATELET # BLD AUTO: 178 10*3/MM3 (ref 140–450)
PMV BLD AUTO: 9.5 FL (ref 6–12)
POTASSIUM BLD-SCNC: 4.3 MMOL/L (ref 3.5–4.7)
PROT SERPL-MCNC: 7.5 G/DL (ref 6.3–8)
RBC # BLD AUTO: 4.58 10*6/MM3 (ref 4.14–5.8)
SODIUM BLD-SCNC: 135 MMOL/L (ref 134–145)
T3FREE SERPL-MCNC: 3.3 PG/ML (ref 2–4.4)
T4 FREE SERPL-MCNC: 1.04 NG/DL (ref 0.93–1.7)
TSH SERPL DL<=0.05 MIU/L-ACNC: 1.68 UIU/ML (ref 0.27–4.2)
WBC NRBC COR # BLD: 6.39 10*3/MM3 (ref 3.4–10.8)

## 2020-04-14 PROCEDURE — 85025 COMPLETE CBC W/AUTO DIFF WBC: CPT

## 2020-04-14 PROCEDURE — 96375 TX/PRO/DX INJ NEW DRUG ADDON: CPT

## 2020-04-14 PROCEDURE — 80053 COMPREHEN METABOLIC PANEL: CPT

## 2020-04-14 PROCEDURE — 99442 PR PHYS/QHP TELEPHONE EVALUATION 11-20 MIN: CPT | Performed by: INTERNAL MEDICINE

## 2020-04-14 PROCEDURE — 84439 ASSAY OF FREE THYROXINE: CPT | Performed by: INTERNAL MEDICINE

## 2020-04-14 PROCEDURE — 25010000002 ONDANSETRON PER 1 MG: Performed by: INTERNAL MEDICINE

## 2020-04-14 PROCEDURE — 96413 CHEMO IV INFUSION 1 HR: CPT

## 2020-04-14 PROCEDURE — 84443 ASSAY THYROID STIM HORMONE: CPT | Performed by: INTERNAL MEDICINE

## 2020-04-14 PROCEDURE — 25010000002 ATEZOLIZUMAB 1200 MG/20ML SOLUTION 20 ML VIAL: Performed by: INTERNAL MEDICINE

## 2020-04-14 PROCEDURE — 25010000003 HEPARIN LOCK FLUCH PER 10 UNITS: Performed by: INTERNAL MEDICINE

## 2020-04-14 PROCEDURE — 84481 FREE ASSAY (FT-3): CPT | Performed by: INTERNAL MEDICINE

## 2020-04-14 RX ORDER — SODIUM CHLORIDE 9 MG/ML
250 INJECTION, SOLUTION INTRAVENOUS ONCE
Status: CANCELLED | OUTPATIENT
Start: 2020-04-14

## 2020-04-14 RX ORDER — SODIUM CHLORIDE 0.9 % (FLUSH) 0.9 %
10 SYRINGE (ML) INJECTION AS NEEDED
Status: DISCONTINUED | OUTPATIENT
Start: 2020-04-14 | End: 2020-04-14 | Stop reason: HOSPADM

## 2020-04-14 RX ORDER — HEPARIN SODIUM (PORCINE) LOCK FLUSH IV SOLN 100 UNIT/ML 100 UNIT/ML
500 SOLUTION INTRAVENOUS AS NEEDED
Status: DISCONTINUED | OUTPATIENT
Start: 2020-04-14 | End: 2020-04-14 | Stop reason: HOSPADM

## 2020-04-14 RX ORDER — SODIUM CHLORIDE 9 MG/ML
250 INJECTION, SOLUTION INTRAVENOUS ONCE
Status: COMPLETED | OUTPATIENT
Start: 2020-04-14 | End: 2020-04-14

## 2020-04-14 RX ORDER — SODIUM CHLORIDE 9 MG/ML
250 INJECTION, SOLUTION INTRAVENOUS ONCE
Status: CANCELLED | OUTPATIENT
Start: 2020-05-05

## 2020-04-14 RX ORDER — HEPARIN SODIUM (PORCINE) LOCK FLUSH IV SOLN 100 UNIT/ML 100 UNIT/ML
500 SOLUTION INTRAVENOUS AS NEEDED
Status: CANCELLED | OUTPATIENT
Start: 2020-04-14

## 2020-04-14 RX ORDER — SODIUM CHLORIDE 0.9 % (FLUSH) 0.9 %
10 SYRINGE (ML) INJECTION AS NEEDED
Status: CANCELLED | OUTPATIENT
Start: 2020-04-14

## 2020-04-14 RX ADMIN — SODIUM CHLORIDE 100 ML: 9 INJECTION, SOLUTION INTRAVENOUS at 14:14

## 2020-04-14 RX ADMIN — ONDANSETRON 16 MG: 2 INJECTION, SOLUTION INTRAMUSCULAR; INTRAVENOUS at 14:14

## 2020-04-14 RX ADMIN — Medication 500 UNITS: at 15:03

## 2020-04-14 RX ADMIN — SODIUM CHLORIDE, PRESERVATIVE FREE 10 ML: 5 INJECTION INTRAVENOUS at 15:03

## 2020-04-14 RX ADMIN — ATEZOLIZUMAB 1200 MG: 1200 INJECTION, SOLUTION INTRAVENOUS at 14:30

## 2020-04-14 NOTE — PROGRESS NOTES
"  REASON FOR FOLLOW UP:   1.  T4N3 Small cell lung cancer  2.  SVC syndrome  3.  Palliative therapy with carboplatin, -16 initiated on 7/8/2019.  Cycle 1 was initiated during a hospitalization.  Atezolizumab was added with cycle #2.  4 cycles of chemotherapy complete as of 9/19/2019.  4.  Port placed on 8/22/2019, delayed due to SVC syndrome  5.  Neutropenia related to therapy requiring the addition of Neulasta with cycle #3.  6.  Single agent atezolizumab initiated 10/8/2019.  Scan on 10/1/2019 revealed good disease response disease.  There is an area of concern on the adrenal gland which we will continue to monitor.  MRI brain shows no evidence of metastatic disease.    HISTORY OF PRESENT ILLNESS:  Julia Smith III is a 70 y.o. male the above medical history here today for follow-up continuing maintenance atezolizumab.    He continues to tolerate therapy well.  No rash.  No nausea vomiting or diarrhea.  He has some mild numbness in his toes.  He remains active but also reports that he gets \"out of gas\" easily.  He is doing everything he needs to do he states.        Past Medical History:   Diagnosis Date   • Atrial fibrillation with RVR (CMS/HCC)    • Cancer (CMS/HCC) 06/2019    Right lung   • Chronic cough    • COPD (chronic obstructive pulmonary disease) (CMS/HCC)    • Hearing loss    • History of shingles    • Lower back pain    • Mastoiditis    • Mild mitral regurgitation    • Other fatigue    • PAF (paroxysmal atrial fibrillation) (CMS/HCC)    • Pneumonia        Past Surgical History:   Procedure Laterality Date   • BRONCHOSCOPY N/A 6/19/2019    Procedure: BRONCHOSCOPY WITH WASHINGS AND BIOPSY AND ENDOBRONCHIAL ULTRASOUND WITH FNA;  Surgeon: Arslan Marquez MD;  Location: [facility] ENDOSCOPY;  Service: Pulmonary   • INNER EAR SURGERY Left    • MASTOIDECTOMY Left 1994   • VENOUS ACCESS DEVICE (PORT) INSERTION Right 8/22/2019    Procedure: INSERTION OF PORTACATH;  Surgeon: Ramila Gallardo MD;  Location: "  [facility] OR;  Service: Cardiothoracic       SOCIAL HISTORY:   reports that he has been smoking cigarettes. He has a 40.00 pack-year smoking history. He has never used smokeless tobacco. He reports that he drinks about 4.0 - 6.0 standard drinks of alcohol per week. He reports that he has current or past drug history. Drug: Marijuana.    FAMILY HISTORY:  family history includes COPD in his father; Heart disease in his brother; No Known Problems in his brother, brother, brother, mother, and sister.    ALLERGIES:  No Known Allergies    MEDICATIONS:  The medication list has been reviewed with the patient by the medical assistant, and the list has been updated in the electronic medical record, which I reviewed.  Medication dosages and frequencies were confirmed to be accurate.    I have reviewed the patient's medical history in detail and updated the computerized patient record.    Review of Systems   Review of Systems   Constitutional: Positive for fatigue. Negative for activity change, appetite change, chills and fever.   HENT: Negative for mouth sores, nosebleeds and trouble swallowing.    Respiratory: Negative for cough.    Cardiovascular: Negative for chest pain and leg swelling.   Gastrointestinal: Negative for abdominal pain, constipation, diarrhea, nausea and vomiting.   Genitourinary: Negative for difficulty urinating.   Musculoskeletal:        Knee pain - chronic stable   Skin: Negative for rash.   Neurological: Negative for dizziness and numbness.   Hematological: Negative for adenopathy. Does not bruise/bleed easily.   Psychiatric/Behavioral: Negative for sleep disturbance.       There were no vitals filed for this visit.  Physical ExamTelephone only visit today.  Speech fluent.      DIAGNOSTIC DATA:  Results from last 7 days   Lab Units 04/14/20  1301   WBC 10*3/mm3 6.39   NEUTROS ABS 10*3/mm3 3.73   HEMOGLOBIN g/dL 14.9   HEMATOCRIT % 45.3   PLATELETS 10*3/mm3 178     Results from last 7 days   Lab Units  04/14/20  1301   SODIUM mmol/L 135   POTASSIUM mmol/L 4.3   CHLORIDE mmol/L 97*   CO2 mmol/L 25.5   BUN mg/dL 10   CREATININE mg/dL 1.10   CALCIUM mg/dL 9.3   ALBUMIN g/dL 4.00   BILIRUBIN mg/dL 0.4   ALK PHOS U/L 77   ALT (SGPT) U/L 15   AST (SGOT) U/L 26   GLUCOSE mg/dL 103           IMAGING:  CT images of the chest abdomen and pelvis from 2/4/2020 with ongoing improvement.  Images personally reviewed.    IMPRESSION:  1. Further interval decrease in size of the mediastinal and right hilar  ill-defined mass since the previous study of 08/14/2019 as discussed  above.  2. Stable size of the adrenal glands. The left adrenal gland  demonstrated some mild increased activity on the PET/CT scan of  10/01/2019.  3. No new evidence of metastatic disease.    ASSESSMENT:  This is a 70 y.o. male with:  1.   Small cell lung cancer originating in the right hilum:   · He has an extremely large mass there with concern for SVC compression on CT imaging from 6/4/2019.  There is some left hilar adenopathy.  · Some parotid lesions which are concerning but indeterminate, left greater than right.  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  · Staging MRI of the brain 7/10/2019 negative for metastatic disease  · He was discussed at multidisciplinary thoracic conference, with his T4 and 3 disease, concern for involvement of the right supraclavicular region and obvious left hilum, plans to add atezolizumab with cycle 2, we can consider future radiation to the residual mediastinal mass if necessary.  · Lung lesion too large to radiate in spite of the SVC narrowing.    · On 7/8/2019 we started carboplatin AUC 5 and VP16 100 mg/m2 through peripheral IVs  · Cycle 2 postponed on 7/29/2019 due to heart rate of 154.  Atezolizumab added with cycle #2.  · Radiation can be added for a  residual mediastinal mass if necessary  · CT imaging on 8/14/2019 after two cycles of therapy shows significant improvement.  · Mediport anticipated by Dr. Gallardo on 8/22/2019.  · On  8/20/2019 cycle 3 was delayed due to neutropenia and with cycle 3 we did incorporate Neulasta.  · He is here today for scheduled cycle #4 (third cycle with atezolizumab).  Proceed with treatment today and over the next 2 days.  · Maintenance atezolizumab initiated 10/8/2019.  PET scan revealed disease response to therapy, he does have an area of hypermetabolic activity in the adrenal gland which is small and did not show up on prior exam.  We will continue to monitor this.  His MRI of the brain is also negative for metastatic disease.  · CT 2/4/2020 with ongoing improvement    2.  Chronic hearing loss: History of mastoiditis.  He is not a candidate for cisplatin because of this.  Stable.    3.  Atrial fibrillation.  He was initiated on metoprolol 12.5 mg twice daily.  This did delay port placement.  The patient was taking his metoprolol inconsistently.  For cycle 2 was delayed due to heart rate of 150 7/29/2019.  He was seen by cardiology with instructions to increase his metoprolol to 25 mg twice daily.  Heart rate stable today.    4.  Tobacco use.  He did utilize a NicoDerm patch while inpatient, though has continued smoking since discharge.  We discussed smoking cessation again today.    5.  SVC syndrome.  Improved after 2 cycles of chemotherapy.    6. Tachycardia related to atrial fibrillation as outlined above  · Heart rate on 7/29/19 was 154.  Cycle 2 was delayed.  · Metoprolol increased to 25 mg twice daily with improvement in heart rate.    7. Fatigue:  Follow up thyroid studies.  Other labs if this worsens.  Can be due to atezolizumab alone.        PLAN:   1. Proceed with maintenance atezolizumab today and every 3 weeks with his last cycle scheduled to begin on 7/7/2020  2. No need for radiation therapy at this time  3. Return  for treatment in 3 in 6 weeks.  4. CT imaging in 5 weeks and I will see him back in 6 weeks  5. Advised him to call us with any issues prior to follow up  6. Follow up pending labs from today    You have chosen to receive care through a telephone visit. Do you consent to use a telephone visit for your medical care today? Yes    15 minutes were spent in this visit today.  [id number].          Thai Morel MD

## 2020-05-05 ENCOUNTER — INFUSION (OUTPATIENT)
Dept: ONCOLOGY | Facility: HOSPITAL | Age: 70
End: 2020-05-05

## 2020-05-05 VITALS
WEIGHT: 196 LBS | HEART RATE: 90 BPM | SYSTOLIC BLOOD PRESSURE: 144 MMHG | DIASTOLIC BLOOD PRESSURE: 78 MMHG | TEMPERATURE: 99 F | BODY MASS INDEX: 28.7 KG/M2 | OXYGEN SATURATION: 90 %

## 2020-05-05 DIAGNOSIS — Z79.899 HIGH RISK MEDICATION USE: ICD-10-CM

## 2020-05-05 DIAGNOSIS — C34.90 SMALL CELL LUNG CANCER (HCC): Primary | ICD-10-CM

## 2020-05-05 DIAGNOSIS — Z45.2 ENCOUNTER FOR FITTING AND ADJUSTMENT OF VASCULAR CATHETER: ICD-10-CM

## 2020-05-05 LAB
ALBUMIN SERPL-MCNC: 4.2 G/DL (ref 3.5–5.2)
ALBUMIN/GLOB SERPL: 1.2 G/DL (ref 1.1–2.4)
ALP SERPL-CCNC: 79 U/L (ref 38–116)
ALT SERPL W P-5'-P-CCNC: 16 U/L (ref 0–41)
ANION GAP SERPL CALCULATED.3IONS-SCNC: 12.6 MMOL/L (ref 5–15)
AST SERPL-CCNC: 20 U/L (ref 0–40)
BASOPHILS # BLD AUTO: 0.09 10*3/MM3 (ref 0–0.2)
BASOPHILS NFR BLD AUTO: 1 % (ref 0–1.5)
BILIRUB SERPL-MCNC: 0.4 MG/DL (ref 0.2–1.2)
BUN BLD-MCNC: 11 MG/DL (ref 6–20)
BUN/CREAT SERPL: 11 (ref 7.3–30)
CALCIUM SPEC-SCNC: 9.4 MG/DL (ref 8.5–10.2)
CHLORIDE SERPL-SCNC: 98 MMOL/L (ref 98–107)
CO2 SERPL-SCNC: 24.4 MMOL/L (ref 22–29)
CREAT BLD-MCNC: 1 MG/DL (ref 0.7–1.3)
DEPRECATED RDW RBC AUTO: 44.5 FL (ref 37–54)
EOSINOPHIL # BLD AUTO: 0.3 10*3/MM3 (ref 0–0.4)
EOSINOPHIL NFR BLD AUTO: 3.4 % (ref 0.3–6.2)
ERYTHROCYTE [DISTWIDTH] IN BLOOD BY AUTOMATED COUNT: 12.4 % (ref 12.3–15.4)
GFR SERPL CREATININE-BSD FRML MDRD: 74 ML/MIN/1.73
GLOBULIN UR ELPH-MCNC: 3.6 GM/DL (ref 1.8–3.5)
GLUCOSE BLD-MCNC: 104 MG/DL (ref 74–124)
HCT VFR BLD AUTO: 46 % (ref 37.5–51)
HGB BLD-MCNC: 15.4 G/DL (ref 13–17.7)
IMM GRANULOCYTES # BLD AUTO: 0.02 10*3/MM3 (ref 0–0.05)
IMM GRANULOCYTES NFR BLD AUTO: 0.2 % (ref 0–0.5)
LYMPHOCYTES # BLD AUTO: 1.36 10*3/MM3 (ref 0.7–3.1)
LYMPHOCYTES NFR BLD AUTO: 15.3 % (ref 19.6–45.3)
MCH RBC QN AUTO: 32.4 PG (ref 26.6–33)
MCHC RBC AUTO-ENTMCNC: 33.5 G/DL (ref 31.5–35.7)
MCV RBC AUTO: 96.6 FL (ref 79–97)
MONOCYTES # BLD AUTO: 0.89 10*3/MM3 (ref 0.1–0.9)
MONOCYTES NFR BLD AUTO: 10 % (ref 5–12)
NEUTROPHILS # BLD AUTO: 6.21 10*3/MM3 (ref 1.7–7)
NEUTROPHILS NFR BLD AUTO: 70.1 % (ref 42.7–76)
NRBC BLD AUTO-RTO: 0 /100 WBC (ref 0–0.2)
PLATELET # BLD AUTO: 225 10*3/MM3 (ref 140–450)
PMV BLD AUTO: 9.3 FL (ref 6–12)
POTASSIUM BLD-SCNC: 4.6 MMOL/L (ref 3.5–4.7)
PROT SERPL-MCNC: 7.8 G/DL (ref 6.3–8)
RBC # BLD AUTO: 4.76 10*6/MM3 (ref 4.14–5.8)
SODIUM BLD-SCNC: 135 MMOL/L (ref 134–145)
WBC NRBC COR # BLD: 8.87 10*3/MM3 (ref 3.4–10.8)

## 2020-05-05 PROCEDURE — 80053 COMPREHEN METABOLIC PANEL: CPT

## 2020-05-05 PROCEDURE — 25010000003 HEPARIN LOCK FLUSH PER 10 UNITS: Performed by: INTERNAL MEDICINE

## 2020-05-05 PROCEDURE — 25010000002 ONDANSETRON PER 1 MG: Performed by: INTERNAL MEDICINE

## 2020-05-05 PROCEDURE — 85025 COMPLETE CBC W/AUTO DIFF WBC: CPT

## 2020-05-05 PROCEDURE — 96375 TX/PRO/DX INJ NEW DRUG ADDON: CPT

## 2020-05-05 PROCEDURE — 96413 CHEMO IV INFUSION 1 HR: CPT

## 2020-05-05 PROCEDURE — 25010000002 ATEZOLIZUMAB 1200 MG/20ML SOLUTION 20 ML VIAL: Performed by: INTERNAL MEDICINE

## 2020-05-05 RX ORDER — HEPARIN SODIUM (PORCINE) LOCK FLUSH IV SOLN 100 UNIT/ML 100 UNIT/ML
500 SOLUTION INTRAVENOUS AS NEEDED
Status: DISCONTINUED | OUTPATIENT
Start: 2020-05-05 | End: 2020-05-05 | Stop reason: HOSPADM

## 2020-05-05 RX ORDER — HEPARIN SODIUM (PORCINE) LOCK FLUSH IV SOLN 100 UNIT/ML 100 UNIT/ML
500 SOLUTION INTRAVENOUS AS NEEDED
Status: CANCELLED | OUTPATIENT
Start: 2020-05-05

## 2020-05-05 RX ORDER — SODIUM CHLORIDE 0.9 % (FLUSH) 0.9 %
10 SYRINGE (ML) INJECTION AS NEEDED
Status: CANCELLED | OUTPATIENT
Start: 2020-05-05

## 2020-05-05 RX ORDER — SODIUM CHLORIDE 0.9 % (FLUSH) 0.9 %
10 SYRINGE (ML) INJECTION AS NEEDED
Status: DISCONTINUED | OUTPATIENT
Start: 2020-05-05 | End: 2020-05-05 | Stop reason: HOSPADM

## 2020-05-05 RX ORDER — SODIUM CHLORIDE 9 MG/ML
250 INJECTION, SOLUTION INTRAVENOUS ONCE
Status: COMPLETED | OUTPATIENT
Start: 2020-05-05 | End: 2020-05-05

## 2020-05-05 RX ADMIN — SODIUM CHLORIDE 250 ML: 900 INJECTION, SOLUTION INTRAVENOUS at 14:40

## 2020-05-05 RX ADMIN — SODIUM CHLORIDE, PRESERVATIVE FREE 500 UNITS: 5 INJECTION INTRAVENOUS at 15:28

## 2020-05-05 RX ADMIN — ATEZOLIZUMAB 1200 MG: 1200 INJECTION, SOLUTION INTRAVENOUS at 14:56

## 2020-05-05 RX ADMIN — ONDANSETRON HYDROCHLORIDE 16 MG: 2 SOLUTION INTRAMUSCULAR; INTRAVENOUS at 14:44

## 2020-05-05 RX ADMIN — Medication 10 ML: at 15:28

## 2020-05-19 ENCOUNTER — INFUSION (OUTPATIENT)
Dept: ONCOLOGY | Facility: HOSPITAL | Age: 70
End: 2020-05-19

## 2020-05-19 ENCOUNTER — HOSPITAL ENCOUNTER (OUTPATIENT)
Dept: PET IMAGING | Facility: HOSPITAL | Age: 70
Discharge: HOME | End: 2020-05-19
Admitting: INTERNAL MEDICINE

## 2020-05-19 DIAGNOSIS — C34.90 SMALL CELL LUNG CANCER (HCC): Chronic | ICD-10-CM

## 2020-05-19 LAB — CREAT BLDA-MCNC: 0.9 MG/DL (ref 0.6–1.3)

## 2020-05-19 PROCEDURE — 74177 CT ABD & PELVIS W/CONTRAST: CPT

## 2020-05-19 PROCEDURE — 25010000002 IOPAMIDOL 61 % SOLUTION: Performed by: INTERNAL MEDICINE

## 2020-05-19 PROCEDURE — 82565 ASSAY OF CREATININE: CPT

## 2020-05-19 PROCEDURE — 0 DIATRIZOATE MEGLUMINE & SODIUM PER 1 ML: Performed by: INTERNAL MEDICINE

## 2020-05-19 PROCEDURE — 71260 CT THORAX DX C+: CPT

## 2020-05-19 RX ADMIN — IOPAMIDOL 85 ML: 612 INJECTION, SOLUTION INTRAVENOUS at 09:15

## 2020-05-19 RX ADMIN — DIATRIZOATE MEGLUMINE AND DIATRIZOATE SODIUM 30 ML: 660; 100 LIQUID ORAL; RECTAL at 08:24

## 2020-05-26 ENCOUNTER — OFFICE VISIT (OUTPATIENT)
Dept: ONCOLOGY | Facility: CLINIC | Age: 70
End: 2020-05-26

## 2020-05-26 ENCOUNTER — INFUSION (OUTPATIENT)
Dept: ONCOLOGY | Facility: HOSPITAL | Age: 70
End: 2020-05-26

## 2020-05-26 DIAGNOSIS — Z45.2 ENCOUNTER FOR FITTING AND ADJUSTMENT OF VASCULAR CATHETER: ICD-10-CM

## 2020-05-26 DIAGNOSIS — Z79.899 HIGH RISK MEDICATION USE: Primary | ICD-10-CM

## 2020-05-26 DIAGNOSIS — C34.90 SMALL CELL LUNG CANCER (HCC): ICD-10-CM

## 2020-05-26 DIAGNOSIS — Z79.899 HIGH RISK MEDICATION USE: ICD-10-CM

## 2020-05-26 LAB
ALBUMIN SERPL-MCNC: 4 G/DL (ref 3.5–5.2)
ALBUMIN/GLOB SERPL: 1.2 G/DL (ref 1.1–2.4)
ALP SERPL-CCNC: 74 U/L (ref 38–116)
ALT SERPL W P-5'-P-CCNC: 18 U/L (ref 0–41)
ANION GAP SERPL CALCULATED.3IONS-SCNC: 13.6 MMOL/L (ref 5–15)
AST SERPL-CCNC: 24 U/L (ref 0–40)
BASOPHILS # BLD AUTO: 0.1 10*3/MM3 (ref 0–0.2)
BASOPHILS NFR BLD AUTO: 1.4 % (ref 0–1.5)
BILIRUB SERPL-MCNC: 0.3 MG/DL (ref 0.2–1.2)
BUN BLD-MCNC: 13 MG/DL (ref 6–20)
BUN/CREAT SERPL: 11.9 (ref 7.3–30)
CALCIUM SPEC-SCNC: 9.3 MG/DL (ref 8.5–10.2)
CHLORIDE SERPL-SCNC: 100 MMOL/L (ref 98–107)
CO2 SERPL-SCNC: 23.4 MMOL/L (ref 22–29)
CREAT BLD-MCNC: 1.09 MG/DL (ref 0.7–1.3)
DEPRECATED RDW RBC AUTO: 45.8 FL (ref 37–54)
EOSINOPHIL # BLD AUTO: 0.35 10*3/MM3 (ref 0–0.4)
EOSINOPHIL NFR BLD AUTO: 4.8 % (ref 0.3–6.2)
ERYTHROCYTE [DISTWIDTH] IN BLOOD BY AUTOMATED COUNT: 12.9 % (ref 12.3–15.4)
GFR SERPL CREATININE-BSD FRML MDRD: 67 ML/MIN/1.73
GLOBULIN UR ELPH-MCNC: 3.4 GM/DL (ref 1.8–3.5)
GLUCOSE BLD-MCNC: 130 MG/DL (ref 74–124)
HCT VFR BLD AUTO: 44.6 % (ref 37.5–51)
HGB BLD-MCNC: 14.8 G/DL (ref 13–17.7)
IMM GRANULOCYTES # BLD AUTO: 0.03 10*3/MM3 (ref 0–0.05)
IMM GRANULOCYTES NFR BLD AUTO: 0.4 % (ref 0–0.5)
LYMPHOCYTES # BLD AUTO: 1.75 10*3/MM3 (ref 0.7–3.1)
LYMPHOCYTES NFR BLD AUTO: 24.2 % (ref 19.6–45.3)
MCH RBC QN AUTO: 32.4 PG (ref 26.6–33)
MCHC RBC AUTO-ENTMCNC: 33.2 G/DL (ref 31.5–35.7)
MCV RBC AUTO: 97.6 FL (ref 79–97)
MONOCYTES # BLD AUTO: 0.67 10*3/MM3 (ref 0.1–0.9)
MONOCYTES NFR BLD AUTO: 9.3 % (ref 5–12)
NEUTROPHILS # BLD AUTO: 4.33 10*3/MM3 (ref 1.7–7)
NEUTROPHILS NFR BLD AUTO: 59.9 % (ref 42.7–76)
NRBC BLD AUTO-RTO: 0 /100 WBC (ref 0–0.2)
PLATELET # BLD AUTO: 196 10*3/MM3 (ref 140–450)
PMV BLD AUTO: 9.5 FL (ref 6–12)
POTASSIUM BLD-SCNC: 4.3 MMOL/L (ref 3.5–4.7)
PROT SERPL-MCNC: 7.4 G/DL (ref 6.3–8)
RBC # BLD AUTO: 4.57 10*6/MM3 (ref 4.14–5.8)
SODIUM BLD-SCNC: 137 MMOL/L (ref 134–145)
T3FREE SERPL-MCNC: 3.09 PG/ML (ref 2–4.4)
T4 FREE SERPL-MCNC: 0.9 NG/DL (ref 0.93–1.7)
TSH SERPL DL<=0.05 MIU/L-ACNC: 4.06 UIU/ML (ref 0.27–4.2)
WBC NRBC COR # BLD: 7.23 10*3/MM3 (ref 3.4–10.8)

## 2020-05-26 PROCEDURE — 96367 TX/PROPH/DG ADDL SEQ IV INF: CPT

## 2020-05-26 PROCEDURE — 96413 CHEMO IV INFUSION 1 HR: CPT

## 2020-05-26 PROCEDURE — 25010000003 HEPARIN LOCK FLUSH PER 10 UNITS: Performed by: INTERNAL MEDICINE

## 2020-05-26 PROCEDURE — 85025 COMPLETE CBC W/AUTO DIFF WBC: CPT

## 2020-05-26 PROCEDURE — 84443 ASSAY THYROID STIM HORMONE: CPT | Performed by: INTERNAL MEDICINE

## 2020-05-26 PROCEDURE — 25010000002 FOSAPREPITANT PER 1 MG: Performed by: INTERNAL MEDICINE

## 2020-05-26 PROCEDURE — 80053 COMPREHEN METABOLIC PANEL: CPT

## 2020-05-26 PROCEDURE — 96417 CHEMO IV INFUS EACH ADDL SEQ: CPT

## 2020-05-26 PROCEDURE — 25010000002 DEXAMETHASONE SODIUM PHOSPHATE 100 MG/10ML SOLUTION: Performed by: INTERNAL MEDICINE

## 2020-05-26 PROCEDURE — 96375 TX/PRO/DX INJ NEW DRUG ADDON: CPT

## 2020-05-26 PROCEDURE — 84439 ASSAY OF FREE THYROXINE: CPT | Performed by: INTERNAL MEDICINE

## 2020-05-26 PROCEDURE — 25010000002 CARBOPLATIN PER 50 MG: Performed by: INTERNAL MEDICINE

## 2020-05-26 PROCEDURE — 25010000002 PALONOSETRON PER 25 MCG: Performed by: INTERNAL MEDICINE

## 2020-05-26 PROCEDURE — 84481 FREE ASSAY (FT-3): CPT | Performed by: INTERNAL MEDICINE

## 2020-05-26 PROCEDURE — 99215 OFFICE O/P EST HI 40 MIN: CPT | Performed by: INTERNAL MEDICINE

## 2020-05-26 PROCEDURE — 25010000002 ETOPOSIDE 100 MG/5ML SOLUTION 5 ML VIAL: Performed by: INTERNAL MEDICINE

## 2020-05-26 RX ORDER — PALONOSETRON 0.05 MG/ML
0.25 INJECTION, SOLUTION INTRAVENOUS ONCE
Status: COMPLETED | OUTPATIENT
Start: 2020-05-26 | End: 2020-05-26

## 2020-05-26 RX ORDER — DIPHENHYDRAMINE HYDROCHLORIDE 50 MG/ML
50 INJECTION INTRAMUSCULAR; INTRAVENOUS AS NEEDED
Status: CANCELLED | OUTPATIENT
Start: 2020-05-26

## 2020-05-26 RX ORDER — SODIUM CHLORIDE 9 MG/ML
250 INJECTION, SOLUTION INTRAVENOUS ONCE
Status: COMPLETED | OUTPATIENT
Start: 2020-05-26 | End: 2020-05-26

## 2020-05-26 RX ORDER — HEPARIN SODIUM (PORCINE) LOCK FLUSH IV SOLN 100 UNIT/ML 100 UNIT/ML
500 SOLUTION INTRAVENOUS AS NEEDED
Status: DISCONTINUED | OUTPATIENT
Start: 2020-05-26 | End: 2020-05-26 | Stop reason: HOSPADM

## 2020-05-26 RX ORDER — SODIUM CHLORIDE 9 MG/ML
250 INJECTION, SOLUTION INTRAVENOUS ONCE
Status: CANCELLED | OUTPATIENT
Start: 2020-05-26

## 2020-05-26 RX ORDER — FAMOTIDINE 10 MG/ML
20 INJECTION, SOLUTION INTRAVENOUS AS NEEDED
Status: CANCELLED | OUTPATIENT
Start: 2020-05-26

## 2020-05-26 RX ORDER — SODIUM CHLORIDE 9 MG/ML
250 INJECTION, SOLUTION INTRAVENOUS ONCE
Status: CANCELLED | OUTPATIENT
Start: 2020-05-27

## 2020-05-26 RX ORDER — SODIUM CHLORIDE 0.9 % (FLUSH) 0.9 %
10 SYRINGE (ML) INJECTION AS NEEDED
Status: CANCELLED | OUTPATIENT
Start: 2020-05-26

## 2020-05-26 RX ORDER — PALONOSETRON 0.05 MG/ML
0.25 INJECTION, SOLUTION INTRAVENOUS ONCE
Status: CANCELLED | OUTPATIENT
Start: 2020-05-26

## 2020-05-26 RX ORDER — HEPARIN SODIUM (PORCINE) LOCK FLUSH IV SOLN 100 UNIT/ML 100 UNIT/ML
500 SOLUTION INTRAVENOUS AS NEEDED
Status: CANCELLED | OUTPATIENT
Start: 2020-05-26

## 2020-05-26 RX ORDER — SODIUM CHLORIDE 9 MG/ML
250 INJECTION, SOLUTION INTRAVENOUS ONCE
Status: CANCELLED | OUTPATIENT
Start: 2020-05-28

## 2020-05-26 RX ADMIN — Medication 500 UNITS: at 11:28

## 2020-05-26 RX ADMIN — DEXAMETHASONE SODIUM PHOSPHATE 12 MG: 10 INJECTION, SOLUTION INTRAMUSCULAR; INTRAVENOUS at 08:55

## 2020-05-26 RX ADMIN — ETOPOSIDE 210 MG: 20 INJECTION, SOLUTION, CONCENTRATE INTRAVENOUS at 09:47

## 2020-05-26 RX ADMIN — SODIUM CHLORIDE 100 ML: 9 INJECTION, SOLUTION INTRAVENOUS at 09:12

## 2020-05-26 RX ADMIN — CARBOPLATIN 530 MG: 10 INJECTION, SOLUTION INTRAVENOUS at 10:52

## 2020-05-26 RX ADMIN — SODIUM CHLORIDE 250 ML: 9 INJECTION, SOLUTION INTRAVENOUS at 08:54

## 2020-05-26 RX ADMIN — PALONOSETRON HYDROCHLORIDE 0.25 MG: 0.25 INJECTION, SOLUTION INTRAVENOUS at 08:54

## 2020-05-26 NOTE — PROGRESS NOTES
REASON FOR FOLLOW UP:   1.  T4N3 Small cell lung cancer  2.  SVC syndrome  3.  Palliative therapy with carboplatin, -16 initiated on 7/8/2019.  Cycle 1 was initiated during a hospitalization.  Atezolizumab was added with cycle #2.  4 cycles of chemotherapy complete as of 9/19/2019.  4.  Port placed on 8/22/2019, delayed due to SVC syndrome  5.  Neutropenia related to therapy requiring the addition of Neulasta with cycle #3.  6.  Single agent maintenance atezolizumab initiated 10/8/2019.  Scan on 10/1/2019 revealed good disease response disease.  There is an area of concern on the adrenal gland which we will continue to monitor.  MRI brain shows no evidence of metastatic disease.  6.  CT imaging 2/4/2020 with ongoing improvement  7.  CT imaging 5/19/2020 with progression.      HISTORY OF PRESENT ILLNESS:  Julia Smith III is a 70 y.o. male the above medical history here today for follow-up continuing maintenance atezolizumab.    He continues to report fatigue.  This has not really changed.  He reports some numbness and tingling in his toes.  This has not worsened recently.      Past Medical History:   Diagnosis Date   • Atrial fibrillation with RVR (CMS/HCC)    • Cancer (CMS/HCC) 06/2019    Right lung   • Chronic cough    • COPD (chronic obstructive pulmonary disease) (CMS/HCC)    • Hearing loss    • History of shingles    • Lower back pain    • Mastoiditis    • Mild mitral regurgitation    • Other fatigue    • PAF (paroxysmal atrial fibrillation) (CMS/HCC)    • Pneumonia        Past Surgical History:   Procedure Laterality Date   • BRONCHOSCOPY N/A 6/19/2019    Procedure: BRONCHOSCOPY WITH WASHINGS AND BIOPSY AND ENDOBRONCHIAL ULTRASOUND WITH FNA;  Surgeon: Arslan Marquez MD;  Location: [facility] ENDOSCOPY;  Service: Pulmonary   • INNER EAR SURGERY Left    • MASTOIDECTOMY Left 1994   • VENOUS ACCESS DEVICE (PORT) INSERTION Right 8/22/2019    Procedure: INSERTION OF PORTACATH;  Surgeon: Ramila Gallardo MD;   Location: [facility];  Service: Cardiothoracic       SOCIAL HISTORY:   reports that he has been smoking cigarettes. He has a 40.00 pack-year smoking history. He has never used smokeless tobacco. He reports that he drinks about 4.0 - 6.0 standard drinks of alcohol per week. He reports that he has current or past drug history. Drug: Marijuana.    FAMILY HISTORY:  family history includes COPD in his father; Heart disease in his brother; No Known Problems in his brother, brother, brother, mother, and sister.    ALLERGIES:  No Known Allergies    MEDICATIONS:  The medication list has been reviewed with the patient by the medical assistant, and the list has been updated in the electronic medical record, which I reviewed.  Medication dosages and frequencies were confirmed to be accurate.    I have reviewed the patient's medical history in detail and updated the computerized patient record.    Review of Systems   Review of Systems   Constitutional: Positive for fatigue. Negative for activity change, appetite change, chills and fever.   HENT: Negative for mouth sores, nosebleeds and trouble swallowing.    Respiratory: Negative for cough.    Cardiovascular: Negative for chest pain and leg swelling.   Gastrointestinal: Negative for abdominal pain, constipation, diarrhea, nausea and vomiting.   Genitourinary: Negative for difficulty urinating.   Musculoskeletal:        Knee pain - chronic stable   Skin: Negative for rash.   Neurological: Positive for numbness. Negative for dizziness.   Hematological: Negative for adenopathy. Does not bruise/bleed easily.   Psychiatric/Behavioral: Negative for sleep disturbance.       Vitals:    05/26/20 0743   PainSc: Comment: lung cancer     Physical Exam   Constitutional: He is oriented to person, place, and time. He appears well-developed and well-nourished. No distress.   HENT:   Head: Normocephalic and atraumatic. Hair is normal.   Mouth/Throat: Oropharynx is clear and moist.   Eyes:  Conjunctivae, EOM and lids are normal. Pupils are equal.   Neck: Neck supple. No JVD present. No thyroid mass and no thyromegaly present.   Cardiovascular: Normal rate and regular rhythm. Exam reveals no gallop and no friction rub.   No murmur heard.  Pulmonary/Chest: Effort normal and breath sounds normal. No respiratory distress. He has no wheezes. He has no rales.   Mediport present, benign in appearance   Abdominal: Soft. He exhibits no distension and no mass. There is no splenomegaly or hepatomegaly. There is no tenderness. There is no guarding.   Musculoskeletal: Normal range of motion. He exhibits no edema, tenderness or deformity.   Lymphadenopathy:     He has no cervical adenopathy.     He has no axillary adenopathy.        Right: No inguinal and no supraclavicular adenopathy present.        Left: No inguinal and no supraclavicular adenopathy present.   Neurological: He is alert and oriented to person, place, and time. He has normal strength.   Skin: Skin is warm and dry. No rash noted.   Psychiatric: He has a normal mood and affect. His behavior is normal. Judgment and thought content normal. Cognition and memory are normal.   Nursing note and vitals reviewed.         DIAGNOSTIC DATA:  Results from last 7 days   Lab Units 05/26/20  0727   WBC 10*3/mm3 7.23   NEUTROS ABS 10*3/mm3 4.33   HEMOGLOBIN g/dL 14.8   HEMATOCRIT % 44.6   PLATELETS 10*3/mm3 196     Results from last 7 days   Lab Units 05/26/20  0727   SODIUM mmol/L 137   POTASSIUM mmol/L 4.3   CHLORIDE mmol/L 100   CO2 mmol/L 23.4   BUN mg/dL 13   CREATININE mg/dL 1.09   CALCIUM mg/dL 9.3   ALBUMIN g/dL 4.00   BILIRUBIN mg/dL 0.3   ALK PHOS U/L 74   ALT (SGPT) U/L 18   AST (SGOT) U/L 24   GLUCOSE mg/dL 130*           IMAGING:  CT images of the chest abdomen and pelvis from 5/19/2020 with progression. Images personally reviewed.    ASSESSMENT:  This is a 70 y.o. male with:  1.   Small cell lung cancer originating in the right hilum:   · He has an  extremely large mass there with concern for SVC compression on CT imaging from 6/4/2019.  There is some left hilar adenopathy.  · Some parotid lesions which are concerning but indeterminate, left greater than right.  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  · Staging MRI of the brain 7/10/2019 negative for metastatic disease  · He was discussed at multidisciplinary thoracic conference, with his T4 and 3 disease, concern for involvement of the right supraclavicular region and obvious left hilum, plans to add atezolizumab with cycle 2, we can consider future radiation to the residual mediastinal mass if necessary.  · Lung lesion too large to radiate in spite of the SVC narrowing.    · On 7/8/2019 we started carboplatin AUC 5 and VP16 100 mg/m2 through peripheral IVs  · Cycle 2 postponed on 7/29/2019 due to heart rate of 154.  Atezolizumab added with cycle #2.  · Radiation can be added for a residual mediastinal mass if necessary  · CT imaging on 8/14/2019 after two cycles of therapy shows significant improvement.  · Mediport anticipated by Dr. Gallardo on 8/22/2019.  · On  8/20/2019 cycle 3 was delayed due to neutropenia and with cycle 3 we did incorporate Neulasta.  · Cycle 4 completed 9/17/2019  · Maintenance atezolizumab initiated 10/8/2019.  PET scan revealed disease response to therapy, he does have an area of hypermetabolic activity in the adrenal gland which is small and did not show up on prior exam.  We will continue to monitor this.  His MRI of the brain is also negative for metastatic disease.  · CT 2/4/2020 with ongoing improvement.  Maintenance atezolizumab pursued.    · CT imaging 5/19/2020 with progression.  Increase in the right hilar/mediastinal mass with enlarging adenopathy.    · Given that the progression is longer than 6 months after  completing carbo/VP16, proceed with further carbo/VP16.    2.  Chronic hearing loss: History of mastoiditis.  He is not a candidate for cisplatin because of this.  Stable.    3.  Atrial fibrillation.  He was initiated on metoprolol 12.5 mg twice daily.  This did delay port placement.  The patient was taking his metoprolol inconsistently.  For cycle 2 was delayed due to heart rate of 150 7/29/2019.  He was seen by cardiology with instructions to increase his metoprolol to 25 mg twice daily.  Heart rate stable today.    4.  Tobacco use.  He did utilize a NicoDerm patch while inpatient, though has continued smoking since discharge.  We discussed smoking cessation again today.    5.  SVC syndrome.  Improved after 2 cycles of chemotherapy.    6. Tachycardia related to atrial fibrillation as outlined above  · Heart rate on 7/29/19 was 154.  Cycle 2 was delayed.  · Metoprolol increased to 25 mg twice daily with improvement in heart rate.    7. Fatigue: Stable.  Monitor.  Follow-up thyroid studies.      PLAN:   1. With progression, further carbo/VP16 at previous doses with plans for as many as 4 more cycles and scans after 2 cycles.  He is agreeable to changing therapy.  2. Discontinue atezolizumab at this point.  3. Consider radiation to the mass as well  4. I'll present his case at thoracic conference  5. NP in three weeks for cycle 2.  Scans in 5 weeks.  I'lll see him in 6 weeks for cycle 3 if there has been a response to therapy and he's tolerating it adequately.        Thai Morel MD

## 2020-05-27 ENCOUNTER — INFUSION (OUTPATIENT)
Dept: ONCOLOGY | Facility: HOSPITAL | Age: 70
End: 2020-05-27

## 2020-05-27 VITALS
WEIGHT: 195 LBS | OXYGEN SATURATION: 96 % | BODY MASS INDEX: 28.56 KG/M2 | DIASTOLIC BLOOD PRESSURE: 83 MMHG | HEART RATE: 82 BPM | TEMPERATURE: 98.1 F | SYSTOLIC BLOOD PRESSURE: 146 MMHG

## 2020-05-27 DIAGNOSIS — Z45.2 ENCOUNTER FOR FITTING AND ADJUSTMENT OF VASCULAR CATHETER: ICD-10-CM

## 2020-05-27 DIAGNOSIS — Z79.899 HIGH RISK MEDICATION USE: Primary | ICD-10-CM

## 2020-05-27 DIAGNOSIS — C34.90 SMALL CELL LUNG CANCER (HCC): ICD-10-CM

## 2020-05-27 PROCEDURE — 96413 CHEMO IV INFUSION 1 HR: CPT

## 2020-05-27 PROCEDURE — 25010000002 ETOPOSIDE 500 MG/25ML SOLUTION 25 ML VIAL: Performed by: NURSE PRACTITIONER

## 2020-05-27 PROCEDURE — 25010000002 DEXAMETHASONE SODIUM PHOSPHATE 100 MG/10ML SOLUTION: Performed by: INTERNAL MEDICINE

## 2020-05-27 PROCEDURE — 96375 TX/PRO/DX INJ NEW DRUG ADDON: CPT

## 2020-05-27 PROCEDURE — 25010000003 HEPARIN LOCK FLUSH PER 10 UNITS: Performed by: INTERNAL MEDICINE

## 2020-05-27 RX ORDER — SODIUM CHLORIDE 0.9 % (FLUSH) 0.9 %
10 SYRINGE (ML) INJECTION AS NEEDED
Status: CANCELLED | OUTPATIENT
Start: 2020-05-27

## 2020-05-27 RX ORDER — SODIUM CHLORIDE 0.9 % (FLUSH) 0.9 %
10 SYRINGE (ML) INJECTION AS NEEDED
Status: DISCONTINUED | OUTPATIENT
Start: 2020-05-27 | End: 2020-05-27 | Stop reason: HOSPADM

## 2020-05-27 RX ORDER — SODIUM CHLORIDE 9 MG/ML
250 INJECTION, SOLUTION INTRAVENOUS ONCE
Status: COMPLETED | OUTPATIENT
Start: 2020-05-27 | End: 2020-05-27

## 2020-05-27 RX ORDER — HEPARIN SODIUM (PORCINE) LOCK FLUSH IV SOLN 100 UNIT/ML 100 UNIT/ML
500 SOLUTION INTRAVENOUS AS NEEDED
Status: DISCONTINUED | OUTPATIENT
Start: 2020-05-27 | End: 2020-05-27 | Stop reason: HOSPADM

## 2020-05-27 RX ORDER — HEPARIN SODIUM (PORCINE) LOCK FLUSH IV SOLN 100 UNIT/ML 100 UNIT/ML
500 SOLUTION INTRAVENOUS AS NEEDED
Status: CANCELLED | OUTPATIENT
Start: 2020-05-27

## 2020-05-27 RX ADMIN — SODIUM CHLORIDE 250 ML: 900 INJECTION, SOLUTION INTRAVENOUS at 15:59

## 2020-05-27 RX ADMIN — ETOPOSIDE 210 MG: 20 INJECTION, SOLUTION, CONCENTRATE INTRAVENOUS at 16:16

## 2020-05-27 RX ADMIN — SODIUM CHLORIDE, PRESERVATIVE FREE 10 ML: 5 INJECTION INTRAVENOUS at 17:24

## 2020-05-27 RX ADMIN — Medication 500 UNITS: at 17:24

## 2020-05-27 RX ADMIN — DEXAMETHASONE SODIUM PHOSPHATE 12 MG: 10 INJECTION, SOLUTION INTRAMUSCULAR; INTRAVENOUS at 15:59

## 2020-05-28 ENCOUNTER — INFUSION (OUTPATIENT)
Dept: ONCOLOGY | Facility: HOSPITAL | Age: 70
End: 2020-05-28

## 2020-05-28 ENCOUNTER — TELEPHONE (OUTPATIENT)
Dept: ONCOLOGY | Facility: CLINIC | Age: 70
End: 2020-05-28

## 2020-05-28 VITALS
WEIGHT: 198 LBS | OXYGEN SATURATION: 96 % | BODY MASS INDEX: 28.99 KG/M2 | SYSTOLIC BLOOD PRESSURE: 175 MMHG | DIASTOLIC BLOOD PRESSURE: 100 MMHG | TEMPERATURE: 98.1 F | HEART RATE: 74 BPM

## 2020-05-28 DIAGNOSIS — Z79.899 HIGH RISK MEDICATION USE: Primary | ICD-10-CM

## 2020-05-28 DIAGNOSIS — C34.90 SMALL CELL LUNG CANCER (HCC): Primary | Chronic | ICD-10-CM

## 2020-05-28 DIAGNOSIS — Z45.2 ENCOUNTER FOR FITTING AND ADJUSTMENT OF VASCULAR CATHETER: ICD-10-CM

## 2020-05-28 DIAGNOSIS — C34.90 SMALL CELL LUNG CANCER (HCC): ICD-10-CM

## 2020-05-28 DIAGNOSIS — C34.2 MALIGNANT NEOPLASM OF MIDDLE LOBE, BRONCHUS OR LUNG (HCC): ICD-10-CM

## 2020-05-28 PROCEDURE — 25010000003 HEPARIN LOCK FLUSH PER 10 UNITS: Performed by: INTERNAL MEDICINE

## 2020-05-28 PROCEDURE — 25010000002 ETOPOSIDE 1 GM/50ML SOLUTION 50 ML VIAL: Performed by: NURSE PRACTITIONER

## 2020-05-28 PROCEDURE — 96413 CHEMO IV INFUSION 1 HR: CPT

## 2020-05-28 PROCEDURE — 25010000002 DEXAMETHASONE SODIUM PHOSPHATE 100 MG/10ML SOLUTION: Performed by: INTERNAL MEDICINE

## 2020-05-28 PROCEDURE — 96375 TX/PRO/DX INJ NEW DRUG ADDON: CPT

## 2020-05-28 RX ORDER — HEPARIN SODIUM (PORCINE) LOCK FLUSH IV SOLN 100 UNIT/ML 100 UNIT/ML
500 SOLUTION INTRAVENOUS AS NEEDED
Status: DISCONTINUED | OUTPATIENT
Start: 2020-05-28 | End: 2020-05-28 | Stop reason: HOSPADM

## 2020-05-28 RX ORDER — SODIUM CHLORIDE 9 MG/ML
250 INJECTION, SOLUTION INTRAVENOUS ONCE
Status: COMPLETED | OUTPATIENT
Start: 2020-05-28 | End: 2020-05-28

## 2020-05-28 RX ORDER — SODIUM CHLORIDE 0.9 % (FLUSH) 0.9 %
10 SYRINGE (ML) INJECTION AS NEEDED
Status: CANCELLED | OUTPATIENT
Start: 2020-05-28

## 2020-05-28 RX ORDER — SODIUM CHLORIDE 0.9 % (FLUSH) 0.9 %
10 SYRINGE (ML) INJECTION AS NEEDED
Status: DISCONTINUED | OUTPATIENT
Start: 2020-05-28 | End: 2020-05-28 | Stop reason: HOSPADM

## 2020-05-28 RX ORDER — HEPARIN SODIUM (PORCINE) LOCK FLUSH IV SOLN 100 UNIT/ML 100 UNIT/ML
500 SOLUTION INTRAVENOUS AS NEEDED
Status: CANCELLED | OUTPATIENT
Start: 2020-05-28

## 2020-05-28 RX ADMIN — DEXAMETHASONE SODIUM PHOSPHATE 12 MG: 10 INJECTION, SOLUTION INTRAMUSCULAR; INTRAVENOUS at 15:42

## 2020-05-28 RX ADMIN — SODIUM CHLORIDE 250 ML: 9 INJECTION, SOLUTION INTRAVENOUS at 15:41

## 2020-05-28 RX ADMIN — Medication 500 UNITS: at 17:03

## 2020-05-28 RX ADMIN — ETOPOSIDE 210 MG: 20 INJECTION, SOLUTION, CONCENTRATE INTRAVENOUS at 16:00

## 2020-05-28 NOTE — TELEPHONE ENCOUNTER
----- Message from Thai Morel MD sent at 5/28/2020  7:27 AM EDT -----  Regarding: change CT to PET  Based on discussion at the thoracic conference this morning, need to change CT on 6/30 to a PET scan.  This has been ordered.  Thanks, MINDY

## 2020-05-28 NOTE — PROGRESS NOTES
Discussed at thoracic conference this morning.  Plan two cycles carbo/ 16 then a PET scan (not CT imaging as ordered) and brain MRI.  If appropriate at that time, plan radiation to any residual right hilar mass.  Irinotecan if further progression after that.

## 2020-06-16 ENCOUNTER — INFUSION (OUTPATIENT)
Dept: ONCOLOGY | Facility: HOSPITAL | Age: 70
End: 2020-06-16

## 2020-06-16 ENCOUNTER — OFFICE VISIT (OUTPATIENT)
Dept: ONCOLOGY | Facility: CLINIC | Age: 70
End: 2020-06-16

## 2020-06-16 VITALS
BODY MASS INDEX: 29.03 KG/M2 | DIASTOLIC BLOOD PRESSURE: 84 MMHG | OXYGEN SATURATION: 94 % | RESPIRATION RATE: 16 BRPM | TEMPERATURE: 98.4 F | WEIGHT: 196 LBS | SYSTOLIC BLOOD PRESSURE: 160 MMHG | HEART RATE: 84 BPM | HEIGHT: 69 IN

## 2020-06-16 DIAGNOSIS — Z79.899 HIGH RISK MEDICATION USE: ICD-10-CM

## 2020-06-16 DIAGNOSIS — C34.90 SMALL CELL LUNG CANCER (HCC): ICD-10-CM

## 2020-06-16 DIAGNOSIS — Z45.2 ENCOUNTER FOR FITTING AND ADJUSTMENT OF VASCULAR CATHETER: ICD-10-CM

## 2020-06-16 DIAGNOSIS — Z79.899 HIGH RISK MEDICATION USE: Primary | ICD-10-CM

## 2020-06-16 LAB
ALBUMIN SERPL-MCNC: 4.1 G/DL (ref 3.5–5.2)
ALBUMIN/GLOB SERPL: 1.3 G/DL (ref 1.1–2.4)
ALP SERPL-CCNC: 76 U/L (ref 38–116)
ALT SERPL W P-5'-P-CCNC: 19 U/L (ref 0–41)
ANION GAP SERPL CALCULATED.3IONS-SCNC: 11.8 MMOL/L (ref 5–15)
AST SERPL-CCNC: 21 U/L (ref 0–40)
BASOPHILS # BLD AUTO: 0.03 10*3/MM3 (ref 0–0.2)
BASOPHILS NFR BLD AUTO: 0.7 % (ref 0–1.5)
BILIRUB SERPL-MCNC: 0.3 MG/DL (ref 0.2–1.2)
BUN BLD-MCNC: 10 MG/DL (ref 6–20)
BUN/CREAT SERPL: 10 (ref 7.3–30)
CALCIUM SPEC-SCNC: 9.4 MG/DL (ref 8.5–10.2)
CHLORIDE SERPL-SCNC: 101 MMOL/L (ref 98–107)
CO2 SERPL-SCNC: 23.2 MMOL/L (ref 22–29)
CREAT BLD-MCNC: 1 MG/DL (ref 0.7–1.3)
DEPRECATED RDW RBC AUTO: 47.7 FL (ref 37–54)
EOSINOPHIL # BLD AUTO: 0.07 10*3/MM3 (ref 0–0.4)
EOSINOPHIL NFR BLD AUTO: 1.7 % (ref 0.3–6.2)
ERYTHROCYTE [DISTWIDTH] IN BLOOD BY AUTOMATED COUNT: 13.9 % (ref 12.3–15.4)
GFR SERPL CREATININE-BSD FRML MDRD: 74 ML/MIN/1.73
GLOBULIN UR ELPH-MCNC: 3.2 GM/DL (ref 1.8–3.5)
GLUCOSE BLD-MCNC: 120 MG/DL (ref 74–124)
HCT VFR BLD AUTO: 41.1 % (ref 37.5–51)
HGB BLD-MCNC: 13.5 G/DL (ref 13–17.7)
IMM GRANULOCYTES # BLD AUTO: 0.02 10*3/MM3 (ref 0–0.05)
IMM GRANULOCYTES NFR BLD AUTO: 0.5 % (ref 0–0.5)
LYMPHOCYTES # BLD AUTO: 1.14 10*3/MM3 (ref 0.7–3.1)
LYMPHOCYTES NFR BLD AUTO: 27.5 % (ref 19.6–45.3)
MCH RBC QN AUTO: 31.9 PG (ref 26.6–33)
MCHC RBC AUTO-ENTMCNC: 32.8 G/DL (ref 31.5–35.7)
MCV RBC AUTO: 97.2 FL (ref 79–97)
MONOCYTES # BLD AUTO: 0.91 10*3/MM3 (ref 0.1–0.9)
MONOCYTES NFR BLD AUTO: 22 % (ref 5–12)
NEUTROPHILS # BLD AUTO: 1.97 10*3/MM3 (ref 1.7–7)
NEUTROPHILS NFR BLD AUTO: 47.6 % (ref 42.7–76)
NRBC BLD AUTO-RTO: 0 /100 WBC (ref 0–0.2)
PLATELET # BLD AUTO: 215 10*3/MM3 (ref 140–450)
PMV BLD AUTO: 9.3 FL (ref 6–12)
POTASSIUM BLD-SCNC: 4.3 MMOL/L (ref 3.5–4.7)
PROT SERPL-MCNC: 7.3 G/DL (ref 6.3–8)
RBC # BLD AUTO: 4.23 10*6/MM3 (ref 4.14–5.8)
SODIUM BLD-SCNC: 136 MMOL/L (ref 134–145)
WBC NRBC COR # BLD: 4.14 10*3/MM3 (ref 3.4–10.8)

## 2020-06-16 PROCEDURE — 99213 OFFICE O/P EST LOW 20 MIN: CPT | Performed by: NURSE PRACTITIONER

## 2020-06-16 PROCEDURE — 25010000002 ETOPOSIDE 1 GM/50ML SOLUTION 50 ML VIAL: Performed by: NURSE PRACTITIONER

## 2020-06-16 PROCEDURE — 96375 TX/PRO/DX INJ NEW DRUG ADDON: CPT

## 2020-06-16 PROCEDURE — 96367 TX/PROPH/DG ADDL SEQ IV INF: CPT

## 2020-06-16 PROCEDURE — 25010000002 CARBOPLATIN PER 50 MG: Performed by: NURSE PRACTITIONER

## 2020-06-16 PROCEDURE — 25010000003 HEPARIN LOCK FLUSH PER 10 UNITS: Performed by: INTERNAL MEDICINE

## 2020-06-16 PROCEDURE — 96413 CHEMO IV INFUSION 1 HR: CPT

## 2020-06-16 PROCEDURE — 25010000002 PALONOSETRON PER 25 MCG: Performed by: NURSE PRACTITIONER

## 2020-06-16 PROCEDURE — 25010000002 FOSAPREPITANT PER 1 MG: Performed by: NURSE PRACTITIONER

## 2020-06-16 PROCEDURE — 80053 COMPREHEN METABOLIC PANEL: CPT

## 2020-06-16 PROCEDURE — 85025 COMPLETE CBC W/AUTO DIFF WBC: CPT

## 2020-06-16 PROCEDURE — 96417 CHEMO IV INFUS EACH ADDL SEQ: CPT

## 2020-06-16 PROCEDURE — 25010000002 DEXAMETHASONE SODIUM PHOSPHATE 100 MG/10ML SOLUTION: Performed by: NURSE PRACTITIONER

## 2020-06-16 RX ORDER — FAMOTIDINE 10 MG/ML
20 INJECTION, SOLUTION INTRAVENOUS AS NEEDED
Status: CANCELLED | OUTPATIENT
Start: 2020-06-16

## 2020-06-16 RX ORDER — SODIUM CHLORIDE 9 MG/ML
250 INJECTION, SOLUTION INTRAVENOUS ONCE
Status: COMPLETED | OUTPATIENT
Start: 2020-06-16 | End: 2020-06-16

## 2020-06-16 RX ORDER — ONDANSETRON HYDROCHLORIDE 8 MG/1
TABLET, FILM COATED ORAL
COMMUNITY
Start: 2020-05-26 | End: 2020-09-09 | Stop reason: SDDI

## 2020-06-16 RX ORDER — SODIUM CHLORIDE 0.9 % (FLUSH) 0.9 %
10 SYRINGE (ML) INJECTION AS NEEDED
Status: CANCELLED | OUTPATIENT
Start: 2020-06-16

## 2020-06-16 RX ORDER — SODIUM CHLORIDE 9 MG/ML
250 INJECTION, SOLUTION INTRAVENOUS ONCE
Status: CANCELLED | OUTPATIENT
Start: 2020-06-17

## 2020-06-16 RX ORDER — PALONOSETRON 0.05 MG/ML
0.25 INJECTION, SOLUTION INTRAVENOUS ONCE
Status: CANCELLED | OUTPATIENT
Start: 2020-06-16

## 2020-06-16 RX ORDER — HEPARIN SODIUM (PORCINE) LOCK FLUSH IV SOLN 100 UNIT/ML 100 UNIT/ML
500 SOLUTION INTRAVENOUS AS NEEDED
Status: CANCELLED | OUTPATIENT
Start: 2020-06-16

## 2020-06-16 RX ORDER — SODIUM CHLORIDE 9 MG/ML
250 INJECTION, SOLUTION INTRAVENOUS ONCE
Status: CANCELLED | OUTPATIENT
Start: 2020-06-18

## 2020-06-16 RX ORDER — DIPHENHYDRAMINE HYDROCHLORIDE 50 MG/ML
50 INJECTION INTRAMUSCULAR; INTRAVENOUS AS NEEDED
Status: CANCELLED | OUTPATIENT
Start: 2020-06-16

## 2020-06-16 RX ORDER — HEPARIN SODIUM (PORCINE) LOCK FLUSH IV SOLN 100 UNIT/ML 100 UNIT/ML
500 SOLUTION INTRAVENOUS AS NEEDED
Status: DISCONTINUED | OUTPATIENT
Start: 2020-06-16 | End: 2020-06-16 | Stop reason: HOSPADM

## 2020-06-16 RX ORDER — PALONOSETRON 0.05 MG/ML
0.25 INJECTION, SOLUTION INTRAVENOUS ONCE
Status: COMPLETED | OUTPATIENT
Start: 2020-06-16 | End: 2020-06-16

## 2020-06-16 RX ORDER — SODIUM CHLORIDE 9 MG/ML
250 INJECTION, SOLUTION INTRAVENOUS ONCE
Status: CANCELLED | OUTPATIENT
Start: 2020-06-16

## 2020-06-16 RX ADMIN — CARBOPLATIN 570 MG: 10 INJECTION, SOLUTION INTRAVENOUS at 16:41

## 2020-06-16 RX ADMIN — DEXAMETHASONE SODIUM PHOSPHATE 12 MG: 10 INJECTION, SOLUTION INTRAMUSCULAR; INTRAVENOUS at 15:09

## 2020-06-16 RX ADMIN — PALONOSETRON 0.25 MG: 0.05 INJECTION, SOLUTION INTRAVENOUS at 14:35

## 2020-06-16 RX ADMIN — Medication 500 UNITS: at 17:16

## 2020-06-16 RX ADMIN — SODIUM CHLORIDE 250 ML: 9 INJECTION, SOLUTION INTRAVENOUS at 14:35

## 2020-06-16 RX ADMIN — SODIUM CHLORIDE 100 ML: 9 INJECTION, SOLUTION INTRAVENOUS at 14:37

## 2020-06-16 RX ADMIN — ETOPOSIDE 210 MG: 20 INJECTION, SOLUTION, CONCENTRATE INTRAVENOUS at 15:28

## 2020-06-16 NOTE — PROGRESS NOTES
REASON FOR FOLLOW UP:   1.  T4N3 Small cell lung cancer  2.  SVC syndrome  3.  Palliative therapy with carboplatin, -16 initiated on 7/8/2019.  Cycle 1 was initiated during a hospitalization.  Atezolizumab was added with cycle #2.  4 cycles of chemotherapy complete as of 9/19/2019.  4.  Port placed on 8/22/2019, delayed due to SVC syndrome  5.  Neutropenia related to therapy requiring the addition of Neulasta with cycle #3.  6.  Single agent maintenance atezolizumab initiated 10/8/2019.  Scan on 10/1/2019 revealed good disease response disease.  There is an area of concern on the adrenal gland which we will continue to monitor.  MRI brain shows no evidence of metastatic disease.  6.  CT imaging 2/4/2020 with ongoing improvement  7.  CT imaging 5/19/2020 with progression.        HISTORY OF PRESENT ILLNESS:  Julia Smith III is a 70 y.o. male with the above medical history here today for scheduled carboplatin -16.  Reports feeling fair in the office today.  He denies new symptoms, however, he does continue to struggle with significant fatigue.  He reports this is tolerable.  His neuropathy is stable.  His appetite is good.  His CBC is stable.      Past Medical History:   Diagnosis Date   • Atrial fibrillation with RVR (CMS/HCC)    • Cancer (CMS/HCC) 06/2019    Right lung   • Chronic cough    • COPD (chronic obstructive pulmonary disease) (CMS/HCC)    • Hearing loss    • History of shingles    • Lower back pain    • Mastoiditis    • Mild mitral regurgitation    • Other fatigue    • PAF (paroxysmal atrial fibrillation) (CMS/HCC)    • Pneumonia        Past Surgical History:   Procedure Laterality Date   • BRONCHOSCOPY N/A 6/19/2019    Procedure: BRONCHOSCOPY WITH WASHINGS AND BIOPSY AND ENDOBRONCHIAL ULTRASOUND WITH FNA;  Surgeon: Arslan Marquez MD;  Location: [facility] ENDOSCOPY;  Service: Pulmonary   • INNER EAR SURGERY Left    • MASTOIDECTOMY Left 1994   • VENOUS ACCESS DEVICE (PORT) INSERTION Right 8/22/2019  "   Procedure: INSERTION OF PORTACATH;  Surgeon: Ramila Gallardo MD;  Location: [facility];  Service: Cardiothoracic       SOCIAL HISTORY:   reports that he has been smoking cigarettes. He has a 40.00 pack-year smoking history. He has never used smokeless tobacco. He reports that he drinks about 4.0 - 6.0 standard drinks of alcohol per week. He reports that he has current or past drug history. Drug: Marijuana.    FAMILY HISTORY:  family history includes COPD in his father; Heart disease in his brother; No Known Problems in his brother, brother, brother, mother, and sister.    ALLERGIES:  No Known Allergies    MEDICATIONS:  The medication list has been reviewed with the patient by the medical assistant, and the list has been updated in the electronic medical record, which I reviewed.  Medication dosages and frequencies were confirmed to be accurate.    I have reviewed the patient's medical history in detail and updated the computerized patient record.    Review of Systems   Review of Systems   Constitutional: Positive for fatigue. Negative for activity change, appetite change, chills and fever.   HENT: Negative for mouth sores, nosebleeds and trouble swallowing.    Respiratory: Negative for cough.    Cardiovascular: Negative for chest pain and leg swelling.   Gastrointestinal: Negative for abdominal pain, constipation, diarrhea, nausea and vomiting.   Genitourinary: Negative for difficulty urinating.   Musculoskeletal:        Knee pain - chronic unchanged   Skin: Negative for rash.   Neurological: Positive for numbness (minimal). Negative for dizziness.   Hematological: Negative for adenopathy. Does not bruise/bleed easily.   Psychiatric/Behavioral: Negative for sleep disturbance.       Vitals:    06/16/20 1350   BP: 160/84   Pulse: 84   Resp: 16   Temp: 98.4 °F (36.9 °C)   SpO2: 94%   Weight: 88.9 kg (196 lb)   Height: 175 cm (68.9\")  Comment: new ht.   PainSc: 0-No pain     Physical Exam   Constitutional: He is " oriented to person, place, and time. He appears well-developed and well-nourished. No distress.   He is wearing a facemask   HENT:   Head: Normocephalic and atraumatic. Hair is normal.   Mouth/Throat: Oropharynx is clear and moist.   Eyes: Conjunctivae, EOM and lids are normal. Pupils are equal.   Neck: Neck supple. No JVD present. No thyroid mass and no thyromegaly present.   Cardiovascular: Normal rate and regular rhythm.   Pulmonary/Chest: Effort normal and breath sounds normal. No respiratory distress.   Mediport present, benign in appearance   Abdominal: Soft. He exhibits no distension and no mass. There is no splenomegaly or hepatomegaly. There is no tenderness. There is no guarding.   Musculoskeletal: Normal range of motion. He exhibits no edema, tenderness or deformity.   Neurological: He is alert and oriented to person, place, and time. He has normal strength.   Skin: Skin is warm and dry. No rash noted.   Psychiatric: He has a normal mood and affect. His behavior is normal. Judgment and thought content normal. Cognition and memory are normal.   Nursing note and vitals reviewed.         DIAGNOSTIC DATA:  Results from last 7 days   Lab Units 06/16/20  1303   WBC 10*3/mm3 4.14   NEUTROS ABS 10*3/mm3 1.97   HEMOGLOBIN g/dL 13.5   HEMATOCRIT % 41.1   PLATELETS 10*3/mm3 215     Results from last 7 days   Lab Units 06/16/20  1303   SODIUM mmol/L 136   POTASSIUM mmol/L 4.3   CHLORIDE mmol/L 101   CO2 mmol/L 23.2   BUN mg/dL 10   CREATININE mg/dL 1.00   CALCIUM mg/dL 9.4   ALBUMIN g/dL 4.10   BILIRUBIN mg/dL 0.3   ALK PHOS U/L 76   ALT (SGPT) U/L 19   AST (SGOT) U/L 21   GLUCOSE mg/dL 120           IMAGING:  CT images of the chest abdomen and pelvis from 5/19/2020 with progression. Images personally reviewed.    ASSESSMENT:  This is a 70 y.o. male with:  1.   Small cell lung cancer originating in the right hilum:   · He has an extremely large mass there with concern for SVC compression on CT imaging from 6/4/2019.   There is some left hilar adenopathy.  · Some parotid lesions which are concerning but indeterminate, left greater than right.  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  · Staging MRI of the brain 7/10/2019 negative for metastatic disease  · He was discussed at multidisciplinary thoracic conference, with his T4 and 3 disease, concern for involvement of the right supraclavicular region and obvious left hilum, plans to add atezolizumab with cycle 2, we can consider future radiation to the residual mediastinal mass if necessary.  · Lung lesion too large to radiate in spite of the SVC narrowing.    · On 7/8/2019 we started carboplatin AUC 5 and VP16 100 mg/m2 through peripheral IVs  · Cycle 2 postponed on 7/29/2019 due to heart rate of 154.  Atezolizumab added with cycle #2.  · Radiation can be added for a residual mediastinal mass if necessary  · CT imaging on 8/14/2019 after two cycles of therapy shows significant improvement.  · Mediport anticipated by Dr. Gallardo on 8/22/2019.  · On  8/20/2019 cycle 3 was delayed due to neutropenia and with cycle 3 we did incorporate Neulasta.  · Cycle 4 completed 9/17/2019  · Maintenance atezolizumab initiated 10/8/2019.  PET scan revealed disease response to therapy, he does have an area of hypermetabolic activity in the adrenal gland which is small and did not show up on prior exam.  We will continue to monitor this.  His MRI of the brain is also negative for metastatic disease.  · CT 2/4/2020 with ongoing improvement.  Maintenance atezolizumab pursued.    · CT imaging 5/19/2020 with progression.  Increase in the right hilar/mediastinal mass with enlarging adenopathy.    · Given that the progression is longer than 6 months after completing carbo/VP16, proceed with further carbo/VP16.  · He is here today, 6/16/2020  for scheduled carboplatin -16 with fair tolerance    2.  Chronic hearing loss: History of mastoiditis.  He is not a candidate for cisplatin because of this.  Stable.    3.  Atrial fibrillation.  He was initiated on metoprolol 12.5 mg twice daily.  This did delay port placement.  The patient was taking his metoprolol inconsistently.  For cycle 2 was delayed due to heart rate of 150 7/29/2019.  He was seen by cardiology with instructions to increase his metoprolol to 25 mg twice daily.  Heart rate stable today.  He reports no symptoms.    4.  Tobacco use.  He did utilize a NicoDerm patch while inpatient, though has continued smoking since discharge.  We discussed smoking cessation again today.    5.  SVC syndrome.  Improved after 2 cycles of chemotherapy.    6. Tachycardia related to atrial fibrillation as outlined above  · Heart rate on 7/29/19 was 154.  Cycle 2 was delayed.  · Metoprolol increased to 25 mg twice daily with improvement in heart rate.    7. Fatigue: Stable.  Monitor.  Follow-up thyroid studies.      PLAN:   1. Continue with carbo/VP16 as scheduled today..  2. He will return as already scheduled on June 30, 2020 for CT scans to be reviewed with Dr. Morel on July 7, 2020 in anticipation of his next treatment.  3. Consider radiation to the mass as well  4. We will continue to monitor his neuropathy  5. He knows to call the office with any new or worsening symptoms before his next scheduled visits..        BERTO Lovell

## 2020-06-17 ENCOUNTER — INFUSION (OUTPATIENT)
Dept: ONCOLOGY | Facility: HOSPITAL | Age: 70
End: 2020-06-17

## 2020-06-17 VITALS
TEMPERATURE: 98.7 F | BODY MASS INDEX: 29.15 KG/M2 | DIASTOLIC BLOOD PRESSURE: 69 MMHG | SYSTOLIC BLOOD PRESSURE: 133 MMHG | RESPIRATION RATE: 18 BRPM | WEIGHT: 196.8 LBS | OXYGEN SATURATION: 95 % | HEART RATE: 77 BPM

## 2020-06-17 DIAGNOSIS — Z45.2 ENCOUNTER FOR FITTING AND ADJUSTMENT OF VASCULAR CATHETER: ICD-10-CM

## 2020-06-17 DIAGNOSIS — C34.90 SMALL CELL LUNG CANCER (HCC): ICD-10-CM

## 2020-06-17 DIAGNOSIS — Z79.899 HIGH RISK MEDICATION USE: Primary | ICD-10-CM

## 2020-06-17 PROCEDURE — 63710000001 PROCHLORPERAZINE MALEATE PER 5 MG: Performed by: NURSE PRACTITIONER

## 2020-06-17 PROCEDURE — 25010000002 ETOPOSIDE 100 MG/5ML SOLUTION 5 ML VIAL: Performed by: NURSE PRACTITIONER

## 2020-06-17 PROCEDURE — 96413 CHEMO IV INFUSION 1 HR: CPT

## 2020-06-17 PROCEDURE — 25010000003 HEPARIN LOCK FLUSH PER 10 UNITS: Performed by: INTERNAL MEDICINE

## 2020-06-17 RX ORDER — SODIUM CHLORIDE 0.9 % (FLUSH) 0.9 %
10 SYRINGE (ML) INJECTION AS NEEDED
Status: CANCELLED | OUTPATIENT
Start: 2020-06-17

## 2020-06-17 RX ORDER — HEPARIN SODIUM (PORCINE) LOCK FLUSH IV SOLN 100 UNIT/ML 100 UNIT/ML
500 SOLUTION INTRAVENOUS AS NEEDED
Status: CANCELLED | OUTPATIENT
Start: 2020-06-17

## 2020-06-17 RX ORDER — SODIUM CHLORIDE 9 MG/ML
250 INJECTION, SOLUTION INTRAVENOUS ONCE
Status: COMPLETED | OUTPATIENT
Start: 2020-06-17 | End: 2020-06-17

## 2020-06-17 RX ORDER — HEPARIN SODIUM (PORCINE) LOCK FLUSH IV SOLN 100 UNIT/ML 100 UNIT/ML
500 SOLUTION INTRAVENOUS AS NEEDED
Status: DISCONTINUED | OUTPATIENT
Start: 2020-06-17 | End: 2020-06-17 | Stop reason: HOSPADM

## 2020-06-17 RX ORDER — PROCHLORPERAZINE MALEATE 5 MG/1
10 TABLET ORAL ONCE
Status: COMPLETED | OUTPATIENT
Start: 2020-06-17 | End: 2020-06-17

## 2020-06-17 RX ADMIN — SODIUM CHLORIDE, PRESERVATIVE FREE 500 UNITS: 5 INJECTION INTRAVENOUS at 14:30

## 2020-06-17 RX ADMIN — ETOPOSIDE 210 MG: 20 INJECTION, SOLUTION, CONCENTRATE INTRAVENOUS at 13:20

## 2020-06-17 RX ADMIN — SODIUM CHLORIDE 250 ML: 9 INJECTION, SOLUTION INTRAVENOUS at 13:16

## 2020-06-17 RX ADMIN — PROCHLORPERAZINE MALEATE 10 MG: 5 TABLET ORAL at 13:17

## 2020-06-18 ENCOUNTER — INFUSION (OUTPATIENT)
Dept: ONCOLOGY | Facility: HOSPITAL | Age: 70
End: 2020-06-18

## 2020-06-18 VITALS
BODY MASS INDEX: 29.47 KG/M2 | SYSTOLIC BLOOD PRESSURE: 169 MMHG | WEIGHT: 199 LBS | DIASTOLIC BLOOD PRESSURE: 87 MMHG | TEMPERATURE: 99.5 F | OXYGEN SATURATION: 96 % | HEART RATE: 73 BPM

## 2020-06-18 DIAGNOSIS — C34.90 SMALL CELL LUNG CANCER (HCC): ICD-10-CM

## 2020-06-18 DIAGNOSIS — Z79.899 HIGH RISK MEDICATION USE: Primary | ICD-10-CM

## 2020-06-18 DIAGNOSIS — Z45.2 ENCOUNTER FOR FITTING AND ADJUSTMENT OF VASCULAR CATHETER: ICD-10-CM

## 2020-06-18 PROCEDURE — 63710000001 PROCHLORPERAZINE MALEATE PER 5 MG: Performed by: INTERNAL MEDICINE

## 2020-06-18 PROCEDURE — 25010000002 ETOPOSIDE 500 MG/25ML SOLUTION 25 ML VIAL: Performed by: NURSE PRACTITIONER

## 2020-06-18 PROCEDURE — 25010000003 HEPARIN LOCK FLUSH PER 10 UNITS: Performed by: INTERNAL MEDICINE

## 2020-06-18 PROCEDURE — 96413 CHEMO IV INFUSION 1 HR: CPT

## 2020-06-18 RX ORDER — PROCHLORPERAZINE MALEATE 5 MG/1
10 TABLET ORAL ONCE
Status: COMPLETED | OUTPATIENT
Start: 2020-06-18 | End: 2020-06-18

## 2020-06-18 RX ORDER — HEPARIN SODIUM (PORCINE) LOCK FLUSH IV SOLN 100 UNIT/ML 100 UNIT/ML
500 SOLUTION INTRAVENOUS AS NEEDED
Status: DISCONTINUED | OUTPATIENT
Start: 2020-06-18 | End: 2020-06-18 | Stop reason: HOSPADM

## 2020-06-18 RX ORDER — HEPARIN SODIUM (PORCINE) LOCK FLUSH IV SOLN 100 UNIT/ML 100 UNIT/ML
500 SOLUTION INTRAVENOUS AS NEEDED
Status: CANCELLED | OUTPATIENT
Start: 2020-06-18

## 2020-06-18 RX ORDER — SODIUM CHLORIDE 0.9 % (FLUSH) 0.9 %
10 SYRINGE (ML) INJECTION AS NEEDED
Status: CANCELLED | OUTPATIENT
Start: 2020-06-18

## 2020-06-18 RX ORDER — SODIUM CHLORIDE 9 MG/ML
250 INJECTION, SOLUTION INTRAVENOUS ONCE
Status: COMPLETED | OUTPATIENT
Start: 2020-06-18 | End: 2020-06-18

## 2020-06-18 RX ORDER — SODIUM CHLORIDE 0.9 % (FLUSH) 0.9 %
10 SYRINGE (ML) INJECTION AS NEEDED
Status: DISCONTINUED | OUTPATIENT
Start: 2020-06-18 | End: 2020-06-18 | Stop reason: HOSPADM

## 2020-06-18 RX ADMIN — Medication 500 UNITS: at 14:16

## 2020-06-18 RX ADMIN — SODIUM CHLORIDE, PRESERVATIVE FREE 10 ML: 5 INJECTION INTRAVENOUS at 14:16

## 2020-06-18 RX ADMIN — SODIUM CHLORIDE 250 ML: 9 INJECTION, SOLUTION INTRAVENOUS at 12:54

## 2020-06-18 RX ADMIN — ETOPOSIDE 210 MG: 20 INJECTION, SOLUTION, CONCENTRATE INTRAVENOUS at 13:06

## 2020-06-18 RX ADMIN — PROCHLORPERAZINE MALEATE 10 MG: 5 TABLET ORAL at 12:54

## 2020-06-30 ENCOUNTER — APPOINTMENT (OUTPATIENT)
Dept: CT IMAGING | Facility: HOSPITAL | Age: 70
End: 2020-06-30

## 2020-06-30 ENCOUNTER — HOSPITAL ENCOUNTER (OUTPATIENT)
Dept: PET IMAGING | Facility: HOSPITAL | Age: 70
Discharge: HOME | End: 2020-06-30

## 2020-06-30 ENCOUNTER — HOSPITAL ENCOUNTER (OUTPATIENT)
Dept: MRI IMAGING | Facility: HOSPITAL | Age: 70
Discharge: HOME | End: 2020-06-30
Admitting: INTERNAL MEDICINE

## 2020-06-30 ENCOUNTER — APPOINTMENT (OUTPATIENT)
Dept: PET IMAGING | Facility: HOSPITAL | Age: 70
End: 2020-06-30

## 2020-06-30 DIAGNOSIS — C34.90 SMALL CELL LUNG CANCER (HCC): Chronic | ICD-10-CM

## 2020-06-30 DIAGNOSIS — C34.90 SMALL CELL LUNG CANCER (HCC): ICD-10-CM

## 2020-06-30 DIAGNOSIS — C34.2 MALIGNANT NEOPLASM OF MIDDLE LOBE, BRONCHUS OR LUNG (HCC): ICD-10-CM

## 2020-06-30 LAB — CREAT BLDA-MCNC: 0.9 MG/DL (ref 0.6–1.3)

## 2020-06-30 PROCEDURE — 82565 ASSAY OF CREATININE: CPT

## 2020-06-30 PROCEDURE — A9577 INJ MULTIHANCE: HCPCS | Performed by: INTERNAL MEDICINE

## 2020-06-30 PROCEDURE — 0 GADOBENATE DIMEGLUMINE 529 MG/ML SOLUTION: Performed by: INTERNAL MEDICINE

## 2020-06-30 PROCEDURE — 70553 MRI BRAIN STEM W/O & W/DYE: CPT

## 2020-06-30 RX ADMIN — GADOBENATE DIMEGLUMINE 18 ML: 529 INJECTION, SOLUTION INTRAVENOUS at 13:10

## 2020-07-01 ENCOUNTER — HOSPITAL ENCOUNTER (OUTPATIENT)
Dept: PET IMAGING | Facility: HOSPITAL | Age: 70
Discharge: HOME | End: 2020-07-01

## 2020-07-01 ENCOUNTER — HOSPITAL ENCOUNTER (OUTPATIENT)
Dept: PET IMAGING | Facility: HOSPITAL | Age: 70
Discharge: HOME | End: 2020-07-01
Admitting: NURSE PRACTITIONER

## 2020-07-01 DIAGNOSIS — Z79.899 HIGH RISK MEDICATION USE: ICD-10-CM

## 2020-07-01 DIAGNOSIS — C34.90 SMALL CELL LUNG CANCER (HCC): ICD-10-CM

## 2020-07-01 LAB — GLUCOSE BLDC GLUCOMTR-MCNC: 104 MG/DL (ref 70–130)

## 2020-07-01 PROCEDURE — 78815 PET IMAGE W/CT SKULL-THIGH: CPT

## 2020-07-01 PROCEDURE — 0 FLUDEOXYGLUCOSE F18 SOLUTION: Performed by: INTERNAL MEDICINE

## 2020-07-01 PROCEDURE — 82962 GLUCOSE BLOOD TEST: CPT

## 2020-07-01 PROCEDURE — A9552 F18 FDG: HCPCS | Performed by: INTERNAL MEDICINE

## 2020-07-01 RX ADMIN — FLUDEOXYGLUCOSE F18 1 DOSE: 300 INJECTION INTRAVENOUS at 08:45

## 2020-07-07 ENCOUNTER — INFUSION (OUTPATIENT)
Dept: ONCOLOGY | Facility: HOSPITAL | Age: 70
End: 2020-07-07

## 2020-07-07 ENCOUNTER — OFFICE VISIT (OUTPATIENT)
Dept: ONCOLOGY | Facility: CLINIC | Age: 70
End: 2020-07-07

## 2020-07-07 VITALS
RESPIRATION RATE: 16 BRPM | TEMPERATURE: 99 F | DIASTOLIC BLOOD PRESSURE: 77 MMHG | HEIGHT: 69 IN | BODY MASS INDEX: 28.93 KG/M2 | OXYGEN SATURATION: 96 % | SYSTOLIC BLOOD PRESSURE: 138 MMHG | WEIGHT: 195.3 LBS | HEART RATE: 89 BPM

## 2020-07-07 DIAGNOSIS — Z79.899 HIGH RISK MEDICATION USE: Primary | ICD-10-CM

## 2020-07-07 DIAGNOSIS — Z45.2 ENCOUNTER FOR FITTING AND ADJUSTMENT OF VASCULAR CATHETER: ICD-10-CM

## 2020-07-07 DIAGNOSIS — C34.90 SMALL CELL LUNG CANCER (HCC): ICD-10-CM

## 2020-07-07 DIAGNOSIS — Z79.899 HIGH RISK MEDICATION USE: ICD-10-CM

## 2020-07-07 DIAGNOSIS — C34.90 SMALL CELL LUNG CANCER (HCC): Primary | Chronic | ICD-10-CM

## 2020-07-07 LAB
ALBUMIN SERPL-MCNC: 4.1 G/DL (ref 3.5–5.2)
ALBUMIN/GLOB SERPL: 1.3 G/DL (ref 1.1–2.4)
ALP SERPL-CCNC: 82 U/L (ref 38–116)
ALT SERPL W P-5'-P-CCNC: 21 U/L (ref 0–41)
ANION GAP SERPL CALCULATED.3IONS-SCNC: 11.3 MMOL/L (ref 5–15)
AST SERPL-CCNC: 22 U/L (ref 0–40)
BASOPHILS # BLD AUTO: 0.04 10*3/MM3 (ref 0–0.2)
BASOPHILS NFR BLD AUTO: 1 % (ref 0–1.5)
BILIRUB SERPL-MCNC: 0.3 MG/DL (ref 0.2–1.2)
BUN SERPL-MCNC: 7 MG/DL (ref 6–20)
BUN/CREAT SERPL: 7.4 (ref 7.3–30)
CALCIUM SPEC-SCNC: 9.5 MG/DL (ref 8.5–10.2)
CHLORIDE SERPL-SCNC: 100 MMOL/L (ref 98–107)
CO2 SERPL-SCNC: 24.7 MMOL/L (ref 22–29)
CREAT SERPL-MCNC: 0.95 MG/DL (ref 0.7–1.3)
DEPRECATED RDW RBC AUTO: 49.7 FL (ref 37–54)
EOSINOPHIL # BLD AUTO: 0.1 10*3/MM3 (ref 0–0.4)
EOSINOPHIL NFR BLD AUTO: 2.5 % (ref 0.3–6.2)
ERYTHROCYTE [DISTWIDTH] IN BLOOD BY AUTOMATED COUNT: 15.9 % (ref 12.3–15.4)
GFR SERPL CREATININE-BSD FRML MDRD: 79 ML/MIN/1.73
GLOBULIN UR ELPH-MCNC: 3.1 GM/DL (ref 1.8–3.5)
GLUCOSE SERPL-MCNC: 157 MG/DL (ref 74–124)
HCT VFR BLD AUTO: 37.8 % (ref 37.5–51)
HGB BLD-MCNC: 12.7 G/DL (ref 13–17.7)
IMM GRANULOCYTES # BLD AUTO: 0.03 10*3/MM3 (ref 0–0.05)
IMM GRANULOCYTES NFR BLD AUTO: 0.8 % (ref 0–0.5)
LYMPHOCYTES # BLD AUTO: 1.09 10*3/MM3 (ref 0.7–3.1)
LYMPHOCYTES NFR BLD AUTO: 27.7 % (ref 19.6–45.3)
MCH RBC QN AUTO: 33 PG (ref 26.6–33)
MCHC RBC AUTO-ENTMCNC: 33.6 G/DL (ref 31.5–35.7)
MCV RBC AUTO: 98.2 FL (ref 79–97)
MONOCYTES # BLD AUTO: 0.69 10*3/MM3 (ref 0.1–0.9)
MONOCYTES NFR BLD AUTO: 17.6 % (ref 5–12)
NEUTROPHILS NFR BLD AUTO: 1.98 10*3/MM3 (ref 1.7–7)
NEUTROPHILS NFR BLD AUTO: 50.4 % (ref 42.7–76)
NRBC BLD AUTO-RTO: 0 /100 WBC (ref 0–0.2)
PLATELET # BLD AUTO: 135 10*3/MM3 (ref 140–450)
PMV BLD AUTO: 10.1 FL (ref 6–12)
POTASSIUM SERPL-SCNC: 4 MMOL/L (ref 3.5–4.7)
PROT SERPL-MCNC: 7.2 G/DL (ref 6.3–8)
RBC # BLD AUTO: 3.85 10*6/MM3 (ref 4.14–5.8)
SODIUM SERPL-SCNC: 136 MMOL/L (ref 134–145)
WBC # BLD AUTO: 3.93 10*3/MM3 (ref 3.4–10.8)

## 2020-07-07 PROCEDURE — 25010000002 FOSAPREPITANT PER 1 MG: Performed by: INTERNAL MEDICINE

## 2020-07-07 PROCEDURE — 25010000002 PALONOSETRON PER 25 MCG: Performed by: INTERNAL MEDICINE

## 2020-07-07 PROCEDURE — 25010000003 HEPARIN LOCK FLUSH PER 10 UNITS: Performed by: INTERNAL MEDICINE

## 2020-07-07 PROCEDURE — 25010000002 ETOPOSIDE 500 MG/25ML SOLUTION 25 ML VIAL: Performed by: INTERNAL MEDICINE

## 2020-07-07 PROCEDURE — 96417 CHEMO IV INFUS EACH ADDL SEQ: CPT

## 2020-07-07 PROCEDURE — 99214 OFFICE O/P EST MOD 30 MIN: CPT | Performed by: INTERNAL MEDICINE

## 2020-07-07 PROCEDURE — 96367 TX/PROPH/DG ADDL SEQ IV INF: CPT

## 2020-07-07 PROCEDURE — 25010000002 CARBOPLATIN PER 50 MG: Performed by: INTERNAL MEDICINE

## 2020-07-07 PROCEDURE — 25010000002 DEXAMETHASONE SODIUM PHOSPHATE 100 MG/10ML SOLUTION: Performed by: INTERNAL MEDICINE

## 2020-07-07 PROCEDURE — 85025 COMPLETE CBC W/AUTO DIFF WBC: CPT

## 2020-07-07 PROCEDURE — 96413 CHEMO IV INFUSION 1 HR: CPT

## 2020-07-07 PROCEDURE — 96375 TX/PRO/DX INJ NEW DRUG ADDON: CPT

## 2020-07-07 PROCEDURE — 80053 COMPREHEN METABOLIC PANEL: CPT

## 2020-07-07 RX ORDER — PROCHLORPERAZINE MALEATE 10 MG
10 TABLET ORAL ONCE
Status: CANCELLED | OUTPATIENT
Start: 2020-07-09

## 2020-07-07 RX ORDER — HEPARIN SODIUM (PORCINE) LOCK FLUSH IV SOLN 100 UNIT/ML 100 UNIT/ML
500 SOLUTION INTRAVENOUS AS NEEDED
Status: CANCELLED | OUTPATIENT
Start: 2020-07-07

## 2020-07-07 RX ORDER — PROCHLORPERAZINE MALEATE 10 MG
10 TABLET ORAL ONCE
Status: CANCELLED | OUTPATIENT
Start: 2020-07-08

## 2020-07-07 RX ORDER — SODIUM CHLORIDE 9 MG/ML
250 INJECTION, SOLUTION INTRAVENOUS ONCE
Status: CANCELLED | OUTPATIENT
Start: 2020-07-07

## 2020-07-07 RX ORDER — SODIUM CHLORIDE 9 MG/ML
250 INJECTION, SOLUTION INTRAVENOUS ONCE
Status: COMPLETED | OUTPATIENT
Start: 2020-07-07 | End: 2020-07-07

## 2020-07-07 RX ORDER — DIPHENHYDRAMINE HYDROCHLORIDE 50 MG/ML
50 INJECTION INTRAMUSCULAR; INTRAVENOUS AS NEEDED
Status: CANCELLED | OUTPATIENT
Start: 2020-07-07

## 2020-07-07 RX ORDER — HEPARIN SODIUM (PORCINE) LOCK FLUSH IV SOLN 100 UNIT/ML 100 UNIT/ML
500 SOLUTION INTRAVENOUS AS NEEDED
Status: DISCONTINUED | OUTPATIENT
Start: 2020-07-07 | End: 2020-07-07 | Stop reason: HOSPADM

## 2020-07-07 RX ORDER — PALONOSETRON 0.05 MG/ML
0.25 INJECTION, SOLUTION INTRAVENOUS ONCE
Status: CANCELLED | OUTPATIENT
Start: 2020-07-07

## 2020-07-07 RX ORDER — SODIUM CHLORIDE 9 MG/ML
250 INJECTION, SOLUTION INTRAVENOUS ONCE
Status: CANCELLED | OUTPATIENT
Start: 2020-07-08

## 2020-07-07 RX ORDER — SODIUM CHLORIDE 0.9 % (FLUSH) 0.9 %
10 SYRINGE (ML) INJECTION AS NEEDED
Status: DISCONTINUED | OUTPATIENT
Start: 2020-07-07 | End: 2020-07-07 | Stop reason: HOSPADM

## 2020-07-07 RX ORDER — SODIUM CHLORIDE 0.9 % (FLUSH) 0.9 %
10 SYRINGE (ML) INJECTION AS NEEDED
Status: CANCELLED | OUTPATIENT
Start: 2020-07-07

## 2020-07-07 RX ORDER — FAMOTIDINE 10 MG/ML
20 INJECTION, SOLUTION INTRAVENOUS AS NEEDED
Status: CANCELLED | OUTPATIENT
Start: 2020-07-07

## 2020-07-07 RX ORDER — SODIUM CHLORIDE 9 MG/ML
250 INJECTION, SOLUTION INTRAVENOUS ONCE
Status: CANCELLED | OUTPATIENT
Start: 2020-07-09

## 2020-07-07 RX ORDER — PALONOSETRON 0.05 MG/ML
0.25 INJECTION, SOLUTION INTRAVENOUS ONCE
Status: COMPLETED | OUTPATIENT
Start: 2020-07-07 | End: 2020-07-07

## 2020-07-07 RX ADMIN — DEXAMETHASONE SODIUM PHOSPHATE 12 MG: 10 INJECTION, SOLUTION INTRAMUSCULAR; INTRAVENOUS at 09:29

## 2020-07-07 RX ADMIN — PALONOSETRON HYDROCHLORIDE 0.25 MG: 0.25 INJECTION, SOLUTION INTRAVENOUS at 08:59

## 2020-07-07 RX ADMIN — SODIUM CHLORIDE 250 ML: 900 INJECTION, SOLUTION INTRAVENOUS at 08:59

## 2020-07-07 RX ADMIN — CARBOPLATIN 470 MG: 10 INJECTION, SOLUTION INTRAVENOUS at 10:46

## 2020-07-07 RX ADMIN — ETOPOSIDE 160 MG: 20 INJECTION, SOLUTION, CONCENTRATE INTRAVENOUS at 09:46

## 2020-07-07 RX ADMIN — SODIUM CHLORIDE 100 ML: 9 INJECTION, SOLUTION INTRAVENOUS at 09:00

## 2020-07-07 RX ADMIN — Medication 500 UNITS: at 11:21

## 2020-07-07 RX ADMIN — SODIUM CHLORIDE, PRESERVATIVE FREE 10 ML: 5 INJECTION INTRAVENOUS at 11:21

## 2020-07-07 NOTE — PROGRESS NOTES
REASON FOR FOLLOW UP:   1.  T4N3 Small cell lung cancer  2.  SVC syndrome  3.  Palliative therapy with carboplatin, -16 initiated on 7/8/2019.  Cycle 1 was initiated during a hospitalization.  Atezolizumab was added with cycle #2.  4 cycles of chemotherapy complete as of 9/19/2019.  4.  Port placed on 8/22/2019, delayed due to SVC syndrome  5.  Neutropenia related to therapy requiring the addition of Neulasta with cycle #3.  6.  Single agent maintenance atezolizumab initiated 10/8/2019.  Scan on 10/1/2019 revealed good disease response disease.  There is an area of concern on the adrenal gland which we will continue to monitor.  MRI brain shows no evidence of metastatic disease.  6.  CT imaging 2/4/2020 with ongoing improvement  7.  CT imaging 5/19/2020 with progression.  8.  Carboplatin/VP16 re-initiated on 5/26/2020.  Cycle 2 6/16/2020   9.  Presented at lung conference with PET scan after two cycles planned followed by radiation to a residual right hilar mass if appropriate.  Irinotecan if indicated after that.  10.  PET scan 7/1/2020 with increased uptake in the subcarinal mediastinum with decreasing left hilar and LLL adenopathy, suspected reactive nodular area at the right base, resolution of the left adrenal uptake, intensely FDG avid left parotid nodule at 1 cm (prior biopsy consistent with Warthin's tumor, benign)      HISTORY OF PRESENT ILLNESS:  Julia Smith III is a 70 y.o. male with the above medical history here today for cycle 3 scheduled carboplatin -16.     Complains of fatigue.  He is not very active.  Complains of heartburn.  He is using soda water and Tums for this.  No fevers or chills.  No nausea or vomiting.  No mucositis.    His PET scan shows ongoing uptake at the right hilum.  He will be referred for radiation.        Past Medical History:   Diagnosis Date   • Atrial fibrillation with RVR (CMS/HCC)    • Cancer (CMS/HCC) 06/2019    Right lung   • Chronic cough    • COPD (chronic  obstructive pulmonary disease) (CMS/[facility])    • Hearing loss    • History of shingles    • Lower back pain    • Mastoiditis    • Mild mitral regurgitation    • Other fatigue    • PAF (paroxysmal atrial fibrillation) (CMS/[facility])    • Pneumonia        Past Surgical History:   Procedure Laterality Date   • BRONCHOSCOPY N/A 6/19/2019    Procedure: BRONCHOSCOPY WITH WASHINGS AND BIOPSY AND ENDOBRONCHIAL ULTRASOUND WITH FNA;  Surgeon: Arslan Marquez MD;  Location: [facility] ENDOSCOPY;  Service: Pulmonary   • INNER EAR SURGERY Left    • MASTOIDECTOMY Left 1994   • VENOUS ACCESS DEVICE (PORT) INSERTION Right 8/22/2019    Procedure: INSERTION OF PORTACATH;  Surgeon: Ramila Gallardo MD;  Location: [facility] OR;  Service: Cardiothoracic       SOCIAL HISTORY:   reports that he has been smoking cigarettes. He has a 40.00 pack-year smoking history. He has never used smokeless tobacco. He reports that he drinks about 4.0 - 6.0 standard drinks of alcohol per week. He reports that he has current or past drug history. Drug: Marijuana.    FAMILY HISTORY:  family history includes COPD in his father; Heart disease in his brother; No Known Problems in his brother, brother, brother, mother, and sister.    ALLERGIES:  No Known Allergies    MEDICATIONS:  The medication list has been reviewed with the patient by the medical assistant, and the list has been updated in the electronic medical record, which I reviewed.  Medication dosages and frequencies were confirmed to be accurate.    I have reviewed the patient's medical history in detail and updated the computerized patient record.    Review of Systems   Review of Systems   Constitutional: Positive for fatigue. Negative for activity change, appetite change, chills and fever.   HENT: Negative for mouth sores, nosebleeds and trouble swallowing.    Respiratory: Negative for cough.    Cardiovascular: Negative for chest pain and leg swelling.   Gastrointestinal: Negative for abdominal pain,  "constipation, diarrhea, nausea and vomiting.        Heartburn   Genitourinary: Negative for difficulty urinating.   Musculoskeletal:        Knee pain - chronic unchanged   Skin: Negative for rash.   Neurological: Positive for numbness (minimal). Negative for dizziness.   Hematological: Negative for adenopathy. Does not bruise/bleed easily.   Psychiatric/Behavioral: Negative for sleep disturbance.       Vitals:    07/07/20 0754   BP: 138/77   Pulse: 89   Resp: 16   Temp: 99 °F (37.2 °C)   TempSrc: Oral   SpO2: 96%   Weight: 88.6 kg (195 lb 4.8 oz)   Height: 175 cm (68.9\")   PainSc: 0-No pain  Comment: lung cancer     Physical Exam   Constitutional: He is oriented to person, place, and time. He appears well-developed and well-nourished. No distress.   He is wearing a facemask   HENT:   Head: Normocephalic and atraumatic. Hair is normal.   Mouth/Throat: Oropharynx is clear and moist.   Eyes: Conjunctivae, EOM and lids are normal. Pupils are equal.   Neck: Neck supple. No JVD present. No thyroid mass and no thyromegaly present.   Cardiovascular: Normal rate and regular rhythm.   Pulmonary/Chest: Effort normal and breath sounds normal. No respiratory distress.   Mediport present, benign in appearance   Abdominal: Soft. He exhibits no distension and no mass. There is no splenomegaly or hepatomegaly. There is no tenderness. There is no guarding.   Musculoskeletal: Normal range of motion. He exhibits no edema, tenderness or deformity.   Neurological: He is alert and oriented to person, place, and time. He has normal strength.   Skin: Skin is warm and dry. No rash noted.   Psychiatric: He has a normal mood and affect. His behavior is normal. Judgment and thought content normal. Cognition and memory are normal.   Nursing note and vitals reviewed.  No change from prior except as noted      DIAGNOSTIC DATA:  Results from last 7 days   Lab Units 07/07/20  0758   WBC 10*3/mm3 3.93   NEUTROS ABS 10*3/mm3 1.98   HEMOGLOBIN g/dL " 12.7*   HEMATOCRIT % 37.8   PLATELETS 10*3/mm3 135*     Results from last 7 days   Lab Units 06/30/20  1217   CREATININE mg/dL 0.90           IMAGING:  PET scan 7/1/20020 images personally reviewed.  Significant uptake in the mediastinum and right hilum.      MRI brain 6/30/2020 negative for metastatic disease.    ASSESSMENT:  This is a 70 y.o. male with:  1.   Small cell lung cancer originating in the right hilum:   · He has an extremely large mass there with concern for SVC compression on CT imaging from 6/4/2019.  There is some left hilar adenopathy.  · Some parotid lesions which are concerning but indeterminate, left greater than right.  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  · Staging MRI of the brain 7/10/2019 negative for metastatic disease  · He was discussed at multidisciplinary thoracic conference, with his T4 and 3 disease, concern for involvement of the right supraclavicular region and obvious left hilum, plans to add atezolizumab with cycle 2, we can consider future radiation to the residual mediastinal mass if necessary.  · Lung lesion too large to radiate in spite of the SVC narrowing.    · On 7/8/2019 we started carboplatin AUC 5 and VP16 100 mg/m2 through peripheral IVs  · Cycle 2 postponed on 7/29/2019 due to heart rate of 154.  Atezolizumab added with cycle #2.  · Radiation can be added for a residual mediastinal mass if necessary  · CT imaging on 8/14/2019 after two cycles of therapy shows significant improvement.  · Mediport anticipated by Dr. Gallardo on 8/22/2019.  · On  8/20/2019 cycle 3 was delayed due to neutropenia and with cycle 3 we did incorporate Neulasta.  · Cycle 4 completed 9/17/2019  · Maintenance atezolizumab initiated 10/8/2019.  PET scan revealed disease response to therapy, he does have an area of hypermetabolic  activity in the adrenal gland which is small and did not show up on prior exam.  We will continue to monitor this.  His MRI of the brain is also negative for metastatic disease.  · CT 2/4/2020 with ongoing improvement.  Maintenance atezolizumab pursued.    · CT imaging 5/19/2020 with progression.  Increase in the right hilar/mediastinal mass with enlarging adenopathy.    · Given that the progression is longer than 6 months after completing carbo/VP16, proceed with further carbo/VP16.  · Cycle 1 initiated 5/26/2020  · PET scan after two cycles with ongoing uptake in the right hilum, otherwise somewhat improved  · Cycle 3 7/7/2020  · Referred for radiation    2.  Chronic hearing loss: History of mastoiditis.  He is not a candidate for cisplatin because of this.  Stable.    3.  Atrial fibrillation.  He was initiated on metoprolol 12.5 mg twice daily.  This did delay port placement.  The patient was taking his metoprolol inconsistently.  For cycle 2 was delayed due to heart rate of 150 7/29/2019.  He was seen by cardiology with instructions to increase his metoprolol to 25 mg twice daily.  Heart rate stable today.  He reports no symptoms.    4.  Tobacco use.  He did utilize a NicoDerm patch while inpatient, though has continued smoking since discharge.  We discussed smoking cessation again today.    5.  SVC syndrome.  Improved after 2 cycles of chemotherapy.    6. Tachycardia related to atrial fibrillation as outlined above  · Heart rate on 7/29/19 was 154.  Cycle 2 was delayed.  · Metoprolol increased to 25 mg twice daily with improvement in heart rate.    7. Fatigue: Stable.  Monitor.  Thyroid studies were normal.    8.  Heartburn: He is using Tums and soda water for this.  I advised a daily over-the-counter PPI if he is interested.      PLAN:   1. Continue with cycle 3 carbo/VP16 as scheduled today.. Blood counts are dropping.  Particularly with considering the addition of radiation and with worsening fatigue,  decrease carboplatin to AUC equals 4 and -16 to 80 mg per metered squared today.  2. Refer for radiation  3. We will continue to monitor his neuropathy  4. Return in three weeks with an NP to consider cycle 4 of therapy if counts and tolerance permit.  If he is receiving radiation therapy at that time, consider omitting chemotherapy and proceeding only with radiation depending on how he is doing.  5. He knows to call the office with any new or worsening symptoms before his next scheduled visits.        Thai Morel MD

## 2020-07-08 ENCOUNTER — INFUSION (OUTPATIENT)
Dept: ONCOLOGY | Facility: HOSPITAL | Age: 70
End: 2020-07-08

## 2020-07-08 VITALS
DIASTOLIC BLOOD PRESSURE: 74 MMHG | SYSTOLIC BLOOD PRESSURE: 136 MMHG | WEIGHT: 195.4 LBS | RESPIRATION RATE: 16 BRPM | OXYGEN SATURATION: 97 % | TEMPERATURE: 98.9 F | HEART RATE: 85 BPM | BODY MASS INDEX: 28.94 KG/M2

## 2020-07-08 DIAGNOSIS — Z79.899 HIGH RISK MEDICATION USE: Primary | ICD-10-CM

## 2020-07-08 DIAGNOSIS — C34.90 SMALL CELL LUNG CANCER (HCC): ICD-10-CM

## 2020-07-08 PROCEDURE — 63710000001 PROCHLORPERAZINE MALEATE PER 5 MG: Performed by: INTERNAL MEDICINE

## 2020-07-08 PROCEDURE — 96413 CHEMO IV INFUSION 1 HR: CPT

## 2020-07-08 PROCEDURE — 25010000002 ETOPOSIDE 500 MG/25ML SOLUTION 25 ML VIAL: Performed by: NURSE PRACTITIONER

## 2020-07-08 RX ORDER — PROCHLORPERAZINE MALEATE 5 MG/1
10 TABLET ORAL ONCE
Status: COMPLETED | OUTPATIENT
Start: 2020-07-08 | End: 2020-07-08

## 2020-07-08 RX ORDER — SODIUM CHLORIDE 9 MG/ML
250 INJECTION, SOLUTION INTRAVENOUS ONCE
Status: COMPLETED | OUTPATIENT
Start: 2020-07-08 | End: 2020-07-08

## 2020-07-08 RX ADMIN — SODIUM CHLORIDE 250 ML: 9 INJECTION, SOLUTION INTRAVENOUS at 12:34

## 2020-07-08 RX ADMIN — PROCHLORPERAZINE MALEATE 10 MG: 5 TABLET ORAL at 12:34

## 2020-07-08 RX ADMIN — ETOPOSIDE 160 MG: 20 INJECTION, SOLUTION, CONCENTRATE INTRAVENOUS at 12:55

## 2020-07-09 ENCOUNTER — INFUSION (OUTPATIENT)
Dept: ONCOLOGY | Facility: HOSPITAL | Age: 70
End: 2020-07-09

## 2020-07-09 VITALS
WEIGHT: 197 LBS | OXYGEN SATURATION: 95 % | TEMPERATURE: 98.2 F | DIASTOLIC BLOOD PRESSURE: 73 MMHG | RESPIRATION RATE: 16 BRPM | HEART RATE: 83 BPM | SYSTOLIC BLOOD PRESSURE: 134 MMHG | BODY MASS INDEX: 29.18 KG/M2

## 2020-07-09 DIAGNOSIS — C34.90 SMALL CELL LUNG CANCER (HCC): ICD-10-CM

## 2020-07-09 DIAGNOSIS — Z79.899 HIGH RISK MEDICATION USE: Primary | ICD-10-CM

## 2020-07-09 PROCEDURE — 63710000001 PROCHLORPERAZINE MALEATE PER 10 MG: Performed by: INTERNAL MEDICINE

## 2020-07-09 PROCEDURE — 96413 CHEMO IV INFUSION 1 HR: CPT

## 2020-07-09 PROCEDURE — 25010000002 ETOPOSIDE 500 MG/25ML SOLUTION 25 ML VIAL: Performed by: NURSE PRACTITIONER

## 2020-07-09 RX ORDER — PROCHLORPERAZINE MALEATE 10 MG
10 TABLET ORAL ONCE
Status: COMPLETED | OUTPATIENT
Start: 2020-07-09 | End: 2020-07-09

## 2020-07-09 RX ORDER — SODIUM CHLORIDE 9 MG/ML
250 INJECTION, SOLUTION INTRAVENOUS ONCE
Status: COMPLETED | OUTPATIENT
Start: 2020-07-09 | End: 2020-07-09

## 2020-07-09 RX ADMIN — PROCHLORPERAZINE MALEATE 10 MG: 10 TABLET ORAL at 12:57

## 2020-07-09 RX ADMIN — SODIUM CHLORIDE 250 ML: 9 INJECTION, SOLUTION INTRAVENOUS at 12:57

## 2020-07-09 RX ADMIN — ETOPOSIDE 160 MG: 20 INJECTION, SOLUTION, CONCENTRATE INTRAVENOUS at 13:13

## 2020-07-15 ENCOUNTER — APPOINTMENT (OUTPATIENT)
Dept: RADIATION ONCOLOGY | Facility: HOSPITAL | Age: 70
End: 2020-07-15

## 2020-07-15 ENCOUNTER — CONSULT (OUTPATIENT)
Dept: RADIATION ONCOLOGY | Facility: HOSPITAL | Age: 70
End: 2020-07-15

## 2020-07-15 VITALS
HEART RATE: 78 BPM | WEIGHT: 196.6 LBS | SYSTOLIC BLOOD PRESSURE: 153 MMHG | OXYGEN SATURATION: 97 % | DIASTOLIC BLOOD PRESSURE: 97 MMHG | TEMPERATURE: 98.7 F | BODY MASS INDEX: 29.12 KG/M2

## 2020-07-15 DIAGNOSIS — C80.1 SMALL CELL CARCINOMA (HCC): Primary | ICD-10-CM

## 2020-07-15 PROCEDURE — 99204 OFFICE O/P NEW MOD 45 MIN: CPT | Performed by: RADIOLOGY

## 2020-07-15 PROCEDURE — 77263 THER RADIOLOGY TX PLNG CPLX: CPT | Performed by: RADIOLOGY

## 2020-07-15 PROCEDURE — 77334 RADIATION TREATMENT AID(S): CPT | Performed by: RADIOLOGY

## 2020-07-15 PROCEDURE — G0463 HOSPITAL OUTPT CLINIC VISIT: HCPCS | Performed by: RADIOLOGY

## 2020-07-15 PROCEDURE — 77470 SPECIAL RADIATION TREATMENT: CPT | Performed by: RADIOLOGY

## 2020-07-15 PROCEDURE — 99407 BEHAV CHNG SMOKING > 10 MIN: CPT | Performed by: RADIOLOGY

## 2020-07-15 NOTE — PROGRESS NOTES
DIAGNOSIS and REASON FOR CONSULTATION:  Small cell lung cancer (CMS/HCC)   - for advice and recommendations regarding the diagnosis    CHIEF COMPLAINT:  For advice and recommendations regarding Small cell carcinoma (CMS/HCC)  HISTORY OF PRESENT ILLNESS:  The patient is a 70 y.o. year old male who presented in June, 2019 with hemoptysis and weight loss. CT of the chest on June 4, 2019 showed a large mass in the mediastinum and right hilum measuring 12.3 x 8.7 x 13 cm, with narrowing of the right mainstem bronchus and occulusion of the right upper lobe and right interlobar bronchus, with extension to the left mediastinum and hilum, causing SVC narrowing with collateral vessels noted. Also noted is a 1.4 cm left lower lobe and a right pleural effusion. Bronchoscopy on June 19, 2019 showed small cell carcinoma from a right mainstem endobronchial mass, station 7 node and station 10R node.     PET scan on July 1, 2019 showed increased uptake in a 1.1 cm left parotid gland nodule and a smaller nodule in the right parotid gland, SUV of 22.9 n the large central chest mass, measuring 11.5 in greatest dimension with again extension into the right middle and lower lobes, near and complete stenosis of right sided bronchi and stenosis of proximal left main bronchus, multiple subcentimeter lung nodules in the right lung, hypermetabolic left infrahilar node measuring 1.1 cm iwht an SUV of 6.3, left perihilar lung nodule with an SUV of 13.6 measuring 1.4 cm, enlarged node adjacent to the GE junction measuring 1.2 cm without hypermetabolism.  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    He was started immediately on chemotherapy with carboplatin and etoposide with the addition of atezolizumab with cycle 2.  He had a very nice early response with significant improvement in his shortness of breath and SVC, allowing the  port placement after 2 cycles.  He completed 4 cycles on September 19, 2019 and PET scan on October 1, 2019 showed decrease in the subcarinal lymphadenopathy measuring approximately 3.2 cm in AP diameter, decrease in the remaining mediastinal and right hilar lymphadenopathy but still with narrowing of the right middle lobe bronchus.  Significant decrease was noted in the nodule along the left lower lobe bronchovascular bundle.  No new evidence of disease was noted.  MRI of the brain on the same date showed no evidence of metastatic disease.    Therefore he continued on treatment with atezolizumab only and his next set of imaging on February 4, 2020 showed again a poorly defined mass involving the mediastinum and right hilum measuring approximately 2.1 x 5.9 cm, significantly decreased and no new evidence of metastatic disease.  Therefore he continued on with treatment with atezolizumab but imaging on May 19, 2020 unfortunately showed an increase in the size of the mediastinal mass now measuring 7 x 4 cm, obstructing the right lower lobe and right middle lobe segmental bronchi, contiguous with the subcarinal mass which was also increased from 2.1 to 3.5 cm, exerting mass-effect on the posterior superior wall of the right atrium and extending into the subcarinal and paraesophageal region.  Increase was noted in a 7 mm node anterior to the SVC and several borderline enlarged AP window nodes were noted.  No other evidence of metastatic disease was noted.    Given this evidence of progression, carboplatin/-16 was reinitiated on May 26, 2020 with initiation of his second cycle on June 16, 2020.  MRI of the brain on June 30, 2020 continues to show no evidence of metastatic disease and finally PET scan on July 1, 2020 showed the 1 cm left parotid gland which was stable, hypermetabolism within the large mame conglomerate centered within the subcarinal aspect of the mediastinum with extension into the right hilum with an  SUV of 20.8, increased from 6.2.  Moderate activity was noted extending along the right hilum with an SUV of 4.8 measuring 6.8 x 4.1 cm.  Mass continues to compress the mainstem bronchus.     He received his third cycle of carbo/-16 on July 7, 2020 and I was asked to see the patient at the request of the referring provider noted above for advice and recommendations regarding this diagnosis.     Clinically, he is doing fairly well. He states his SOA is stable and he denies any significant cough or dysphagia. He continues to smoke about 11/2 ppd and is not terribly interested in quitting despite numerous options discussed. He has cut down from at least 2ppd.    Performance Status: (1) Restricted in physically strenuous activity, ambulatory and able to do work of light nature  Objective   Past Medical History: he  has a past medical history of Atrial fibrillation with RVR (CMS/[facility]), Cancer (CMS/[facility]) (06/2019), Chronic cough, COPD (chronic obstructive pulmonary disease) (CMS/[facility]), Hearing loss, History of shingles, Lower back pain, Mastoiditis, Mild mitral regurgitation, Other fatigue, PAF (paroxysmal atrial fibrillation) (CMS/[facility]), and Pneumonia.    Past Surgical History:  he has a past surgical history that includes Inner ear surgery (Left); Mastoidectomy (Left, 1994); Bronchoscopy (N/A, 6/19/2019); and Venous Access Device (Port) (Right, 8/22/2019).    Meds:    Current Outpatient Medications:   •  aspirin 81 MG tablet, Take 1 tablet by mouth Daily., Disp: 30 tablet, Rfl: 11  •  metoprolol tartrate (LOPRESSOR) 25 MG tablet, TAKE 1 TABLET BY MOUTH TWO TIMES A DAY, Disp: 180 tablet, Rfl: 0  •  ondansetron (ZOFRAN) 8 MG tablet, TAKE 1 TABLET BY MOUTH THREE TIMES A DAY AS NEEDED FOR NAUSEA AND VOMITING, Disp: , Rfl:   No current facility-administered medications for this visit.     Allergies:  No Known Allergies    Family History:  his family history includes COPD in his father; Heart disease in his brother; No Known  Problems in his brother, brother, brother, mother, and sister.    Social History:  he  reports that he has been smoking cigarettes. He has a 40.00 pack-year smoking history. He has never used smokeless tobacco. He reports that he drinks about 4.0 - 6.0 standard drinks of alcohol per week. He reports that he has current or past drug history. Drug: Marijuana.    Pertinent Findings on   Review of Systems   Constitutional: Positive for fatigue.   HENT:   Positive for hearing loss and tinnitus.    Respiratory: Positive for shortness of breath.    Neurological: Positive for light-headedness.   Hematological: Bruises/bleeds easily.   :     Subjective   Vitals:    07/15/20 0922   BP: 153/97   Pulse: 78   Temp: 98.7 °F (37.1 °C)   TempSrc: Tympanic   SpO2: 97%   Weight: 89.2 kg (196 lb 9.6 oz)   PainSc: 0-No pain       Pertinent Findings on:  Physical Exam   Constitutional: He is oriented to person, place, and time. He appears well-developed and well-nourished.   HENT:   Head: Normocephalic and atraumatic.   Eyes: EOM are normal.   Neck: Normal range of motion.   Pulmonary/Chest: Effort normal.   Abdominal: Soft.   Musculoskeletal: Normal range of motion.   Neurological: He is alert and oriented to person, place, and time.   Skin: Skin is warm and dry.   Psychiatric: He has a normal mood and affect. His behavior is normal. Judgment and thought content normal.   Vitals reviewed.         Assessment: Small cell carcinoma (CMS/HCC)  This assessment comes from my review of the imaging, pathology, physician notes and other pertinent information as mentioned.    Plan:   We reviewed today the details of the pathology and imaging studies up to this point and all questions were answered in that regard. There is a good understanding of the extent and stage of the disease.  We discussed the treatment options as well, specifically chemotherapy and radiation therapy and we discussed the specific recomendations given the extent of disease  in this case.  He understands his bulk of disease at presentation precluded the use of radiation but that his nice response now will allow us to treat the central chest disease and he is anxious to have treatment.    Therefore, we discussed a course of treatment consisting of approximately 6000 cGy in 30 treatments to the above mentioned areas over a 6 week period of time.  We discussed the logistics of the daily treatment as well as our treatment planning process.      We then discussed the acute side effects, specifically mild irritation of the skin in the treatment area and the small likelihood of increased cough, sore throat, dysphagia and decreased appetite and fatigue.  We discussed the anticipated time frame for the resolution of the above-mentioned symptoms. We then discussed the small but possible long-term possibility of radiation pneumonitis, fibrosis and the possibility of being more short of air at the conclusion of the radiation therapy simply from those benign changes.      We again discussed the importance of our treatment planning process in limiting the volume of lung treated as much as possible.  We discussed the imporance of a 4D CT for treatment planning given multiple studies that have shown that tumor motion during the breathing cycle needs to be evaluated and managed when highly conformal radiation techniques are used to treat lung cancer.  We discussed the recommended use of 4D CT scan planning coupled with IMRT in lung cancers as they are associated with improved overall survival and a decreased risk of > grade 3 pneumonitis compared to 3D conformal radiotherapy.  I explained why this patient would benefit and I believe all questions were answered.     We were able to complete that initial treatment planning 4D scan today. I will be fusing the most recent scans on to our treatment planning scan to insure adequate coverage of the involved area. I am anticipating getting the treatments  underway within a week or so.    Additionally, I advised the patient of the risks in continuing to use tobacco and explained that tobacco abuse is extremely harmful -  it significantly increases the risk of cardiovascular disease, atherosclerosis, myocardial infarction, strokes and peripheral vascular disease but also will amplify the acute side effects of the radiation. I strongly advised stopping the tobacco use and we discussed various ways to quit smoking and specific strategies that the patient and I together think might work in this case. We discussed the need to set a quit date, have a plan for times when the patient normally smokes and what to do instead of smoking. He agreed to consider it further.    Objective   I spent greater than 45 minutes in face-to-face time with the patient and 25 minutes of that time were spent in counseling and coordination of care, including review of imaging and pathology; indications, goals, logistics, alternatives and risks - both common and rare - for my recommendations as well as surveillance and potential outcomes.    I spent approximately 12 minutes discussing smoking and tobacco cessation, intensive, greater than 10 minutes.

## 2020-07-16 ENCOUNTER — DOCUMENTATION (OUTPATIENT)
Dept: OTHER | Facility: HOSPITAL | Age: 70
End: 2020-07-16

## 2020-07-16 PROCEDURE — 77338 DESIGN MLC DEVICE FOR IMRT: CPT | Performed by: RADIOLOGY

## 2020-07-16 PROCEDURE — 77300 RADIATION THERAPY DOSE PLAN: CPT | Performed by: RADIOLOGY

## 2020-07-16 PROCEDURE — 77293 RESPIRATOR MOTION MGMT SIMUL: CPT | Performed by: RADIOLOGY

## 2020-07-16 PROCEDURE — 77301 RADIOTHERAPY DOSE PLAN IMRT: CPT | Performed by: RADIOLOGY

## 2020-07-16 NOTE — PROGRESS NOTES
ONCOLOGY SOCIAL WORKER PSYCHOSOCIAL ASSESSMENT    Date:  7/16/2020      Reason for Visit:  To review supportive services and assess for needs    Distress Screening Complete:  Yes (See assessment for details)   DISTRESS SCORE:  4     I.    PHYSICAL:     Diagnosis:   Patient Active Problem List   Diagnosis   • Encounter for screening for malignant neoplasm of colon   • Small cell lung cancer (CMS/HCC)   • Small cell carcinoma (CMS/HCC)   • Atrial fibrillation with RVR (CMS/HCC)   • Pneumonia   • COPD (chronic obstructive pulmonary disease) (CMS/HCC)   • PAF (paroxysmal atrial fibrillation) (CMS/HCC)   • High risk medication use   • Tobacco abuse   • Mild mitral regurgitation   • Daily consumption of alcohol   • Cigarette nicotine dependence without complication   • Encounter for fitting and adjustment of vascular catheter      Date of Diagnosis: June 2019    Recurrent of same type:  No  Recurrence with New Primary:  No,    Date of Previous Diagnosis:    Treatment:  Chemotherapy and Radiation    Other:       II.   FINANCIAL:  Employment Status:  Retired  VA Benefits: No  Source of Income:  Social Security and assisted  Other:    Transportation Issues:  No   Means of Transportation:  Private car  Prescription Drug Coverage:  Yes  Medications Unable to Afford:  unknown  Pharmacy Name/Location:    [id number] - [place], [place] - [address] [phone] [facility] [phone]   [address] [address]  Phone: [phone] Fax: [phone]     III.  SOCIAL:    Marital Status:    Significant Other:  Yes, Patience , his wife   Children under 18:  No  Do the children know about the cancer diagnosis:  Yes - her has 2 adult sons  Does the patient have:  Living Will / Advanced Directive - needs  Home Situation: lives in a two story home with steps to enter.  Lives with his wife.     Patient's Support System: wife and sons    ADLs - Functioning Level at Home:  Independent  Able to (on  own):  Walk, Bathe, Dress and Cook  Current DME:  none  Current Home Health: none  Dialysis:  No   Any Drug Use History:  No  Any cultural/ethical background issues:  No      IV.    MENTAL:    Emotional Status:  Anxious    Mental Status:  Alert and Oriented  Any current psychiatric illness:  No,   History of psychiatric illness:  No,   Family history of psychiatric illness:  N/A,   Current Psychiatrist: none   Current Therapist:  none  Taking any psychiatric medications:  No,   Suicidal Ideation:  No;    Homicidal Ideation:  No;   Coping Skills / Strengths: his laid back attitude and supportive family   DEPRESSION SCALE SCORE (PHQ-9): 3    V.    SPIRITUAL:    Episcopalian Affiliation:  Not assessed   Attend Services Regularly:  N/A  Any spiritual support:  N/A      VI.     GOALS / NEEDS:    Goals: to complete radiation treatments with minimal side effects  Needs: Emotional support   Referrals Made: [facility]     OSW received referral from radiation center to see for psychosocial support and pt fears about re-occurrence and not being alive a year from now.  OSW spoke to patient via telephone.  Completed assessment.  Patient is currently undergoing chemo and radiation for his small cell lung cancer. Explained role of OSW and services we can assist with.  Patient interested in [facility] program - mailed him a brochure. Discussed ways to decrease fears and anxiety surrounding his diagnosis.  Offered supportive counseling.  Patient would like to think about it.  Mailed pt my contact information.      NAINA Conte  07/16/20  15:03

## 2020-07-22 PROCEDURE — 77386: CPT | Performed by: RADIOLOGY

## 2020-07-22 PROCEDURE — 77427 RADIATION TX MANAGEMENT X5: CPT | Performed by: RADIOLOGY

## 2020-07-22 PROCEDURE — 77386 CHG INTENSITY MODULATED RADIATION TX DLVR COMPLEX: CPT | Performed by: RADIOLOGY

## 2020-07-22 PROCEDURE — 77014 CHG CT GUIDANCE RADIATION THERAPY FLDS PLACEMENT: CPT | Performed by: RADIOLOGY

## 2020-07-23 PROCEDURE — 77386 CHG INTENSITY MODULATED RADIATION TX DLVR COMPLEX: CPT | Performed by: RADIOLOGY

## 2020-07-23 PROCEDURE — 77386: CPT | Performed by: RADIOLOGY

## 2020-07-23 PROCEDURE — 77014 CHG CT GUIDANCE RADIATION THERAPY FLDS PLACEMENT: CPT | Performed by: RADIOLOGY

## 2020-07-24 PROCEDURE — 77386 CHG INTENSITY MODULATED RADIATION TX DLVR COMPLEX: CPT | Performed by: RADIOLOGY

## 2020-07-24 PROCEDURE — 77386: CPT | Performed by: RADIOLOGY

## 2020-07-24 PROCEDURE — 77014 CHG CT GUIDANCE RADIATION THERAPY FLDS PLACEMENT: CPT | Performed by: RADIOLOGY

## 2020-07-27 PROCEDURE — 77386: CPT | Performed by: RADIOLOGY

## 2020-07-27 PROCEDURE — 77386 CHG INTENSITY MODULATED RADIATION TX DLVR COMPLEX: CPT | Performed by: RADIOLOGY

## 2020-07-27 PROCEDURE — 77014 CHG CT GUIDANCE RADIATION THERAPY FLDS PLACEMENT: CPT | Performed by: RADIOLOGY

## 2020-07-28 ENCOUNTER — INFUSION (OUTPATIENT)
Dept: ONCOLOGY | Facility: HOSPITAL | Age: 70
End: 2020-07-28

## 2020-07-28 ENCOUNTER — APPOINTMENT (OUTPATIENT)
Dept: ONCOLOGY | Facility: HOSPITAL | Age: 70
End: 2020-07-28

## 2020-07-28 ENCOUNTER — RADIATION ONCOLOGY WEEKLY ASSESSMENT (OUTPATIENT)
Dept: RADIATION ONCOLOGY | Facility: HOSPITAL | Age: 70
End: 2020-07-28

## 2020-07-28 ENCOUNTER — OFFICE VISIT (OUTPATIENT)
Dept: ONCOLOGY | Facility: CLINIC | Age: 70
End: 2020-07-28

## 2020-07-28 VITALS
OXYGEN SATURATION: 97 % | BODY MASS INDEX: 29.15 KG/M2 | DIASTOLIC BLOOD PRESSURE: 84 MMHG | RESPIRATION RATE: 16 BRPM | HEART RATE: 89 BPM | SYSTOLIC BLOOD PRESSURE: 161 MMHG | TEMPERATURE: 98.8 F | HEIGHT: 69 IN | WEIGHT: 196.8 LBS

## 2020-07-28 DIAGNOSIS — C80.1 SMALL CELL CARCINOMA (HCC): Primary | ICD-10-CM

## 2020-07-28 DIAGNOSIS — C34.90 SMALL CELL LUNG CANCER (HCC): ICD-10-CM

## 2020-07-28 DIAGNOSIS — Z79.899 HIGH RISK MEDICATION USE: Primary | ICD-10-CM

## 2020-07-28 DIAGNOSIS — Z79.899 HIGH RISK MEDICATION USE: ICD-10-CM

## 2020-07-28 DIAGNOSIS — Z45.2 ENCOUNTER FOR FITTING AND ADJUSTMENT OF VASCULAR CATHETER: ICD-10-CM

## 2020-07-28 DIAGNOSIS — I48.91 ATRIAL FIBRILLATION WITH RVR (HCC): ICD-10-CM

## 2020-07-28 DIAGNOSIS — C34.90 SMALL CELL LUNG CANCER (HCC): Primary | Chronic | ICD-10-CM

## 2020-07-28 LAB
ALBUMIN SERPL-MCNC: 4.2 G/DL (ref 3.5–5.2)
ALBUMIN/GLOB SERPL: 1.4 G/DL (ref 1.1–2.4)
ALP SERPL-CCNC: 74 U/L (ref 38–116)
ALT SERPL W P-5'-P-CCNC: 24 U/L (ref 0–41)
ANION GAP SERPL CALCULATED.3IONS-SCNC: 8.7 MMOL/L (ref 5–15)
AST SERPL-CCNC: 28 U/L (ref 0–40)
BASOPHILS # BLD AUTO: 0.05 10*3/MM3 (ref 0–0.2)
BASOPHILS NFR BLD AUTO: 1.4 % (ref 0–1.5)
BILIRUB SERPL-MCNC: 0.4 MG/DL (ref 0.2–1.2)
BUN SERPL-MCNC: 12 MG/DL (ref 6–20)
BUN/CREAT SERPL: 11.9 (ref 7.3–30)
CALCIUM SPEC-SCNC: 9.5 MG/DL (ref 8.5–10.2)
CHLORIDE SERPL-SCNC: 102 MMOL/L (ref 98–107)
CO2 SERPL-SCNC: 24.3 MMOL/L (ref 22–29)
CREAT SERPL-MCNC: 1.01 MG/DL (ref 0.7–1.3)
DEPRECATED RDW RBC AUTO: 72.8 FL (ref 37–54)
EOSINOPHIL # BLD AUTO: 0.09 10*3/MM3 (ref 0–0.4)
EOSINOPHIL NFR BLD AUTO: 2.5 % (ref 0.3–6.2)
ERYTHROCYTE [DISTWIDTH] IN BLOOD BY AUTOMATED COUNT: 19.9 % (ref 12.3–15.4)
GFR SERPL CREATININE-BSD FRML MDRD: 73 ML/MIN/1.73
GLOBULIN UR ELPH-MCNC: 3.1 GM/DL (ref 1.8–3.5)
GLUCOSE SERPL-MCNC: 84 MG/DL (ref 74–124)
HCT VFR BLD AUTO: 37.4 % (ref 37.5–51)
HGB BLD-MCNC: 12.5 G/DL (ref 13–17.7)
IMM GRANULOCYTES # BLD AUTO: 0.01 10*3/MM3 (ref 0–0.05)
IMM GRANULOCYTES NFR BLD AUTO: 0.3 % (ref 0–0.5)
LYMPHOCYTES # BLD AUTO: 0.88 10*3/MM3 (ref 0.7–3.1)
LYMPHOCYTES NFR BLD AUTO: 24.8 % (ref 19.6–45.3)
MCH RBC QN AUTO: 34.2 PG (ref 26.6–33)
MCHC RBC AUTO-ENTMCNC: 33.4 G/DL (ref 31.5–35.7)
MCV RBC AUTO: 102.5 FL (ref 79–97)
MONOCYTES # BLD AUTO: 0.75 10*3/MM3 (ref 0.1–0.9)
MONOCYTES NFR BLD AUTO: 21.1 % (ref 5–12)
NEUTROPHILS NFR BLD AUTO: 1.77 10*3/MM3 (ref 1.7–7)
NEUTROPHILS NFR BLD AUTO: 49.9 % (ref 42.7–76)
NRBC BLD AUTO-RTO: 0 /100 WBC (ref 0–0.2)
PLATELET # BLD AUTO: 126 10*3/MM3 (ref 140–450)
PMV BLD AUTO: 9.5 FL (ref 6–12)
POTASSIUM SERPL-SCNC: 4.4 MMOL/L (ref 3.5–4.7)
PROT SERPL-MCNC: 7.3 G/DL (ref 6.3–8)
RBC # BLD AUTO: 3.65 10*6/MM3 (ref 4.14–5.8)
SODIUM SERPL-SCNC: 135 MMOL/L (ref 134–145)
WBC # BLD AUTO: 3.55 10*3/MM3 (ref 3.4–10.8)

## 2020-07-28 PROCEDURE — 77014 CHG CT GUIDANCE RADIATION THERAPY FLDS PLACEMENT: CPT | Performed by: RADIOLOGY

## 2020-07-28 PROCEDURE — 25010000002 ETOPOSIDE 500 MG/25ML SOLUTION 25 ML VIAL: Performed by: NURSE PRACTITIONER

## 2020-07-28 PROCEDURE — 77386: CPT | Performed by: RADIOLOGY

## 2020-07-28 PROCEDURE — 77386 CHG INTENSITY MODULATED RADIATION TX DLVR COMPLEX: CPT | Performed by: RADIOLOGY

## 2020-07-28 PROCEDURE — 77336 RADIATION PHYSICS CONSULT: CPT | Performed by: RADIOLOGY

## 2020-07-28 PROCEDURE — 85025 COMPLETE CBC W/AUTO DIFF WBC: CPT

## 2020-07-28 PROCEDURE — 96367 TX/PROPH/DG ADDL SEQ IV INF: CPT

## 2020-07-28 PROCEDURE — 80053 COMPREHEN METABOLIC PANEL: CPT

## 2020-07-28 PROCEDURE — 96375 TX/PRO/DX INJ NEW DRUG ADDON: CPT

## 2020-07-28 PROCEDURE — 99213 OFFICE O/P EST LOW 20 MIN: CPT | Performed by: NURSE PRACTITIONER

## 2020-07-28 PROCEDURE — 25010000002 CARBOPLATIN PER 50 MG: Performed by: NURSE PRACTITIONER

## 2020-07-28 PROCEDURE — 25010000002 PALONOSETRON PER 25 MCG: Performed by: NURSE PRACTITIONER

## 2020-07-28 PROCEDURE — 25010000002 DEXAMETHASONE SODIUM PHOSPHATE 100 MG/10ML SOLUTION: Performed by: NURSE PRACTITIONER

## 2020-07-28 PROCEDURE — 25010000002 FOSAPREPITANT PER 1 MG: Performed by: NURSE PRACTITIONER

## 2020-07-28 PROCEDURE — 96366 THER/PROPH/DIAG IV INF ADDON: CPT

## 2020-07-28 PROCEDURE — 96417 CHEMO IV INFUS EACH ADDL SEQ: CPT

## 2020-07-28 PROCEDURE — 25010000003 HEPARIN LOCK FLUSH PER 10 UNITS: Performed by: INTERNAL MEDICINE

## 2020-07-28 PROCEDURE — 96413 CHEMO IV INFUSION 1 HR: CPT

## 2020-07-28 RX ORDER — SODIUM CHLORIDE 9 MG/ML
250 INJECTION, SOLUTION INTRAVENOUS ONCE
Status: COMPLETED | OUTPATIENT
Start: 2020-07-28 | End: 2020-07-28

## 2020-07-28 RX ORDER — FAMOTIDINE 10 MG/ML
20 INJECTION, SOLUTION INTRAVENOUS AS NEEDED
Status: CANCELLED | OUTPATIENT
Start: 2020-07-28

## 2020-07-28 RX ORDER — SODIUM CHLORIDE 9 MG/ML
250 INJECTION, SOLUTION INTRAVENOUS ONCE
Status: CANCELLED | OUTPATIENT
Start: 2020-07-29

## 2020-07-28 RX ORDER — DIPHENHYDRAMINE HYDROCHLORIDE 50 MG/ML
50 INJECTION INTRAMUSCULAR; INTRAVENOUS AS NEEDED
Status: CANCELLED | OUTPATIENT
Start: 2020-07-28

## 2020-07-28 RX ORDER — PALONOSETRON 0.05 MG/ML
0.25 INJECTION, SOLUTION INTRAVENOUS ONCE
Status: COMPLETED | OUTPATIENT
Start: 2020-07-28 | End: 2020-07-28

## 2020-07-28 RX ORDER — PROCHLORPERAZINE MALEATE 10 MG
10 TABLET ORAL ONCE
Status: CANCELLED | OUTPATIENT
Start: 2020-07-29

## 2020-07-28 RX ORDER — SODIUM CHLORIDE 0.9 % (FLUSH) 0.9 %
10 SYRINGE (ML) INJECTION AS NEEDED
Status: DISCONTINUED | OUTPATIENT
Start: 2020-07-28 | End: 2020-07-28 | Stop reason: HOSPADM

## 2020-07-28 RX ORDER — HEPARIN SODIUM (PORCINE) LOCK FLUSH IV SOLN 100 UNIT/ML 100 UNIT/ML
500 SOLUTION INTRAVENOUS AS NEEDED
Status: DISCONTINUED | OUTPATIENT
Start: 2020-07-28 | End: 2020-07-28 | Stop reason: HOSPADM

## 2020-07-28 RX ORDER — PROCHLORPERAZINE MALEATE 10 MG
10 TABLET ORAL ONCE
Status: CANCELLED | OUTPATIENT
Start: 2020-07-30

## 2020-07-28 RX ORDER — SODIUM CHLORIDE 0.9 % (FLUSH) 0.9 %
10 SYRINGE (ML) INJECTION AS NEEDED
Status: CANCELLED | OUTPATIENT
Start: 2020-07-28

## 2020-07-28 RX ORDER — SODIUM CHLORIDE 9 MG/ML
250 INJECTION, SOLUTION INTRAVENOUS ONCE
Status: CANCELLED | OUTPATIENT
Start: 2020-07-30

## 2020-07-28 RX ORDER — SODIUM CHLORIDE 9 MG/ML
250 INJECTION, SOLUTION INTRAVENOUS ONCE
Status: CANCELLED | OUTPATIENT
Start: 2020-07-28

## 2020-07-28 RX ORDER — HEPARIN SODIUM (PORCINE) LOCK FLUSH IV SOLN 100 UNIT/ML 100 UNIT/ML
500 SOLUTION INTRAVENOUS AS NEEDED
Status: CANCELLED | OUTPATIENT
Start: 2020-07-28

## 2020-07-28 RX ADMIN — PALONOSETRON 0.25 MG: 0.05 INJECTION, SOLUTION INTRAVENOUS at 14:16

## 2020-07-28 RX ADMIN — ETOPOSIDE 160 MG: 20 INJECTION, SOLUTION, CONCENTRATE INTRAVENOUS at 15:08

## 2020-07-28 RX ADMIN — Medication 500 UNITS: at 16:44

## 2020-07-28 RX ADMIN — SODIUM CHLORIDE, PRESERVATIVE FREE 10 ML: 5 INJECTION INTRAVENOUS at 16:44

## 2020-07-28 RX ADMIN — SODIUM CHLORIDE 100 ML: 9 INJECTION, SOLUTION INTRAVENOUS at 14:32

## 2020-07-28 RX ADMIN — DEXAMETHASONE SODIUM PHOSPHATE 12 MG: 10 INJECTION, SOLUTION INTRAMUSCULAR; INTRAVENOUS at 14:16

## 2020-07-28 RX ADMIN — CARBOPLATIN 450 MG: 10 INJECTION, SOLUTION INTRAVENOUS at 16:07

## 2020-07-28 RX ADMIN — SODIUM CHLORIDE 250 ML: 9 INJECTION, SOLUTION INTRAVENOUS at 14:19

## 2020-07-28 NOTE — PROGRESS NOTES
Physician Weekly Management Note    Diagnosis:     1. Small cell lung cancer (CMS/HCC)     cT4    Reason for Visit:   Radiation (5/30)     Concurrent Chemo:   No    Notes on Treatment course, Films, Medical progress and Plan:  Doing well. No problems or questions, cont on.    Performance Status:  (1) Restricted in physically strenuous activity, ambulatory and able to do work of light nature  Subjective     ROS - Other than as listed above, as follows:  Constitutional - Normal - no complaints of fatigue, denies lack of appetite, fever, night sweats or change in weight.  Neck - Normal - denies neck masses, muscle weakness, neck pain, decreased range of motion or swelling.  Cardiovascular - Normal - denies arrhythmias, chest pain, dyspnea, edema, orthopnea or palpitations.  Respiratory - Normal - denies cough, dyspnea, hemoptysis, hiccoughs, pleuritic chest pain or wheezing.  Gastrointestinal - Normal - no complaints of constipation, abdominal pain, diarrhea, heartburn/dyspepsia, hematemesis, hemorrhoids, melena or GI bleeding, nausea, pain or cramping or vomiting.     There were no vitals filed for this visit.  Objective     PHYSICAL EXAM - Other than as listed above, as follows:  Constitutional - Normal - no evidence of impaired alertness, inadequate appearance, premature or advanced chronologic age, uncooperativeness, altered mood, affect or disorientation.  Neck - Normal - no evidence of tender or enlarged lymph nodes, neck abnormalities, restricted range of motion or enlarged thyroid.  Chest - Normal - no evidence of chest abnormalities, tender or enlarged lymph nodes.  Respiratory - Normal - no evidence of abnormal breat sounds, chest abnormalities on palpation and chest abnormalities on percussion and normal breath sounds.  Hematologic/Lymphatic - Normal - no evidence of tender or enlarged axillary lymph nodes nor tender or enlarged neck nodes.    Technical aspects reviewed:  Weekly OBI approved if applicable?  "Yes  Weekly port films approved?   Yes  Change requests noted if applicable?  No  Patient setup and plan reviewed?  Yes    Chart Reviewed:  Continue current treatment plan?   Yes  Treatment plan change requested?  No    I have reviewed and marked as \"reviewed\" the current medications, allergies and problem list in the patients EMR.  I have reviewed the patient's medical, surgical  history in detail, reviewed any pertinent lab work and updated the computerized patient record if needed.    Patient's Care Team:  Patient Care Team:  Paty Saldana APRN as PCP - General (Family Medicine)  Thai Morel MD as PCP - Claims Attributed  Thai Morel MD as Consulting Physician (Hematology and Oncology)  Annabelle Giang MD as Consulting Physician (Radiation Oncology)    "

## 2020-07-28 NOTE — PROGRESS NOTES
REASON FOR FOLLOW UP:   1.  T4N3 Small cell lung cancer  2.  SVC syndrome  3.  Palliative therapy with carboplatin, -16 initiated on 7/8/2019.  Cycle 1 was initiated during a hospitalization.  Atezolizumab was added with cycle #2.  4 cycles of chemotherapy complete as of 9/19/2019.  4.  Port placed on 8/22/2019, delayed due to SVC syndrome  5.  Neutropenia related to therapy requiring the addition of Neulasta with cycle #3.  6.  Single agent maintenance atezolizumab initiated 10/8/2019.  Scan on 10/1/2019 revealed good disease response disease.  There is an area of concern on the adrenal gland which we will continue to monitor.  MRI brain shows no evidence of metastatic disease.  6.  CT imaging 2/4/2020 with ongoing improvement  7.  CT imaging 5/19/2020 with progression.  8.  Carboplatin/VP16 re-initiated on 5/26/2020.  Cycle 2 6/16/2020   9.  Presented at lung conference with PET scan after two cycles planned followed by radiation to a residual right hilar mass if appropriate.  Irinotecan if indicated after that.  10.  PET scan 7/1/2020 with increased uptake in the subcarinal mediastinum with decreasing left hilar and LLL adenopathy, suspected reactive nodular area at the right base, resolution of the left adrenal uptake, intensely FDG avid left parotid nodule at 1 cm (prior biopsy consistent with Warthin's tumor, benign)      HISTORY OF PRESENT ILLNESS:  Julia Smith III is a 70 y.o. male with the above mentioned history, who returns the office today in anticipation of carboplatin -16.  He recently initiated radiation therapy, having received 5 radiation treatments to his lung mass.  He reports today his shortness of breath and cough are unchanged from his baseline.  He continues to have shortness of breath on exertion.  He denies fevers or chills, signs or symptoms of infection.  He reports he tolerates chemotherapy reasonably well with fatigue for several days following treatment.  He then does improve.  He  continues to have numbness in his feet though states this is unchanged.  His appetite and intake are adequate.  His bowels are moving normally.  He is without nausea or vomiting.    Past Medical History:   Diagnosis Date   • Atrial fibrillation with RVR (CMS/HCC)    • Cancer (CMS/HCC) 06/2019    Right lung   • Chronic cough    • COPD (chronic obstructive pulmonary disease) (CMS/HCC)    • Hearing loss    • History of shingles    • Lower back pain    • Mastoiditis    • Mild mitral regurgitation    • Other fatigue    • PAF (paroxysmal atrial fibrillation) (CMS/[facility])    • Pneumonia        Past Surgical History:   Procedure Laterality Date   • BRONCHOSCOPY N/A 6/19/2019    Procedure: BRONCHOSCOPY WITH WASHINGS AND BIOPSY AND ENDOBRONCHIAL ULTRASOUND WITH FNA;  Surgeon: Arslan Marquez MD;  [facility] ENDOSCOPY;  Service: Pulmonary   • INNER EAR SURGERY Left    • MASTOIDECTOMY Left 1994   • VENOUS ACCESS DEVICE (PORT) INSERTION Right 8/22/2019    Procedure: INSERTION OF PORTACATH;  Surgeon: Ramila Gallardo MD;  Location: [facility] OR;  Service: Cardiothoracic       SOCIAL HISTORY:   reports that he has been smoking cigarettes. He has a 40.00 pack-year smoking history. He has never used smokeless tobacco. He reports that he drinks about 4.0 - 6.0 standard drinks of alcohol per week. He reports that he has current or past drug history. Drug: Marijuana.    FAMILY HISTORY:  family history includes COPD in his father; Heart disease in his brother; No Known Problems in his brother, brother, brother, mother, and sister.    ALLERGIES:  No Known Allergies    MEDICATIONS:  The medication list has been reviewed with the patient by the medical assistant, and the list has been updated in the electronic medical record, which I reviewed.  Medication dosages and frequencies were confirmed to be accurate.    I have reviewed the patient's medical history in detail and updated the computerized patient record.    Review of  "Systems   Review of Systems   Constitutional: Positive for fatigue. Negative for activity change, appetite change, chills and fever.   HENT: Negative for mouth sores, nosebleeds and trouble swallowing.    Respiratory: Positive for shortness of breath (chronic, unchanged). Negative for cough.    Cardiovascular: Negative for chest pain and leg swelling.   Gastrointestinal: Negative for abdominal pain, constipation, diarrhea, nausea and vomiting.        Heartburn   Genitourinary: Negative for difficulty urinating.   Musculoskeletal:        Knee pain - chronic unchanged   Skin: Negative for rash.   Neurological: Positive for numbness (minimal). Negative for dizziness.   Hematological: Negative for adenopathy. Does not bruise/bleed easily.   Psychiatric/Behavioral: Negative for sleep disturbance.   Review of systems 07/28/2020 unchanged from previous office visit except as updated.      Vitals:    07/28/20 1359   BP: 161/84   Pulse: 89   Resp: 16   Temp: 98.8 °F (37.1 °C)   SpO2: 97%   Weight: 89.3 kg (196 lb 12.8 oz)   Height: 175 cm (68.9\")   PainSc: 0-No pain     Physical Exam   Constitutional: He is oriented to person, place, and time. He appears well-developed and well-nourished. No distress.   He is wearing a facemask   HENT:   Head: Normocephalic and atraumatic. Hair is normal.   Mouth/Throat: Oropharynx is clear and moist.   Eyes: Conjunctivae, EOM and lids are normal. Pupils are equal.   Neck: Neck supple. No JVD present. No thyroid mass present.   Cardiovascular: Normal rate and regular rhythm.   Pulmonary/Chest: Effort normal and breath sounds normal. No respiratory distress.   Mediport present, benign in appearance   Abdominal: Soft. He exhibits no distension and no mass. There is no splenomegaly or hepatomegaly. There is no tenderness. There is no guarding.   Musculoskeletal: Normal range of motion. He exhibits no edema, tenderness or deformity.   Neurological: He is alert and oriented to person, place, and " time. He has normal strength.   Skin: Skin is warm and dry. No rash noted.   Psychiatric: He has a normal mood and affect. His behavior is normal. Judgment and thought content normal. Cognition and memory are normal.   Vitals reviewed.  Physical exam 07/28/2020  unchanged from previous office visit except as updated.        DIAGNOSTIC DATA:  Results from last 7 days   Lab Units 07/28/20  1348   WBC 10*3/mm3 3.55   NEUTROS ABS 10*3/mm3 1.77   HEMOGLOBIN g/dL 12.5*   HEMATOCRIT % 37.4*   PLATELETS 10*3/mm3 126*     Results from last 7 days   Lab Units 07/28/20  1348   SODIUM mmol/L 135   POTASSIUM mmol/L 4.4   CHLORIDE mmol/L 102   CO2 mmol/L 24.3   BUN mg/dL 12   CREATININE mg/dL 1.01   CALCIUM mg/dL 9.5   ALBUMIN g/dL 4.20   BILIRUBIN mg/dL 0.4   ALK PHOS U/L 74   ALT (SGPT) U/L 24   AST (SGOT) U/L 28   GLUCOSE mg/dL 84           IMAGING:  PET scan 7/1/20020 images personally reviewed.  Significant uptake in the mediastinum and right hilum.      MRI brain 6/30/2020 negative for metastatic disease.    ASSESSMENT:  This is a 70 y.o. male with:  1.   Small cell lung cancer originating in the right hilum:   · He has an extremely large mass there with concern for SVC compression on CT imaging from 6/4/2019.  There is some left hilar adenopathy.  · Some parotid lesions which are concerning but indeterminate, left greater than right.  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  · Staging MRI of the brain 7/10/2019 negative for metastatic disease  · He was discussed at multidisciplinary thoracic conference, with his T4 and 3 disease, concern for involvement of the right supraclavicular region and obvious left hilum, plans to add atezolizumab with cycle 2, we can consider future radiation to the residual mediastinal mass if necessary.  · Lung lesion too large  to radiate in spite of the SVC narrowing.    · On 7/8/2019 we started carboplatin AUC 5 and VP16 100 mg/m2 through peripheral IVs  · Cycle 2 postponed on 7/29/2019 due to heart rate of 154.  Atezolizumab added with cycle #2.  · Radiation can be added for a residual mediastinal mass if necessary  · CT imaging on 8/14/2019 after two cycles of therapy shows significant improvement.  · Mediport anticipated by Dr. Gallardo on 8/22/2019.  · On  8/20/2019 cycle 3 was delayed due to neutropenia and with cycle 3 we did incorporate Neulasta.  · Cycle 4 completed 9/17/2019  · Maintenance atezolizumab initiated 10/8/2019.  PET scan revealed disease response to therapy, he does have an area of hypermetabolic activity in the adrenal gland which is small and did not show up on prior exam.  We will continue to monitor this.  His MRI of the brain is also negative for metastatic disease.  · CT 2/4/2020 with ongoing improvement.  Maintenance atezolizumab pursued.    · CT imaging 5/19/2020 with progression.  Increase in the right hilar/mediastinal mass with enlarging adenopathy.    · Given that the progression is longer than 6 months after completing carbo/VP16, proceed with further carbo/VP16.  · Cycle 1 initiated 5/26/2020  · PET scan after two cycles with ongoing uptake in the right hilum, otherwise somewhat improved  · Radiation initiated 7/22/2020 with plans for 30 treatments  · Proceed with cycle 4 carboplatin -16 7/28/2020.      2.  Chronic hearing loss: History of mastoiditis.  He is not a candidate for cisplatin because of this.  Stable.    3.  Atrial fibrillation.  He was initiated on metoprolol 12.5 mg twice daily.  This did delay port placement.  The patient was taking his metoprolol inconsistently.  For cycle 2 was delayed due to heart rate of 150 7/29/2019.  He was seen by cardiology with instructions to increase his metoprolol to 25 mg twice daily.  Heart rate stable today.  He reports no symptoms.    4.  Tobacco use.  He  did utilize a NicoDerm patch while inpatient, though has continued smoking since discharge.  We discussed smoking cessation again today.    5.  SVC syndrome.  Improved after 2 cycles of chemotherapy.    6. Tachycardia related to atrial fibrillation as outlined above  · Heart rate on 7/29/19 was 154.  Cycle 2 was delayed.  · Metoprolol increased to 25 mg twice daily with improvement in heart rate.    7. Fatigue: Stable.  Monitor.  Thyroid studies were normal.    8.  Heartburn: He is using Tums and soda water for this.  The patient may require PPI now with a restriction of radiation.  We will continue to monitor.      PLAN:   1. Proceed with cycle 4 carboplatin -16 today  2. Continue radiation under the direction of radiation oncology  3. Return weekly for CBC with nurse review  4. Follow-up with Dr. Morel in 3 weeks in anticipation of cycle 5 carboplatin -16  5. The patient understands we will monitor his counts closely, as he may struggle with pancytopenia while receiving concurrent therapy  6. He knows to call the office with any new or worsening symptoms before his next scheduled visits.      The patient is on a high risk medication requiring close monitoring    Danielle Murguia, APRN  07/28/2020

## 2020-07-29 ENCOUNTER — TELEPHONE (OUTPATIENT)
Dept: ONCOLOGY | Facility: CLINIC | Age: 70
End: 2020-07-29

## 2020-07-29 ENCOUNTER — INFUSION (OUTPATIENT)
Dept: ONCOLOGY | Facility: HOSPITAL | Age: 70
End: 2020-07-29

## 2020-07-29 VITALS
WEIGHT: 197 LBS | DIASTOLIC BLOOD PRESSURE: 82 MMHG | TEMPERATURE: 98.5 F | OXYGEN SATURATION: 94 % | BODY MASS INDEX: 29.18 KG/M2 | RESPIRATION RATE: 18 BRPM | HEART RATE: 94 BPM | SYSTOLIC BLOOD PRESSURE: 141 MMHG

## 2020-07-29 DIAGNOSIS — C34.90 SMALL CELL LUNG CANCER (HCC): ICD-10-CM

## 2020-07-29 DIAGNOSIS — Z79.899 HIGH RISK MEDICATION USE: Primary | ICD-10-CM

## 2020-07-29 DIAGNOSIS — Z45.2 ENCOUNTER FOR FITTING AND ADJUSTMENT OF VASCULAR CATHETER: ICD-10-CM

## 2020-07-29 PROCEDURE — 25010000003 HEPARIN LOCK FLUSH PER 10 UNITS: Performed by: INTERNAL MEDICINE

## 2020-07-29 PROCEDURE — 77427 RADIATION TX MANAGEMENT X5: CPT | Performed by: RADIOLOGY

## 2020-07-29 PROCEDURE — 25010000002 ETOPOSIDE 500 MG/25ML SOLUTION 25 ML VIAL: Performed by: NURSE PRACTITIONER

## 2020-07-29 PROCEDURE — 96413 CHEMO IV INFUSION 1 HR: CPT

## 2020-07-29 PROCEDURE — 77386: CPT | Performed by: RADIOLOGY

## 2020-07-29 PROCEDURE — 77386 CHG INTENSITY MODULATED RADIATION TX DLVR COMPLEX: CPT | Performed by: RADIOLOGY

## 2020-07-29 PROCEDURE — 63710000001 PROCHLORPERAZINE MALEATE PER 5 MG: Performed by: NURSE PRACTITIONER

## 2020-07-29 PROCEDURE — 77014 CHG CT GUIDANCE RADIATION THERAPY FLDS PLACEMENT: CPT | Performed by: RADIOLOGY

## 2020-07-29 RX ORDER — SODIUM CHLORIDE 0.9 % (FLUSH) 0.9 %
10 SYRINGE (ML) INJECTION AS NEEDED
Status: CANCELLED | OUTPATIENT
Start: 2020-07-29

## 2020-07-29 RX ORDER — SODIUM CHLORIDE 9 MG/ML
250 INJECTION, SOLUTION INTRAVENOUS ONCE
Status: COMPLETED | OUTPATIENT
Start: 2020-07-29 | End: 2020-07-29

## 2020-07-29 RX ORDER — PROCHLORPERAZINE MALEATE 5 MG/1
10 TABLET ORAL ONCE
Status: COMPLETED | OUTPATIENT
Start: 2020-07-29 | End: 2020-07-29

## 2020-07-29 RX ORDER — HEPARIN SODIUM (PORCINE) LOCK FLUSH IV SOLN 100 UNIT/ML 100 UNIT/ML
500 SOLUTION INTRAVENOUS AS NEEDED
Status: DISCONTINUED | OUTPATIENT
Start: 2020-07-29 | End: 2020-07-29 | Stop reason: HOSPADM

## 2020-07-29 RX ORDER — HEPARIN SODIUM (PORCINE) LOCK FLUSH IV SOLN 100 UNIT/ML 100 UNIT/ML
500 SOLUTION INTRAVENOUS AS NEEDED
Status: CANCELLED | OUTPATIENT
Start: 2020-07-29

## 2020-07-29 RX ADMIN — PROCHLORPERAZINE MALEATE 10 MG: 5 TABLET ORAL at 14:03

## 2020-07-29 RX ADMIN — Medication 500 UNITS: at 15:11

## 2020-07-29 RX ADMIN — ETOPOSIDE 160 MG: 20 INJECTION, SOLUTION, CONCENTRATE INTRAVENOUS at 14:04

## 2020-07-29 RX ADMIN — SODIUM CHLORIDE 250 ML: 9 INJECTION, SOLUTION INTRAVENOUS at 14:02

## 2020-07-29 NOTE — TELEPHONE ENCOUNTER
[facility] IN RADIATION CALLED TO RESCHEDULE PT'S APPTS THAT NEED LABS.    ON APPTS 8/4 & 8/11 THEY NEED THE  LABS SCHEDULED BEFORE HIS RADIATION APPTS.    PLEASE REVIEW OTHER APPTS AS WELL WHERE LABS ARE NEEDED.    NINFA BEST CALL BACK # [phone]

## 2020-07-29 NOTE — TELEPHONE ENCOUNTER
Called Melody about the labs that are needed before the pt has radiation, we have rescheduled to 2:30 labs pt will go downstairs to get his radiation then come upstairs to get the RN review at 4:00 after his radiation tx

## 2020-07-30 ENCOUNTER — INFUSION (OUTPATIENT)
Dept: ONCOLOGY | Facility: HOSPITAL | Age: 70
End: 2020-07-30

## 2020-07-30 VITALS
SYSTOLIC BLOOD PRESSURE: 181 MMHG | HEART RATE: 85 BPM | TEMPERATURE: 98.8 F | RESPIRATION RATE: 16 BRPM | WEIGHT: 199.4 LBS | OXYGEN SATURATION: 98 % | BODY MASS INDEX: 29.53 KG/M2 | DIASTOLIC BLOOD PRESSURE: 92 MMHG

## 2020-07-30 DIAGNOSIS — Z79.899 HIGH RISK MEDICATION USE: Primary | ICD-10-CM

## 2020-07-30 DIAGNOSIS — C34.90 SMALL CELL LUNG CANCER (HCC): ICD-10-CM

## 2020-07-30 PROCEDURE — 77386: CPT | Performed by: RADIOLOGY

## 2020-07-30 PROCEDURE — 63710000001 PROCHLORPERAZINE MALEATE PER 5 MG: Performed by: NURSE PRACTITIONER

## 2020-07-30 PROCEDURE — 77014 CHG CT GUIDANCE RADIATION THERAPY FLDS PLACEMENT: CPT | Performed by: RADIOLOGY

## 2020-07-30 PROCEDURE — 25010000002 ETOPOSIDE 500 MG/25ML SOLUTION 25 ML VIAL: Performed by: NURSE PRACTITIONER

## 2020-07-30 PROCEDURE — 77386 CHG INTENSITY MODULATED RADIATION TX DLVR COMPLEX: CPT | Performed by: RADIOLOGY

## 2020-07-30 PROCEDURE — 96413 CHEMO IV INFUSION 1 HR: CPT

## 2020-07-30 RX ORDER — PROCHLORPERAZINE MALEATE 5 MG/1
10 TABLET ORAL ONCE
Status: COMPLETED | OUTPATIENT
Start: 2020-07-30 | End: 2020-07-30

## 2020-07-30 RX ORDER — SODIUM CHLORIDE 9 MG/ML
250 INJECTION, SOLUTION INTRAVENOUS ONCE
Status: COMPLETED | OUTPATIENT
Start: 2020-07-30 | End: 2020-07-30

## 2020-07-30 RX ADMIN — SODIUM CHLORIDE 250 ML: 9 INJECTION, SOLUTION INTRAVENOUS at 13:23

## 2020-07-30 RX ADMIN — PROCHLORPERAZINE MALEATE 10 MG: 5 TABLET ORAL at 13:23

## 2020-07-30 RX ADMIN — ETOPOSIDE 160 MG: 20 INJECTION, SOLUTION, CONCENTRATE INTRAVENOUS at 13:42

## 2020-07-31 PROCEDURE — 77386 CHG INTENSITY MODULATED RADIATION TX DLVR COMPLEX: CPT | Performed by: RADIOLOGY

## 2020-07-31 PROCEDURE — 77386: CPT | Performed by: RADIOLOGY

## 2020-07-31 PROCEDURE — 77014 CHG CT GUIDANCE RADIATION THERAPY FLDS PLACEMENT: CPT | Performed by: RADIOLOGY

## 2020-08-01 ENCOUNTER — APPOINTMENT (OUTPATIENT)
Dept: RADIATION ONCOLOGY | Facility: HOSPITAL | Age: 70
End: 2020-08-01

## 2020-08-03 PROCEDURE — 77386 CHG INTENSITY MODULATED RADIATION TX DLVR COMPLEX: CPT | Performed by: RADIOLOGY

## 2020-08-03 PROCEDURE — 77014 CHG CT GUIDANCE RADIATION THERAPY FLDS PLACEMENT: CPT | Performed by: RADIOLOGY

## 2020-08-03 PROCEDURE — 77386: CPT | Performed by: RADIOLOGY

## 2020-08-04 ENCOUNTER — LAB (OUTPATIENT)
Dept: LAB | Facility: HOSPITAL | Age: 70
End: 2020-08-04

## 2020-08-04 ENCOUNTER — CLINICAL SUPPORT (OUTPATIENT)
Dept: ONCOLOGY | Facility: HOSPITAL | Age: 70
End: 2020-08-04

## 2020-08-04 DIAGNOSIS — C34.90 SMALL CELL LUNG CANCER (HCC): Primary | ICD-10-CM

## 2020-08-04 LAB
BASOPHILS # BLD AUTO: 0.06 10*3/MM3 (ref 0–0.2)
BASOPHILS NFR BLD AUTO: 1.8 % (ref 0–1.5)
DEPRECATED RDW RBC AUTO: 68.2 FL (ref 37–54)
EOSINOPHIL # BLD AUTO: 0.02 10*3/MM3 (ref 0–0.4)
EOSINOPHIL NFR BLD AUTO: 0.6 % (ref 0.3–6.2)
ERYTHROCYTE [DISTWIDTH] IN BLOOD BY AUTOMATED COUNT: 19.3 % (ref 12.3–15.4)
HCT VFR BLD AUTO: 35.1 % (ref 37.5–51)
HGB BLD-MCNC: 12.1 G/DL (ref 13–17.7)
IMM GRANULOCYTES # BLD AUTO: 0.06 10*3/MM3 (ref 0–0.05)
IMM GRANULOCYTES NFR BLD AUTO: 1.8 % (ref 0–0.5)
LYMPHOCYTES # BLD AUTO: 0.68 10*3/MM3 (ref 0.7–3.1)
LYMPHOCYTES NFR BLD AUTO: 20.7 % (ref 19.6–45.3)
MCH RBC QN AUTO: 34 PG (ref 26.6–33)
MCHC RBC AUTO-ENTMCNC: 34.5 G/DL (ref 31.5–35.7)
MCV RBC AUTO: 98.6 FL (ref 79–97)
MONOCYTES # BLD AUTO: 0.28 10*3/MM3 (ref 0.1–0.9)
MONOCYTES NFR BLD AUTO: 8.5 % (ref 5–12)
NEUTROPHILS NFR BLD AUTO: 2.19 10*3/MM3 (ref 1.7–7)
NEUTROPHILS NFR BLD AUTO: 66.6 % (ref 42.7–76)
NRBC BLD AUTO-RTO: 0 /100 WBC (ref 0–0.2)
PLATELET # BLD AUTO: 138 10*3/MM3 (ref 140–450)
PMV BLD AUTO: 9.6 FL (ref 6–12)
RBC # BLD AUTO: 3.56 10*6/MM3 (ref 4.14–5.8)
WBC # BLD AUTO: 3.29 10*3/MM3 (ref 3.4–10.8)

## 2020-08-04 PROCEDURE — 36415 COLL VENOUS BLD VENIPUNCTURE: CPT

## 2020-08-04 PROCEDURE — 85025 COMPLETE CBC W/AUTO DIFF WBC: CPT

## 2020-08-04 PROCEDURE — 77386: CPT | Performed by: RADIOLOGY

## 2020-08-04 PROCEDURE — G0463 HOSPITAL OUTPT CLINIC VISIT: HCPCS

## 2020-08-04 PROCEDURE — 77386 CHG INTENSITY MODULATED RADIATION TX DLVR COMPLEX: CPT | Performed by: RADIOLOGY

## 2020-08-04 PROCEDURE — 77014 CHG CT GUIDANCE RADIATION THERAPY FLDS PLACEMENT: CPT | Performed by: RADIOLOGY

## 2020-08-04 PROCEDURE — 77336 RADIATION PHYSICS CONSULT: CPT | Performed by: RADIOLOGY

## 2020-08-04 NOTE — PROGRESS NOTES
CBC reviewed with pt. Counts stable. Pt reports coughing up blood. This started today. He has been undergoing radiation for the past week. He reports that the blood is bright red with some dark red clots. Lungs clear upon auscultation. D/W Yumiko Gracia, NP. Per Yumiko who s/w Dr. Morel, have pt continue to monitor. He will need to hold his baby aspirin until further notice. If amount of blood increases, he will need to go to the ER. Informed pt and he v/u. Advised him to let radiation know about this as well. He v/u.       Lab Results   Component Value Date    WBC 3.29 (L) 08/04/2020    HGB 12.1 (L) 08/04/2020    HCT 35.1 (L) 08/04/2020    MCV 98.6 (H) 08/04/2020     (L) 08/04/2020

## 2020-08-05 PROCEDURE — 77427 RADIATION TX MANAGEMENT X5: CPT | Performed by: RADIOLOGY

## 2020-08-05 PROCEDURE — 77014 CHG CT GUIDANCE RADIATION THERAPY FLDS PLACEMENT: CPT | Performed by: RADIOLOGY

## 2020-08-05 PROCEDURE — 77386: CPT | Performed by: RADIOLOGY

## 2020-08-05 PROCEDURE — 77386 CHG INTENSITY MODULATED RADIATION TX DLVR COMPLEX: CPT | Performed by: RADIOLOGY

## 2020-08-06 ENCOUNTER — RADIATION ONCOLOGY WEEKLY ASSESSMENT (OUTPATIENT)
Dept: RADIATION ONCOLOGY | Facility: HOSPITAL | Age: 70
End: 2020-08-06

## 2020-08-06 DIAGNOSIS — C34.90 SMALL CELL LUNG CANCER (HCC): Primary | Chronic | ICD-10-CM

## 2020-08-06 PROCEDURE — 77014 CHG CT GUIDANCE RADIATION THERAPY FLDS PLACEMENT: CPT | Performed by: RADIOLOGY

## 2020-08-06 PROCEDURE — 77386: CPT | Performed by: RADIOLOGY

## 2020-08-06 PROCEDURE — 77386 CHG INTENSITY MODULATED RADIATION TX DLVR COMPLEX: CPT | Performed by: RADIOLOGY

## 2020-08-06 NOTE — PROGRESS NOTES
Physician Weekly Management Note    Diagnosis:     1. Small cell lung cancer (CMS/HCC)     cT4    Reason for Visit:   Radiation (12/30)     Concurrent Chemo:   No    Notes on Treatment course, Films, Medical progress and Plan:  Doing fairly well. Had a couple episodes of hemoptysis day before yesterday. None since. Cont to watch. No other problems or questions, cont on.    Performance Status:  (1) Restricted in physically strenuous activity, ambulatory and able to do work of light nature  Subjective     ROS - Other than as listed above, as follows:  Constitutional - Normal - no complaints of fatigue, denies lack of appetite, fever, night sweats or change in weight.  Neck - Normal - denies neck masses, muscle weakness, neck pain, decreased range of motion or swelling.  Cardiovascular - Normal - denies arrhythmias, chest pain, dyspnea, edema, orthopnea or palpitations.  Respiratory - Normal - denies cough, dyspnea, hemoptysis, hiccoughs, pleuritic chest pain or wheezing.  Gastrointestinal - Normal - no complaints of constipation, abdominal pain, diarrhea, heartburn/dyspepsia, hematemesis, hemorrhoids, melena or GI bleeding, nausea, pain or cramping or vomiting.     There were no vitals filed for this visit.  Objective     PHYSICAL EXAM - Other than as listed above, as follows:  Constitutional - Normal - no evidence of impaired alertness, inadequate appearance, premature or advanced chronologic age, uncooperativeness, altered mood, affect or disorientation.  Neck - Normal - no evidence of tender or enlarged lymph nodes, neck abnormalities, restricted range of motion or enlarged thyroid.  Chest - Normal - no evidence of chest abnormalities, tender or enlarged lymph nodes.  Respiratory - Normal - no evidence of abnormal breat sounds, chest abnormalities on palpation and chest abnormalities on percussion and normal breath sounds.  Hematologic/Lymphatic - Normal - no evidence of tender or enlarged axillary lymph nodes  "nor tender or enlarged neck nodes.    Technical aspects reviewed:  Weekly OBI approved if applicable? Yes  Weekly port films approved?   Yes  Change requests noted if applicable?  No  Patient setup and plan reviewed?  Yes    Chart Reviewed:  Continue current treatment plan?   Yes  Treatment plan change requested?  No    I have reviewed and marked as \"reviewed\" the current medications, allergies and problem list in the patients EMR.  I have reviewed the patient's medical, surgical  history in detail, reviewed any pertinent lab work and updated the computerized patient record if needed.    Patient's Care Team:  Patient Care Team:  Paty Saldana APRN as PCP - General (Family Medicine)  Thai Morel MD as PCP - Claims Attributed  Thai Morel MD as Consulting Physician (Hematology and Oncology)  Annabelle Giang MD as Consulting Physician (Radiation Oncology)    "

## 2020-08-07 PROCEDURE — 77386: CPT | Performed by: RADIOLOGY

## 2020-08-07 PROCEDURE — 77014 CHG CT GUIDANCE RADIATION THERAPY FLDS PLACEMENT: CPT | Performed by: RADIOLOGY

## 2020-08-07 PROCEDURE — 77386 CHG INTENSITY MODULATED RADIATION TX DLVR COMPLEX: CPT | Performed by: RADIOLOGY

## 2020-08-10 PROCEDURE — 77386 CHG INTENSITY MODULATED RADIATION TX DLVR COMPLEX: CPT | Performed by: RADIOLOGY

## 2020-08-10 PROCEDURE — 77386: CPT | Performed by: RADIOLOGY

## 2020-08-10 PROCEDURE — 77014 CHG CT GUIDANCE RADIATION THERAPY FLDS PLACEMENT: CPT | Performed by: RADIOLOGY

## 2020-08-11 ENCOUNTER — TELEPHONE (OUTPATIENT)
Dept: ONCOLOGY | Facility: CLINIC | Age: 70
End: 2020-08-11

## 2020-08-11 ENCOUNTER — LAB (OUTPATIENT)
Dept: LAB | Facility: HOSPITAL | Age: 70
End: 2020-08-11

## 2020-08-11 ENCOUNTER — CLINICAL SUPPORT (OUTPATIENT)
Dept: ONCOLOGY | Facility: HOSPITAL | Age: 70
End: 2020-08-11

## 2020-08-11 DIAGNOSIS — C34.90 SMALL CELL LUNG CANCER (HCC): Primary | ICD-10-CM

## 2020-08-11 LAB
BASOPHILS # BLD AUTO: 0.02 10*3/MM3 (ref 0–0.2)
BASOPHILS NFR BLD AUTO: 0.8 % (ref 0–1.5)
DEPRECATED RDW RBC AUTO: 69.6 FL (ref 37–54)
EOSINOPHIL # BLD AUTO: 0.03 10*3/MM3 (ref 0–0.4)
EOSINOPHIL NFR BLD AUTO: 1.2 % (ref 0.3–6.2)
ERYTHROCYTE [DISTWIDTH] IN BLOOD BY AUTOMATED COUNT: 19.8 % (ref 12.3–15.4)
HCT VFR BLD AUTO: 31.7 % (ref 37.5–51)
HGB BLD-MCNC: 11 G/DL (ref 13–17.7)
IMM GRANULOCYTES # BLD AUTO: 0.02 10*3/MM3 (ref 0–0.05)
IMM GRANULOCYTES NFR BLD AUTO: 0.8 % (ref 0–0.5)
LYMPHOCYTES # BLD AUTO: 0.71 10*3/MM3 (ref 0.7–3.1)
LYMPHOCYTES NFR BLD AUTO: 28.3 % (ref 19.6–45.3)
MCH RBC QN AUTO: 34.8 PG (ref 26.6–33)
MCHC RBC AUTO-ENTMCNC: 34.7 G/DL (ref 31.5–35.7)
MCV RBC AUTO: 100.3 FL (ref 79–97)
MONOCYTES # BLD AUTO: 0.47 10*3/MM3 (ref 0.1–0.9)
MONOCYTES NFR BLD AUTO: 18.7 % (ref 5–12)
NEUTROPHILS NFR BLD AUTO: 1.26 10*3/MM3 (ref 1.7–7)
NEUTROPHILS NFR BLD AUTO: 50.2 % (ref 42.7–76)
NRBC BLD AUTO-RTO: 0 /100 WBC (ref 0–0.2)
PLATELET # BLD AUTO: 74 10*3/MM3 (ref 140–450)
PMV BLD AUTO: 10.7 FL (ref 6–12)
RBC # BLD AUTO: 3.16 10*6/MM3 (ref 4.14–5.8)
WBC # BLD AUTO: 2.51 10*3/MM3 (ref 3.4–10.8)

## 2020-08-11 PROCEDURE — 77336 RADIATION PHYSICS CONSULT: CPT | Performed by: RADIOLOGY

## 2020-08-11 PROCEDURE — 77386 CHG INTENSITY MODULATED RADIATION TX DLVR COMPLEX: CPT | Performed by: RADIOLOGY

## 2020-08-11 PROCEDURE — 85025 COMPLETE CBC W/AUTO DIFF WBC: CPT

## 2020-08-11 PROCEDURE — 77014 CHG CT GUIDANCE RADIATION THERAPY FLDS PLACEMENT: CPT | Performed by: RADIOLOGY

## 2020-08-11 PROCEDURE — 36415 COLL VENOUS BLD VENIPUNCTURE: CPT

## 2020-08-11 PROCEDURE — G0463 HOSPITAL OUTPT CLINIC VISIT: HCPCS

## 2020-08-11 PROCEDURE — 77386: CPT | Performed by: RADIOLOGY

## 2020-08-11 NOTE — PROGRESS NOTES
Pt here for RN review. Platelets have dropped since last visit. Pt reports feeling well and denies coughing up blood. Since last visit pt has resumed aspirin. Reviewed labs with Dr. Morel. No new orders at this time. Will recheck labs next week. Pt v/u and given copy of labs.     Lab Results   Component Value Date    WBC 2.51 (L) 08/11/2020    HGB 11.0 (L) 08/11/2020    HCT 31.7 (L) 08/11/2020    .3 (H) 08/11/2020    PLT 74 (L) 08/11/2020

## 2020-08-11 NOTE — TELEPHONE ENCOUNTER
Pt called stating he was returning our call from yesterday (8/10). Did not see anything indicating a call was made from our office yesterday. No answer, lvm stating such and asking pt to call back w/ any concerns.

## 2020-08-12 PROCEDURE — 77386: CPT | Performed by: RADIOLOGY

## 2020-08-12 PROCEDURE — 77014 CHG CT GUIDANCE RADIATION THERAPY FLDS PLACEMENT: CPT | Performed by: RADIOLOGY

## 2020-08-12 PROCEDURE — 77386 CHG INTENSITY MODULATED RADIATION TX DLVR COMPLEX: CPT | Performed by: RADIOLOGY

## 2020-08-12 PROCEDURE — 77427 RADIATION TX MANAGEMENT X5: CPT | Performed by: RADIOLOGY

## 2020-08-13 ENCOUNTER — RADIATION ONCOLOGY WEEKLY ASSESSMENT (OUTPATIENT)
Dept: RADIATION ONCOLOGY | Facility: HOSPITAL | Age: 70
End: 2020-08-13

## 2020-08-13 DIAGNOSIS — C34.90 SMALL CELL LUNG CANCER (HCC): Primary | Chronic | ICD-10-CM

## 2020-08-13 PROCEDURE — 77014 CHG CT GUIDANCE RADIATION THERAPY FLDS PLACEMENT: CPT | Performed by: RADIOLOGY

## 2020-08-13 PROCEDURE — 77386: CPT | Performed by: RADIOLOGY

## 2020-08-13 PROCEDURE — 77386 CHG INTENSITY MODULATED RADIATION TX DLVR COMPLEX: CPT | Performed by: RADIOLOGY

## 2020-08-13 NOTE — PROGRESS NOTES
Physician Weekly Management Note    Diagnosis:     1. Small cell lung cancer (CMS/HCC)     cT4    Reason for Visit:   Radiation (17/30)     Concurrent Chemo:   No    Notes on Treatment course, Films, Medical progress and Plan:  Doing fairly well. No further hemoptysis. 1 episode of emesis last nigh and some esophagitis symptoms. Will send in [place]. Still eating full diet.  No other problems or questions, cont on.    Performance Status:  (1) Restricted in physically strenuous activity, ambulatory and able to do work of light nature  Subjective     ROS - Other than as listed above, as follows:  Constitutional - Normal - no complaints of fatigue, denies lack of appetite, fever, night sweats or change in weight.  Neck - Normal - denies neck masses, muscle weakness, neck pain, decreased range of motion or swelling.  Cardiovascular - Normal - denies arrhythmias, chest pain, dyspnea, edema, orthopnea or palpitations.  Respiratory - Normal - denies cough, dyspnea, hemoptysis, hiccoughs, pleuritic chest pain or wheezing.  Gastrointestinal - Normal - no complaints of constipation, abdominal pain, diarrhea, heartburn/dyspepsia, hematemesis, hemorrhoids, melena or GI bleeding, nausea, pain or cramping or vomiting.     There were no vitals filed for this visit.  Objective     PHYSICAL EXAM - Other than as listed above, as follows:  Constitutional - Normal - no evidence of impaired alertness, inadequate appearance, premature or advanced chronologic age, uncooperativeness, altered mood, affect or disorientation.  Neck - Normal - no evidence of tender or enlarged lymph nodes, neck abnormalities, restricted range of motion or enlarged thyroid.  Chest - Normal - no evidence of chest abnormalities, tender or enlarged lymph nodes.  Respiratory - Normal - no evidence of abnormal breat sounds, chest abnormalities on palpation and chest abnormalities on percussion and normal breath sounds.  Hematologic/Lymphatic - Normal - no  "evidence of tender or enlarged axillary lymph nodes nor tender or enlarged neck nodes.    Technical aspects reviewed:  Weekly OBI approved if applicable? Yes  Weekly port films approved?   Yes  Change requests noted if applicable?  No  Patient setup and plan reviewed?  Yes    Chart Reviewed:  Continue current treatment plan?   Yes  Treatment plan change requested?  No    I have reviewed and marked as \"reviewed\" the current medications, allergies and problem list in the patients EMR.  I have reviewed the patient's medical, surgical  history in detail, reviewed any pertinent lab work and updated the computerized patient record if needed.    Patient's Care Team:  Patient Care Team:  Paty Saldana APRN as PCP - General (Family Medicine)  Thai Morel MD as PCP - Claims Attributed  Thai Morel MD as Consulting Physician (Hematology and Oncology)  Annabelle Giang MD as Consulting Physician (Radiation Oncology)    "

## 2020-08-14 PROCEDURE — 77014 CHG CT GUIDANCE RADIATION THERAPY FLDS PLACEMENT: CPT | Performed by: RADIOLOGY

## 2020-08-14 PROCEDURE — 77386: CPT | Performed by: RADIOLOGY

## 2020-08-14 PROCEDURE — 77386 CHG INTENSITY MODULATED RADIATION TX DLVR COMPLEX: CPT | Performed by: RADIOLOGY

## 2020-08-17 PROCEDURE — 77014 CHG CT GUIDANCE RADIATION THERAPY FLDS PLACEMENT: CPT | Performed by: RADIOLOGY

## 2020-08-17 PROCEDURE — 77386: CPT | Performed by: RADIOLOGY

## 2020-08-17 PROCEDURE — 77386 CHG INTENSITY MODULATED RADIATION TX DLVR COMPLEX: CPT | Performed by: RADIOLOGY

## 2020-08-18 ENCOUNTER — RADIATION ONCOLOGY WEEKLY ASSESSMENT (OUTPATIENT)
Dept: RADIATION ONCOLOGY | Facility: HOSPITAL | Age: 70
End: 2020-08-18

## 2020-08-18 DIAGNOSIS — C34.90 SMALL CELL LUNG CANCER (HCC): Primary | Chronic | ICD-10-CM

## 2020-08-18 PROCEDURE — 77014 CHG CT GUIDANCE RADIATION THERAPY FLDS PLACEMENT: CPT | Performed by: RADIOLOGY

## 2020-08-18 PROCEDURE — 77336 RADIATION PHYSICS CONSULT: CPT | Performed by: RADIOLOGY

## 2020-08-18 PROCEDURE — 77386 CHG INTENSITY MODULATED RADIATION TX DLVR COMPLEX: CPT | Performed by: RADIOLOGY

## 2020-08-18 PROCEDURE — 77386: CPT | Performed by: RADIOLOGY

## 2020-08-18 NOTE — PROGRESS NOTES
Physician Weekly Management Note    Diagnosis:     1. Small cell lung cancer (CMS/HCC)     cT4    Reason for Visit:   Radiation (20/30)     Concurrent Chemo:   No    Notes on Treatment course, Films, Medical progress and Plan:  Doing fairly well. Fatigue his only complaint. He says his allergies have been awful this whole year and they are bothering him still but no change in cough, nor hemoptysis. Still eating full diet.  No other problems or questions, cont on.    Performance Status:  (1) Restricted in physically strenuous activity, ambulatory and able to do work of light nature  Subjective     ROS - Other than as listed above, as follows:  Constitutional - Normal - no complaints of fatigue, denies lack of appetite, fever, night sweats or change in weight.  Neck - Normal - denies neck masses, muscle weakness, neck pain, decreased range of motion or swelling.  Cardiovascular - Normal - denies arrhythmias, chest pain, dyspnea, edema, orthopnea or palpitations.  Respiratory - Normal - denies cough, dyspnea, hemoptysis, hiccoughs, pleuritic chest pain or wheezing.  Gastrointestinal - Normal - no complaints of constipation, abdominal pain, diarrhea, heartburn/dyspepsia, hematemesis, hemorrhoids, melena or GI bleeding, nausea, pain or cramping or vomiting.     There were no vitals filed for this visit.  Objective     PHYSICAL EXAM - Other than as listed above, as follows:  Constitutional - Normal - no evidence of impaired alertness, inadequate appearance, premature or advanced chronologic age, uncooperativeness, altered mood, affect or disorientation.  Neck - Normal - no evidence of tender or enlarged lymph nodes, neck abnormalities, restricted range of motion or enlarged thyroid.  Chest - Normal - no evidence of chest abnormalities, tender or enlarged lymph nodes.  Respiratory - Normal - no evidence of abnormal breat sounds, chest abnormalities on palpation and chest abnormalities on percussion and normal breath  "sounds.  Hematologic/Lymphatic - Normal - no evidence of tender or enlarged axillary lymph nodes nor tender or enlarged neck nodes.    Technical aspects reviewed:  Weekly OBI approved if applicable? Yes  Weekly port films approved?   Yes  Change requests noted if applicable?  No  Patient setup and plan reviewed?  Yes    Chart Reviewed:  Continue current treatment plan?   Yes  Treatment plan change requested?  No    I have reviewed and marked as \"reviewed\" the current medications, allergies and problem list in the patients EMR.  I have reviewed the patient's medical, surgical  history in detail, reviewed any pertinent lab work and updated the computerized patient record if needed.    Patient's Care Team:  Patient Care Team:  Paty Saldana APRN as PCP - General (Family Medicine)  Thai Morel MD as PCP - Claims Attributed  Thai Morel MD as Consulting Physician (Hematology and Oncology)  Annabelle Giang MD as Consulting Physician (Radiation Oncology)    "

## 2020-08-18 NOTE — PROGRESS NOTES
REASON FOR FOLLOW UP:   1.  T4N3 Small cell lung cancer  2.  SVC syndrome  3.  Palliative therapy with carboplatin, -16 initiated on 7/8/2019.  Cycle 1 was initiated during a hospitalization.  Atezolizumab was added with cycle #2.  4 cycles of chemotherapy complete as of 9/19/2019.  4.  Port placed on 8/22/2019, delayed due to SVC syndrome  5.  Neutropenia related to therapy requiring the addition of Neulasta with cycle #3.  6.  Single agent maintenance atezolizumab initiated 10/8/2019.  Scan on 10/1/2019 revealed good disease response disease.  There is an area of concern on the adrenal gland which we will continue to monitor.  MRI brain shows no evidence of metastatic disease.  6.  CT imaging 2/4/2020 with ongoing improvement  7.  CT imaging 5/19/2020 with progression.  8.  Carboplatin/VP16 re-initiated on 5/26/2020.  Cycle 2 6/16/2020   9.  Presented at lung conference with PET scan after two cycles planned followed by radiation to a residual right hilar mass if appropriate.  Irinotecan if indicated after that.  10.  PET scan 7/1/2020 with increased uptake in the subcarinal mediastinum with decreasing left hilar and LLL adenopathy, suspected reactive nodular area at the right base, resolution of the left adrenal uptake, intensely FDG avid left parotid nodule at 1 cm (prior biopsy consistent with Warthin's tumor, benign)        HISTORY OF PRESENT ILLNESS:  Julia Smith III is a 70 y.o. male with the above mentioned history, who returns the office today for follow up.      We are considering cycle 5 of chemotherapy today.    Radiation continues, cycle #21 today.    He is tolerating therapy well.  He does note ongoing fatigue.  He is taking care of all of his activities of daily living.  Mild occasional nausea with rare emesis, well controlled with medication.  No respiratory symptoms.  No fevers or chills.  Appetite is adequate otherwise.        Past Medical History:   Diagnosis Date   • Atrial fibrillation with  RVR (CMS/[facility])    • Cancer (CMS/[facility]) 06/2019    Right lung   • Chronic cough    • COPD (chronic obstructive pulmonary disease) (CMS/[facility])    • Hearing loss    • History of shingles    • Lower back pain    • Mastoiditis    • Mild mitral regurgitation    • Other fatigue    • PAF (paroxysmal atrial fibrillation) (CMS/[facility])    • Pneumonia        Past Surgical History:   Procedure Laterality Date   • BRONCHOSCOPY N/A 6/19/2019    Procedure: BRONCHOSCOPY WITH WASHINGS AND BIOPSY AND ENDOBRONCHIAL ULTRASOUND WITH FNA;  Surgeon: Arslan Marquez MD;  Location: [facility];  Service: Pulmonary   • INNER EAR SURGERY Left    • MASTOIDECTOMY Left 1994   • VENOUS ACCESS DEVICE (PORT) INSERTION Right 8/22/2019    Procedure: INSERTION OF PORTACATH;  Surgeon: Ramila Gallardo MD;  Location: [facility] OR;  Service: Cardiothoracic       SOCIAL HISTORY:   reports that he has been smoking cigarettes. He has a 40.00 pack-year smoking history. He has never used smokeless tobacco. He reports that he drinks about 4.0 - 6.0 standard drinks of alcohol per week. He reports that he has current or past drug history. Drug: Marijuana.    FAMILY HISTORY:  family history includes COPD in his father; Heart disease in his brother; No Known Problems in his brother, brother, brother, mother, and sister.    ALLERGIES:  No Known Allergies    MEDICATIONS:  The medication list has been reviewed with the patient by the medical assistant, and the list has been updated in the electronic medical record, which I reviewed.  Medication dosages and frequencies were confirmed to be accurate.    I have reviewed the patient's medical history in detail and updated the computerized patient record.    Review of Systems   Review of Systems   Constitutional: Positive for fatigue. Negative for activity change, appetite change, chills and fever.   HENT: Negative for mouth sores, nosebleeds and trouble swallowing.    Respiratory: Negative for cough and shortness of  "breath.    Cardiovascular: Negative for chest pain and leg swelling.   Gastrointestinal: Negative for abdominal pain, constipation, diarrhea, nausea and vomiting.        Heartburn   Genitourinary: Negative for difficulty urinating.   Musculoskeletal:        Knee pain - chronic unchanged   Skin: Negative for rash.   Neurological: Positive for numbness (minimal). Negative for dizziness.   Hematological: Negative for adenopathy. Does not bruise/bleed easily.   Psychiatric/Behavioral: Negative for sleep disturbance.   Review of systems 8/19/2020 unchanged from previous office visit except as updated.      Vitals:    08/19/20 0746   BP: 105/69   Pulse: 90   Resp: 16   Temp: 99.1 °F (37.3 °C)   TempSrc: Skin   SpO2: 96%   Weight: 88.5 kg (195 lb 1.6 oz)   Height: 175 cm (68.9\")   PainSc: 0-No pain     Physical Exam   Constitutional: He is oriented to person, place, and time. He appears well-developed and well-nourished. No distress.   He is wearing a facemask   HENT:   Head: Normocephalic and atraumatic. Hair is normal.   Eyes: Lids are normal. Pupils are equal.   Neck: Neck supple. No JVD present. No thyroid mass present.   Cardiovascular: Normal rate and regular rhythm.   Pulmonary/Chest: Effort normal and breath sounds normal. No respiratory distress.   Mediport present, benign in appearance   Abdominal: Soft. He exhibits no distension and no mass. There is no splenomegaly or hepatomegaly. There is no tenderness. There is no guarding.   Musculoskeletal: Normal range of motion. He exhibits no edema, tenderness or deformity.   Neurological: He is alert and oriented to person, place, and time. He has normal strength.   Skin: Skin is warm and dry. No rash noted.   Prominent varicose veins in his legs   Psychiatric: His behavior is normal. Judgment and thought content normal. Cognition and memory are normal.   Flat affect, unchanged   Vitals reviewed.  Reviewed 8/19/2020, updated      DIAGNOSTIC DATA:  Results from last 7 " days   Lab Units 08/19/20  0726   WBC 10*3/mm3 3.34*   NEUTROS ABS 10*3/mm3 2.06   HEMOGLOBIN g/dL 10.7*   HEMATOCRIT % 32.8*   PLATELETS 10*3/mm3 113*     Results from last 7 days   Lab Units 08/19/20  0726   SODIUM mmol/L 136   POTASSIUM mmol/L 4.2   CHLORIDE mmol/L 101   CO2 mmol/L 23.3   BUN mg/dL 11   CREATININE mg/dL 1.01   CALCIUM mg/dL 9.3   ALBUMIN g/dL 4.10   BILIRUBIN mg/dL 0.4   ALK PHOS U/L 69   ALT (SGPT) U/L 17   AST (SGOT) U/L 22   GLUCOSE mg/dL 155*           IMAGING:  PET scan 7/1/20020 images personally reviewed.  Significant uptake in the mediastinum and right hilum.      MRI brain 6/30/2020 negative for metastatic disease.    ASSESSMENT:  This is a 70 y.o. male with:  1.   Small cell lung cancer originating in the right hilum:   · He has an extremely large mass there with concern for SVC compression on CT imaging from 6/4/2019.  There is some left hilar adenopathy.  · Some parotid lesions which are concerning but indeterminate, left greater than right.  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  · Staging MRI of the brain 7/10/2019 negative for metastatic disease  · He was discussed at multidisciplinary thoracic conference, with his T4 and 3 disease, concern for involvement of the right supraclavicular region and obvious left hilum, plans to add atezolizumab with cycle 2, we can consider future radiation to the residual mediastinal mass if necessary.  · Lung lesion too large to radiate in spite of the SVC narrowing.    · On 7/8/2019 we started carboplatin AUC 5 and VP16 100 mg/m2 through peripheral IVs  · Cycle 2 postponed on 7/29/2019 due to heart rate of 154.  Atezolizumab added with cycle #2.  · Radiation can be added for a residual mediastinal mass if necessary  · CT imaging on 8/14/2019 after two cycles of therapy shows  significant improvement.  · Mediport anticipated by Dr. Gallardo on 8/22/2019.  · On  8/20/2019 cycle 3 was delayed due to neutropenia and with cycle 3 we did incorporate Neulasta.  · Cycle 4 completed 9/17/2019  · Maintenance atezolizumab initiated 10/8/2019.  PET scan revealed disease response to therapy, he does have an area of hypermetabolic activity in the adrenal gland which is small and did not show up on prior exam.  We will continue to monitor this.  His MRI of the brain is also negative for metastatic disease.  · CT 2/4/2020 with ongoing improvement.  Maintenance atezolizumab pursued.    · CT imaging 5/19/2020 with progression.  Increase in the right hilar/mediastinal mass with enlarging adenopathy.    · Given that the progression is longer than 6 months after completing carbo/VP16, proceed with further carbo/VP16.  · Cycle 1 initiated 5/26/2020  · PET scan after two cycles with ongoing uptake in the right hilum, otherwise somewhat improved  · Radiation initiated 7/22/2020 with plans for 30 treatments  · Proceeded with cycle 4 carboplatin -16 7/28/2020.    · As of 8/19/2020, discussed holding further chemotherapy since he had 4 cycles versus continuing potentially with as many as 6.  He prefers to continue with therapy.  His blood counts have been acceptable for therapy.  We will continue weekly monitoring.    2.  Chronic hearing loss: History of mastoiditis.  He is not a candidate for cisplatin because of this.  Stable.    3.  Atrial fibrillation.  He was initiated on metoprolol 12.5 mg twice daily.  This did delay port placement.  The patient was taking his metoprolol inconsistently.  For cycle 2 was delayed due to heart rate of 150 7/29/2019.  He was seen by cardiology with instructions to increase his metoprolol to 25 mg twice daily.  Heart rate stable today.  He reports no symptoms.    4.  Tobacco use.  He did utilize a NicoDerm patch while inpatient, though has continued smoking since discharge.  We  discussed smoking cessation again today.    5.  SVC syndrome.  Improved after 2 cycles of chemotherapy.    6. Tachycardia related to atrial fibrillation as outlined above  · Heart rate on 7/29/19 was 154.  Cycle 2 was delayed.  · Metoprolol increased to 25 mg twice daily with improvement in heart rate.    7. Fatigue: Stable.  Monitor.  Thyroid studies were normal.    8.  Heartburn: He is using Tums and soda water for this.  The patient may require PPI.  We will continue to monitor.  He did not complain of this today.      PLAN:   1. Proceed with cycle 5 carboplatin -16 today.  Continue with carboplatin AUC equals 4 and -16 80 mg per metered squared.  2. Continue radiation under the direction of radiation oncology, fraction 21/30 today  3. Return weekly for CBC with nurse review  4. Follow-u in 3 weeks in anticipation of cycle 6 carboplatin -16 assuming he desires further therapy and his blood counts are acceptable.  5. He knows to call the office with any new or worsening symptoms before his next scheduled visits.    6. Plan PET scan about 6 to 8 weeks after completing radiation.    Thai Morel MD

## 2020-08-19 ENCOUNTER — INFUSION (OUTPATIENT)
Dept: ONCOLOGY | Facility: HOSPITAL | Age: 70
End: 2020-08-19

## 2020-08-19 ENCOUNTER — OFFICE VISIT (OUTPATIENT)
Dept: ONCOLOGY | Facility: CLINIC | Age: 70
End: 2020-08-19

## 2020-08-19 VITALS
RESPIRATION RATE: 16 BRPM | DIASTOLIC BLOOD PRESSURE: 69 MMHG | OXYGEN SATURATION: 96 % | SYSTOLIC BLOOD PRESSURE: 105 MMHG | BODY MASS INDEX: 28.9 KG/M2 | TEMPERATURE: 99.1 F | HEIGHT: 69 IN | WEIGHT: 195.1 LBS | HEART RATE: 90 BPM

## 2020-08-19 DIAGNOSIS — Z45.2 ENCOUNTER FOR FITTING AND ADJUSTMENT OF VASCULAR CATHETER: ICD-10-CM

## 2020-08-19 DIAGNOSIS — C34.90 SMALL CELL LUNG CANCER (HCC): Primary | ICD-10-CM

## 2020-08-19 DIAGNOSIS — Z79.899 HIGH RISK MEDICATION USE: ICD-10-CM

## 2020-08-19 DIAGNOSIS — C34.90 SMALL CELL LUNG CANCER (HCC): ICD-10-CM

## 2020-08-19 LAB
ALBUMIN SERPL-MCNC: 4.1 G/DL (ref 3.5–5.2)
ALBUMIN/GLOB SERPL: 1.5 G/DL (ref 1.1–2.4)
ALP SERPL-CCNC: 69 U/L (ref 38–116)
ALT SERPL W P-5'-P-CCNC: 17 U/L (ref 0–41)
ANION GAP SERPL CALCULATED.3IONS-SCNC: 11.7 MMOL/L (ref 5–15)
AST SERPL-CCNC: 22 U/L (ref 0–40)
BASOPHILS # BLD AUTO: 0.02 10*3/MM3 (ref 0–0.2)
BASOPHILS NFR BLD AUTO: 0.6 % (ref 0–1.5)
BILIRUB SERPL-MCNC: 0.4 MG/DL (ref 0.2–1.2)
BUN SERPL-MCNC: 11 MG/DL (ref 6–20)
BUN/CREAT SERPL: 10.9 (ref 7.3–30)
CALCIUM SPEC-SCNC: 9.3 MG/DL (ref 8.5–10.2)
CHLORIDE SERPL-SCNC: 101 MMOL/L (ref 98–107)
CO2 SERPL-SCNC: 23.3 MMOL/L (ref 22–29)
CREAT SERPL-MCNC: 1.01 MG/DL (ref 0.7–1.3)
DEPRECATED RDW RBC AUTO: 84.2 FL (ref 37–54)
EOSINOPHIL # BLD AUTO: 0.07 10*3/MM3 (ref 0–0.4)
EOSINOPHIL NFR BLD AUTO: 2.1 % (ref 0.3–6.2)
ERYTHROCYTE [DISTWIDTH] IN BLOOD BY AUTOMATED COUNT: 21.2 % (ref 12.3–15.4)
GFR SERPL CREATININE-BSD FRML MDRD: 73 ML/MIN/1.73
GLOBULIN UR ELPH-MCNC: 2.8 GM/DL (ref 1.8–3.5)
GLUCOSE SERPL-MCNC: 155 MG/DL (ref 74–124)
HCT VFR BLD AUTO: 32.8 % (ref 37.5–51)
HGB BLD-MCNC: 10.7 G/DL (ref 13–17.7)
IMM GRANULOCYTES # BLD AUTO: 0.02 10*3/MM3 (ref 0–0.05)
IMM GRANULOCYTES NFR BLD AUTO: 0.6 % (ref 0–0.5)
LYMPHOCYTES # BLD AUTO: 0.7 10*3/MM3 (ref 0.7–3.1)
LYMPHOCYTES NFR BLD AUTO: 21 % (ref 19.6–45.3)
MCH RBC QN AUTO: 35.3 PG (ref 26.6–33)
MCHC RBC AUTO-ENTMCNC: 32.6 G/DL (ref 31.5–35.7)
MCV RBC AUTO: 108.3 FL (ref 79–97)
MONOCYTES # BLD AUTO: 0.47 10*3/MM3 (ref 0.1–0.9)
MONOCYTES NFR BLD AUTO: 14.1 % (ref 5–12)
NEUTROPHILS NFR BLD AUTO: 2.06 10*3/MM3 (ref 1.7–7)
NEUTROPHILS NFR BLD AUTO: 61.6 % (ref 42.7–76)
NRBC BLD AUTO-RTO: 0 /100 WBC (ref 0–0.2)
PLATELET # BLD AUTO: 113 10*3/MM3 (ref 140–450)
PMV BLD AUTO: 9.7 FL (ref 6–12)
POTASSIUM SERPL-SCNC: 4.2 MMOL/L (ref 3.5–4.7)
PROT SERPL-MCNC: 6.9 G/DL (ref 6.3–8)
RBC # BLD AUTO: 3.03 10*6/MM3 (ref 4.14–5.8)
SODIUM SERPL-SCNC: 136 MMOL/L (ref 134–145)
WBC # BLD AUTO: 3.34 10*3/MM3 (ref 3.4–10.8)

## 2020-08-19 PROCEDURE — 96375 TX/PRO/DX INJ NEW DRUG ADDON: CPT

## 2020-08-19 PROCEDURE — 25010000002 DEXAMETHASONE SODIUM PHOSPHATE 100 MG/10ML SOLUTION: Performed by: INTERNAL MEDICINE

## 2020-08-19 PROCEDURE — 25010000002 FOSAPREPITANT PER 1 MG: Performed by: INTERNAL MEDICINE

## 2020-08-19 PROCEDURE — 99214 OFFICE O/P EST MOD 30 MIN: CPT | Performed by: INTERNAL MEDICINE

## 2020-08-19 PROCEDURE — 25010000002 ETOPOSIDE 500 MG/25ML SOLUTION 25 ML VIAL: Performed by: INTERNAL MEDICINE

## 2020-08-19 PROCEDURE — 77427 RADIATION TX MANAGEMENT X5: CPT | Performed by: RADIOLOGY

## 2020-08-19 PROCEDURE — 77386 CHG INTENSITY MODULATED RADIATION TX DLVR COMPLEX: CPT | Performed by: RADIOLOGY

## 2020-08-19 PROCEDURE — 96367 TX/PROPH/DG ADDL SEQ IV INF: CPT

## 2020-08-19 PROCEDURE — 77014 CHG CT GUIDANCE RADIATION THERAPY FLDS PLACEMENT: CPT | Performed by: RADIOLOGY

## 2020-08-19 PROCEDURE — 96413 CHEMO IV INFUSION 1 HR: CPT

## 2020-08-19 PROCEDURE — 80053 COMPREHEN METABOLIC PANEL: CPT

## 2020-08-19 PROCEDURE — 77386: CPT | Performed by: RADIOLOGY

## 2020-08-19 PROCEDURE — 25010000002 CARBOPLATIN PER 50 MG: Performed by: INTERNAL MEDICINE

## 2020-08-19 PROCEDURE — 25010000003 HEPARIN LOCK FLUSH PER 10 UNITS: Performed by: INTERNAL MEDICINE

## 2020-08-19 PROCEDURE — 96417 CHEMO IV INFUS EACH ADDL SEQ: CPT

## 2020-08-19 PROCEDURE — 25010000002 PALONOSETRON PER 25 MCG: Performed by: INTERNAL MEDICINE

## 2020-08-19 PROCEDURE — 85025 COMPLETE CBC W/AUTO DIFF WBC: CPT

## 2020-08-19 RX ORDER — SODIUM CHLORIDE 9 MG/ML
250 INJECTION, SOLUTION INTRAVENOUS ONCE
Status: COMPLETED | OUTPATIENT
Start: 2020-08-19 | End: 2020-08-19

## 2020-08-19 RX ORDER — FAMOTIDINE 10 MG/ML
20 INJECTION, SOLUTION INTRAVENOUS AS NEEDED
Status: CANCELLED | OUTPATIENT
Start: 2020-08-19

## 2020-08-19 RX ORDER — DIPHENHYDRAMINE HYDROCHLORIDE 50 MG/ML
50 INJECTION INTRAMUSCULAR; INTRAVENOUS AS NEEDED
Status: CANCELLED | OUTPATIENT
Start: 2020-08-19

## 2020-08-19 RX ORDER — PALONOSETRON 0.05 MG/ML
0.25 INJECTION, SOLUTION INTRAVENOUS ONCE
Status: COMPLETED | OUTPATIENT
Start: 2020-08-19 | End: 2020-08-19

## 2020-08-19 RX ORDER — PROCHLORPERAZINE MALEATE 10 MG
10 TABLET ORAL ONCE
Status: CANCELLED | OUTPATIENT
Start: 2020-08-21

## 2020-08-19 RX ORDER — SODIUM CHLORIDE 9 MG/ML
250 INJECTION, SOLUTION INTRAVENOUS ONCE
Status: CANCELLED | OUTPATIENT
Start: 2020-08-20

## 2020-08-19 RX ORDER — HEPARIN SODIUM (PORCINE) LOCK FLUSH IV SOLN 100 UNIT/ML 100 UNIT/ML
500 SOLUTION INTRAVENOUS AS NEEDED
Status: CANCELLED | OUTPATIENT
Start: 2020-08-19

## 2020-08-19 RX ORDER — SODIUM CHLORIDE 9 MG/ML
250 INJECTION, SOLUTION INTRAVENOUS ONCE
Status: CANCELLED | OUTPATIENT
Start: 2020-08-19

## 2020-08-19 RX ORDER — HEPARIN SODIUM (PORCINE) LOCK FLUSH IV SOLN 100 UNIT/ML 100 UNIT/ML
500 SOLUTION INTRAVENOUS AS NEEDED
Status: DISCONTINUED | OUTPATIENT
Start: 2020-08-19 | End: 2020-08-19 | Stop reason: HOSPADM

## 2020-08-19 RX ORDER — SODIUM CHLORIDE 9 MG/ML
250 INJECTION, SOLUTION INTRAVENOUS ONCE
Status: CANCELLED | OUTPATIENT
Start: 2020-08-21

## 2020-08-19 RX ORDER — SODIUM CHLORIDE 0.9 % (FLUSH) 0.9 %
10 SYRINGE (ML) INJECTION AS NEEDED
Status: CANCELLED | OUTPATIENT
Start: 2020-08-19

## 2020-08-19 RX ORDER — SODIUM CHLORIDE 0.9 % (FLUSH) 0.9 %
10 SYRINGE (ML) INJECTION AS NEEDED
Status: DISCONTINUED | OUTPATIENT
Start: 2020-08-19 | End: 2020-08-19 | Stop reason: HOSPADM

## 2020-08-19 RX ORDER — PALONOSETRON 0.05 MG/ML
0.25 INJECTION, SOLUTION INTRAVENOUS ONCE
Status: CANCELLED | OUTPATIENT
Start: 2020-08-19

## 2020-08-19 RX ORDER — PROCHLORPERAZINE MALEATE 10 MG
10 TABLET ORAL ONCE
Status: CANCELLED | OUTPATIENT
Start: 2020-08-20

## 2020-08-19 RX ADMIN — CARBOPLATIN 450 MG: 10 INJECTION, SOLUTION INTRAVENOUS at 10:26

## 2020-08-19 RX ADMIN — Medication 500 UNITS: at 11:00

## 2020-08-19 RX ADMIN — SODIUM CHLORIDE 250 ML: 9 INJECTION, SOLUTION INTRAVENOUS at 08:30

## 2020-08-19 RX ADMIN — SODIUM CHLORIDE, PRESERVATIVE FREE 10 ML: 5 INJECTION INTRAVENOUS at 11:00

## 2020-08-19 RX ADMIN — PALONOSETRON 0.25 MG: 0.05 INJECTION, SOLUTION INTRAVENOUS at 08:30

## 2020-08-19 RX ADMIN — DEXAMETHASONE SODIUM PHOSPHATE 12 MG: 10 INJECTION, SOLUTION INTRAMUSCULAR; INTRAVENOUS at 09:02

## 2020-08-19 RX ADMIN — SODIUM CHLORIDE 100 ML: 900 INJECTION, SOLUTION INTRAVENOUS at 08:31

## 2020-08-19 RX ADMIN — ETOPOSIDE 160 MG: 20 INJECTION, SOLUTION, CONCENTRATE INTRAVENOUS at 09:21

## 2020-08-20 ENCOUNTER — INFUSION (OUTPATIENT)
Dept: ONCOLOGY | Facility: HOSPITAL | Age: 70
End: 2020-08-20

## 2020-08-20 VITALS
RESPIRATION RATE: 18 BRPM | WEIGHT: 196.7 LBS | OXYGEN SATURATION: 98 % | BODY MASS INDEX: 29.13 KG/M2 | SYSTOLIC BLOOD PRESSURE: 167 MMHG | HEART RATE: 84 BPM | DIASTOLIC BLOOD PRESSURE: 84 MMHG | TEMPERATURE: 97.8 F

## 2020-08-20 DIAGNOSIS — Z79.899 HIGH RISK MEDICATION USE: Primary | ICD-10-CM

## 2020-08-20 DIAGNOSIS — C34.90 SMALL CELL LUNG CANCER (HCC): ICD-10-CM

## 2020-08-20 PROCEDURE — 77386 CHG INTENSITY MODULATED RADIATION TX DLVR COMPLEX: CPT | Performed by: RADIOLOGY

## 2020-08-20 PROCEDURE — 77014 CHG CT GUIDANCE RADIATION THERAPY FLDS PLACEMENT: CPT | Performed by: RADIOLOGY

## 2020-08-20 PROCEDURE — 77386: CPT | Performed by: RADIOLOGY

## 2020-08-20 PROCEDURE — 25010000002 ETOPOSIDE 500 MG/25ML SOLUTION 25 ML VIAL: Performed by: INTERNAL MEDICINE

## 2020-08-20 PROCEDURE — 63710000001 PROCHLORPERAZINE MALEATE PER 5 MG: Performed by: INTERNAL MEDICINE

## 2020-08-20 PROCEDURE — 96413 CHEMO IV INFUSION 1 HR: CPT

## 2020-08-20 RX ORDER — PROCHLORPERAZINE MALEATE 5 MG/1
10 TABLET ORAL ONCE
Status: COMPLETED | OUTPATIENT
Start: 2020-08-20 | End: 2020-08-20

## 2020-08-20 RX ORDER — SODIUM CHLORIDE 9 MG/ML
250 INJECTION, SOLUTION INTRAVENOUS ONCE
Status: COMPLETED | OUTPATIENT
Start: 2020-08-20 | End: 2020-08-20

## 2020-08-20 RX ADMIN — PROCHLORPERAZINE MALEATE 10 MG: 5 TABLET ORAL at 15:45

## 2020-08-20 RX ADMIN — ETOPOSIDE 160 MG: 20 INJECTION, SOLUTION, CONCENTRATE INTRAVENOUS at 15:59

## 2020-08-20 RX ADMIN — SODIUM CHLORIDE 250 ML: 9 INJECTION, SOLUTION INTRAVENOUS at 15:44

## 2020-08-21 ENCOUNTER — INFUSION (OUTPATIENT)
Dept: ONCOLOGY | Facility: HOSPITAL | Age: 70
End: 2020-08-21

## 2020-08-21 VITALS
WEIGHT: 197.8 LBS | BODY MASS INDEX: 29.3 KG/M2 | RESPIRATION RATE: 18 BRPM | TEMPERATURE: 97.3 F | SYSTOLIC BLOOD PRESSURE: 161 MMHG | OXYGEN SATURATION: 97 % | HEART RATE: 86 BPM | DIASTOLIC BLOOD PRESSURE: 82 MMHG

## 2020-08-21 DIAGNOSIS — Z45.2 ENCOUNTER FOR FITTING AND ADJUSTMENT OF VASCULAR CATHETER: ICD-10-CM

## 2020-08-21 DIAGNOSIS — C34.90 SMALL CELL LUNG CANCER (HCC): ICD-10-CM

## 2020-08-21 DIAGNOSIS — Z79.899 HIGH RISK MEDICATION USE: Primary | ICD-10-CM

## 2020-08-21 PROCEDURE — 63710000001 PROCHLORPERAZINE MALEATE PER 10 MG: Performed by: INTERNAL MEDICINE

## 2020-08-21 PROCEDURE — 96413 CHEMO IV INFUSION 1 HR: CPT

## 2020-08-21 PROCEDURE — 77014 CHG CT GUIDANCE RADIATION THERAPY FLDS PLACEMENT: CPT | Performed by: RADIOLOGY

## 2020-08-21 PROCEDURE — 25010000003 HEPARIN LOCK FLUSH PER 10 UNITS: Performed by: INTERNAL MEDICINE

## 2020-08-21 PROCEDURE — 77386 CHG INTENSITY MODULATED RADIATION TX DLVR COMPLEX: CPT | Performed by: RADIOLOGY

## 2020-08-21 PROCEDURE — 77386: CPT | Performed by: RADIOLOGY

## 2020-08-21 PROCEDURE — 25010000002 ETOPOSIDE 500 MG/25ML SOLUTION 25 ML VIAL: Performed by: INTERNAL MEDICINE

## 2020-08-21 RX ORDER — HEPARIN SODIUM (PORCINE) LOCK FLUSH IV SOLN 100 UNIT/ML 100 UNIT/ML
500 SOLUTION INTRAVENOUS AS NEEDED
Status: CANCELLED | OUTPATIENT
Start: 2020-08-21

## 2020-08-21 RX ORDER — PROCHLORPERAZINE MALEATE 10 MG
10 TABLET ORAL ONCE
Status: COMPLETED | OUTPATIENT
Start: 2020-08-21 | End: 2020-08-21

## 2020-08-21 RX ORDER — SODIUM CHLORIDE 0.9 % (FLUSH) 0.9 %
10 SYRINGE (ML) INJECTION AS NEEDED
Status: DISCONTINUED | OUTPATIENT
Start: 2020-08-21 | End: 2020-08-21 | Stop reason: HOSPADM

## 2020-08-21 RX ORDER — SODIUM CHLORIDE 9 MG/ML
250 INJECTION, SOLUTION INTRAVENOUS ONCE
Status: COMPLETED | OUTPATIENT
Start: 2020-08-21 | End: 2020-08-21

## 2020-08-21 RX ORDER — SODIUM CHLORIDE 0.9 % (FLUSH) 0.9 %
10 SYRINGE (ML) INJECTION AS NEEDED
Status: CANCELLED | OUTPATIENT
Start: 2020-08-21

## 2020-08-21 RX ORDER — HEPARIN SODIUM (PORCINE) LOCK FLUSH IV SOLN 100 UNIT/ML 100 UNIT/ML
500 SOLUTION INTRAVENOUS AS NEEDED
Status: DISCONTINUED | OUTPATIENT
Start: 2020-08-21 | End: 2020-08-21 | Stop reason: HOSPADM

## 2020-08-21 RX ADMIN — SODIUM CHLORIDE, PRESERVATIVE FREE 10 ML: 5 INJECTION INTRAVENOUS at 16:36

## 2020-08-21 RX ADMIN — SODIUM CHLORIDE 250 ML: 9 INJECTION, SOLUTION INTRAVENOUS at 15:22

## 2020-08-21 RX ADMIN — PROCHLORPERAZINE MALEATE 10 MG: 10 TABLET ORAL at 15:22

## 2020-08-21 RX ADMIN — ETOPOSIDE 160 MG: 20 INJECTION, SOLUTION, CONCENTRATE INTRAVENOUS at 15:36

## 2020-08-21 RX ADMIN — Medication 500 UNITS: at 16:36

## 2020-08-24 PROCEDURE — 77386: CPT | Performed by: RADIOLOGY

## 2020-08-24 PROCEDURE — 77386 CHG INTENSITY MODULATED RADIATION TX DLVR COMPLEX: CPT | Performed by: RADIOLOGY

## 2020-08-24 PROCEDURE — 77014 CHG CT GUIDANCE RADIATION THERAPY FLDS PLACEMENT: CPT | Performed by: RADIOLOGY

## 2020-08-25 PROCEDURE — 77336 RADIATION PHYSICS CONSULT: CPT | Performed by: RADIOLOGY

## 2020-08-25 PROCEDURE — 77386 CHG INTENSITY MODULATED RADIATION TX DLVR COMPLEX: CPT | Performed by: RADIOLOGY

## 2020-08-25 PROCEDURE — 77014 CHG CT GUIDANCE RADIATION THERAPY FLDS PLACEMENT: CPT | Performed by: RADIOLOGY

## 2020-08-25 PROCEDURE — 77386: CPT | Performed by: RADIOLOGY

## 2020-08-26 ENCOUNTER — RADIATION ONCOLOGY WEEKLY ASSESSMENT (OUTPATIENT)
Dept: RADIATION ONCOLOGY | Facility: HOSPITAL | Age: 70
End: 2020-08-26

## 2020-08-26 ENCOUNTER — LAB (OUTPATIENT)
Dept: LAB | Facility: HOSPITAL | Age: 70
End: 2020-08-26

## 2020-08-26 ENCOUNTER — CLINICAL SUPPORT (OUTPATIENT)
Dept: ONCOLOGY | Facility: HOSPITAL | Age: 70
End: 2020-08-26

## 2020-08-26 DIAGNOSIS — C34.90 SMALL CELL LUNG CANCER (HCC): ICD-10-CM

## 2020-08-26 DIAGNOSIS — C34.90 SMALL CELL LUNG CANCER (HCC): Primary | Chronic | ICD-10-CM

## 2020-08-26 LAB
BASOPHILS # BLD AUTO: 0.04 10*3/MM3 (ref 0–0.2)
BASOPHILS NFR BLD AUTO: 1.7 % (ref 0–1.5)
DEPRECATED RDW RBC AUTO: 73.1 FL (ref 37–54)
EOSINOPHIL # BLD AUTO: 0.01 10*3/MM3 (ref 0–0.4)
EOSINOPHIL NFR BLD AUTO: 0.4 % (ref 0.3–6.2)
ERYTHROCYTE [DISTWIDTH] IN BLOOD BY AUTOMATED COUNT: 19.1 % (ref 12.3–15.4)
HCT VFR BLD AUTO: 31.5 % (ref 37.5–51)
HGB BLD-MCNC: 11.2 G/DL (ref 13–17.7)
IMM GRANULOCYTES # BLD AUTO: 0.05 10*3/MM3 (ref 0–0.05)
IMM GRANULOCYTES NFR BLD AUTO: 2.1 % (ref 0–0.5)
LYMPHOCYTES # BLD AUTO: 0.47 10*3/MM3 (ref 0.7–3.1)
LYMPHOCYTES NFR BLD AUTO: 19.8 % (ref 19.6–45.3)
MCH RBC QN AUTO: 36.8 PG (ref 26.6–33)
MCHC RBC AUTO-ENTMCNC: 35.6 G/DL (ref 31.5–35.7)
MCV RBC AUTO: 103.6 FL (ref 79–97)
MONOCYTES # BLD AUTO: 0.18 10*3/MM3 (ref 0.1–0.9)
MONOCYTES NFR BLD AUTO: 7.6 % (ref 5–12)
NEUTROPHILS NFR BLD AUTO: 1.62 10*3/MM3 (ref 1.7–7)
NEUTROPHILS NFR BLD AUTO: 68.4 % (ref 42.7–76)
NRBC BLD AUTO-RTO: 0 /100 WBC (ref 0–0.2)
PLATELET # BLD AUTO: 158 10*3/MM3 (ref 140–450)
PMV BLD AUTO: 9.8 FL (ref 6–12)
RBC # BLD AUTO: 3.04 10*6/MM3 (ref 4.14–5.8)
WBC # BLD AUTO: 2.37 10*3/MM3 (ref 3.4–10.8)

## 2020-08-26 PROCEDURE — 77386: CPT | Performed by: RADIOLOGY

## 2020-08-26 PROCEDURE — 36415 COLL VENOUS BLD VENIPUNCTURE: CPT

## 2020-08-26 PROCEDURE — 77014 CHG CT GUIDANCE RADIATION THERAPY FLDS PLACEMENT: CPT | Performed by: RADIOLOGY

## 2020-08-26 PROCEDURE — 77386 CHG INTENSITY MODULATED RADIATION TX DLVR COMPLEX: CPT | Performed by: RADIOLOGY

## 2020-08-26 PROCEDURE — 85025 COMPLETE CBC W/AUTO DIFF WBC: CPT

## 2020-08-26 PROCEDURE — G0463 HOSPITAL OUTPT CLINIC VISIT: HCPCS

## 2020-08-26 PROCEDURE — 77427 RADIATION TX MANAGEMENT X5: CPT | Performed by: RADIOLOGY

## 2020-08-26 NOTE — PROGRESS NOTES
Pt here for RN review. Copy of labs given to pt. Labs stable. Pt completed completed cycle 5 of carbo/, continues daily radiation. States he is feeling good, no c/o. Eating and drinking good. No n/v/d. Pt will return on sept 2 for labs. Encouraged the pt to call with any concerns. Pt v/u    Lab Results   Component Value Date    WBC 2.37 (L) 08/26/2020    HGB 11.2 (L) 08/26/2020    HCT 31.5 (L) 08/26/2020    .6 (H) 08/26/2020     08/26/2020

## 2020-08-26 NOTE — PROGRESS NOTES
Physician Weekly Management Note    Diagnosis:     1. Small cell lung cancer (CMS/HCC)     cT4    Reason for Visit:   Radiation (26/30)     Concurrent Chemo:   No    Notes on Treatment course, Films, Medical progress and Plan:  Doing fairly well. Fatigue persists. He was dizzy when getting off the table today. Eating and drinking ok. No new symptoms. Esophagitis improved with meds. Still eating full diet. Rechecked BP today and WNL. No other problems or questions, cont on.    Performance Status:  (1) Restricted in physically strenuous activity, ambulatory and able to do work of light nature  Subjective     ROS - Other than as listed above, as follows:  Constitutional - Normal - no complaints of fatigue, denies lack of appetite, fever, night sweats or change in weight.  Neck - Normal - denies neck masses, muscle weakness, neck pain, decreased range of motion or swelling.  Cardiovascular - Normal - denies arrhythmias, chest pain, dyspnea, edema, orthopnea or palpitations.  Respiratory - Normal - denies cough, dyspnea, hemoptysis, hiccoughs, pleuritic chest pain or wheezing.  Gastrointestinal - Normal - no complaints of constipation, abdominal pain, diarrhea, heartburn/dyspepsia, hematemesis, hemorrhoids, melena or GI bleeding, nausea, pain or cramping or vomiting.     There were no vitals filed for this visit.  Objective     PHYSICAL EXAM - Other than as listed above, as follows:  Constitutional - Normal - no evidence of impaired alertness, inadequate appearance, premature or advanced chronologic age, uncooperativeness, altered mood, affect or disorientation.  Neck - Normal - no evidence of tender or enlarged lymph nodes, neck abnormalities, restricted range of motion or enlarged thyroid.  Chest - Normal - no evidence of chest abnormalities, tender or enlarged lymph nodes.  Respiratory - Normal - no evidence of abnormal breat sounds, chest abnormalities on palpation and chest abnormalities on percussion and normal  "breath sounds.  Hematologic/Lymphatic - Normal - no evidence of tender or enlarged axillary lymph nodes nor tender or enlarged neck nodes.    Technical aspects reviewed:  Weekly OBI approved if applicable? Yes  Weekly port films approved?   Yes  Change requests noted if applicable?  No  Patient setup and plan reviewed?  Yes    Chart Reviewed:  Continue current treatment plan?   Yes  Treatment plan change requested?  No    I have reviewed and marked as \"reviewed\" the current medications, allergies and problem list in the patients EMR.  I have reviewed the patient's medical, surgical  history in detail, reviewed any pertinent lab work and updated the computerized patient record if needed.    Patient's Care Team:  Patient Care Team:  Paty Saldana APRN as PCP - General (Family Medicine)  Thai Morel MD as PCP - Claims Attributed  Thai Morel MD as Consulting Physician (Hematology and Oncology)  Annabelle Giang MD as Consulting Physician (Radiation Oncology)    "

## 2020-08-27 PROCEDURE — 77386: CPT | Performed by: RADIOLOGY

## 2020-08-27 PROCEDURE — 77014 CHG CT GUIDANCE RADIATION THERAPY FLDS PLACEMENT: CPT | Performed by: RADIOLOGY

## 2020-08-27 PROCEDURE — 77386 CHG INTENSITY MODULATED RADIATION TX DLVR COMPLEX: CPT | Performed by: RADIOLOGY

## 2020-08-28 PROCEDURE — 77014 CHG CT GUIDANCE RADIATION THERAPY FLDS PLACEMENT: CPT | Performed by: RADIOLOGY

## 2020-08-28 PROCEDURE — 77386: CPT | Performed by: RADIOLOGY

## 2020-08-31 ENCOUNTER — RADIATION ONCOLOGY WEEKLY ASSESSMENT (OUTPATIENT)
Dept: RADIATION ONCOLOGY | Facility: HOSPITAL | Age: 70
End: 2020-08-31

## 2020-08-31 DIAGNOSIS — C34.90 SMALL CELL LUNG CANCER (HCC): Primary | Chronic | ICD-10-CM

## 2020-08-31 PROCEDURE — 77014 CHG CT GUIDANCE RADIATION THERAPY FLDS PLACEMENT: CPT | Performed by: RADIOLOGY

## 2020-08-31 PROCEDURE — 77386: CPT | Performed by: RADIOLOGY

## 2020-08-31 PROCEDURE — 77386 CHG INTENSITY MODULATED RADIATION TX DLVR COMPLEX: CPT | Performed by: RADIOLOGY

## 2020-08-31 NOTE — PROGRESS NOTES
Physician Weekly Management Note    Diagnosis:     1. Small cell lung cancer (CMS/HCC)     cT4    Reason for Visit:   Tx: 29/30     Concurrent Chemo:   No    Notes on Treatment course, Films, Medical progress and Plan:  Doing fairly well. Fatigue persists. Still eating full diet. No other problems or questions, cont on.  He is planning to continue on with chemotherapy and PET scan in 6-8 weeks per CBC. Will check on scheduling and further treatment.    Performance Status:  (1) Restricted in physically strenuous activity, ambulatory and able to do work of light nature  Subjective     ROS - Other than as listed above, as follows:  Constitutional - Normal - no complaints of fatigue, denies lack of appetite, fever, night sweats or change in weight.  Neck - Normal - denies neck masses, muscle weakness, neck pain, decreased range of motion or swelling.  Cardiovascular - Normal - denies arrhythmias, chest pain, dyspnea, edema, orthopnea or palpitations.  Respiratory - Normal - denies cough, dyspnea, hemoptysis, hiccoughs, pleuritic chest pain or wheezing.  Gastrointestinal - Normal - no complaints of constipation, abdominal pain, diarrhea, heartburn/dyspepsia, hematemesis, hemorrhoids, melena or GI bleeding, nausea, pain or cramping or vomiting.     There were no vitals filed for this visit.  Objective     PHYSICAL EXAM - Other than as listed above, as follows:  Constitutional - Normal - no evidence of impaired alertness, inadequate appearance, premature or advanced chronologic age, uncooperativeness, altered mood, affect or disorientation.  Neck - Normal - no evidence of tender or enlarged lymph nodes, neck abnormalities, restricted range of motion or enlarged thyroid.  Chest - Normal - no evidence of chest abnormalities, tender or enlarged lymph nodes.  Respiratory - Normal - no evidence of abnormal breat sounds, chest abnormalities on palpation and chest abnormalities on percussion and normal breath  "sounds.  Hematologic/Lymphatic - Normal - no evidence of tender or enlarged axillary lymph nodes nor tender or enlarged neck nodes.    Technical aspects reviewed:  Weekly OBI approved if applicable? Yes  Weekly port films approved?   Yes  Change requests noted if applicable?  No  Patient setup and plan reviewed?  Yes    Chart Reviewed:  Continue current treatment plan?   Yes  Treatment plan change requested?  No    I have reviewed and marked as \"reviewed\" the current medications, allergies and problem list in the patients EMR.  I have reviewed the patient's medical, surgical  history in detail, reviewed any pertinent lab work and updated the computerized patient record if needed.    Patient's Care Team:  Patient Care Team:  Paty Saldana APRN as PCP - General (Family Medicine)  Thai Morel MD as PCP - Claims Attributed  Thai Morel MD as Consulting Physician (Hematology and Oncology)  Annabelle Giang MD as Consulting Physician (Radiation Oncology)    "

## 2020-09-01 ENCOUNTER — DOCUMENTATION (OUTPATIENT)
Dept: RADIATION ONCOLOGY | Facility: HOSPITAL | Age: 70
End: 2020-09-01

## 2020-09-01 ENCOUNTER — APPOINTMENT (OUTPATIENT)
Dept: RADIATION ONCOLOGY | Facility: HOSPITAL | Age: 70
End: 2020-09-01

## 2020-09-01 PROCEDURE — 77386 CHG INTENSITY MODULATED RADIATION TX DLVR COMPLEX: CPT | Performed by: RADIOLOGY

## 2020-09-01 PROCEDURE — 77386: CPT | Performed by: RADIOLOGY

## 2020-09-01 PROCEDURE — 77336 RADIATION PHYSICS CONSULT: CPT | Performed by: RADIOLOGY

## 2020-09-01 PROCEDURE — 77014 CHG CT GUIDANCE RADIATION THERAPY FLDS PLACEMENT: CPT | Performed by: RADIOLOGY

## 2020-09-02 ENCOUNTER — LAB (OUTPATIENT)
Dept: LAB | Facility: HOSPITAL | Age: 70
End: 2020-09-02

## 2020-09-02 ENCOUNTER — CLINICAL SUPPORT (OUTPATIENT)
Dept: ONCOLOGY | Facility: HOSPITAL | Age: 70
End: 2020-09-02

## 2020-09-02 DIAGNOSIS — C34.90 SMALL CELL LUNG CANCER (HCC): ICD-10-CM

## 2020-09-02 LAB
BASOPHILS # BLD AUTO: 0.03 10*3/MM3 (ref 0–0.2)
BASOPHILS NFR BLD AUTO: 1.1 % (ref 0–1.5)
DEPRECATED RDW RBC AUTO: 70.9 FL (ref 37–54)
EOSINOPHIL # BLD AUTO: 0.01 10*3/MM3 (ref 0–0.4)
EOSINOPHIL NFR BLD AUTO: 0.4 % (ref 0.3–6.2)
ERYTHROCYTE [DISTWIDTH] IN BLOOD BY AUTOMATED COUNT: 18.4 % (ref 12.3–15.4)
HCT VFR BLD AUTO: 29.6 % (ref 37.5–51)
HGB BLD-MCNC: 10.6 G/DL (ref 13–17.7)
IMM GRANULOCYTES # BLD AUTO: 0.05 10*3/MM3 (ref 0–0.05)
IMM GRANULOCYTES NFR BLD AUTO: 1.8 % (ref 0–0.5)
LYMPHOCYTES # BLD AUTO: 0.67 10*3/MM3 (ref 0.7–3.1)
LYMPHOCYTES NFR BLD AUTO: 24.5 % (ref 19.6–45.3)
MCH RBC QN AUTO: 38.3 PG (ref 26.6–33)
MCHC RBC AUTO-ENTMCNC: 35.8 G/DL (ref 31.5–35.7)
MCV RBC AUTO: 106.9 FL (ref 79–97)
MONOCYTES # BLD AUTO: 0.72 10*3/MM3 (ref 0.1–0.9)
MONOCYTES NFR BLD AUTO: 26.3 % (ref 5–12)
NEUTROPHILS NFR BLD AUTO: 1.26 10*3/MM3 (ref 1.7–7)
NEUTROPHILS NFR BLD AUTO: 45.9 % (ref 42.7–76)
NRBC BLD AUTO-RTO: 0 /100 WBC (ref 0–0.2)
PLATELET # BLD AUTO: 89 10*3/MM3 (ref 140–450)
PMV BLD AUTO: 11.1 FL (ref 6–12)
RBC # BLD AUTO: 2.77 10*6/MM3 (ref 4.14–5.8)
WBC # BLD AUTO: 2.74 10*3/MM3 (ref 3.4–10.8)

## 2020-09-02 PROCEDURE — G0463 HOSPITAL OUTPT CLINIC VISIT: HCPCS

## 2020-09-02 PROCEDURE — 85025 COMPLETE CBC W/AUTO DIFF WBC: CPT

## 2020-09-02 PROCEDURE — 36415 COLL VENOUS BLD VENIPUNCTURE: CPT

## 2020-09-02 NOTE — PROGRESS NOTES
Pt here for RN review, pt completed cycle 5 of carbo/ two weeks ago. States he is feeling tired but no other c/o. Eating and drinking well. Pain controlled. No n/v/d. All counts low, reminded pt of neutropenic precautions. Pt denies any bleeding or bruising. Pt will return next week for MD visit and possible treatment.       Lab Results   Component Value Date    WBC 2.74 (L) 09/02/2020    HGB 10.6 (L) 09/02/2020    HCT 29.6 (L) 09/02/2020    .9 (H) 09/02/2020    PLT 89 (L) 09/02/2020

## 2020-09-03 ENCOUNTER — TELEPHONE (OUTPATIENT)
Dept: GENERAL RADIOLOGY | Facility: HOSPITAL | Age: 70
End: 2020-09-03

## 2020-09-03 NOTE — TELEPHONE ENCOUNTER
----- Message from Debra Joe RN sent at 9/2/2020  4:37 PM EDT -----  Please add Carbo/ to pt's schedule on 9/9, plus  16 on 9/10 and 9/11    thanks

## 2020-09-08 NOTE — PROGRESS NOTES
RADIATION THERAPY TREATMENT SUMMARY  Patient: Julia Smith III  YOB: 1950  Diagnosis: Small cell lung cancer (CMS/HCC)     Julia Smith III has recently completed the course of radiation therapy prescribed for the above-mentioned diagnosis.  Below, please find the specifics of the course of treatment delivered:    Radiation Details:    Tx Start Date  7/22/2020   Tx End date  9/1/2020   Intent   Curative   Total Treatments 30  Prescription Name MEDIASTINUM  Primary/Boost  PRIMARY  Dose Per Fraction 200 cGy/Frac  Number of Fractions 30  Prescribe To  6000 cGy at 200 cGy/Frac  Mode    Photon  Technique  VMAT  Frequency  Once Daily  Energy   6X       Tolerance and Toxicities: he tolerated the treatments well with fatigue, requiring no treatment breaks. he completed the treatments in 41 elapsed days.    Follow-up Plans:  He is planning to continue on with chemotherapy and PET scan in 6-8 weeks per CBC. Will review timing of scan/discuss and see back thereafter, but encouraged him to call if we could be of further help prior.

## 2020-09-09 ENCOUNTER — APPOINTMENT (OUTPATIENT)
Dept: ONCOLOGY | Facility: HOSPITAL | Age: 70
End: 2020-09-09

## 2020-09-09 ENCOUNTER — INFUSION (OUTPATIENT)
Dept: ONCOLOGY | Facility: HOSPITAL | Age: 70
End: 2020-09-09

## 2020-09-09 ENCOUNTER — OFFICE VISIT (OUTPATIENT)
Dept: ONCOLOGY | Facility: CLINIC | Age: 70
End: 2020-09-09

## 2020-09-09 VITALS
OXYGEN SATURATION: 98 % | HEART RATE: 71 BPM | HEIGHT: 69 IN | DIASTOLIC BLOOD PRESSURE: 74 MMHG | SYSTOLIC BLOOD PRESSURE: 144 MMHG | TEMPERATURE: 98.2 F | RESPIRATION RATE: 16 BRPM | WEIGHT: 195 LBS | BODY MASS INDEX: 28.88 KG/M2

## 2020-09-09 DIAGNOSIS — C34.11 PRIMARY MALIGNANT NEOPLASM OF RIGHT UPPER LOBE OF LUNG (HCC): ICD-10-CM

## 2020-09-09 DIAGNOSIS — C34.90 SMALL CELL LUNG CANCER (HCC): ICD-10-CM

## 2020-09-09 DIAGNOSIS — C34.90 SMALL CELL LUNG CANCER (HCC): Primary | Chronic | ICD-10-CM

## 2020-09-09 DIAGNOSIS — Z79.899 HIGH RISK MEDICATION USE: ICD-10-CM

## 2020-09-09 LAB
ALBUMIN SERPL-MCNC: 4 G/DL (ref 3.5–5.2)
ALBUMIN/GLOB SERPL: 1.4 G/DL (ref 1.1–2.4)
ALP SERPL-CCNC: 68 U/L (ref 38–116)
ALT SERPL W P-5'-P-CCNC: 20 U/L (ref 0–41)
ANION GAP SERPL CALCULATED.3IONS-SCNC: 11.9 MMOL/L (ref 5–15)
AST SERPL-CCNC: 25 U/L (ref 0–40)
BASOPHILS # BLD AUTO: 0.02 10*3/MM3 (ref 0–0.2)
BASOPHILS NFR BLD AUTO: 0.6 % (ref 0–1.5)
BILIRUB SERPL-MCNC: 0.4 MG/DL (ref 0.2–1.2)
BUN SERPL-MCNC: 8 MG/DL (ref 6–20)
BUN/CREAT SERPL: 8.2 (ref 7.3–30)
CALCIUM SPEC-SCNC: 9.1 MG/DL (ref 8.5–10.2)
CHLORIDE SERPL-SCNC: 102 MMOL/L (ref 98–107)
CO2 SERPL-SCNC: 23.1 MMOL/L (ref 22–29)
CREAT SERPL-MCNC: 0.98 MG/DL (ref 0.7–1.3)
DEPRECATED RDW RBC AUTO: 82.8 FL (ref 37–54)
EOSINOPHIL # BLD AUTO: 0.06 10*3/MM3 (ref 0–0.4)
EOSINOPHIL NFR BLD AUTO: 1.7 % (ref 0.3–6.2)
ERYTHROCYTE [DISTWIDTH] IN BLOOD BY AUTOMATED COUNT: 19.4 % (ref 12.3–15.4)
GFR SERPL CREATININE-BSD FRML MDRD: 76 ML/MIN/1.73
GLOBULIN UR ELPH-MCNC: 2.8 GM/DL (ref 1.8–3.5)
GLUCOSE SERPL-MCNC: 130 MG/DL (ref 74–124)
HCT VFR BLD AUTO: 30.6 % (ref 37.5–51)
HGB BLD-MCNC: 10.2 G/DL (ref 13–17.7)
IMM GRANULOCYTES # BLD AUTO: 0.02 10*3/MM3 (ref 0–0.05)
IMM GRANULOCYTES NFR BLD AUTO: 0.6 % (ref 0–0.5)
LYMPHOCYTES # BLD AUTO: 0.48 10*3/MM3 (ref 0.7–3.1)
LYMPHOCYTES NFR BLD AUTO: 13.3 % (ref 19.6–45.3)
MCH RBC QN AUTO: 38.3 PG (ref 26.6–33)
MCHC RBC AUTO-ENTMCNC: 33.3 G/DL (ref 31.5–35.7)
MCV RBC AUTO: 115 FL (ref 79–97)
MONOCYTES # BLD AUTO: 0.6 10*3/MM3 (ref 0.1–0.9)
MONOCYTES NFR BLD AUTO: 16.7 % (ref 5–12)
NEUTROPHILS NFR BLD AUTO: 2.42 10*3/MM3 (ref 1.7–7)
NEUTROPHILS NFR BLD AUTO: 67.1 % (ref 42.7–76)
NRBC BLD AUTO-RTO: 0 /100 WBC (ref 0–0.2)
PLATELET # BLD AUTO: 115 10*3/MM3 (ref 140–450)
PMV BLD AUTO: 10.3 FL (ref 6–12)
POTASSIUM SERPL-SCNC: 4 MMOL/L (ref 3.5–4.7)
PROT SERPL-MCNC: 6.8 G/DL (ref 6.3–8)
RBC # BLD AUTO: 2.66 10*6/MM3 (ref 4.14–5.8)
SODIUM SERPL-SCNC: 137 MMOL/L (ref 134–145)
WBC # BLD AUTO: 3.6 10*3/MM3 (ref 3.4–10.8)

## 2020-09-09 PROCEDURE — 85025 COMPLETE CBC W/AUTO DIFF WBC: CPT

## 2020-09-09 PROCEDURE — 96523 IRRIG DRUG DELIVERY DEVICE: CPT

## 2020-09-09 PROCEDURE — 36591 DRAW BLOOD OFF VENOUS DEVICE: CPT

## 2020-09-09 PROCEDURE — 99214 OFFICE O/P EST MOD 30 MIN: CPT | Performed by: INTERNAL MEDICINE

## 2020-09-09 PROCEDURE — 80053 COMPREHEN METABOLIC PANEL: CPT

## 2020-09-09 NOTE — PROGRESS NOTES
REASON FOR FOLLOW UP:   1.  T4N3 Small cell lung cancer  2.  SVC syndrome  3.  Palliative therapy with carboplatin, -16 initiated on 7/8/2019.  Cycle 1 was initiated during a hospitalization.  Atezolizumab was added with cycle #2.  4 cycles of chemotherapy complete as of 9/19/2019.  4.  Port placed on 8/22/2019, delayed due to SVC syndrome  5.  Neutropenia related to therapy requiring the addition of Neulasta with cycle #3.  6.  Single agent maintenance atezolizumab initiated 10/8/2019.  Scan on 10/1/2019 revealed good disease response disease.  There is an area of concern on the adrenal gland which we will continue to monitor.  MRI brain shows no evidence of metastatic disease.  6.  CT imaging 2/4/2020 with ongoing improvement  7.  CT imaging 5/19/2020 with progression.  8.  Carboplatin/VP16 re-initiated on 5/26/2020.  Cycle 2 6/16/2020   9.  Presented at lung conference with PET scan after two cycles planned followed by radiation to a residual right hilar mass if appropriate.  Irinotecan if indicated after that.  10.  PET scan 7/1/2020 with increased uptake in the subcarinal mediastinum with decreasing left hilar and LLL adenopathy, suspected reactive nodular area at the right base, resolution of the left adrenal uptake, intensely FDG avid left parotid nodule at 1 cm (prior biopsy consistent with Warthin's tumor, benign)   11. Radiation pursued, 30 fractions completed from 7/22/2020 through 9/1/2020.      HISTORY OF PRESENT ILLNESS:  Julia Smith III is a 70 y.o. male with the above mentioned history, who returns the office today for follow up.      Radiation is complete as of 9/1/2020.    We are considering cycle 6 of chemotherapy today.    After cycle 5 his counts did drop with a WBC of 2.37 and PLT of 89,000.    He has continued to tolerate therapy well.  He does report worsening fatigue and wants to sleep a lot.  He does report worsening pain with swallowing likely related to radiation esophagitis.  He is  using a Magic mouthwash mixture which does help.  Pain has improved a little bit over the past couple of days.    He also notes one episode of syncope with coughing a few days ago.  He has had some other episodes of near syncope with coughing as well.  He states that he is drinking plenty of water.  No chest pain or shortness of breath.      Past Medical History:   Diagnosis Date   • Atrial fibrillation with RVR (CMS/[facility])    • Cancer (CMS/[facility]) 06/2019    Right lung   • Chronic cough    • COPD (chronic obstructive pulmonary disease) (CMS/[facility])    • Hearing loss    • History of shingles    • Lower back pain    • Mastoiditis    • Mild mitral regurgitation    • Other fatigue    • PAF (paroxysmal atrial fibrillation) (CMS/[facility])    • Pneumonia        Past Surgical History:   Procedure Laterality Date   • BRONCHOSCOPY N/A 6/19/2019    Procedure: BRONCHOSCOPY WITH WASHINGS AND BIOPSY AND ENDOBRONCHIAL ULTRASOUND WITH FNA;  Surgeon: Arslan Marquez MD;  Location: [facility] ENDOSCOPY;  Service: Pulmonary   • INNER EAR SURGERY Left    • MASTOIDECTOMY Left 1994   • VENOUS ACCESS DEVICE (PORT) INSERTION Right 8/22/2019    Procedure: INSERTION OF PORTACATH;  Surgeon: Ramila Gallardo MD;  Location: [facility] OR;  Service: Cardiothoracic       SOCIAL HISTORY:   reports that he has been smoking cigarettes. He has a 40.00 pack-year smoking history. He has never used smokeless tobacco. He reports that he drinks about 4.0 - 6.0 standard drinks of alcohol per week. He reports that he has current or past drug history. Drug: Marijuana.    FAMILY HISTORY:  family history includes COPD in his father; Heart disease in his brother; No Known Problems in his brother, brother, brother, mother, and sister.    ALLERGIES:  No Known Allergies    MEDICATIONS:  The medication list has been reviewed with the patient by the medical assistant, and the list has been updated in the electronic medical record, which I reviewed.  Medication dosages and  "frequencies were confirmed to be accurate.    I have reviewed the patient's medical history in detail and updated the computerized patient record.    Review of Systems   Review of Systems   Constitutional: Positive for fatigue (Worsening ). Negative for activity change, appetite change, chills and fever.   HENT: Negative for mouth sores, nosebleeds and trouble swallowing.    Respiratory: Negative for cough and shortness of breath.    Cardiovascular: Negative for chest pain and leg swelling.   Gastrointestinal: Negative for abdominal pain, constipation, diarrhea, nausea and vomiting.        Pain with swallowing related to radiation esophagitis   Genitourinary: Negative for difficulty urinating.   Musculoskeletal:        Knee pain - chronic unchanged   Skin: Negative for rash.   Neurological: Positive for syncope (Sounds like vasovagal syncope related to coughing) and numbness (minimal). Negative for dizziness.   Hematological: Negative for adenopathy. Does not bruise/bleed easily.   Psychiatric/Behavioral: Negative for sleep disturbance.   Reviewed 9/9/2020    Vitals:    09/09/20 1057   BP: 144/74   Pulse: 71   Resp: 16   Temp: 98.2 °F (36.8 °C)   TempSrc: Temporal   SpO2: 98%   Weight: 88.5 kg (195 lb)   Height: 175 cm (68.9\")   PainSc: 0-No pain  Comment: lung cancer     Physical Exam   Constitutional: He is oriented to person, place, and time. He appears well-developed and well-nourished. No distress.   He is wearing a facemask   HENT:   Head: Normocephalic and atraumatic. Hair is normal.   Eyes: Lids are normal. Pupils are equal.   Neck: Neck supple. No JVD present. No thyroid mass present.   Cardiovascular: Normal rate and regular rhythm.   Pulmonary/Chest: Effort normal and breath sounds normal. No respiratory distress.   Mediport present, benign in appearance   Abdominal: He exhibits no distension and no mass. There is no splenomegaly or hepatomegaly. There is no tenderness. There is no guarding. "   Musculoskeletal: He exhibits no edema, tenderness or deformity.   Neurological: He is alert and oriented to person, place, and time. He has normal strength.   Skin: Skin is warm and dry. No rash noted.   Prominent varicose veins in his legs   Psychiatric: His behavior is normal. Judgment and thought content normal. Cognition and memory are normal.   Flat affect, unchanged   Vitals reviewed.  Reviewed 9/9/2020      DIAGNOSTIC DATA:  Results from last 7 days   Lab Units 09/09/20  1055 09/02/20  1550   WBC 10*3/mm3 3.60 2.74*   NEUTROS ABS 10*3/mm3 2.42 1.26*   HEMOGLOBIN g/dL 10.2* 10.6*   HEMATOCRIT % 30.6* 29.6*   PLATELETS 10*3/mm3 115* 89*               IMAGING:  PET scan 7/1/20020.  Significant uptake in the mediastinum and right hilum.      MRI brain 6/30/2020 negative for metastatic disease.    ASSESSMENT:  This is a 70 y.o. male with:  1.   Small cell lung cancer originating in the right hilum:   · He has an extremely large mass there with concern for SVC compression on CT imaging from 6/4/2019.  There is some left hilar adenopathy.  · Some parotid lesions which are concerning but indeterminate, left greater than right.  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  · Staging MRI of the brain 7/10/2019 negative for metastatic disease  · He was discussed at multidisciplinary thoracic conference, with his T4 and 3 disease, concern for involvement of the right supraclavicular region and obvious left hilum, plans to add atezolizumab with cycle 2, we can consider future radiation to the residual mediastinal mass if necessary.  · Lung lesion too large to radiate in spite of the SVC narrowing.    · On 7/8/2019 we started carboplatin AUC 5 and VP16 100 mg/m2 through peripheral IVs  · Cycle 2 postponed on 7/29/2019 due to heart rate of 154.  Atezolizumab  added with cycle #2.  · Radiation can be added for a residual mediastinal mass if necessary  · CT imaging on 8/14/2019 after two cycles of therapy shows significant improvement.  · Mediport anticipated by Dr. Gallardo on 8/22/2019.  · On  8/20/2019 cycle 3 was delayed due to neutropenia and with cycle 3 we did incorporate Neulasta.  · Cycle 4 completed 9/17/2019  · Maintenance atezolizumab initiated 10/8/2019.  PET scan revealed disease response to therapy, he does have an area of hypermetabolic activity in the adrenal gland which is small and did not show up on prior exam.  We will continue to monitor this.  His MRI of the brain is also negative for metastatic disease.  · CT 2/4/2020 with ongoing improvement.  Maintenance atezolizumab pursued.    · CT imaging 5/19/2020 with progression.  Increase in the right hilar/mediastinal mass with enlarging adenopathy.    · Given that the progression is longer than 6 months after completing carbo/VP16, proceed with further carbo/VP16.  · Cycle 1 initiated 5/26/2020  · PET scan after two cycles with ongoing uptake in the right hilum, otherwise somewhat improved  · Radiation initiated 7/22/2020 with plans for 30 treatments  · Proceeded with cycle 4 carboplatin -16 7/28/2020.    · As of 8/19/2020, discussed holding further chemotherapy since he had 4 cycles versus continuing potentially with as many as 6.  He prefers to continue with therapy.    · Cycle 5 on 8/19/2020  · Radiation complete 9/1/2020  · As of 9/9/2020 we plan no further chemotherapy for now.    2.  Chronic hearing loss: History of mastoiditis.  He is not a candidate for cisplatin because of this.  Stable.    3.  Atrial fibrillation.  He was initiated on metoprolol 12.5 mg twice daily.  This did delay port placement.  The patient was taking his metoprolol inconsistently.  For cycle 2 was delayed due to heart rate of 150 7/29/2019.  He was seen by cardiology with instructions to increase his metoprolol to 25 mg twice  daily.  Heart rate stable today.  He reports no symptoms.  See below.    4.  Tobacco use.  He did utilize a NicoDerm patch while inpatient, though has continued smoking since discharge.     5.  SVC syndrome.  Improved after 2 cycles of chemotherapy.    6. Tachycardia related to atrial fibrillation as outlined above  · Heart rate on 7/29/19 was 154.  Cycle 2 was delayed.  · Metoprolol increased to 25 mg twice daily with improvement in heart rate.    7. Fatigue: Worsening.  Monitor.  Thyroid studies were normal.  Related to chemotherapy.    8.  Radiation esophagitis: He is using a Magic mouth rinse and swallow which does help.  This has improved over the past day.  Anticipate ongoing improvement since he is no longer on radiation.  He states he does not require any pain medication at this point.    9.  Syncope: Sounds vasovagal related to coughing.  Unclear if radiation has contributed to this.    PLAN:   1. No further chemotherapy  2. PET scan and brain MRI in early November with labs and a port flush at that time and I will see him back after that for follow-up.  Consider PCI after that if his scans looks okay.    Thai Morel MD

## 2020-09-10 ENCOUNTER — APPOINTMENT (OUTPATIENT)
Dept: ONCOLOGY | Facility: HOSPITAL | Age: 70
End: 2020-09-10

## 2020-09-11 ENCOUNTER — APPOINTMENT (OUTPATIENT)
Dept: ONCOLOGY | Facility: HOSPITAL | Age: 70
End: 2020-09-11

## 2020-10-16 NOTE — TELEPHONE ENCOUNTER
Approved and sent refill.     AL      Needs appt for further refills. Please get him scheduled Yuliana

## 2020-11-04 ENCOUNTER — HOSPITAL ENCOUNTER (OUTPATIENT)
Dept: MRI IMAGING | Facility: HOSPITAL | Age: 70
Discharge: HOME | End: 2020-11-04

## 2020-11-04 ENCOUNTER — HOSPITAL ENCOUNTER (OUTPATIENT)
Dept: PET IMAGING | Facility: HOSPITAL | Age: 70
Discharge: HOME | End: 2020-11-04

## 2020-11-04 ENCOUNTER — INFUSION (OUTPATIENT)
Dept: ONCOLOGY | Facility: HOSPITAL | Age: 70
End: 2020-11-04

## 2020-11-04 ENCOUNTER — LAB (OUTPATIENT)
Dept: LAB | Facility: HOSPITAL | Age: 70
End: 2020-11-04

## 2020-11-04 DIAGNOSIS — C34.11 PRIMARY MALIGNANT NEOPLASM OF RIGHT UPPER LOBE OF LUNG (HCC): ICD-10-CM

## 2020-11-04 DIAGNOSIS — C34.90 SMALL CELL LUNG CANCER (HCC): ICD-10-CM

## 2020-11-04 DIAGNOSIS — C34.90 SMALL CELL LUNG CANCER (HCC): Chronic | ICD-10-CM

## 2020-11-04 DIAGNOSIS — Z45.2 ENCOUNTER FOR FITTING AND ADJUSTMENT OF VASCULAR CATHETER: Primary | ICD-10-CM

## 2020-11-04 LAB
ALBUMIN SERPL-MCNC: 4.3 G/DL (ref 3.5–5.2)
ALBUMIN/GLOB SERPL: 1.4 G/DL (ref 1.1–2.4)
ALP SERPL-CCNC: 74 U/L (ref 38–116)
ALT SERPL W P-5'-P-CCNC: 25 U/L (ref 0–41)
ANION GAP SERPL CALCULATED.3IONS-SCNC: 12.8 MMOL/L (ref 5–15)
AST SERPL-CCNC: 26 U/L (ref 0–40)
BASOPHILS # BLD AUTO: 0.05 10*3/MM3 (ref 0–0.2)
BASOPHILS NFR BLD AUTO: 1 % (ref 0–1.5)
BILIRUB SERPL-MCNC: 0.4 MG/DL (ref 0.2–1.2)
BUN SERPL-MCNC: 11 MG/DL (ref 6–20)
BUN/CREAT SERPL: 10.6 (ref 7.3–30)
CALCIUM SPEC-SCNC: 9.5 MG/DL (ref 8.5–10.2)
CHLORIDE SERPL-SCNC: 101 MMOL/L (ref 98–107)
CO2 SERPL-SCNC: 23.2 MMOL/L (ref 22–29)
CREAT SERPL-MCNC: 1.04 MG/DL (ref 0.7–1.3)
DEPRECATED RDW RBC AUTO: 51.7 FL (ref 37–54)
EOSINOPHIL # BLD AUTO: 0.21 10*3/MM3 (ref 0–0.4)
EOSINOPHIL NFR BLD AUTO: 4.3 % (ref 0.3–6.2)
ERYTHROCYTE [DISTWIDTH] IN BLOOD BY AUTOMATED COUNT: 12.8 % (ref 12.3–15.4)
GFR SERPL CREATININE-BSD FRML MDRD: 71 ML/MIN/1.73
GLOBULIN UR ELPH-MCNC: 3 GM/DL (ref 1.8–3.5)
GLUCOSE BLDC GLUCOMTR-MCNC: 90 MG/DL (ref 70–130)
GLUCOSE SERPL-MCNC: 90 MG/DL (ref 74–124)
HCT VFR BLD AUTO: 40.9 % (ref 37.5–51)
HGB BLD-MCNC: 13.5 G/DL (ref 13–17.7)
IMM GRANULOCYTES # BLD AUTO: 0.02 10*3/MM3 (ref 0–0.05)
IMM GRANULOCYTES NFR BLD AUTO: 0.4 % (ref 0–0.5)
LYMPHOCYTES # BLD AUTO: 0.88 10*3/MM3 (ref 0.7–3.1)
LYMPHOCYTES NFR BLD AUTO: 18 % (ref 19.6–45.3)
MCH RBC QN AUTO: 36 PG (ref 26.6–33)
MCHC RBC AUTO-ENTMCNC: 33 G/DL (ref 31.5–35.7)
MCV RBC AUTO: 109.1 FL (ref 79–97)
MONOCYTES # BLD AUTO: 0.43 10*3/MM3 (ref 0.1–0.9)
MONOCYTES NFR BLD AUTO: 8.8 % (ref 5–12)
NEUTROPHILS NFR BLD AUTO: 3.31 10*3/MM3 (ref 1.7–7)
NEUTROPHILS NFR BLD AUTO: 67.5 % (ref 42.7–76)
NRBC BLD AUTO-RTO: 0 /100 WBC (ref 0–0.2)
PLATELET # BLD AUTO: 159 10*3/MM3 (ref 140–450)
PMV BLD AUTO: 9.8 FL (ref 6–12)
POTASSIUM SERPL-SCNC: 4.3 MMOL/L (ref 3.5–4.7)
PROT SERPL-MCNC: 7.3 G/DL (ref 6.3–8)
RBC # BLD AUTO: 3.75 10*6/MM3 (ref 4.14–5.8)
SODIUM SERPL-SCNC: 137 MMOL/L (ref 134–145)
WBC # BLD AUTO: 4.9 10*3/MM3 (ref 3.4–10.8)

## 2020-11-04 PROCEDURE — 78815 PET IMAGE W/CT SKULL-THIGH: CPT

## 2020-11-04 PROCEDURE — 36415 COLL VENOUS BLD VENIPUNCTURE: CPT

## 2020-11-04 PROCEDURE — 0 GADOBENATE DIMEGLUMINE 529 MG/ML SOLUTION: Performed by: INTERNAL MEDICINE

## 2020-11-04 PROCEDURE — 80053 COMPREHEN METABOLIC PANEL: CPT

## 2020-11-04 PROCEDURE — 70553 MRI BRAIN STEM W/O & W/DYE: CPT

## 2020-11-04 PROCEDURE — 25010000003 HEPARIN LOCK FLUSH PER 10 UNITS: Performed by: INTERNAL MEDICINE

## 2020-11-04 PROCEDURE — 82962 GLUCOSE BLOOD TEST: CPT

## 2020-11-04 PROCEDURE — A9577 INJ MULTIHANCE: HCPCS | Performed by: INTERNAL MEDICINE

## 2020-11-04 PROCEDURE — A9552 F18 FDG: HCPCS | Performed by: INTERNAL MEDICINE

## 2020-11-04 PROCEDURE — 0 FLUDEOXYGLUCOSE F18 SOLUTION: Performed by: INTERNAL MEDICINE

## 2020-11-04 PROCEDURE — 96523 IRRIG DRUG DELIVERY DEVICE: CPT

## 2020-11-04 PROCEDURE — 85025 COMPLETE CBC W/AUTO DIFF WBC: CPT

## 2020-11-04 RX ORDER — HEPARIN SODIUM (PORCINE) LOCK FLUSH IV SOLN 100 UNIT/ML 100 UNIT/ML
500 SOLUTION INTRAVENOUS AS NEEDED
Status: CANCELLED | OUTPATIENT
Start: 2020-11-04

## 2020-11-04 RX ORDER — HEPARIN SODIUM (PORCINE) LOCK FLUSH IV SOLN 100 UNIT/ML 100 UNIT/ML
500 SOLUTION INTRAVENOUS AS NEEDED
Status: DISCONTINUED | OUTPATIENT
Start: 2020-11-04 | End: 2020-11-04 | Stop reason: HOSPADM

## 2020-11-04 RX ORDER — SODIUM CHLORIDE 0.9 % (FLUSH) 0.9 %
10 SYRINGE (ML) INJECTION AS NEEDED
Status: CANCELLED | OUTPATIENT
Start: 2020-11-04

## 2020-11-04 RX ORDER — SODIUM CHLORIDE 0.9 % (FLUSH) 0.9 %
10 SYRINGE (ML) INJECTION AS NEEDED
Status: DISCONTINUED | OUTPATIENT
Start: 2020-11-04 | End: 2020-11-04 | Stop reason: HOSPADM

## 2020-11-04 RX ADMIN — FLUDEOXYGLUCOSE F18 1 DOSE: 300 INJECTION INTRAVENOUS at 08:53

## 2020-11-04 RX ADMIN — GADOBENATE DIMEGLUMINE 18 ML: 529 INJECTION, SOLUTION INTRAVENOUS at 12:18

## 2020-11-04 RX ADMIN — SODIUM CHLORIDE, PRESERVATIVE FREE 10 ML: 5 INJECTION INTRAVENOUS at 13:15

## 2020-11-04 RX ADMIN — Medication 500 UNITS: at 13:16

## 2020-11-08 NOTE — PROGRESS NOTES
REASON FOR FOLLOW UP:   1.  T4N3 Small cell lung cancer  2.  SVC syndrome  3.  Palliative therapy with carboplatin, -16 initiated on 7/8/2019.  Cycle 1 was initiated during a hospitalization.  Atezolizumab was added with cycle #2.  4 cycles of chemotherapy complete as of 9/19/2019.  4.  Port placed on 8/22/2019, delayed due to SVC syndrome  5.  Neutropenia related to therapy requiring the addition of Neulasta with cycle #3.  6.  Single agent maintenance atezolizumab initiated 10/8/2019.  Scan on 10/1/2019 revealed good disease response disease.  There is an area of concern on the adrenal gland which we will continue to monitor.  MRI brain shows no evidence of metastatic disease.  6.  CT imaging 2/4/2020 with ongoing improvement  7.  CT imaging 5/19/2020 with progression.  8.  Carboplatin/VP16 re-initiated on 5/26/2020.  Cycle 2 6/16/2020   9.  Presented at lung conference with PET scan after two cycles planned followed by radiation to a residual right hilar mass if appropriate.  Irinotecan if indicated after that.  10.  PET scan 7/1/2020 with increased uptake in the subcarinal mediastinum with decreasing left hilar and LLL adenopathy, suspected reactive nodular area at the right base, resolution of the left adrenal uptake, intensely FDG avid left parotid nodule at 1 cm (prior biopsy consistent with Warthin's tumor, benign)   11. Radiation pursued, 30 fractions completed from 7/22/2020 through 9/1/2020.  12. MRI brain 11/4/2020 without change, no evidence for metastatic disease.    13. PET scan 11/4/2020. Improvement in the subcarinal and right hilar nodes with an SUV of 3.7 compared to 20.8 previously. Evidence for esophagitis. Right middle lobe changes resolved. No new disease.         HISTORY OF PRESENT ILLNESS:  Julia Smith III is a 70 y.o. male with the above mentioned history, who returns the office today for follow up.      He states that overall he is doing well.  Ongoing fatigue but this is improving.   He believes that his shortness of breath has improved.  He drinks about 4-5 beers per day and continues to smoke some cigarettes while he is drinking beer as well.  He does report some ongoing numbness in his toes which is mild and he reports intermittent muscle cramping as well.      Past Medical History:   Diagnosis Date   • Atrial fibrillation with RVR (CMS/[facility])    • Cancer (CMS/HCC) 06/2019    Right lung   • Chronic cough    • COPD (chronic obstructive pulmonary disease) (CMS/[facility])    • Hearing loss    • History of shingles    • Lower back pain    • Mastoiditis    • Mild mitral regurgitation    • Other fatigue    • PAF (paroxysmal atrial fibrillation) (CMS/[facility])    • Pneumonia        Past Surgical History:   Procedure Laterality Date   • BRONCHOSCOPY N/A 6/19/2019    Procedure: BRONCHOSCOPY WITH WASHINGS AND BIOPSY AND ENDOBRONCHIAL ULTRASOUND WITH FNA;  Surgeon: Arslan Marquez MD;  Location: [facility] ENDOSCOPY;  Service: Pulmonary   • INNER EAR SURGERY Left    • MASTOIDECTOMY Left 1994   • VENOUS ACCESS DEVICE (PORT) INSERTION Right 8/22/2019    Procedure: INSERTION OF PORTACATH;  Surgeon: Ramila Gallardo MD;  Location: [facility] OR;  Service: Cardiothoracic       SOCIAL HISTORY:   reports that he has been smoking cigarettes. He has a 40.00 pack-year smoking history. He has never used smokeless tobacco. He reports current alcohol use of about 4.0 - 6.0 standard drinks of alcohol per week. He reports current drug use. Drug: Marijuana.    FAMILY HISTORY:  family history includes COPD in his father; Heart disease in his brother; No Known Problems in his brother, brother, brother, mother, and sister.    ALLERGIES:  No Known Allergies    MEDICATIONS:  The medication list has been reviewed with the patient by the medical assistant, and the list has been updated in the electronic medical record, which I reviewed.  Medication dosages and frequencies were confirmed to be accurate.    I have reviewed the patient's  "medical history in detail and updated the computerized patient record.    Review of Systems   Review of Systems   Constitutional: Positive for fatigue (Improving). Negative for activity change, appetite change, chills and fever.   HENT: Negative for mouth sores, nosebleeds and trouble swallowing.    Respiratory: Positive for shortness of breath (Improving). Negative for cough.    Cardiovascular: Negative for chest pain and leg swelling.   Gastrointestinal: Negative for abdominal pain, constipation, diarrhea, nausea and vomiting.        Pain with swallowing related to radiation esophagitis   Genitourinary: Negative for difficulty urinating.   Musculoskeletal:        Knee pain - chronic unchanged   Skin: Negative for rash.   Neurological: Positive for numbness (Ongoing mild numbness in his toes without neuropathic pain). Negative for dizziness and syncope.   Hematological: Negative for adenopathy. Does not bruise/bleed easily.   Psychiatric/Behavioral: Negative for sleep disturbance.   Reviewed 11/9/2020    Vitals:    11/09/20 0949   BP: 124/70   Pulse: 88   Resp: 16   Temp: 97.8 °F (36.6 °C)   TempSrc: Temporal   SpO2: 97%   Weight: 89.7 kg (197 lb 11.2 oz)   Height: 175 cm (68.9\")   PainSc: 0-No pain  Comment: lung cancer     Physical Exam   Constitutional: He is oriented to person, place, and time. He appears well-developed. No distress.   He is wearing a facemask   HENT:   Head: Normocephalic and atraumatic. Hair is normal.   Eyes: Lids are normal. Pupils are equal.   Neck: Neck supple. No JVD present. No thyroid mass present.   Cardiovascular: Normal rate and regular rhythm.   Pulmonary/Chest: Effort normal and breath sounds normal. No respiratory distress.   Mediport present in the right subclavian area, benign in appearance   Abdominal: He exhibits no distension and no mass. There is no splenomegaly or hepatomegaly. There is no abdominal tenderness. There is no guarding.   Musculoskeletal: No tenderness or " deformity.   Neurological: He is alert and oriented to person, place, and time.   Skin: Skin is warm and dry. No rash noted.   Prominent varicose veins in his legs   Psychiatric: His behavior is normal. Judgment and thought content normal.   Flat affect, unchanged   Vitals reviewed.  Reviewed 11/9/2020      DIAGNOSTIC DATA:  Results from last 7 days   Lab Units 11/04/20  0845   WBC 10*3/mm3 4.90   NEUTROS ABS 10*3/mm3 3.31   HEMOGLOBIN g/dL 13.5   HEMATOCRIT % 40.9   PLATELETS 10*3/mm3 159     Results from last 7 days   Lab Units 11/04/20  0845   SODIUM mmol/L 137   POTASSIUM mmol/L 4.3   CHLORIDE mmol/L 101   CO2 mmol/L 23.2   BUN mg/dL 11   CREATININE mg/dL 1.04   CALCIUM mg/dL 9.5   ALBUMIN g/dL 4.30   BILIRUBIN mg/dL 0.4   ALK PHOS U/L 74   ALT (SGPT) U/L 25   AST (SGOT) U/L 26   GLUCOSE mg/dL 90           IMAGING:  MRI brain 11/4/2020 negative for mets.    PET scan 11/4/2020. Improvement in the subcarinal and right hilar nodes with an SUV of 3.7 compared to 20.8 previously. Evidence for esophagitis. Right middle lobe changes resolved. Other stable changes that were previously noted. No new disease.       Images personally reviewed.    ASSESSMENT:  This is a 70 y.o. male with:  1.   Small cell lung cancer originating in the right hilum:   · He has an extremely large mass there with concern for SVC compression on CT imaging from 6/4/2019.  There is some left hilar adenopathy.  · Some parotid lesions which are concerning but indeterminate, left greater than right.  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  · Staging MRI of the brain 7/10/2019 negative for metastatic disease  · He was discussed at multidisciplinary thoracic conference, with his T4 and 3 disease, concern for involvement of the right supraclavicular region and obvious left hilum,  plans to add atezolizumab with cycle 2, we can consider future radiation to the residual mediastinal mass if necessary.  · Lung lesion too large to radiate in spite of the SVC narrowing.    · On 7/8/2019 we started carboplatin AUC 5 and VP16 100 mg/m2 through peripheral IVs  · Cycle 2 postponed on 7/29/2019 due to heart rate of 154.  Atezolizumab added with cycle #2.  · Radiation can be added for a residual mediastinal mass if necessary  · CT imaging on 8/14/2019 after two cycles of therapy shows significant improvement.  · Mediport anticipated by Dr. Gallardo on 8/22/2019.  · On  8/20/2019 cycle 3 was delayed due to neutropenia and with cycle 3 we did incorporate Neulasta.  · Cycle 4 completed 9/17/2019  · Maintenance atezolizumab initiated 10/8/2019.  PET scan revealed disease response to therapy, he does have an area of hypermetabolic activity in the adrenal gland which is small and did not show up on prior exam.  We will continue to monitor this.  His MRI of the brain is also negative for metastatic disease.  · CT 2/4/2020 with ongoing improvement.  Maintenance atezolizumab pursued.    · CT imaging 5/19/2020 with progression.  Increase in the right hilar/mediastinal mass with enlarging adenopathy.    · Given that the progression is longer than 6 months after completing carbo/VP16, proceed with further carbo/VP16.  · Cycle 1 initiated 5/26/2020  · PET scan after two cycles with ongoing uptake in the right hilum, otherwise somewhat improved  · Radiation initiated 7/22/2020 with plans for 30 treatments  · Proceeded with cycle 4 carboplatin -16 7/28/2020.    · As of 8/19/2020, discussed holding further chemotherapy since he had 4 cycles versus continuing potentially with as many as 6.  He prefers to continue with therapy.    · Cycle 5 on 8/19/2020  · Radiation complete 9/1/2020  · As of 9/9/2020 we plan no further chemotherapy for now.  · MRI brain 11/4/2020 negative for mets.  · PET scan 11/4/2020. Improvement in  the subcarinal and right hilar nodes with an SUV of 3.7 compared to 20.8 previously. Evidence for esophagitis. Right middle lobe changes resolved. Other stable changes that were previously noted. No new disease.       2.  Chronic hearing loss: History of mastoiditis.  He is not a candidate for cisplatin because of this.  Stable.    3.  Atrial fibrillation.  He was initiated on metoprolol 12.5 mg twice daily.  This did delay port placement.  The patient was taking his metoprolol inconsistently.  For cycle 2 was delayed due to heart rate of 150 7/29/2019.  He was seen by cardiology with instructions to increase his metoprolol to 25 mg twice daily.  Heart rate stable today.  He reports no symptoms.  See below.    4.  Tobacco use.  He did utilize a NicoDerm patch while inpatient, though has continued smoking since discharge.     5.  SVC syndrome.  Improved after 2 cycles of chemotherapy.    6. Tachycardia related to atrial fibrillation as outlined above  · Heart rate on 7/29/19 was 154.  Cycle 2 was delayed.  · Metoprolol increased to 25 mg twice daily with improvement in heart rate.    7. Fatigue: Improving since he has completed therapy    8.  Radiation esophagitis: Improved since completing therapy.    9.  Syncope: Sounds vasovagal related to coughing.  Unclear if radiation has contributed to this.  Not currently an issue.    PLAN:   1. No further chemotherapy and we will continue to proceed with observation  2. PCI can be considered. Will discuss with Dr. Giang.   3. Port flush in about two months with a CBC and CMP  4. CT imaging with a CBC and CMP in about 4 months and I will see him back after that for follow-up    Thai Morel MD

## 2020-11-09 ENCOUNTER — OFFICE VISIT (OUTPATIENT)
Dept: ONCOLOGY | Facility: CLINIC | Age: 70
End: 2020-11-09

## 2020-11-09 ENCOUNTER — APPOINTMENT (OUTPATIENT)
Dept: LAB | Facility: HOSPITAL | Age: 70
End: 2020-11-09

## 2020-11-09 VITALS
RESPIRATION RATE: 16 BRPM | DIASTOLIC BLOOD PRESSURE: 70 MMHG | BODY MASS INDEX: 29.28 KG/M2 | OXYGEN SATURATION: 97 % | WEIGHT: 197.7 LBS | TEMPERATURE: 97.8 F | HEART RATE: 88 BPM | HEIGHT: 69 IN | SYSTOLIC BLOOD PRESSURE: 124 MMHG

## 2020-11-09 DIAGNOSIS — C34.90 SMALL CELL LUNG CANCER (HCC): Primary | Chronic | ICD-10-CM

## 2020-11-09 DIAGNOSIS — C34.11 PRIMARY MALIGNANT NEOPLASM OF RIGHT UPPER LOBE OF LUNG (HCC): ICD-10-CM

## 2020-11-09 PROCEDURE — 99214 OFFICE O/P EST MOD 30 MIN: CPT | Performed by: INTERNAL MEDICINE

## 2020-12-15 DIAGNOSIS — C34.90 SMALL CELL LUNG CANCER (HCC): Primary | Chronic | ICD-10-CM

## 2021-01-05 ENCOUNTER — INFUSION (OUTPATIENT)
Dept: ONCOLOGY | Facility: HOSPITAL | Age: 71
End: 2021-01-05

## 2021-01-05 ENCOUNTER — TELEPHONE (OUTPATIENT)
Dept: ONCOLOGY | Facility: CLINIC | Age: 71
End: 2021-01-05

## 2021-01-05 DIAGNOSIS — C34.11 PRIMARY MALIGNANT NEOPLASM OF RIGHT UPPER LOBE OF LUNG (HCC): ICD-10-CM

## 2021-01-05 DIAGNOSIS — Z45.2 ENCOUNTER FOR FITTING AND ADJUSTMENT OF VASCULAR CATHETER: Primary | ICD-10-CM

## 2021-01-05 DIAGNOSIS — C34.90 SMALL CELL LUNG CANCER (HCC): ICD-10-CM

## 2021-01-05 LAB
ALBUMIN SERPL-MCNC: 4.1 G/DL (ref 3.5–5.2)
ALBUMIN/GLOB SERPL: 1.2 G/DL (ref 1.1–2.4)
ALP SERPL-CCNC: 83 U/L (ref 38–116)
ALT SERPL W P-5'-P-CCNC: 25 U/L (ref 0–41)
ANION GAP SERPL CALCULATED.3IONS-SCNC: 11.2 MMOL/L (ref 5–15)
AST SERPL-CCNC: 24 U/L (ref 0–40)
BASOPHILS # BLD AUTO: 0.06 10*3/MM3 (ref 0–0.2)
BASOPHILS NFR BLD AUTO: 1 % (ref 0–1.5)
BILIRUB SERPL-MCNC: 0.5 MG/DL (ref 0.2–1.2)
BUN SERPL-MCNC: 12 MG/DL (ref 6–20)
BUN/CREAT SERPL: 11.5 (ref 7.3–30)
CALCIUM SPEC-SCNC: 9.3 MG/DL (ref 8.5–10.2)
CHLORIDE SERPL-SCNC: 100 MMOL/L (ref 98–107)
CO2 SERPL-SCNC: 24.8 MMOL/L (ref 22–29)
CREAT SERPL-MCNC: 1.04 MG/DL (ref 0.7–1.3)
DEPRECATED RDW RBC AUTO: 50.4 FL (ref 37–54)
EOSINOPHIL # BLD AUTO: 0.11 10*3/MM3 (ref 0–0.4)
EOSINOPHIL NFR BLD AUTO: 1.9 % (ref 0.3–6.2)
ERYTHROCYTE [DISTWIDTH] IN BLOOD BY AUTOMATED COUNT: 13.2 % (ref 12.3–15.4)
GFR SERPL CREATININE-BSD FRML MDRD: 71 ML/MIN/1.73
GLOBULIN UR ELPH-MCNC: 3.3 GM/DL (ref 1.8–3.5)
GLUCOSE SERPL-MCNC: 88 MG/DL (ref 74–124)
HCT VFR BLD AUTO: 44.7 % (ref 37.5–51)
HGB BLD-MCNC: 14.7 G/DL (ref 13–17.7)
IMM GRANULOCYTES # BLD AUTO: 0.01 10*3/MM3 (ref 0–0.05)
IMM GRANULOCYTES NFR BLD AUTO: 0.2 % (ref 0–0.5)
LYMPHOCYTES # BLD AUTO: 0.84 10*3/MM3 (ref 0.7–3.1)
LYMPHOCYTES NFR BLD AUTO: 14.3 % (ref 19.6–45.3)
MCH RBC QN AUTO: 33.6 PG (ref 26.6–33)
MCHC RBC AUTO-ENTMCNC: 32.9 G/DL (ref 31.5–35.7)
MCV RBC AUTO: 102.1 FL (ref 79–97)
MONOCYTES # BLD AUTO: 0.74 10*3/MM3 (ref 0.1–0.9)
MONOCYTES NFR BLD AUTO: 12.6 % (ref 5–12)
NEUTROPHILS NFR BLD AUTO: 4.13 10*3/MM3 (ref 1.7–7)
NEUTROPHILS NFR BLD AUTO: 70 % (ref 42.7–76)
NRBC BLD AUTO-RTO: 0 /100 WBC (ref 0–0.2)
PLATELET # BLD AUTO: 147 10*3/MM3 (ref 140–450)
PMV BLD AUTO: 9.6 FL (ref 6–12)
POTASSIUM SERPL-SCNC: 4.4 MMOL/L (ref 3.5–4.7)
PROT SERPL-MCNC: 7.4 G/DL (ref 6.3–8)
RBC # BLD AUTO: 4.38 10*6/MM3 (ref 4.14–5.8)
SODIUM SERPL-SCNC: 136 MMOL/L (ref 134–145)
WBC # BLD AUTO: 5.89 10*3/MM3 (ref 3.4–10.8)

## 2021-01-05 PROCEDURE — 85025 COMPLETE CBC W/AUTO DIFF WBC: CPT

## 2021-01-05 PROCEDURE — 25010000003 HEPARIN LOCK FLUSH PER 10 UNITS: Performed by: INTERNAL MEDICINE

## 2021-01-05 PROCEDURE — 36591 DRAW BLOOD OFF VENOUS DEVICE: CPT

## 2021-01-05 PROCEDURE — 80053 COMPREHEN METABOLIC PANEL: CPT

## 2021-01-05 RX ORDER — HEPARIN SODIUM (PORCINE) LOCK FLUSH IV SOLN 100 UNIT/ML 100 UNIT/ML
500 SOLUTION INTRAVENOUS AS NEEDED
Status: DISCONTINUED | OUTPATIENT
Start: 2021-01-05 | End: 2021-01-05 | Stop reason: HOSPADM

## 2021-01-05 RX ORDER — HEPARIN SODIUM (PORCINE) LOCK FLUSH IV SOLN 100 UNIT/ML 100 UNIT/ML
500 SOLUTION INTRAVENOUS AS NEEDED
Status: CANCELLED | OUTPATIENT
Start: 2021-01-05

## 2021-01-05 RX ORDER — SODIUM CHLORIDE 0.9 % (FLUSH) 0.9 %
10 SYRINGE (ML) INJECTION AS NEEDED
Status: DISCONTINUED | OUTPATIENT
Start: 2021-01-05 | End: 2021-01-05 | Stop reason: HOSPADM

## 2021-01-05 RX ORDER — SODIUM CHLORIDE 0.9 % (FLUSH) 0.9 %
10 SYRINGE (ML) INJECTION AS NEEDED
Status: CANCELLED | OUTPATIENT
Start: 2021-01-05

## 2021-01-05 RX ADMIN — SODIUM CHLORIDE, PRESERVATIVE FREE 10 ML: 5 INJECTION INTRAVENOUS at 14:30

## 2021-01-05 RX ADMIN — Medication 500 UNITS: at 14:30

## 2021-01-05 NOTE — TELEPHONE ENCOUNTER
Tamie from  Radiation is asking that someone tell pt to stop by the radiation center after his lab appt today so that a consultation can be scheduled

## 2021-01-15 ENCOUNTER — CONSULT (OUTPATIENT)
Dept: RADIATION ONCOLOGY | Facility: HOSPITAL | Age: 71
End: 2021-01-15

## 2021-01-15 ENCOUNTER — APPOINTMENT (OUTPATIENT)
Dept: RADIATION ONCOLOGY | Facility: HOSPITAL | Age: 71
End: 2021-01-15

## 2021-01-15 VITALS
TEMPERATURE: 97.3 F | DIASTOLIC BLOOD PRESSURE: 90 MMHG | HEART RATE: 80 BPM | WEIGHT: 200 LBS | BODY MASS INDEX: 29.62 KG/M2 | OXYGEN SATURATION: 97 % | SYSTOLIC BLOOD PRESSURE: 157 MMHG

## 2021-01-15 DIAGNOSIS — C34.90 SMALL CELL LUNG CANCER (HCC): Primary | ICD-10-CM

## 2021-01-15 PROCEDURE — 99406 BEHAV CHNG SMOKING 3-10 MIN: CPT | Performed by: RADIOLOGY

## 2021-01-15 PROCEDURE — 77263 THER RADIOLOGY TX PLNG CPLX: CPT | Performed by: RADIOLOGY

## 2021-01-15 PROCEDURE — 99215 OFFICE O/P EST HI 40 MIN: CPT | Performed by: RADIOLOGY

## 2021-01-15 NOTE — PROGRESS NOTES
DIAGNOSIS and REASON FOR CONSULTATION: Small cell lung cancer (CMS/HCC)   - for advice and recommendations regarding the diagnosis    CHIEF COMPLAINT:  For advice and recommendations regarding Small cell lung cancer (CMS/HCC)  HISTORY OF PRESENT ILLNESS:  The patient is a 70 y.o. year old male who is well-known to me from recent radiation therapy given for his lung primary.  We delivered 6000 cGy in 30 treatments to the chest from July 22 through September 1, 2020.    In short, he presented in June, 2019 with hemoptysis and weight loss. CT of the chest on June 4, 2019 showed a large mass in the mediastinum and right hilum measuring 12.3 x 8.7 x 13 cm, with narrowing of the right mainstem bronchus and occulusion of the right upper lobe and right interlobar bronchus, with extension to the left mediastinum and hilum, causing SVC narrowing with collateral vessels noted. Also noted is a 1.4 cm left lower lobe and a right pleural effusion. Bronchoscopy on June 19, 2019 showed small cell carcinoma from a right mainstem endobronchial mass, station 7 node and station 10R node. PET scan on July 1, 2019 showed increased uptake in a 1.1 cm left parotid gland nodule and a smaller nodule in the right parotid gland, SUV of 22.9 n the large central chest mass, measuring 11.5 in greatest dimension with again extension into the right middle and lower lobes, near and complete stenosis of right sided bronchi and stenosis of proximal left main bronchus, multiple subcentimeter lung nodules in the right lung, hypermetabolic left infrahilar node measuring 1.1 cm iwht an SUV of 6.3, left perihilar lung nodule with an SUV of 13.6 measuring 1.4 cm, enlarged node adjacent to the GE junction measuring 1.2 cm without hypermetabolism.  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 He was started immediately on chemotherapy with  carboplatin and etoposide with the addition of atezolizumab with cycle 2.  He had a very nice early response with significant improvement in his shortness of breath and SVC, allowing the port placement after 2 cycles.  He completed 4 cycles on September 19, 2019 and PET scan on October 1, 2019 showed decrease in the subcarinal lymphadenopathy measuring approximately 3.2 cm in AP diameter, decrease in the remaining mediastinal and right hilar lymphadenopathy but still with narrowing of the right middle lobe bronchus.  Significant decrease was noted in the nodule along the left lower lobe bronchovascular bundle.  No new evidence of disease was noted.  MRI of the brain on the same date showed no evidence of metastatic disease. Therefore he continued on treatment with atezolizumab only and his next set of imaging on February 4, 2020 showed again a poorly defined mass involving the mediastinum and right hilum measuring approximately 2.1 x 5.9 cm, significantly decreased and no new evidence of metastatic disease.  Therefore he continued on with treatment with atezolizumab but imaging on May 19, 2020 unfortunately showed an increase in the size of the mediastinal mass now measuring 7 x 4 cm, obstructing the right lower lobe and right middle lobe segmental bronchi, contiguous with the subcarinal mass which was also increased from 2.1 to 3.5 cm, exerting mass-effect on the posterior superior wall of the right atrium and extending into the subcarinal and paraesophageal region.  Increase was noted in a 7 mm node anterior to the SVC and several borderline enlarged AP window nodes were noted.  No other evidence of metastatic disease was noted. Given this evidence of progression, carboplatin/-16 was reinitiated on May 26, 2020 with initiation of his second cycle on June 16, 2020.  MRI of the brain on June 30, 2020 continues to show no evidence of metastatic disease and finally PET scan on July 1, 2020 showed the 1 cm left  parotid gland which was stable, hypermetabolism within the large mame conglomerate centered within the subcarinal aspect of the mediastinum with extension into the right hilum with an SUV of 20.8, increased from 6.2.  Moderate activity was noted extending along the right hilum with an SUV of 4.8 measuring 6.8 x 4.1 cm.  Mass continues to compress the mainstem bronchus. He received his third cycle of carbo/-16 on July 7, 2020 and following that, we added the course of radiation therapy described above.    His follow-up PET scan on November 4, 2020 showed further decrease in the subcarinal and right hilar mame conglomerate with no new evidence of metastatic disease noted and MRI of the brain on the same date showed mild small vessel disease but no evidence of metastasis.    Given his nice response, no further chemotherapy was planned and I was asked to see the patient at the request of the referring provider noted above for advice and recommendations regarding this diagnosis and possible prophylactic cranial radiation.     Clinically, he is doing wonderfully well. He feels good and maintains an active performance status. He denies any change in his baseline SOA, no cough, hemoptysis, appetite change.    Performance Status: (1) Restricted in physically strenuous activity, ambulatory and able to do work of light nature  Objective   Past Medical History: he  has a past medical history of Atrial fibrillation with RVR (CMS/[facility]), Cancer (CMS/[facility]) (06/2019), Chronic cough, COPD (chronic obstructive pulmonary disease) (CMS/[facility]), Hearing loss, History of shingles, Lower back pain, Lung cancer (CMS/HCC), Mastoiditis, Mild mitral regurgitation, Other fatigue, PAF (paroxysmal atrial fibrillation) (CMS/[facility]), and Pneumonia.    Past Surgical History:  he has a past surgical history that includes Inner ear surgery (Left); Mastoidectomy (Left, 1994); Bronchoscopy (N/A, 6/19/2019); and Venous Access Device (Port) (Right,  8/22/2019).    Meds:    Current Outpatient Medications:   •  aspirin 81 MG tablet, Take 1 tablet by mouth Daily., Disp: 30 tablet, Rfl: 11  •  metoprolol tartrate (LOPRESSOR) 25 MG tablet, TAKE 1 TABLET BY MOUTH TWO TIMES A DAY , Disp: 180 tablet, Rfl: 1  •  aluminum-magnesium hydroxide 200-200 MG/5ML suspension, diphenhydrAMINE 12.5 MG/5ML liquid, Lidocaine Viscous HCl 2 % solution, nystatin 192283 UNIT/ML suspension, Swish and swallow 10 mL 4 (Four) Times a Day Before Meals & at Bedtime., Disp: 400 mL, Rfl: 2    Allergies:  No Known Allergies    Family History:  his family history includes COPD in his father; Heart disease in his brother; No Known Problems in his brother, brother, brother, mother, and sister.    Social History:  he  reports that he has been smoking cigarettes. He has a 40.00 pack-year smoking history. He has never used smokeless tobacco. He reports current alcohol use of about 4.0 - 6.0 standard drinks of alcohol per week. He reports current drug use. Drug: Marijuana.    Pertinent Findings on   Review of Systems   Constitutional: Positive for fatigue. Negative for appetite change, chills, diaphoresis, fever and unexpected weight change.   HENT:   Positive for hearing loss and tinnitus. Negative for lump/mass, mouth sores, nosebleeds, sore throat, trouble swallowing and voice change.    Eyes: Negative for eye problems.   Respiratory: Positive for shortness of breath. Negative for chest tightness, cough, hemoptysis and wheezing.    Cardiovascular: Negative for chest pain, leg swelling and palpitations.   Gastrointestinal: Negative for abdominal distention, abdominal pain, blood in stool, constipation, diarrhea, nausea, rectal pain and vomiting.   Endocrine: Negative for hot flashes.   Genitourinary: Negative for bladder incontinence, difficulty urinating, dysuria, frequency, hematuria, nocturia and pelvic pain.    Musculoskeletal: Negative for arthralgias, back pain, flank pain, gait problem,  myalgias, neck pain and neck stiffness.   Skin: Negative for itching and rash.   Neurological: Negative for dizziness, extremity weakness, gait problem, headaches, light-headedness, numbness, seizures and speech difficulty.   Hematological: Negative for adenopathy. Bruises/bleeds easily.   Psychiatric/Behavioral: Negative for confusion, decreased concentration, depression, sleep disturbance and suicidal ideas. The patient is not nervous/anxious.    :     Subjective   Vitals:    01/15/21 1056   BP: 157/90   Pulse: 80   Temp: 97.3 °F (36.3 °C)   TempSrc: Temporal   SpO2: 97%   Weight: 90.7 kg (200 lb)   PainSc: 0-No pain       Pertinent Findings on:  Physical Exam   Constitutional: He is oriented to person, place, and time. He appears well-developed.   HENT:   Head: Normocephalic and atraumatic.   Neck: Normal range of motion.   Pulmonary/Chest: Effort normal.   Abdominal: Soft.   Musculoskeletal: Normal range of motion.   Neurological: He is alert and oriented to person, place, and time.   Skin: Skin is warm and dry.   Psychiatric: His behavior is normal. Judgment and thought content normal.   Vitals reviewed.         Assessment: Small cell lung cancer (CMS/HCC)  This assessment comes from my review of the imaging, pathology, physician notes and other pertinent information as mentioned.    Plan:   We discussed current recommendations for prophylactic treatment of the brain with a negative MRI to decrease the chances of metastatic spread in patients who have had a nice response to systemic treatment. We reviewed the percentages associated with the treatment and all questions were answered in that regard.   I did ask that he undergo a repeat MRI of the brain as his last was almost 3 months ago now and he was agreeable.    We discussed a course of approximately 2400 cGy given in 8 treatments to the whole brain over 1 and 1/2 weeks.  We discussed the acute side effects of alopecia, irritation of the scalp, headache,  nausea and vomiting.  We will hold off on the steroids if we can but discussed letting us know if those develop at any point. We also discussed the long term possibility of affecting the mentation and memory though the chances of that are small and again, all questions were answered.    As his performance status remains excellent and mentation without deficit, we discussed the new literature regarding the benefit of hippocampal sparing and the improvement in retaining mentation after treatment when using this technique and he asked appropriate questions. The patient is aware of significant cognitive deficits seen with whole brain historically and we discussed the improvement in the technology and the improvement in long term side effects with new techniques and this sophisticated treatment planning and delivery technique.  This will also require image guided treatment guidance to insure hippocampal avoidance daily.     He wishes to discuss further with his wife while awaiting the MRI. Following that, we will discuss again and determine if he wishes to pursue prophylactic treatment.      Objective   I spent greater than 45 minutes in face-to-face time with the patient and 25 minutes of that time were spent in counseling and coordination of care, including review of imaging and pathology; indications, goals, logistics, alternatives and risks - both common and rare - for my recommendations as well as surveillance and potential outcomes.    I spent approximately 12 minutes discussing smoking and tobacco cessation, intensive, greater than 10 minutes.

## 2021-01-28 ENCOUNTER — HOSPITAL ENCOUNTER (OUTPATIENT)
Dept: MRI IMAGING | Facility: HOSPITAL | Age: 71
Discharge: HOME | End: 2021-01-28
Admitting: RADIOLOGY

## 2021-01-28 DIAGNOSIS — C34.90 SMALL CELL LUNG CANCER (HCC): ICD-10-CM

## 2021-01-28 PROCEDURE — 70553 MRI BRAIN STEM W/O & W/DYE: CPT

## 2021-01-28 PROCEDURE — A9577 INJ MULTIHANCE: HCPCS | Performed by: RADIOLOGY

## 2021-01-28 PROCEDURE — 0 GADOBENATE DIMEGLUMINE 529 MG/ML SOLUTION: Performed by: RADIOLOGY

## 2021-01-28 RX ADMIN — GADOBENATE DIMEGLUMINE 19 ML: 529 INJECTION, SOLUTION INTRAVENOUS at 20:20

## 2021-02-02 ENCOUNTER — TELEPHONE (OUTPATIENT)
Dept: RADIATION ONCOLOGY | Facility: HOSPITAL | Age: 71
End: 2021-02-02

## 2021-02-02 NOTE — TELEPHONE ENCOUNTER
Left pt a message for him to call back to schedule a telephone visit for this week to further discuss prophylactic radiation treatments to his brain per Dr. Giang.

## 2021-02-12 ENCOUNTER — APPOINTMENT (OUTPATIENT)
Dept: RADIATION ONCOLOGY | Facility: HOSPITAL | Age: 71
End: 2021-02-12

## 2021-02-12 PROCEDURE — 77334 RADIATION TREATMENT AID(S): CPT | Performed by: RADIOLOGY

## 2021-02-15 PROCEDURE — 77300 RADIATION THERAPY DOSE PLAN: CPT | Performed by: RADIOLOGY

## 2021-02-15 PROCEDURE — 77338 DESIGN MLC DEVICE FOR IMRT: CPT | Performed by: RADIOLOGY

## 2021-02-15 PROCEDURE — 77301 RADIOTHERAPY DOSE PLAN IMRT: CPT | Performed by: RADIOLOGY

## 2021-02-16 PROCEDURE — 77386: CPT | Performed by: RADIOLOGY

## 2021-02-16 PROCEDURE — 77014 CHG CT GUIDANCE RADIATION THERAPY FLDS PLACEMENT: CPT | Performed by: RADIOLOGY

## 2021-02-17 PROCEDURE — 77386: CPT | Performed by: RADIOLOGY

## 2021-02-17 PROCEDURE — 77014 CHG CT GUIDANCE RADIATION THERAPY FLDS PLACEMENT: CPT | Performed by: RADIOLOGY

## 2021-02-18 PROCEDURE — 77014 CHG CT GUIDANCE RADIATION THERAPY FLDS PLACEMENT: CPT | Performed by: RADIOLOGY

## 2021-02-18 PROCEDURE — 77386: CPT | Performed by: RADIOLOGY

## 2021-02-19 ENCOUNTER — INFUSION (OUTPATIENT)
Dept: ONCOLOGY | Facility: HOSPITAL | Age: 71
End: 2021-02-19

## 2021-02-19 ENCOUNTER — HOSPITAL ENCOUNTER (OUTPATIENT)
Dept: PET IMAGING | Facility: HOSPITAL | Age: 71
Discharge: HOME | End: 2021-02-19
Admitting: INTERNAL MEDICINE

## 2021-02-19 DIAGNOSIS — C34.11 PRIMARY MALIGNANT NEOPLASM OF RIGHT UPPER LOBE OF LUNG (HCC): ICD-10-CM

## 2021-02-19 DIAGNOSIS — C34.90 SMALL CELL LUNG CANCER (HCC): ICD-10-CM

## 2021-02-19 DIAGNOSIS — C34.90 SMALL CELL LUNG CANCER (HCC): Chronic | ICD-10-CM

## 2021-02-19 LAB
ALBUMIN SERPL-MCNC: 4.2 G/DL (ref 3.5–5.2)
ALBUMIN/GLOB SERPL: 1.3 G/DL (ref 1.1–2.4)
ALP SERPL-CCNC: 79 U/L (ref 38–116)
ALT SERPL W P-5'-P-CCNC: 29 U/L (ref 0–41)
ANION GAP SERPL CALCULATED.3IONS-SCNC: 15 MMOL/L (ref 5–15)
AST SERPL-CCNC: 30 U/L (ref 0–40)
BASOPHILS # BLD AUTO: 0.06 10*3/MM3 (ref 0–0.2)
BASOPHILS NFR BLD AUTO: 1.3 % (ref 0–1.5)
BILIRUB SERPL-MCNC: 0.4 MG/DL (ref 0.2–1.2)
BUN SERPL-MCNC: 12 MG/DL (ref 6–20)
BUN/CREAT SERPL: 11.2 (ref 7.3–30)
CALCIUM SPEC-SCNC: 9.5 MG/DL (ref 8.5–10.2)
CHLORIDE SERPL-SCNC: 102 MMOL/L (ref 98–107)
CO2 SERPL-SCNC: 21 MMOL/L (ref 22–29)
CREAT BLDA-MCNC: 1 MG/DL (ref 0.6–1.3)
CREAT SERPL-MCNC: 1.07 MG/DL (ref 0.7–1.3)
DEPRECATED RDW RBC AUTO: 52.5 FL (ref 37–54)
EOSINOPHIL # BLD AUTO: 0.13 10*3/MM3 (ref 0–0.4)
EOSINOPHIL NFR BLD AUTO: 2.8 % (ref 0.3–6.2)
ERYTHROCYTE [DISTWIDTH] IN BLOOD BY AUTOMATED COUNT: 13.9 % (ref 12.3–15.4)
GFR SERPL CREATININE-BSD FRML MDRD: 69 ML/MIN/1.73
GLOBULIN UR ELPH-MCNC: 3.2 GM/DL (ref 1.8–3.5)
GLUCOSE SERPL-MCNC: 100 MG/DL (ref 74–124)
HCT VFR BLD AUTO: 42.2 % (ref 37.5–51)
HGB BLD-MCNC: 13.8 G/DL (ref 13–17.7)
IMM GRANULOCYTES # BLD AUTO: 0.02 10*3/MM3 (ref 0–0.05)
IMM GRANULOCYTES NFR BLD AUTO: 0.4 % (ref 0–0.5)
LYMPHOCYTES # BLD AUTO: 0.79 10*3/MM3 (ref 0.7–3.1)
LYMPHOCYTES NFR BLD AUTO: 16.9 % (ref 19.6–45.3)
MCH RBC QN AUTO: 33.4 PG (ref 26.6–33)
MCHC RBC AUTO-ENTMCNC: 32.7 G/DL (ref 31.5–35.7)
MCV RBC AUTO: 102.2 FL (ref 79–97)
MONOCYTES # BLD AUTO: 0.51 10*3/MM3 (ref 0.1–0.9)
MONOCYTES NFR BLD AUTO: 10.9 % (ref 5–12)
NEUTROPHILS NFR BLD AUTO: 3.17 10*3/MM3 (ref 1.7–7)
NEUTROPHILS NFR BLD AUTO: 67.7 % (ref 42.7–76)
NRBC BLD AUTO-RTO: 0 /100 WBC (ref 0–0.2)
PLATELET # BLD AUTO: 147 10*3/MM3 (ref 140–450)
PMV BLD AUTO: 9.5 FL (ref 6–12)
POTASSIUM SERPL-SCNC: 4.1 MMOL/L (ref 3.5–4.7)
PROT SERPL-MCNC: 7.4 G/DL (ref 6.3–8)
RBC # BLD AUTO: 4.13 10*6/MM3 (ref 4.14–5.8)
SODIUM SERPL-SCNC: 138 MMOL/L (ref 134–145)
WBC # BLD AUTO: 4.68 10*3/MM3 (ref 3.4–10.8)

## 2021-02-19 PROCEDURE — 25010000002 IOPAMIDOL 61 % SOLUTION: Performed by: INTERNAL MEDICINE

## 2021-02-19 PROCEDURE — 80053 COMPREHEN METABOLIC PANEL: CPT

## 2021-02-19 PROCEDURE — 85025 COMPLETE CBC W/AUTO DIFF WBC: CPT

## 2021-02-19 PROCEDURE — 77014 CHG CT GUIDANCE RADIATION THERAPY FLDS PLACEMENT: CPT | Performed by: RADIOLOGY

## 2021-02-19 PROCEDURE — 71260 CT THORAX DX C+: CPT

## 2021-02-19 PROCEDURE — 36591 DRAW BLOOD OFF VENOUS DEVICE: CPT

## 2021-02-19 PROCEDURE — 82565 ASSAY OF CREATININE: CPT

## 2021-02-19 PROCEDURE — 74177 CT ABD & PELVIS W/CONTRAST: CPT

## 2021-02-19 PROCEDURE — 36415 COLL VENOUS BLD VENIPUNCTURE: CPT

## 2021-02-19 PROCEDURE — 0 DIATRIZOATE MEGLUMINE & SODIUM PER 1 ML: Performed by: INTERNAL MEDICINE

## 2021-02-19 PROCEDURE — 77386: CPT | Performed by: RADIOLOGY

## 2021-02-19 RX ADMIN — DIATRIZOATE MEGLUMINE AND DIATRIZOATE SODIUM 30 ML: 660; 100 LIQUID ORAL; RECTAL at 10:05

## 2021-02-19 RX ADMIN — IOPAMIDOL 85 ML: 612 INJECTION, SOLUTION INTRAVENOUS at 10:55

## 2021-02-22 ENCOUNTER — RADIATION ONCOLOGY WEEKLY ASSESSMENT (OUTPATIENT)
Dept: RADIATION ONCOLOGY | Facility: HOSPITAL | Age: 71
End: 2021-02-22

## 2021-02-22 DIAGNOSIS — C80.1 SMALL CELL CARCINOMA (HCC): Primary | ICD-10-CM

## 2021-02-22 PROCEDURE — 77014 CHG CT GUIDANCE RADIATION THERAPY FLDS PLACEMENT: CPT | Performed by: RADIOLOGY

## 2021-02-22 PROCEDURE — 77427 RADIATION TX MANAGEMENT X5: CPT | Performed by: RADIOLOGY

## 2021-02-22 PROCEDURE — 77386 CHG INTENSITY MODULATED RADIATION TX DLVR COMPLEX: CPT | Performed by: RADIOLOGY

## 2021-02-22 PROCEDURE — 77386: CPT | Performed by: RADIOLOGY

## 2021-02-22 NOTE — PROGRESS NOTES
Physician Weekly Management Note    Diagnosis:     1. Small cell carcinoma (CMS/HCC)    PCI    Reason for Visit: Radiation (5/8)    Concurrent Chemo:       Notes on Treatment course, Films, Medical progress and Plan:  Doing well. No problems or questions, cont on.    Performance Status:  (1) Restricted in physically strenuous activity, ambulatory and able to do work of light nature    ROS -  Other than those listed above, as follows:  Constitutional - Normal - no complaints of fatigue, denies lack of appetite, fever, night sweats or change in weight.  Neck - Normal - denies neck masses, muscle weakness, neck pain, decreased range of motion or swelling.  Cardiovascular - Normal - denies arrhythmias, chest pain, dyspnea, edema, orthopnea or palpitations.  Respiratory - Normal - denies cough, dyspnea, hemoptysis, hiccoughs, pleuritic chest pain or wheezing.  Gastrointestinal - Normal - no complaints of constipation, abdominal pain, diarrhea, heartburn/dyspepsia, hematemesis, hemorrhoids, melena or GI bleeding, nausea, pain or cramping or vomiting.  Neuro - Normal - no new complaints of vision change, headache, nausea, cranial nerve deficit, gait abnormality, syncope, seizure.    PHYSICAL EXAM - Other than changes listed above, as follows:  There were no vitals filed for this visit.    Constitutional - Normal - no evidence of impaired alertness, inadequate appearance, premature or advanced chronologic age, uncooperativeness, altered mood, affect or disorientation.  Neck - Normal - no evidence of tender or enlarged lymph nodes, neck abnormalities, restricted range of motion or enlarged thyroid.  Chest - Normal - no evidence of chest abnormalities, tender or enlarged lymph nodes.  Respiratory - Normal - no evidence of abnormal breat sounds, chest abnormalities on palpation and chest abnormalities on percussion and normal breath sounds.  Hematologic/Lymphatic - Normal - no evidence of tender or enlarged axillary lymph  "nodes nor tender or enlarged neck nodes.  Neuro - Normal - no evidence of cranial nerve deficit, gait abnormality.    Problem added:  No problems updated.  Medications added: No orders of the defined types were placed in this encounter.    Ancillary referrals made: No orders of the defined types were placed in this encounter.      Technical aspects reviewed:  Weekly OBI approved if applicable? Yes   Weekly port films approved?  Yes  Change requests noted if applicable?  Yes   Patient setup and plan reviewed?  Yes     Chart Reviewed:  Continue current treatment plan?  Yes  Treatment plan change requested? No    I have reviewed and marked as \"reviewed\" the current medications, allergies and problem list in the patients EMR.    I have reviewed the patient's medical, surgical  history in detail, reviewed any pertinent lab work  and updated the computerized patient record if needed.    Patient's Care Team:  Patient Care Team:  Paty Saldana APRN as PCP - General (Family Medicine)  Thai Morel MD as Consulting Physician (Hematology and Oncology)  Annabelle Giang MD as Consulting Physician (Radiation Oncology)      "

## 2021-02-23 PROCEDURE — 77386 CHG INTENSITY MODULATED RADIATION TX DLVR COMPLEX: CPT | Performed by: RADIOLOGY

## 2021-02-23 PROCEDURE — 77427 RADIATION TX MANAGEMENT X5: CPT | Performed by: RADIOLOGY

## 2021-02-23 PROCEDURE — 77386: CPT | Performed by: RADIOLOGY

## 2021-02-23 PROCEDURE — 77014 CHG CT GUIDANCE RADIATION THERAPY FLDS PLACEMENT: CPT | Performed by: RADIOLOGY

## 2021-02-23 PROCEDURE — 77336 RADIATION PHYSICS CONSULT: CPT | Performed by: RADIOLOGY

## 2021-02-24 ENCOUNTER — OFFICE VISIT (OUTPATIENT)
Dept: ONCOLOGY | Facility: CLINIC | Age: 71
End: 2021-02-24

## 2021-02-24 ENCOUNTER — APPOINTMENT (OUTPATIENT)
Dept: LAB | Facility: HOSPITAL | Age: 71
End: 2021-02-24

## 2021-02-24 VITALS
BODY MASS INDEX: 29.27 KG/M2 | TEMPERATURE: 99.6 F | RESPIRATION RATE: 16 BRPM | HEART RATE: 90 BPM | DIASTOLIC BLOOD PRESSURE: 78 MMHG | HEIGHT: 69 IN | OXYGEN SATURATION: 95 % | SYSTOLIC BLOOD PRESSURE: 142 MMHG | WEIGHT: 197.6 LBS

## 2021-02-24 DIAGNOSIS — C34.90 SMALL CELL LUNG CANCER (HCC): Primary | Chronic | ICD-10-CM

## 2021-02-24 PROCEDURE — 77386: CPT | Performed by: RADIOLOGY

## 2021-02-24 PROCEDURE — 77014 CHG CT GUIDANCE RADIATION THERAPY FLDS PLACEMENT: CPT | Performed by: RADIOLOGY

## 2021-02-24 PROCEDURE — 77386 CHG INTENSITY MODULATED RADIATION TX DLVR COMPLEX: CPT | Performed by: RADIOLOGY

## 2021-02-24 PROCEDURE — 99214 OFFICE O/P EST MOD 30 MIN: CPT | Performed by: INTERNAL MEDICINE

## 2021-02-24 RX ORDER — AMOXICILLIN 500 MG/1
500 CAPSULE ORAL 3 TIMES DAILY
COMMUNITY
Start: 2021-02-03 | End: 2021-02-24 | Stop reason: SDDI

## 2021-02-24 RX ORDER — ONDANSETRON 8 MG/1
8 TABLET, ORALLY DISINTEGRATING ORAL EVERY 8 HOURS PRN
Qty: 30 TABLET | Refills: 5 | Status: SHIPPED | OUTPATIENT
Start: 2021-02-24 | End: 2022-03-17

## 2021-02-24 NOTE — PROGRESS NOTES
REASON FOR FOLLOW UP:   1.  T4N3 Small cell lung cancer  2.  SVC syndrome  3.  Palliative therapy with carboplatin, -16 initiated on 7/8/2019.  Cycle 1 was initiated during a hospitalization.  Atezolizumab was added with cycle #2.  4 cycles of chemotherapy complete as of 9/19/2019.  4.  Port placed on 8/22/2019, delayed due to SVC syndrome  5.  Neutropenia related to therapy requiring the addition of Neulasta with cycle #3.  6.  Single agent maintenance atezolizumab initiated 10/8/2019.  Scan on 10/1/2019 revealed good disease response disease.  There is an area of concern on the adrenal gland which we will continue to monitor.  MRI brain shows no evidence of metastatic disease.  6.  CT imaging 2/4/2020 with ongoing improvement  7.  CT imaging 5/19/2020 with progression.  8.  Carboplatin/VP16 re-initiated on 5/26/2020.  Cycle 2 6/16/2020   9.  Presented at lung conference with PET scan after two cycles planned followed by radiation to a residual right hilar mass if appropriate.  Irinotecan if indicated after that.  10.  PET scan 7/1/2020 with increased uptake in the subcarinal mediastinum with decreasing left hilar and LLL adenopathy, suspected reactive nodular area at the right base, resolution of the left adrenal uptake, intensely FDG avid left parotid nodule at 1 cm (prior biopsy consistent with Warthin's tumor, benign)   11. Radiation pursued, 30 fractions completed from 7/22/2020 through 9/1/2020.  12. MRI brain 11/4/2020 without change, no evidence for metastatic disease.    13. PET scan 11/4/2020. Improvement in the subcarinal and right hilar nodes with an SUV of 3.7 compared to 20.8 previously. Evidence for esophagitis. Right middle lobe changes resolved. No new disease.   14. MRI brain 1/28/2021   14. CT imaging 2/19/2021 with decrease in the primary tumor, two new small noncalcified nodules in the medial LLL         HISTORY OF PRESENT ILLNESS:  Julia Smith III is a 70 y.o. male with the above mentioned  "history, who returns the office today for follow up.      He is currently completing whole brain radiation due to finish tomorrow.  He generally tolerates this well but has had a couple of episodes of nausea and vomiting.  He does not have any antiemetics at home to use.    He does state that it takes him \"a couple of hours\" to \"get going\" in the morning.  He reports ongoing fatigue.  He denies much shortness of breath.  He admits to about 4-5 beers per day and smokes cigarettes when he is drinking beers.    Review of Systems   Fatigue.  Nausea.    Vitals:    02/24/21 1105   BP: 142/78   Pulse: 90   Resp: 16   Temp: 99.6 °F (37.6 °C)   TempSrc: Temporal   SpO2: 95%   Weight: 89.6 kg (197 lb 9.6 oz)   Height: 175 cm (68.9\")   PainSc: 0-No pain  Comment: lung cancer     General:  No acute distress, awake, alert and oriented  Skin:  Warm and dry, no visible rash  HEENT:  Normocephalic/atraumatic.  Wearing a facemask.  Chest:  Normal respiratory effort.  Lungs clear to auscultation bilaterally.  Mediport is present, benign in appearance.  Heart: Regular rate and rhythm without murmurs gallops or rubs  Lymphatics: No cervical supraclavicular or axillary adenopathy  Extremities:  No visible clubbing, cyanosis, or edema  Neuro/psych:  Grossly non-focal.  Normal mood and affect.          DIAGNOSTIC DATA:  Results from last 7 days   Lab Units 02/19/21  1017   WBC 10*3/mm3 4.68   NEUTROS ABS 10*3/mm3 3.17   HEMOGLOBIN g/dL 13.8   HEMATOCRIT % 42.2   PLATELETS 10*3/mm3 147     Results from last 7 days   Lab Units 02/19/21  1151 02/19/21  1049   SODIUM mmol/L 138  --    POTASSIUM mmol/L 4.1  --    CHLORIDE mmol/L 102  --    CO2 mmol/L 21.0*  --    BUN mg/dL 12  --    CREATININE mg/dL 1.07 1.00   CALCIUM mg/dL 9.5  --    ALBUMIN g/dL 4.20  --    BILIRUBIN mg/dL 0.4  --    ALK PHOS U/L 79  --    ALT (SGPT) U/L 29  --    AST (SGOT) U/L 30  --    GLUCOSE mg/dL 100  --            IMAGING:  MRI Brain With & Without Contrast " (01/28/2021 20:37)  CT Abdomen Pelvis With Contrast (02/19/2021 10:56)  CT Chest With Contrast Diagnostic (02/19/2021 10:56)    CT chest abdomen and pelvis from 2/19/2021 images personally reviewed.  New small nodules in the left lung.    ASSESSMENT:  This is a 70 y.o. male with:    *Small cell lung cancer originating in the right hilum:   · He has an extremely large mass there with concern for SVC compression on CT imaging from 6/4/2019.  There is some left hilar adenopathy.  · Some parotid lesions which are concerning but indeterminate, left greater than right.  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  · Staging MRI of the brain 7/10/2019 negative for metastatic disease  · He was discussed at multidisciplinary thoracic conference, with his T4 and 3 disease, concern for involvement of the right supraclavicular region and obvious left hilum, plans to add atezolizumab with cycle 2, we can consider future radiation to the residual mediastinal mass if necessary.  · Lung lesion too large to radiate in spite of the SVC narrowing.    · On 7/8/2019 we started carboplatin AUC 5 and VP16 100 mg/m2 through peripheral IVs  · Cycle 2 postponed on 7/29/2019 due to heart rate of 154.  Atezolizumab added with cycle #2.  · Radiation can be added for a residual mediastinal mass if necessary  · CT imaging on 8/14/2019 after two cycles of therapy shows significant improvement.  · Mediport anticipated by Dr. Gallardo on 8/22/2019.  · On  8/20/2019 cycle 3 was delayed due to neutropenia and with cycle 3 we did incorporate Neulasta.  · Cycle 4 completed 9/17/2019  · Maintenance atezolizumab initiated 10/8/2019.  PET scan revealed disease response to therapy, he does have an area of hypermetabolic activity in the adrenal gland which is small and did not show up on prior  exam.  We will continue to monitor this.  His MRI of the brain is also negative for metastatic disease.  · CT 2/4/2020 with ongoing improvement.  Maintenance atezolizumab pursued.    · CT imaging 5/19/2020 with progression.  Increase in the right hilar/mediastinal mass with enlarging adenopathy.    · Given that the progression is longer than 6 months after completing carbo/VP16, proceed with further carbo/VP16.  · Cycle 1 initiated 5/26/2020  · PET scan after two cycles with ongoing uptake in the right hilum, otherwise somewhat improved  · Radiation initiated 7/22/2020 with plans for 30 treatments  · Proceeded with cycle 4 carboplatin -16 7/28/2020.    · As of 8/19/2020, discussed holding further chemotherapy since he had 4 cycles versus continuing potentially with as many as 6.  He prefers to continue with therapy.    · Cycle 5 on 8/19/2020  · Radiation complete 9/1/2020  · As of 9/9/2020 we plan no further chemotherapy for now.  · MRI brain 11/4/2020 negative for mets.  · PET scan 11/4/2020. Improvement in the subcarinal and right hilar nodes with an SUV of 3.7 compared to 20.8 previously. Evidence for esophagitis. Right middle lobe changes resolved. Other stable changes that were previously noted. No new disease.     · MRI brain 1/28/2021 negative  · PCI pursued   · CT imaging 2/19/2021 with decrease in the primary tumor, two new small noncalcified nodules in the medial LLL       *Chronic hearing loss: History of mastoiditis.  He is not a candidate for cisplatin because of this.  Stable.    *Atrial fibrillation.  He was initiated on metoprolol 12.5 mg twice daily.  This did delay port placement.  The patient was taking his metoprolol inconsistently.  For cycle 2 was delayed due to heart rate of 150 7/29/2019.  He was seen by cardiology with instructions to increase his metoprolol to 25 mg twice daily.  Heart rate stable today.  He reports no symptoms.  See below.    *Tobacco use.  He did utilize a NicoDerm  patch while inpatient, though has continued smoking since discharge.     *SVC syndrome.  Improved after 2 cycles of chemotherapy.    *Tachycardia related to atrial fibrillation as outlined above  · Heart rate on 7/29/19 was 154.  Cycle 2 was delayed.  · Metoprolol increased to 25 mg twice daily with improvement in heart rate.    *Nausea: Potentially related to whole brain radiation.  Prescription for ondansetron electronically sent to his pharmacy.    *Poor performance status with ongoing tobacco and alcohol use.    PLAN:   1. No further chemotherapy and we will continue to proceed with observation  2. Proceed with PCI, due to be completed tomorrow  3. Prescription for ondansetron ODT electronically sent to his pharmacy for nausea.  4. Short term follow up CT imaging in about 2 months with labs and a port flush and I will see him back after that for follow-up.  5. Further systemic therapy if appropriate for documented progression.    Thai Morel MD

## 2021-02-25 ENCOUNTER — DOCUMENTATION (OUTPATIENT)
Dept: RADIATION ONCOLOGY | Facility: HOSPITAL | Age: 71
End: 2021-02-25

## 2021-02-25 PROCEDURE — 77386: CPT | Performed by: RADIOLOGY

## 2021-02-25 PROCEDURE — 77014 CHG CT GUIDANCE RADIATION THERAPY FLDS PLACEMENT: CPT | Performed by: RADIOLOGY

## 2021-02-25 PROCEDURE — 77336 RADIATION PHYSICS CONSULT: CPT | Performed by: RADIOLOGY

## 2021-03-19 ENCOUNTER — BULK ORDERING (OUTPATIENT)
Dept: CASE MANAGEMENT | Facility: OTHER | Age: 71
End: 2021-03-19

## 2021-03-19 DIAGNOSIS — Z23 IMMUNIZATION DUE: ICD-10-CM

## 2021-03-24 ENCOUNTER — IMMUNIZATION (OUTPATIENT)
Dept: VACCINE CLINIC | Facility: HOSPITAL | Age: 71
End: 2021-03-24

## 2021-03-24 DIAGNOSIS — Z23 IMMUNIZATION DUE: ICD-10-CM

## 2021-03-24 PROCEDURE — 0001A: CPT | Performed by: INTERNAL MEDICINE

## 2021-03-24 PROCEDURE — 91300 HC SARSCOV02 VAC 30MCG/0.3ML IM: CPT | Performed by: INTERNAL MEDICINE

## 2021-04-13 ENCOUNTER — HOSPITAL ENCOUNTER (OUTPATIENT)
Dept: PET IMAGING | Facility: HOSPITAL | Age: 71
Discharge: HOME | End: 2021-04-13
Admitting: INTERNAL MEDICINE

## 2021-04-13 ENCOUNTER — INFUSION (OUTPATIENT)
Dept: ONCOLOGY | Facility: HOSPITAL | Age: 71
End: 2021-04-13

## 2021-04-13 DIAGNOSIS — Z45.2 ENCOUNTER FOR FITTING AND ADJUSTMENT OF VASCULAR CATHETER: ICD-10-CM

## 2021-04-13 DIAGNOSIS — C34.90 SMALL CELL LUNG CANCER (HCC): Chronic | ICD-10-CM

## 2021-04-13 DIAGNOSIS — C34.90 SMALL CELL LUNG CANCER (HCC): Primary | ICD-10-CM

## 2021-04-13 LAB
ALBUMIN SERPL-MCNC: 4.4 G/DL (ref 3.5–5.2)
ALBUMIN/GLOB SERPL: 1.3 G/DL (ref 1.1–2.4)
ALP SERPL-CCNC: 86 U/L (ref 38–116)
ALT SERPL W P-5'-P-CCNC: 28 U/L (ref 0–41)
ANION GAP SERPL CALCULATED.3IONS-SCNC: 10.2 MMOL/L (ref 5–15)
AST SERPL-CCNC: 25 U/L (ref 0–40)
BASOPHILS # BLD AUTO: 0.09 10*3/MM3 (ref 0–0.2)
BASOPHILS NFR BLD AUTO: 1.5 % (ref 0–1.5)
BILIRUB SERPL-MCNC: 0.4 MG/DL (ref 0.2–1.2)
BUN SERPL-MCNC: 13 MG/DL (ref 6–20)
BUN/CREAT SERPL: 13.4 (ref 7.3–30)
CALCIUM SPEC-SCNC: 9.7 MG/DL (ref 8.5–10.2)
CHLORIDE SERPL-SCNC: 105 MMOL/L (ref 98–107)
CO2 SERPL-SCNC: 24.8 MMOL/L (ref 22–29)
CREAT BLDA-MCNC: 1.1 MG/DL (ref 0.6–1.3)
CREAT SERPL-MCNC: 0.97 MG/DL (ref 0.7–1.3)
DEPRECATED RDW RBC AUTO: 49.8 FL (ref 37–54)
EOSINOPHIL # BLD AUTO: 0.19 10*3/MM3 (ref 0–0.4)
EOSINOPHIL NFR BLD AUTO: 3.2 % (ref 0.3–6.2)
ERYTHROCYTE [DISTWIDTH] IN BLOOD BY AUTOMATED COUNT: 13.4 % (ref 12.3–15.4)
GFR SERPL CREATININE-BSD FRML MDRD: 77 ML/MIN/1.73
GLOBULIN UR ELPH-MCNC: 3.3 GM/DL (ref 1.8–3.5)
GLUCOSE SERPL-MCNC: 87 MG/DL (ref 74–124)
HCT VFR BLD AUTO: 43.6 % (ref 37.5–51)
HGB BLD-MCNC: 14.6 G/DL (ref 13–17.7)
IMM GRANULOCYTES # BLD AUTO: 0.02 10*3/MM3 (ref 0–0.05)
IMM GRANULOCYTES NFR BLD AUTO: 0.3 % (ref 0–0.5)
LYMPHOCYTES # BLD AUTO: 0.97 10*3/MM3 (ref 0.7–3.1)
LYMPHOCYTES NFR BLD AUTO: 16.1 % (ref 19.6–45.3)
MCH RBC QN AUTO: 33.6 PG (ref 26.6–33)
MCHC RBC AUTO-ENTMCNC: 33.5 G/DL (ref 31.5–35.7)
MCV RBC AUTO: 100.5 FL (ref 79–97)
MONOCYTES # BLD AUTO: 0.76 10*3/MM3 (ref 0.1–0.9)
MONOCYTES NFR BLD AUTO: 12.6 % (ref 5–12)
NEUTROPHILS NFR BLD AUTO: 3.99 10*3/MM3 (ref 1.7–7)
NEUTROPHILS NFR BLD AUTO: 66.3 % (ref 42.7–76)
NRBC BLD AUTO-RTO: 0 /100 WBC (ref 0–0.2)
PLATELET # BLD AUTO: 158 10*3/MM3 (ref 140–450)
PMV BLD AUTO: 10.2 FL (ref 6–12)
POTASSIUM SERPL-SCNC: 4.2 MMOL/L (ref 3.5–4.7)
PROT SERPL-MCNC: 7.7 G/DL (ref 6.3–8)
RBC # BLD AUTO: 4.34 10*6/MM3 (ref 4.14–5.8)
SODIUM SERPL-SCNC: 140 MMOL/L (ref 134–145)
WBC # BLD AUTO: 6.02 10*3/MM3 (ref 3.4–10.8)

## 2021-04-13 PROCEDURE — 25010000002 HEPARIN LOCK FLUSH PER 10 UNITS: Performed by: INTERNAL MEDICINE

## 2021-04-13 PROCEDURE — 25010000002 IOPAMIDOL 61 % SOLUTION: Performed by: INTERNAL MEDICINE

## 2021-04-13 PROCEDURE — 82565 ASSAY OF CREATININE: CPT

## 2021-04-13 PROCEDURE — 36591 DRAW BLOOD OFF VENOUS DEVICE: CPT

## 2021-04-13 PROCEDURE — 71260 CT THORAX DX C+: CPT

## 2021-04-13 PROCEDURE — 80053 COMPREHEN METABOLIC PANEL: CPT

## 2021-04-13 PROCEDURE — 85025 COMPLETE CBC W/AUTO DIFF WBC: CPT

## 2021-04-13 RX ORDER — HEPARIN SODIUM (PORCINE) LOCK FLUSH IV SOLN 100 UNIT/ML 100 UNIT/ML
500 SOLUTION INTRAVENOUS AS NEEDED
Status: CANCELLED | OUTPATIENT
Start: 2021-04-13

## 2021-04-13 RX ORDER — HEPARIN SODIUM (PORCINE) LOCK FLUSH IV SOLN 100 UNIT/ML 100 UNIT/ML
500 SOLUTION INTRAVENOUS AS NEEDED
Status: DISCONTINUED | OUTPATIENT
Start: 2021-04-13 | End: 2021-04-13 | Stop reason: HOSPADM

## 2021-04-13 RX ORDER — SODIUM CHLORIDE 0.9 % (FLUSH) 0.9 %
10 SYRINGE (ML) INJECTION AS NEEDED
Status: DISCONTINUED | OUTPATIENT
Start: 2021-04-13 | End: 2021-04-13 | Stop reason: HOSPADM

## 2021-04-13 RX ORDER — SODIUM CHLORIDE 0.9 % (FLUSH) 0.9 %
10 SYRINGE (ML) INJECTION AS NEEDED
Status: CANCELLED | OUTPATIENT
Start: 2021-04-13

## 2021-04-13 RX ADMIN — SODIUM CHLORIDE, PRESERVATIVE FREE 10 ML: 5 INJECTION INTRAVENOUS at 13:16

## 2021-04-13 RX ADMIN — Medication 500 UNITS: at 13:16

## 2021-04-13 RX ADMIN — IOPAMIDOL 75 ML: 612 INJECTION, SOLUTION INTRAVENOUS at 13:45

## 2021-04-14 ENCOUNTER — IMMUNIZATION (OUTPATIENT)
Dept: VACCINE CLINIC | Facility: HOSPITAL | Age: 71
End: 2021-04-14

## 2021-04-14 PROCEDURE — 91300 HC SARSCOV02 VAC 30MCG/0.3ML IM: CPT | Performed by: INTERNAL MEDICINE

## 2021-04-14 PROCEDURE — 0002A: CPT | Performed by: INTERNAL MEDICINE

## 2021-04-18 NOTE — PROGRESS NOTES
REASON FOR FOLLOW UP:   1.  T4N3 Small cell lung cancer  2.  SVC syndrome  3.  Palliative therapy with carboplatin, -16 initiated on 7/8/2019.  Cycle 1 was initiated during a hospitalization.  Atezolizumab was added with cycle #2.  4 cycles of chemotherapy complete as of 9/19/2019.  4.  Port placed on 8/22/2019, delayed due to SVC syndrome  5.  Neutropenia related to therapy requiring the addition of Neulasta with cycle #3.  6.  Single agent maintenance atezolizumab initiated 10/8/2019.  Scan on 10/1/2019 revealed good disease response disease.  There is an area of concern on the adrenal gland which we will continue to monitor.  MRI brain shows no evidence of metastatic disease.  6.  CT imaging 2/4/2020 with ongoing improvement  7.  CT imaging 5/19/2020 with progression.  8.  Carboplatin/VP16 re-initiated on 5/26/2020.  Cycle 2 6/16/2020   9.  Presented at lung conference with PET scan after two cycles planned followed by radiation to a residual right hilar mass if appropriate.  Irinotecan if indicated after that.  10.  PET scan 7/1/2020 with increased uptake in the subcarinal mediastinum with decreasing left hilar and LLL adenopathy, suspected reactive nodular area at the right base, resolution of the left adrenal uptake, intensely FDG avid left parotid nodule at 1 cm (prior biopsy consistent with Warthin's tumor, benign)   11. Radiation pursued, 30 fractions completed from 7/22/2020 through 9/1/2020.  12. MRI brain 11/4/2020 without change, no evidence for metastatic disease.    13. PET scan 11/4/2020. Improvement in the subcarinal and right hilar nodes with an SUV of 3.7 compared to 20.8 previously. Evidence for esophagitis. Right middle lobe changes resolved. No new disease.   14. MRI brain 1/28/2021 without metastatic disease.  Prophylactic cranial irradiation completed 2/25/2021.  14. CT imaging 2/19/2021 with decrease in the primary tumor, two new small noncalcified nodules in the medial LLL    "Observation.  15.  CT imaging 4/13/2021 with subtle increase in left upper lobe and left perihilar nodules.  Right-sided subcarinal and infrahilar tumor unchanged.  Changes consistent with pneumonitis.      HISTORY OF PRESENT ILLNESS:  Julia Smith III is a 71 y.o. male with the above mentioned history, who returns the office today for follow up.      He reports ongoing fatigue and tiredness.  He reports dyspnea on exertion.  He reports leg pain with walking.  Sometimes this is calf pain.  Sometimes it is knee pain.  Sometimes it is hip pain.  He states he was supposed to have a vascular evaluation done at some point but this was not done due to treatment for his cancer.  He denies any cognitive problems.  He still smokes a little bit.  We did not discuss alcohol use today.  Previously he admitted to at least 4-5 beers per day.    Review of Systems   Fatigue.  Leg pain with walking.    Vitals:    04/19/21 1403   BP: 123/79   Pulse: 92   Resp: 16   Temp: 98.4 °F (36.9 °C)   TempSrc: Temporal   SpO2: 94%   Weight: 87.9 kg (193 lb 11.2 oz)   Height: 175 cm (68.9\")   PainSc: 0-No pain  Comment: lung cancer     General:  No acute distress, awake, alert and oriented.  Chronically ill-appearing.  Skin:  Warm and dry, no visible rash  HEENT:  Normocephalic/atraumatic.  Wearing a facemask.  Chest:  Normal respiratory effort.  Lungs clear to auscultation bilaterally.  Mediport is present in the right subclavian area, benign in appearance.  Heart: Regular rate and rhythm without murmurs gallops or rubs  Lymphatics: No cervical supraclavicular or axillary adenopathy  Extremities:  No clubbing, cyanosis, or edema  Neuro/psych:  Grossly non-focal.  Normal mood and affect.    DIAGNOSTIC DATA:  Comprehensive Metabolic Panel (04/13/2021 13:43)  CBC & Differential (04/13/2021 13:43)      IMAGING:  CT Chest With Contrast Diagnostic (04/13/2021 13:50)    CT imaging 4/13/2021 with subtle increase in left upper lobe and left perihilar " nodules.  Right-sided subcarinal and infrahilar tumor unchanged.  Changes consistent with pneumonitis.    Images personally reviewed    ASSESSMENT:  This is a 71 y.o. male with:    *Small cell lung cancer originating in the right hilum:   · He has an extremely large mass there with concern for SVC compression on CT imaging from 6/4/2019.  There is some left hilar adenopathy.  · Some parotid lesions which are concerning but indeterminate, left greater than right.  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  · Staging MRI of the brain 7/10/2019 negative for metastatic disease  · He was discussed at multidisciplinary thoracic conference, with his T4 and 3 disease, concern for involvement of the right supraclavicular region and obvious left hilum, plans to add atezolizumab with cycle 2, we can consider future radiation to the residual mediastinal mass if necessary.  · Lung lesion too large to radiate in spite of the SVC narrowing.    · On 7/8/2019 we started carboplatin AUC 5 and VP16 100 mg/m2 through peripheral IVs  · Cycle 2 postponed on 7/29/2019 due to heart rate of 154.  Atezolizumab added with cycle #2.  · Radiation can be added for a residual mediastinal mass if necessary  · CT imaging on 8/14/2019 after two cycles of therapy shows significant improvement.  · Mediport anticipated by Dr. Gallardo on 8/22/2019.  · On  8/20/2019 cycle 3 was delayed due to neutropenia and with cycle 3 we did incorporate Neulasta.  · Cycle 4 completed 9/17/2019  · Maintenance atezolizumab initiated 10/8/2019.  PET scan revealed disease response to therapy, he does have an area of hypermetabolic activity in the adrenal gland which is small and did not show up on prior exam.  We will continue to monitor this.  His MRI of the brain is also negative for metastatic  disease.  · CT 2/4/2020 with ongoing improvement.  Maintenance atezolizumab pursued.    · CT imaging 5/19/2020 with progression.  Increase in the right hilar/mediastinal mass with enlarging adenopathy.    · Given that the progression is longer than 6 months after completing carbo/VP16, proceed with further carbo/VP16.  · Cycle 1 initiated 5/26/2020  · PET scan after two cycles with ongoing uptake in the right hilum, otherwise somewhat improved  · Radiation initiated 7/22/2020 with plans for 30 treatments  · Proceeded with cycle 4 carboplatin -16 7/28/2020.    · As of 8/19/2020, discussed holding further chemotherapy since he had 4 cycles versus continuing potentially with as many as 6.  He prefers to continue with therapy.    · Cycle 5 on 8/19/2020  · Radiation complete 9/1/2020  · As of 9/9/2020 we plan no further chemotherapy for now.  · MRI brain 11/4/2020 negative for mets.  · PET scan 11/4/2020. Improvement in the subcarinal and right hilar nodes with an SUV of 3.7 compared to 20.8 previously. Evidence for esophagitis. Right middle lobe changes resolved. Other stable changes that were previously noted. No new disease.     · MRI brain 1/28/2021 negative  · PCI pursued, complete from 2/16/2021 through 2/25/2021  · CT imaging 2/19/2021 with decrease in the primary tumor, two new small noncalcified nodules in the medial LLL     · CT imaging 4/13/2021 with subtle increase in left upper lobe and left perihilar nodules.  Right-sided subcarinal and infrahilar tumor unchanged.  Changes consistent with radiation pneumonitis.    *Chronic hearing loss: History of mastoiditis.  He is not a candidate for cisplatin because of this.  Stable.    *Atrial fibrillation.  He was initiated on metoprolol 12.5 mg twice daily.  This did delay port placement.  The patient was taking his metoprolol inconsistently.  For cycle 2 was delayed due to heart rate of 150 7/29/2019.  He was seen by cardiology with instructions to increase his  metoprolol to 25 mg twice daily.  Heart rate stable today.  He reports no symptoms.  See below.    *Tobacco use.  He did utilize a NicoDerm patch while inpatient, though has continued smoking since discharge.  He states he has cut back.    *History of SVC syndrome.  Improved after 2 cycles of chemotherapy.    *Tachycardia related to atrial fibrillation as outlined above  · Heart rate improved with medical therapy    *Dyspnea on exertion: Probably related to COPD and ongoing lung damage from smoking.    *Leg pain with walking: Some of this sounds like claudication but some of this sounds like orthopedic pain.  Follow-up with primary care.    PLAN:   1. Port flush in about 6 weeks  2. CT chest with contrast in about 3 months with a port flush, CBC, CMP at that time.  3. I will see him back after that for review    Thai Morel MD

## 2021-04-19 ENCOUNTER — OFFICE VISIT (OUTPATIENT)
Dept: ONCOLOGY | Facility: CLINIC | Age: 71
End: 2021-04-19

## 2021-04-19 ENCOUNTER — APPOINTMENT (OUTPATIENT)
Dept: LAB | Facility: HOSPITAL | Age: 71
End: 2021-04-19

## 2021-04-19 VITALS
OXYGEN SATURATION: 94 % | BODY MASS INDEX: 28.69 KG/M2 | DIASTOLIC BLOOD PRESSURE: 79 MMHG | RESPIRATION RATE: 16 BRPM | WEIGHT: 193.7 LBS | SYSTOLIC BLOOD PRESSURE: 123 MMHG | HEART RATE: 92 BPM | TEMPERATURE: 98.4 F | HEIGHT: 69 IN

## 2021-04-19 DIAGNOSIS — C34.90 SMALL CELL LUNG CANCER (HCC): Primary | Chronic | ICD-10-CM

## 2021-04-19 PROCEDURE — 99214 OFFICE O/P EST MOD 30 MIN: CPT | Performed by: INTERNAL MEDICINE

## 2021-06-03 ENCOUNTER — INFUSION (OUTPATIENT)
Dept: ONCOLOGY | Facility: HOSPITAL | Age: 71
End: 2021-06-03

## 2021-06-03 DIAGNOSIS — Z45.2 ENCOUNTER FOR FITTING AND ADJUSTMENT OF VASCULAR CATHETER: Primary | ICD-10-CM

## 2021-06-03 PROCEDURE — 96523 IRRIG DRUG DELIVERY DEVICE: CPT

## 2021-06-03 PROCEDURE — 25010000002 HEPARIN LOCK FLUSH PER 10 UNITS: Performed by: INTERNAL MEDICINE

## 2021-06-03 RX ORDER — SODIUM CHLORIDE 0.9 % (FLUSH) 0.9 %
10 SYRINGE (ML) INJECTION AS NEEDED
Status: DISCONTINUED | OUTPATIENT
Start: 2021-06-03 | End: 2021-06-03 | Stop reason: HOSPADM

## 2021-06-03 RX ORDER — HEPARIN SODIUM (PORCINE) LOCK FLUSH IV SOLN 100 UNIT/ML 100 UNIT/ML
500 SOLUTION INTRAVENOUS AS NEEDED
Status: DISCONTINUED | OUTPATIENT
Start: 2021-06-03 | End: 2021-06-03 | Stop reason: HOSPADM

## 2021-06-03 RX ORDER — SODIUM CHLORIDE 0.9 % (FLUSH) 0.9 %
10 SYRINGE (ML) INJECTION AS NEEDED
Status: CANCELLED | OUTPATIENT
Start: 2021-06-03

## 2021-06-03 RX ORDER — HEPARIN SODIUM (PORCINE) LOCK FLUSH IV SOLN 100 UNIT/ML 100 UNIT/ML
500 SOLUTION INTRAVENOUS AS NEEDED
Status: CANCELLED | OUTPATIENT
Start: 2021-06-03

## 2021-06-03 RX ADMIN — SODIUM CHLORIDE, PRESERVATIVE FREE 10 ML: 5 INJECTION INTRAVENOUS at 14:06

## 2021-06-03 RX ADMIN — Medication 500 UNITS: at 14:06

## 2021-07-14 ENCOUNTER — HOSPITAL ENCOUNTER (OUTPATIENT)
Dept: PET IMAGING | Facility: HOSPITAL | Age: 71
Discharge: HOME | End: 2021-07-14
Admitting: INTERNAL MEDICINE

## 2021-07-14 ENCOUNTER — LAB (OUTPATIENT)
Dept: LAB | Facility: HOSPITAL | Age: 71
End: 2021-07-14

## 2021-07-14 DIAGNOSIS — C34.90 SMALL CELL LUNG CANCER (HCC): Chronic | ICD-10-CM

## 2021-07-14 DIAGNOSIS — C34.90 SMALL CELL LUNG CANCER (HCC): ICD-10-CM

## 2021-07-14 LAB
ALBUMIN SERPL-MCNC: 4.3 G/DL (ref 3.5–5.2)
ALBUMIN/GLOB SERPL: 1.4 G/DL (ref 1.1–2.4)
ALP SERPL-CCNC: 79 U/L (ref 38–116)
ALT SERPL W P-5'-P-CCNC: 18 U/L (ref 0–41)
ANION GAP SERPL CALCULATED.3IONS-SCNC: 12.5 MMOL/L (ref 5–15)
AST SERPL-CCNC: 21 U/L (ref 0–40)
BASOPHILS # BLD AUTO: 0.09 10*3/MM3 (ref 0–0.2)
BASOPHILS NFR BLD AUTO: 1.4 % (ref 0–1.5)
BILIRUB SERPL-MCNC: 0.6 MG/DL (ref 0.2–1.2)
BUN SERPL-MCNC: 16 MG/DL (ref 6–20)
BUN/CREAT SERPL: 13.9 (ref 7.3–30)
CALCIUM SPEC-SCNC: 9.5 MG/DL (ref 8.5–10.2)
CHLORIDE SERPL-SCNC: 102 MMOL/L (ref 98–107)
CO2 SERPL-SCNC: 25.5 MMOL/L (ref 22–29)
CREAT BLDA-MCNC: 1.2 MG/DL (ref 0.6–1.3)
CREAT SERPL-MCNC: 1.15 MG/DL (ref 0.7–1.3)
DEPRECATED RDW RBC AUTO: 49.1 FL (ref 37–54)
EOSINOPHIL # BLD AUTO: 0.13 10*3/MM3 (ref 0–0.4)
EOSINOPHIL NFR BLD AUTO: 2 % (ref 0.3–6.2)
ERYTHROCYTE [DISTWIDTH] IN BLOOD BY AUTOMATED COUNT: 13.6 % (ref 12.3–15.4)
GFR SERPL CREATININE-BSD FRML MDRD: 63 ML/MIN/1.73
GLOBULIN UR ELPH-MCNC: 3 GM/DL (ref 1.8–3.5)
GLUCOSE SERPL-MCNC: 107 MG/DL (ref 74–124)
HCT VFR BLD AUTO: 42.6 % (ref 37.5–51)
HGB BLD-MCNC: 14.4 G/DL (ref 13–17.7)
IMM GRANULOCYTES # BLD AUTO: 0.01 10*3/MM3 (ref 0–0.05)
IMM GRANULOCYTES NFR BLD AUTO: 0.2 % (ref 0–0.5)
LYMPHOCYTES # BLD AUTO: 0.96 10*3/MM3 (ref 0.7–3.1)
LYMPHOCYTES NFR BLD AUTO: 14.7 % (ref 19.6–45.3)
MCH RBC QN AUTO: 33 PG (ref 26.6–33)
MCHC RBC AUTO-ENTMCNC: 33.8 G/DL (ref 31.5–35.7)
MCV RBC AUTO: 97.5 FL (ref 79–97)
MONOCYTES # BLD AUTO: 0.77 10*3/MM3 (ref 0.1–0.9)
MONOCYTES NFR BLD AUTO: 11.8 % (ref 5–12)
NEUTROPHILS NFR BLD AUTO: 4.57 10*3/MM3 (ref 1.7–7)
NEUTROPHILS NFR BLD AUTO: 69.9 % (ref 42.7–76)
NRBC BLD AUTO-RTO: 0 /100 WBC (ref 0–0.2)
PLATELET # BLD AUTO: 137 10*3/MM3 (ref 140–450)
PMV BLD AUTO: 10.7 FL (ref 6–12)
POTASSIUM SERPL-SCNC: 4.1 MMOL/L (ref 3.5–4.7)
PROT SERPL-MCNC: 7.3 G/DL (ref 6.3–8)
RBC # BLD AUTO: 4.37 10*6/MM3 (ref 4.14–5.8)
SODIUM SERPL-SCNC: 140 MMOL/L (ref 134–145)
WBC # BLD AUTO: 6.53 10*3/MM3 (ref 3.4–10.8)

## 2021-07-14 PROCEDURE — 80053 COMPREHEN METABOLIC PANEL: CPT

## 2021-07-14 PROCEDURE — 85025 COMPLETE CBC W/AUTO DIFF WBC: CPT

## 2021-07-14 PROCEDURE — 71260 CT THORAX DX C+: CPT

## 2021-07-14 PROCEDURE — 25010000002 IOPAMIDOL 61 % SOLUTION: Performed by: INTERNAL MEDICINE

## 2021-07-14 PROCEDURE — 82565 ASSAY OF CREATININE: CPT

## 2021-07-14 PROCEDURE — 36415 COLL VENOUS BLD VENIPUNCTURE: CPT

## 2021-07-14 RX ADMIN — IOPAMIDOL 75 ML: 612 INJECTION, SOLUTION INTRAVENOUS at 13:08

## 2021-07-19 ENCOUNTER — APPOINTMENT (OUTPATIENT)
Dept: LAB | Facility: HOSPITAL | Age: 71
End: 2021-07-19

## 2021-07-19 ENCOUNTER — OFFICE VISIT (OUTPATIENT)
Dept: ONCOLOGY | Facility: CLINIC | Age: 71
End: 2021-07-19

## 2021-07-19 VITALS
HEART RATE: 76 BPM | HEIGHT: 69 IN | RESPIRATION RATE: 16 BRPM | SYSTOLIC BLOOD PRESSURE: 141 MMHG | DIASTOLIC BLOOD PRESSURE: 77 MMHG | BODY MASS INDEX: 28.11 KG/M2 | OXYGEN SATURATION: 97 % | TEMPERATURE: 98 F | WEIGHT: 189.8 LBS

## 2021-07-19 DIAGNOSIS — C34.90 SMALL CELL LUNG CANCER (HCC): Primary | ICD-10-CM

## 2021-07-19 DIAGNOSIS — C80.1 SMALL CELL CARCINOMA (HCC): ICD-10-CM

## 2021-07-19 DIAGNOSIS — C34.11 PRIMARY MALIGNANT NEOPLASM OF RIGHT UPPER LOBE OF LUNG (HCC): ICD-10-CM

## 2021-07-19 DIAGNOSIS — R53.83 OTHER FATIGUE: ICD-10-CM

## 2021-07-19 LAB
T4 FREE SERPL-MCNC: 1.08 NG/DL (ref 0.93–1.7)
TSH SERPL DL<=0.05 MIU/L-ACNC: 2.28 UIU/ML (ref 0.27–4.2)

## 2021-07-19 PROCEDURE — 84439 ASSAY OF FREE THYROXINE: CPT | Performed by: INTERNAL MEDICINE

## 2021-07-19 PROCEDURE — 36415 COLL VENOUS BLD VENIPUNCTURE: CPT | Performed by: INTERNAL MEDICINE

## 2021-07-19 PROCEDURE — 84443 ASSAY THYROID STIM HORMONE: CPT | Performed by: INTERNAL MEDICINE

## 2021-07-19 PROCEDURE — 99214 OFFICE O/P EST MOD 30 MIN: CPT | Performed by: INTERNAL MEDICINE

## 2021-07-19 NOTE — PROGRESS NOTES
REASON FOR FOLLOW UP:   1.  T4N3 Small cell lung cancer  2.  SVC syndrome  3.  Palliative therapy with carboplatin, -16 initiated on 7/8/2019.  Cycle 1 was initiated during a hospitalization.  Atezolizumab was added with cycle #2.  4 cycles of chemotherapy complete as of 9/19/2019.  4.  Port placed on 8/22/2019, delayed due to SVC syndrome  5.  Neutropenia related to therapy requiring the addition of Neulasta with cycle #3.  6.  Single agent maintenance atezolizumab initiated 10/8/2019.  Scan on 10/1/2019 revealed good disease response disease.  There is an area of concern on the adrenal gland which we will continue to monitor.  MRI brain shows no evidence of metastatic disease.  6.  CT imaging 2/4/2020 with ongoing improvement  7.  CT imaging 5/19/2020 with progression.  8.  Carboplatin/VP16 re-initiated on 5/26/2020.  Cycle 2 6/16/2020   9.  Presented at lung conference with PET scan after two cycles planned followed by radiation to a residual right hilar mass if appropriate.  Irinotecan if indicated after that.  10.  PET scan 7/1/2020 with increased uptake in the subcarinal mediastinum with decreasing left hilar and LLL adenopathy, suspected reactive nodular area at the right base, resolution of the left adrenal uptake, intensely FDG avid left parotid nodule at 1 cm (prior biopsy consistent with Warthin's tumor, benign)   11. Radiation pursued, 30 fractions completed from 7/22/2020 through 9/1/2020.  12. MRI brain 11/4/2020 without change, no evidence for metastatic disease.    13. PET scan 11/4/2020. Improvement in the subcarinal and right hilar nodes with an SUV of 3.7 compared to 20.8 previously. Evidence for esophagitis. Right middle lobe changes resolved. No new disease.   14. MRI brain 1/28/2021 without metastatic disease.  Prophylactic cranial irradiation completed 2/25/2021.  14. CT imaging 2/19/2021 with decrease in the primary tumor, two new small noncalcified nodules in the medial LLL    "Observation.  15.  CT imaging 4/13/2021 with subtle increase in left upper lobe and left perihilar nodules.  Right-sided subcarinal and infrahilar tumor unchanged.  Changes consistent with pneumonitis.      HISTORY OF PRESENT ILLNESS:  Julia Smith III is a 71 y.o. male with the above mentioned history, who returns the office today for follow up.      He continues to complain of fatigue and being tired in spite of getting quite a lot of sleep at night.  He states that he feels like he is never fully awake.  He has peripheral neuropathy in his feet.  He is eating adequately.  He does complain of some hearing loss.  He also complains of some accentuated hearing with chewing crunchy food.    Review of Systems   Fatigue.  Leg pain with walking.    Vitals:    07/19/21 1303   BP: 141/77   Pulse: 76   Resp: 16   Temp: 98 °F (36.7 °C)   TempSrc: Temporal   SpO2: 97%   Weight: 86.1 kg (189 lb 12.8 oz)   Height: 175 cm (68.9\")   PainSc: 7  Comment: lung cancer   PainLoc: Generalized     General:  No acute distress, awake, alert and oriented.  Chronically ill-appearing.  Skin:  Warm and dry, no visible rash.  Chronic sun damage.  HEENT:  Normocephalic/atraumatic.  Wearing a facemask.  I looked in both auditory canals today.  The left canal is partially occluded with cerumen.  The right canal is completely occluded with cerumen.  He does have a defect on the left behind the ear from his prior surgery.  Chest:  Normal respiratory effort.  Lungs clear to auscultation bilaterally.  Mediport is present in the right subclavian area, benign in appearance.  Heart: Regular rate and rhythm without murmurs gallops or rubs  Lymphatics: No cervical supraclavicular or axillary adenopathy  Extremities:  No clubbing, cyanosis, or edema.  Varicose veins present.  Neuro/psych:  Grossly non-focal.  Normal mood and affect.    DIAGNOSTIC DATA:  Comprehensive Metabolic Panel (07/14/2021 13:00)  CBC & Differential (07/14/2021 " 13:00)      IMAGING:  CT Chest With Contrast Diagnostic (07/14/2021 13:21)    CT images personally reviewed. Two cavitary left lung lesions are slightly bigger.    ASSESSMENT:  This is a 71 y.o. male with:    *Small cell lung cancer originating in the right hilum:   · He has an extremely large mass there with concern for SVC compression on CT imaging from 6/4/2019.  There is some left hilar adenopathy.  · Some parotid lesions which are concerning but indeterminate, left greater than right.  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  · Staging MRI of the brain 7/10/2019 negative for metastatic disease  · He was discussed at multidisciplinary thoracic conference, with his T4 and 3 disease, concern for involvement of the right supraclavicular region and obvious left hilum, plans to add atezolizumab with cycle 2, we can consider future radiation to the residual mediastinal mass if necessary.  · Lung lesion too large to radiate in spite of the SVC narrowing.    · On 7/8/2019 we started carboplatin AUC 5 and VP16 100 mg/m2 through peripheral IVs  · Cycle 2 postponed on 7/29/2019 due to heart rate of 154.  Atezolizumab added with cycle #2.  · Radiation can be added for a residual mediastinal mass if necessary  · CT imaging on 8/14/2019 after two cycles of therapy shows significant improvement.  · Mediport anticipated by Dr. Gallardo on 8/22/2019.  · On  8/20/2019 cycle 3 was delayed due to neutropenia and with cycle 3 we did incorporate Neulasta.  · Cycle 4 completed 9/17/2019  · Maintenance atezolizumab initiated 10/8/2019.  PET scan revealed disease response to therapy, he does have an area of hypermetabolic activity in the adrenal gland which is small and did not show up on prior exam.  We will continue to monitor this.  His MRI of the brain is also negative  for metastatic disease.  · CT 2/4/2020 with ongoing improvement.  Maintenance atezolizumab pursued.    · CT imaging 5/19/2020 with progression.  Increase in the right hilar/mediastinal mass with enlarging adenopathy.    · Given that the progression is longer than 6 months after completing carbo/VP16, proceed with further carbo/VP16.  · Cycle 1 initiated 5/26/2020  · PET scan after two cycles with ongoing uptake in the right hilum, otherwise somewhat improved  · Radiation initiated 7/22/2020 with plans for 30 treatments  · Proceeded with cycle 4 carboplatin -16 7/28/2020.    · As of 8/19/2020, discussed holding further chemotherapy since he had 4 cycles versus continuing potentially with as many as 6.  He prefers to continue with therapy.    · Cycle 5 on 8/19/2020  · Radiation complete 9/1/2020  · As of 9/9/2020 we plan no further chemotherapy for now.  · MRI brain 11/4/2020 negative for mets.  · PET scan 11/4/2020. Improvement in the subcarinal and right hilar nodes with an SUV of 3.7 compared to 20.8 previously. Evidence for esophagitis. Right middle lobe changes resolved. Other stable changes that were previously noted. No new disease.     · MRI brain 1/28/2021 negative  · PCI pursued, complete from 2/16/2021 through 2/25/2021  · CT imaging 2/19/2021 with decrease in the primary tumor, two new small noncalcified nodules in the medial LLL     · CT imaging 4/13/2021 with subtle increase in left upper lobe and left perihilar nodules.  Right-sided subcarinal and infrahilar tumor unchanged.  Changes consistent with radiation pneumonitis.  · CT imaging 7/14/2021 with slight interval increase in size of 2 adjacent cavitary nodules in the medial left lower lobe.  Right infrahilar/subcarinal lesion stable.    *Chronic hearing loss: History of mastoiditis.  He is not a candidate for cisplatin because of this.  He complained of this again 7/19.    *He does have some cerumen impaction bilaterally but worse on the right.  I  offered to attempt to clear this for him today but he will use other means for this.  I advised Debrox.    *Atrial fibrillation.  He was initiated on metoprolol 12.5 mg twice daily.  This did delay port placement.  The patient was taking his metoprolol inconsistently.  For cycle 2 was delayed due to heart rate of 150 7/29/2019.  He was seen by cardiology with instructions to increase his metoprolol to 25 mg twice daily.  Heart rate stable today.  He reports no symptoms.  See below.    *Tobacco use.  Continues to smoke    *History of SVC syndrome.  Improved after 2 cycles of chemotherapy.  No further evidence of this.    *Dyspnea on exertion: Probably related to COPD and ongoing lung damage from smoking.    *Leg pain with walking: Some of this sounds like claudication but some of this sounds like orthopedic pain.  Follow-up with primary care.    *Fatigue and tiredness: I suspect that whole brain radiation is contributing to this.  We will check a TSH and free T4 today.    *Malfunctioning Mediport: He states that no blood can be obtained from the port.  Apparently the port flushes well.  Request a port dye study if necessary.    PLAN:   1. TSH and free T4 today  2. Port flush in about 6 weeks  3. CT chest abdomen and pelvis with contrast in about 3 months with a port flush, CBC, CMP at that time.  4. Follow-up with me after that for review  5. Otherwise see above    Thai Morel MD

## 2021-08-30 ENCOUNTER — INFUSION (OUTPATIENT)
Dept: ONCOLOGY | Facility: HOSPITAL | Age: 71
End: 2021-08-30

## 2021-08-30 DIAGNOSIS — Z45.2 ENCOUNTER FOR FITTING AND ADJUSTMENT OF VASCULAR CATHETER: Primary | ICD-10-CM

## 2021-08-30 PROCEDURE — 96523 IRRIG DRUG DELIVERY DEVICE: CPT

## 2021-08-30 PROCEDURE — 25010000002 HEPARIN LOCK FLUSH PER 10 UNITS: Performed by: INTERNAL MEDICINE

## 2021-08-30 RX ORDER — HEPARIN SODIUM (PORCINE) LOCK FLUSH IV SOLN 100 UNIT/ML 100 UNIT/ML
500 SOLUTION INTRAVENOUS AS NEEDED
Status: DISCONTINUED | OUTPATIENT
Start: 2021-08-30 | End: 2021-08-30 | Stop reason: HOSPADM

## 2021-08-30 RX ORDER — SODIUM CHLORIDE 0.9 % (FLUSH) 0.9 %
10 SYRINGE (ML) INJECTION AS NEEDED
Status: CANCELLED | OUTPATIENT
Start: 2021-11-29

## 2021-08-30 RX ORDER — HEPARIN SODIUM (PORCINE) LOCK FLUSH IV SOLN 100 UNIT/ML 100 UNIT/ML
500 SOLUTION INTRAVENOUS AS NEEDED
Status: CANCELLED | OUTPATIENT
Start: 2021-11-29

## 2021-08-30 RX ORDER — SODIUM CHLORIDE 0.9 % (FLUSH) 0.9 %
10 SYRINGE (ML) INJECTION AS NEEDED
Status: DISCONTINUED | OUTPATIENT
Start: 2021-08-30 | End: 2021-08-30 | Stop reason: HOSPADM

## 2021-08-30 RX ADMIN — SODIUM CHLORIDE, PRESERVATIVE FREE 10 ML: 5 INJECTION INTRAVENOUS at 14:11

## 2021-08-30 RX ADMIN — Medication 500 UNITS: at 14:11

## 2021-10-11 ENCOUNTER — HOSPITAL ENCOUNTER (OUTPATIENT)
Dept: PET IMAGING | Facility: HOSPITAL | Age: 71
Discharge: HOME | End: 2021-10-11
Admitting: INTERNAL MEDICINE

## 2021-10-11 ENCOUNTER — INFUSION (OUTPATIENT)
Dept: ONCOLOGY | Facility: HOSPITAL | Age: 71
End: 2021-10-11

## 2021-10-11 DIAGNOSIS — R53.83 OTHER FATIGUE: ICD-10-CM

## 2021-10-11 DIAGNOSIS — C34.11 PRIMARY MALIGNANT NEOPLASM OF RIGHT UPPER LOBE OF LUNG (HCC): ICD-10-CM

## 2021-10-11 DIAGNOSIS — C34.90 SMALL CELL LUNG CANCER (HCC): ICD-10-CM

## 2021-10-11 DIAGNOSIS — C80.1 SMALL CELL CARCINOMA (HCC): ICD-10-CM

## 2021-10-11 LAB
ALBUMIN SERPL-MCNC: 3.8 G/DL (ref 3.5–5.2)
ALBUMIN/GLOB SERPL: 1.2 G/DL (ref 1.1–2.4)
ALP SERPL-CCNC: 72 U/L (ref 38–116)
ALT SERPL W P-5'-P-CCNC: 14 U/L (ref 0–41)
ANION GAP SERPL CALCULATED.3IONS-SCNC: 12.4 MMOL/L (ref 5–15)
AST SERPL-CCNC: 21 U/L (ref 0–40)
BASOPHILS # BLD AUTO: 0.09 10*3/MM3 (ref 0–0.2)
BASOPHILS NFR BLD AUTO: 1.5 % (ref 0–1.5)
BILIRUB SERPL-MCNC: 0.4 MG/DL (ref 0.2–1.2)
BUN SERPL-MCNC: 15 MG/DL (ref 6–20)
BUN/CREAT SERPL: 15.2 (ref 7.3–30)
CALCIUM SPEC-SCNC: 8.9 MG/DL (ref 8.5–10.2)
CHLORIDE SERPL-SCNC: 103 MMOL/L (ref 98–107)
CO2 SERPL-SCNC: 22.6 MMOL/L (ref 22–29)
CREAT SERPL-MCNC: 0.99 MG/DL (ref 0.7–1.3)
DEPRECATED RDW RBC AUTO: 45.7 FL (ref 37–54)
EOSINOPHIL # BLD AUTO: 0.2 10*3/MM3 (ref 0–0.4)
EOSINOPHIL NFR BLD AUTO: 3.3 % (ref 0.3–6.2)
ERYTHROCYTE [DISTWIDTH] IN BLOOD BY AUTOMATED COUNT: 12.6 % (ref 12.3–15.4)
GFR SERPL CREATININE-BSD FRML MDRD: 75 ML/MIN/1.73
GLOBULIN UR ELPH-MCNC: 3.1 GM/DL (ref 1.8–3.5)
GLUCOSE SERPL-MCNC: 92 MG/DL (ref 74–124)
HCT VFR BLD AUTO: 43.5 % (ref 37.5–51)
HGB BLD-MCNC: 14.3 G/DL (ref 13–17.7)
IMM GRANULOCYTES # BLD AUTO: 0.02 10*3/MM3 (ref 0–0.05)
IMM GRANULOCYTES NFR BLD AUTO: 0.3 % (ref 0–0.5)
LYMPHOCYTES # BLD AUTO: 1.03 10*3/MM3 (ref 0.7–3.1)
LYMPHOCYTES NFR BLD AUTO: 16.8 % (ref 19.6–45.3)
MCH RBC QN AUTO: 32.4 PG (ref 26.6–33)
MCHC RBC AUTO-ENTMCNC: 32.9 G/DL (ref 31.5–35.7)
MCV RBC AUTO: 98.4 FL (ref 79–97)
MONOCYTES # BLD AUTO: 0.74 10*3/MM3 (ref 0.1–0.9)
MONOCYTES NFR BLD AUTO: 12.1 % (ref 5–12)
NEUTROPHILS NFR BLD AUTO: 4.05 10*3/MM3 (ref 1.7–7)
NEUTROPHILS NFR BLD AUTO: 66 % (ref 42.7–76)
NRBC BLD AUTO-RTO: 0 /100 WBC (ref 0–0.2)
PLATELET # BLD AUTO: 177 10*3/MM3 (ref 140–450)
PMV BLD AUTO: 9.8 FL (ref 6–12)
POTASSIUM SERPL-SCNC: 4.3 MMOL/L (ref 3.5–4.7)
PROT SERPL-MCNC: 6.9 G/DL (ref 6.3–8)
RBC # BLD AUTO: 4.42 10*6/MM3 (ref 4.14–5.8)
SODIUM SERPL-SCNC: 138 MMOL/L (ref 134–145)
WBC # BLD AUTO: 6.13 10*3/MM3 (ref 3.4–10.8)

## 2021-10-11 PROCEDURE — 85025 COMPLETE CBC W/AUTO DIFF WBC: CPT

## 2021-10-11 PROCEDURE — 0 DIATRIZOATE MEGLUMINE & SODIUM PER 1 ML: Performed by: INTERNAL MEDICINE

## 2021-10-11 PROCEDURE — 71260 CT THORAX DX C+: CPT

## 2021-10-11 PROCEDURE — 74177 CT ABD & PELVIS W/CONTRAST: CPT

## 2021-10-11 PROCEDURE — 80053 COMPREHEN METABOLIC PANEL: CPT

## 2021-10-11 PROCEDURE — 25010000002 IOPAMIDOL 61 % SOLUTION: Performed by: INTERNAL MEDICINE

## 2021-10-11 RX ADMIN — DIATRIZOATE MEGLUMINE AND DIATRIZOATE SODIUM 30 ML: 660; 100 LIQUID ORAL; RECTAL at 10:22

## 2021-10-11 RX ADMIN — IOPAMIDOL 85 ML: 612 INJECTION, SOLUTION INTRAVENOUS at 11:20

## 2021-10-17 NOTE — PROGRESS NOTES
REASON FOR FOLLOW UP:   1.  T4N3 Small cell lung cancer  2.  SVC syndrome  3.  Palliative therapy with carboplatin, -16 initiated on 7/8/2019.  Cycle 1 was initiated during a hospitalization.  Atezolizumab was added with cycle #2.  4 cycles of chemotherapy complete as of 9/19/2019.  4.  Port placed on 8/22/2019, delayed due to SVC syndrome  5.  Neutropenia related to therapy requiring the addition of Neulasta with cycle #3.  6.  Single agent maintenance atezolizumab initiated 10/8/2019.  Scan on 10/1/2019 revealed good disease response disease.  There is an area of concern on the adrenal gland which we will continue to monitor.  MRI brain shows no evidence of metastatic disease.  6.  CT imaging 2/4/2020 with ongoing improvement  7.  CT imaging 5/19/2020 with progression.  8.  Carboplatin/VP16 re-initiated on 5/26/2020.  Cycle 2 6/16/2020   9.  Presented at lung conference with PET scan after two cycles planned followed by radiation to a residual right hilar mass if appropriate.  Irinotecan if indicated after that.  10.  PET scan 7/1/2020 with increased uptake in the subcarinal mediastinum with decreasing left hilar and LLL adenopathy, suspected reactive nodular area at the right base, resolution of the left adrenal uptake, intensely FDG avid left parotid nodule at 1 cm (prior biopsy consistent with Warthin's tumor, benign)   11. Radiation pursued, 30 fractions completed from 7/22/2020 through 9/1/2020.  12. MRI brain 11/4/2020 without change, no evidence for metastatic disease.    13. PET scan 11/4/2020. Improvement in the subcarinal and right hilar nodes with an SUV of 3.7 compared to 20.8 previously. Evidence for esophagitis. Right middle lobe changes resolved. No new disease.   14. MRI brain 1/28/2021 without metastatic disease.  Prophylactic cranial irradiation completed 2/25/2021.  14. CT imaging 2/19/2021 with decrease in the primary tumor, two new small noncalcified nodules in the medial LLL    "Observation.  15.  CT imaging 4/13/2021 with subtle increase in left upper lobe and left perihilar nodules.  Right-sided subcarinal and infrahilar tumor unchanged.  Changes consistent with pneumonitis.    HISTORY OF PRESENT ILLNESS:  Julia Smith III is a 71 y.o. male with the above mentioned history, who returns the office today for follow up.     He remains fatigued.  He has dyspnea on exertion.  He complains of some leg pain when he walks.  He relates this to osteoarthritis of his knee.  He complains of \"blackheads\" on his forehead.  He does wear a hat a lot.    Review of Systems   See the HPI    Vitals:    10/18/21 1305   BP: 157/74   Pulse: 61   Resp: 16   Temp: 98 °F (36.7 °C)   TempSrc: Temporal   SpO2: 95%   Weight: 86.5 kg (190 lb 9.6 oz)   Height: 175 cm (68.9\")   PainSc: 0-No pain  Comment: lung cancer     General:  No acute distress, awake, alert and oriented.  Chronically ill-appearing.  Skin:  Chronic sun damage.  Multiple tiny open comedones present on his forehead..  HEENT:  Normocephalic/atraumatic.  Wearing a facemask.  I looked in both auditory canals today.  Both auditory canals are open.  He does have a defect on the left behind the ear from his prior left mastoidectomy.  There was quite a lot of brown cerumen-like material that was removed today.  Chest:  Normal respiratory effort.  Lungs clear to auscultation bilaterally.  Mediport is present in the right subclavian area, benign in appearance.  Heart: Regular rate and rhythm without murmurs gallops or rubs  Lymphatics: No cervical supraclavicular or axillary adenopathy  Extremities:  No clubbing, cyanosis, or edema.  Varicose veins present.  Neuro/psych:  Grossly non-focal.  Flat affect    DIAGNOSTIC DATA:  Comprehensive Metabolic Panel (10/11/2021 10:06)  CBC & Differential (10/11/2021 10:06)      IMAGING:  CT Abdomen Pelvis With Contrast (10/11/2021 11:23)  CT Chest With Contrast Diagnostic (10/11/2021 11:23)    CT images reviewed. Increase " in cavitary nodules since July. Right infrahilar mass enlarged up to 4.8 cm vs 3.8 cm previously.     ASSESSMENT:  This is a 71 y.o. male with:    *Small cell lung cancer originating in the right hilum:   · He has an extremely large mass there with concern for SVC compression on CT imaging from 6/4/2019.  There is some left hilar adenopathy.  · Some parotid lesions which are concerning but indeterminate, left greater than right.  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  · Staging MRI of the brain 7/10/2019 negative for metastatic disease  · He was discussed at multidisciplinary thoracic conference, with his T4 and 3 disease, concern for involvement of the right supraclavicular region and obvious left hilum, plans to add atezolizumab with cycle 2, we can consider future radiation to the residual mediastinal mass if necessary.  · Lung lesion too large to radiate in spite of the SVC narrowing.    · On 7/8/2019 we started carboplatin AUC 5 and VP16 100 mg/m2 through peripheral IVs  · Cycle 2 postponed on 7/29/2019 due to heart rate of 154.  Atezolizumab added with cycle #2.  · Radiation can be added for a residual mediastinal mass if necessary  · CT imaging on 8/14/2019 after two cycles of therapy shows significant improvement.  · Mediport anticipated by Dr. Gallardo on 8/22/2019.  · On  8/20/2019 cycle 3 was delayed due to neutropenia and with cycle 3 we did incorporate Neulasta.  · Cycle 4 completed 9/17/2019  · Maintenance atezolizumab initiated 10/8/2019.  PET scan revealed disease response to therapy, he does have an area of hypermetabolic activity in the adrenal gland which is small and did not show up on prior exam.  We will continue to monitor this.  His MRI of the brain is also negative for metastatic disease.  · CT 2/4/2020 with ongoing  improvement.  Maintenance atezolizumab pursued.    · CT imaging 5/19/2020 with progression.  Increase in the right hilar/mediastinal mass with enlarging adenopathy.    · Given that the progression is longer than 6 months after completing carbo/VP16, proceed with further carbo/VP16.  · Cycle 1 initiated 5/26/2020  · PET scan after two cycles with ongoing uptake in the right hilum, otherwise somewhat improved  · Radiation initiated 7/22/2020 with plans for 30 treatments  · Proceeded with cycle 4 carboplatin -16 7/28/2020.    · As of 8/19/2020, discussed holding further chemotherapy since he had 4 cycles versus continuing potentially with as many as 6.  He prefers to continue with therapy.    · Cycle 5 on 8/19/2020  · Radiation complete 9/1/2020  · As of 9/9/2020, plan no further chemotherapy  · MRI brain 11/4/2020 negative for mets.  · PET scan 11/4/2020. Improvement in the subcarinal and right hilar nodes with an SUV of 3.7 compared to 20.8 previously. Evidence for esophagitis. Right middle lobe changes resolved. Other stable changes that were previously noted. No new disease.     · MRI brain 1/28/2021 negative  · PCI pursued, complete from 2/16/2021 through 2/25/2021  · CT imaging 2/19/2021 with decrease in the primary tumor, two new small noncalcified nodules in the medial LLL     · CT imaging 4/13/2021 with subtle increase in left upper lobe and left perihilar nodules.  Right-sided subcarinal and infrahilar tumor unchanged.  Changes consistent with radiation pneumonitis.  · CT imaging 7/14/2021 with slight interval increase in size of 2 adjacent cavitary nodules in the medial left lower lobe.  Right infrahilar/subcarinal lesion stable.  · CT imaging 10/11/21 with enlarging right infrahilar mass with two enlarging left lower lobe cavitary lesions    *Chronic hearing loss: History of left mastoiditis.  He is not a candidate for cisplatin because of this.     *Atrial fibrillation.  He was initiated on metoprolol  12.5 mg twice daily.  This did delay port placement.  The patient was taking his metoprolol inconsistently.  For cycle 2 was delayed due to heart rate of 150 7/29/2019.  He was seen by cardiology with instructions to increase his metoprolol to 25 mg twice daily.  Heart rate stable today.  He reports no symptoms.  See below.    *Tobacco use.  Continues to smoke    *History of SVC syndrome.  Improved after 2 cycles of chemotherapy.  No further evidence of this.    *Dyspnea on exertion: Probably related to COPD and ongoing lung damage from smoking.  We discussed that his symptoms would improve if he stopped smoking today.    *Leg pain with walking: Some of this sounds like claudication but some of this sounds like orthopedic pain.  Follow-up with primary care.    *Fatigue and tiredness: I suspect that whole brain radiation is contributing to this.  TSH and free T4 were normal.    *Malfunctioning Mediport: He states that no blood can be obtained from the port.  Apparently the port flushes well.  Request a port dye study if necessary.    *COVID-19 pandemic: I advised him to get his third COVID-19 vaccine.    *Innumerable tiny open comedones on his forehead: I advised him to see a dermatologist.  I am not sure if radiation contributed to this.  He does wear a hat much of the time and I suspect this is contributing as well.     *He had a lot of brown circular-like material contained within the cranial defects from his prior left mastoidectomy.  I was able to clean some of this today.  I advised him to have his wife clean these areas with soap and water and mineral oil if necessary.    PLAN:   1. I will present his case at the multidisciplinary thoracic conference this week  2. I advised him to see a dermatologist  3. I advised him to get his third COVID-19 vaccine  4. For now, plan port flush in about 6 weeks  5. For now, CT chest abdomen and pelvis with contrast with a CBC and CMP in about 3 months and follow-up with me  after that for review    I spent 47 minutes in this visit today reviewing his record, communicating with him, examining him, placing orders, documenting the encounter.    Thai Morel MD

## 2021-10-18 ENCOUNTER — OFFICE VISIT (OUTPATIENT)
Dept: ONCOLOGY | Facility: CLINIC | Age: 71
End: 2021-10-18

## 2021-10-18 ENCOUNTER — APPOINTMENT (OUTPATIENT)
Dept: LAB | Facility: HOSPITAL | Age: 71
End: 2021-10-18

## 2021-10-18 VITALS
BODY MASS INDEX: 28.23 KG/M2 | HEART RATE: 61 BPM | HEIGHT: 69 IN | DIASTOLIC BLOOD PRESSURE: 74 MMHG | WEIGHT: 190.6 LBS | SYSTOLIC BLOOD PRESSURE: 157 MMHG | TEMPERATURE: 98 F | RESPIRATION RATE: 16 BRPM | OXYGEN SATURATION: 95 %

## 2021-10-18 DIAGNOSIS — C34.11 PRIMARY MALIGNANT NEOPLASM OF RIGHT UPPER LOBE OF LUNG (HCC): Primary | ICD-10-CM

## 2021-10-18 PROCEDURE — 99215 OFFICE O/P EST HI 40 MIN: CPT | Performed by: INTERNAL MEDICINE

## 2021-10-19 ENCOUNTER — MDT ASSESSMENT (OUTPATIENT)
Dept: OTHER | Facility: HOSPITAL | Age: 71
End: 2021-10-19

## 2021-10-19 NOTE — PROGRESS NOTES
MULTIDISCIPLINARY TREATMENT PLANNING CONFERENCE  DATE: 10/20/2021       SPECIALTY: Thoracic Conference    PRESENTER: Thai Morel MD    SITE: Right Lung         Please discuss clinical/working stage, TNM, Stage Group, National Treatment Guidelines, and Prognostic Indicators.       CONFERENCE SUMMARY:  This morning we discussed the conference that the right hilar mass has not significantly changed in size over several CT studies.  We discussed what to do with the enlarging left lung cavitary lesions.  1 of these lesions has been present since his diagnosis.  The other is newer.  Both have slowly increased in size.  For now we discussed observing these with serial CT scans.  If there continues to be significant change, consideration of PET imaging and bronchoscopy.                          AJCC STAGE: T4N3 small cell carcinoma of the lung        REFERRAL SUPPORT   Psychosocial Assessment:  []                Clinical Trials:  []                Genetic Testing:  []                Geriatric Assessment:  []                Smoking Cessation:  []                Nutrition Assessment:  []                Social Work Evaluation/Barriers to Care:  []                Behavioral Oncology Evaluation:  []                Palliative Care:  []      EVIDENCE BASED NATIONAL TREATMENT GUIDELINES:  [x]                   SOCIAL HISTORY:   reports that he has been smoking cigarettes. He has a 40.00 pack-year smoking history. He has never used smokeless tobacco. He reports current alcohol use of about 4.0 - 6.0 standard drinks of alcohol per week. He reports current drug use. Drug: Marijuana.                  PAST MEDICAL HISTORY:   has a past medical history of Atrial fibrillation with RVR (HCC), Cancer (HCC) (06/2019), Chronic cough, COPD (chronic obstructive pulmonary disease) (HCC), Hearing loss, History of shingles, Lower back pain, Lung cancer (HCC), Mastoiditis, Mild mitral regurgitation, Other fatigue, PAF (paroxysmal atrial  fibrillation) (HCC), and Pneumonia.                  PAST SURGICAL HISTORY:  @                 IMAGING:     CT CHEST FINDINGS: Both of the cavitary nodules within the left lower  lobe have demonstrated interval increase in size. The largest inferior  nodule is currently 2.4 x 2.1 cm compared to 1.4 x 1.4 cm previously.  The other just anterior and superior to this location currently measures  18 x 12 mm compared to 11 x 9 mm.     The confluent mass which is seen in the right infrahilar region  extending into the subcarinal space has demonstrated interval  enlargement as well. This is most noticeable in the infracarinal space  where it measures 2.8 cm transverse compared to 2.1 cm previously. It  extends a slightly greater distance in the AP direction along the  pericardium, currently measuring 4.8 cm versus 3.9 cm. The portion  extending into the infracarinal space does not appear to have changed  significantly. There are multiple small to moderately enlarged stable  mediastinal lymph nodes without change. Stable cardiac enlargement, no  pericardial effusion. The esophagus is satisfactory in course and  caliber.     No pleural effusion, acute airspace disease or new pulmonary mass has  developed; no lytic or sclerotic bone lesion.     CONCLUSION:  1. Two left lower lobe cavitary nodules demonstrate interval increase in  size.  2. Ill-defined mass in the right infrahilar region extending into the  subcarinal space demonstrates interval enlargement as described above.     CT ABDOMEN AND PELVIS FINDINGS: The liver, gallbladder, pancreas and  spleen have a normal appearance. No adrenal abnormality/nodule has  developed. There are bilateral renal cysts similar to the previous  examination, the renal cortical pattern of enhancement is symmetric and  satisfactory. No calculus nor obstructive uropathy. Atherosclerotic  calcification of the aorta with additionally mural thrombus. The urinary  bladder is satisfactory in  appearance. There is diverticulosis of the  colon. The appendix and small bowel are satisfactory in appearance. No  abdominal/pelvic free intraperitoneal air, adenopathy or free fluid.     CONCLUSION: Stable imaging of the abdomen and pelvis, no acute  intraabdominal abnormality nor indication of metastatic disease.                 SURGICAL PROCEDURE / PATHOLOGY:     6/19/2019: [id number] ([facility])     Final Diagnosis  1. Lung, Right Mainstem Endobronchial Mass, Biopsy:  A. Small cell carcinoma.      6/19/2019: [id number] ([facility])    Final Diagnosis  1. Lymph Node, Mediastinum Station 7, FNA:  A. Positive for high-grade malignancy consistent with small cell carcinoma.  2. Lymph Node, Mediastinum Station 10R, FNA:  A. Positive for high-grade malignancy consistent with small cell carcinoma.                Labs & Biomarkers:      6/19/2019: Ki67 marks >95% of tumor cell nuclei.

## 2021-10-20 ENCOUNTER — IMMUNIZATION (OUTPATIENT)
Dept: VACCINE CLINIC | Facility: HOSPITAL | Age: 71
End: 2021-10-20

## 2021-10-20 ENCOUNTER — TELEPHONE (OUTPATIENT)
Dept: ONCOLOGY | Facility: CLINIC | Age: 71
End: 2021-10-20

## 2021-10-20 PROCEDURE — 0004A ADM SARSCOV2 30MCG/0.3ML BOOSTER: CPT | Performed by: INTERNAL MEDICINE

## 2021-10-20 PROCEDURE — 91300 HC SARSCOV02 VAC 30MCG/0.3ML IM: CPT | Performed by: INTERNAL MEDICINE

## 2021-10-20 NOTE — TELEPHONE ENCOUNTER
----- Message from Thai Morel MD sent at 10/20/2021  8:31 AM EDT -----  Solange,    Can you please call him and let him know that I discussed his situation at our thoracic conference this morning?  We discussed having him follow-up with me as I have previously planned with repeat CT scans in a few months and follow-up with me after that.  No additional evaluation at this time.  The right sided lung mass is stable in size.    Thanks! MINDY

## 2021-10-21 ENCOUNTER — TELEPHONE (OUTPATIENT)
Dept: ONCOLOGY | Facility: CLINIC | Age: 71
End: 2021-10-21

## 2021-10-21 NOTE — TELEPHONE ENCOUNTER
Called pt and informed him of Dr. Morel's message. He v/u. No additional questions at this time and f/u scheduled.     ----- Message from Thai Morel MD sent at 10/20/2021  8:31 AM EDT -----  Solange,    Can you please call him and let him know that I discussed his situation at our thoracic conference this morning?  We discussed having him follow-up with me as I have previously planned with repeat CT scans in a few months and follow-up with me after that.  No additional evaluation at this time.  The right sided lung mass is stable in size.    Thanks! MINDY

## 2021-10-21 NOTE — TELEPHONE ENCOUNTER
Attempted to call pt yesterday and today. No answer either day. lvm X 2 asking him to return call.     ----- Message from Thai Morel MD sent at 10/20/2021  8:31 AM EDT -----  Solange,    Can you please call him and let him know that I discussed his situation at our thoracic conference this morning?  We discussed having him follow-up with me as I have previously planned with repeat CT scans in a few months and follow-up with me after that.  No additional evaluation at this time.  The right sided lung mass is stable in size.    Thanks! MINDY

## 2021-11-29 ENCOUNTER — INFUSION (OUTPATIENT)
Dept: ONCOLOGY | Facility: HOSPITAL | Age: 71
End: 2021-11-29

## 2021-11-29 DIAGNOSIS — Z45.2 ENCOUNTER FOR FITTING AND ADJUSTMENT OF VASCULAR CATHETER: Primary | ICD-10-CM

## 2021-11-29 PROCEDURE — 96523 IRRIG DRUG DELIVERY DEVICE: CPT

## 2021-11-29 PROCEDURE — 25010000002 HEPARIN LOCK FLUSH PER 10 UNITS: Performed by: INTERNAL MEDICINE

## 2021-11-29 RX ORDER — HEPARIN SODIUM (PORCINE) LOCK FLUSH IV SOLN 100 UNIT/ML 100 UNIT/ML
500 SOLUTION INTRAVENOUS AS NEEDED
Status: DISCONTINUED | OUTPATIENT
Start: 2021-11-29 | End: 2021-11-29 | Stop reason: HOSPADM

## 2021-11-29 RX ORDER — HEPARIN SODIUM (PORCINE) LOCK FLUSH IV SOLN 100 UNIT/ML 100 UNIT/ML
500 SOLUTION INTRAVENOUS AS NEEDED
Status: CANCELLED | OUTPATIENT
Start: 2021-11-29

## 2021-11-29 RX ORDER — SODIUM CHLORIDE 0.9 % (FLUSH) 0.9 %
10 SYRINGE (ML) INJECTION AS NEEDED
Status: CANCELLED | OUTPATIENT
Start: 2021-11-29

## 2021-11-29 RX ORDER — SODIUM CHLORIDE 0.9 % (FLUSH) 0.9 %
10 SYRINGE (ML) INJECTION AS NEEDED
Status: DISCONTINUED | OUTPATIENT
Start: 2021-11-29 | End: 2021-11-29 | Stop reason: HOSPADM

## 2021-11-29 RX ADMIN — Medication 10 ML: at 14:06

## 2021-11-29 RX ADMIN — Medication 500 UNITS: at 14:06

## 2022-01-12 ENCOUNTER — INFUSION (OUTPATIENT)
Dept: ONCOLOGY | Facility: HOSPITAL | Age: 72
End: 2022-01-12

## 2022-01-12 ENCOUNTER — HOSPITAL ENCOUNTER (OUTPATIENT)
Dept: PET IMAGING | Facility: HOSPITAL | Age: 72
Discharge: HOME | End: 2022-01-12
Admitting: INTERNAL MEDICINE

## 2022-01-12 DIAGNOSIS — C34.11 PRIMARY MALIGNANT NEOPLASM OF RIGHT UPPER LOBE OF LUNG: Primary | ICD-10-CM

## 2022-01-12 DIAGNOSIS — C34.11 PRIMARY MALIGNANT NEOPLASM OF RIGHT UPPER LOBE OF LUNG: ICD-10-CM

## 2022-01-12 DIAGNOSIS — Z45.2 ENCOUNTER FOR FITTING AND ADJUSTMENT OF VASCULAR CATHETER: ICD-10-CM

## 2022-01-12 LAB
ALBUMIN SERPL-MCNC: 4.3 G/DL (ref 3.5–5.2)
ALBUMIN/GLOB SERPL: 1.3 G/DL (ref 1.1–2.4)
ALP SERPL-CCNC: 83 U/L (ref 38–116)
ALT SERPL W P-5'-P-CCNC: 16 U/L (ref 0–41)
ANION GAP SERPL CALCULATED.3IONS-SCNC: 13.3 MMOL/L (ref 5–15)
AST SERPL-CCNC: 21 U/L (ref 0–40)
BASOPHILS # BLD AUTO: 0.12 10*3/MM3 (ref 0–0.2)
BASOPHILS NFR BLD AUTO: 1.7 % (ref 0–1.5)
BILIRUB SERPL-MCNC: 0.5 MG/DL (ref 0.2–1.2)
BUN SERPL-MCNC: 15 MG/DL (ref 6–20)
BUN/CREAT SERPL: 13.6 (ref 7.3–30)
CALCIUM SPEC-SCNC: 9.7 MG/DL (ref 8.5–10.2)
CHLORIDE SERPL-SCNC: 101 MMOL/L (ref 98–107)
CO2 SERPL-SCNC: 23.7 MMOL/L (ref 22–29)
CREAT BLDA-MCNC: 1.1 MG/DL (ref 0.6–1.3)
CREAT SERPL-MCNC: 1.1 MG/DL (ref 0.7–1.3)
DEPRECATED RDW RBC AUTO: 51 FL (ref 37–54)
EOSINOPHIL # BLD AUTO: 0.13 10*3/MM3 (ref 0–0.4)
EOSINOPHIL NFR BLD AUTO: 1.9 % (ref 0.3–6.2)
ERYTHROCYTE [DISTWIDTH] IN BLOOD BY AUTOMATED COUNT: 13.7 % (ref 12.3–15.4)
GFR SERPL CREATININE-BSD FRML MDRD: 66 ML/MIN/1.73
GLOBULIN UR ELPH-MCNC: 3.2 GM/DL (ref 1.8–3.5)
GLUCOSE SERPL-MCNC: 83 MG/DL (ref 74–124)
HCT VFR BLD AUTO: 49.2 % (ref 37.5–51)
HGB BLD-MCNC: 15.9 G/DL (ref 13–17.7)
IMM GRANULOCYTES # BLD AUTO: 0.04 10*3/MM3 (ref 0–0.05)
IMM GRANULOCYTES NFR BLD AUTO: 0.6 % (ref 0–0.5)
LYMPHOCYTES # BLD AUTO: 0.94 10*3/MM3 (ref 0.7–3.1)
LYMPHOCYTES NFR BLD AUTO: 13.6 % (ref 19.6–45.3)
MCH RBC QN AUTO: 32.6 PG (ref 26.6–33)
MCHC RBC AUTO-ENTMCNC: 32.3 G/DL (ref 31.5–35.7)
MCV RBC AUTO: 101 FL (ref 79–97)
MONOCYTES # BLD AUTO: 0.74 10*3/MM3 (ref 0.1–0.9)
MONOCYTES NFR BLD AUTO: 10.7 % (ref 5–12)
NEUTROPHILS NFR BLD AUTO: 4.93 10*3/MM3 (ref 1.7–7)
NEUTROPHILS NFR BLD AUTO: 71.5 % (ref 42.7–76)
NRBC BLD AUTO-RTO: 0 /100 WBC (ref 0–0.2)
PLATELET # BLD AUTO: 137 10*3/MM3 (ref 140–450)
PMV BLD AUTO: 10.8 FL (ref 6–12)
POTASSIUM SERPL-SCNC: 4.4 MMOL/L (ref 3.5–4.7)
PROT SERPL-MCNC: 7.5 G/DL (ref 6.3–8)
RBC # BLD AUTO: 4.87 10*6/MM3 (ref 4.14–5.8)
SODIUM SERPL-SCNC: 138 MMOL/L (ref 134–145)
WBC NRBC COR # BLD: 6.9 10*3/MM3 (ref 3.4–10.8)

## 2022-01-12 PROCEDURE — 0 DIATRIZOATE MEGLUMINE & SODIUM PER 1 ML: Performed by: INTERNAL MEDICINE

## 2022-01-12 PROCEDURE — 82565 ASSAY OF CREATININE: CPT

## 2022-01-12 PROCEDURE — 74177 CT ABD & PELVIS W/CONTRAST: CPT

## 2022-01-12 PROCEDURE — 85025 COMPLETE CBC W/AUTO DIFF WBC: CPT

## 2022-01-12 PROCEDURE — 80053 COMPREHEN METABOLIC PANEL: CPT

## 2022-01-12 PROCEDURE — 25010000002 IOPAMIDOL 61 % SOLUTION: Performed by: INTERNAL MEDICINE

## 2022-01-12 PROCEDURE — 71260 CT THORAX DX C+: CPT

## 2022-01-12 PROCEDURE — 25010000002 HEPARIN LOCK FLUSH PER 10 UNITS: Performed by: INTERNAL MEDICINE

## 2022-01-12 RX ORDER — SODIUM CHLORIDE 0.9 % (FLUSH) 0.9 %
10 SYRINGE (ML) INJECTION AS NEEDED
Status: DISCONTINUED | OUTPATIENT
Start: 2022-01-12 | End: 2022-01-12 | Stop reason: HOSPADM

## 2022-01-12 RX ORDER — HEPARIN SODIUM (PORCINE) LOCK FLUSH IV SOLN 100 UNIT/ML 100 UNIT/ML
500 SOLUTION INTRAVENOUS AS NEEDED
Status: DISCONTINUED | OUTPATIENT
Start: 2022-01-12 | End: 2022-01-12 | Stop reason: HOSPADM

## 2022-01-12 RX ORDER — HEPARIN SODIUM (PORCINE) LOCK FLUSH IV SOLN 100 UNIT/ML 100 UNIT/ML
500 SOLUTION INTRAVENOUS AS NEEDED
Status: CANCELLED | OUTPATIENT
Start: 2022-01-12

## 2022-01-12 RX ORDER — SODIUM CHLORIDE 0.9 % (FLUSH) 0.9 %
10 SYRINGE (ML) INJECTION AS NEEDED
Status: CANCELLED | OUTPATIENT
Start: 2022-01-12

## 2022-01-12 RX ADMIN — SODIUM CHLORIDE, PRESERVATIVE FREE 10 ML: 5 INJECTION INTRAVENOUS at 13:37

## 2022-01-12 RX ADMIN — IOPAMIDOL 85 ML: 612 INJECTION, SOLUTION INTRAVENOUS at 14:40

## 2022-01-12 RX ADMIN — Medication 500 UNITS: at 13:37

## 2022-01-12 RX ADMIN — DIATRIZOATE MEGLUMINE AND DIATRIZOATE SODIUM 30 ML: 660; 100 LIQUID ORAL; RECTAL at 13:45

## 2022-01-18 NOTE — PROGRESS NOTES
"REASON FOR FOLLOW UP:   1.  T4N3 Small cell lung cancer  2.  SVC syndrome  3.  Initially treated with chemotherapy.  Subsequent radiation.  Subsequent PCI.      HISTORY OF PRESENT ILLNESS:  Julia Smith III is a 71 y.o. male with the above mentioned history, who returns the office today for follow up.     He remains weak and fatigued.  He reports ongoing leg weakness.  He denies much shortness of breath or dyspnea on exertion.  Chronic cough.      Review of Systems   See the HPI    Vitals:    01/19/22 1336   Resp: 18   Weight: 86.9 kg (191 lb 8 oz)   Height: 175 cm (68.9\")   PainSc: 0-No pain     General:  No acute distress, awake, alert and oriented.  Chronically ill-appearing.  Skin:  Chronic sun damage.  HEENT:  Normocephalic/atraumatic.  Wearing a facemask.  Chronic defect from mastoidectomy.  Chest:  Normal respiratory effort.  Scattered rhonchi.  Occasional cough.  Mediport is present in the right subclavian area, benign in appearance.  Heart: Regular rate and rhythm without murmurs gallops or rubs  Lymphatics: No palpable cervical supraclavicular or axillary adenopathy  Extremities:  No clubbing, cyanosis, or edema.  Varicose veins present.  Neuro/psych:  Grossly non-focal.  Flat affect    DIAGNOSTIC DATA:  CBC & Differential (01/12/2022 13:37)  Comprehensive Metabolic Panel (01/12/2022 13:37)      IMAGING:  CT Abdomen Pelvis With Contrast (01/12/2022 14:46)  CT Chest With Contrast Diagnostic (01/12/2022 14:46)    CT images 1/12/2022 personally reviewed.  Lower lobe perihilar nodules now solitary mass at 3 x 2 cm.  Right hilar mass decreased in size.    ASSESSMENT:  This is a 71 y.o. male with:    *Small cell lung cancer originating in the right hilum:   · He has an extremely large mass there with concern for SVC compression on CT imaging from 6/4/2019.  There is some left hilar adenopathy.  · Some parotid lesions which are concerning but indeterminate, left greater than right.  · Biopsy left parotid on 7/8 " shows papillary cystadenoma  · As no evidence for distant metastatic disease, if radiation oncology agreeable will plan chemotherapy and radiation with radiation to be added later.  · Staging MRI of the brain 7/10/2019 negative for metastatic disease  · He was discussed at multidisciplinary thoracic conference, with his T4 and 3 disease, concern for involvement of the right supraclavicular region and obvious left hilum, plans to add atezolizumab with cycle 2, we can consider future radiation to the residual mediastinal mass if necessary.  · Lung lesion too large to radiate in spite of the SVC narrowing.    · On 7/8/2019 we started carboplatin AUC 5 and VP16 100 mg/m2 through peripheral IVs  · Cycle 2 postponed on 7/29/2019 due to heart rate of 154.  Atezolizumab added with cycle #2.  · Radiation can be added for a residual mediastinal mass if necessary  · CT imaging on 8/14/2019 after two cycles of therapy shows significant improvement.  · Mediport anticipated by Dr. Gallardo on 8/22/2019.  · On  8/20/2019 cycle 3 was delayed due to neutropenia and with cycle 3 we did incorporate Neulasta.  · Cycle 4 completed 9/17/2019  · Maintenance atezolizumab initiated 10/8/2019.  PET scan revealed disease response to therapy, he does have an area of hypermetabolic activity in the adrenal gland which is small and did not show up on prior exam.  We will continue to monitor this.  His MRI of the brain is also negative for metastatic disease.  · CT 2/4/2020 with ongoing improvement.  Maintenance atezolizumab pursued.    · CT imaging 5/19/2020 with progression.  Increase in the right hilar/mediastinal mass with enlarging adenopathy.    · Given that the progression is longer than 6 months after completing carbo/VP16, proceed with further carbo/VP16.  · Cycle 1 initiated 5/26/2020  · PET scan after two cycles with ongoing uptake in the right hilum, otherwise somewhat improved  · Radiation initiated 7/22/2020 with plans for 30  treatments  · Proceeded with cycle 4 carboplatin -16 7/28/2020.    · As of 8/19/2020, discussed holding further chemotherapy since he had 4 cycles versus continuing potentially with as many as 6.  He prefers to continue with therapy.    · Cycle 5 on 8/19/2020  · Radiation complete 9/1/2020  · As of 9/9/2020, plan no further chemotherapy  · MRI brain 11/4/2020 negative for mets.  · PET scan 11/4/2020. Improvement in the subcarinal and right hilar nodes with an SUV of 3.7 compared to 20.8 previously. Evidence for esophagitis. Right middle lobe changes resolved. Other stable changes that were previously noted. No new disease.     · MRI brain 1/28/2021 negative  · PCI pursued, complete from 2/16/2021 through 2/25/2021  · CT imaging 2/19/2021 with decrease in the primary tumor, two new small noncalcified nodules in the medial LLL     · CT imaging 4/13/2021 with subtle increase in left upper lobe and left perihilar nodules.  Right-sided subcarinal and infrahilar tumor unchanged.  Changes consistent with radiation pneumonitis.  · CT imaging 7/14/2021 with slight interval increase in size of 2 adjacent cavitary nodules in the medial left lower lobe.  Right infrahilar/subcarinal lesion stable.  · CT imaging 10/11/21 with enlarging right infrahilar mass with two enlarging left lower lobe cavitary lesions  · His case was presented at the multidisciplinary thoracic conference.  Plan observation of the lung lesions with serial CT scans.  If significant change, PET imaging and bronchoscopy.  · CT imaging 1/12/2022 with ongoing evolution of the left lower lobe cavitary lesions, some increase in size.  They have coalesced into one.    *Chronic hearing loss: History of left mastoiditis.  He is not a candidate for cisplatin because of this.     *Atrial fibrillation.  He was initiated on metoprolol 12.5 mg twice daily.  This did delay port placement.  The patient was taking his metoprolol inconsistently.  For cycle 2 was delayed due  to heart rate of 150 7/29/2019.  He was seen by cardiology with instructions to increase his metoprolol to 25 mg twice daily.  Heart rate stable today.  He reports no symptoms.  See below.    *Tobacco use.  Continues to smoke    *History of SVC syndrome.  Improved after 2 cycles of chemotherapy.  No further evidence of this.    *Dyspnea on exertion: rRelated to COPD and ongoing lung damage from smoking.     *Leg pain with walking: Some of this sounds like claudication but some of this sounds like orthopedic pain.  Symptoms stable.  Follow-up with primary care.    *Fatigue and tiredness: I suspect that whole brain radiation is contributing to this.  TSH and free T4 were normal.  Stable.    *Malfunctioning Mediport: He states that no blood can be obtained from the port.  Apparently the port flushes well.  Unchanged.    *COVID-19 pandemic: Fully vaccinated with a booster.    PLAN:   1. PET scan  2. Refer back to Dr. Marquez with pulmonology to consider bronchoscopy with attention to the left lung  3. I will present his case at our multidisciplinary thoracic conference after his PET scan and see him back after that.    Thai Morel MD

## 2022-01-19 ENCOUNTER — OFFICE VISIT (OUTPATIENT)
Dept: ONCOLOGY | Facility: CLINIC | Age: 72
End: 2022-01-19

## 2022-01-19 ENCOUNTER — APPOINTMENT (OUTPATIENT)
Dept: LAB | Facility: HOSPITAL | Age: 72
End: 2022-01-19

## 2022-01-19 VITALS
HEIGHT: 69 IN | BODY MASS INDEX: 28.36 KG/M2 | SYSTOLIC BLOOD PRESSURE: 148 MMHG | WEIGHT: 191.5 LBS | RESPIRATION RATE: 18 BRPM | HEART RATE: 78 BPM | DIASTOLIC BLOOD PRESSURE: 86 MMHG | TEMPERATURE: 98.2 F | OXYGEN SATURATION: 97 %

## 2022-01-19 DIAGNOSIS — C34.90 SMALL CELL LUNG CANCER: ICD-10-CM

## 2022-01-19 DIAGNOSIS — C34.11 PRIMARY MALIGNANT NEOPLASM OF RIGHT UPPER LOBE OF LUNG: ICD-10-CM

## 2022-01-19 DIAGNOSIS — J98.4 CAVITATING MASS OF LOWER LOBE OF LEFT LUNG: Primary | ICD-10-CM

## 2022-01-19 PROCEDURE — 99214 OFFICE O/P EST MOD 30 MIN: CPT | Performed by: INTERNAL MEDICINE

## 2022-01-19 RX ORDER — AMOXICILLIN 500 MG/1
1000 CAPSULE ORAL 2 TIMES DAILY
COMMUNITY
End: 2022-01-19

## 2022-01-19 RX ORDER — METOPROLOL SUCCINATE AND HYDROCHLOROTHIAZIDE 12.5; 5 MG/1; MG/1
TABLET ORAL
COMMUNITY
End: 2022-01-19

## 2022-01-25 ENCOUNTER — HOSPITAL ENCOUNTER (OUTPATIENT)
Dept: PET IMAGING | Facility: HOSPITAL | Age: 72
Discharge: HOME | End: 2022-01-25

## 2022-01-25 DIAGNOSIS — J98.4 CAVITATING MASS OF LOWER LOBE OF LEFT LUNG: ICD-10-CM

## 2022-01-25 DIAGNOSIS — C34.90 SMALL CELL LUNG CANCER: ICD-10-CM

## 2022-01-25 DIAGNOSIS — C34.11 PRIMARY MALIGNANT NEOPLASM OF RIGHT UPPER LOBE OF LUNG: ICD-10-CM

## 2022-01-25 LAB — GLUCOSE BLDC GLUCOMTR-MCNC: 84 MG/DL (ref 70–130)

## 2022-01-25 PROCEDURE — 0 FLUDEOXYGLUCOSE F18 SOLUTION: Performed by: INTERNAL MEDICINE

## 2022-01-25 PROCEDURE — 82962 GLUCOSE BLOOD TEST: CPT

## 2022-01-25 PROCEDURE — 78815 PET IMAGE W/CT SKULL-THIGH: CPT

## 2022-01-25 PROCEDURE — A9552 F18 FDG: HCPCS | Performed by: INTERNAL MEDICINE

## 2022-01-25 RX ADMIN — FLUDEOXYGLUCOSE F18 1 DOSE: 300 INJECTION INTRAVENOUS at 13:27

## 2022-01-26 ENCOUNTER — MDT ASSESSMENT (OUTPATIENT)
Dept: OTHER | Facility: HOSPITAL | Age: 72
End: 2022-01-26

## 2022-01-26 NOTE — PROGRESS NOTES
MULTIDISCIPLINARY TREATMENT PLANNING CONFERENCE  DATE: 1/27/2022       SPECIALTY: Thoracic Conference    PRESENTER: Thai Morel MD   SITE: Right Lung         Please discuss clinical/working stage, TNM, Stage Group, National Treatment Guidelines, and Prognostic Indicators.       CONFERENCE SUMMARY:  Await bronchoscopy results. Unclear if this is recurrence of the small cell ca or a new primary. It sounds like radiation will be possible as this is likely outside the prior field. Radiation alone if this is small cell recurrence. Consideration of concurrent chemoradiation if this is NSCLCa.                            AJCC STAGE: TBD        REFERRAL SUPPORT   Psychosocial Assessment:  []                Clinical Trials:  []                Genetic Testing:  []                Geriatric Assessment:  []                Smoking Cessation:  []                Nutrition Assessment:  []                Social Work Evaluation/Barriers to Care:  []                Behavioral Oncology Evaluation:  []                Palliative Care:  []      EVIDENCE BASED NATIONAL TREATMENT GUIDELINES:  [x]                   SOCIAL HISTORY:   reports that he has been smoking cigarettes. He has a 40.00 pack-year smoking history. He has never used smokeless tobacco. He reports current alcohol use of about 4.0 - 6.0 standard drinks of alcohol per week. He reports current drug use. Drug: Marijuana.                  PAST MEDICAL HISTORY:   has a past medical history of Atrial fibrillation with RVR (HCC), Cancer (HCC) (06/2019), Chronic cough, COPD (chronic obstructive pulmonary disease) (HCC), Hearing loss, History of shingles, Lower back pain, Lung cancer (HCC), Mastoiditis, Mild mitral regurgitation, Other fatigue, PAF (paroxysmal atrial fibrillation) (HCC), and Pneumonia.                  PAST SURGICAL HISTORY:  @                 IMAGING:  NM PET/CT Skull Base to Mid Thigh    Result Date: 1/26/2022  1. The 3.0 x 2.0 cm cavitary nodule along the left lower  lobe bronchovascular bundle is hypermetabolic and suspicious for malignancy. There is no evidence for metastatic lymphadenopathy at the left hilum or mediastinum. There is no evidence for distant metastases. 2. Stable low level activity at the treated mediastinal/right hilar small cell lung cancer. 3. Continued stability of the hypermetabolic bilateral parotid nodules.                       SURGICAL PROCEDURE / PATHOLOGY:     6/19/2019: [id number] ([facility])    Final Diagnosis  1. Lung, Right Mainstem Endobronchial Mass, Biopsy:  A. Small cell carcinoma.      6/19/2019: [id number] ([facility])    Final Diagnosis  1. Lymph Node, Mediastinum Station 7, FNA:  A. Positive for high-grade malignancy consistent with small cell carcinoma.  2. Lymph Node, Mediastinum Station 10R, FNA:  A. Positive for high-grade malignancy consistent with small cell carcinoma.                 Labs & Biomarkers:     6/19/2019:    Ki67 marks >95% of tumor cell nuclei.

## 2022-02-09 ENCOUNTER — TELEPHONE (OUTPATIENT)
Dept: GENERAL RADIOLOGY | Facility: HOSPITAL | Age: 72
End: 2022-02-09

## 2022-02-09 NOTE — TELEPHONE ENCOUNTER
CALLED PT WITH AN APPT WITH DR MIKE STONER IF HE NEEDED TO CHANGE HIS APPT DATE AND TIME TO CALL US BACK

## 2022-02-09 NOTE — TELEPHONE ENCOUNTER
----- Message from Thai Morel MD sent at 1/27/2022  4:09 PM EST -----  Nery,    Can you find out when he's going to see Dr. Marquez? I referred him when I saw him a couple of days ago.    Assuming Dr. Marquez performs a bronchoscopy, I'll need to see him back after that.    Thanks!

## 2022-02-14 ENCOUNTER — TRANSCRIBE ORDERS (OUTPATIENT)
Dept: PULMONOLOGY | Facility: HOSPITAL | Age: 72
End: 2022-02-14

## 2022-02-14 DIAGNOSIS — Z01.818 OTHER SPECIFIED PRE-OPERATIVE EXAMINATION: Primary | ICD-10-CM

## 2022-02-15 ENCOUNTER — TELEPHONE (OUTPATIENT)
Dept: ONCOLOGY | Facility: CLINIC | Age: 72
End: 2022-02-15

## 2022-02-15 NOTE — TELEPHONE ENCOUNTER
----- Message from Thai Morel MD sent at 2/13/2022  5:45 PM EST -----  Regarding: RE: F/U APPT  He saw Dr. Marquez on 2/8. The bronchoscopy is not scheduled until 2/21 according to what I can see on [facility] scheduling. I'll need to see him back at least a few days after the bronchoscopy, not this week please. [facility]  ----- Message -----  From: Nery Benjamin  Sent: 2/9/2022   5:47 PM EST  To: Thai Morel MD  Subject: F/U APPT                                         DR MOREL - PT HAS BEEN SCHEDULED WITH YOU ON 2/16/22 - BRONCOSCOPE WAS DONE ON 2/8/22      THANK KEELEY LARA   ----- Message -----  From: Thai Morel MD  Sent: 1/27/2022   4:11 PM EST  To: Thai Morel MD, Nery Lara,    Can you find out when he's going to see Dr. Marquez? I referred him when I saw him a couple of days ago.    Assuming Dr. Marquez performs a bronchoscopy, I'll need to see him back after that.    Thanks!

## 2022-02-16 ENCOUNTER — APPOINTMENT (OUTPATIENT)
Dept: LAB | Facility: HOSPITAL | Age: 72
End: 2022-02-16

## 2022-02-18 ENCOUNTER — LAB (OUTPATIENT)
Dept: LAB | Facility: HOSPITAL | Age: 72
End: 2022-02-18

## 2022-02-18 DIAGNOSIS — Z01.818 OTHER SPECIFIED PRE-OPERATIVE EXAMINATION: ICD-10-CM

## 2022-02-18 PROCEDURE — C9803 HOPD COVID-19 SPEC COLLECT: HCPCS

## 2022-02-18 PROCEDURE — U0005 INFEC AGEN DETEC AMPLI PROBE: HCPCS

## 2022-02-18 PROCEDURE — U0004 COV-19 TEST NON-CDC HGH THRU: HCPCS

## 2022-02-19 LAB — SARS-COV-2 ORF1AB RESP QL NAA+PROBE: NOT DETECTED

## 2022-02-21 ENCOUNTER — ANESTHESIA (OUTPATIENT)
Dept: GASTROENTEROLOGY | Facility: HOSPITAL | Age: 72
End: 2022-02-21

## 2022-02-21 ENCOUNTER — APPOINTMENT (OUTPATIENT)
Dept: GENERAL RADIOLOGY | Facility: HOSPITAL | Age: 72
End: 2022-02-21

## 2022-02-21 ENCOUNTER — HOSPITAL ENCOUNTER (OUTPATIENT)
Facility: HOSPITAL | Age: 72
Setting detail: HOSPITAL OUTPATIENT SURGERY
Discharge: HOME | End: 2022-02-21
Attending: INTERNAL MEDICINE | Admitting: INTERNAL MEDICINE

## 2022-02-21 ENCOUNTER — ANESTHESIA EVENT (OUTPATIENT)
Dept: GASTROENTEROLOGY | Facility: HOSPITAL | Age: 72
End: 2022-02-21

## 2022-02-21 VITALS
HEART RATE: 76 BPM | OXYGEN SATURATION: 94 % | TEMPERATURE: 98.4 F | RESPIRATION RATE: 16 BRPM | BODY MASS INDEX: 26.94 KG/M2 | DIASTOLIC BLOOD PRESSURE: 90 MMHG | WEIGHT: 192.4 LBS | HEIGHT: 71 IN | SYSTOLIC BLOOD PRESSURE: 158 MMHG

## 2022-02-21 DIAGNOSIS — R91.8 LUNG MASS: ICD-10-CM

## 2022-02-21 LAB — GIE STN SPEC: NORMAL

## 2022-02-21 PROCEDURE — 87116 MYCOBACTERIA CULTURE: CPT | Performed by: INTERNAL MEDICINE

## 2022-02-21 PROCEDURE — 88360 TUMOR IMMUNOHISTOCHEM/MANUAL: CPT

## 2022-02-21 PROCEDURE — 87102 FUNGUS ISOLATION CULTURE: CPT | Performed by: INTERNAL MEDICINE

## 2022-02-21 PROCEDURE — 88341 IMHCHEM/IMCYTCHM EA ADD ANTB: CPT | Performed by: INTERNAL MEDICINE

## 2022-02-21 PROCEDURE — 25010000002 PROPOFOL 10 MG/ML EMULSION: Performed by: NURSE ANESTHETIST, CERTIFIED REGISTERED

## 2022-02-21 PROCEDURE — 87206 SMEAR FLUORESCENT/ACID STAI: CPT | Performed by: INTERNAL MEDICINE

## 2022-02-21 PROCEDURE — 87071 CULTURE AEROBIC QUANT OTHER: CPT | Performed by: INTERNAL MEDICINE

## 2022-02-21 PROCEDURE — 88312 SPECIAL STAINS GROUP 1: CPT | Performed by: INTERNAL MEDICINE

## 2022-02-21 PROCEDURE — 87205 SMEAR GRAM STAIN: CPT | Performed by: INTERNAL MEDICINE

## 2022-02-21 PROCEDURE — 88305 TISSUE EXAM BY PATHOLOGIST: CPT | Performed by: INTERNAL MEDICINE

## 2022-02-21 PROCEDURE — 71045 X-RAY EXAM CHEST 1 VIEW: CPT

## 2022-02-21 PROCEDURE — 88112 CYTOPATH CELL ENHANCE TECH: CPT | Performed by: INTERNAL MEDICINE

## 2022-02-21 PROCEDURE — 76000 FLUOROSCOPY <1 HR PHYS/QHP: CPT

## 2022-02-21 PROCEDURE — 88342 IMHCHEM/IMCYTCHM 1ST ANTB: CPT | Performed by: INTERNAL MEDICINE

## 2022-02-21 RX ORDER — LIDOCAINE HYDROCHLORIDE 10 MG/ML
INJECTION, SOLUTION EPIDURAL; INFILTRATION; INTRACAUDAL; PERINEURAL AS NEEDED
Status: DISCONTINUED | OUTPATIENT
Start: 2022-02-21 | End: 2022-02-21 | Stop reason: HOSPADM

## 2022-02-21 RX ORDER — LIDOCAINE HYDROCHLORIDE 20 MG/ML
INJECTION, SOLUTION INFILTRATION; PERINEURAL AS NEEDED
Status: DISCONTINUED | OUTPATIENT
Start: 2022-02-21 | End: 2022-02-21 | Stop reason: SURG

## 2022-02-21 RX ORDER — SODIUM CHLORIDE, SODIUM LACTATE, POTASSIUM CHLORIDE, CALCIUM CHLORIDE 600; 310; 30; 20 MG/100ML; MG/100ML; MG/100ML; MG/100ML
1000 INJECTION, SOLUTION INTRAVENOUS CONTINUOUS
Status: DISCONTINUED | OUTPATIENT
Start: 2022-02-21 | End: 2022-02-21 | Stop reason: HOSPADM

## 2022-02-21 RX ORDER — PROPOFOL 10 MG/ML
VIAL (ML) INTRAVENOUS AS NEEDED
Status: DISCONTINUED | OUTPATIENT
Start: 2022-02-21 | End: 2022-02-21 | Stop reason: SURG

## 2022-02-21 RX ADMIN — LIDOCAINE HYDROCHLORIDE 100 MG: 20 INJECTION, SOLUTION INFILTRATION; PERINEURAL at 08:15

## 2022-02-21 RX ADMIN — PROPOFOL 150 MG: 10 INJECTION, EMULSION INTRAVENOUS at 08:15

## 2022-02-21 RX ADMIN — SODIUM CHLORIDE, POTASSIUM CHLORIDE, SODIUM LACTATE AND CALCIUM CHLORIDE 1000 ML: 600; 310; 30; 20 INJECTION, SOLUTION INTRAVENOUS at 07:47

## 2022-02-21 RX ADMIN — PROPOFOL 30 MG: 10 INJECTION, EMULSION INTRAVENOUS at 08:21

## 2022-02-21 RX ADMIN — PROPOFOL 160 MCG/KG/MIN: 10 INJECTION, EMULSION INTRAVENOUS at 08:20

## 2022-02-21 NOTE — OP NOTE
Bronchoscopy Procedure Note    Procedure:  1. Bronchoscopy, Diagnostic    Pre-Operative Diagnosis:  Lung mass        Post-Operative Diagnosis: Same    Indication:  Lung mass    Anesthesia: Monitored Anesthesia Care (MAC)    Procedure Details: Patient was consented for the procedure with all risk and benefit of the procedure explained in detail.  Patient was given the opportunity to ask questions and all concerns were answered.  The bronchocope was inserted into the main airway via the LMA. An anatomical survey was done of the main airways and the subsegmental bronchus to at least the first subsegmental level of all five lobes of both lungs.  The findings are reported below.  A bronchoalveolar lavage was performed using aliquots of normal saline instilled into the airways then aspirated back.    Findings:  Bronchoscope passed through LMA to the level of the vocal cords.  Lidocaine used for local anesthetic over vocal cords.  Bronchoscope was passed between the vocal cords into the trachea.  All airways were visualized to at least the first subsegment level of all 5 lobes of both lungs.  Airways were of normal size and caliber.  No endobronchial lesions seen.    Fluoroscopically guided transbronchial brushings performed in the left lower lobe and sent for analysis.    Fluoroscopically guided transbronchial biopsies performed in the left lower lobe x 7 passes and sent for pathology review.     Bronchial alveolar lavage was performed in the left lower lobe with 120cc saline instilled and 50cc hazy, cellular return.      Estimated Blood Loss:  Minimal           Specimens:  As above                Complications:  None; patient tolerated the procedure well.           Disposition: PACU - hemodynamically stable.      Patient tolerated the procedure well.    Arslan Marquez MD  2/21/2022  08:41 EST

## 2022-02-21 NOTE — ANESTHESIA PROCEDURE NOTES
Airway  Urgency: elective    Date/Time: 2/21/2022 8:17 AM    General Information and Staff    Patient location during procedure: OR  Anesthesiologist: Perry Vaca MD  CRNA: Zahraa Diop CRNA    Indications and Patient Condition  Indications for airway management: airway protection    Preoxygenated: yes  Mask difficulty assessment: 0 - not attempted    Final Airway Details  Final airway type: supraglottic airway      Successful airway: gastric  Size 4    Number of attempts at approach: 1  Assessment: lips, teeth, and gum same as pre-op and atraumatic intubation

## 2022-02-21 NOTE — DISCHARGE INSTRUCTIONS
For the next 24 hours patient needs to be with a responsible adult.    For 24 hours DO NOT drive, operate machinery, appliances, drink alcohol, make important decisions or sign legal documents.    DO NOT EAT OR DRINK ANYTHING UNTIL 10:30AM TODAY. Then, start with a light or bland diet if you are feeling sick to your stomach otherwise advance to regular diet as tolerated.    Follow recommendations on procedure report if provided by your doctor.    Call Dr. Marquez for problems [phone]    Problems may include but not limited to: large amounts of bleeding, trouble breathing, repeated vomiting, severe unrelieved pain, fever or chills.

## 2022-02-21 NOTE — ANESTHESIA PREPROCEDURE EVALUATION
Anesthesia Evaluation     Patient summary reviewed and Nursing notes reviewed                Airway   Mallampati: I  TM distance: >3 FB  Neck ROM: full  No difficulty expected  Dental - normal exam   (+) poor dentition    Comment: Missing front bottom tooth; several front bottom teeth loose ; pt aware of possibility of teeth coming loose.    Pulmonary    (+) pneumonia , a smoker Current, lung cancer, COPD, rhonchi,   Cardiovascular - normal exam    (+) valvular problems/murmurs MR, dysrhythmias Atrial Fib, Paroxysmal Atrial Fib,     ROS comment: RBBB    Neuro/Psych- negative ROS  GI/Hepatic/Renal/Endo - negative ROS     Musculoskeletal (-) negative ROS    Abdominal  - normal exam    Bowel sounds: normal.   Substance History - negative use     OB/GYN negative ob/gyn ROS         Other      history of cancer                    Anesthesia Plan    ASA 4     general   total IV anesthesia    Anesthetic plan, all risks, benefits, and alternatives have been provided, discussed and informed consent has been obtained with: patient.        CODE STATUS:

## 2022-02-21 NOTE — ANESTHESIA POSTPROCEDURE EVALUATION
"Patient: Julia Ocasiokins III    Procedure Summary     Date: 02/21/22 Room / Location: [facility] ENDOSCOPY 7 /  [facility]    Anesthesia Start: 0804 Anesthesia Stop: 0858    Procedure: BRONCHOSCOPY WITH FLUORO, BAL, BRUSHINGS, AND BIOPSIES LLL (N/A Bronchus) Diagnosis:     Surgeons: Arslan Marquez MD Provider: Perry Vaca MD    Anesthesia Type: general ASA Status: 4          Anesthesia Type: general    Vitals  Vitals Value Taken Time   /90 02/21/22 0930   Temp     Pulse 76 02/21/22 0930   Resp 16 02/21/22 0930   SpO2 94 % 02/21/22 0930           Post Anesthesia Care and Evaluation    Patient location during evaluation: PACU  Patient participation: complete - patient participated  Level of consciousness: awake  Pain score: 0  Pain management: adequate  Airway patency: patent  Anesthetic complications: No anesthetic complications  PONV Status: none  Cardiovascular status: acceptable  Respiratory status: acceptable  Hydration status: acceptable    Comments: /90   Pulse 76   Temp 36.9 °C (98.4 °F) (Oral)   Resp 16   Ht 180.3 cm (71\")   Wt 87.3 kg (192 lb 6.4 oz)   SpO2 94%   BMI 26.83 kg/m²       "

## 2022-02-22 LAB
CYTO UR: NORMAL
LAB AP CASE REPORT: NORMAL
LAB AP DIAGNOSIS COMMENT: NORMAL
PATH REPORT.FINAL DX SPEC: NORMAL
PATH REPORT.GROSS SPEC: NORMAL

## 2022-02-22 NOTE — PROGRESS NOTES
"REASON FOR FOLLOW UP:   1.  T4N3 Small cell lung cancer  2.  SVC syndrome  3.  Initially treated with chemotherapy.  Subsequent radiation.  Subsequent PCI.      HISTORY OF PRESENT ILLNESS:  Julia Smith III is a 71 y.o. male with the above mentioned history, who returns the office today for follow up.     He returns today to follow-up his biopsy results from the recent bronchoscopy.    He continues to do about the same.  He complains of poor endurance, weakness in his legs.  He did well after the bronchoscopy.  Mild chronic cough.      Review of Systems   See the HPI    Vitals:    02/24/22 0944   BP: 124/86   Pulse: 92   Resp: 16   Temp: 96.6 °F (35.9 °C)   TempSrc: Temporal   SpO2: 95%   Weight: 86.4 kg (190 lb 6.4 oz)   Height: 175 cm (68.9\")   PainSc: 0-No pain  Comment: lung cancer     General:  No acute distress, awake, alert and oriented.  Chronically ill-appearing.  Skin:  Warm and dry, no visible rash  HEENT:  Normocephalic/atraumatic.  Wearing a face mask.  Chest:  Normal respiratory effort  Extremities:  No visible clubbing, cyanosis, or edema  Neuro/psych:  Grossly nonfocal.  Normal mood and affect.        DIAGNOSTIC DATA:      IMAGING:  NM PET/CT Skull Base to Mid Thigh (01/25/2022 14:27)    PET scan images personally reviewed.  3 x 2 cm cavitary nodule is hypermetabolic.  No metastatic lymphadenopathy.  No distant metastatic disease.    ASSESSMENT:  This is a 71 y.o. male with:    *Small cell lung cancer originating in the right hilum:   · He has an extremely large mass there with concern for SVC compression on CT imaging from 6/4/2019.  There is some left hilar adenopathy.  · Some parotid lesions which are concerning but indeterminate, left greater than right.  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  · Staging MRI of the brain " 7/10/2019 negative for metastatic disease  · He was discussed at multidisciplinary thoracic conference, with his T4 and 3 disease, concern for involvement of the right supraclavicular region and obvious left hilum, plans to add atezolizumab with cycle 2, we can consider future radiation to the residual mediastinal mass if necessary.  · Lung lesion too large to radiate in spite of the SVC narrowing.    · On 7/8/2019 we started carboplatin AUC 5 and VP16 100 mg/m2 through peripheral IVs  · Cycle 2 postponed on 7/29/2019 due to heart rate of 154.  Atezolizumab added with cycle #2.  · Radiation can be added for a residual mediastinal mass if necessary  · CT imaging on 8/14/2019 after two cycles of therapy shows significant improvement.  · Mediport anticipated by Dr. Gallardo on 8/22/2019.  · On  8/20/2019 cycle 3 was delayed due to neutropenia and with cycle 3 we did incorporate Neulasta.  · Cycle 4 completed 9/17/2019  · Maintenance atezolizumab initiated 10/8/2019.  PET scan revealed disease response to therapy, he does have an area of hypermetabolic activity in the adrenal gland which is small and did not show up on prior exam.  We will continue to monitor this.  His MRI of the brain is also negative for metastatic disease.  · CT 2/4/2020 with ongoing improvement.  Maintenance atezolizumab pursued.    · CT imaging 5/19/2020 with progression.  Increase in the right hilar/mediastinal mass with enlarging adenopathy.    · Given that the progression is longer than 6 months after completing carbo/VP16, proceed with further carbo/VP16.  · Cycle 1 initiated 5/26/2020  · PET scan after two cycles with ongoing uptake in the right hilum, otherwise somewhat improved  · Radiation initiated 7/22/2020 with plans for 30 treatments  · Proceeded with cycle 4 carboplatin -16 7/28/2020.    · As of 8/19/2020, discussed holding further chemotherapy since he had 4 cycles versus continuing potentially with as many as 6.  He prefers to  continue with therapy.    · Cycle 5 on 8/19/2020  · Radiation complete 9/1/2020  · As of 9/9/2020, plan no further chemotherapy  · MRI brain 11/4/2020 negative for mets.  · PET scan 11/4/2020. Improvement in the subcarinal and right hilar nodes with an SUV of 3.7 compared to 20.8 previously. Evidence for esophagitis. Right middle lobe changes resolved. Other stable changes that were previously noted. No new disease.     · MRI brain 1/28/2021 negative  · PCI pursued, complete from 2/16/2021 through 2/25/2021  · CT imaging 2/19/2021 with decrease in the primary tumor, two new small noncalcified nodules in the medial LLL     · CT imaging 4/13/2021 with subtle increase in left upper lobe and left perihilar nodules.  Right-sided subcarinal and infrahilar tumor unchanged.  Changes consistent with radiation pneumonitis.  · CT imaging 7/14/2021 with slight interval increase in size of 2 adjacent cavitary nodules in the medial left lower lobe.  Right infrahilar/subcarinal lesion stable.  · CT imaging 10/11/21 with enlarging right infrahilar mass with two enlarging left lower lobe cavitary lesions  · His case was presented at the multidisciplinary thoracic conference.  Plan observation of the lung lesions with serial CT scans.  If significant change, PET imaging and bronchoscopy.  · CT imaging 1/12/2022 with ongoing evolution of the left lower lobe cavitary lesions, some increase in size.  They have coalesced into one.  · PET scan on 1/25/2022 showed a hypermetabolic 3 x 2 cm cavitary nodule with no other evidence of disease.  · He did have a bronchoscopy performed on 2/21/2022.  BAL cytology negative for malignant cells.  Brushings with rare atypical squamous cells, nondiagnostic for malignancy.  Pathology showed evidence for dysplastic squamous epithelium suspicious for squamous cell carcinoma.  PD-L1 60%.    *Chronic hearing loss: History of left mastoiditis.  He is not a candidate for cisplatin because of this.      *Atrial fibrillation.  He was initiated on metoprolol 12.5 mg twice daily.  This did delay port placement.  The patient was taking his metoprolol inconsistently.  For cycle 2 was delayed due to heart rate of 150 7/29/2019.  He was seen by cardiology with instructions to increase his metoprolol to 25 mg twice daily.  Heart rate stable today.  He reports no symptoms.  See below.    *Tobacco use.  Continues to smoke    *History of SVC syndrome.  Improved after 2 cycles of chemotherapy.  No further evidence of this.    *Dyspnea on exertion: rRelated to COPD and ongoing lung damage from smoking.     *Leg pain with walking: Some of this sounds like claudication but some of this sounds like orthopedic pain.  Symptoms remain stable.  Follow-up with primary care.    *Fatigue and tiredness: I suspect that whole brain radiation is contributing to this.  TSH and free T4 were normal.  Stable.    *Malfunctioning Mediport: He states that no blood can be obtained from the port.  Apparently the port flushes well.  Unchanged.    *COVID-19 pandemic: Fully vaccinated with a booster.    PLAN:   1. Refer to radiation oncology.  His performance status is marginal for concurrent chemotherapy although he is willing to take this.  Therefore, we will plan weekly carboplatin and paclitaxel concurrent with radiation assuming radiation oncology is in agreement.  2. I will see him back in 2 to 3 weeks for follow-up with plans to begin chemotherapy that day.  Prior to that I will present his case at the multidisciplinary thoracic conference next week.    Thai Morel MD

## 2022-02-23 ENCOUNTER — TELEPHONE (OUTPATIENT)
Dept: ONCOLOGY | Facility: CLINIC | Age: 72
End: 2022-02-23

## 2022-02-23 LAB
BACTERIA SPEC AEROBE CULT: NORMAL
GRAM STN SPEC: NORMAL
GRAM STN SPEC: NORMAL
LAB AP CASE REPORT: NORMAL
LAB AP DIAGNOSIS COMMENT: NORMAL
LAB AP SPECIAL STAINS: NORMAL
PATH REPORT.ADDENDUM SPEC: NORMAL
PATH REPORT.FINAL DX SPEC: NORMAL
PATH REPORT.GROSS SPEC: NORMAL

## 2022-02-24 ENCOUNTER — APPOINTMENT (OUTPATIENT)
Dept: LAB | Facility: HOSPITAL | Age: 72
End: 2022-02-24

## 2022-02-24 ENCOUNTER — OFFICE VISIT (OUTPATIENT)
Dept: ONCOLOGY | Facility: CLINIC | Age: 72
End: 2022-02-24

## 2022-02-24 VITALS
HEIGHT: 69 IN | TEMPERATURE: 96.6 F | RESPIRATION RATE: 16 BRPM | WEIGHT: 190.4 LBS | HEART RATE: 92 BPM | SYSTOLIC BLOOD PRESSURE: 124 MMHG | BODY MASS INDEX: 28.2 KG/M2 | DIASTOLIC BLOOD PRESSURE: 86 MMHG | OXYGEN SATURATION: 95 %

## 2022-02-24 DIAGNOSIS — C34.32 PRIMARY MALIGNANT NEOPLASM OF BRONCHUS OF LEFT LOWER LOBE: Primary | ICD-10-CM

## 2022-02-24 PROCEDURE — 99214 OFFICE O/P EST MOD 30 MIN: CPT | Performed by: INTERNAL MEDICINE

## 2022-03-02 ENCOUNTER — OFFICE VISIT (OUTPATIENT)
Dept: ONCOLOGY | Facility: CLINIC | Age: 72
End: 2022-03-02

## 2022-03-02 ENCOUNTER — APPOINTMENT (OUTPATIENT)
Dept: LAB | Facility: HOSPITAL | Age: 72
End: 2022-03-02

## 2022-03-02 ENCOUNTER — MDT ASSESSMENT (OUTPATIENT)
Dept: OTHER | Facility: HOSPITAL | Age: 72
End: 2022-03-02

## 2022-03-02 VITALS — BODY MASS INDEX: 28.35 KG/M2 | WEIGHT: 191.4 LBS

## 2022-03-02 DIAGNOSIS — C34.32 PRIMARY MALIGNANT NEOPLASM OF BRONCHUS OF LEFT LOWER LOBE: Primary | ICD-10-CM

## 2022-03-02 PROCEDURE — 99214 OFFICE O/P EST MOD 30 MIN: CPT | Performed by: NURSE PRACTITIONER

## 2022-03-02 NOTE — PROGRESS NOTES
[facility] Hematology/Oncology Treatment Plan Summary    [id number]  MD: Dr. Morel    Diagnosis:     ICD-10-CM ICD-9-CM   1. Primary malignant neoplasm of bronchus of left lower lobe (HCC)  C34.32 162.5         Treatment Medication(s):   1. Carboplatin  2. Taxol    Frequency: weekly along with radiation    Number of cycles: 6    Starting on: 3/10/2022    Items for home use: Senokot-S (for constipation), Imodium AD (for diarrhea), Tylenol (for fever and/or pain) and Thermometer    Rx written for: [x] Nausea    [] Pre-Chemo   ondansetron 8 mg by mouth every 8 hours as needed for nausea    Completing Provider: BERTO Jiménez           Date/time: 03/02/2022      Please note: You will be seen by a provider frequently with your treatment plan. This plan may change depending on many factors, if so, this will be discussed with you by your physician.  Last update 12/2020.

## 2022-03-02 NOTE — PROGRESS NOTES
____________________PATIENT EDUCATION____________________    PATIENT EDUCATION:  Today I met with the patient to discuss the chemotherapy regimen recommended for treatment of his/her Primary malignant neoplasm of bronchus of left lower lobe (HCC)  .  The patient was given explanation of treatment premed side effects including office policy that prohibits patients to drive if sedating medications are administered, MD explanation given regarding benefits, side effects, toxicities and goals of treatment.  The patient received a Chemotherapy/Biotherapy Plan Summary including diagnosis and explanation of specific treatment plan.    SIDE EFFECTS:  Common side effects were discussed with the patient.  Discussion included where applicable hair loss/discoloration, anemia/fatigue, infection/chills/fever, appetite, bleeding risk/precautions, constipation, diarrhea, mouth sores, taste alteration, loss of appetite, nausea/vomiting, peripheral neuropathy, skin/nail changes, rash, muscle aches/weakness, photosensitivity, weight gain/loss, liver damage, lung damage, kidney damage, DVT/PE risk, fluid retention, pleural/pericardial effusion, somnolence, electrolyte/LFT imbalance, vein exercises and/or the possible need for vascular access/port placement.  The patient was advised that although uncommon, leakage of an infused medication from the vein or venous access device may lead to skin breakdown and/or other tissue damage.  The patient was advised that he/she may have pain, bleeding, and/or bruising from the insertion of a needle in their vein or venous access device (port).      Discussion also included side effects specific to drugs in the treatment plan, specifically Carboplatin, Taxol    I spent 32 minutes caring for Julia on this date of service. This time includes time spent by me in the following activities: preparing for the visit, reviewing tests, counseling and educating the patient/family/caregiver, ordering  medications, tests, or procedures, documenting information in the medical record and care coordination.      Danielle Murguia, APRN  03/02/2022

## 2022-03-02 NOTE — PROGRESS NOTES
MULTIDISCIPLINARY TREATMENT PLANNING CONFERENCE  DATE: 3/3/2022       SPECIALTY: Thoracic Conference    PRESENTER: Thai Morel MD    SITE: Left Lung         Please discuss clinical/working stage, TNM, Stage Group, National Treatment Guidelines, and Prognostic Indicators.       CONFERENCE SUMMARY:  Understanding that there is not a 100% definitive diagnosis of squamous cell carcinoma but very high suspicion, plan concurrent chemotherapy and radiation with weekly carboplatin and paclitaxel and plans for 30 fractions of radiation.  We discussed whether a PD-L1 result of 60% is suggestive of malignancy and there was no definitive answer to this question provided during the conference.                          AJCC STAGE: T1c, N0        REFERRAL SUPPORT   Psychosocial Assessment:  []                Clinical Trials:  []                Genetic Testing:  []                Geriatric Assessment:  []                Smoking Cessation:  []                Nutrition Assessment:  []                Social Work Evaluation/Barriers to Care:  []                Behavioral Oncology Evaluation:  []                Palliative Care:  []      EVIDENCE BASED NATIONAL TREATMENT GUIDELINES:  [x]                   SOCIAL HISTORY:   reports that he has been smoking cigarettes. He has a 40.00 pack-year smoking history. He has never used smokeless tobacco. He reports current alcohol use of about 4.0 - 6.0 standard drinks of alcohol per week. He reports current drug use. Drug: Marijuana.                  PAST MEDICAL HISTORY:   has a past medical history of Atrial fibrillation with RVR (HCC), Cancer (HCC) (06/2019), Chronic cough, COPD (chronic obstructive pulmonary disease) (HCC), Hearing loss, History of shingles, Lower back pain, Lung cancer (HCC), Mastoiditis, Mild mitral regurgitation, Other fatigue, PAF (paroxysmal atrial fibrillation) (HCC), and Pneumonia.                  PAST SURGICAL HISTORY:  @                 IMAGING:  NM PET/CT Skull  Base to Mid Thigh    Result Date: 1/26/2022  1. The 3.0 x 2.0 cm cavitary nodule along the left lower lobe bronchovascular bundle is hypermetabolic and suspicious for malignancy. There is no evidence for metastatic lymphadenopathy at the left hilum or mediastinum. There is no evidence for distant metastases. 2. Stable low level activity at the treated mediastinal/right hilar small cell lung cancer. 3. Continued stability of the hypermetabolic bilateral parotid nodules.  This report was finalized on 1/26/2022 4:28 PM by Dr. Oanh Crow M.D.                      SURGICAL PROCEDURE / PATHOLOGY:      2/21/2022: [id number] ([facility])    Final Diagnosis  1. Lung, Left Lower Lobe, Bronchoalveolar (BAL):  A. Negative for malignant cells.  B. Pulmonary macrophages, benign bronchial cells, mixed inflammation and bacteria suggesting Actinomyces  present.  C. Negative for fungus by routine GMS staining.  2. Lung, Left Lower Lobe, Brushing:  A. Rare atypical squamous cells (non-diagnostic for malignancy).  B. Benign bronchial cells and mixed inflammation present.      2/21/2022: [id number] ([facility])    Final Diagnosis  1. Lung, Left Lower Lobe, Biopsy: Endobronchial and alveolated pulmonary parenchyma with  A. Minute superficial fragments of dysplastic squamous epithelium.                  Labs & Biomarkers:      2/21/2022:

## 2022-03-03 ENCOUNTER — CONSULT (OUTPATIENT)
Dept: RADIATION ONCOLOGY | Facility: HOSPITAL | Age: 72
End: 2022-03-03

## 2022-03-03 ENCOUNTER — APPOINTMENT (OUTPATIENT)
Dept: RADIATION ONCOLOGY | Facility: HOSPITAL | Age: 72
End: 2022-03-03

## 2022-03-03 VITALS
HEART RATE: 86 BPM | BODY MASS INDEX: 28.44 KG/M2 | DIASTOLIC BLOOD PRESSURE: 75 MMHG | SYSTOLIC BLOOD PRESSURE: 122 MMHG | OXYGEN SATURATION: 95 % | WEIGHT: 192 LBS

## 2022-03-03 DIAGNOSIS — C34.92 NON-SMALL CELL CANCER OF LEFT LUNG: Primary | ICD-10-CM

## 2022-03-03 PROBLEM — C34.90 NON-SMALL CELL LUNG CANCER (HCC): Status: ACTIVE | Noted: 2022-03-03

## 2022-03-03 PROCEDURE — 77334 RADIATION TREATMENT AID(S): CPT | Performed by: RADIOLOGY

## 2022-03-03 PROCEDURE — 77370 RADIATION PHYSICS CONSULT: CPT | Performed by: RADIOLOGY

## 2022-03-03 PROCEDURE — 77263 THER RADIOLOGY TX PLNG CPLX: CPT | Performed by: RADIOLOGY

## 2022-03-03 PROCEDURE — 99215 OFFICE O/P EST HI 40 MIN: CPT | Performed by: RADIOLOGY

## 2022-03-03 PROCEDURE — 77399 UNLISTED PX MED RADJ PHYSICS: CPT | Performed by: RADIOLOGY

## 2022-03-03 NOTE — PROGRESS NOTES
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   The patient is a 71 y.o.  male with a history of small cell lung cancer treated with chemotherapy followed by radiation to the mediastinum/ right hilum with Dr. Giang.  He completed a dose of 60Gy on 9/1/2020 and then completed PCI in February 2021.  He has been followed with serial CT scan and most recently had a CT chest, abdomen and pelvis on 1/12/22 which showed 2 perihilar left lower lobe cavitary nodules now coalescing into a single mass measuring 3 x 2cm and ill defined right hilar density with mixed change, the inferior portion is smaller with little change in the transverse measurement with abrupt truncation of right middle lobe bronchus.     He then had a PET scan on 1/25/22 which showed activity in left lower lobe bronchovascular nodule measuring 3 x 2 cm with max suv of 15 and low level activity in the right hilar soft tissue density with max suv of 3.7 unchanged and no evidence of distant metastatic disease with stability of bilateral parotid nodules.     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  His case was presented in the multidisciplinary lung conference this morning with consensus recommendation for concurrent chemo/radiation.  The pathology was reviewed in conference and the specimen was felt to be consistent with squamous cell carcinoma.    He continues to smoke one pack per day.  He reports chronic soa and cough.  He denies any recent weight loss.             Review of Systems   Constitutional: Positive for fatigue.   HENT: Negative.    Eyes: Negative.    Respiratory: Positive for shortness of breath and  "wheezing.    Gastrointestinal: Negative.    Genitourinary: Negative.    Musculoskeletal: Positive for arthralgias.   Neurological: Negative.    Hematological: Negative.    Psychiatric/Behavioral: Negative.          Past Medical History:   Diagnosis Date   • Atrial fibrillation with RVR (HCC)    • Cancer (HCC) 06/2019    Right lung   • Chronic cough    • COPD (chronic obstructive pulmonary disease) (HCC)    • Hearing loss    • History of shingles    • Lower back pain    • Lung cancer (HCC)    • Mastoiditis    • Mild mitral regurgitation    • Other fatigue    • PAF (paroxysmal atrial fibrillation) (HCC)    • Pneumonia          Past Surgical History:   Procedure Laterality Date   • BRONCHOSCOPY N/A 6/19/2019    Procedure: BRONCHOSCOPY WITH WASHINGS AND BIOPSY AND ENDOBRONCHIAL ULTRASOUND WITH FNA;  Surgeon: Arslan Maruqez MD;  Location: [facility] ENDOSCOPY;  Service: Pulmonary   • BRONCHOSCOPY N/A 2/21/2022    Procedure: BRONCHOSCOPY WITH FLUORO, BAL, BRUSHINGS, AND BIOPSIES LLL;  Surgeon: Arslan Marquez MD;  Location: [facility] ENDOSCOPY;  Service: Pulmonary;  Laterality: N/A;  PRE: LUNG MASS  POST: SAME   • INNER EAR SURGERY Left    • MASTOIDECTOMY Left 1994   • VENOUS ACCESS DEVICE (PORT) INSERTION Right 8/22/2019    Procedure: INSERTION OF PORTACATH;  Surgeon: Ramila Gallardo MD;  Location: [facility] OR;  Service: Cardiothoracic         Social History     Socioeconomic History   • Marital status:      Spouse name: Krystle   • Number of children: 2   Tobacco Use   • Smoking status: Current Every Day Smoker     Packs/day: 1.00     Years: 40.00     Pack years: 40.00     Types: Cigarettes   • Smokeless tobacco: Never Used   • Tobacco comment: Since age 32   Substance and Sexual Activity   • Alcohol use: Yes     Alcohol/week: 4.0 - 6.0 standard drinks     Types: 4 - 6 Cans of beer per week     Comment: daily /caffeine use - coffee   • Drug use: Yes     Types: Marijuana     Comment: last used \"few days ago\" "   • Sexual activity: Not Currently     Partners: Female         Family History   Problem Relation Age of Onset   • No Known Problems Mother    • COPD Father            • No Known Problems Sister    • Heart disease Brother    • No Known Problems Brother    • No Known Problems Brother    • No Known Problems Brother           Objective    Physical Exam  Constitutional:       Appearance: Normal appearance.   Eyes:      Extraocular Movements: Extraocular movements intact.   Cardiovascular:      Rate and Rhythm: Normal rate.   Pulmonary:      Effort: Pulmonary effort is normal. No respiratory distress.      Breath sounds: Wheezing present. No rales.   Abdominal:      General: Abdomen is flat.   Musculoskeletal:         General: Normal range of motion.      Cervical back: Normal range of motion.   Neurological:      General: No focal deficit present.      Mental Status: He is alert.   Psychiatric:         Mood and Affect: Mood normal.           Current Outpatient Medications on File Prior to Visit   Medication Sig Dispense Refill   • aspirin 81 MG tablet Take 1 tablet by mouth Daily. 30 tablet 11   • metoprolol tartrate (LOPRESSOR) 25 MG tablet TAKE 1 TABLET BY MOUTH TWO TIMES A  tablet 2   • ondansetron ODT (ZOFRAN-ODT) 8 MG disintegrating tablet Place 1 tablet on the tongue Every 8 (Eight) Hours As Needed for Nausea or Vomiting for up to 180 doses. 30 tablet 5     No current facility-administered medications on file prior to visit.       ALLERGIES:  No Known Allergies    There were no vitals taken for this visit.     Current Status 2022   ECOG score 0         Assessment/Plan     Julia Smith III is a 71 y.o. male with small cell lung cancer diagnosed in 2019 now with newly diagnosed squamous cell carcinoma of the left lung, cT1cN0.  I have reviewed his previous radiation treatment plans when he received 60Gy to the right hilum and mediastinum. I believe we can still treat the left lung mass in a  definitive fashion with concurrent chemotherapy.   I discussed with him my recommendation for radiation therapy. I discussed with him today the risks, benefits and rationale of radiation therapy to the lung.  I discussed both the acute and long term side effects to include but not limited to the following:    Acute:  skin erythema, fatigue, lowered blood counts, pneumonitis resulting in shortness of breath, cough or pain wtih inspiration, esophagitiis resulting in pain or difficulty with swallowing    Late:  permanent skin changes, late pneumonitis or pulmonary fibrosis resulting in permanent shortness of breath, chronic cough or oxygen dependency, esophageal stricture, late cardiac damage and the remote risk of second malignancies.    Julia GIA Smith III  voiced understanding and wishes to proceed with the treatments here at [facility].  We will proceed with CT simulation for treatment planning purposes today and plan to begin treatments Thursday March 10 with concurrent chemotherapy.   I plan to deliver a dose of approximately 60-66Gy in 30-33 fractions with imrt to protect surrounding normal structures and in light of his previous radiation to the mediastinum/right hilum nearby.      I personally spent greater than 60 minutes today assessing, managing, discussing and documenting my visit with the patient. That time includes review of records, imaging and pathology reports, obtaining my own history, performing a medically appropriate evaluation, counseling and educating the patient, discussing goals, logistics, alternatives and risks of my recommendations, surveillance options and potential outcomes. It also includes the time documenting the clinical information in the EMR and communicating my recommendations to the other involved physicians.               Thank you very much for allowing me to participate in the care of this very pleasant patient.    Sincerely,      Yessenia Elizabeth MD

## 2022-03-04 PROCEDURE — 77470 SPECIAL RADIATION TREATMENT: CPT | Performed by: RADIOLOGY

## 2022-03-07 DIAGNOSIS — C34.90 SMALL CELL LUNG CANCER: ICD-10-CM

## 2022-03-07 DIAGNOSIS — Z79.899 HIGH RISK MEDICATION USE: Primary | ICD-10-CM

## 2022-03-07 PROCEDURE — 77293 RESPIRATOR MOTION MGMT SIMUL: CPT | Performed by: RADIOLOGY

## 2022-03-07 PROCEDURE — 77300 RADIATION THERAPY DOSE PLAN: CPT | Performed by: RADIOLOGY

## 2022-03-07 PROCEDURE — 77338 DESIGN MLC DEVICE FOR IMRT: CPT | Performed by: RADIOLOGY

## 2022-03-07 PROCEDURE — 77301 RADIOTHERAPY DOSE PLAN IMRT: CPT | Performed by: RADIOLOGY

## 2022-03-08 PROCEDURE — 77300 RADIATION THERAPY DOSE PLAN: CPT | Performed by: RADIOLOGY

## 2022-03-10 ENCOUNTER — OFFICE VISIT (OUTPATIENT)
Dept: ONCOLOGY | Facility: CLINIC | Age: 72
End: 2022-03-10

## 2022-03-10 ENCOUNTER — INFUSION (OUTPATIENT)
Dept: ONCOLOGY | Facility: HOSPITAL | Age: 72
End: 2022-03-10

## 2022-03-10 ENCOUNTER — APPOINTMENT (OUTPATIENT)
Dept: RADIATION ONCOLOGY | Facility: HOSPITAL | Age: 72
End: 2022-03-10

## 2022-03-10 ENCOUNTER — CLINICAL SUPPORT (OUTPATIENT)
Dept: OTHER | Facility: HOSPITAL | Age: 72
End: 2022-03-10

## 2022-03-10 VITALS
HEART RATE: 82 BPM | WEIGHT: 191 LBS | BODY MASS INDEX: 28.29 KG/M2 | HEIGHT: 69 IN | TEMPERATURE: 97.6 F | OXYGEN SATURATION: 94 % | SYSTOLIC BLOOD PRESSURE: 129 MMHG | DIASTOLIC BLOOD PRESSURE: 88 MMHG | RESPIRATION RATE: 16 BRPM

## 2022-03-10 VITALS — SYSTOLIC BLOOD PRESSURE: 128 MMHG | DIASTOLIC BLOOD PRESSURE: 71 MMHG | HEART RATE: 74 BPM

## 2022-03-10 VITALS — HEIGHT: 69 IN | BODY MASS INDEX: 28.29 KG/M2 | WEIGHT: 191 LBS

## 2022-03-10 DIAGNOSIS — Z79.899 HIGH RISK MEDICATION USE: ICD-10-CM

## 2022-03-10 DIAGNOSIS — C34.90 SMALL CELL LUNG CANCER: ICD-10-CM

## 2022-03-10 DIAGNOSIS — Z45.2 ENCOUNTER FOR FITTING AND ADJUSTMENT OF VASCULAR CATHETER: Primary | ICD-10-CM

## 2022-03-10 DIAGNOSIS — C34.92 NON-SMALL CELL CANCER OF LEFT LUNG: Primary | ICD-10-CM

## 2022-03-10 LAB
ALBUMIN SERPL-MCNC: 4.1 G/DL (ref 3.5–5.2)
ALBUMIN/GLOB SERPL: 1.4 G/DL (ref 1.1–2.4)
ALP SERPL-CCNC: 80 U/L (ref 38–116)
ALT SERPL W P-5'-P-CCNC: 22 U/L (ref 0–41)
ANION GAP SERPL CALCULATED.3IONS-SCNC: 8.6 MMOL/L (ref 5–15)
AST SERPL-CCNC: 23 U/L (ref 0–40)
BASOPHILS # BLD AUTO: 0.15 10*3/MM3 (ref 0–0.2)
BASOPHILS NFR BLD AUTO: 2.4 % (ref 0–1.5)
BILIRUB SERPL-MCNC: 0.3 MG/DL (ref 0.2–1.2)
BUN SERPL-MCNC: 12 MG/DL (ref 6–20)
BUN/CREAT SERPL: 10.6 (ref 7.3–30)
CALCIUM SPEC-SCNC: 9.4 MG/DL (ref 8.5–10.2)
CHLORIDE SERPL-SCNC: 103 MMOL/L (ref 98–107)
CO2 SERPL-SCNC: 26.4 MMOL/L (ref 22–29)
CREAT SERPL-MCNC: 1.13 MG/DL (ref 0.7–1.3)
DEPRECATED RDW RBC AUTO: 46.1 FL (ref 37–54)
EGFRCR SERPLBLD CKD-EPI 2021: 69.9 ML/MIN/1.73
EOSINOPHIL # BLD AUTO: 0.25 10*3/MM3 (ref 0–0.4)
EOSINOPHIL NFR BLD AUTO: 4.1 % (ref 0.3–6.2)
ERYTHROCYTE [DISTWIDTH] IN BLOOD BY AUTOMATED COUNT: 12.9 % (ref 12.3–15.4)
GLOBULIN UR ELPH-MCNC: 3 GM/DL (ref 1.8–3.5)
GLUCOSE SERPL-MCNC: 89 MG/DL (ref 74–124)
HCT VFR BLD AUTO: 44.2 % (ref 37.5–51)
HGB BLD-MCNC: 15 G/DL (ref 13–17.7)
IMM GRANULOCYTES # BLD AUTO: 0.02 10*3/MM3 (ref 0–0.05)
IMM GRANULOCYTES NFR BLD AUTO: 0.3 % (ref 0–0.5)
LYMPHOCYTES # BLD AUTO: 1.35 10*3/MM3 (ref 0.7–3.1)
LYMPHOCYTES NFR BLD AUTO: 22 % (ref 19.6–45.3)
MCH RBC QN AUTO: 32.8 PG (ref 26.6–33)
MCHC RBC AUTO-ENTMCNC: 33.9 G/DL (ref 31.5–35.7)
MCV RBC AUTO: 96.7 FL (ref 79–97)
MONOCYTES # BLD AUTO: 0.78 10*3/MM3 (ref 0.1–0.9)
MONOCYTES NFR BLD AUTO: 12.7 % (ref 5–12)
NEUTROPHILS NFR BLD AUTO: 3.6 10*3/MM3 (ref 1.7–7)
NEUTROPHILS NFR BLD AUTO: 58.5 % (ref 42.7–76)
NRBC BLD AUTO-RTO: 0 /100 WBC (ref 0–0.2)
PLATELET # BLD AUTO: 149 10*3/MM3 (ref 140–450)
PMV BLD AUTO: 10.1 FL (ref 6–12)
POTASSIUM SERPL-SCNC: 4.2 MMOL/L (ref 3.5–4.7)
PROT SERPL-MCNC: 7.1 G/DL (ref 6.3–8)
RBC # BLD AUTO: 4.57 10*6/MM3 (ref 4.14–5.8)
SODIUM SERPL-SCNC: 138 MMOL/L (ref 134–145)
WBC NRBC COR # BLD: 6.15 10*3/MM3 (ref 3.4–10.8)

## 2022-03-10 PROCEDURE — 25010000002 PALONOSETRON PER 25 MCG: Performed by: INTERNAL MEDICINE

## 2022-03-10 PROCEDURE — 25010000002 PACLITAXEL PER 1 MG: Performed by: INTERNAL MEDICINE

## 2022-03-10 PROCEDURE — 96375 TX/PRO/DX INJ NEW DRUG ADDON: CPT

## 2022-03-10 PROCEDURE — 77386 CHG INTENSITY MODULATED RADIATION TX DLVR COMPLEX: CPT | Performed by: RADIOLOGY

## 2022-03-10 PROCEDURE — 96413 CHEMO IV INFUSION 1 HR: CPT

## 2022-03-10 PROCEDURE — 85025 COMPLETE CBC W/AUTO DIFF WBC: CPT

## 2022-03-10 PROCEDURE — 25010000002 HEPARIN LOCK FLUSH PER 10 UNITS: Performed by: INTERNAL MEDICINE

## 2022-03-10 PROCEDURE — 96417 CHEMO IV INFUS EACH ADDL SEQ: CPT

## 2022-03-10 PROCEDURE — 80053 COMPREHEN METABOLIC PANEL: CPT

## 2022-03-10 PROCEDURE — 25010000002 DEXAMETHASONE SODIUM PHOSPHATE 100 MG/10ML SOLUTION: Performed by: INTERNAL MEDICINE

## 2022-03-10 PROCEDURE — 77427 RADIATION TX MANAGEMENT X5: CPT | Performed by: RADIOLOGY

## 2022-03-10 PROCEDURE — 25010000002 DIPHENHYDRAMINE PER 50 MG: Performed by: INTERNAL MEDICINE

## 2022-03-10 PROCEDURE — 25010000002 CARBOPLATIN PER 50 MG: Performed by: INTERNAL MEDICINE

## 2022-03-10 PROCEDURE — 77386: CPT | Performed by: RADIOLOGY

## 2022-03-10 PROCEDURE — 99214 OFFICE O/P EST MOD 30 MIN: CPT | Performed by: INTERNAL MEDICINE

## 2022-03-10 RX ORDER — SODIUM CHLORIDE 0.9 % (FLUSH) 0.9 %
10 SYRINGE (ML) INJECTION AS NEEDED
Status: DISCONTINUED | OUTPATIENT
Start: 2022-03-10 | End: 2022-03-10 | Stop reason: HOSPADM

## 2022-03-10 RX ORDER — FAMOTIDINE 10 MG/ML
20 INJECTION, SOLUTION INTRAVENOUS ONCE
Status: CANCELLED | OUTPATIENT
Start: 2022-03-10

## 2022-03-10 RX ORDER — SODIUM CHLORIDE 9 MG/ML
250 INJECTION, SOLUTION INTRAVENOUS ONCE
Status: COMPLETED | OUTPATIENT
Start: 2022-03-10 | End: 2022-03-10

## 2022-03-10 RX ORDER — DIPHENHYDRAMINE HYDROCHLORIDE 50 MG/ML
50 INJECTION INTRAMUSCULAR; INTRAVENOUS AS NEEDED
Status: CANCELLED | OUTPATIENT
Start: 2022-03-10

## 2022-03-10 RX ORDER — HEPARIN SODIUM (PORCINE) LOCK FLUSH IV SOLN 100 UNIT/ML 100 UNIT/ML
500 SOLUTION INTRAVENOUS AS NEEDED
Status: DISCONTINUED | OUTPATIENT
Start: 2022-03-10 | End: 2022-03-10 | Stop reason: HOSPADM

## 2022-03-10 RX ORDER — PALONOSETRON 0.05 MG/ML
0.25 INJECTION, SOLUTION INTRAVENOUS ONCE
Status: CANCELLED | OUTPATIENT
Start: 2022-03-10

## 2022-03-10 RX ORDER — SODIUM CHLORIDE 0.9 % (FLUSH) 0.9 %
10 SYRINGE (ML) INJECTION AS NEEDED
Status: CANCELLED | OUTPATIENT
Start: 2022-03-10

## 2022-03-10 RX ORDER — PALONOSETRON 0.05 MG/ML
0.25 INJECTION, SOLUTION INTRAVENOUS ONCE
Status: COMPLETED | OUTPATIENT
Start: 2022-03-10 | End: 2022-03-10

## 2022-03-10 RX ORDER — SODIUM CHLORIDE 9 MG/ML
250 INJECTION, SOLUTION INTRAVENOUS ONCE
Status: CANCELLED | OUTPATIENT
Start: 2022-03-10

## 2022-03-10 RX ORDER — FAMOTIDINE 10 MG/ML
20 INJECTION, SOLUTION INTRAVENOUS ONCE
Status: COMPLETED | OUTPATIENT
Start: 2022-03-10 | End: 2022-03-10

## 2022-03-10 RX ORDER — HEPARIN SODIUM (PORCINE) LOCK FLUSH IV SOLN 100 UNIT/ML 100 UNIT/ML
500 SOLUTION INTRAVENOUS AS NEEDED
Status: CANCELLED | OUTPATIENT
Start: 2022-03-10

## 2022-03-10 RX ORDER — FAMOTIDINE 10 MG/ML
20 INJECTION, SOLUTION INTRAVENOUS AS NEEDED
Status: CANCELLED | OUTPATIENT
Start: 2022-03-10

## 2022-03-10 RX ADMIN — FAMOTIDINE 20 MG: 10 INJECTION INTRAVENOUS at 09:59

## 2022-03-10 RX ADMIN — Medication 10 ML: at 12:17

## 2022-03-10 RX ADMIN — DEXAMETHASONE SODIUM PHOSPHATE 12 MG: 10 INJECTION, SOLUTION INTRAMUSCULAR; INTRAVENOUS at 09:21

## 2022-03-10 RX ADMIN — Medication 500 UNITS: at 12:17

## 2022-03-10 RX ADMIN — PALONOSETRON 0.25 MG: 0.05 INJECTION, SOLUTION INTRAVENOUS at 10:02

## 2022-03-10 RX ADMIN — CARBOPLATIN 200 MG: 10 INJECTION INTRAVENOUS at 11:42

## 2022-03-10 RX ADMIN — PACLITAXEL 100 MG: 6 INJECTION, SOLUTION INTRAVENOUS at 10:35

## 2022-03-10 RX ADMIN — SODIUM CHLORIDE 250 ML: 9 INJECTION, SOLUTION INTRAVENOUS at 09:21

## 2022-03-10 RX ADMIN — DIPHENHYDRAMINE HYDROCHLORIDE 50 MG: 50 INJECTION, SOLUTION INTRAMUSCULAR; INTRAVENOUS at 09:39

## 2022-03-10 NOTE — PROGRESS NOTES
"Outpatient Oncology Nutrition  Assessment/PES    Patient Name:  Julia Smith III  YOB: 1950  MRN: [id number]    Assessment Date:  3/10/2022    Comments:  Met patient in the infusion center today. Last saw patient 8/2019 while he was receiving carboplatin/ 16. Weight at that time was 182 lb.   Today's weight 191 lb. No weight loss.  Reports good appetite. Uses marijuana regularly, cigarettes too. Also drinks ETOH. States he does not like water. Encouraged nutrition and hydration. Will follow during treatment course.      General Info     Row Name 03/10/22 1143       Today's Session    Person(s) attending today's session Patient       General Information    Oncology patient? yes  small cell lung cancer, h/o SVC syndrome.       Oncology Specific Assessment    Type of treatment Chemotherapy;Radiation    Frequency of treatment Carboplatin, taxol weekly, daily radiation                 Anthropometrics     Row Name 03/10/22 1145 03/10/22 1144       Anthropometrics    Height 175 cm (68.9\") 175 cm (68.9\")    Weight 86.6 kg (191 lb) 86.6 kg (191 lb)    Weight for Calculation 86.6 kg (191 lb) --       Ideal Body Weight (IBW)    Retired Ideal Body Weight (IBW) (kg) 73.4 73.4    Retired % Ideal Body Weight 118.03 118.03       Retired Body Mass Index (BMI)    Retired BMI (kg/m2) 28.35 28.35                 Home Nutrition Report     Row Name 03/10/22 1144          Home Nutrition Report    Fluid/Beverage Intake Alcohol     Diet No specific     Typical Intake (Food/Fluid/EN/PN) Patient states he has a good appetite (using marijuana almost daily)                Estimated/Assessed Needs - Anthropometrics     Row Name 03/10/22 1145 03/10/22 1144       Anthropometrics    Height 175 cm (68.9\") 175 cm (68.9\")    Weight 86.6 kg (191 lb) 86.6 kg (191 lb)    Weight for Calculation 86.6 kg (191 lb) --       Estimated/Assessed Needs    Additional Documentation KCAL/KG (Group);Protein Requirements (Group);Fluid Requirements " (Group) --       KCAL/KG    KCAL/KG 25 Kcal/Kg (kcal);30 Kcal/Kg (kcal) --    25 Kcal/Kg (kcal) 2165.925 --    30 Kcal/Kg (kcal) 2599.11 --       Protein Requirements    Weight Used For Protein Calculations 86.6 kg (191 lb) --    Est Protein Requirement Amount (gms/kg) 1.2 gm protein --    Estimated Protein Requirements (gms/day) 103.96 --       Fluid Requirements    Fluid Requirements (mL/day) 2500 --    Estimated Fluid Requirement Method RDA Method --    RDA Method (mL) 2500 --                 Labs/Tests/Procedures/Meds     Row Name 03/10/22 1245          Labs/Procedures/Meds    Lab Results Reviewed reviewed            Diagnostic Tests/Procedures    Diagnostic Test/Procedure Reviewed reviewed            Medications    Pertinent Medications Reviewed reviewed     Pertinent Medications Comments zofran                   Problem/Interventions:   Problem 1     Row Name 03/10/22 1245          Nutrition Diagnoses Problem 1    Problem 1 Predicted Suboptimal Intake     Etiology (related to) Medical Diagnosis     Oncology Lung cancer  small cell lung cancer     Signs/Symptoms (evidenced by) Report/Observation     Reported/Observed By Patient     Appetite Good                        Intervention Goal     Row Name 03/10/22 1246          Intervention Goal    General Provide information regarding MNT for treatment/condition;Meet nutritional needs for age/condition;Disease management/therapy     PO Tolerate PO;Meet estimated needs;Maintain intake     Weight No significant weight loss                    Education/Evaluation     Row Name 03/10/22 1246          Education    Education Provided education regarding;Education topics;Advised regarding habits/behavior     Provided education regarding Avoidance of associated complications     Advised Regarding Habits/Behavior Increased nutrient density;Eating pattern;Food choices            Monitor/Evaluation    Education Follow-up Reinforce PRN                 Electronically signed  by:  Amairani Fair RD, LD  03/10/22 12:47 EST

## 2022-03-10 NOTE — PROGRESS NOTES
"REASON FOR FOLLOW UP:   1.  T4N3 Small cell lung cancer  2.  SVC syndrome  3.  Initially treated with chemotherapy.  Subsequent radiation.  Subsequent PCI. ISAIAS  4. New squamous cell carcinoma of the left hilum      HISTORY OF PRESENT ILLNESS:  Julia Smith III is a 72 y.o. male with the above mentioned history, who returns the office today for follow up.     He returns today to initiate weekly carboplatin/paclitaxel concurrent with radiation.    He continues to feel about the same.  He continues to smoke.  He continues to have little motivation to do much.  He denies any pain.      Review of Systems   See the HPI    Vitals:    03/10/22 0843   BP: 129/88   Pulse: 82   Resp: 16   Temp: 97.6 °F (36.4 °C)   TempSrc: Temporal   SpO2: 94%   Weight: 86.6 kg (191 lb)   Height: 175 cm (68.9\")   PainSc: 0-No pain  Comment: lung cancer     General:  No acute distress, awake, alert and oriented.  Chronically ill-appearing.  Skin:  Warm and dry, no visible rash  HEENT:  Normocephalic/atraumatic.  Wearing a face mask.  Chest:  Normal respiratory effort.  Benign-appearing Mediport which is accessed today.  Lungs clear to auscultation bilaterally.  Heart: Regular rate and rhythm  Extremities:  No visible clubbing, cyanosis, or edema  Neuro/psych:  Grossly nonfocal.  Normal mood and affect.        DIAGNOSTIC DATA:  CBC and Differential (03/10/2022 08:40)      IMAGING:  NM PET/CT Skull Base to Mid Thigh (01/25/2022 14:27)    ASSESSMENT:  This is a 72 y.o. male with:    *Small cell lung cancer originating in the right hilum:   · He has an extremely large mass there with concern for SVC compression on CT imaging from 6/4/2019.  There is some left hilar adenopathy.  · Some parotid lesions which are concerning but indeterminate, left greater than right.  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  · Staging MRI of the brain 7/10/2019 negative for metastatic disease  · He was discussed at multidisciplinary thoracic conference, with his T4 and 3 disease, concern for involvement of the right supraclavicular region and obvious left hilum, plans to add atezolizumab with cycle 2, we can consider future radiation to the residual mediastinal mass if necessary.  · Lung lesion too large to radiate in spite of the SVC narrowing.    · On 7/8/2019 we started carboplatin AUC 5 and VP16 100 mg/m2 through peripheral IVs  · Cycle 2 postponed on 7/29/2019 due to heart rate of 154.  Atezolizumab added with cycle #2.  · Radiation can be added for a residual mediastinal mass if necessary  · CT imaging on 8/14/2019 after two cycles of therapy shows significant improvement.  · Mediport anticipated by Dr. Gallardo on 8/22/2019.  · On  8/20/2019 cycle 3 was delayed due to neutropenia and with cycle 3 we did incorporate Neulasta.  · Cycle 4 completed 9/17/2019  · Maintenance atezolizumab initiated 10/8/2019.  PET scan revealed disease response to therapy, he does have an area of hypermetabolic activity in the adrenal gland which is small and did not show up on prior exam.  We will continue to monitor this.  His MRI of the brain is also negative for metastatic disease.  · CT 2/4/2020 with ongoing improvement.  Maintenance atezolizumab pursued.    · CT imaging 5/19/2020 with progression.  Increase in the right hilar/mediastinal mass with enlarging adenopathy.    · Given that the progression is longer than 6 months after completing carbo/VP16, proceed with further carbo/VP16.  · Cycle 1 initiated 5/26/2020  · PET scan after two cycles with ongoing uptake in the right hilum, otherwise somewhat improved  · Radiation initiated 7/22/2020 with plans for 30 treatments  · Proceeded with cycle 4 carboplatin -16 7/28/2020.    · As of 8/19/2020, discussed holding further chemotherapy since he had 4 cycles versus  continuing potentially with as many as 6.  He prefers to continue with therapy.    · Cycle 5 on 8/19/2020  · Radiation complete 9/1/2020  · As of 9/9/2020, plan no further chemotherapy  · MRI brain 11/4/2020 negative for mets.  · PET scan 11/4/2020. Improvement in the subcarinal and right hilar nodes with an SUV of 3.7 compared to 20.8 previously. Evidence for esophagitis. Right middle lobe changes resolved. Other stable changes that were previously noted. No new disease.     · MRI brain 1/28/2021 negative  · PCI pursued, complete from 2/16/2021 through 2/25/2021  · CT imaging 2/19/2021 with decrease in the primary tumor, two new small noncalcified nodules in the medial LLL     · CT imaging 4/13/2021 with subtle increase in left upper lobe and left perihilar nodules.  Right-sided subcarinal and infrahilar tumor unchanged.  Changes consistent with radiation pneumonitis.  · CT imaging 7/14/2021 with slight interval increase in size of 2 adjacent cavitary nodules in the medial left lower lobe.  Right infrahilar/subcarinal lesion stable.  · CT imaging 10/11/21 with enlarging right infrahilar mass with two enlarging left lower lobe cavitary lesions  · His case was presented at the multidisciplinary thoracic conference.  Plan observation of the lung lesions with serial CT scans.  If significant change, PET imaging and bronchoscopy.  · CT imaging 1/12/2022 with ongoing evolution of the left lower lobe cavitary lesions, some increase in size.  They have coalesced into one.  · PET scan on 1/25/2022 showed a hypermetabolic 3 x 2 cm cavitary nodule with no other evidence of disease.  · He did have a bronchoscopy performed on 2/21/2022.  BAL cytology negative for malignant cells.  Brushings with rare atypical squamous cells, nondiagnostic for malignancy.  Pathology showed evidence for dysplastic squamous epithelium suspicious for squamous cell carcinoma.  PD-L1 60%.    *New squamous cell carcinoma of the left lower  lobe  · Presented at thoracic conference. Plan concurrent chemoradiation with carbo/taxol    *Chronic hearing loss: History of left mastoiditis.  He is not a candidate for cisplatin because of this.     *Atrial fibrillation.  He was initiated on metoprolol 12.5 mg twice daily.  This did delay port placement.  The patient was taking his metoprolol inconsistently.  For cycle 2 was delayed due to heart rate of 150 7/29/2019.  He was seen by cardiology with instructions to increase his metoprolol to 25 mg twice daily.  Heart rate stable today.  He reports no symptoms.  See below.    *Tobacco use.  Continues to smoke    *History of SVC syndrome.  Improved after 2 cycles of chemotherapy.  No further evidence of this and this has resolved.    *Dyspnea on exertion: rRelated to COPD and ongoing lung damage from smoking.     *Leg pain with walking: Some of this sounds like claudication but some of this sounds like orthopedic pain.  Symptoms remain stable.  Follow-up with primary care.    *Fatigue and tiredness: I suspect that whole brain radiation is contributing to this.  TSH and free T4 were normal.  Stable.    *Malfunctioning Mediport: He states that no blood can be obtained from the port.  Apparently the port flushes well.  Unchanged today and the port is accessed for therapy.    *COVID-19 pandemic: Fully vaccinated with a booster.    PLAN:   1. Cycle 1 day 1 carboplatin/paclitaxel today concurrent with radiation with the carboplatin dose at this point pending his creatinine. Curative intent.  He agrees to proceed with therapy.  Potential adverse effects including the possibility of an infusion reaction were discussed with him today.  Carboplatin at AUC =2 and paclitaxel 50 mg/m2. Dose reduce if necessary. Plan up to 6 cycles of therapy as tolerated.  2. We will schedule five more weeks of therapy  3. He will initiate radiation today following chemotherapy    High risk medication requiring intensive monitoring    Thai  MD Adriel

## 2022-03-10 NOTE — NURSING NOTE
Discussed use of cold therapy to hands and feet to lessen chance of neuropathy. Patient verbalized understanding, but was only able to tolerate cold to hands for approx. 5 minutes, but to feet for entire Taxol infusion.

## 2022-03-11 ENCOUNTER — APPOINTMENT (OUTPATIENT)
Dept: RADIATION ONCOLOGY | Facility: HOSPITAL | Age: 72
End: 2022-03-11

## 2022-03-11 PROCEDURE — 77386: CPT | Performed by: RADIOLOGY

## 2022-03-11 PROCEDURE — 77386 CHG INTENSITY MODULATED RADIATION TX DLVR COMPLEX: CPT | Performed by: RADIOLOGY

## 2022-03-14 ENCOUNTER — APPOINTMENT (OUTPATIENT)
Dept: RADIATION ONCOLOGY | Facility: HOSPITAL | Age: 72
End: 2022-03-14

## 2022-03-14 PROCEDURE — 77386: CPT | Performed by: RADIOLOGY

## 2022-03-15 ENCOUNTER — APPOINTMENT (OUTPATIENT)
Dept: RADIATION ONCOLOGY | Facility: HOSPITAL | Age: 72
End: 2022-03-15

## 2022-03-15 ENCOUNTER — RADIATION ONCOLOGY WEEKLY ASSESSMENT (OUTPATIENT)
Dept: RADIATION ONCOLOGY | Facility: HOSPITAL | Age: 72
End: 2022-03-15

## 2022-03-15 VITALS
WEIGHT: 190 LBS | HEART RATE: 92 BPM | SYSTOLIC BLOOD PRESSURE: 137 MMHG | DIASTOLIC BLOOD PRESSURE: 87 MMHG | BODY MASS INDEX: 28.14 KG/M2 | OXYGEN SATURATION: 99 %

## 2022-03-15 DIAGNOSIS — C34.92 NON-SMALL CELL CANCER OF LEFT LUNG: Primary | ICD-10-CM

## 2022-03-15 PROCEDURE — 77386: CPT | Performed by: RADIOLOGY

## 2022-03-15 PROCEDURE — 77336 RADIATION PHYSICS CONSULT: CPT | Performed by: RADIOLOGY

## 2022-03-15 PROCEDURE — 77386 CHG INTENSITY MODULATED RADIATION TX DLVR COMPLEX: CPT | Performed by: RADIOLOGY

## 2022-03-15 NOTE — PROGRESS NOTES
Physician Weekly Management Note    Diagnosis:     Diagnosis Plan   1. Non-small cell cancer of left lung (HCC)         RT Details:  fx 4/30 left lung 800cGy/6000  Notes on Treatment course, Films, Medical progress:  Doing ok, denies any soa or cough out of normal, cont on     Weekly Management:  Medication reviewed?   Yes  New medications given?   No  Problemlist reviewed?   Yes  Problem added?   No  Issues raised requiring referral to support services - task assigned to:  na    Technical aspects reviewed:  Weekly OBI approved?   Yes  Weekly port films approved?   Yes  Change requests noted on port film?   No  Patient setup and plan reviewed?   Yes    Chart Reviewed:  Continue current treatment plan?   Yes  Treatment plan change requested?   No  CBC reviewed?   Yes  Concurrent Chemo?   No    Objective     Toxicities:   none     Review of Systems   Constitutional: Negative.    Respiratory: Negative.           There were no vitals filed for this visit.    Current Status 1/19/2022   ECOG score 0       Physical Exam  Constitutional:       Appearance: Normal appearance.   Pulmonary:      Effort: Pulmonary effort is normal.      Breath sounds: No wheezing.   Neurological:      Mental Status: He is alert.             Problem Summary List    Diagnosis:     Diagnosis Plan   1. Non-small cell cancer of left lung (HCC)       Pathology:   Non small cell lung cancer     Past Medical History:   Diagnosis Date   • Atrial fibrillation with RVR (HCC)    • Cancer (HCC) 06/2019    Right lung   • Chronic cough    • COPD (chronic obstructive pulmonary disease) (HCC)    • Hearing loss    • History of shingles    • Lower back pain    • Lung cancer (HCC)    • Mastoiditis    • Mild mitral regurgitation    • Other fatigue    • PAF (paroxysmal atrial fibrillation) (HCC)    • Pneumonia          Past Surgical History:   Procedure Laterality Date   • BRONCHOSCOPY N/A 6/19/2019    Procedure: BRONCHOSCOPY WITH WASHINGS AND BIOPSY AND  ENDOBRONCHIAL ULTRASOUND WITH FNA;  Surgeon: Arslan Marquez MD;  Location: [facility] ENDOSCOPY;  Service: Pulmonary   • BRONCHOSCOPY N/A 2/21/2022    Procedure: BRONCHOSCOPY WITH FLUORO, BAL, BRUSHINGS, AND BIOPSIES LLL;  Surgeon: Arslan Marquez MD;  Location: [facility] ENDOSCOPY;  Service: Pulmonary;  Laterality: N/A;  PRE: LUNG MASS  POST: SAME   • INNER EAR SURGERY Left    • MASTOIDECTOMY Left 1994   • VENOUS ACCESS DEVICE (PORT) INSERTION Right 8/22/2019    Procedure: INSERTION OF PORTACATH;  Surgeon: Ramila Gallardo MD;  Location: [facility] MAIN OR;  Service: Cardiothoracic         Current Outpatient Medications on File Prior to Visit   Medication Sig Dispense Refill   • aspirin 81 MG tablet Take 1 tablet by mouth Daily. 30 tablet 11   • metoprolol tartrate (LOPRESSOR) 25 MG tablet TAKE 1 TABLET BY MOUTH TWO TIMES A  tablet 2   • ondansetron ODT (ZOFRAN-ODT) 8 MG disintegrating tablet Place 1 tablet on the tongue Every 8 (Eight) Hours As Needed for Nausea or Vomiting for up to 180 doses. 30 tablet 5     No current facility-administered medications on file prior to visit.       No Known Allergies      Referring Provider:    No referring provider defined for this encounter.    Oncologist:  No primary care provider on file.      Seen and approved by:  Yessenia Elizabeth MD  03/15/2022

## 2022-03-16 ENCOUNTER — APPOINTMENT (OUTPATIENT)
Dept: RADIATION ONCOLOGY | Facility: HOSPITAL | Age: 72
End: 2022-03-16

## 2022-03-16 PROCEDURE — 77386: CPT | Performed by: RADIOLOGY

## 2022-03-16 PROCEDURE — 77386 CHG INTENSITY MODULATED RADIATION TX DLVR COMPLEX: CPT | Performed by: RADIOLOGY

## 2022-03-17 ENCOUNTER — INFUSION (OUTPATIENT)
Dept: ONCOLOGY | Facility: HOSPITAL | Age: 72
End: 2022-03-17

## 2022-03-17 ENCOUNTER — OFFICE VISIT (OUTPATIENT)
Dept: ONCOLOGY | Facility: CLINIC | Age: 72
End: 2022-03-17

## 2022-03-17 ENCOUNTER — APPOINTMENT (OUTPATIENT)
Dept: RADIATION ONCOLOGY | Facility: HOSPITAL | Age: 72
End: 2022-03-17

## 2022-03-17 VITALS — DIASTOLIC BLOOD PRESSURE: 81 MMHG | SYSTOLIC BLOOD PRESSURE: 136 MMHG | HEART RATE: 70 BPM

## 2022-03-17 VITALS
SYSTOLIC BLOOD PRESSURE: 117 MMHG | TEMPERATURE: 97.8 F | RESPIRATION RATE: 18 BRPM | HEIGHT: 69 IN | BODY MASS INDEX: 28.19 KG/M2 | HEART RATE: 86 BPM | DIASTOLIC BLOOD PRESSURE: 75 MMHG | OXYGEN SATURATION: 94 % | WEIGHT: 190.3 LBS

## 2022-03-17 DIAGNOSIS — C34.11 PRIMARY MALIGNANT NEOPLASM OF RIGHT UPPER LOBE OF LUNG: ICD-10-CM

## 2022-03-17 DIAGNOSIS — Z79.899 HIGH RISK MEDICATION USE: Primary | ICD-10-CM

## 2022-03-17 DIAGNOSIS — Z79.899 HIGH RISK MEDICATION USE: ICD-10-CM

## 2022-03-17 DIAGNOSIS — C34.90 SMALL CELL LUNG CANCER: ICD-10-CM

## 2022-03-17 DIAGNOSIS — C80.1 SMALL CELL CARCINOMA: Primary | ICD-10-CM

## 2022-03-17 LAB
ALBUMIN SERPL-MCNC: 4 G/DL (ref 3.5–5.2)
ALBUMIN/GLOB SERPL: 1.5 G/DL (ref 1.1–2.4)
ALP SERPL-CCNC: 75 U/L (ref 38–116)
ALT SERPL W P-5'-P-CCNC: 24 U/L (ref 0–41)
ANION GAP SERPL CALCULATED.3IONS-SCNC: 6.9 MMOL/L (ref 5–15)
AST SERPL-CCNC: 21 U/L (ref 0–40)
BASOPHILS # BLD AUTO: 0.1 10*3/MM3 (ref 0–0.2)
BASOPHILS NFR BLD AUTO: 1.8 % (ref 0–1.5)
BILIRUB SERPL-MCNC: 0.5 MG/DL (ref 0.2–1.2)
BUN SERPL-MCNC: 14 MG/DL (ref 6–20)
BUN/CREAT SERPL: 13.5 (ref 7.3–30)
CALCIUM SPEC-SCNC: 9.3 MG/DL (ref 8.5–10.2)
CHLORIDE SERPL-SCNC: 104 MMOL/L (ref 98–107)
CO2 SERPL-SCNC: 26.1 MMOL/L (ref 22–29)
CREAT SERPL-MCNC: 1.04 MG/DL (ref 0.7–1.3)
DEPRECATED RDW RBC AUTO: 45.8 FL (ref 37–54)
EGFRCR SERPLBLD CKD-EPI 2021: 77.2 ML/MIN/1.73
EOSINOPHIL # BLD AUTO: 0.2 10*3/MM3 (ref 0–0.4)
EOSINOPHIL NFR BLD AUTO: 3.6 % (ref 0.3–6.2)
ERYTHROCYTE [DISTWIDTH] IN BLOOD BY AUTOMATED COUNT: 12.9 % (ref 12.3–15.4)
GLOBULIN UR ELPH-MCNC: 2.7 GM/DL (ref 1.8–3.5)
GLUCOSE SERPL-MCNC: 111 MG/DL (ref 74–124)
HCT VFR BLD AUTO: 43.5 % (ref 37.5–51)
HGB BLD-MCNC: 14.3 G/DL (ref 13–17.7)
IMM GRANULOCYTES # BLD AUTO: 0.03 10*3/MM3 (ref 0–0.05)
IMM GRANULOCYTES NFR BLD AUTO: 0.5 % (ref 0–0.5)
LYMPHOCYTES # BLD AUTO: 1.02 10*3/MM3 (ref 0.7–3.1)
LYMPHOCYTES NFR BLD AUTO: 18.1 % (ref 19.6–45.3)
MCH RBC QN AUTO: 32.1 PG (ref 26.6–33)
MCHC RBC AUTO-ENTMCNC: 32.9 G/DL (ref 31.5–35.7)
MCV RBC AUTO: 97.8 FL (ref 79–97)
MONOCYTES # BLD AUTO: 0.44 10*3/MM3 (ref 0.1–0.9)
MONOCYTES NFR BLD AUTO: 7.8 % (ref 5–12)
NEUTROPHILS NFR BLD AUTO: 3.84 10*3/MM3 (ref 1.7–7)
NEUTROPHILS NFR BLD AUTO: 68.2 % (ref 42.7–76)
NRBC BLD AUTO-RTO: 0 /100 WBC (ref 0–0.2)
PLATELET # BLD AUTO: 134 10*3/MM3 (ref 140–450)
PMV BLD AUTO: 10.7 FL (ref 6–12)
POTASSIUM SERPL-SCNC: 4.3 MMOL/L (ref 3.5–4.7)
PROT SERPL-MCNC: 6.7 G/DL (ref 6.3–8)
RBC # BLD AUTO: 4.45 10*6/MM3 (ref 4.14–5.8)
SODIUM SERPL-SCNC: 137 MMOL/L (ref 134–145)
WBC NRBC COR # BLD: 5.63 10*3/MM3 (ref 3.4–10.8)

## 2022-03-17 PROCEDURE — 96413 CHEMO IV INFUSION 1 HR: CPT

## 2022-03-17 PROCEDURE — 77386 CHG INTENSITY MODULATED RADIATION TX DLVR COMPLEX: CPT | Performed by: RADIOLOGY

## 2022-03-17 PROCEDURE — 77386: CPT | Performed by: RADIOLOGY

## 2022-03-17 PROCEDURE — 25010000002 DEXAMETHASONE SODIUM PHOSPHATE 100 MG/10ML SOLUTION

## 2022-03-17 PROCEDURE — 96417 CHEMO IV INFUS EACH ADDL SEQ: CPT

## 2022-03-17 PROCEDURE — 85025 COMPLETE CBC W/AUTO DIFF WBC: CPT

## 2022-03-17 PROCEDURE — 25010000002 CARBOPLATIN PER 50 MG

## 2022-03-17 PROCEDURE — 99214 OFFICE O/P EST MOD 30 MIN: CPT

## 2022-03-17 PROCEDURE — 96375 TX/PRO/DX INJ NEW DRUG ADDON: CPT

## 2022-03-17 PROCEDURE — 80053 COMPREHEN METABOLIC PANEL: CPT

## 2022-03-17 PROCEDURE — 25010000002 DIPHENHYDRAMINE PER 50 MG

## 2022-03-17 PROCEDURE — 25010000002 PACLITAXEL PER 1 MG

## 2022-03-17 PROCEDURE — 25010000002 PALONOSETRON PER 25 MCG

## 2022-03-17 RX ORDER — FAMOTIDINE 10 MG/ML
20 INJECTION, SOLUTION INTRAVENOUS ONCE
Status: CANCELLED | OUTPATIENT
Start: 2022-03-17

## 2022-03-17 RX ORDER — PALONOSETRON 0.05 MG/ML
0.25 INJECTION, SOLUTION INTRAVENOUS ONCE
Status: CANCELLED | OUTPATIENT
Start: 2022-03-17

## 2022-03-17 RX ORDER — SODIUM CHLORIDE 9 MG/ML
250 INJECTION, SOLUTION INTRAVENOUS ONCE
Status: COMPLETED | OUTPATIENT
Start: 2022-03-17 | End: 2022-03-17

## 2022-03-17 RX ORDER — FAMOTIDINE 10 MG/ML
20 INJECTION, SOLUTION INTRAVENOUS ONCE
Status: COMPLETED | OUTPATIENT
Start: 2022-03-17 | End: 2022-03-17

## 2022-03-17 RX ORDER — PALONOSETRON 0.05 MG/ML
0.25 INJECTION, SOLUTION INTRAVENOUS ONCE
Status: COMPLETED | OUTPATIENT
Start: 2022-03-17 | End: 2022-03-17

## 2022-03-17 RX ADMIN — DEXAMETHASONE SODIUM PHOSPHATE 12 MG: 10 INJECTION, SOLUTION INTRAMUSCULAR; INTRAVENOUS at 09:37

## 2022-03-17 RX ADMIN — CARBOPLATIN 210 MG: 10 INJECTION INTRAVENOUS at 11:50

## 2022-03-17 RX ADMIN — DIPHENHYDRAMINE HYDROCHLORIDE 25 MG: 50 INJECTION, SOLUTION INTRAMUSCULAR; INTRAVENOUS at 09:55

## 2022-03-17 RX ADMIN — FAMOTIDINE 20 MG: 10 INJECTION INTRAVENOUS at 09:37

## 2022-03-17 RX ADMIN — PALONOSETRON 0.25 MG: 0.05 INJECTION, SOLUTION INTRAVENOUS at 09:37

## 2022-03-17 RX ADMIN — PACLITAXEL 100 MG: 6 INJECTION, SOLUTION INTRAVENOUS at 10:41

## 2022-03-17 RX ADMIN — SODIUM CHLORIDE 250 ML: 9 INJECTION, SOLUTION INTRAVENOUS at 09:39

## 2022-03-17 NOTE — PROGRESS NOTES
"REASON FOR FOLLOW UP:   1.  T4N3 Small cell lung cancer  2.  SVC syndrome  3.  Initially treated with chemotherapy.  Subsequent radiation.  Subsequent PCI. ISAIAS  4. 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 male with the above mentioned history, who returns today in follow up due for cycle 2 carboplatin/paclitaxel with radiation He continues to tolerate treatment very well. He returns today to initiate weekly carboplatin/paclitaxel concurrent with radiation. He denies new pain. He does continue to smoke a pack and a half a day. He does not want to quit at this time. He denies diarrhea or constipation. No new concerns.      Review of Systems   See the HPI    Vitals:    03/17/22 0911   BP: 117/75   Pulse: 86   Resp: 18   Temp: 97.8 °F (36.6 °C)   TempSrc: Temporal   SpO2: 94%   Weight: 86.3 kg (190 lb 4.8 oz)   Height: 175 cm (68.9\")   PainSc: 0-No pain     General:  No acute distress, awake, alert and oriented.  Chronically ill-appearing.  Skin:  Warm and dry, no visible rash  HEENT:  Normocephalic/atraumatic.  Wearing a face mask.  Chest:  Normal respiratory effort.  Benign-appearing Mediport which is accessed today.  Lungs clear to auscultation bilaterally.  Heart: Regular rate and rhythm  Extremities:  No visible clubbing, cyanosis, or edema  Neuro/psych:  Grossly nonfocal.  Normal mood and affect.    I have reexamined the patient and the results are consistent with the previously documented exam. EBRTO Medrano     DIAGNOSTIC DATA:  CBC and Differential (03/17/2022 08:58)  Comprehensive metabolic panel (03/17/2022 08:58)    IMAGING:  NM PET/CT Skull Base to Mid Thigh (01/25/2022 14:27)    ASSESSMENT:  This is a 72 y.o. male with:    *Small cell lung cancer originating in the right hilum:   · He has an extremely large mass there with concern for SVC compression on CT imaging from 6/4/2019.  There is some left hilar adenopathy.  · Some " parotid lesions which are concerning but indeterminate, left greater than right.  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  · Staging MRI of the brain 7/10/2019 negative for metastatic disease  · He was discussed at multidisciplinary thoracic conference, with his T4 and 3 disease, concern for involvement of the right supraclavicular region and obvious left hilum, plans to add atezolizumab with cycle 2, we can consider future radiation to the residual mediastinal mass if necessary.  · Lung lesion too large to radiate in spite of the SVC narrowing.    · On 7/8/2019 we started carboplatin AUC 5 and VP16 100 mg/m2 through peripheral IVs  · Cycle 2 postponed on 7/29/2019 due to heart rate of 154.  Atezolizumab added with cycle #2.  · Radiation can be added for a residual mediastinal mass if necessary  · CT imaging on 8/14/2019 after two cycles of therapy shows significant improvement.  · Mediport anticipated by Dr. Gallardo on 8/22/2019.  · On  8/20/2019 cycle 3 was delayed due to neutropenia and with cycle 3 we did incorporate Neulasta.  · Cycle 4 completed 9/17/2019  · Maintenance atezolizumab initiated 10/8/2019.  PET scan revealed disease response to therapy, he does have an area of hypermetabolic activity in the adrenal gland which is small and did not show up on prior exam.  We will continue to monitor this.  His MRI of the brain is also negative for metastatic disease.  · CT 2/4/2020 with ongoing improvement.  Maintenance atezolizumab pursued.    · CT imaging 5/19/2020 with progression.  Increase in the right hilar/mediastinal mass with enlarging adenopathy.    · Given that the progression is longer than 6 months after completing carbo/VP16, proceed with further carbo/VP16.  · Cycle 1 initiated 5/26/2020  · PET scan after two cycles with ongoing  uptake in the right hilum, otherwise somewhat improved  · Radiation initiated 7/22/2020 with plans for 30 treatments  · Proceeded with cycle 4 carboplatin -16 7/28/2020.    · As of 8/19/2020, discussed holding further chemotherapy since he had 4 cycles versus continuing potentially with as many as 6.  He prefers to continue with therapy.    · Cycle 5 on 8/19/2020  · Radiation complete 9/1/2020  · As of 9/9/2020, plan no further chemotherapy  · MRI brain 11/4/2020 negative for mets.  · PET scan 11/4/2020. Improvement in the subcarinal and right hilar nodes with an SUV of 3.7 compared to 20.8 previously. Evidence for esophagitis. Right middle lobe changes resolved. Other stable changes that were previously noted. No new disease.     · MRI brain 1/28/2021 negative  · PCI pursued, complete from 2/16/2021 through 2/25/2021  · CT imaging 2/19/2021 with decrease in the primary tumor, two new small noncalcified nodules in the medial LLL     · CT imaging 4/13/2021 with subtle increase in left upper lobe and left perihilar nodules.  Right-sided subcarinal and infrahilar tumor unchanged.  Changes consistent with radiation pneumonitis.  · CT imaging 7/14/2021 with slight interval increase in size of 2 adjacent cavitary nodules in the medial left lower lobe.  Right infrahilar/subcarinal lesion stable.  · CT imaging 10/11/21 with enlarging right infrahilar mass with two enlarging left lower lobe cavitary lesions  · His case was presented at the multidisciplinary thoracic conference.  Plan observation of the lung lesions with serial CT scans.  If significant change, PET imaging and bronchoscopy.  · CT imaging 1/12/2022 with ongoing evolution of the left lower lobe cavitary lesions, some increase in size.  They have coalesced into one.  · PET scan on 1/25/2022 showed a hypermetabolic 3 x 2 cm cavitary nodule with no other evidence of disease.  · He did have a bronchoscopy performed on 2/21/2022.  BAL cytology negative for  malignant cells.  Brushings with rare atypical squamous cells, nondiagnostic for malignancy.  Pathology showed evidence for dysplastic squamous epithelium suspicious for squamous cell carcinoma.  PD-L1 60%.    *New squamous cell carcinoma of the left lower lobe  · Presented at thoracic conference. Plan concurrent chemoradiation with carbo/taxol  · 3/17/2022 Proceed with cycle 2 concurrent chemoradiation with carbo/taxol    *Chronic hearing loss: History of left mastoiditis.  He is not a candidate for cisplatin because of this.     *Atrial fibrillation.  He was initiated on metoprolol 12.5 mg twice daily.  This did delay port placement.  The patient was taking his metoprolol inconsistently.  For cycle 2 was delayed due to heart rate of 150 7/29/2019.  He was seen by cardiology with instructions to increase his metoprolol to 25 mg twice daily.  Heart rate stable today.  He reports no symptoms.  See below.    *Tobacco use.  Continues to smoke a pack and a half a day    *History of SVC syndrome.  Improved after 2 cycles of chemotherapy.  No further evidence of this and this has resolved.    *Dyspnea on exertion: rRelated to COPD and ongoing lung damage from smoking.     *Leg pain with walking: Some of this sounds like claudication but some of this sounds like orthopedic pain.  Symptoms remain stable.  Follow-up with primary care.    *Fatigue and tiredness: I suspect that whole brain radiation is contributing to this.  TSH and free T4 were normal.  Stable.    *Malfunctioning Mediport: He states that no blood can be obtained from the port.  Apparently the port flushes well.  Unchanged today and the port is accessed for therapy.    *COVID-19 pandemic: Fully vaccinated with a booster.    PLAN:   1. Proceed today with cycle 2-day 1 carbo/taxol and concurrent radiation. Curative intent.   2. Carboplatin at AUC =2 and paclitaxel 50 mg/m2. Dose reduce if necessary. Plan up to 6 cycles of therapy as tolerated.  3. Return in 1  week for cycle 3 carbo/taxol   4. Call/return sooner for new or worsening symptoms    He continues on iron medication requiring close monitoring for toxicity    BERTO Medrano

## 2022-03-18 ENCOUNTER — APPOINTMENT (OUTPATIENT)
Dept: RADIATION ONCOLOGY | Facility: HOSPITAL | Age: 72
End: 2022-03-18

## 2022-03-18 PROCEDURE — 77386 CHG INTENSITY MODULATED RADIATION TX DLVR COMPLEX: CPT | Performed by: RADIOLOGY

## 2022-03-18 PROCEDURE — 77427 RADIATION TX MANAGEMENT X5: CPT | Performed by: RADIOLOGY

## 2022-03-18 PROCEDURE — 77386: CPT | Performed by: RADIOLOGY

## 2022-03-21 ENCOUNTER — APPOINTMENT (OUTPATIENT)
Dept: RADIATION ONCOLOGY | Facility: HOSPITAL | Age: 72
End: 2022-03-21

## 2022-03-21 LAB — FUNGUS WND CULT: NORMAL

## 2022-03-21 PROCEDURE — 77386: CPT | Performed by: RADIOLOGY

## 2022-03-21 PROCEDURE — 77386 CHG INTENSITY MODULATED RADIATION TX DLVR COMPLEX: CPT | Performed by: RADIOLOGY

## 2022-03-22 ENCOUNTER — APPOINTMENT (OUTPATIENT)
Dept: RADIATION ONCOLOGY | Facility: HOSPITAL | Age: 72
End: 2022-03-22

## 2022-03-22 ENCOUNTER — RADIATION ONCOLOGY WEEKLY ASSESSMENT (OUTPATIENT)
Dept: RADIATION ONCOLOGY | Facility: HOSPITAL | Age: 72
End: 2022-03-22

## 2022-03-22 VITALS — OXYGEN SATURATION: 98 % | SYSTOLIC BLOOD PRESSURE: 139 MMHG | HEART RATE: 78 BPM | DIASTOLIC BLOOD PRESSURE: 85 MMHG

## 2022-03-22 DIAGNOSIS — C34.92 NON-SMALL CELL CANCER OF LEFT LUNG: Primary | ICD-10-CM

## 2022-03-22 PROCEDURE — 77386 CHG INTENSITY MODULATED RADIATION TX DLVR COMPLEX: CPT | Performed by: RADIOLOGY

## 2022-03-22 PROCEDURE — 77386: CPT | Performed by: RADIOLOGY

## 2022-03-22 PROCEDURE — 77336 RADIATION PHYSICS CONSULT: CPT | Performed by: RADIOLOGY

## 2022-03-22 NOTE — PROGRESS NOTES
Physician Weekly Management Note    Diagnosis:     Diagnosis Plan   1. Non-small cell cancer of left lung (HCC)         RT Details:  fx 9/30 left lung 1800cGy/6000    Notes on Treatment course, Films, Medical progress:  Doing ok, episode of vomiting this morning but better now, no nausea, offered rx for phenergan or compazine but he refused, cont on     Weekly Management:  Medication reviewed?   Yes  New medications given?   No  Problemlist reviewed?   Yes  Problem added?   No  Issues raised requiring referral to support services - task assigned to:  na    Technical aspects reviewed:  Weekly OBI approved?   Yes  Weekly port films approved?   Yes  Change requests noted on port film?   No  Patient setup and plan reviewed?   Yes    Chart Reviewed:  Continue current treatment plan?   Yes  Treatment plan change requested?   No  CBC reviewed?   Yes  Concurrent Chemo?   No    Objective     Toxicities:   none     Review of Systems   Constitutional: Negative.    Respiratory: Negative.    Gastrointestinal: Positive for vomiting.          Vitals:    03/22/22 1506   BP: 139/85   Pulse: 78   SpO2: 98%       Current Status 3/17/2022   ECOG score 0       Physical Exam  Constitutional:       Appearance: Normal appearance.   Pulmonary:      Effort: Pulmonary effort is normal.      Breath sounds: Normal breath sounds. No wheezing.   Neurological:      Mental Status: He is alert.             Problem Summary List    Diagnosis:     Diagnosis Plan   1. Non-small cell cancer of left lung (HCC)       Pathology:   Lung cancer     Past Medical History:   Diagnosis Date   • Atrial fibrillation with RVR (HCC)    • Cancer (HCC) 06/2019    Right lung   • Chronic cough    • COPD (chronic obstructive pulmonary disease) (HCC)    • Hearing loss    • History of shingles    • Lower back pain    • Lung cancer (HCC)    • Mastoiditis    • Mild mitral regurgitation    • Other fatigue    • PAF (paroxysmal atrial fibrillation) (HCC)    • Pneumonia           Past Surgical History:   Procedure Laterality Date   • BRONCHOSCOPY N/A 6/19/2019    Procedure: BRONCHOSCOPY WITH WASHINGS AND BIOPSY AND ENDOBRONCHIAL ULTRASOUND WITH FNA;  Surgeon: Arslan Marquez MD;  Location: [facility] ENDOSCOPY;  Service: Pulmonary   • BRONCHOSCOPY N/A 2/21/2022    Procedure: BRONCHOSCOPY WITH FLUORO, BAL, BRUSHINGS, AND BIOPSIES LLL;  Surgeon: Arslan Marquez MD;  Location: [facility] ENDOSCOPY;  Service: Pulmonary;  Laterality: N/A;  PRE: LUNG MASS  POST: SAME   • INNER EAR SURGERY Left    • MASTOIDECTOMY Left 1994   • VENOUS ACCESS DEVICE (PORT) INSERTION Right 8/22/2019    Procedure: INSERTION OF PORTACATH;  Surgeon: Ramila Gallardo MD;  Location: [facility] OR;  Service: Cardiothoracic         Current Outpatient Medications on File Prior to Visit   Medication Sig Dispense Refill   • aspirin 81 MG tablet Take 1 tablet by mouth Daily. 30 tablet 11   • metoprolol tartrate (LOPRESSOR) 25 MG tablet TAKE 1 TABLET BY MOUTH TWO TIMES A  tablet 2     No current facility-administered medications on file prior to visit.       No Known Allergies      Referring Provider:    No referring provider defined for this encounter.    Oncologist:  No primary care provider on file.      Seen and approved by:  Yessenia Elizabeth MD  03/22/2022

## 2022-03-23 ENCOUNTER — APPOINTMENT (OUTPATIENT)
Dept: RADIATION ONCOLOGY | Facility: HOSPITAL | Age: 72
End: 2022-03-23

## 2022-03-23 PROCEDURE — 77386: CPT | Performed by: RADIOLOGY

## 2022-03-23 PROCEDURE — 77386 CHG INTENSITY MODULATED RADIATION TX DLVR COMPLEX: CPT | Performed by: RADIOLOGY

## 2022-03-24 ENCOUNTER — INFUSION (OUTPATIENT)
Dept: ONCOLOGY | Facility: HOSPITAL | Age: 72
End: 2022-03-24

## 2022-03-24 ENCOUNTER — OFFICE VISIT (OUTPATIENT)
Dept: ONCOLOGY | Facility: CLINIC | Age: 72
End: 2022-03-24

## 2022-03-24 ENCOUNTER — APPOINTMENT (OUTPATIENT)
Dept: RADIATION ONCOLOGY | Facility: HOSPITAL | Age: 72
End: 2022-03-24

## 2022-03-24 VITALS
HEIGHT: 69 IN | DIASTOLIC BLOOD PRESSURE: 85 MMHG | SYSTOLIC BLOOD PRESSURE: 138 MMHG | OXYGEN SATURATION: 96 % | TEMPERATURE: 96.8 F | BODY MASS INDEX: 28.16 KG/M2 | WEIGHT: 190.1 LBS | RESPIRATION RATE: 16 BRPM | HEART RATE: 78 BPM

## 2022-03-24 VITALS — DIASTOLIC BLOOD PRESSURE: 77 MMHG | SYSTOLIC BLOOD PRESSURE: 120 MMHG | HEART RATE: 75 BPM

## 2022-03-24 DIAGNOSIS — C34.90 SMALL CELL LUNG CANCER: ICD-10-CM

## 2022-03-24 DIAGNOSIS — C80.1 SMALL CELL CARCINOMA: ICD-10-CM

## 2022-03-24 DIAGNOSIS — Z79.899 HIGH RISK MEDICATION USE: ICD-10-CM

## 2022-03-24 DIAGNOSIS — I48.91 ATRIAL FIBRILLATION WITH RVR: ICD-10-CM

## 2022-03-24 DIAGNOSIS — Z79.899 HIGH RISK MEDICATION USE: Primary | ICD-10-CM

## 2022-03-24 DIAGNOSIS — C34.92 NON-SMALL CELL CANCER OF LEFT LUNG: Primary | ICD-10-CM

## 2022-03-24 LAB
ALBUMIN SERPL-MCNC: 3.9 G/DL (ref 3.5–5.2)
ALBUMIN/GLOB SERPL: 1.4 G/DL (ref 1.1–2.4)
ALP SERPL-CCNC: 77 U/L (ref 38–116)
ALT SERPL W P-5'-P-CCNC: 28 U/L (ref 0–41)
ANION GAP SERPL CALCULATED.3IONS-SCNC: 9.4 MMOL/L (ref 5–15)
AST SERPL-CCNC: 25 U/L (ref 0–40)
BASOPHILS # BLD AUTO: 0.09 10*3/MM3 (ref 0–0.2)
BASOPHILS NFR BLD AUTO: 2.2 % (ref 0–1.5)
BILIRUB SERPL-MCNC: 0.7 MG/DL (ref 0.2–1.2)
BUN SERPL-MCNC: 12 MG/DL (ref 6–20)
BUN/CREAT SERPL: 12.6 (ref 7.3–30)
CALCIUM SPEC-SCNC: 8.7 MG/DL (ref 8.5–10.2)
CHLORIDE SERPL-SCNC: 102 MMOL/L (ref 98–107)
CO2 SERPL-SCNC: 24.6 MMOL/L (ref 22–29)
CREAT SERPL-MCNC: 0.95 MG/DL (ref 0.7–1.3)
DEPRECATED RDW RBC AUTO: 45.8 FL (ref 37–54)
EGFRCR SERPLBLD CKD-EPI 2021: 86.1 ML/MIN/1.73
EOSINOPHIL # BLD AUTO: 0.09 10*3/MM3 (ref 0–0.4)
EOSINOPHIL NFR BLD AUTO: 2.2 % (ref 0.3–6.2)
ERYTHROCYTE [DISTWIDTH] IN BLOOD BY AUTOMATED COUNT: 13.1 % (ref 12.3–15.4)
GLOBULIN UR ELPH-MCNC: 2.7 GM/DL (ref 1.8–3.5)
GLUCOSE SERPL-MCNC: 122 MG/DL (ref 74–124)
HCT VFR BLD AUTO: 41 % (ref 37.5–51)
HGB BLD-MCNC: 13.9 G/DL (ref 13–17.7)
IMM GRANULOCYTES # BLD AUTO: 0.02 10*3/MM3 (ref 0–0.05)
IMM GRANULOCYTES NFR BLD AUTO: 0.5 % (ref 0–0.5)
LYMPHOCYTES # BLD AUTO: 0.52 10*3/MM3 (ref 0.7–3.1)
LYMPHOCYTES NFR BLD AUTO: 12.7 % (ref 19.6–45.3)
MCH RBC QN AUTO: 32.9 PG (ref 26.6–33)
MCHC RBC AUTO-ENTMCNC: 33.9 G/DL (ref 31.5–35.7)
MCV RBC AUTO: 97.2 FL (ref 79–97)
MONOCYTES # BLD AUTO: 0.36 10*3/MM3 (ref 0.1–0.9)
MONOCYTES NFR BLD AUTO: 8.8 % (ref 5–12)
NEUTROPHILS NFR BLD AUTO: 3.02 10*3/MM3 (ref 1.7–7)
NEUTROPHILS NFR BLD AUTO: 73.6 % (ref 42.7–76)
NRBC BLD AUTO-RTO: 0 /100 WBC (ref 0–0.2)
PLATELET # BLD AUTO: 127 10*3/MM3 (ref 140–450)
PMV BLD AUTO: 9.9 FL (ref 6–12)
POTASSIUM SERPL-SCNC: 4.3 MMOL/L (ref 3.5–4.7)
PROT SERPL-MCNC: 6.6 G/DL (ref 6.3–8)
RBC # BLD AUTO: 4.22 10*6/MM3 (ref 4.14–5.8)
SODIUM SERPL-SCNC: 136 MMOL/L (ref 134–145)
WBC NRBC COR # BLD: 4.1 10*3/MM3 (ref 3.4–10.8)

## 2022-03-24 PROCEDURE — 99214 OFFICE O/P EST MOD 30 MIN: CPT | Performed by: NURSE PRACTITIONER

## 2022-03-24 PROCEDURE — 96413 CHEMO IV INFUSION 1 HR: CPT

## 2022-03-24 PROCEDURE — 25010000002 DEXAMETHASONE SODIUM PHOSPHATE 100 MG/10ML SOLUTION: Performed by: NURSE PRACTITIONER

## 2022-03-24 PROCEDURE — 96417 CHEMO IV INFUS EACH ADDL SEQ: CPT

## 2022-03-24 PROCEDURE — 77386: CPT | Performed by: RADIOLOGY

## 2022-03-24 PROCEDURE — 25010000002 PALONOSETRON PER 25 MCG: Performed by: NURSE PRACTITIONER

## 2022-03-24 PROCEDURE — 25010000002 CARBOPLATIN PER 50 MG: Performed by: NURSE PRACTITIONER

## 2022-03-24 PROCEDURE — 77386 CHG INTENSITY MODULATED RADIATION TX DLVR COMPLEX: CPT | Performed by: RADIOLOGY

## 2022-03-24 PROCEDURE — 25010000002 DIPHENHYDRAMINE PER 50 MG: Performed by: NURSE PRACTITIONER

## 2022-03-24 PROCEDURE — 96375 TX/PRO/DX INJ NEW DRUG ADDON: CPT

## 2022-03-24 PROCEDURE — 25010000002 PACLITAXEL PER 1 MG: Performed by: NURSE PRACTITIONER

## 2022-03-24 PROCEDURE — 96367 TX/PROPH/DG ADDL SEQ IV INF: CPT

## 2022-03-24 PROCEDURE — 80053 COMPREHEN METABOLIC PANEL: CPT

## 2022-03-24 PROCEDURE — 85025 COMPLETE CBC W/AUTO DIFF WBC: CPT

## 2022-03-24 RX ORDER — FAMOTIDINE 10 MG/ML
20 INJECTION, SOLUTION INTRAVENOUS ONCE
Status: CANCELLED | OUTPATIENT
Start: 2022-03-24

## 2022-03-24 RX ORDER — PALONOSETRON 0.05 MG/ML
0.25 INJECTION, SOLUTION INTRAVENOUS ONCE
Status: COMPLETED | OUTPATIENT
Start: 2022-03-24 | End: 2022-03-24

## 2022-03-24 RX ORDER — SODIUM CHLORIDE 9 MG/ML
250 INJECTION, SOLUTION INTRAVENOUS ONCE
Status: CANCELLED | OUTPATIENT
Start: 2022-03-24

## 2022-03-24 RX ORDER — FAMOTIDINE 10 MG/ML
20 INJECTION, SOLUTION INTRAVENOUS ONCE
Status: COMPLETED | OUTPATIENT
Start: 2022-03-24 | End: 2022-03-24

## 2022-03-24 RX ORDER — SODIUM CHLORIDE 9 MG/ML
250 INJECTION, SOLUTION INTRAVENOUS ONCE
Status: COMPLETED | OUTPATIENT
Start: 2022-03-24 | End: 2022-03-24

## 2022-03-24 RX ORDER — PALONOSETRON 0.05 MG/ML
0.25 INJECTION, SOLUTION INTRAVENOUS ONCE
Status: CANCELLED | OUTPATIENT
Start: 2022-03-24

## 2022-03-24 RX ADMIN — CARBOPLATIN 230 MG: 10 INJECTION INTRAVENOUS at 12:25

## 2022-03-24 RX ADMIN — FAMOTIDINE 20 MG: 10 INJECTION INTRAVENOUS at 10:16

## 2022-03-24 RX ADMIN — DEXAMETHASONE SODIUM PHOSPHATE 12 MG: 10 INJECTION, SOLUTION INTRAMUSCULAR; INTRAVENOUS at 10:33

## 2022-03-24 RX ADMIN — PALONOSETRON HYDROCHLORIDE 0.25 MG: 0.25 INJECTION, SOLUTION INTRAVENOUS at 10:16

## 2022-03-24 RX ADMIN — SODIUM CHLORIDE 250 ML: 9 INJECTION, SOLUTION INTRAVENOUS at 10:15

## 2022-03-24 RX ADMIN — DIPHENHYDRAMINE HYDROCHLORIDE 25 MG: 50 INJECTION INTRAMUSCULAR; INTRAVENOUS at 10:16

## 2022-03-24 RX ADMIN — PACLITAXEL 100 MG: 6 INJECTION, SOLUTION INTRAVENOUS at 11:24

## 2022-03-24 NOTE — PROGRESS NOTES
"REASON FOR FOLLOW UP:   1.  T4N3 Small cell lung cancer  2.  SVC syndrome  3.  Initially treated with chemotherapy.  Subsequent radiation.  Subsequent PCI. ISAIAS  4. 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 male with the above mentioned history, who returns the office today in anticipation of his third week of concurrent chemoradiation with carboplatin Taxol.  He is overall tolerating treatment well.  He does report some blood-tinged drainage when blowing his nose.  He has very occasional nausea which he manages with herbal remedies.  His shortness of breath is unchanged as is his cough.  He denies fevers or chills.  He did have some constipation though thankfully his bowels are moving more normally after beginning over-the-counter medications.      Review of Systems   See the HPI    Vitals:    03/24/22 0912   BP: 138/85   Pulse: 78   Resp: 16   Temp: 96.8 °F (36 °C)   TempSrc: Temporal   SpO2: 96%   Weight: 86.2 kg (190 lb 1.6 oz)   Height: 175 cm (68.9\")   PainSc: 0-No pain     General:  No acute distress, awake, alert and oriented.  Chronically ill-appearing.  Strong odor of smoke  Skin:  Warm and dry, no visible rash  HEENT:  Normocephalic/atraumatic.  Wearing a face mask.  Chest:  Normal respiratory effort.  Benign-appearing Mediport which is accessed today.  Coarse rhonchi throughout which did clear with coughing  Heart: Regular rate and rhythm  Extremities:  No visible clubbing, cyanosis, or edema  Neuro/psych:  Grossly nonfocal.  Normal mood and affect.    I have reexamined the patient and the results are consistent with the previously documented exam. BERTO Jiménez     DIAGNOSTIC DATA:  CBC and Differential (03/24/2022 09:03)  Comprehensive metabolic panel (03/24/2022 09:03)      IMAGING:  NM PET/CT Skull Base to Mid Thigh (01/25/2022 14:27)    ASSESSMENT:  This is a 72 y.o. male with:    *Small cell lung cancer originating " in the right hilum:   · He has an extremely large mass there with concern for SVC compression on CT imaging from 6/4/2019.  There is some left hilar adenopathy.  · Some parotid lesions which are concerning but indeterminate, left greater than right.  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  · Staging MRI of the brain 7/10/2019 negative for metastatic disease  · He was discussed at multidisciplinary thoracic conference, with his T4 and 3 disease, concern for involvement of the right supraclavicular region and obvious left hilum, plans to add atezolizumab with cycle 2, we can consider future radiation to the residual mediastinal mass if necessary.  · Lung lesion too large to radiate in spite of the SVC narrowing.    · On 7/8/2019 we started carboplatin AUC 5 and VP16 100 mg/m2 through peripheral IVs  · Cycle 2 postponed on 7/29/2019 due to heart rate of 154.  Atezolizumab added with cycle #2.  · Radiation can be added for a residual mediastinal mass if necessary  · CT imaging on 8/14/2019 after two cycles of therapy shows significant improvement.  · Mediport anticipated by Dr. Gallardo on 8/22/2019.  · On  8/20/2019 cycle 3 was delayed due to neutropenia and with cycle 3 we did incorporate Neulasta.  · Cycle 4 completed 9/17/2019  · Maintenance atezolizumab initiated 10/8/2019.  PET scan revealed disease response to therapy, he does have an area of hypermetabolic activity in the adrenal gland which is small and did not show up on prior exam.  We will continue to monitor this.  His MRI of the brain is also negative for metastatic disease.  · CT 2/4/2020 with ongoing improvement.  Maintenance atezolizumab pursued.    · CT imaging 5/19/2020 with progression.  Increase in the right hilar/mediastinal mass with enlarging adenopathy.    · Given that the  progression is longer than 6 months after completing carbo/VP16, proceed with further carbo/VP16.  · Cycle 1 initiated 5/26/2020  · PET scan after two cycles with ongoing uptake in the right hilum, otherwise somewhat improved  · Radiation initiated 7/22/2020 with plans for 30 treatments  · Proceeded with cycle 4 carboplatin -16 7/28/2020.    · As of 8/19/2020, discussed holding further chemotherapy since he had 4 cycles versus continuing potentially with as many as 6.  He prefers to continue with therapy.    · Cycle 5 on 8/19/2020  · Radiation complete 9/1/2020  · As of 9/9/2020, plan no further chemotherapy  · MRI brain 11/4/2020 negative for mets.  · PET scan 11/4/2020. Improvement in the subcarinal and right hilar nodes with an SUV of 3.7 compared to 20.8 previously. Evidence for esophagitis. Right middle lobe changes resolved. Other stable changes that were previously noted. No new disease.     · MRI brain 1/28/2021 negative  · PCI pursued, complete from 2/16/2021 through 2/25/2021  · CT imaging 2/19/2021 with decrease in the primary tumor, two new small noncalcified nodules in the medial LLL     · CT imaging 4/13/2021 with subtle increase in left upper lobe and left perihilar nodules.  Right-sided subcarinal and infrahilar tumor unchanged.  Changes consistent with radiation pneumonitis.  · CT imaging 7/14/2021 with slight interval increase in size of 2 adjacent cavitary nodules in the medial left lower lobe.  Right infrahilar/subcarinal lesion stable.  · CT imaging 10/11/21 with enlarging right infrahilar mass with two enlarging left lower lobe cavitary lesions  · His case was presented at the multidisciplinary thoracic conference.  Plan observation of the lung lesions with serial CT scans.  If significant change, PET imaging and bronchoscopy.  · CT imaging 1/12/2022 with ongoing evolution of the left lower lobe cavitary lesions, some increase in size.  They have coalesced into one.  · PET scan on 1/25/2022  showed a hypermetabolic 3 x 2 cm cavitary nodule with no other evidence of disease.  · He did have a bronchoscopy performed on 2/21/2022.  BAL cytology negative for malignant cells.  Brushings with rare atypical squamous cells, nondiagnostic for malignancy.  Pathology showed evidence for dysplastic squamous epithelium suspicious for squamous cell carcinoma.  PD-L1 60%.  · He is not on active therapy for his small cell lung cancer as we manage his squamous cell carcinoma as detailed below    *New squamous cell carcinoma of the left lower lobe  · Presented at thoracic conference. Plan concurrent chemoradiation with carbo/taxol  · Proceed today, 3/24/2022 with week 3 concurrent chemoradiation with carboplatin Taxol    *Chronic hearing loss: History of left mastoiditis.  He is not a candidate for cisplatin because of this.     *Atrial fibrillation.  He was initiated on metoprolol 12.5 mg twice daily.  This did delay port placement.  The patient was taking his metoprolol inconsistently.  For cycle 2 was delayed due to heart rate of 150 7/29/2019.  He was seen by cardiology with instructions to increase his metoprolol to 25 mg twice daily.  Heart rate stable today.  He reports no symptoms.  See below.    *Tobacco use.  Continues to smoke a pack and a half a day    *History of SVC syndrome.  Improved after 2 cycles of chemotherapy.  No further evidence of this and this has resolved.    *Dyspnea on exertion: Related to COPD and ongoing lung damage from smoking.  He reports his shortness of breath is stable today    *Leg pain with walking: Some of this sounds like claudication but some of this sounds like orthopedic pain.  Symptoms remain stable.  Follow-up with primary care.    *Fatigue and tiredness: Suspect that whole brain radiation is contributing to this.  TSH and free T4 were normal.  Stable.    *Malfunctioning Mediport: He states that no blood can be obtained from the port.  Apparently the port flushes well.   Unchanged today and the port is accessed for therapy.    *COVID-19 pandemic: Fully vaccinated with a booster.    PLAN:   1. Proceed today with cycle 3-day 1 carbo/taxol and concurrent radiation. Curative intent.   2. Carboplatin at AUC =2 and paclitaxel 50 mg/m2. Dose reduce if necessary. Plan up to 6 cycles of therapy as tolerated.  3. Continue radiation under the direction of radiation oncology  4. Begin saline nasal spray or Vaseline to the nares for intermittent nosebleeds  5. Return in 1 week and 2 weeks for CBC, CMP, nurse practitioner follow-up, ongoing carboplatin Taxol weekly with concurrent radiation    He continues on iron medication requiring close monitoring for toxicity    Danielle Murguia, APRN  03/24/2022

## 2022-03-25 ENCOUNTER — APPOINTMENT (OUTPATIENT)
Dept: RADIATION ONCOLOGY | Facility: HOSPITAL | Age: 72
End: 2022-03-25

## 2022-03-25 PROCEDURE — 77386 CHG INTENSITY MODULATED RADIATION TX DLVR COMPLEX: CPT | Performed by: RADIOLOGY

## 2022-03-25 PROCEDURE — 77386: CPT | Performed by: RADIOLOGY

## 2022-03-25 PROCEDURE — 77427 RADIATION TX MANAGEMENT X5: CPT | Performed by: RADIOLOGY

## 2022-03-28 ENCOUNTER — APPOINTMENT (OUTPATIENT)
Dept: RADIATION ONCOLOGY | Facility: HOSPITAL | Age: 72
End: 2022-03-28

## 2022-03-28 PROCEDURE — 77386: CPT | Performed by: RADIOLOGY

## 2022-03-28 PROCEDURE — 77386 CHG INTENSITY MODULATED RADIATION TX DLVR COMPLEX: CPT | Performed by: RADIOLOGY

## 2022-03-29 ENCOUNTER — RADIATION ONCOLOGY WEEKLY ASSESSMENT (OUTPATIENT)
Dept: RADIATION ONCOLOGY | Facility: HOSPITAL | Age: 72
End: 2022-03-29

## 2022-03-29 ENCOUNTER — APPOINTMENT (OUTPATIENT)
Dept: RADIATION ONCOLOGY | Facility: HOSPITAL | Age: 72
End: 2022-03-29

## 2022-03-29 DIAGNOSIS — C80.1 SMALL CELL CARCINOMA: ICD-10-CM

## 2022-03-29 DIAGNOSIS — C34.11 PRIMARY MALIGNANT NEOPLASM OF RIGHT UPPER LOBE OF LUNG: Primary | ICD-10-CM

## 2022-03-29 PROCEDURE — 77386: CPT | Performed by: RADIOLOGY

## 2022-03-29 PROCEDURE — 77336 RADIATION PHYSICS CONSULT: CPT | Performed by: RADIOLOGY

## 2022-03-29 PROCEDURE — 77386 CHG INTENSITY MODULATED RADIATION TX DLVR COMPLEX: CPT | Performed by: RADIOLOGY

## 2022-03-29 NOTE — PROGRESS NOTES
Physician Weekly Management Note    Diagnosis:     Diagnosis Plan   1. Primary malignant neoplasm of right upper lobe of lung (HCC)     2. Small cell carcinoma (HCC)         RT Details:  Squamous cell carcinoma of left lungfx 14/30 2800cGy/6000    Notes on Treatment course, Films, Medical progress:  Doing well, no increased soa, increased fatigue cont on    Weekly Management:  Medication reviewed?   Yes  New medications given?   No  Problemlist reviewed?   Yes  Problem added?   No  Issues raised requiring referral to support services - task assigned to:  na    Technical aspects reviewed:  Weekly OBI approved?   Yes  Weekly port films approved?   Yes  Change requests noted on port film?   No  Patient setup and plan reviewed?   Yes    Chart Reviewed:  Continue current treatment plan?   Yes  Treatment plan change requested?   No  CBC reviewed?   Yes  Concurrent Chemo?   Yes    Objective     Toxicities:   none     Review of Systems   Constitutional: Positive for fatigue.   Respiratory: Negative.           There were no vitals filed for this visit.    Current Status 3/17/2022   ECOG score 0       Physical Exam  Constitutional:       Appearance: Normal appearance.   Pulmonary:      Breath sounds: Normal breath sounds.   Neurological:      Mental Status: He is alert.             Problem Summary List    Diagnosis:     Diagnosis Plan   1. Primary malignant neoplasm of right upper lobe of lung (HCC)     2. Small cell carcinoma (HCC)       Pathology:   Squamous cell lung    Past Medical History:   Diagnosis Date   • Atrial fibrillation with RVR (HCC)    • Cancer (HCC) 06/2019    Right lung   • Chronic cough    • COPD (chronic obstructive pulmonary disease) (HCC)    • Hearing loss    • History of shingles    • Lower back pain    • Lung cancer (HCC)    • Mastoiditis    • Mild mitral regurgitation    • Other fatigue    • PAF (paroxysmal atrial fibrillation) (HCC)    • Pneumonia          Past Surgical History:   Procedure  Laterality Date   • BRONCHOSCOPY N/A 6/19/2019    Procedure: BRONCHOSCOPY WITH WASHINGS AND BIOPSY AND ENDOBRONCHIAL ULTRASOUND WITH FNA;  Surgeon: Arslan Marquez MD;  Location: [facility] ENDOSCOPY;  Service: Pulmonary   • BRONCHOSCOPY N/A 2/21/2022    Procedure: BRONCHOSCOPY WITH FLUORO, BAL, BRUSHINGS, AND BIOPSIES LLL;  Surgeon: Arslan Marquez MD;  Location: [facility] ENDOSCOPY;  Service: Pulmonary;  Laterality: N/A;  PRE: LUNG MASS  POST: SAME   • INNER EAR SURGERY Left    • MASTOIDECTOMY Left 1994   • VENOUS ACCESS DEVICE (PORT) INSERTION Right 8/22/2019    Procedure: INSERTION OF PORTACATH;  Surgeon: Ramila Gallardo MD;  Location: [facility] MAIN OR;  Service: Cardiothoracic         Current Outpatient Medications on File Prior to Visit   Medication Sig Dispense Refill   • aspirin 81 MG tablet Take 1 tablet by mouth Daily. 30 tablet 11   • metoprolol tartrate (LOPRESSOR) 25 MG tablet TAKE 1 TABLET BY MOUTH TWO TIMES A  tablet 2     No current facility-administered medications on file prior to visit.       No Known Allergies      Referring Provider:    No referring provider defined for this encounter.    Oncologist:  No primary care provider on file.      Seen and approved by:  Yessenia Elizabeth MD  03/29/2022

## 2022-03-30 ENCOUNTER — APPOINTMENT (OUTPATIENT)
Dept: RADIATION ONCOLOGY | Facility: HOSPITAL | Age: 72
End: 2022-03-30

## 2022-03-30 PROCEDURE — 77386 CHG INTENSITY MODULATED RADIATION TX DLVR COMPLEX: CPT | Performed by: RADIOLOGY

## 2022-03-30 PROCEDURE — 77386: CPT | Performed by: RADIOLOGY

## 2022-03-31 ENCOUNTER — INFUSION (OUTPATIENT)
Dept: ONCOLOGY | Facility: HOSPITAL | Age: 72
End: 2022-03-31

## 2022-03-31 ENCOUNTER — OFFICE VISIT (OUTPATIENT)
Dept: ONCOLOGY | Facility: CLINIC | Age: 72
End: 2022-03-31

## 2022-03-31 ENCOUNTER — APPOINTMENT (OUTPATIENT)
Dept: RADIATION ONCOLOGY | Facility: HOSPITAL | Age: 72
End: 2022-03-31

## 2022-03-31 VITALS
HEIGHT: 69 IN | BODY MASS INDEX: 27.76 KG/M2 | DIASTOLIC BLOOD PRESSURE: 76 MMHG | TEMPERATURE: 97.5 F | WEIGHT: 187.4 LBS | OXYGEN SATURATION: 95 % | SYSTOLIC BLOOD PRESSURE: 118 MMHG | HEART RATE: 101 BPM | RESPIRATION RATE: 17 BRPM

## 2022-03-31 VITALS — SYSTOLIC BLOOD PRESSURE: 108 MMHG | DIASTOLIC BLOOD PRESSURE: 69 MMHG | HEART RATE: 83 BPM

## 2022-03-31 DIAGNOSIS — Z79.899 HIGH RISK MEDICATION USE: Primary | ICD-10-CM

## 2022-03-31 DIAGNOSIS — C34.90 SMALL CELL LUNG CANCER: ICD-10-CM

## 2022-03-31 DIAGNOSIS — K21.9 GASTROESOPHAGEAL REFLUX DISEASE, UNSPECIFIED WHETHER ESOPHAGITIS PRESENT: ICD-10-CM

## 2022-03-31 DIAGNOSIS — C34.92 NON-SMALL CELL CANCER OF LEFT LUNG: Primary | ICD-10-CM

## 2022-03-31 DIAGNOSIS — Z79.899 HIGH RISK MEDICATION USE: ICD-10-CM

## 2022-03-31 DIAGNOSIS — I48.91 ATRIAL FIBRILLATION WITH RVR: ICD-10-CM

## 2022-03-31 LAB
ALBUMIN SERPL-MCNC: 3.9 G/DL (ref 3.5–5.2)
ALBUMIN/GLOB SERPL: 1.4 G/DL (ref 1.1–2.4)
ALP SERPL-CCNC: 78 U/L (ref 38–116)
ALT SERPL W P-5'-P-CCNC: 28 U/L (ref 0–41)
ANION GAP SERPL CALCULATED.3IONS-SCNC: 9.1 MMOL/L (ref 5–15)
AST SERPL-CCNC: 24 U/L (ref 0–40)
BASOPHILS # BLD AUTO: 0.1 10*3/MM3 (ref 0–0.2)
BASOPHILS NFR BLD AUTO: 2.4 % (ref 0–1.5)
BILIRUB SERPL-MCNC: 0.6 MG/DL (ref 0.2–1.2)
BUN SERPL-MCNC: 13 MG/DL (ref 6–20)
BUN/CREAT SERPL: 12.6 (ref 7.3–30)
CALCIUM SPEC-SCNC: 8.9 MG/DL (ref 8.5–10.2)
CHLORIDE SERPL-SCNC: 101 MMOL/L (ref 98–107)
CO2 SERPL-SCNC: 24.9 MMOL/L (ref 22–29)
CREAT SERPL-MCNC: 1.03 MG/DL (ref 0.7–1.3)
DEPRECATED RDW RBC AUTO: 45.7 FL (ref 37–54)
EGFRCR SERPLBLD CKD-EPI 2021: 78.1 ML/MIN/1.73
EOSINOPHIL # BLD AUTO: 0.06 10*3/MM3 (ref 0–0.4)
EOSINOPHIL NFR BLD AUTO: 1.5 % (ref 0.3–6.2)
ERYTHROCYTE [DISTWIDTH] IN BLOOD BY AUTOMATED COUNT: 13.2 % (ref 12.3–15.4)
GLOBULIN UR ELPH-MCNC: 2.8 GM/DL (ref 1.8–3.5)
GLUCOSE SERPL-MCNC: 112 MG/DL (ref 74–124)
HCT VFR BLD AUTO: 40.7 % (ref 37.5–51)
HGB BLD-MCNC: 13.8 G/DL (ref 13–17.7)
IMM GRANULOCYTES # BLD AUTO: 0.01 10*3/MM3 (ref 0–0.05)
IMM GRANULOCYTES NFR BLD AUTO: 0.2 % (ref 0–0.5)
LYMPHOCYTES # BLD AUTO: 0.71 10*3/MM3 (ref 0.7–3.1)
LYMPHOCYTES NFR BLD AUTO: 17.2 % (ref 19.6–45.3)
MCH RBC QN AUTO: 32.7 PG (ref 26.6–33)
MCHC RBC AUTO-ENTMCNC: 33.9 G/DL (ref 31.5–35.7)
MCV RBC AUTO: 96.4 FL (ref 79–97)
MONOCYTES # BLD AUTO: 0.38 10*3/MM3 (ref 0.1–0.9)
MONOCYTES NFR BLD AUTO: 9.2 % (ref 5–12)
NEUTROPHILS NFR BLD AUTO: 2.86 10*3/MM3 (ref 1.7–7)
NEUTROPHILS NFR BLD AUTO: 69.5 % (ref 42.7–76)
NRBC BLD AUTO-RTO: 0 /100 WBC (ref 0–0.2)
PLATELET # BLD AUTO: 135 10*3/MM3 (ref 140–450)
PMV BLD AUTO: 10.1 FL (ref 6–12)
POTASSIUM SERPL-SCNC: 4.3 MMOL/L (ref 3.5–4.7)
PROT SERPL-MCNC: 6.7 G/DL (ref 6.3–8)
RBC # BLD AUTO: 4.22 10*6/MM3 (ref 4.14–5.8)
SODIUM SERPL-SCNC: 135 MMOL/L (ref 134–145)
WBC NRBC COR # BLD: 4.12 10*3/MM3 (ref 3.4–10.8)

## 2022-03-31 PROCEDURE — 80053 COMPREHEN METABOLIC PANEL: CPT

## 2022-03-31 PROCEDURE — 85025 COMPLETE CBC W/AUTO DIFF WBC: CPT

## 2022-03-31 PROCEDURE — 99214 OFFICE O/P EST MOD 30 MIN: CPT | Performed by: NURSE PRACTITIONER

## 2022-03-31 PROCEDURE — 25010000002 PALONOSETRON PER 25 MCG: Performed by: NURSE PRACTITIONER

## 2022-03-31 PROCEDURE — 77386: CPT | Performed by: RADIOLOGY

## 2022-03-31 PROCEDURE — 96367 TX/PROPH/DG ADDL SEQ IV INF: CPT

## 2022-03-31 PROCEDURE — 25010000002 CARBOPLATIN PER 50 MG: Performed by: NURSE PRACTITIONER

## 2022-03-31 PROCEDURE — 25010000002 DEXAMETHASONE SODIUM PHOSPHATE 100 MG/10ML SOLUTION: Performed by: NURSE PRACTITIONER

## 2022-03-31 PROCEDURE — 25010000002 PACLITAXEL PER 1 MG: Performed by: NURSE PRACTITIONER

## 2022-03-31 PROCEDURE — 96375 TX/PRO/DX INJ NEW DRUG ADDON: CPT

## 2022-03-31 PROCEDURE — 96417 CHEMO IV INFUS EACH ADDL SEQ: CPT

## 2022-03-31 PROCEDURE — 77386 CHG INTENSITY MODULATED RADIATION TX DLVR COMPLEX: CPT | Performed by: RADIOLOGY

## 2022-03-31 PROCEDURE — 25010000002 DIPHENHYDRAMINE PER 50 MG: Performed by: NURSE PRACTITIONER

## 2022-03-31 PROCEDURE — 96413 CHEMO IV INFUSION 1 HR: CPT

## 2022-03-31 RX ORDER — FAMOTIDINE 10 MG/ML
20 INJECTION, SOLUTION INTRAVENOUS ONCE
Status: COMPLETED | OUTPATIENT
Start: 2022-03-31 | End: 2022-03-31

## 2022-03-31 RX ORDER — SODIUM CHLORIDE 9 MG/ML
250 INJECTION, SOLUTION INTRAVENOUS ONCE
Status: CANCELLED | OUTPATIENT
Start: 2022-03-31

## 2022-03-31 RX ORDER — PALONOSETRON 0.05 MG/ML
0.25 INJECTION, SOLUTION INTRAVENOUS ONCE
Status: CANCELLED | OUTPATIENT
Start: 2022-03-31

## 2022-03-31 RX ORDER — DIPHENHYDRAMINE HYDROCHLORIDE 50 MG/ML
50 INJECTION INTRAMUSCULAR; INTRAVENOUS AS NEEDED
Status: CANCELLED | OUTPATIENT
Start: 2022-03-31

## 2022-03-31 RX ORDER — SODIUM CHLORIDE 9 MG/ML
250 INJECTION, SOLUTION INTRAVENOUS ONCE
Status: COMPLETED | OUTPATIENT
Start: 2022-03-31 | End: 2022-03-31

## 2022-03-31 RX ORDER — FAMOTIDINE 10 MG/ML
20 INJECTION, SOLUTION INTRAVENOUS ONCE
Status: CANCELLED | OUTPATIENT
Start: 2022-03-31

## 2022-03-31 RX ORDER — PALONOSETRON 0.05 MG/ML
0.25 INJECTION, SOLUTION INTRAVENOUS ONCE
Status: COMPLETED | OUTPATIENT
Start: 2022-03-31 | End: 2022-03-31

## 2022-03-31 RX ORDER — PANTOPRAZOLE SODIUM 40 MG/1
40 TABLET, DELAYED RELEASE ORAL DAILY
Qty: 30 TABLET | Refills: 2 | Status: SHIPPED | OUTPATIENT
Start: 2022-03-31

## 2022-03-31 RX ORDER — FAMOTIDINE 10 MG/ML
20 INJECTION, SOLUTION INTRAVENOUS AS NEEDED
Status: CANCELLED | OUTPATIENT
Start: 2022-03-31

## 2022-03-31 RX ADMIN — CARBOPLATIN 210 MG: 10 INJECTION INTRAVENOUS at 11:48

## 2022-03-31 RX ADMIN — SODIUM CHLORIDE 250 ML: 9 INJECTION, SOLUTION INTRAVENOUS at 09:34

## 2022-03-31 RX ADMIN — PACLITAXEL 100 MG: 6 INJECTION, SOLUTION INTRAVENOUS at 10:44

## 2022-03-31 RX ADMIN — DIPHENHYDRAMINE HYDROCHLORIDE 25 MG: 50 INJECTION INTRAMUSCULAR; INTRAVENOUS at 09:52

## 2022-03-31 RX ADMIN — DEXAMETHASONE SODIUM PHOSPHATE 12 MG: 10 INJECTION, SOLUTION INTRAMUSCULAR; INTRAVENOUS at 09:35

## 2022-03-31 RX ADMIN — FAMOTIDINE 20 MG: 10 INJECTION INTRAVENOUS at 09:35

## 2022-03-31 RX ADMIN — PALONOSETRON 0.25 MG: 0.05 INJECTION, SOLUTION INTRAVENOUS at 09:35

## 2022-03-31 NOTE — PROGRESS NOTES
"REASON FOR FOLLOW UP:   1.  T4N3 Small cell lung cancer  2.  SVC syndrome  3.  Initially treated with chemotherapy.  Subsequent radiation.  Subsequent PCI. ISAIAS  4. 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 male with the above mentioned history, returns the office today in anticipation of his fourth week of concurrent chemoradiation with carboplatin Taxol.  He is tolerating this very well.  He does report intermittent nausea and vomiting particularly waking him up to the night.  He denies reflux symptoms though reports with lying flat he does feel like he has regurgitation.  He denies neuropathy.  He denies a change in his shortness of breath or cough.  He does continue to smoke heavily.  He denies fevers or chills, signs or symptoms of infection.    Review of Systems   See the HPI    Vitals:    03/31/22 0902   BP: 118/76   Pulse: 101   Resp: 17   Temp: 97.5 °F (36.4 °C)   TempSrc: Temporal   SpO2: 95%   Weight: 85 kg (187 lb 6.4 oz)   Height: 175 cm (68.9\")   PainSc: 0-No pain     General:  No acute distress, awake, alert and oriented.  Chronically ill-appearing.  Strong odor of smoke  Skin:  Warm and dry, no visible rash  HEENT:  Normocephalic/atraumatic.  Wearing a face mask.  Chest:  Normal respiratory effort.  Benign-appearing Mediport which is accessed today.  Coarse rhonchi throughout which did clear with coughing  Heart: Regular rate and rhythm  Extremities:  No visible clubbing, cyanosis, or edema  Neuro/psych:  Grossly nonfocal.  Normal mood and affect.    I have reexamined the patient and the results are consistent with the previously documented exam. Danielle Murguia, BERTO     DIAGNOSTIC DATA:  Comprehensive metabolic panel (03/31/2022 08:49)  CBC and Differential (03/31/2022 08:49)    IMAGING:  NM PET/CT Skull Base to Mid Thigh (01/25/2022 14:27)    ASSESSMENT:  This is a 72 y.o. male with:    *Small cell lung cancer originating " in the right hilum:   · He has an extremely large mass there with concern for SVC compression on CT imaging from 6/4/2019.  There is some left hilar adenopathy.  · Some parotid lesions which are concerning but indeterminate, left greater than right.  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  · Staging MRI of the brain 7/10/2019 negative for metastatic disease  · He was discussed at multidisciplinary thoracic conference, with his T4 and 3 disease, concern for involvement of the right supraclavicular region and obvious left hilum, plans to add atezolizumab with cycle 2, we can consider future radiation to the residual mediastinal mass if necessary.  · Lung lesion too large to radiate in spite of the SVC narrowing.    · On 7/8/2019 we started carboplatin AUC 5 and VP16 100 mg/m2 through peripheral IVs  · Cycle 2 postponed on 7/29/2019 due to heart rate of 154.  Atezolizumab added with cycle #2.  · Radiation can be added for a residual mediastinal mass if necessary  · CT imaging on 8/14/2019 after two cycles of therapy shows significant improvement.  · Mediport anticipated by Dr. Gallardo on 8/22/2019.  · On  8/20/2019 cycle 3 was delayed due to neutropenia and with cycle 3 we did incorporate Neulasta.  · Cycle 4 completed 9/17/2019  · Maintenance atezolizumab initiated 10/8/2019.  PET scan revealed disease response to therapy, he does have an area of hypermetabolic activity in the adrenal gland which is small and did not show up on prior exam.  We will continue to monitor this.  His MRI of the brain is also negative for metastatic disease.  · CT 2/4/2020 with ongoing improvement.  Maintenance atezolizumab pursued.    · CT imaging 5/19/2020 with progression.  Increase in the right hilar/mediastinal mass with enlarging adenopathy.    · Given that the  progression is longer than 6 months after completing carbo/VP16, proceed with further carbo/VP16.  · Cycle 1 initiated 5/26/2020  · PET scan after two cycles with ongoing uptake in the right hilum, otherwise somewhat improved  · Radiation initiated 7/22/2020 with plans for 30 treatments  · Proceeded with cycle 4 carboplatin -16 7/28/2020.    · As of 8/19/2020, discussed holding further chemotherapy since he had 4 cycles versus continuing potentially with as many as 6.  He prefers to continue with therapy.    · Cycle 5 on 8/19/2020  · Radiation complete 9/1/2020  · As of 9/9/2020, plan no further chemotherapy  · MRI brain 11/4/2020 negative for mets.  · PET scan 11/4/2020. Improvement in the subcarinal and right hilar nodes with an SUV of 3.7 compared to 20.8 previously. Evidence for esophagitis. Right middle lobe changes resolved. Other stable changes that were previously noted. No new disease.     · MRI brain 1/28/2021 negative  · PCI pursued, complete from 2/16/2021 through 2/25/2021  · CT imaging 2/19/2021 with decrease in the primary tumor, two new small noncalcified nodules in the medial LLL     · CT imaging 4/13/2021 with subtle increase in left upper lobe and left perihilar nodules.  Right-sided subcarinal and infrahilar tumor unchanged.  Changes consistent with radiation pneumonitis.  · CT imaging 7/14/2021 with slight interval increase in size of 2 adjacent cavitary nodules in the medial left lower lobe.  Right infrahilar/subcarinal lesion stable.  · CT imaging 10/11/21 with enlarging right infrahilar mass with two enlarging left lower lobe cavitary lesions  · His case was presented at the multidisciplinary thoracic conference.  Plan observation of the lung lesions with serial CT scans.  If significant change, PET imaging and bronchoscopy.  · CT imaging 1/12/2022 with ongoing evolution of the left lower lobe cavitary lesions, some increase in size.  They have coalesced into one.  · PET scan on 1/25/2022  showed a hypermetabolic 3 x 2 cm cavitary nodule with no other evidence of disease.  · He did have a bronchoscopy performed on 2/21/2022.  BAL cytology negative for malignant cells.  Brushings with rare atypical squamous cells, nondiagnostic for malignancy.  Pathology showed evidence for dysplastic squamous epithelium suspicious for squamous cell carcinoma.  PD-L1 60%.  · He is not on active therapy for his small cell lung cancer as we manage his squamous cell carcinoma as detailed below    *New squamous cell carcinoma of the left lower lobe  · Presented at thoracic conference. Plan concurrent chemoradiation with carbo/taxol  · Proceed today, 3/31/2022 with week 4 concurrent chemoradiation with carboplatin Taxol    *Chronic hearing loss: History of left mastoiditis.  He is not a candidate for cisplatin because of this.     *Atrial fibrillation.  He was initiated on metoprolol 12.5 mg twice daily.  This did delay port placement.  The patient was taking his metoprolol inconsistently.  For cycle 2 was delayed due to heart rate of 150 7/29/2019.  He was seen by cardiology with instructions to increase his metoprolol to 25 mg twice daily.  Heart rate stable today.  He reports no symptoms.  See below.    *Tobacco use.  Continues to smoke a pack and a half a day    *History of SVC syndrome.  Improved after 2 cycles of chemotherapy.  No further evidence of this and this has resolved.    *Dyspnea on exertion: Related to COPD and ongoing lung damage from smoking.  He reports his shortness of breath is stable today    *Leg pain with walking: Some of this sounds like claudication but some of this sounds like orthopedic pain.  Symptoms remain stable.  Follow-up with primary care.    *Fatigue and tiredness: Suspect that whole brain radiation is contributing to this.  TSH and free T4 were normal.  Stable.    *Malfunctioning Mediport: He states that no blood can be obtained from the port.  Apparently the port flushes well.   Unchanged today and the port is accessed for therapy.    *COVID-19 pandemic: Fully vaccinated with a booster.    *GERD  · The patient is having worsening regurgitation nightly, and occasional vomiting.  · Begin Protonix 40 mg daily    PLAN:   1. Proceed today with cycle 4-day 1 carbo/taxol and concurrent radiation. Curative intent.   2. Carboplatin at AUC =2 and paclitaxel 50 mg/m2. Dose reduce if necessary. Plan up to 6 cycles of therapy as tolerated.  3. Continue radiation under the direction of radiation oncology  4. Again Protonix 40 mg daily, this was electronically prescribed  5. Return in 1 week for CBC, CMP, nurse practitioner follow-up, ongoing carboplatin Taxol weekly with concurrent radiation    He continues on iron medication requiring close monitoring for toxicity    Danielle Murguia, APRN  03/31/2022

## 2022-04-01 ENCOUNTER — APPOINTMENT (OUTPATIENT)
Dept: RADIATION ONCOLOGY | Facility: HOSPITAL | Age: 72
End: 2022-04-01

## 2022-04-01 PROCEDURE — 77386: CPT | Performed by: RADIOLOGY

## 2022-04-01 PROCEDURE — 77427 RADIATION TX MANAGEMENT X5: CPT | Performed by: RADIOLOGY

## 2022-04-01 PROCEDURE — 77386 CHG INTENSITY MODULATED RADIATION TX DLVR COMPLEX: CPT | Performed by: RADIOLOGY

## 2022-04-04 ENCOUNTER — APPOINTMENT (OUTPATIENT)
Dept: RADIATION ONCOLOGY | Facility: HOSPITAL | Age: 72
End: 2022-04-04

## 2022-04-04 LAB
MYCOBACTERIUM SPEC CULT: NORMAL
NIGHT BLUE STAIN TISS: NORMAL

## 2022-04-04 PROCEDURE — 77386 CHG INTENSITY MODULATED RADIATION TX DLVR COMPLEX: CPT | Performed by: RADIOLOGY

## 2022-04-04 PROCEDURE — 77386: CPT | Performed by: RADIOLOGY

## 2022-04-05 ENCOUNTER — APPOINTMENT (OUTPATIENT)
Dept: RADIATION ONCOLOGY | Facility: HOSPITAL | Age: 72
End: 2022-04-05

## 2022-04-05 PROCEDURE — 77386 CHG INTENSITY MODULATED RADIATION TX DLVR COMPLEX: CPT | Performed by: RADIOLOGY

## 2022-04-05 PROCEDURE — 77386: CPT | Performed by: RADIOLOGY

## 2022-04-05 PROCEDURE — 77336 RADIATION PHYSICS CONSULT: CPT | Performed by: RADIOLOGY

## 2022-04-06 ENCOUNTER — APPOINTMENT (OUTPATIENT)
Dept: RADIATION ONCOLOGY | Facility: HOSPITAL | Age: 72
End: 2022-04-06

## 2022-04-06 ENCOUNTER — RADIATION ONCOLOGY WEEKLY ASSESSMENT (OUTPATIENT)
Dept: RADIATION ONCOLOGY | Facility: HOSPITAL | Age: 72
End: 2022-04-06

## 2022-04-06 VITALS — WEIGHT: 189.4 LBS | BODY MASS INDEX: 28.05 KG/M2

## 2022-04-06 DIAGNOSIS — C34.92 NON-SMALL CELL CANCER OF LEFT LUNG: Primary | ICD-10-CM

## 2022-04-06 PROCEDURE — 77386: CPT | Performed by: RADIOLOGY

## 2022-04-06 PROCEDURE — 77386 CHG INTENSITY MODULATED RADIATION TX DLVR COMPLEX: CPT | Performed by: RADIOLOGY

## 2022-04-06 NOTE — PROGRESS NOTES
Treatment #20/30 physician Weekly Management Note    Diagnosis:     Diagnosis Plan   1. Non-small cell cancer of left lung (HCC)         RT Details:  Treatment #20/30    Notes on Treatment course, Films, Medical progress:  Reports fatigue.  Denies new shortness of breath cough chest pain or hemoptysis.  Can continue as planned.      [facility] Onc ECOG Status: (0) Fully active, able to carry on all predisease performance without restriction      Weekly Management:  Medication reviewed?   Yes  New medications given?   No  Problemlist reviewed?   Yes  Problem added?   No      Technical aspects reviewed:  Weekly OBI approved?   Yes  Weekly port films approved?   Yes  Change requests noted on port film?   No  Patient setup and plan reviewed?   Yes    Chart Reviewed:  Continue current treatment plan?   Yes  Treatment plan change requested?   No  CBC reviewed?   Yes  Concurrent Chemo?   No    Objective     Toxicities:   As above     Review of Systems   As above    There were no vitals filed for this visit.    Current Status 3/31/2022   ECOG score 0       Physical Exam  As above      Problem Summary List    Diagnosis:     Diagnosis Plan   1. Non-small cell cancer of left lung (HCC)       Pathology:       Past Medical History:   Diagnosis Date   • Atrial fibrillation with RVR (HCC)    • Cancer (HCC) 06/2019    Right lung   • Chronic cough    • COPD (chronic obstructive pulmonary disease) (HCC)    • Hearing loss    • History of shingles    • Lower back pain    • Lung cancer (HCC)    • Mastoiditis    • Mild mitral regurgitation    • Other fatigue    • PAF (paroxysmal atrial fibrillation) (HCC)    • Pneumonia          Past Surgical History:   Procedure Laterality Date   • BRONCHOSCOPY N/A 6/19/2019    Procedure: BRONCHOSCOPY WITH WASHINGS AND BIOPSY AND ENDOBRONCHIAL ULTRASOUND WITH FNA;  Surgeon: Arslan Marquez MD;  Location: [facility] ENDOSCOPY;  Service: Pulmonary   • BRONCHOSCOPY N/A 2/21/2022    Procedure:  BRONCHOSCOPY WITH FLUORO, BAL, BRUSHINGS, AND BIOPSIES LLL;  Surgeon: Arslan Marquez MD;  Location: [facility] ENDOSCOPY;  Service: Pulmonary;  Laterality: N/A;  PRE: LUNG MASS  POST: SAME   • INNER EAR SURGERY Left    • MASTOIDECTOMY Left 1994   • VENOUS ACCESS DEVICE (PORT) INSERTION Right 8/22/2019    Procedure: INSERTION OF PORTACATH;  Surgeon: Ramila Gallardo MD;  Location: [facility] MAIN OR;  Service: Cardiothoracic         Current Outpatient Medications on File Prior to Visit   Medication Sig Dispense Refill   • aspirin 81 MG tablet Take 1 tablet by mouth Daily. 30 tablet 11   • metoprolol tartrate (LOPRESSOR) 25 MG tablet TAKE 1 TABLET BY MOUTH TWO TIMES A  tablet 2   • pantoprazole (PROTONIX) 40 MG EC tablet Take 1 tablet by mouth Daily. 30 tablet 2     No current facility-administered medications on file prior to visit.       No Known Allergies     Primary care MD:    Paty Saldana APRN    Oncologist:  Patient Care Team:  Thai Morel MD as Consulting Physician (Hematology and Oncology)       Seen and approved by:  Gumaro Epps MD  04/06/2022

## 2022-04-07 ENCOUNTER — INFUSION (OUTPATIENT)
Dept: ONCOLOGY | Facility: HOSPITAL | Age: 72
End: 2022-04-07

## 2022-04-07 ENCOUNTER — OFFICE VISIT (OUTPATIENT)
Dept: ONCOLOGY | Facility: CLINIC | Age: 72
End: 2022-04-07

## 2022-04-07 ENCOUNTER — APPOINTMENT (OUTPATIENT)
Dept: RADIATION ONCOLOGY | Facility: HOSPITAL | Age: 72
End: 2022-04-07

## 2022-04-07 VITALS
HEART RATE: 96 BPM | OXYGEN SATURATION: 96 % | WEIGHT: 189.8 LBS | TEMPERATURE: 97.3 F | SYSTOLIC BLOOD PRESSURE: 126 MMHG | RESPIRATION RATE: 18 BRPM | HEIGHT: 69 IN | BODY MASS INDEX: 28.11 KG/M2 | DIASTOLIC BLOOD PRESSURE: 82 MMHG

## 2022-04-07 DIAGNOSIS — Z79.899 HIGH RISK MEDICATION USE: ICD-10-CM

## 2022-04-07 DIAGNOSIS — T45.1X5A CHEMOTHERAPY-INDUCED THROMBOCYTOPENIA: ICD-10-CM

## 2022-04-07 DIAGNOSIS — D69.59 CHEMOTHERAPY-INDUCED THROMBOCYTOPENIA: ICD-10-CM

## 2022-04-07 DIAGNOSIS — Z79.899 HIGH RISK MEDICATION USE: Primary | ICD-10-CM

## 2022-04-07 DIAGNOSIS — K21.9 GASTROESOPHAGEAL REFLUX DISEASE, UNSPECIFIED WHETHER ESOPHAGITIS PRESENT: ICD-10-CM

## 2022-04-07 DIAGNOSIS — C34.90 SMALL CELL LUNG CANCER: ICD-10-CM

## 2022-04-07 DIAGNOSIS — C34.92 NON-SMALL CELL CANCER OF LEFT LUNG: Primary | ICD-10-CM

## 2022-04-07 LAB
ALBUMIN SERPL-MCNC: 3.8 G/DL (ref 3.5–5.2)
ALBUMIN/GLOB SERPL: 1.5 G/DL (ref 1.1–2.4)
ALP SERPL-CCNC: 73 U/L (ref 38–116)
ALT SERPL W P-5'-P-CCNC: 23 U/L (ref 0–41)
ANION GAP SERPL CALCULATED.3IONS-SCNC: 8.1 MMOL/L (ref 5–15)
AST SERPL-CCNC: 23 U/L (ref 0–40)
BASOPHILS # BLD AUTO: 0.05 10*3/MM3 (ref 0–0.2)
BASOPHILS NFR BLD AUTO: 1.9 % (ref 0–1.5)
BILIRUB SERPL-MCNC: 0.5 MG/DL (ref 0.2–1.2)
BUN SERPL-MCNC: 12 MG/DL (ref 6–20)
BUN/CREAT SERPL: 10.8 (ref 7.3–30)
CALCIUM SPEC-SCNC: 8.9 MG/DL (ref 8.5–10.2)
CHLORIDE SERPL-SCNC: 105 MMOL/L (ref 98–107)
CO2 SERPL-SCNC: 24.9 MMOL/L (ref 22–29)
CREAT SERPL-MCNC: 1.11 MG/DL (ref 0.7–1.3)
DEPRECATED RDW RBC AUTO: 47.3 FL (ref 37–54)
EGFRCR SERPLBLD CKD-EPI 2021: 71.4 ML/MIN/1.73
EOSINOPHIL # BLD AUTO: 0.03 10*3/MM3 (ref 0–0.4)
EOSINOPHIL NFR BLD AUTO: 1.1 % (ref 0.3–6.2)
ERYTHROCYTE [DISTWIDTH] IN BLOOD BY AUTOMATED COUNT: 13.9 % (ref 12.3–15.4)
GLOBULIN UR ELPH-MCNC: 2.6 GM/DL (ref 1.8–3.5)
GLUCOSE SERPL-MCNC: 129 MG/DL (ref 74–124)
HCT VFR BLD AUTO: 36.8 % (ref 37.5–51)
HGB BLD-MCNC: 12.6 G/DL (ref 13–17.7)
IMM GRANULOCYTES # BLD AUTO: 0.01 10*3/MM3 (ref 0–0.05)
IMM GRANULOCYTES NFR BLD AUTO: 0.4 % (ref 0–0.5)
LYMPHOCYTES # BLD AUTO: 0.51 10*3/MM3 (ref 0.7–3.1)
LYMPHOCYTES NFR BLD AUTO: 19.1 % (ref 19.6–45.3)
MCH RBC QN AUTO: 33.3 PG (ref 26.6–33)
MCHC RBC AUTO-ENTMCNC: 34.2 G/DL (ref 31.5–35.7)
MCV RBC AUTO: 97.4 FL (ref 79–97)
MONOCYTES # BLD AUTO: 0.22 10*3/MM3 (ref 0.1–0.9)
MONOCYTES NFR BLD AUTO: 8.2 % (ref 5–12)
NEUTROPHILS NFR BLD AUTO: 1.85 10*3/MM3 (ref 1.7–7)
NEUTROPHILS NFR BLD AUTO: 69.3 % (ref 42.7–76)
NRBC BLD AUTO-RTO: 0 /100 WBC (ref 0–0.2)
PLATELET # BLD AUTO: 76 10*3/MM3 (ref 140–450)
PMV BLD AUTO: 10.5 FL (ref 6–12)
POTASSIUM SERPL-SCNC: 4.3 MMOL/L (ref 3.5–4.7)
PROT SERPL-MCNC: 6.4 G/DL (ref 6.3–8)
RBC # BLD AUTO: 3.78 10*6/MM3 (ref 4.14–5.8)
SODIUM SERPL-SCNC: 138 MMOL/L (ref 134–145)
WBC NRBC COR # BLD: 2.67 10*3/MM3 (ref 3.4–10.8)

## 2022-04-07 PROCEDURE — 77386: CPT | Performed by: RADIOLOGY

## 2022-04-07 PROCEDURE — 99215 OFFICE O/P EST HI 40 MIN: CPT | Performed by: NURSE PRACTITIONER

## 2022-04-07 PROCEDURE — 85025 COMPLETE CBC W/AUTO DIFF WBC: CPT

## 2022-04-07 PROCEDURE — 36591 DRAW BLOOD OFF VENOUS DEVICE: CPT

## 2022-04-07 PROCEDURE — 80053 COMPREHEN METABOLIC PANEL: CPT

## 2022-04-07 PROCEDURE — 77386 CHG INTENSITY MODULATED RADIATION TX DLVR COMPLEX: CPT | Performed by: RADIOLOGY

## 2022-04-07 NOTE — PROGRESS NOTES
"REASON FOR FOLLOW UP:   1.  T4N3 Small cell lung cancer  2.  SVC syndrome  3.  Initially treated with chemotherapy.  Subsequent radiation.  Subsequent PCI. ISAIAS  4. 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 male with the above mentioned history, returns the office today in anticipation of his fifth week of concurrent chemoradiation with carboplatin Taxol.  He is tolerating concurrent therapy fairly well.  He has chronic fatigue which is overall unchanged.  He also reports his breathing is unchanged.  He thankfully denies signs or symptoms of bleeding.  He does resolution of his nighttime nausea and vomiting with the addition of Protonix.    Review of Systems   See the HPI    Vitals:    04/07/22 0908   BP: 126/82   Pulse: 96   Resp: 18   Temp: 97.3 °F (36.3 °C)   TempSrc: Temporal   SpO2: 96%   Weight: 86.1 kg (189 lb 12.8 oz)   Height: 175 cm (68.9\")   PainSc: 0-No pain        General:  No acute distress, awake, alert and oriented.  Chronically ill-appearing.  Strong odor of smoke  Skin:  Warm and dry, no visible rash, large bruise on the right forearm  HEENT:  Normocephalic/atraumatic.  Wearing a face mask.  Chest:  Normal respiratory effort.  Benign-appearing Mediport which is accessed today.  Coarse rhonchi throughout which did clear with coughing  Heart: Regular rate and rhythm  Extremities:  No visible clubbing, cyanosis, or edema  Neuro/psych:  Grossly nonfocal.  Normal mood and affect.    I have reexamined the patient and the results are consistent with the previously documented exam. BERTO Jiménez     DIAGNOSTIC DATA:  CBC and Differential (04/07/2022 08:52)      IMAGING:  NM PET/CT Skull Base to Mid Thigh (01/25/2022 14:27)    ASSESSMENT:  This is a 72 y.o. male with:    *Small cell lung cancer originating in the right hilum:   · He has an extremely large mass there with concern for SVC compression on CT imaging from " 6/4/2019.  There is some left hilar adenopathy.  · Some parotid lesions which are concerning but indeterminate, left greater than right.  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  · Staging MRI of the brain 7/10/2019 negative for metastatic disease  · He was discussed at multidisciplinary thoracic conference, with his T4 and 3 disease, concern for involvement of the right supraclavicular region and obvious left hilum, plans to add atezolizumab with cycle 2, we can consider future radiation to the residual mediastinal mass if necessary.  · Lung lesion too large to radiate in spite of the SVC narrowing.    · On 7/8/2019 we started carboplatin AUC 5 and VP16 100 mg/m2 through peripheral IVs  · Cycle 2 postponed on 7/29/2019 due to heart rate of 154.  Atezolizumab added with cycle #2.  · Radiation can be added for a residual mediastinal mass if necessary  · CT imaging on 8/14/2019 after two cycles of therapy shows significant improvement.  · Mediport anticipated by Dr. Gallardo on 8/22/2019.  · On  8/20/2019 cycle 3 was delayed due to neutropenia and with cycle 3 we did incorporate Neulasta.  · Cycle 4 completed 9/17/2019  · Maintenance atezolizumab initiated 10/8/2019.  PET scan revealed disease response to therapy, he does have an area of hypermetabolic activity in the adrenal gland which is small and did not show up on prior exam.  We will continue to monitor this.  His MRI of the brain is also negative for metastatic disease.  · CT 2/4/2020 with ongoing improvement.  Maintenance atezolizumab pursued.    · CT imaging 5/19/2020 with progression.  Increase in the right hilar/mediastinal mass with enlarging adenopathy.    · Given that the progression is longer than 6 months after completing carbo/VP16, proceed with further carbo/VP16.  · Cycle 1 initiated  5/26/2020  · PET scan after two cycles with ongoing uptake in the right hilum, otherwise somewhat improved  · Radiation initiated 7/22/2020 with plans for 30 treatments  · Proceeded with cycle 4 carboplatin -16 7/28/2020.    · As of 8/19/2020, discussed holding further chemotherapy since he had 4 cycles versus continuing potentially with as many as 6.  He prefers to continue with therapy.    · Cycle 5 on 8/19/2020  · Radiation complete 9/1/2020  · As of 9/9/2020, plan no further chemotherapy  · MRI brain 11/4/2020 negative for mets.  · PET scan 11/4/2020. Improvement in the subcarinal and right hilar nodes with an SUV of 3.7 compared to 20.8 previously. Evidence for esophagitis. Right middle lobe changes resolved. Other stable changes that were previously noted. No new disease.     · MRI brain 1/28/2021 negative  · PCI pursued, complete from 2/16/2021 through 2/25/2021  · CT imaging 2/19/2021 with decrease in the primary tumor, two new small noncalcified nodules in the medial LLL     · CT imaging 4/13/2021 with subtle increase in left upper lobe and left perihilar nodules.  Right-sided subcarinal and infrahilar tumor unchanged.  Changes consistent with radiation pneumonitis.  · CT imaging 7/14/2021 with slight interval increase in size of 2 adjacent cavitary nodules in the medial left lower lobe.  Right infrahilar/subcarinal lesion stable.  · CT imaging 10/11/21 with enlarging right infrahilar mass with two enlarging left lower lobe cavitary lesions  · His case was presented at the multidisciplinary thoracic conference.  Plan observation of the lung lesions with serial CT scans.  If significant change, PET imaging and bronchoscopy.  · CT imaging 1/12/2022 with ongoing evolution of the left lower lobe cavitary lesions, some increase in size.  They have coalesced into one.  · PET scan on 1/25/2022 showed a hypermetabolic 3 x 2 cm cavitary nodule with no other evidence of disease.  · He did have a bronchoscopy  performed on 2/21/2022.  BAL cytology negative for malignant cells.  Brushings with rare atypical squamous cells, nondiagnostic for malignancy.  Pathology showed evidence for dysplastic squamous epithelium suspicious for squamous cell carcinoma.  PD-L1 60%.  · He is not on active therapy for his small cell lung cancer as we manage his squamous cell carcinoma as detailed below    *New squamous cell carcinoma of the left lower lobe  · Presented at thoracic conference. Plan concurrent chemoradiation with carbo/taxol  · 3/31/2022 week 4 concurrent chemoradiation with carboplatin Taxol  · Due today, 4/7/2022 for week 5 concurrent therapy which will be held due to platelets of 76,000    *Chronic hearing loss: History of left mastoiditis.  He is not a candidate for cisplatin because of this.     *Atrial fibrillation.  He was initiated on metoprolol 12.5 mg twice daily.  This did delay port placement.  The patient was taking his metoprolol inconsistently.  For cycle 2 was delayed due to heart rate of 150 7/29/2019.  He was seen by cardiology with instructions to increase his metoprolol to 25 mg twice daily.  Heart rate stable today.  He reports no symptoms.  See below.    *Tobacco use.  Continues to smoke a pack and a half a day    *History of SVC syndrome.  Improved after 2 cycles of chemotherapy.  No further evidence of this and this has resolved.    *Dyspnea on exertion: Related to COPD and ongoing lung damage from smoking.  He reports his shortness of breath is stable today    *Leg pain with walking: Some of this sounds like claudication but some of this sounds like orthopedic pain.  Symptoms remain stable.  Follow-up with primary care.    *Fatigue and tiredness: Suspect that whole brain radiation is contributing to this.  TSH and free T4 were normal.  Stable.    *Malfunctioning Mediport: He states that no blood can be obtained from the port.  Apparently the port flushes well.  Unchanged today and the port is accessed  for therapy.    *COVID-19 pandemic: Fully vaccinated with a booster.    *GERD  · The patient is having worsening regurgitation nightly, and occasional vomiting.  · Continue Protonix 40 mg daily    PLAN:   1. Hold cycle 5 carboplatin Taxol today due to thrombocytopenia, platelets of 76,000  2. The patient will stop at radiation when leaving today to determine further plan of care with radiation the rest of this week  3. Return Monday, 4/11/2022 for CBC with RN review, hopefully the patient can continue radiation all of next week.  4. Continue Protonix 40 mg daily,  5. Return in 1 week for CBC, CMP, MD follow-up with Dr. Morel and possible resumption of chemotherapy with carboplatin Taxol    He continues on high risk medication requiring close monitoring for toxicity    Danielle Murguia, APRN  04/07/2022

## 2022-04-08 ENCOUNTER — APPOINTMENT (OUTPATIENT)
Dept: RADIATION ONCOLOGY | Facility: HOSPITAL | Age: 72
End: 2022-04-08

## 2022-04-08 PROCEDURE — 77386: CPT | Performed by: RADIOLOGY

## 2022-04-08 PROCEDURE — 77427 RADIATION TX MANAGEMENT X5: CPT | Performed by: RADIOLOGY

## 2022-04-08 PROCEDURE — 77386 CHG INTENSITY MODULATED RADIATION TX DLVR COMPLEX: CPT | Performed by: RADIOLOGY

## 2022-04-11 ENCOUNTER — APPOINTMENT (OUTPATIENT)
Dept: RADIATION ONCOLOGY | Facility: HOSPITAL | Age: 72
End: 2022-04-11

## 2022-04-11 ENCOUNTER — CLINICAL SUPPORT (OUTPATIENT)
Dept: ONCOLOGY | Facility: HOSPITAL | Age: 72
End: 2022-04-11

## 2022-04-11 ENCOUNTER — LAB (OUTPATIENT)
Dept: LAB | Facility: HOSPITAL | Age: 72
End: 2022-04-11

## 2022-04-11 DIAGNOSIS — C80.1 SMALL CELL CARCINOMA: ICD-10-CM

## 2022-04-11 DIAGNOSIS — C34.11 PRIMARY MALIGNANT NEOPLASM OF RIGHT UPPER LOBE OF LUNG: ICD-10-CM

## 2022-04-11 DIAGNOSIS — C34.90 SMALL CELL LUNG CANCER: ICD-10-CM

## 2022-04-11 DIAGNOSIS — R53.83 OTHER FATIGUE: ICD-10-CM

## 2022-04-11 LAB
BASOPHILS # BLD AUTO: 0.06 10*3/MM3 (ref 0–0.2)
BASOPHILS NFR BLD AUTO: 1.5 % (ref 0–1.5)
DEPRECATED RDW RBC AUTO: 47.8 FL (ref 37–54)
EOSINOPHIL # BLD AUTO: 0.06 10*3/MM3 (ref 0–0.4)
EOSINOPHIL NFR BLD AUTO: 1.5 % (ref 0.3–6.2)
ERYTHROCYTE [DISTWIDTH] IN BLOOD BY AUTOMATED COUNT: 15 % (ref 12.3–15.4)
HCT VFR BLD AUTO: 35.7 % (ref 37.5–51)
HGB BLD-MCNC: 12.6 G/DL (ref 13–17.7)
IMM GRANULOCYTES # BLD AUTO: 0.02 10*3/MM3 (ref 0–0.05)
IMM GRANULOCYTES NFR BLD AUTO: 0.5 % (ref 0–0.5)
LYMPHOCYTES # BLD AUTO: 0.52 10*3/MM3 (ref 0.7–3.1)
LYMPHOCYTES NFR BLD AUTO: 12.9 % (ref 19.6–45.3)
MCH RBC QN AUTO: 33.6 PG (ref 26.6–33)
MCHC RBC AUTO-ENTMCNC: 35.3 G/DL (ref 31.5–35.7)
MCV RBC AUTO: 95.2 FL (ref 79–97)
MONOCYTES # BLD AUTO: 0.87 10*3/MM3 (ref 0.1–0.9)
MONOCYTES NFR BLD AUTO: 21.6 % (ref 5–12)
NEUTROPHILS NFR BLD AUTO: 2.49 10*3/MM3 (ref 1.7–7)
NEUTROPHILS NFR BLD AUTO: 62 % (ref 42.7–76)
NRBC BLD AUTO-RTO: 0 /100 WBC (ref 0–0.2)
PLATELET # BLD AUTO: 78 10*3/MM3 (ref 140–450)
PMV BLD AUTO: 10.4 FL (ref 6–12)
RBC # BLD AUTO: 3.75 10*6/MM3 (ref 4.14–5.8)
WBC NRBC COR # BLD: 4.02 10*3/MM3 (ref 3.4–10.8)

## 2022-04-11 PROCEDURE — G0463 HOSPITAL OUTPT CLINIC VISIT: HCPCS

## 2022-04-11 PROCEDURE — 36415 COLL VENOUS BLD VENIPUNCTURE: CPT

## 2022-04-11 PROCEDURE — 77386 CHG INTENSITY MODULATED RADIATION TX DLVR COMPLEX: CPT | Performed by: RADIOLOGY

## 2022-04-11 PROCEDURE — 85025 COMPLETE CBC W/AUTO DIFF WBC: CPT

## 2022-04-11 PROCEDURE — 77386: CPT | Performed by: RADIOLOGY

## 2022-04-11 PROCEDURE — 77014 CHG CT GUIDANCE RADIATION THERAPY FLDS PLACEMENT: CPT | Performed by: RADIOLOGY

## 2022-04-11 NOTE — NURSING NOTE
Pt here for CBC and review. CBC is stable. WNC and ANC are WNL. Platelets 78 today. No complaints or concerns voiced. Pt back on 4/14 for follow up with Dr. Morel and possible treatment. Copy of labs provided.    Lab Results   Component Value Date    WBC 4.02 04/11/2022    HGB 12.6 (L) 04/11/2022    HCT 35.7 (L) 04/11/2022    MCV 95.2 04/11/2022    PLT 78 (L) 04/11/2022

## 2022-04-12 ENCOUNTER — RADIATION ONCOLOGY WEEKLY ASSESSMENT (OUTPATIENT)
Dept: RADIATION ONCOLOGY | Facility: HOSPITAL | Age: 72
End: 2022-04-12
Payer: MEDICARE

## 2022-04-12 ENCOUNTER — APPOINTMENT (OUTPATIENT)
Dept: RADIATION ONCOLOGY | Facility: HOSPITAL | Age: 72
End: 2022-04-12

## 2022-04-12 VITALS — BODY MASS INDEX: 28.14 KG/M2 | WEIGHT: 190 LBS

## 2022-04-12 DIAGNOSIS — C34.90 NON-SMALL CELL LUNG CANCER, UNSPECIFIED LATERALITY: Primary | ICD-10-CM

## 2022-04-12 PROCEDURE — 77336 RADIATION PHYSICS CONSULT: CPT | Performed by: RADIOLOGY

## 2022-04-12 PROCEDURE — 77386: CPT | Performed by: RADIOLOGY

## 2022-04-12 PROCEDURE — 77014 CHG CT GUIDANCE RADIATION THERAPY FLDS PLACEMENT: CPT | Performed by: RADIOLOGY

## 2022-04-12 PROCEDURE — 77386 CHG INTENSITY MODULATED RADIATION TX DLVR COMPLEX: CPT | Performed by: RADIOLOGY

## 2022-04-13 ENCOUNTER — APPOINTMENT (OUTPATIENT)
Dept: RADIATION ONCOLOGY | Facility: HOSPITAL | Age: 72
End: 2022-04-13

## 2022-04-13 PROCEDURE — 77386: CPT | Performed by: RADIOLOGY

## 2022-04-13 PROCEDURE — 77386 CHG INTENSITY MODULATED RADIATION TX DLVR COMPLEX: CPT | Performed by: RADIOLOGY

## 2022-04-13 PROCEDURE — 77014 CHG CT GUIDANCE RADIATION THERAPY FLDS PLACEMENT: CPT | Performed by: RADIOLOGY

## 2022-04-13 NOTE — PROGRESS NOTES
"REASON FOR FOLLOW UP:   1.  T4N3 Small cell lung cancer  2.  SVC syndrome  3.  Initially treated with chemotherapy.  Subsequent radiation.  Subsequent PCI. ISAIAS  4. 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 male with the above mentioned history, returns the office today in anticipation of his fifth week of weekly carboplatin and paclitaxel.  His energy level remains poor but stable at baseline.  Appetite is adequate.  No obvious adverse effects from the chemotherapy.    Review of Systems   See the HPI    Vitals:    04/14/22 0920   BP: 133/67   Pulse: 97   Resp: 16   Temp: 96.6 °F (35.9 °C)   TempSrc: Temporal   SpO2: 96%   Weight: 86.2 kg (190 lb 1.6 oz)   Height: 175 cm (68.9\")   PainSc: 0-No pain  Comment: lung cancer        General:  No acute distress, awake, alert and oriented.  Chronically ill-appearing.  Strong odor of smoke present again today.  Skin:  Warm and dry, no visible rash, large bruise on the right forearm  HEENT:  Normocephalic/atraumatic.  Wearing a face mask.  Chest:  Normal respiratory effort.  Benign-appearing Mediport which is accessed today.  Lungs clear to auscultation bilaterally.  Heart: Regular rate and rhythm  Extremities:  No visible clubbing, cyanosis, or edema  Neuro/psych:  Grossly nonfocal.  Normal mood and affect.    DIAGNOSTIC DATA:  CBC & Differential (04/14/2022 09:20)      IMAGING:  None reviewed    ASSESSMENT:  This is a 72 y.o. male with:    *Small cell lung cancer originating in the right hilum:   · He has an extremely large mass there with concern for SVC compression on CT imaging from 6/4/2019.  There is some left hilar adenopathy.  · Some parotid lesions which are concerning but indeterminate, left greater than right.  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  · Staging MRI of the brain 7/10/2019 negative for metastatic disease  · He was discussed at multidisciplinary thoracic conference, with his T4 and 3 disease, concern for involvement of the right supraclavicular region and obvious left hilum, plans to add atezolizumab with cycle 2, we can consider future radiation to the residual mediastinal mass if necessary.  · Lung lesion too large to radiate in spite of the SVC narrowing.    · On 7/8/2019 we started carboplatin AUC 5 and VP16 100 mg/m2 through peripheral IVs  · Cycle 2 postponed on 7/29/2019 due to heart rate of 154.  Atezolizumab added with cycle #2.  · Radiation can be added for a residual mediastinal mass if necessary  · CT imaging on 8/14/2019 after two cycles of therapy shows significant improvement.  · Mediport anticipated by Dr. Gallardo on 8/22/2019.  · On  8/20/2019 cycle 3 was delayed due to neutropenia and with cycle 3 we did incorporate Neulasta.  · Cycle 4 completed 9/17/2019  · Maintenance atezolizumab initiated 10/8/2019.  PET scan revealed disease response to therapy, he does have an area of hypermetabolic activity in the adrenal gland which is small and did not show up on prior exam.  We will continue to monitor this.  His MRI of the brain is also negative for metastatic disease.  · CT 2/4/2020 with ongoing improvement.  Maintenance atezolizumab pursued.    · CT imaging 5/19/2020 with progression.  Increase in the right hilar/mediastinal mass with enlarging adenopathy.    · Given that the progression is longer than 6 months after completing carbo/VP16, proceed with further carbo/VP16.  · Cycle 1 initiated 5/26/2020  · PET scan after two cycles with ongoing uptake in the right hilum, otherwise somewhat improved  · Radiation initiated 7/22/2020 with plans for 30 treatments  · Proceeded with cycle 4 carboplatin -16 7/28/2020.    · As of 8/19/2020, discussed holding further chemotherapy since he had 4 cycles versus  continuing potentially with as many as 6.  He prefers to continue with therapy.    · Cycle 5 on 8/19/2020  · Radiation complete 9/1/2020  · As of 9/9/2020, plan no further chemotherapy  · MRI brain 11/4/2020 negative for mets.  · PET scan 11/4/2020. Improvement in the subcarinal and right hilar nodes with an SUV of 3.7 compared to 20.8 previously. Evidence for esophagitis. Right middle lobe changes resolved. Other stable changes that were previously noted. No new disease.     · MRI brain 1/28/2021 negative  · PCI pursued, complete from 2/16/2021 through 2/25/2021  · CT imaging 2/19/2021 with decrease in the primary tumor, two new small noncalcified nodules in the medial LLL     · CT imaging 4/13/2021 with subtle increase in left upper lobe and left perihilar nodules.  Right-sided subcarinal and infrahilar tumor unchanged.  Changes consistent with radiation pneumonitis.  · CT imaging 7/14/2021 with slight interval increase in size of 2 adjacent cavitary nodules in the medial left lower lobe.  Right infrahilar/subcarinal lesion stable.  · CT imaging 10/11/21 with enlarging right infrahilar mass with two enlarging left lower lobe cavitary lesions  · His case was presented at the multidisciplinary thoracic conference.  Plan observation of the lung lesions with serial CT scans.  If significant change, PET imaging and bronchoscopy.  · CT imaging 1/12/2022 with ongoing evolution of the left lower lobe cavitary lesions, some increase in size.  They have coalesced into one.  · PET scan on 1/25/2022 showed a hypermetabolic 3 x 2 cm cavitary nodule with no other evidence of disease.  · He did have a bronchoscopy performed on 2/21/2022.  BAL cytology negative for malignant cells.  Brushings with rare atypical squamous cells, nondiagnostic for malignancy.  Pathology showed evidence for dysplastic squamous epithelium suspicious for squamous cell carcinoma.  PD-L1 60%.  · He is not on active therapy for his small cell lung cancer  as we manage his squamous cell carcinoma as detailed below    *New squamous cell carcinoma of the left lower lobe  · Presented at thoracic conference. Plan concurrent chemoradiation with carbo/taxol  · 3/31/2022 week 4 concurrent chemoradiation with carboplatin Taxol  · 4/7/2022 week 5 concurrent therapy which will be held due to platelets of 76,000  · 4/14/2022: CBC remained stable.  Platelets stable at 78,000.  Proceed with cycle 5 carboplatin and paclitaxel.  Radiation will be complete as of 4/20.    *Chronic hearing loss: History of left mastoiditis.  He is not a candidate for cisplatin because of this.     *Atrial fibrillation.  He was initiated on metoprolol 12.5 mg twice daily.  This did delay port placement.  The patient was taking his metoprolol inconsistently.  For cycle 2 was delayed due to heart rate of 150 7/29/2019.  He was seen by cardiology with instructions to increase his metoprolol to 25 mg twice daily.  Heart rate stable today.  He reports no symptoms.  See below.    *Tobacco use.  Continues to smoke a pack and a half a day    *History of SVC syndrome.  Improved after 2 cycles of chemotherapy.  No further evidence of this and this has resolved.    *Dyspnea on exertion: Related to COPD and ongoing lung damage from smoking.  He reports his shortness of breath is stable today    *Leg pain with walking: Some of this sounds like claudication but some of this sounds like orthopedic pain.  Symptoms remain stable.  Follow-up with primary care.    *Fatigue and tiredness: Suspect that whole brain radiation is contributing to this.  TSH and free T4 were normal.  Stable.    *Malfunctioning Mediport: He states that no blood can be obtained from the port.  Apparently the port flushes well.  Unchanged today and the port is accessed for therapy.    *COVID-19 pandemic: Fully vaccinated with a booster.  Could get 1/4 injection.    *GERD  · The patient is having worsening regurgitation nightly, and occasional  vomiting.  · Continue Protonix 40 mg daily    PLAN:   1. Cycle 5 carbo/taxol today at the same doses in spite of the platelets of 78,000.  This will be his final cycle of therapy.  He will complete radiation as of 4/20.  2. Continue Protonix 40 mg daily,  3. PET scan around the end of June with labs and a port flush and I will see him back after that for follow-up to assess his response to therapy.    He continues on high risk medication requiring close monitoring for toxicity    Thai Morel MD  04/14/2022

## 2022-04-14 ENCOUNTER — INFUSION (OUTPATIENT)
Dept: ONCOLOGY | Facility: HOSPITAL | Age: 72
End: 2022-04-14

## 2022-04-14 ENCOUNTER — OFFICE VISIT (OUTPATIENT)
Dept: ONCOLOGY | Facility: CLINIC | Age: 72
End: 2022-04-14

## 2022-04-14 ENCOUNTER — APPOINTMENT (OUTPATIENT)
Dept: RADIATION ONCOLOGY | Facility: HOSPITAL | Age: 72
End: 2022-04-14

## 2022-04-14 VITALS
OXYGEN SATURATION: 96 % | RESPIRATION RATE: 16 BRPM | TEMPERATURE: 96.6 F | DIASTOLIC BLOOD PRESSURE: 67 MMHG | SYSTOLIC BLOOD PRESSURE: 133 MMHG | HEART RATE: 97 BPM | WEIGHT: 190.1 LBS | HEIGHT: 69 IN | BODY MASS INDEX: 28.16 KG/M2

## 2022-04-14 DIAGNOSIS — Z79.899 HIGH RISK MEDICATION USE: Primary | ICD-10-CM

## 2022-04-14 DIAGNOSIS — C34.90 SMALL CELL LUNG CANCER: Primary | ICD-10-CM

## 2022-04-14 DIAGNOSIS — C34.90 SMALL CELL LUNG CANCER: ICD-10-CM

## 2022-04-14 DIAGNOSIS — C34.11 PRIMARY MALIGNANT NEOPLASM OF RIGHT UPPER LOBE OF LUNG: Primary | ICD-10-CM

## 2022-04-14 LAB
ALBUMIN SERPL-MCNC: 3.7 G/DL (ref 3.5–5.2)
ALBUMIN/GLOB SERPL: 1.2 G/DL (ref 1.1–2.4)
ALP SERPL-CCNC: 80 U/L (ref 38–116)
ALT SERPL W P-5'-P-CCNC: 21 U/L (ref 0–41)
ANION GAP SERPL CALCULATED.3IONS-SCNC: 7.9 MMOL/L (ref 5–15)
AST SERPL-CCNC: 19 U/L (ref 0–40)
BASOPHILS # BLD AUTO: 0.04 10*3/MM3 (ref 0–0.2)
BASOPHILS NFR BLD AUTO: 1.2 % (ref 0–1.5)
BILIRUB SERPL-MCNC: 0.5 MG/DL (ref 0.2–1.2)
BUN SERPL-MCNC: 9 MG/DL (ref 6–20)
BUN/CREAT SERPL: 8.9 (ref 7.3–30)
CALCIUM SPEC-SCNC: 9.3 MG/DL (ref 8.5–10.2)
CHLORIDE SERPL-SCNC: 103 MMOL/L (ref 98–107)
CO2 SERPL-SCNC: 26.1 MMOL/L (ref 22–29)
CREAT SERPL-MCNC: 1.01 MG/DL (ref 0.7–1.3)
DEPRECATED RDW RBC AUTO: 51.6 FL (ref 37–54)
EGFRCR SERPLBLD CKD-EPI 2021: 80 ML/MIN/1.73
EOSINOPHIL # BLD AUTO: 0.09 10*3/MM3 (ref 0–0.4)
EOSINOPHIL NFR BLD AUTO: 2.7 % (ref 0.3–6.2)
ERYTHROCYTE [DISTWIDTH] IN BLOOD BY AUTOMATED COUNT: 15.2 % (ref 12.3–15.4)
GLOBULIN UR ELPH-MCNC: 3 GM/DL (ref 1.8–3.5)
GLUCOSE SERPL-MCNC: 135 MG/DL (ref 74–124)
HCT VFR BLD AUTO: 36.2 % (ref 37.5–51)
HGB BLD-MCNC: 12.3 G/DL (ref 13–17.7)
IMM GRANULOCYTES # BLD AUTO: 0.01 10*3/MM3 (ref 0–0.05)
IMM GRANULOCYTES NFR BLD AUTO: 0.3 % (ref 0–0.5)
LYMPHOCYTES # BLD AUTO: 0.62 10*3/MM3 (ref 0.7–3.1)
LYMPHOCYTES NFR BLD AUTO: 18.8 % (ref 19.6–45.3)
MCH RBC QN AUTO: 33.4 PG (ref 26.6–33)
MCHC RBC AUTO-ENTMCNC: 34 G/DL (ref 31.5–35.7)
MCV RBC AUTO: 98.4 FL (ref 79–97)
MONOCYTES # BLD AUTO: 0.43 10*3/MM3 (ref 0.1–0.9)
MONOCYTES NFR BLD AUTO: 13.1 % (ref 5–12)
NEUTROPHILS NFR BLD AUTO: 2.1 10*3/MM3 (ref 1.7–7)
NEUTROPHILS NFR BLD AUTO: 63.9 % (ref 42.7–76)
NRBC BLD AUTO-RTO: 0 /100 WBC (ref 0–0.2)
PLATELET # BLD AUTO: 78 10*3/MM3 (ref 140–450)
PMV BLD AUTO: 9.4 FL (ref 6–12)
POTASSIUM SERPL-SCNC: 4.2 MMOL/L (ref 3.5–4.7)
PROT SERPL-MCNC: 6.7 G/DL (ref 6.3–8)
RBC # BLD AUTO: 3.68 10*6/MM3 (ref 4.14–5.8)
SODIUM SERPL-SCNC: 137 MMOL/L (ref 134–145)
WBC NRBC COR # BLD: 3.29 10*3/MM3 (ref 3.4–10.8)

## 2022-04-14 PROCEDURE — 25010000002 PACLITAXEL PER 1 MG: Performed by: INTERNAL MEDICINE

## 2022-04-14 PROCEDURE — 80053 COMPREHEN METABOLIC PANEL: CPT

## 2022-04-14 PROCEDURE — 77014 CHG CT GUIDANCE RADIATION THERAPY FLDS PLACEMENT: CPT | Performed by: RADIOLOGY

## 2022-04-14 PROCEDURE — 96413 CHEMO IV INFUSION 1 HR: CPT

## 2022-04-14 PROCEDURE — 77386 CHG INTENSITY MODULATED RADIATION TX DLVR COMPLEX: CPT | Performed by: RADIOLOGY

## 2022-04-14 PROCEDURE — 77386: CPT | Performed by: RADIOLOGY

## 2022-04-14 PROCEDURE — 25010000002 DIPHENHYDRAMINE PER 50 MG: Performed by: INTERNAL MEDICINE

## 2022-04-14 PROCEDURE — 25010000002 PALONOSETRON PER 25 MCG: Performed by: INTERNAL MEDICINE

## 2022-04-14 PROCEDURE — 25010000002 DEXAMETHASONE SODIUM PHOSPHATE 100 MG/10ML SOLUTION: Performed by: INTERNAL MEDICINE

## 2022-04-14 PROCEDURE — 85025 COMPLETE CBC W/AUTO DIFF WBC: CPT

## 2022-04-14 PROCEDURE — 99214 OFFICE O/P EST MOD 30 MIN: CPT | Performed by: INTERNAL MEDICINE

## 2022-04-14 PROCEDURE — 96415 CHEMO IV INFUSION ADDL HR: CPT

## 2022-04-14 PROCEDURE — 25010000002 CARBOPLATIN PER 50 MG: Performed by: INTERNAL MEDICINE

## 2022-04-14 PROCEDURE — 96375 TX/PRO/DX INJ NEW DRUG ADDON: CPT

## 2022-04-14 PROCEDURE — 96417 CHEMO IV INFUS EACH ADDL SEQ: CPT

## 2022-04-14 RX ORDER — DIPHENHYDRAMINE HYDROCHLORIDE 50 MG/ML
50 INJECTION INTRAMUSCULAR; INTRAVENOUS AS NEEDED
Status: CANCELLED | OUTPATIENT
Start: 2022-04-14

## 2022-04-14 RX ORDER — SODIUM CHLORIDE 9 MG/ML
250 INJECTION, SOLUTION INTRAVENOUS ONCE
Status: COMPLETED | OUTPATIENT
Start: 2022-04-14 | End: 2022-04-14

## 2022-04-14 RX ORDER — PALONOSETRON 0.05 MG/ML
0.25 INJECTION, SOLUTION INTRAVENOUS ONCE
Status: CANCELLED | OUTPATIENT
Start: 2022-04-14

## 2022-04-14 RX ORDER — PALONOSETRON 0.05 MG/ML
0.25 INJECTION, SOLUTION INTRAVENOUS ONCE
Status: COMPLETED | OUTPATIENT
Start: 2022-04-14 | End: 2022-04-14

## 2022-04-14 RX ORDER — FAMOTIDINE 10 MG/ML
20 INJECTION, SOLUTION INTRAVENOUS AS NEEDED
Status: CANCELLED | OUTPATIENT
Start: 2022-04-14

## 2022-04-14 RX ORDER — SODIUM CHLORIDE 9 MG/ML
250 INJECTION, SOLUTION INTRAVENOUS ONCE
Status: CANCELLED | OUTPATIENT
Start: 2022-04-14

## 2022-04-14 RX ORDER — FAMOTIDINE 10 MG/ML
20 INJECTION, SOLUTION INTRAVENOUS ONCE
Status: CANCELLED | OUTPATIENT
Start: 2022-04-14

## 2022-04-14 RX ORDER — FAMOTIDINE 10 MG/ML
20 INJECTION, SOLUTION INTRAVENOUS ONCE
Status: COMPLETED | OUTPATIENT
Start: 2022-04-14 | End: 2022-04-14

## 2022-04-14 RX ADMIN — FAMOTIDINE 20 MG: 10 INJECTION INTRAVENOUS at 10:31

## 2022-04-14 RX ADMIN — PALONOSETRON HYDROCHLORIDE 0.25 MG: 0.25 INJECTION, SOLUTION INTRAVENOUS at 10:32

## 2022-04-14 RX ADMIN — DIPHENHYDRAMINE HYDROCHLORIDE 25 MG: 50 INJECTION, SOLUTION INTRAMUSCULAR; INTRAVENOUS at 10:34

## 2022-04-14 RX ADMIN — PACLITAXEL 100 MG: 6 INJECTION, SOLUTION INTRAVENOUS at 11:47

## 2022-04-14 RX ADMIN — DEXAMETHASONE SODIUM PHOSPHATE 12 MG: 10 INJECTION, SOLUTION INTRAMUSCULAR; INTRAVENOUS at 10:54

## 2022-04-14 RX ADMIN — CARBOPLATIN 220 MG: 10 INJECTION INTRAVENOUS at 12:48

## 2022-04-14 RX ADMIN — SODIUM CHLORIDE 250 ML: 9 INJECTION, SOLUTION INTRAVENOUS at 10:30

## 2022-04-14 NOTE — NURSING NOTE
Pt here for C5D1 Carbo/Taxol tx. Pt's platelets 78 - Okay to treat per Dr. Morel.   Pt declined wearing cold mitts to hands and feet during Taxol administration.

## 2022-04-15 PROCEDURE — 77386: CPT | Performed by: RADIOLOGY

## 2022-04-15 PROCEDURE — 77014 CHG CT GUIDANCE RADIATION THERAPY FLDS PLACEMENT: CPT | Performed by: RADIOLOGY

## 2022-04-18 PROCEDURE — 77014 CHG CT GUIDANCE RADIATION THERAPY FLDS PLACEMENT: CPT | Performed by: RADIOLOGY

## 2022-04-18 PROCEDURE — 77386: CPT | Performed by: RADIOLOGY

## 2022-04-19 ENCOUNTER — APPOINTMENT (OUTPATIENT)
Dept: RADIATION ONCOLOGY | Facility: HOSPITAL | Age: 72
End: 2022-04-19

## 2022-04-19 ENCOUNTER — RADIATION ONCOLOGY WEEKLY ASSESSMENT (OUTPATIENT)
Dept: RADIATION ONCOLOGY | Facility: HOSPITAL | Age: 72
End: 2022-04-19

## 2022-04-19 VITALS
SYSTOLIC BLOOD PRESSURE: 128 MMHG | OXYGEN SATURATION: 97 % | WEIGHT: 190 LBS | DIASTOLIC BLOOD PRESSURE: 79 MMHG | BODY MASS INDEX: 28.14 KG/M2 | HEART RATE: 78 BPM

## 2022-04-19 DIAGNOSIS — H57.12 LEFT EYE PAIN: ICD-10-CM

## 2022-04-19 DIAGNOSIS — C34.92 NON-SMALL CELL CANCER OF LEFT LUNG: ICD-10-CM

## 2022-04-19 DIAGNOSIS — C34.92 NON-SMALL CELL CANCER OF LEFT LUNG: Primary | ICD-10-CM

## 2022-04-19 DIAGNOSIS — C34.90 SMALL CELL LUNG CANCER: Primary | ICD-10-CM

## 2022-04-19 PROCEDURE — 77386: CPT | Performed by: RADIOLOGY

## 2022-04-19 PROCEDURE — 77336 RADIATION PHYSICS CONSULT: CPT | Performed by: RADIOLOGY

## 2022-04-19 PROCEDURE — 77014 CHG CT GUIDANCE RADIATION THERAPY FLDS PLACEMENT: CPT | Performed by: RADIOLOGY

## 2022-04-19 NOTE — PROGRESS NOTES
Physician Weekly Management Note    Diagnosis:     Diagnosis Plan   1. Non-small cell cancer of left lung (HCC)         RT Details:  fx 28/30 left lung 56Gy    Notes on Treatment course, Films, Medical progress:  He reports pain in his right eye for the past and headaches, denies nausea or vomiting, denies increased soa, I will order mri brain and orbits to rule out mets,     Weekly Management:  Medication reviewed?   Yes  New medications given?   No  Problemlist reviewed?   Yes  Problem added?   No  Issues raised requiring referral to support services - task assigned to:  na    Technical aspects reviewed:  Weekly OBI approved?   Yes  Weekly port films approved?   Yes  Change requests noted on port film?   No  Patient setup and plan reviewed?   Yes    Chart Reviewed:  Continue current treatment plan?   Yes  Treatment plan change requested?   No  CBC reviewed?   Yes  Concurrent Chemo?   Yes    Objective     Toxicities:   fatigue     Review of Systems   Constitutional: Positive for fatigue.   Eyes: Positive for pain and visual disturbance.   Respiratory: Negative.    Neurological: Positive for headaches.          Vitals:    04/19/22 1517   BP: 128/79   Pulse: 78   SpO2: 97%       Current Status 3/31/2022   ECOG score 0       Physical Exam  Constitutional:       Appearance: Normal appearance.   Eyes:      General: No scleral icterus.     Extraocular Movements: Extraocular movements intact.      Conjunctiva/sclera: Conjunctivae normal.      Pupils: Pupils are equal, round, and reactive to light.   Pulmonary:      Effort: Pulmonary effort is normal. No respiratory distress.   Musculoskeletal:      Cervical back: Normal range of motion.   Neurological:      Mental Status: He is alert.             Problem Summary List    Diagnosis:     Diagnosis Plan   1. Non-small cell cancer of left lung (HCC)       Pathology:   Non small cell lung cancer    Past Medical History:   Diagnosis Date   • Atrial fibrillation with RVR (HCC)     • Cancer (HCC) 06/2019    Right lung   • Chronic cough    • COPD (chronic obstructive pulmonary disease) (HCC)    • Hearing loss    • History of shingles    • Lower back pain    • Lung cancer (HCC)    • Mastoiditis    • Mild mitral regurgitation    • Other fatigue    • PAF (paroxysmal atrial fibrillation) (HCC)    • Pneumonia          Past Surgical History:   Procedure Laterality Date   • BRONCHOSCOPY N/A 6/19/2019    Procedure: BRONCHOSCOPY WITH WASHINGS AND BIOPSY AND ENDOBRONCHIAL ULTRASOUND WITH FNA;  Surgeon: Arslan Marquez MD;  Location: [facility] ENDOSCOPY;  Service: Pulmonary   • BRONCHOSCOPY N/A 2/21/2022    Procedure: BRONCHOSCOPY WITH FLUORO, BAL, BRUSHINGS, AND BIOPSIES LLL;  Surgeon: Arslan Marquez MD;  Location: [facility] ENDOSCOPY;  Service: Pulmonary;  Laterality: N/A;  PRE: LUNG MASS  POST: SAME   • INNER EAR SURGERY Left    • MASTOIDECTOMY Left 1994   • VENOUS ACCESS DEVICE (PORT) INSERTION Right 8/22/2019    Procedure: INSERTION OF PORTACATH;  Surgeon: Ramila Gallardo MD;  Location: [facility] OR;  Service: Cardiothoracic         Current Outpatient Medications on File Prior to Visit   Medication Sig Dispense Refill   • aspirin 81 MG tablet Take 1 tablet by mouth Daily. 30 tablet 11   • metoprolol tartrate (LOPRESSOR) 25 MG tablet TAKE 1 TABLET BY MOUTH TWO TIMES A  tablet 2   • pantoprazole (PROTONIX) 40 MG EC tablet Take 1 tablet by mouth Daily. 30 tablet 2     No current facility-administered medications on file prior to visit.       No Known Allergies      Referring Provider:    No referring provider defined for this encounter.    Oncologist:  No primary care provider on file.      Seen and approved by:  Yessenia Elizabeth MD  04/19/2022

## 2022-04-20 ENCOUNTER — DOCUMENTATION (OUTPATIENT)
Dept: RADIATION ONCOLOGY | Facility: HOSPITAL | Age: 72
End: 2022-04-20

## 2022-04-20 ENCOUNTER — APPOINTMENT (OUTPATIENT)
Dept: RADIATION ONCOLOGY | Facility: HOSPITAL | Age: 72
End: 2022-04-20

## 2022-04-20 PROCEDURE — 77014 CHG CT GUIDANCE RADIATION THERAPY FLDS PLACEMENT: CPT | Performed by: RADIOLOGY

## 2022-04-20 PROCEDURE — 77386: CPT | Performed by: RADIOLOGY

## 2022-04-22 ENCOUNTER — HOSPITAL ENCOUNTER (OUTPATIENT)
Dept: MRI IMAGING | Facility: HOSPITAL | Age: 72
Discharge: HOME | End: 2022-04-22

## 2022-04-22 DIAGNOSIS — H57.12 LEFT EYE PAIN: ICD-10-CM

## 2022-04-22 DIAGNOSIS — C34.92 NON-SMALL CELL CANCER OF LEFT LUNG: ICD-10-CM

## 2022-04-22 DIAGNOSIS — C34.90 SMALL CELL LUNG CANCER: ICD-10-CM

## 2022-04-22 LAB — CREAT BLDA-MCNC: 1 MG/DL (ref 0.6–1.3)

## 2022-04-22 PROCEDURE — 0 GADOBENATE DIMEGLUMINE 529 MG/ML SOLUTION: Performed by: RADIOLOGY

## 2022-04-22 PROCEDURE — A9577 INJ MULTIHANCE: HCPCS | Performed by: RADIOLOGY

## 2022-04-22 PROCEDURE — 70553 MRI BRAIN STEM W/O & W/DYE: CPT

## 2022-04-22 PROCEDURE — 82565 ASSAY OF CREATININE: CPT

## 2022-04-22 PROCEDURE — 70543 MRI ORBT/FAC/NCK W/O &W/DYE: CPT

## 2022-04-22 PROCEDURE — 25010000002 HEPARIN LOCK FLUSH PER 10 UNITS: Performed by: RADIOLOGY

## 2022-04-22 RX ORDER — SODIUM CHLORIDE 0.9 % (FLUSH) 0.9 %
10 SYRINGE (ML) INJECTION AS NEEDED
Status: DISCONTINUED | OUTPATIENT
Start: 2022-04-22 | End: 2022-04-23 | Stop reason: HOSPADM

## 2022-04-22 RX ORDER — HEPARIN SODIUM (PORCINE) LOCK FLUSH IV SOLN 100 UNIT/ML 100 UNIT/ML
500 SOLUTION INTRAVENOUS ONCE
Status: COMPLETED | OUTPATIENT
Start: 2022-04-22 | End: 2022-04-22

## 2022-04-22 RX ADMIN — Medication 10 ML: at 15:26

## 2022-04-22 RX ADMIN — Medication 500 UNITS: at 15:25

## 2022-04-22 RX ADMIN — GADOBENATE DIMEGLUMINE 17 ML: 529 INJECTION, SOLUTION INTRAVENOUS at 14:36

## 2022-04-29 ENCOUNTER — TELEPHONE (OUTPATIENT)
Dept: OTHER | Facility: HOSPITAL | Age: 72
End: 2022-04-29

## 2022-04-29 NOTE — TELEPHONE ENCOUNTER
[facility]  SURVIVORSHIP SERVICES CARE COORDINATION NOTE  Survivorship Treatment Summary Referral Scheduling  PHONE      Call placed to patient RE: open referral to survivorship treatment summary visit.    Left voice mail for patient.     Introduced myself and reviewed purpose and goals of survivorship treatment summary visit as well as what to expect..    Patient mailed name and contact information for Yessenia Lopez DNP, APRN.    Patient encouraged to call the office at any point for additional information, resources or support.

## 2022-05-24 ENCOUNTER — OFFICE VISIT (OUTPATIENT)
Dept: RADIATION ONCOLOGY | Facility: HOSPITAL | Age: 72
End: 2022-05-24

## 2022-05-24 VITALS
RESPIRATION RATE: 18 BRPM | OXYGEN SATURATION: 98 % | HEART RATE: 96 BPM | TEMPERATURE: 97.9 F | SYSTOLIC BLOOD PRESSURE: 132 MMHG | WEIGHT: 190.2 LBS | DIASTOLIC BLOOD PRESSURE: 76 MMHG | BODY MASS INDEX: 28.17 KG/M2

## 2022-05-24 DIAGNOSIS — C34.92 NON-SMALL CELL CANCER OF LEFT LUNG: ICD-10-CM

## 2022-05-24 DIAGNOSIS — C80.1 SMALL CELL CARCINOMA: Primary | ICD-10-CM

## 2022-05-24 DIAGNOSIS — C34.11 PRIMARY MALIGNANT NEOPLASM OF RIGHT UPPER LOBE OF LUNG: ICD-10-CM

## 2022-05-24 PROCEDURE — 99212-NC PR NO CHARGE CBC OFFICE OUTPATIENT VISIT 10 MINUTES: Performed by: RADIOLOGY

## 2022-05-24 NOTE — PROGRESS NOTES
Subjective     No ref. provider found    Cancer Staging  Stage IA3 (cT1c, cN0, cM0)  No stage assigned    cT4   No chief complaint on file.   4 week follow up                              cT1cN0 squamous cell carcinoma of left lung    Dear Thai Morel MD     I had the pleasure of seeing Julia Smith  [facility] .   The patient is a 71 y.o. male with a history of small cell lung cancer treated with chemotherapy followed by radiation to the mediastinum/ right hilum with Dr. Giang.  He completed a dose of 60Gy on 9/1/2020 and then completed PCI in February 2021.  He has been followed with serial CT scan and most recently had a CT chest, abdomen and pelvis on 1/12/22 which showed 2 perihilar left lower lobe cavitary nodules now coalescing into a single mass measuring 3 x 2cm and ill defined right hilar density with mixed change, the inferior portion is smaller with little change in the transverse measurement with abrupt truncation of right middle lobe bronchus.      He then had a PET scan on 1/25/22 which showed activity in left lower lobe bronchovascular nodule measuring 3 x 2 cm with max suv of 15 and low level activity in the right hilar soft tissue density with max suv of 3.7 unchanged and no evidence of distant metastatic disease with stability of bilateral parotid nodules.      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  His case was presented in the multidisciplinary lung conference this morning with consensus recommendation for concurrent chemo/radiation.  The pathology was reviewed in conference and the specimen was felt to be consistent with squamous cell carcinoma.     He continues to smoke one pack per day.  He reports chronic soa and cough.  He denies any recent weight loss.      Interval hx 5/24/22:  He completed concurrent chemo/radiation on 4/20/22.  He received a dose of 60Gy in 30  "fractions with concurrent weekly carbo/taxol.                I.      Review of Systems   Constitutional: Positive for fatigue.   HENT: Negative.    Respiratory: Negative.    Neurological: Negative.          Past Medical History:   Diagnosis Date   • Atrial fibrillation with RVR (HCC)    • Cancer (HCC) 06/2019    Right lung   • Chronic cough    • COPD (chronic obstructive pulmonary disease) (HCC)    • Hearing loss    • History of shingles    • Lower back pain    • Lung cancer (HCC)    • Mastoiditis    • Mild mitral regurgitation    • Other fatigue    • PAF (paroxysmal atrial fibrillation) (HCC)    • Pneumonia          Past Surgical History:   Procedure Laterality Date   • BRONCHOSCOPY N/A 6/19/2019    Procedure: BRONCHOSCOPY WITH WASHINGS AND BIOPSY AND ENDOBRONCHIAL ULTRASOUND WITH FNA;  Surgeon: Arslan Marquez MD;  Location: [facility] ENDOSCOPY;  Service: Pulmonary   • BRONCHOSCOPY N/A 2/21/2022    Procedure: BRONCHOSCOPY WITH FLUORO, BAL, BRUSHINGS, AND BIOPSIES LLL;  Surgeon: Arslan Marquez MD;  Location: [facility] ENDOSCOPY;  Service: Pulmonary;  Laterality: N/A;  PRE: LUNG MASS  POST: SAME   • INNER EAR SURGERY Left    • MASTOIDECTOMY Left 1994   • VENOUS ACCESS DEVICE (PORT) INSERTION Right 8/22/2019    Procedure: INSERTION OF PORTACATH;  Surgeon: Ramila Gallardo MD;  Location: [facility] OR;  Service: Cardiothoracic         Social History     Socioeconomic History   • Marital status:      Spouse name: Krystle   • Number of children: 2   Tobacco Use   • Smoking status: Current Every Day Smoker     Packs/day: 1.00     Years: 40.00     Pack years: 40.00     Types: Cigarettes   • Smokeless tobacco: Never Used   • Tobacco comment: Since age 32   Substance and Sexual Activity   • Alcohol use: Yes     Alcohol/week: 4.0 - 6.0 standard drinks     Types: 4 - 6 Cans of beer per week     Comment: daily /caffeine use - coffee   • Drug use: Yes     Types: Marijuana     Comment: last used \"few days ago\"   • " Sexual activity: Not Currently     Partners: Female         Family History   Problem Relation Age of Onset   • No Known Problems Mother    • COPD Father            • No Known Problems Sister    • Heart disease Brother    • No Known Problems Brother    • No Known Problems Brother    • No Known Problems Brother           Objective    Physical Exam  Constitutional:       Appearance: Normal appearance.   Pulmonary:      Effort: Pulmonary effort is normal. No respiratory distress.      Breath sounds: Normal breath sounds. No stridor. No wheezing.   Neurological:      General: No focal deficit present.      Mental Status: He is alert.           Current Outpatient Medications on File Prior to Visit   Medication Sig Dispense Refill   • aspirin 81 MG tablet Take 1 tablet by mouth Daily. 30 tablet 11   • metoprolol tartrate (LOPRESSOR) 25 MG tablet TAKE 1 TABLET BY MOUTH TWO TIMES A  tablet 2   • pantoprazole (PROTONIX) 40 MG EC tablet Take 1 tablet by mouth Daily. 30 tablet 2     No current facility-administered medications on file prior to visit.       ALLERGIES:  No Known Allergies    /76   Pulse 96   Temp 97.9 °F (36.6 °C)   Resp 18   Wt 86.3 kg (190 lb 3.2 oz)   SpO2 98%   BMI 28.17 kg/m²      Current Status 3/31/2022   ECOG score 0         Assessment & Plan      Julia Smith III is a 72 y.o. male with hx small cell lung cancer diagnosed in 2019 now with newly diagnosed squamous cell carcinoma of the left lung, cT1cN0.  he is now 4 weeks out from radiation to the left lung squamous cell carcinoma and 1 1/2 years out from radiation to the right small cell lung cancer. He has a PET scan scheduled for  and follow up with Dr. Morel shortly after.  I will see him back the week of  to review results.      I will see her back in one year for follow up.  She knows to call for this appointment.             Thank you very much for allowing me to participate in the care of this very pleasant  patient.    Sincerely,      Yessenia Elizabeth MD

## 2022-06-21 ENCOUNTER — HOSPITAL ENCOUNTER (OUTPATIENT)
Dept: PET IMAGING | Facility: HOSPITAL | Age: 72
Discharge: HOME | End: 2022-06-21

## 2022-06-21 DIAGNOSIS — C34.11 PRIMARY MALIGNANT NEOPLASM OF RIGHT UPPER LOBE OF LUNG: ICD-10-CM

## 2022-06-21 LAB — GLUCOSE BLDC GLUCOMTR-MCNC: 86 MG/DL (ref 70–130)

## 2022-06-21 PROCEDURE — 0 FLUDEOXYGLUCOSE F18 SOLUTION: Performed by: INTERNAL MEDICINE

## 2022-06-21 PROCEDURE — A9552 F18 FDG: HCPCS | Performed by: INTERNAL MEDICINE

## 2022-06-21 PROCEDURE — 82962 GLUCOSE BLOOD TEST: CPT

## 2022-06-21 PROCEDURE — 78815 PET IMAGE W/CT SKULL-THIGH: CPT

## 2022-06-21 RX ADMIN — FLUDEOXYGLUCOSE F18 1 DOSE: 300 INJECTION INTRAVENOUS at 09:00

## 2022-06-27 ENCOUNTER — OFFICE VISIT (OUTPATIENT)
Dept: RADIATION ONCOLOGY | Facility: HOSPITAL | Age: 72
End: 2022-06-27

## 2022-06-27 ENCOUNTER — APPOINTMENT (OUTPATIENT)
Dept: RADIATION ONCOLOGY | Facility: HOSPITAL | Age: 72
End: 2022-06-27

## 2022-06-27 VITALS
BODY MASS INDEX: 27.49 KG/M2 | DIASTOLIC BLOOD PRESSURE: 81 MMHG | OXYGEN SATURATION: 97 % | WEIGHT: 185.6 LBS | SYSTOLIC BLOOD PRESSURE: 133 MMHG | HEART RATE: 72 BPM | RESPIRATION RATE: 16 BRPM

## 2022-06-27 DIAGNOSIS — C34.92 NON-SMALL CELL CANCER OF LEFT LUNG: Primary | ICD-10-CM

## 2022-06-27 PROCEDURE — 99212-NC PR NO CHARGE CBC OFFICE OUTPATIENT VISIT 10 MINUTES: Performed by: RADIOLOGY

## 2022-06-27 PROCEDURE — G0463 HOSPITAL OUTPT CLINIC VISIT: HCPCS | Performed by: RADIOLOGY

## 2022-06-27 NOTE — PROGRESS NOTES
Subjective     Thai Morel MD    Cancer Staging  Stage IA3 (cT1c, cN0, cM0)  No stage assigned         Dear Thai Morel MD     I had the pleasure of seeing Julia Smith  [facility] .   The patient is a 71 y.o. male with a history of small cell lung cancer treated with chemotherapy followed by radiation to the mediastinum/ right hilum with Dr. Giang.  He completed a dose of 60Gy on 9/1/2020 and then completed PCI in February 2021.  He has been followed with serial CT scan and most recently had a CT chest, abdomen and pelvis on 1/12/22 which showed 2 perihilar left lower lobe cavitary nodules now coalescing into a single mass measuring 3 x 2cm and ill defined right hilar density with mixed change, the inferior portion is smaller with little change in the transverse measurement with abrupt truncation of right middle lobe bronchus.      He then had a PET scan on 1/25/22 which showed activity in left lower lobe bronchovascular nodule measuring 3 x 2 cm with max suv of 15 and low level activity in the right hilar soft tissue density with max suv of 3.7 unchanged and no evidence of distant metastatic disease with stability of bilateral parotid nodules.      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  His case was presented in the multidisciplinary lung conference this morning with consensus recommendation for concurrent chemo/radiation.  The pathology was reviewed in conference and the specimen was felt to be consistent with squamous cell carcinoma.     He continues to smoke one pack per day.  He reports chronic soa and cough.  He denies any recent weight loss.       Interval hx 5/24/22:  He completed concurrent chemo/radiation on 4/20/22.  He received a dose of 60Gy in 30 fractions with concurrent weekly carbo/taxol.                               Interval hx 6/26/22:  He had a PET scan on 6/21/22  which showed resolution of the left lower lobe hypermetabolic mass with a residual photopenic 1.7 x 1.1 cm thin-walled cavity. There is no evidence for metastatic lymphadenopathy and there is no evidence for new metastatic disease.     He is doing well and denies any soa or cough out of normal.  He still has fatigue.            Review of Systems   Constitutional: Negative.    HENT: Negative.    Respiratory: Negative.    Skin: Negative.    Psychiatric/Behavioral: Negative.          Past Medical History:   Diagnosis Date   • Atrial fibrillation with RVR (HCC)    • Cancer (HCC) 06/2019    Right lung   • Chronic cough    • COPD (chronic obstructive pulmonary disease) (HCC)    • Hearing loss    • History of shingles    • Lower back pain    • Lung cancer (HCC)    • Mastoiditis    • Mild mitral regurgitation    • Other fatigue    • PAF (paroxysmal atrial fibrillation) (HCC)    • Pneumonia          Past Surgical History:   Procedure Laterality Date   • BRONCHOSCOPY N/A 6/19/2019    Procedure: BRONCHOSCOPY WITH WASHINGS AND BIOPSY AND ENDOBRONCHIAL ULTRASOUND WITH FNA;  Surgeon: Arslan Marquez MD;  Location: [facility] ENDOSCOPY;  Service: Pulmonary   • BRONCHOSCOPY N/A 2/21/2022    Procedure: BRONCHOSCOPY WITH FLUORO, BAL, BRUSHINGS, AND BIOPSIES LLL;  Surgeon: Arslan Marquez MD;  Location: [facility] ENDOSCOPY;  Service: Pulmonary;  Laterality: N/A;  PRE: LUNG MASS  POST: SAME   • INNER EAR SURGERY Left    • MASTOIDECTOMY Left 1994   • VENOUS ACCESS DEVICE (PORT) INSERTION Right 8/22/2019    Procedure: INSERTION OF PORTACATH;  Surgeon: Ramila Gallardo MD;  Location: [facility] OR;  Service: Cardiothoracic         Social History     Socioeconomic History   • Marital status:      Spouse name: Krystle   • Number of children: 2   Tobacco Use   • Smoking status: Current Every Day Smoker     Packs/day: 1.00     Years: 40.00     Pack years: 40.00     Types: Cigarettes   • Smokeless tobacco: Never Used   • Tobacco  "comment: Since age 32   Substance and Sexual Activity   • Alcohol use: Yes     Alcohol/week: 4.0 - 6.0 standard drinks     Types: 4 - 6 Cans of beer per week     Comment: daily /caffeine use - coffee   • Drug use: Yes     Types: Marijuana     Comment: last used \"few days ago\"   • Sexual activity: Not Currently     Partners: Female         Family History   Problem Relation Age of Onset   • No Known Problems Mother    • COPD Father            • No Known Problems Sister    • Heart disease Brother    • No Known Problems Brother    • No Known Problems Brother    • No Known Problems Brother           Objective    Physical Exam  Constitutional:       Appearance: Normal appearance.   Cardiovascular:      Pulses: Normal pulses.   Pulmonary:      Effort: Pulmonary effort is normal. No respiratory distress.      Breath sounds: Normal breath sounds.   Musculoskeletal:         General: Normal range of motion.   Neurological:      General: No focal deficit present.      Mental Status: He is alert.   Psychiatric:         Mood and Affect: Mood normal.         Behavior: Behavior normal.           Current Outpatient Medications on File Prior to Visit   Medication Sig Dispense Refill   • aspirin 81 MG tablet Take 1 tablet by mouth Daily. 30 tablet 11   • metoprolol tartrate (LOPRESSOR) 25 MG tablet TAKE 1 TABLET BY MOUTH TWO TIMES A  tablet 2   • pantoprazole (PROTONIX) 40 MG EC tablet Take 1 tablet by mouth Daily. 30 tablet 2     No current facility-administered medications on file prior to visit.       ALLERGIES:  No Known Allergies    There were no vitals taken for this visit.     Current Status 3/31/2022   ECOG score 0         Assessment & Plan      Julia Smith III is a 72 y.o. 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  He is now 2 months out from radiation to the left lung squamous cell carcinoma and 1 1/2 years out from " radiation to the right small cell lung cancer. I have personally reviewed his PET scan and agree with the findings.  I discussed these findings with him.  He has follow up with Dr. Morel tomorrow.  I will see him back in 6 months.  I will leave the CT scans to Dr. Morel but he will likley need repeat scans in 3-4 months.    I personally spent greater than 30 minutes today assessing, managing, discussing and documenting my visit with the patient. That time includes review of records, imaging and pathology reports, obtaining my own history, performing a medically appropriate evaluation, counseling and educating the patient, discussing goals, logistics, alternatives and risks of my recommendations, surveillance options and potential outcomes. It also includes the time documenting the clinical information in the EMR and communicating my recommendations to the other involved physicians.             Thank you very much for allowing me to participate in the care of this very pleasant patient.    Sincerely,      Yessenia Elizabeth MD

## 2022-06-27 NOTE — PROGRESS NOTES
"REASON FOR FOLLOW UP:   1.  T4N3 Small cell lung cancer  2.  SVC syndrome  3.  Initially treated with chemotherapy.  Subsequent radiation.  Subsequent PCI. ISAIAS  4. 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 male with the above mentioned history, returns the office today for follow up to review imaging.    He is doing about the same.  He reports some fasciculations in his legs.  He reports ongoing leg weakness.  He reports ongoing fatigue.  He continues to smoke cigarettes.  He denies any worsening shortness of breath.  He reports some rhinorrhea.    Review of Systems   See the HPI    Vitals:    06/28/22 1503   BP: 143/83   Pulse: 77   Resp: 16   Temp: 97.3 °F (36.3 °C)   TempSrc: Temporal   SpO2: 98%   Weight: 85.5 kg (188 lb 9.6 oz)   Height: 175 cm (68.9\")   PainSc: 0-No pain      General:  No acute distress, awake, alert and oriented.  Chronically ill-appearing.  Skin:  Warm and dry, no visible rash  HEENT:  Normocephalic/atraumatic.  Wearing a face mask.  Chest:  Normal respiratory effort  Extremities:  No visible clubbing, cyanosis, or edema  Neuro/psych:  Grossly nonfocal.  Normal mood and affect.      DIAGNOSTIC DATA:  CBC & Differential (06/28/2022 14:43)      IMAGING:  NM PET/CT Skull Base to Mid Thigh (06/21/2022 10:19)    I personally reviewed PET scan images.  No obvious disease present at this point.    ASSESSMENT:  This is a 72 y.o. male with:    *Small cell lung cancer originating in the right hilum:   · He has an extremely large mass there with concern for SVC compression on CT imaging from 6/4/2019.  There is some left hilar adenopathy.  · Some parotid lesions which are concerning but indeterminate, left greater than right.  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  · Staging MRI of the brain 7/10/2019 negative for metastatic disease  · He was discussed at multidisciplinary thoracic conference, with his T4 and 3 disease, concern for involvement of the right supraclavicular region and obvious left hilum, plans to add atezolizumab with cycle 2, we can consider future radiation to the residual mediastinal mass if necessary.  · Lung lesion too large to radiate in spite of the SVC narrowing.    · On 7/8/2019 we started carboplatin AUC 5 and VP16 100 mg/m2 through peripheral IVs  · Cycle 2 postponed on 7/29/2019 due to heart rate of 154.  Atezolizumab added with cycle #2.  · Radiation can be added for a residual mediastinal mass if necessary  · CT imaging on 8/14/2019 after two cycles of therapy shows significant improvement.  · Mediport anticipated by Dr. Gallardo on 8/22/2019.  · On  8/20/2019 cycle 3 was delayed due to neutropenia and with cycle 3 we did incorporate Neulasta.  · Cycle 4 completed 9/17/2019  · Maintenance atezolizumab initiated 10/8/2019.  PET scan revealed disease response to therapy, he does have an area of hypermetabolic activity in the adrenal gland which is small and did not show up on prior exam.  We will continue to monitor this.  His MRI of the brain is also negative for metastatic disease.  · CT 2/4/2020 with ongoing improvement.  Maintenance atezolizumab pursued.    · CT imaging 5/19/2020 with progression.  Increase in the right hilar/mediastinal mass with enlarging adenopathy.    · Given that the progression is longer than 6 months after completing carbo/VP16, proceed with further carbo/VP16.  · Cycle 1 initiated 5/26/2020  · PET scan after two cycles with ongoing uptake in the right hilum, otherwise somewhat improved  · Radiation initiated 7/22/2020 with plans for 30 treatments  · Proceeded with cycle 4 carboplatin -16 7/28/2020.    · As of 8/19/2020, discussed holding  further chemotherapy since he had 4 cycles versus continuing potentially with as many as 6.  He prefers to continue with therapy.    · Cycle 5 on 8/19/2020  · Radiation complete 9/1/2020  · As of 9/9/2020, plan no further chemotherapy  · MRI brain 11/4/2020 negative for mets.  · PET scan 11/4/2020. Improvement in the subcarinal and right hilar nodes with an SUV of 3.7 compared to 20.8 previously. Evidence for esophagitis. Right middle lobe changes resolved. Other stable changes that were previously noted. No new disease.     · MRI brain 1/28/2021 negative  · PCI pursued, complete from 2/16/2021 through 2/25/2021  · CT imaging 2/19/2021 with decrease in the primary tumor, two new small noncalcified nodules in the medial LLL     · CT imaging 4/13/2021 with subtle increase in left upper lobe and left perihilar nodules.  Right-sided subcarinal and infrahilar tumor unchanged.  Changes consistent with radiation pneumonitis.  · CT imaging 7/14/2021 with slight interval increase in size of 2 adjacent cavitary nodules in the medial left lower lobe.  Right infrahilar/subcarinal lesion stable.  · CT imaging 10/11/21 with enlarging right infrahilar mass with two enlarging left lower lobe cavitary lesions  · His case was presented at the multidisciplinary thoracic conference.  Plan observation of the lung lesions with serial CT scans.  If significant change, PET imaging and bronchoscopy.  · CT imaging 1/12/2022 with ongoing evolution of the left lower lobe cavitary lesions, some increase in size.  They have coalesced into one.  · PET scan on 1/25/2022 showed a hypermetabolic 3 x 2 cm cavitary nodule with no other evidence of disease.  · He did have a bronchoscopy performed on 2/21/2022.  BAL cytology negative for malignant cells.  Brushings with rare atypical squamous cells, nondiagnostic for malignancy.  Pathology showed evidence for dysplastic squamous epithelium suspicious for squamous cell carcinoma.  PD-L1 60%.  · He is not  on active therapy for his small cell lung cancer as we manage his squamous cell carcinoma as detailed below  · No evidence of disease at this time    *More recent squamous cell carcinoma of the left lower lobe  · Presented at thoracic conference. Plan concurrent chemoradiation with carbo/taxol  · 3/31/2022 week 4 concurrent chemoradiation with carboplatin Taxol  · 4/7/2022 week 5 concurrent therapy which will be held due to platelets of 76,000  · 4/14/2022: CBC remained stable.  Platelets stable at 78,000.  Proceed with cycle 5 carboplatin and paclitaxel.  Radiation complete as of 4/20.  · PET scan on 6/21/2022 without obvious disease.  Excellent response to therapy.    *Chronic hearing loss: History of left mastoiditis.  He is not a candidate for cisplatin because of this.     *Atrial fibrillation.  He was initiated on metoprolol 12.5 mg twice daily.  This did delay port placement.  The patient was taking his metoprolol inconsistently.  For cycle 2 was delayed due to heart rate of 150 7/29/2019.  He was seen by cardiology with instructions to increase his metoprolol to 25 mg twice daily.  He reports no symptoms.  See below.    *Tobacco use.  Continues to smoke a pack and a half a day    *History of SVC syndrome at diagnosis of small cell lung cancer.  Improved after 2 cycles of chemotherapy.  No further evidence of this and this has resolved.    *Dyspnea on exertion: Related to COPD and ongoing lung damage from smoking.  He reports his shortness of breath is stable again today    *Leg pain with walking: Some of this sounds like claudication but some of this sounds like orthopedic pain.  Symptoms remain stable.  Follow-up with primary care.    *Fatigue and tiredness: Stable    *Malfunctioning Mediport: He states that no blood can be obtained from the port.  Apparently the port flushes well.      *COVID-19 pandemic: vaccinated with a booster.        PLAN:   1. No need for therapy at this time.  Maintain port flushes  every 2 months.  Port flush labs and a CT scan of his chest with contrast in 4 months and I will see him back after that for surveillance.    Thai Morel MD  06/28/2022

## 2022-06-28 ENCOUNTER — OFFICE VISIT (OUTPATIENT)
Dept: ONCOLOGY | Facility: CLINIC | Age: 72
End: 2022-06-28

## 2022-06-28 ENCOUNTER — APPOINTMENT (OUTPATIENT)
Dept: ONCOLOGY | Facility: HOSPITAL | Age: 72
End: 2022-06-28

## 2022-06-28 ENCOUNTER — INFUSION (OUTPATIENT)
Dept: ONCOLOGY | Facility: HOSPITAL | Age: 72
End: 2022-06-28

## 2022-06-28 VITALS
HEIGHT: 69 IN | WEIGHT: 188.6 LBS | DIASTOLIC BLOOD PRESSURE: 83 MMHG | BODY MASS INDEX: 27.93 KG/M2 | HEART RATE: 77 BPM | TEMPERATURE: 97.3 F | OXYGEN SATURATION: 98 % | RESPIRATION RATE: 16 BRPM | SYSTOLIC BLOOD PRESSURE: 143 MMHG

## 2022-06-28 DIAGNOSIS — C34.11 PRIMARY MALIGNANT NEOPLASM OF RIGHT UPPER LOBE OF LUNG: ICD-10-CM

## 2022-06-28 DIAGNOSIS — Z45.2 ENCOUNTER FOR FITTING AND ADJUSTMENT OF VASCULAR CATHETER: Primary | ICD-10-CM

## 2022-06-28 DIAGNOSIS — C34.92 NON-SMALL CELL CANCER OF LEFT LUNG: ICD-10-CM

## 2022-06-28 DIAGNOSIS — C80.1 SMALL CELL CARCINOMA: Primary | ICD-10-CM

## 2022-06-28 LAB
ALBUMIN SERPL-MCNC: 4.1 G/DL (ref 3.5–5.2)
ALBUMIN/GLOB SERPL: 1.4 G/DL (ref 1.1–2.4)
ALP SERPL-CCNC: 79 U/L (ref 38–116)
ALT SERPL W P-5'-P-CCNC: 20 U/L (ref 0–41)
ANION GAP SERPL CALCULATED.3IONS-SCNC: 12.1 MMOL/L (ref 5–15)
AST SERPL-CCNC: 24 U/L (ref 0–40)
BASOPHILS # BLD AUTO: 0.07 10*3/MM3 (ref 0–0.2)
BASOPHILS NFR BLD AUTO: 1.4 % (ref 0–1.5)
BILIRUB SERPL-MCNC: 0.4 MG/DL (ref 0.2–1.2)
BUN SERPL-MCNC: 12 MG/DL (ref 6–20)
BUN/CREAT SERPL: 10.9 (ref 7.3–30)
CALCIUM SPEC-SCNC: 9.5 MG/DL (ref 8.5–10.2)
CHLORIDE SERPL-SCNC: 102 MMOL/L (ref 98–107)
CO2 SERPL-SCNC: 23.9 MMOL/L (ref 22–29)
CREAT SERPL-MCNC: 1.1 MG/DL (ref 0.7–1.3)
DEPRECATED RDW RBC AUTO: 50.9 FL (ref 37–54)
EGFRCR SERPLBLD CKD-EPI 2021: 72.2 ML/MIN/1.73
EOSINOPHIL # BLD AUTO: 0.12 10*3/MM3 (ref 0–0.4)
EOSINOPHIL NFR BLD AUTO: 2.5 % (ref 0.3–6.2)
ERYTHROCYTE [DISTWIDTH] IN BLOOD BY AUTOMATED COUNT: 13.1 % (ref 12.3–15.4)
GLOBULIN UR ELPH-MCNC: 3 GM/DL (ref 1.8–3.5)
GLUCOSE SERPL-MCNC: 139 MG/DL (ref 74–124)
HCT VFR BLD AUTO: 41.6 % (ref 37.5–51)
HGB BLD-MCNC: 13.9 G/DL (ref 13–17.7)
IMM GRANULOCYTES # BLD AUTO: 0.01 10*3/MM3 (ref 0–0.05)
IMM GRANULOCYTES NFR BLD AUTO: 0.2 % (ref 0–0.5)
LYMPHOCYTES # BLD AUTO: 0.83 10*3/MM3 (ref 0.7–3.1)
LYMPHOCYTES NFR BLD AUTO: 17.1 % (ref 19.6–45.3)
MCH RBC QN AUTO: 34.8 PG (ref 26.6–33)
MCHC RBC AUTO-ENTMCNC: 33.4 G/DL (ref 31.5–35.7)
MCV RBC AUTO: 104.3 FL (ref 79–97)
MONOCYTES # BLD AUTO: 0.36 10*3/MM3 (ref 0.1–0.9)
MONOCYTES NFR BLD AUTO: 7.4 % (ref 5–12)
NEUTROPHILS NFR BLD AUTO: 3.47 10*3/MM3 (ref 1.7–7)
NEUTROPHILS NFR BLD AUTO: 71.4 % (ref 42.7–76)
NRBC BLD AUTO-RTO: 0 /100 WBC (ref 0–0.2)
PLATELET # BLD AUTO: 108 10*3/MM3 (ref 140–450)
PMV BLD AUTO: 10.4 FL (ref 6–12)
POTASSIUM SERPL-SCNC: 4.3 MMOL/L (ref 3.5–4.7)
PROT SERPL-MCNC: 7.1 G/DL (ref 6.3–8)
RBC # BLD AUTO: 3.99 10*6/MM3 (ref 4.14–5.8)
SODIUM SERPL-SCNC: 138 MMOL/L (ref 134–145)
WBC NRBC COR # BLD: 4.86 10*3/MM3 (ref 3.4–10.8)

## 2022-06-28 PROCEDURE — 25010000002 HEPARIN LOCK FLUSH PER 10 UNITS: Performed by: INTERNAL MEDICINE

## 2022-06-28 PROCEDURE — 36591 DRAW BLOOD OFF VENOUS DEVICE: CPT

## 2022-06-28 PROCEDURE — 80053 COMPREHEN METABOLIC PANEL: CPT

## 2022-06-28 PROCEDURE — 85025 COMPLETE CBC W/AUTO DIFF WBC: CPT

## 2022-06-28 PROCEDURE — 99214 OFFICE O/P EST MOD 30 MIN: CPT | Performed by: INTERNAL MEDICINE

## 2022-06-28 RX ORDER — HEPARIN SODIUM (PORCINE) LOCK FLUSH IV SOLN 100 UNIT/ML 100 UNIT/ML
500 SOLUTION INTRAVENOUS AS NEEDED
Status: CANCELLED | OUTPATIENT
Start: 2022-06-28

## 2022-06-28 RX ORDER — SODIUM CHLORIDE 0.9 % (FLUSH) 0.9 %
10 SYRINGE (ML) INJECTION AS NEEDED
Status: CANCELLED | OUTPATIENT
Start: 2022-06-28

## 2022-06-28 RX ORDER — SODIUM CHLORIDE 0.9 % (FLUSH) 0.9 %
10 SYRINGE (ML) INJECTION AS NEEDED
Status: DISCONTINUED | OUTPATIENT
Start: 2022-06-28 | End: 2022-06-28 | Stop reason: HOSPADM

## 2022-06-28 RX ORDER — HEPARIN SODIUM (PORCINE) LOCK FLUSH IV SOLN 100 UNIT/ML 100 UNIT/ML
500 SOLUTION INTRAVENOUS AS NEEDED
Status: DISCONTINUED | OUTPATIENT
Start: 2022-06-28 | End: 2022-06-28 | Stop reason: HOSPADM

## 2022-06-28 RX ADMIN — Medication 10 ML: at 14:44

## 2022-06-28 RX ADMIN — Medication 500 UNITS: at 14:44

## 2022-08-23 ENCOUNTER — INFUSION (OUTPATIENT)
Dept: ONCOLOGY | Facility: HOSPITAL | Age: 72
End: 2022-08-23

## 2022-08-23 DIAGNOSIS — C80.1 SMALL CELL CARCINOMA: ICD-10-CM

## 2022-08-23 DIAGNOSIS — C34.92 NON-SMALL CELL CANCER OF LEFT LUNG: ICD-10-CM

## 2022-08-23 DIAGNOSIS — Z45.2 ENCOUNTER FOR FITTING AND ADJUSTMENT OF VASCULAR CATHETER: Primary | ICD-10-CM

## 2022-08-23 PROCEDURE — 36591 DRAW BLOOD OFF VENOUS DEVICE: CPT

## 2022-08-23 PROCEDURE — 96523 IRRIG DRUG DELIVERY DEVICE: CPT

## 2022-08-23 PROCEDURE — 25010000002 HEPARIN LOCK FLUSH PER 10 UNITS: Performed by: INTERNAL MEDICINE

## 2022-08-23 RX ORDER — HEPARIN SODIUM (PORCINE) LOCK FLUSH IV SOLN 100 UNIT/ML 100 UNIT/ML
500 SOLUTION INTRAVENOUS AS NEEDED
Status: CANCELLED | OUTPATIENT
Start: 2022-08-23

## 2022-08-23 RX ORDER — HEPARIN SODIUM (PORCINE) LOCK FLUSH IV SOLN 100 UNIT/ML 100 UNIT/ML
500 SOLUTION INTRAVENOUS AS NEEDED
Status: DISCONTINUED | OUTPATIENT
Start: 2022-08-23 | End: 2022-08-23 | Stop reason: HOSPADM

## 2022-08-23 RX ORDER — SODIUM CHLORIDE 0.9 % (FLUSH) 0.9 %
10 SYRINGE (ML) INJECTION AS NEEDED
Status: DISCONTINUED | OUTPATIENT
Start: 2022-08-23 | End: 2022-08-23 | Stop reason: HOSPADM

## 2022-08-23 RX ORDER — SODIUM CHLORIDE 0.9 % (FLUSH) 0.9 %
10 SYRINGE (ML) INJECTION AS NEEDED
Status: CANCELLED | OUTPATIENT
Start: 2022-08-23

## 2022-08-23 RX ADMIN — Medication 500 UNITS: at 14:44

## 2022-08-23 RX ADMIN — Medication 10 ML: at 14:44

## 2022-10-18 ENCOUNTER — INFUSION (OUTPATIENT)
Dept: ONCOLOGY | Facility: HOSPITAL | Age: 72
End: 2022-10-18

## 2022-10-18 ENCOUNTER — HOSPITAL ENCOUNTER (OUTPATIENT)
Dept: PET IMAGING | Facility: HOSPITAL | Age: 72
Discharge: HOME | End: 2022-10-18
Admitting: INTERNAL MEDICINE

## 2022-10-18 DIAGNOSIS — C34.92 NON-SMALL CELL CANCER OF LEFT LUNG: ICD-10-CM

## 2022-10-18 DIAGNOSIS — C80.1 SMALL CELL CARCINOMA: ICD-10-CM

## 2022-10-18 DIAGNOSIS — C80.1 SMALL CELL CARCINOMA: Primary | ICD-10-CM

## 2022-10-18 LAB
ALBUMIN SERPL-MCNC: 4.3 G/DL (ref 3.5–5.2)
ALBUMIN/GLOB SERPL: 1.5 G/DL (ref 1.1–2.4)
ALP SERPL-CCNC: 78 U/L (ref 38–116)
ALT SERPL W P-5'-P-CCNC: 28 U/L (ref 0–41)
ANION GAP SERPL CALCULATED.3IONS-SCNC: 11.3 MMOL/L (ref 5–15)
AST SERPL-CCNC: 26 U/L (ref 0–40)
BASOPHILS # BLD AUTO: 0.13 10*3/MM3 (ref 0–0.2)
BASOPHILS NFR BLD AUTO: 2.1 % (ref 0–1.5)
BILIRUB SERPL-MCNC: 0.6 MG/DL (ref 0.2–1.2)
BUN SERPL-MCNC: 18 MG/DL (ref 6–20)
BUN/CREAT SERPL: 17.3 (ref 7.3–30)
CALCIUM SPEC-SCNC: 9.4 MG/DL (ref 8.5–10.2)
CHLORIDE SERPL-SCNC: 103 MMOL/L (ref 98–107)
CO2 SERPL-SCNC: 23.7 MMOL/L (ref 22–29)
CREAT BLDA-MCNC: 0.6 MG/DL (ref 0.6–1.3)
CREAT SERPL-MCNC: 1.04 MG/DL (ref 0.7–1.3)
DEPRECATED RDW RBC AUTO: 48.9 FL (ref 37–54)
EGFRCR SERPLBLD CKD-EPI 2021: 77.2 ML/MIN/1.73
EOSINOPHIL # BLD AUTO: 0.23 10*3/MM3 (ref 0–0.4)
EOSINOPHIL NFR BLD AUTO: 3.7 % (ref 0.3–6.2)
ERYTHROCYTE [DISTWIDTH] IN BLOOD BY AUTOMATED COUNT: 13.5 % (ref 12.3–15.4)
GLOBULIN UR ELPH-MCNC: 2.8 GM/DL (ref 1.8–3.5)
GLUCOSE SERPL-MCNC: 89 MG/DL (ref 74–124)
HCT VFR BLD AUTO: 42 % (ref 37.5–51)
HGB BLD-MCNC: 14.2 G/DL (ref 13–17.7)
IMM GRANULOCYTES # BLD AUTO: 0.02 10*3/MM3 (ref 0–0.05)
IMM GRANULOCYTES NFR BLD AUTO: 0.3 % (ref 0–0.5)
LYMPHOCYTES # BLD AUTO: 1.26 10*3/MM3 (ref 0.7–3.1)
LYMPHOCYTES NFR BLD AUTO: 20.4 % (ref 19.6–45.3)
MCH RBC QN AUTO: 33.3 PG (ref 26.6–33)
MCHC RBC AUTO-ENTMCNC: 33.8 G/DL (ref 31.5–35.7)
MCV RBC AUTO: 98.4 FL (ref 79–97)
MONOCYTES # BLD AUTO: 0.79 10*3/MM3 (ref 0.1–0.9)
MONOCYTES NFR BLD AUTO: 12.8 % (ref 5–12)
NEUTROPHILS NFR BLD AUTO: 3.75 10*3/MM3 (ref 1.7–7)
NEUTROPHILS NFR BLD AUTO: 60.7 % (ref 42.7–76)
NRBC BLD AUTO-RTO: 0 /100 WBC (ref 0–0.2)
PLATELET # BLD AUTO: 137 10*3/MM3 (ref 140–450)
PMV BLD AUTO: 9.6 FL (ref 6–12)
POTASSIUM SERPL-SCNC: 4.3 MMOL/L (ref 3.5–4.7)
PROT SERPL-MCNC: 7.1 G/DL (ref 6.3–8)
RBC # BLD AUTO: 4.27 10*6/MM3 (ref 4.14–5.8)
SODIUM SERPL-SCNC: 138 MMOL/L (ref 134–145)
WBC NRBC COR # BLD: 6.18 10*3/MM3 (ref 3.4–10.8)

## 2022-10-18 PROCEDURE — 85025 COMPLETE CBC W/AUTO DIFF WBC: CPT

## 2022-10-18 PROCEDURE — 25010000002 IOPAMIDOL 61 % SOLUTION: Performed by: INTERNAL MEDICINE

## 2022-10-18 PROCEDURE — 80053 COMPREHEN METABOLIC PANEL: CPT

## 2022-10-18 PROCEDURE — 82565 ASSAY OF CREATININE: CPT

## 2022-10-18 PROCEDURE — 71260 CT THORAX DX C+: CPT

## 2022-10-18 RX ADMIN — IOPAMIDOL 75 ML: 612 INJECTION, SOLUTION INTRAVENOUS at 11:10

## 2022-10-24 NOTE — PROGRESS NOTES
"REASON FOR FOLLOW UP:   1.  T4N3 Small cell lung cancer  2.  SVC syndrome  3.  Initially treated with chemotherapy.  Subsequent radiation.  Subsequent PCI. ISAIAS  4. 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 male with the above mentioned history, returns the office today for follow up to review imaging.    He remains fatigued.  He denies significant shortness of breath.  He continues to smoke.  Ongoing rhinorrhea and sinus congestion.    Review of Systems   See the HPI    Vitals:    10/25/22 1508   BP: 151/82   Pulse: 73   Resp: 18   Temp: 97.9 °F (36.6 °C)   TempSrc: Temporal   SpO2: 97%   Weight: 85.8 kg (189 lb 3.2 oz)   Height: 175 cm (68.9\")   PainSc: 0-No pain      General:  No acute distress, awake, alert and oriented.  Chronically ill-appearing.  Skin:  Warm and dry, no visible rash.  Small lesion on the extensor surface of the right hand.  The patient states he cut off a wart.  HEENT:  Normocephalic/atraumatic.  Wearing a face mask.  Chest:  Normal respiratory effort.  Lungs clear to auscultation bilaterally.  Benign-appearing Mediport present in the right subclavian area.  Heart: Regular rate and rhythm  Lymphatics: No palpable cervical supraclavicular or axillary adenopathy  Extremities:  No visible clubbing, cyanosis, or edema  Neuro/psych:  Grossly nonfocal.  Normal mood and affect.      DIAGNOSTIC DATA:  Comprehensive Metabolic Panel (10/18/2022 10:52)  CBC & Differential (10/18/2022 10:52)    IMAGING:  CT Chest With Contrast Diagnostic (10/18/2022 11:14)    CT images personally reviewed.  Decrease in size of the left lower lobe cavitary lesion.  No other evidence of disease.    ASSESSMENT:  This is a 72 y.o. male with:    *Small cell lung cancer originating in the right hilum:   · He has an extremely large mass there with concern for SVC compression on CT " imaging from 6/4/2019.  There is some left hilar adenopathy.  · Some parotid lesions which are concerning but indeterminate, left greater than right.  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  · Staging MRI of the brain 7/10/2019 negative for metastatic disease  · He was discussed at multidisciplinary thoracic conference, with his T4 and 3 disease, concern for involvement of the right supraclavicular region and obvious left hilum, plans to add atezolizumab with cycle 2, we can consider future radiation to the residual mediastinal mass if necessary.  · Lung lesion too large to radiate in spite of the SVC narrowing.    · On 7/8/2019 we started carboplatin AUC 5 and VP16 100 mg/m2 through peripheral IVs  · Cycle 2 postponed on 7/29/2019 due to heart rate of 154.  Atezolizumab added with cycle #2.  · Radiation can be added for a residual mediastinal mass if necessary  · CT imaging on 8/14/2019 after two cycles of therapy shows significant improvement.  · Mediport anticipated by Dr. Gallardo on 8/22/2019.  · On  8/20/2019 cycle 3 was delayed due to neutropenia and with cycle 3 we did incorporate Neulasta.  · Cycle 4 completed 9/17/2019  · Maintenance atezolizumab initiated 10/8/2019.  PET scan revealed disease response to therapy, he does have an area of hypermetabolic activity in the adrenal gland which is small and did not show up on prior exam.  We will continue to monitor this.  His MRI of the brain is also negative for metastatic disease.  · CT 2/4/2020 with ongoing improvement.  Maintenance atezolizumab pursued.    · CT imaging 5/19/2020 with progression.  Increase in the right hilar/mediastinal mass with enlarging adenopathy.    · Given that the progression is longer than 6 months after completing carbo/VP16, proceed with further carbo/VP16.  · Cycle  1 initiated 5/26/2020  · PET scan after two cycles with ongoing uptake in the right hilum, otherwise somewhat improved  · Radiation initiated 7/22/2020 with plans for 30 treatments  · Proceeded with cycle 4 carboplatin -16 7/28/2020.    · As of 8/19/2020, discussed holding further chemotherapy since he had 4 cycles versus continuing potentially with as many as 6.  He prefers to continue with therapy.    · Cycle 5 on 8/19/2020  · Radiation complete 9/1/2020  · As of 9/9/2020, plan no further chemotherapy  · MRI brain 11/4/2020 negative for mets.  · PET scan 11/4/2020. Improvement in the subcarinal and right hilar nodes with an SUV of 3.7 compared to 20.8 previously. Evidence for esophagitis. Right middle lobe changes resolved. Other stable changes that were previously noted. No new disease.     · MRI brain 1/28/2021 negative  · PCI pursued, complete from 2/16/2021 through 2/25/2021  · CT imaging 2/19/2021 with decrease in the primary tumor, two new small noncalcified nodules in the medial LLL     · CT imaging 4/13/2021 with subtle increase in left upper lobe and left perihilar nodules.  Right-sided subcarinal and infrahilar tumor unchanged.  Changes consistent with radiation pneumonitis.  · CT imaging 7/14/2021 with slight interval increase in size of 2 adjacent cavitary nodules in the medial left lower lobe.  Right infrahilar/subcarinal lesion stable.  · CT imaging 10/11/21 with enlarging right infrahilar mass with two enlarging left lower lobe cavitary lesions  · His case was presented at the multidisciplinary thoracic conference.  Plan observation of the lung lesions with serial CT scans.  If significant change, PET imaging and bronchoscopy.  · CT imaging 1/12/2022 with ongoing evolution of the left lower lobe cavitary lesions, some increase in size.  They have coalesced into one.  · PET scan on 1/25/2022 showed a hypermetabolic 3 x 2 cm cavitary nodule with no other evidence of disease.  · He did have a  bronchoscopy performed on 2/21/2022.  BAL cytology negative for malignant cells.  Brushings with rare atypical squamous cells, nondiagnostic for malignancy.  Pathology showed evidence for dysplastic squamous epithelium suspicious for squamous cell carcinoma.  PD-L1 60%.  · He is not on active therapy for his small cell lung cancer as we manage his squamous cell carcinoma as detailed below  · No evidence of disease at this time    *More recent squamous cell carcinoma of the left lower lobe  · Presented at thoracic conference. Plan concurrent chemoradiation with carbo/taxol  · 3/31/2022 week 4 concurrent chemoradiation with carboplatin Taxol  · 4/7/2022 week 5 concurrent therapy which will be held due to platelets of 76,000  · 4/14/2022: CBC remained stable.  Platelets stable at 78,000.  Proceed with cycle 5 carboplatin and paclitaxel.  Radiation complete as of 4/20.  · PET scan on 6/21/2022 without obvious disease.  Excellent response to therapy.  · CT imaging 10/18/2022 with no evidence of progression.  Ongoing improvement in the radiated lung lesion.    *Chronic hearing loss: History of left mastoiditis.  He is not a candidate for cisplatin because of this.     *Atrial fibrillation.  He was initiated on metoprolol 12.5 mg twice daily.  This did delay port placement.  The patient was taking his metoprolol inconsistently.  For cycle 2 was delayed due to heart rate of 150 7/29/2019.  He was seen by cardiology with instructions to increase his metoprolol to 25 mg twice daily.  He reports no symptoms.  See below.    *Tobacco use.  Continues to smoke a pack and a half a day    *History of SVC syndrome at diagnosis of small cell lung cancer.  Improved after 2 cycles of chemotherapy.  No further evidence of this and this has resolved.    *Dyspnea on exertion: Related to COPD and ongoing lung damage from smoking.  He reports his shortness of breath is stable again today    *Leg pain with walking: Some of this sounds like  claudication but some of this sounds like orthopedic pain.  Symptoms remain stable.  Follow-up with primary care.    *Fatigue and tiredness: Stable    *Malfunctioning Mediport: He states that no blood can be obtained from the port.  Apparently the port flushes well.        PLAN:   1. Port flush in about 6 weeks.  About 2 months after that, CT chest with labs and a port flush.  I will see him back following that.    Thai Morel MD  10/25/2022

## 2022-10-25 ENCOUNTER — APPOINTMENT (OUTPATIENT)
Dept: LAB | Facility: HOSPITAL | Age: 72
End: 2022-10-25

## 2022-10-25 ENCOUNTER — OFFICE VISIT (OUTPATIENT)
Dept: ONCOLOGY | Facility: CLINIC | Age: 72
End: 2022-10-25

## 2022-10-25 VITALS
HEIGHT: 69 IN | BODY MASS INDEX: 28.02 KG/M2 | SYSTOLIC BLOOD PRESSURE: 151 MMHG | HEART RATE: 73 BPM | TEMPERATURE: 97.9 F | OXYGEN SATURATION: 97 % | DIASTOLIC BLOOD PRESSURE: 82 MMHG | RESPIRATION RATE: 18 BRPM | WEIGHT: 189.2 LBS

## 2022-10-25 DIAGNOSIS — C34.90 SMALL CELL LUNG CANCER: Primary | Chronic | ICD-10-CM

## 2022-10-25 DIAGNOSIS — C34.92 NON-SMALL CELL CANCER OF LEFT LUNG: ICD-10-CM

## 2022-10-25 PROCEDURE — 99214 OFFICE O/P EST MOD 30 MIN: CPT | Performed by: INTERNAL MEDICINE

## 2022-10-27 ENCOUNTER — TELEPHONE (OUTPATIENT)
Dept: ONCOLOGY | Facility: CLINIC | Age: 72
End: 2022-10-27

## 2022-10-27 NOTE — TELEPHONE ENCOUNTER
Caller: SUSANNE NAYAK    Relationship: Emergency Contact    Best call back number: [phone]    What is the best time to reach you: ANYTIME    Who are you requesting to speak with (clinical staff, provider,  specific staff member): DR MCKEON OR NURSE    What was the call regarding: PTS WIFE STATED THAT DR MCKEON'S NOTE STATES PT WILL HAVE HIS PORT FLUSH 12/5 AND THEN WAS SUPPOSED TO HAVE HIS CT 2 MONTHS LATER BUT IT IS SCHEDULED FOR 12/27.    PLEASE CALL TO CLARIFY DATE OF CT TO MAKE SURE IT IS SCHEDULED CORRECTLY.     Do you require a callback: YES

## 2022-10-27 NOTE — TELEPHONE ENCOUNTER
MD Clinic Scheduling   MD Visit Comments port flush in about 6 weeks  - at 10/25/22 1535       . ct chest, port flush, labs about 8 weeks after that (about 3.5 months from now)  - at 10/25/22 1535       .. edward 1 unit vitals only a few business days after the ct scan  - at 10/25/22 9775

## 2022-12-06 ENCOUNTER — INFUSION (OUTPATIENT)
Dept: ONCOLOGY | Facility: HOSPITAL | Age: 72
End: 2022-12-06

## 2022-12-06 DIAGNOSIS — Z45.2 ENCOUNTER FOR FITTING AND ADJUSTMENT OF VASCULAR CATHETER: Primary | ICD-10-CM

## 2022-12-06 PROCEDURE — 96523 IRRIG DRUG DELIVERY DEVICE: CPT

## 2022-12-06 PROCEDURE — 25010000002 HEPARIN LOCK FLUSH PER 10 UNITS: Performed by: INTERNAL MEDICINE

## 2022-12-06 RX ORDER — HEPARIN SODIUM (PORCINE) LOCK FLUSH IV SOLN 100 UNIT/ML 100 UNIT/ML
500 SOLUTION INTRAVENOUS AS NEEDED
Status: DISCONTINUED | OUTPATIENT
Start: 2022-12-06 | End: 2022-12-06 | Stop reason: HOSPADM

## 2022-12-06 RX ORDER — SODIUM CHLORIDE 0.9 % (FLUSH) 0.9 %
10 SYRINGE (ML) INJECTION AS NEEDED
Status: DISCONTINUED | OUTPATIENT
Start: 2022-12-06 | End: 2022-12-06 | Stop reason: HOSPADM

## 2022-12-06 RX ADMIN — Medication 10 ML: at 15:12

## 2022-12-06 RX ADMIN — Medication 500 UNITS: at 15:12

## 2023-01-31 ENCOUNTER — HOSPITAL ENCOUNTER (OUTPATIENT)
Dept: CT IMAGING | Facility: HOSPITAL | Age: 73
Discharge: HOME | End: 2023-01-31
Payer: MEDICARE

## 2023-01-31 ENCOUNTER — LAB (OUTPATIENT)
Dept: LAB | Facility: HOSPITAL | Age: 73
End: 2023-01-31
Payer: MEDICARE

## 2023-01-31 DIAGNOSIS — C34.90 SMALL CELL LUNG CANCER: ICD-10-CM

## 2023-01-31 DIAGNOSIS — C34.90 SMALL CELL LUNG CANCER: Chronic | ICD-10-CM

## 2023-01-31 DIAGNOSIS — C34.92 NON-SMALL CELL CANCER OF LEFT LUNG: ICD-10-CM

## 2023-01-31 LAB
ALBUMIN SERPL-MCNC: 4 G/DL (ref 3.5–5.2)
ALBUMIN/GLOB SERPL: 1.3 G/DL
ALP SERPL-CCNC: 75 U/L (ref 39–117)
ALT SERPL W P-5'-P-CCNC: 44 U/L (ref 1–41)
ANION GAP SERPL CALCULATED.3IONS-SCNC: 10.6 MMOL/L (ref 5–15)
AST SERPL-CCNC: 40 U/L (ref 1–40)
BASOPHILS # BLD AUTO: 0.1 10*3/MM3 (ref 0–0.2)
BASOPHILS NFR BLD AUTO: 1.4 % (ref 0–1.5)
BILIRUB SERPL-MCNC: 0.6 MG/DL (ref 0–1.2)
BUN SERPL-MCNC: 9 MG/DL (ref 8–23)
BUN/CREAT SERPL: 8.5 (ref 7–25)
CALCIUM SPEC-SCNC: 9.1 MG/DL (ref 8.6–10.5)
CHLORIDE SERPL-SCNC: 104 MMOL/L (ref 98–107)
CO2 SERPL-SCNC: 23.4 MMOL/L (ref 22–29)
CREAT BLDA-MCNC: 1 MG/DL (ref 0.6–1.3)
CREAT SERPL-MCNC: 1.06 MG/DL (ref 0.76–1.27)
DEPRECATED RDW RBC AUTO: 39.6 FL (ref 37–54)
EGFRCR SERPLBLD CKD-EPI 2021: 75.5 ML/MIN/1.73
EOSINOPHIL # BLD AUTO: 0.24 10*3/MM3 (ref 0–0.4)
EOSINOPHIL NFR BLD AUTO: 3.5 % (ref 0.3–6.2)
ERYTHROCYTE [DISTWIDTH] IN BLOOD BY AUTOMATED COUNT: 11.9 % (ref 12.3–15.4)
GLOBULIN UR ELPH-MCNC: 3.2 GM/DL
GLUCOSE SERPL-MCNC: 88 MG/DL (ref 65–99)
HCT VFR BLD AUTO: 43.2 % (ref 37.5–51)
HGB BLD-MCNC: 14.9 G/DL (ref 13–17.7)
LYMPHOCYTES # BLD AUTO: 1.09 10*3/MM3 (ref 0.7–3.1)
LYMPHOCYTES NFR BLD AUTO: 15.7 % (ref 19.6–45.3)
MCH RBC QN AUTO: 31.9 PG (ref 26.6–33)
MCHC RBC AUTO-ENTMCNC: 34.5 G/DL (ref 31.5–35.7)
MCV RBC AUTO: 92.5 FL (ref 79–97)
MONOCYTES # BLD AUTO: 0.89 10*3/MM3 (ref 0.1–0.9)
MONOCYTES NFR BLD AUTO: 12.8 % (ref 5–12)
NEUTROPHILS NFR BLD AUTO: 4.61 10*3/MM3 (ref 1.7–7)
NEUTROPHILS NFR BLD AUTO: 66.3 % (ref 42.7–76)
PLATELET # BLD AUTO: 124 10*3/MM3 (ref 140–450)
PMV BLD AUTO: 11.5 FL (ref 6–12)
POTASSIUM SERPL-SCNC: 4.4 MMOL/L (ref 3.5–5.2)
PROT SERPL-MCNC: 7.2 G/DL (ref 6–8.5)
RBC # BLD AUTO: 4.67 10*6/MM3 (ref 4.14–5.8)
SODIUM SERPL-SCNC: 138 MMOL/L (ref 136–145)
WBC NRBC COR # BLD: 6.95 10*3/MM3 (ref 3.4–10.8)

## 2023-01-31 PROCEDURE — 25010000002 IOPAMIDOL 61 % SOLUTION: Performed by: INTERNAL MEDICINE

## 2023-01-31 PROCEDURE — 36415 COLL VENOUS BLD VENIPUNCTURE: CPT

## 2023-01-31 PROCEDURE — 71260 CT THORAX DX C+: CPT

## 2023-01-31 PROCEDURE — 82565 ASSAY OF CREATININE: CPT

## 2023-01-31 PROCEDURE — 80053 COMPREHEN METABOLIC PANEL: CPT

## 2023-01-31 PROCEDURE — 85025 COMPLETE CBC W/AUTO DIFF WBC: CPT

## 2023-01-31 RX ADMIN — IOPAMIDOL 75 ML: 612 INJECTION, SOLUTION INTRAVENOUS at 15:11

## 2023-02-07 ENCOUNTER — OFFICE VISIT (OUTPATIENT)
Dept: ONCOLOGY | Facility: CLINIC | Age: 73
End: 2023-02-07
Payer: MEDICARE

## 2023-02-07 ENCOUNTER — INFUSION (OUTPATIENT)
Dept: ONCOLOGY | Facility: HOSPITAL | Age: 73
End: 2023-02-07
Payer: MEDICARE

## 2023-02-07 VITALS
RESPIRATION RATE: 18 BRPM | HEART RATE: 79 BPM | DIASTOLIC BLOOD PRESSURE: 90 MMHG | SYSTOLIC BLOOD PRESSURE: 150 MMHG | HEIGHT: 69 IN | BODY MASS INDEX: 28.23 KG/M2 | TEMPERATURE: 98 F | WEIGHT: 190.6 LBS | OXYGEN SATURATION: 96 %

## 2023-02-07 DIAGNOSIS — Z45.2 ENCOUNTER FOR FITTING AND ADJUSTMENT OF VASCULAR CATHETER: Primary | ICD-10-CM

## 2023-02-07 DIAGNOSIS — C34.90 SMALL CELL LUNG CANCER: ICD-10-CM

## 2023-02-07 DIAGNOSIS — Z85.118 HISTORY OF LUNG CANCER: Primary | ICD-10-CM

## 2023-02-07 LAB
ALBUMIN SERPL-MCNC: 4.2 G/DL (ref 3.5–5.2)
ALBUMIN/GLOB SERPL: 1.4 G/DL (ref 1.1–2.4)
ALP SERPL-CCNC: 77 U/L (ref 38–116)
ALT SERPL W P-5'-P-CCNC: 23 U/L (ref 0–41)
ANION GAP SERPL CALCULATED.3IONS-SCNC: 9.8 MMOL/L (ref 5–15)
AST SERPL-CCNC: 23 U/L (ref 0–40)
BASOPHILS # BLD AUTO: 0.13 10*3/MM3 (ref 0–0.2)
BASOPHILS NFR BLD AUTO: 2.1 % (ref 0–1.5)
BILIRUB SERPL-MCNC: 0.4 MG/DL (ref 0.2–1.2)
BUN SERPL-MCNC: 16 MG/DL (ref 6–20)
BUN/CREAT SERPL: 13.6 (ref 7.3–30)
CALCIUM SPEC-SCNC: 9.7 MG/DL (ref 8.5–10.2)
CHLORIDE SERPL-SCNC: 103 MMOL/L (ref 98–107)
CO2 SERPL-SCNC: 25.2 MMOL/L (ref 22–29)
CREAT SERPL-MCNC: 1.18 MG/DL (ref 0.7–1.3)
DEPRECATED RDW RBC AUTO: 45.6 FL (ref 37–54)
EGFRCR SERPLBLD CKD-EPI 2021: 66.4 ML/MIN/1.73
EOSINOPHIL # BLD AUTO: 0.28 10*3/MM3 (ref 0–0.4)
EOSINOPHIL NFR BLD AUTO: 4.5 % (ref 0.3–6.2)
ERYTHROCYTE [DISTWIDTH] IN BLOOD BY AUTOMATED COUNT: 12.7 % (ref 12.3–15.4)
GLOBULIN UR ELPH-MCNC: 2.9 GM/DL (ref 1.8–3.5)
GLUCOSE SERPL-MCNC: 96 MG/DL (ref 74–124)
HCT VFR BLD AUTO: 43.2 % (ref 37.5–51)
HGB BLD-MCNC: 14.5 G/DL (ref 13–17.7)
IMM GRANULOCYTES # BLD AUTO: 0.02 10*3/MM3 (ref 0–0.05)
IMM GRANULOCYTES NFR BLD AUTO: 0.3 % (ref 0–0.5)
LYMPHOCYTES # BLD AUTO: 1.55 10*3/MM3 (ref 0.7–3.1)
LYMPHOCYTES NFR BLD AUTO: 25 % (ref 19.6–45.3)
MCH RBC QN AUTO: 32.6 PG (ref 26.6–33)
MCHC RBC AUTO-ENTMCNC: 33.6 G/DL (ref 31.5–35.7)
MCV RBC AUTO: 97.1 FL (ref 79–97)
MONOCYTES # BLD AUTO: 0.77 10*3/MM3 (ref 0.1–0.9)
MONOCYTES NFR BLD AUTO: 12.4 % (ref 5–12)
NEUTROPHILS NFR BLD AUTO: 3.45 10*3/MM3 (ref 1.7–7)
NEUTROPHILS NFR BLD AUTO: 55.7 % (ref 42.7–76)
NRBC BLD AUTO-RTO: 0 /100 WBC (ref 0–0.2)
PLATELET # BLD AUTO: 139 10*3/MM3 (ref 140–450)
PMV BLD AUTO: 10.6 FL (ref 6–12)
POTASSIUM SERPL-SCNC: 4.1 MMOL/L (ref 3.5–4.7)
PROT SERPL-MCNC: 7.1 G/DL (ref 6.3–8)
RBC # BLD AUTO: 4.45 10*6/MM3 (ref 4.14–5.8)
SODIUM SERPL-SCNC: 138 MMOL/L (ref 134–145)
WBC NRBC COR # BLD: 6.2 10*3/MM3 (ref 3.4–10.8)

## 2023-02-07 PROCEDURE — 25010000002 HEPARIN LOCK FLUSH PER 10 UNITS: Performed by: INTERNAL MEDICINE

## 2023-02-07 PROCEDURE — 36591 DRAW BLOOD OFF VENOUS DEVICE: CPT

## 2023-02-07 PROCEDURE — 85025 COMPLETE CBC W/AUTO DIFF WBC: CPT

## 2023-02-07 PROCEDURE — 80053 COMPREHEN METABOLIC PANEL: CPT

## 2023-02-07 PROCEDURE — 99214 OFFICE O/P EST MOD 30 MIN: CPT | Performed by: INTERNAL MEDICINE

## 2023-02-07 RX ORDER — HEPARIN SODIUM (PORCINE) LOCK FLUSH IV SOLN 100 UNIT/ML 100 UNIT/ML
500 SOLUTION INTRAVENOUS AS NEEDED
OUTPATIENT
Start: 2023-02-07

## 2023-02-07 RX ORDER — SODIUM CHLORIDE 0.9 % (FLUSH) 0.9 %
10 SYRINGE (ML) INJECTION AS NEEDED
Status: DISCONTINUED | OUTPATIENT
Start: 2023-02-07 | End: 2023-02-07 | Stop reason: HOSPADM

## 2023-02-07 RX ORDER — SODIUM CHLORIDE 0.9 % (FLUSH) 0.9 %
10 SYRINGE (ML) INJECTION AS NEEDED
OUTPATIENT
Start: 2023-02-07

## 2023-02-07 RX ORDER — HEPARIN SODIUM (PORCINE) LOCK FLUSH IV SOLN 100 UNIT/ML 100 UNIT/ML
500 SOLUTION INTRAVENOUS AS NEEDED
Status: DISCONTINUED | OUTPATIENT
Start: 2023-02-07 | End: 2023-02-07 | Stop reason: HOSPADM

## 2023-02-07 RX ADMIN — Medication 10 ML: at 10:37

## 2023-02-07 RX ADMIN — Medication 500 UNITS: at 10:37

## 2023-02-07 NOTE — PROGRESS NOTES
"REASON FOR FOLLOW UP:   1.  T4N3 Small cell lung cancer  2.  SVC syndrome  3.  Initially treated with chemotherapy.  Subsequent radiation.  Subsequent PCI. ISAIAS  4. 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 male with the above mentioned history, returns the office today for follow up to review imaging.    He continues to smoke.  He remains fatigued.  He denies any chest pain.  His appetite remains adequate.  He denies any new problems today.    Review of Systems   See the HPI    Vitals:    02/07/23 1049   BP: 150/90   Pulse: 79   Resp: 18   Temp: 98 °F (36.7 °C)   TempSrc: Temporal   SpO2: 96%   Weight: 86.5 kg (190 lb 9.6 oz)   Height: 175 cm (68.9\")   PainSc: 0-No pain      General:  No acute distress, awake, alert and oriented.  Remains chronically ill-appearing.  Skin:  Warm and dry, no visible rash.   HEENT:  Normocephalic/atraumatic.  Wearing a face mask.  Chest:  Normal respiratory effort.  Lungs clear to auscultation bilaterally.  Benign-appearing Mediport present in the right subclavian area.  Heart: Regular rate and rhythm  Lymphatics: No palpable cervical supraclavicular adenopathy  Extremities:  No visible clubbing, cyanosis, or edema  Neuro/psych:  Grossly nonfocal.  Normal mood and affect.      DIAGNOSTIC DATA:  CBC & Differential (02/07/2023 10:37)      IMAGING:  CT Chest With Contrast Diagnostic (01/31/2023 15:16)    CT images reviewed. Stable findings.     ASSESSMENT:  This is a 72 y.o. male with:    *Small cell lung cancer originating in the right hilum:   · He has an extremely large mass there with concern for SVC compression on CT imaging from 6/4/2019.  There is some left hilar adenopathy.  · Some parotid lesions which are concerning but indeterminate, left greater than right.  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  · Staging MRI of the brain 7/10/2019 negative for metastatic disease  · He was discussed at multidisciplinary thoracic conference, with his T4 and 3 disease, concern for involvement of the right supraclavicular region and obvious left hilum, plans to add atezolizumab with cycle 2, we can consider future radiation to the residual mediastinal mass if necessary.  · Lung lesion too large to radiate in spite of the SVC narrowing.    · On 7/8/2019 we started carboplatin AUC 5 and VP16 100 mg/m2 through peripheral IVs  · Cycle 2 postponed on 7/29/2019 due to heart rate of 154.  Atezolizumab added with cycle #2.  · Radiation can be added for a residual mediastinal mass if necessary  · CT imaging on 8/14/2019 after two cycles of therapy shows significant improvement.  · Mediport anticipated by Dr. Gallardo on 8/22/2019.  · On  8/20/2019 cycle 3 was delayed due to neutropenia and with cycle 3 we did incorporate Neulasta.  · Cycle 4 completed 9/17/2019  · Maintenance atezolizumab initiated 10/8/2019.  PET scan revealed disease response to therapy, he does have an area of hypermetabolic activity in the adrenal gland which is small and did not show up on prior exam.  We will continue to monitor this.  His MRI of the brain is also negative for metastatic disease.  · CT 2/4/2020 with ongoing improvement.  Maintenance atezolizumab pursued.    · CT imaging 5/19/2020 with progression.  Increase in the right hilar/mediastinal mass with enlarging adenopathy.    · Given that the progression is longer than 6 months after completing carbo/VP16, proceed with further carbo/VP16.  · Cycle 1 initiated 5/26/2020  · PET scan after two cycles with ongoing uptake in the right hilum, otherwise somewhat improved  · Radiation initiated 7/22/2020 with plans for 30 treatments  · Proceeded with cycle 4  carboplatin -16 7/28/2020.    · As of 8/19/2020, discussed holding further chemotherapy since he had 4 cycles versus continuing potentially with as many as 6.  He prefers to continue with therapy.    · Cycle 5 on 8/19/2020  · Radiation complete 9/1/2020  · As of 9/9/2020, plan no further chemotherapy  · MRI brain 11/4/2020 negative for mets.  · PET scan 11/4/2020. Improvement in the subcarinal and right hilar nodes with an SUV of 3.7 compared to 20.8 previously. Evidence for esophagitis. Right middle lobe changes resolved. Other stable changes that were previously noted. No new disease.     · MRI brain 1/28/2021 negative  · PCI pursued, complete from 2/16/2021 through 2/25/2021  · CT imaging 2/19/2021 with decrease in the primary tumor, two new small noncalcified nodules in the medial LLL     · CT imaging 4/13/2021 with subtle increase in left upper lobe and left perihilar nodules.  Right-sided subcarinal and infrahilar tumor unchanged.  Changes consistent with radiation pneumonitis.  · CT imaging 7/14/2021 with slight interval increase in size of 2 adjacent cavitary nodules in the medial left lower lobe.  Right infrahilar/subcarinal lesion stable.  · CT imaging 10/11/21 with enlarging right infrahilar mass with two enlarging left lower lobe cavitary lesions  · His case was presented at the multidisciplinary thoracic conference.  Plan observation of the lung lesions with serial CT scans.  If significant change, PET imaging and bronchoscopy.  · CT imaging 1/12/2022 with ongoing evolution of the left lower lobe cavitary lesions, some increase in size.  They have coalesced into one.  · PET scan on 1/25/2022 showed a hypermetabolic 3 x 2 cm cavitary nodule with no other evidence of disease.  · He did have a bronchoscopy performed on 2/21/2022.  BAL cytology negative for malignant cells.  Brushings with rare atypical squamous cells, nondiagnostic for malignancy.  Pathology showed evidence for dysplastic squamous  epithelium suspicious for squamous cell carcinoma.  PD-L1 60%.  · He is not on active therapy for his small cell lung cancer as we manage his squamous cell carcinoma as detailed below  · No evidence of disease at this time    *More recent squamous cell carcinoma of the left lower lobe  · Presented at thoracic conference. Plan concurrent chemoradiation with carbo/taxol  · 3/31/2022 week 4 concurrent chemoradiation with carboplatin Taxol  · 4/7/2022 week 5 concurrent therapy which will be held due to platelets of 76,000  · 4/14/2022: CBC remained stable.  Platelets stable at 78,000.  Proceed with cycle 5 carboplatin and paclitaxel.  Radiation complete as of 4/20.  · PET scan on 6/21/2022 without obvious disease.  Excellent response to therapy.  · CT imaging 10/18/2022 with no evidence of progression.  Ongoing improvement in the radiated lung lesion.  · CT imaging 1/31/23 stable    *Chronic hearing loss: History of left mastoiditis.  He is not a candidate for cisplatin because of this.     *Atrial fibrillation.  Currently asymptomatic    *Tobacco use.  Continues to smoke but states he is smoking less than prior    *History of SVC syndrome at diagnosis of small cell lung cancer.  Improved after 2 cycles of chemotherapy.  No further evidence of this and this has resolved.    *Dyspnea on exertion: Related to COPD and ongoing lung damage from smoking.  Stable shortness of breath    *Leg pain with walking: Some of this sounds like claudication but some of this sounds like orthopedic pain.  He did not complain of this today.    *Fatigue and tiredness: Stable    *Malfunctioning Mediport: He states his port functioned normally today    PLAN:   1. Port flush in about 2 months  2. CT chest with contrast in about 4 months.  3. I will see him back after that with labs and a port flush.  If everything looks stable at that time, continue follow-up imaging every 6 months.    Thai Morel MD  02/07/2023

## 2023-04-06 ENCOUNTER — TELEPHONE (OUTPATIENT)
Dept: ONCOLOGY | Facility: CLINIC | Age: 73
End: 2023-04-06
Payer: MEDICARE

## 2023-04-13 ENCOUNTER — INFUSION (OUTPATIENT)
Dept: ONCOLOGY | Facility: HOSPITAL | Age: 73
End: 2023-04-13
Payer: MEDICARE

## 2023-04-13 DIAGNOSIS — Z45.2 ENCOUNTER FOR FITTING AND ADJUSTMENT OF VASCULAR CATHETER: Primary | ICD-10-CM

## 2023-04-13 PROCEDURE — 96523 IRRIG DRUG DELIVERY DEVICE: CPT

## 2023-04-13 PROCEDURE — 25010000002 HEPARIN LOCK FLUSH PER 10 UNITS: Performed by: INTERNAL MEDICINE

## 2023-04-13 RX ORDER — SODIUM CHLORIDE 0.9 % (FLUSH) 0.9 %
10 SYRINGE (ML) INJECTION AS NEEDED
OUTPATIENT
Start: 2023-04-13

## 2023-04-13 RX ORDER — SODIUM CHLORIDE 0.9 % (FLUSH) 0.9 %
10 SYRINGE (ML) INJECTION AS NEEDED
Status: DISCONTINUED | OUTPATIENT
Start: 2023-04-13 | End: 2023-04-13 | Stop reason: HOSPADM

## 2023-04-13 RX ORDER — HEPARIN SODIUM (PORCINE) LOCK FLUSH IV SOLN 100 UNIT/ML 100 UNIT/ML
500 SOLUTION INTRAVENOUS AS NEEDED
OUTPATIENT
Start: 2023-04-13

## 2023-04-13 RX ORDER — HEPARIN SODIUM (PORCINE) LOCK FLUSH IV SOLN 100 UNIT/ML 100 UNIT/ML
500 SOLUTION INTRAVENOUS AS NEEDED
Status: DISCONTINUED | OUTPATIENT
Start: 2023-04-13 | End: 2023-04-13 | Stop reason: HOSPADM

## 2023-04-13 RX ADMIN — Medication 500 UNITS: at 14:01

## 2023-04-13 RX ADMIN — Medication 10 ML: at 14:01

## 2023-05-31 ENCOUNTER — HOSPITAL ENCOUNTER (OUTPATIENT)
Dept: CT IMAGING | Facility: HOSPITAL | Age: 73
Discharge: HOME | End: 2023-05-31
Admitting: INTERNAL MEDICINE

## 2023-05-31 DIAGNOSIS — Z85.118 HISTORY OF LUNG CANCER: ICD-10-CM

## 2023-05-31 LAB — CREAT BLDA-MCNC: 1.2 MG/DL (ref 0.6–1.3)

## 2023-05-31 PROCEDURE — 25510000001 IOPAMIDOL 61 % SOLUTION: Performed by: INTERNAL MEDICINE

## 2023-05-31 PROCEDURE — 82565 ASSAY OF CREATININE: CPT

## 2023-05-31 PROCEDURE — 71260 CT THORAX DX C+: CPT

## 2023-05-31 RX ADMIN — IOPAMIDOL 85 ML: 612 INJECTION, SOLUTION INTRAVENOUS at 14:40

## 2023-06-02 NOTE — PROGRESS NOTES
"REASON FOR FOLLOW UP:   1.  T4N3 Small cell lung cancer  2.  SVC syndrome  3.  Initially treated with chemotherapy.  Subsequent radiation.  Subsequent PCI. ISAIAS  4. 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 male with the above mentioned history, returns the office today for follow up to review imaging.    He overall is doing about the same.  He continues to smoke.  He continues to have arthritis pain.  He remains fatigued.  Adequate appetite.  No new issues today.    Review of Systems   See the HPI    Vitals:    06/05/23 1352   BP: 118/77   Pulse: 94   Resp: 16   Temp: 98.4 °F (36.9 °C)   TempSrc: Infrared   SpO2: 93%   Weight: 85.5 kg (188 lb 8 oz)   Height: 175 cm (68.9\")   PainSc: 0-No pain      General:  No acute distress, awake, alert and oriented.  Remains chronically ill-appearing.  Skin:  Warm and dry, no visible rash.   HEENT:  Normocephalic/atraumatic.  Wearing a face mask.  Chest:  Normal respiratory effort.  Lungs clear to auscultation bilaterally.  Benign-appearing Mediport present in the right subclavian area, unchanged today.  Heart: Regular rate and rhythm  Lymphatics: No palpable cervical supraclavicular adenopathy  Extremities:  No visible clubbing, cyanosis, or edema  Neuro/psych:  Grossly nonfocal.  Normal mood and affect.      DIAGNOSTIC DATA:  CBC & Differential (06/05/2023 13:42)     IMAGING:  CT Chest With Contrast Diagnostic (05/31/2023 14:41)     CT images personally reviewed.  Tiny right upper lobe groundglass nodule and a right lower lobe micronodule.    ASSESSMENT:  This is a 73 y.o. male with:    *Small cell lung cancer originating in the right hilum:   He has an extremely large mass there with concern for SVC compression on CT imaging from 6/4/2019.  There is some left hilar adenopathy.  Some parotid lesions which are concerning but " indeterminate, left greater than right.  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  Staging MRI of the brain 7/10/2019 negative for metastatic disease  He was discussed at multidisciplinary thoracic conference, with his T4 and 3 disease, concern for involvement of the right supraclavicular region and obvious left hilum, plans to add atezolizumab with cycle 2, we can consider future radiation to the residual mediastinal mass if necessary.  Lung lesion too large to radiate in spite of the SVC narrowing.    On 7/8/2019 we started carboplatin AUC 5 and VP16 100 mg/m2 through peripheral IVs  Cycle 2 postponed on 7/29/2019 due to heart rate of 154.  Atezolizumab added with cycle #2.  Radiation can be added for a residual mediastinal mass if necessary  CT imaging on 8/14/2019 after two cycles of therapy shows significant improvement.  Mediport anticipated by Dr. Gallardo on 8/22/2019.  On  8/20/2019 cycle 3 was delayed due to neutropenia and with cycle 3 we did incorporate Neulasta.  Cycle 4 completed 9/17/2019  Maintenance atezolizumab initiated 10/8/2019.  PET scan revealed disease response to therapy, he does have an area of hypermetabolic activity in the adrenal gland which is small and did not show up on prior exam.  We will continue to monitor this.  His MRI of the brain is also negative for metastatic disease.  CT 2/4/2020 with ongoing improvement.  Maintenance atezolizumab pursued.    CT imaging 5/19/2020 with progression.  Increase in the right hilar/mediastinal mass with enlarging adenopathy.    Given that the progression is longer than 6 months after completing carbo/VP16, proceed with further carbo/VP16.  Cycle 1 initiated 5/26/2020  PET scan after two cycles with ongoing uptake in the right hilum, otherwise somewhat improved  Radiation initiated  7/22/2020 with plans for 30 treatments  Proceeded with cycle 4 carboplatin -16 7/28/2020.    As of 8/19/2020, discussed holding further chemotherapy since he had 4 cycles versus continuing potentially with as many as 6.  He prefers to continue with therapy.    Cycle 5 on 8/19/2020  Radiation complete 9/1/2020  As of 9/9/2020, plan no further chemotherapy  MRI brain 11/4/2020 negative for mets.  PET scan 11/4/2020. Improvement in the subcarinal and right hilar nodes with an SUV of 3.7 compared to 20.8 previously. Evidence for esophagitis. Right middle lobe changes resolved. Other stable changes that were previously noted. No new disease.     MRI brain 1/28/2021 negative  PCI pursued, complete from 2/16/2021 through 2/25/2021  CT imaging 2/19/2021 with decrease in the primary tumor, two new small noncalcified nodules in the medial LLL     CT imaging 4/13/2021 with subtle increase in left upper lobe and left perihilar nodules.  Right-sided subcarinal and infrahilar tumor unchanged.  Changes consistent with radiation pneumonitis.  CT imaging 7/14/2021 with slight interval increase in size of 2 adjacent cavitary nodules in the medial left lower lobe.  Right infrahilar/subcarinal lesion stable.  CT imaging 10/11/21 with enlarging right infrahilar mass with two enlarging left lower lobe cavitary lesions  His case was presented at the multidisciplinary thoracic conference.  Plan observation of the lung lesions with serial CT scans.  If significant change, PET imaging and bronchoscopy.  CT imaging 1/12/2022 with ongoing evolution of the left lower lobe cavitary lesions, some increase in size.  They have coalesced into one.  PET scan on 1/25/2022 showed a hypermetabolic 3 x 2 cm cavitary nodule with no other evidence of disease.  He did have a bronchoscopy performed on 2/21/2022.  BAL cytology negative for malignant cells.  Brushings with rare atypical squamous cells, nondiagnostic for malignancy.  Pathology showed evidence  for dysplastic squamous epithelium suspicious for squamous cell carcinoma.  PD-L1 60%.  He is not on active therapy for his small cell lung cancer as we manage his squamous cell carcinoma as detailed below  CT imaging on 5/31/2023 without definite progression.    *More recent squamous cell carcinoma of the left lower lobe  Presented at thoracic conference. Plan concurrent chemoradiation with carbo/taxol  3/31/2022 week 4 concurrent chemoradiation with carboplatin Taxol  4/7/2022 week 5 concurrent therapy which will be held due to platelets of 76,000  4/14/2022: CBC remained stable.  Platelets stable at 78,000.  Proceed with cycle 5 carboplatin and paclitaxel.  Radiation complete as of 4/20.  PET scan on 6/21/2022 without obvious disease.  Excellent response to therapy.  CT imaging 10/18/2022 with no evidence of progression.  Ongoing improvement in the radiated lung lesion.  CT imaging 1/31/23 stable  CT imaging 5/31/2023 without definite progression.  A couple of tiny nodules are now visible.    *Chronic hearing loss: History of left mastoiditis.  He was not a candidate for cisplatin because of this.     *Atrial fibrillation.  Currently asymptomatic    *Tobacco use.  Continues to smoke but states he is smoking less than prior    *History of SVC syndrome at diagnosis of small cell lung cancer.  Improved after 2 cycles of chemotherapy.  No further evidence of this and this has resolved.    *Dyspnea on exertion: Related to COPD and ongoing lung damage from smoking.  Stable shortness of breath    *Leg pain with walking: Some of this sounds like claudication but some of this sounds like orthopedic pain.  This mostly sounds like orthopedic pain.    *Fatigue and tiredness: Stable    *History of malfunctioning Mediport: Functioning normally at this time.    PLAN:   He needs a port flush every 8 weeks or so  Plan follow-up CT imaging in 6 months and I will see him back after that for follow-up with labs and a port  fabian Morel MD  06/05/2023

## 2023-06-05 ENCOUNTER — OFFICE VISIT (OUTPATIENT)
Dept: ONCOLOGY | Facility: CLINIC | Age: 73
End: 2023-06-05
Payer: MEDICARE

## 2023-06-05 ENCOUNTER — INFUSION (OUTPATIENT)
Dept: ONCOLOGY | Facility: HOSPITAL | Age: 73
End: 2023-06-05
Payer: MEDICARE

## 2023-06-05 VITALS
TEMPERATURE: 98.4 F | DIASTOLIC BLOOD PRESSURE: 77 MMHG | BODY MASS INDEX: 27.92 KG/M2 | HEIGHT: 69 IN | SYSTOLIC BLOOD PRESSURE: 118 MMHG | RESPIRATION RATE: 16 BRPM | HEART RATE: 94 BPM | OXYGEN SATURATION: 93 % | WEIGHT: 188.5 LBS

## 2023-06-05 DIAGNOSIS — Z85.118 HISTORY OF LUNG CANCER: ICD-10-CM

## 2023-06-05 DIAGNOSIS — Z85.118 HISTORY OF LUNG CANCER: Primary | ICD-10-CM

## 2023-06-05 DIAGNOSIS — Z45.2 ENCOUNTER FOR FITTING AND ADJUSTMENT OF VASCULAR CATHETER: Primary | ICD-10-CM

## 2023-06-05 LAB
ALBUMIN SERPL-MCNC: 4.1 G/DL (ref 3.5–5.2)
ALBUMIN/GLOB SERPL: 1.2 G/DL (ref 1.1–2.4)
ALP SERPL-CCNC: 81 U/L (ref 38–116)
ALT SERPL W P-5'-P-CCNC: 14 U/L (ref 0–41)
ANION GAP SERPL CALCULATED.3IONS-SCNC: 12.3 MMOL/L (ref 5–15)
AST SERPL-CCNC: 28 U/L (ref 0–40)
BASOPHILS # BLD AUTO: 0.13 10*3/MM3 (ref 0–0.2)
BASOPHILS NFR BLD AUTO: 1.8 % (ref 0–1.5)
BILIRUB SERPL-MCNC: 0.4 MG/DL (ref 0.2–1.2)
BUN SERPL-MCNC: 13 MG/DL (ref 6–20)
BUN/CREAT SERPL: 12.5 (ref 7.3–30)
CALCIUM SPEC-SCNC: 9.4 MG/DL (ref 8.5–10.2)
CHLORIDE SERPL-SCNC: 101 MMOL/L (ref 98–107)
CO2 SERPL-SCNC: 20.7 MMOL/L (ref 22–29)
CREAT SERPL-MCNC: 1.04 MG/DL (ref 0.7–1.3)
DEPRECATED RDW RBC AUTO: 47.1 FL (ref 37–54)
EGFRCR SERPLBLD CKD-EPI 2021: 76.8 ML/MIN/1.73
EOSINOPHIL # BLD AUTO: 0.29 10*3/MM3 (ref 0–0.4)
EOSINOPHIL NFR BLD AUTO: 4 % (ref 0.3–6.2)
ERYTHROCYTE [DISTWIDTH] IN BLOOD BY AUTOMATED COUNT: 13.2 % (ref 12.3–15.4)
GLOBULIN UR ELPH-MCNC: 3.3 GM/DL (ref 1.8–3.5)
GLUCOSE SERPL-MCNC: 92 MG/DL (ref 74–124)
HCT VFR BLD AUTO: 46.7 % (ref 37.5–51)
HGB BLD-MCNC: 15.5 G/DL (ref 13–17.7)
IMM GRANULOCYTES # BLD AUTO: 0.03 10*3/MM3 (ref 0–0.05)
IMM GRANULOCYTES NFR BLD AUTO: 0.4 % (ref 0–0.5)
LYMPHOCYTES # BLD AUTO: 1.26 10*3/MM3 (ref 0.7–3.1)
LYMPHOCYTES NFR BLD AUTO: 17.4 % (ref 19.6–45.3)
MCH RBC QN AUTO: 32 PG (ref 26.6–33)
MCHC RBC AUTO-ENTMCNC: 33.2 G/DL (ref 31.5–35.7)
MCV RBC AUTO: 96.3 FL (ref 79–97)
MONOCYTES # BLD AUTO: 0.73 10*3/MM3 (ref 0.1–0.9)
MONOCYTES NFR BLD AUTO: 10.1 % (ref 5–12)
NEUTROPHILS NFR BLD AUTO: 4.79 10*3/MM3 (ref 1.7–7)
NEUTROPHILS NFR BLD AUTO: 66.3 % (ref 42.7–76)
NRBC BLD AUTO-RTO: 0 /100 WBC (ref 0–0.2)
PLATELET # BLD AUTO: 149 10*3/MM3 (ref 140–450)
PMV BLD AUTO: 10.4 FL (ref 6–12)
POTASSIUM SERPL-SCNC: 5.3 MMOL/L (ref 3.5–4.7)
PROT SERPL-MCNC: 7.4 G/DL (ref 6.3–8)
RBC # BLD AUTO: 4.85 10*6/MM3 (ref 4.14–5.8)
SODIUM SERPL-SCNC: 134 MMOL/L (ref 134–145)
WBC NRBC COR # BLD: 7.23 10*3/MM3 (ref 3.4–10.8)

## 2023-06-05 PROCEDURE — 1126F AMNT PAIN NOTED NONE PRSNT: CPT | Performed by: INTERNAL MEDICINE

## 2023-06-05 PROCEDURE — 25010000002 HEPARIN LOCK FLUSH PER 10 UNITS: Performed by: INTERNAL MEDICINE

## 2023-06-05 PROCEDURE — 1159F MED LIST DOCD IN RCRD: CPT | Performed by: INTERNAL MEDICINE

## 2023-06-05 PROCEDURE — 85025 COMPLETE CBC W/AUTO DIFF WBC: CPT

## 2023-06-05 PROCEDURE — 1160F RVW MEDS BY RX/DR IN RCRD: CPT | Performed by: INTERNAL MEDICINE

## 2023-06-05 PROCEDURE — 80053 COMPREHEN METABOLIC PANEL: CPT

## 2023-06-05 PROCEDURE — 99214 OFFICE O/P EST MOD 30 MIN: CPT | Performed by: INTERNAL MEDICINE

## 2023-06-05 PROCEDURE — 36591 DRAW BLOOD OFF VENOUS DEVICE: CPT

## 2023-06-05 RX ORDER — HEPARIN SODIUM (PORCINE) LOCK FLUSH IV SOLN 100 UNIT/ML 100 UNIT/ML
500 SOLUTION INTRAVENOUS AS NEEDED
OUTPATIENT
Start: 2023-06-05

## 2023-06-05 RX ORDER — HEPARIN SODIUM (PORCINE) LOCK FLUSH IV SOLN 100 UNIT/ML 100 UNIT/ML
500 SOLUTION INTRAVENOUS AS NEEDED
Status: DISCONTINUED | OUTPATIENT
Start: 2023-06-05 | End: 2023-06-05 | Stop reason: HOSPADM

## 2023-06-05 RX ORDER — SODIUM CHLORIDE 0.9 % (FLUSH) 0.9 %
10 SYRINGE (ML) INJECTION AS NEEDED
Status: DISCONTINUED | OUTPATIENT
Start: 2023-06-05 | End: 2023-06-05 | Stop reason: HOSPADM

## 2023-06-05 RX ORDER — SODIUM CHLORIDE 0.9 % (FLUSH) 0.9 %
10 SYRINGE (ML) INJECTION AS NEEDED
OUTPATIENT
Start: 2023-06-05

## 2023-06-05 RX ADMIN — Medication 10 ML: at 13:42

## 2023-06-05 RX ADMIN — Medication 500 UNITS: at 13:42

## 2023-07-31 ENCOUNTER — INFUSION (OUTPATIENT)
Dept: ONCOLOGY | Facility: HOSPITAL | Age: 73
End: 2023-07-31
Payer: MEDICARE

## 2023-07-31 DIAGNOSIS — Z45.2 ENCOUNTER FOR FITTING AND ADJUSTMENT OF VASCULAR CATHETER: Primary | ICD-10-CM

## 2023-07-31 PROCEDURE — 25010000002 HEPARIN LOCK FLUSH PER 10 UNITS: Performed by: INTERNAL MEDICINE

## 2023-07-31 PROCEDURE — 96523 IRRIG DRUG DELIVERY DEVICE: CPT

## 2023-07-31 RX ORDER — HEPARIN SODIUM (PORCINE) LOCK FLUSH IV SOLN 100 UNIT/ML 100 UNIT/ML
500 SOLUTION INTRAVENOUS AS NEEDED
OUTPATIENT
Start: 2023-07-31

## 2023-07-31 RX ORDER — HEPARIN SODIUM (PORCINE) LOCK FLUSH IV SOLN 100 UNIT/ML 100 UNIT/ML
500 SOLUTION INTRAVENOUS AS NEEDED
Status: DISCONTINUED | OUTPATIENT
Start: 2023-07-31 | End: 2023-07-31 | Stop reason: HOSPADM

## 2023-07-31 RX ORDER — SODIUM CHLORIDE 0.9 % (FLUSH) 0.9 %
10 SYRINGE (ML) INJECTION AS NEEDED
OUTPATIENT
Start: 2023-07-31

## 2023-07-31 RX ORDER — SODIUM CHLORIDE 0.9 % (FLUSH) 0.9 %
10 SYRINGE (ML) INJECTION AS NEEDED
Status: DISCONTINUED | OUTPATIENT
Start: 2023-07-31 | End: 2023-07-31 | Stop reason: HOSPADM

## 2023-07-31 RX ADMIN — Medication 10 ML: at 13:56

## 2023-07-31 RX ADMIN — Medication 500 UNITS: at 13:56

## 2023-09-19 ENCOUNTER — TELEPHONE (OUTPATIENT)
Dept: ONCOLOGY | Facility: CLINIC | Age: 73
End: 2023-09-19
Payer: MEDICARE

## 2023-09-19 NOTE — TELEPHONE ENCOUNTER
"    Caller: Julia Smith III \"Oliver\"    Relationship to patient: Self    Best call back number: [phone]     Chief complaint: R/S    Type of visit: PORT FLUSH    Requested date: CALL PT TO R/S     If rescheduling, when is the original appointment: 9/18             "

## 2023-09-20 NOTE — TELEPHONE ENCOUNTER
"  Caller: Julia Smith III \"Oliver\"     Relationship to patient: Self     Best call back number: [phone]      Chief complaint: R/S     Type of visit: PORT FLUSH     Requested date: PATIENT CALLING BACK TO R/S      If rescheduling, when is the original appointment: 9/18         "

## 2023-09-27 ENCOUNTER — INFUSION (OUTPATIENT)
Dept: ONCOLOGY | Facility: HOSPITAL | Age: 73
End: 2023-09-27
Payer: MEDICARE

## 2023-09-27 DIAGNOSIS — Z45.2 ENCOUNTER FOR FITTING AND ADJUSTMENT OF VASCULAR CATHETER: Primary | ICD-10-CM

## 2023-09-27 PROCEDURE — 25010000002 HEPARIN LOCK FLUSH PER 10 UNITS: Performed by: INTERNAL MEDICINE

## 2023-09-27 PROCEDURE — 96523 IRRIG DRUG DELIVERY DEVICE: CPT

## 2023-09-27 RX ORDER — SODIUM CHLORIDE 0.9 % (FLUSH) 0.9 %
10 SYRINGE (ML) INJECTION AS NEEDED
Status: DISCONTINUED | OUTPATIENT
Start: 2023-09-27 | End: 2023-09-27 | Stop reason: HOSPADM

## 2023-09-27 RX ORDER — HEPARIN SODIUM (PORCINE) LOCK FLUSH IV SOLN 100 UNIT/ML 100 UNIT/ML
500 SOLUTION INTRAVENOUS AS NEEDED
Status: DISCONTINUED | OUTPATIENT
Start: 2023-09-27 | End: 2023-09-27 | Stop reason: HOSPADM

## 2023-09-27 RX ORDER — HEPARIN SODIUM (PORCINE) LOCK FLUSH IV SOLN 100 UNIT/ML 100 UNIT/ML
500 SOLUTION INTRAVENOUS AS NEEDED
OUTPATIENT
Start: 2023-09-27

## 2023-09-27 RX ORDER — SODIUM CHLORIDE 0.9 % (FLUSH) 0.9 %
10 SYRINGE (ML) INJECTION AS NEEDED
OUTPATIENT
Start: 2023-09-27

## 2023-09-27 RX ADMIN — Medication 500 UNITS: at 13:59

## 2023-09-27 RX ADMIN — Medication 10 ML: at 13:59

## 2023-11-13 ENCOUNTER — HOSPITAL ENCOUNTER (OUTPATIENT)
Dept: CT IMAGING | Facility: HOSPITAL | Age: 73
Discharge: HOME | End: 2023-11-13
Admitting: INTERNAL MEDICINE
Payer: MEDICARE

## 2023-11-13 DIAGNOSIS — Z85.118 HISTORY OF LUNG CANCER: ICD-10-CM

## 2023-11-13 LAB — CREAT BLDA-MCNC: 1 MG/DL (ref 0.6–1.3)

## 2023-11-13 PROCEDURE — 71260 CT THORAX DX C+: CPT

## 2023-11-13 PROCEDURE — 82565 ASSAY OF CREATININE: CPT

## 2023-11-13 PROCEDURE — 25510000001 IOPAMIDOL 61 % SOLUTION: Performed by: INTERNAL MEDICINE

## 2023-11-13 RX ADMIN — IOPAMIDOL 75 ML: 612 INJECTION, SOLUTION INTRAVENOUS at 14:22

## 2023-11-15 NOTE — PROGRESS NOTES
"REASON FOR FOLLOW UP:   1.  T4N3 Small cell lung cancer  2.  SVC syndrome  3.  Initially treated with chemotherapy.  Subsequent radiation.  Subsequent PCI. ISAIAS  4. 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 male with the above mentioned history, returns the office today for follow up to review imaging.    In general he is doing about the same.  About a month ago he did injure his left leg and has a nonhealing wound there which is painful.  He is on Augmentin.  He requests referral to wound clinic.  He has been seen at 2 different urgent cares.    Breathing is about the same.  No new respiratory issues.    Review of Systems   See the HPI    Vitals:    11/20/23 1321   BP: 132/84   Pulse: 82   Resp: 16   Temp: 97.8 °F (36.6 °C)   TempSrc: Infrared   SpO2: 97%   Weight: 86.1 kg (189 lb 12.8 oz)   Height: 175 cm (68.9\")   PainSc: 0-No pain        General:  No acute distress, awake, alert and oriented.  Remains chronically ill-appearing.  Skin: There is about a 2 cm rounded crusted skin lesion on the left tib/fib area.  Tenderness surrounding this.  Mild erythema.  HEENT:  Normocephalic/atraumatic.   Chest:  Normal respiratory effort.  Lungs clear to auscultation bilaterally.  Benign-appearing Mediport present in the right subclavian area, unchanged today.  Heart: Regular rate and rhythm  Lymphatics: No palpable cervical supraclavicular adenopathy  Extremities:  No visible clubbing, cyanosis, or edema  Neuro/psych:  Grossly nonfocal.  Normal mood and affect.              DIAGNOSTIC DATA:  CBC & Differential (11/20/2023 13:12)     IMAGING:  CT Chest With Contrast Diagnostic (11/13/2023 14:23)     CT images personally reviewed.  4 mm right lower lobe nodule.  We will continue observation.  Stable findings otherwise.    ASSESSMENT:  This is a 73 y.o. male with:    *Small cell lung " cancer originating in the right hilum:   He has an extremely large mass there with concern for SVC compression on CT imaging from 6/4/2019.  There is some left hilar adenopathy.  Some parotid lesions which are concerning but indeterminate, left greater than right.  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  Staging MRI of the brain 7/10/2019 negative for metastatic disease  He was discussed at multidisciplinary thoracic conference, with his T4 and 3 disease, concern for involvement of the right supraclavicular region and obvious left hilum, plans to add atezolizumab with cycle 2, we can consider future radiation to the residual mediastinal mass if necessary.  Lung lesion too large to radiate in spite of the SVC narrowing.    On 7/8/2019 we started carboplatin AUC 5 and VP16 100 mg/m2 through peripheral IVs  Cycle 2 postponed on 7/29/2019 due to heart rate of 154.  Atezolizumab added with cycle #2.  Radiation can be added for a residual mediastinal mass if necessary  CT imaging on 8/14/2019 after two cycles of therapy shows significant improvement.  Mediport anticipated by Dr. Gallardo on 8/22/2019.  On  8/20/2019 cycle 3 was delayed due to neutropenia and with cycle 3 we did incorporate Neulasta.  Cycle 4 completed 9/17/2019  Maintenance atezolizumab initiated 10/8/2019.  PET scan revealed disease response to therapy, he does have an area of hypermetabolic activity in the adrenal gland which is small and did not show up on prior exam.  We will continue to monitor this.  His MRI of the brain is also negative for metastatic disease.  CT 2/4/2020 with ongoing improvement.  Maintenance atezolizumab pursued.    CT imaging 5/19/2020 with progression.  Increase in the right hilar/mediastinal mass with enlarging adenopathy.    Given that the progression is longer  than 6 months after completing carbo/VP16, proceed with further carbo/VP16.  Cycle 1 initiated 5/26/2020  PET scan after two cycles with ongoing uptake in the right hilum, otherwise somewhat improved  Radiation initiated 7/22/2020 with plans for 30 treatments  Proceeded with cycle 4 carboplatin -16 7/28/2020.    As of 8/19/2020, discussed holding further chemotherapy since he had 4 cycles versus continuing potentially with as many as 6.  He prefers to continue with therapy.    Cycle 5 on 8/19/2020  Radiation complete 9/1/2020  As of 9/9/2020, plan no further chemotherapy  MRI brain 11/4/2020 negative for mets.  PET scan 11/4/2020. Improvement in the subcarinal and right hilar nodes with an SUV of 3.7 compared to 20.8 previously. Evidence for esophagitis. Right middle lobe changes resolved. Other stable changes that were previously noted. No new disease.     MRI brain 1/28/2021 negative  PCI pursued, complete from 2/16/2021 through 2/25/2021  CT imaging 2/19/2021 with decrease in the primary tumor, two new small noncalcified nodules in the medial LLL     CT imaging 4/13/2021 with subtle increase in left upper lobe and left perihilar nodules.  Right-sided subcarinal and infrahilar tumor unchanged.  Changes consistent with radiation pneumonitis.  CT imaging 7/14/2021 with slight interval increase in size of 2 adjacent cavitary nodules in the medial left lower lobe.  Right infrahilar/subcarinal lesion stable.  CT imaging 10/11/21 with enlarging right infrahilar mass with two enlarging left lower lobe cavitary lesions  His case was presented at the multidisciplinary thoracic conference.  Plan observation of the lung lesions with serial CT scans.  If significant change, PET imaging and bronchoscopy.  CT imaging 1/12/2022 with ongoing evolution of the left lower lobe cavitary lesions, some increase in size.  They have coalesced into one.  PET scan on 1/25/2022 showed a hypermetabolic 3 x 2 cm cavitary nodule with no  other evidence of disease.  He did have a bronchoscopy performed on 2/21/2022.  BAL cytology negative for malignant cells.  Brushings with rare atypical squamous cells, nondiagnostic for malignancy.  Pathology showed evidence for dysplastic squamous epithelium suspicious for squamous cell carcinoma.  PD-L1 60%.  He is not on active therapy for his small cell lung cancer as we manage his squamous cell carcinoma as detailed below  CT imaging on 5/31/2023 without definite progression.  CT imaging 11/13/2023 without progression    *More recent squamous cell carcinoma of the left lower lobe  Presented at thoracic conference. Plan concurrent chemoradiation with carbo/taxol  3/31/2022 week 4 concurrent chemoradiation with carboplatin Taxol  4/7/2022 week 5 concurrent therapy which will be held due to platelets of 76,000  4/14/2022: CBC remained stable.  Platelets stable at 78,000.  Proceed with cycle 5 carboplatin and paclitaxel.  Radiation complete as of 4/20.  PET scan on 6/21/2022 without obvious disease.  Excellent response to therapy.  CT imaging 10/18/2022 with no evidence of progression.  Ongoing improvement in the radiated lung lesion.  CT imaging 1/31/23 stable  CT imaging 5/31/2023 without definite progression.  A couple of tiny nodules are now visible.    *Chronic hearing loss: History of left mastoiditis.  He was not a candidate for cisplatin because of this.     *Atrial fibrillation.  Currently asymptomatic    *Tobacco use.  Continues to smoke but states he is smoking less than prior    *History of SVC syndrome at diagnosis of small cell lung cancer.  Improved after 2 cycles of chemotherapy.  No further evidence of this and this has resolved.    *Dyspnea on exertion: Related to COPD and ongoing lung damage from smoking.  Stable shortness of breath    *Leg pain with walking: Some of this sounds like claudication but some of this sounds like orthopedic pain.  This mostly sounds like orthopedic pain.    *Fatigue  and tiredness: Stable    *History of malfunctioning Mediport: Functioning normally at this time.    *Left lower extremity wound    PLAN:   I advised him to keep his leg wound covered with a bandage with some antibiotic ointment on it as well   Continue oral antibiotics  Refer to wound care  Port flush every 8 weeks  Follow-up CT imaging in 6 months.  I will see him back after that with labs and a port flush.    Thai Morel MD  11/20/2023

## 2023-11-20 ENCOUNTER — INFUSION (OUTPATIENT)
Dept: ONCOLOGY | Facility: HOSPITAL | Age: 73
End: 2023-11-20
Payer: MEDICARE

## 2023-11-20 ENCOUNTER — OFFICE VISIT (OUTPATIENT)
Dept: ONCOLOGY | Facility: CLINIC | Age: 73
End: 2023-11-20
Payer: MEDICARE

## 2023-11-20 VITALS
TEMPERATURE: 97.8 F | OXYGEN SATURATION: 97 % | SYSTOLIC BLOOD PRESSURE: 132 MMHG | DIASTOLIC BLOOD PRESSURE: 84 MMHG | WEIGHT: 189.8 LBS | HEART RATE: 82 BPM | RESPIRATION RATE: 16 BRPM | HEIGHT: 69 IN | BODY MASS INDEX: 28.11 KG/M2

## 2023-11-20 DIAGNOSIS — Z85.118 HISTORY OF LUNG CANCER: ICD-10-CM

## 2023-11-20 DIAGNOSIS — Z45.2 ENCOUNTER FOR FITTING AND ADJUSTMENT OF VASCULAR CATHETER: Primary | ICD-10-CM

## 2023-11-20 DIAGNOSIS — S81.802A WOUND OF LEFT LOWER EXTREMITY, INITIAL ENCOUNTER: Primary | ICD-10-CM

## 2023-11-20 LAB
ALBUMIN SERPL-MCNC: 3.7 G/DL (ref 3.5–5.2)
ALBUMIN/GLOB SERPL: 1.3 G/DL
ALP SERPL-CCNC: 79 U/L (ref 39–117)
ALT SERPL W P-5'-P-CCNC: 33 U/L (ref 1–41)
ANION GAP SERPL CALCULATED.3IONS-SCNC: 10.1 MMOL/L (ref 5–15)
AST SERPL-CCNC: 31 U/L (ref 1–40)
BASOPHILS # BLD AUTO: 0.11 10*3/MM3 (ref 0–0.2)
BASOPHILS NFR BLD AUTO: 1.2 % (ref 0–1.5)
BILIRUB SERPL-MCNC: 0.6 MG/DL (ref 0–1.2)
BUN SERPL-MCNC: 10 MG/DL (ref 8–23)
BUN/CREAT SERPL: 10.6 (ref 7–25)
CALCIUM SPEC-SCNC: 8.7 MG/DL (ref 8.6–10.5)
CHLORIDE SERPL-SCNC: 102 MMOL/L (ref 98–107)
CO2 SERPL-SCNC: 25.9 MMOL/L (ref 22–29)
CREAT SERPL-MCNC: 0.94 MG/DL (ref 0.7–1.3)
DEPRECATED RDW RBC AUTO: 45.3 FL (ref 37–54)
EGFRCR SERPLBLD CKD-EPI 2021: 86.7 ML/MIN/1.73
EOSINOPHIL # BLD AUTO: 0.78 10*3/MM3 (ref 0–0.4)
EOSINOPHIL NFR BLD AUTO: 8.7 % (ref 0.3–6.2)
ERYTHROCYTE [DISTWIDTH] IN BLOOD BY AUTOMATED COUNT: 13.2 % (ref 12.3–15.4)
GLOBULIN UR ELPH-MCNC: 2.9 GM/DL
GLUCOSE SERPL-MCNC: 86 MG/DL (ref 65–99)
HCT VFR BLD AUTO: 44.4 % (ref 37.5–51)
HGB BLD-MCNC: 15.3 G/DL (ref 13–17.7)
IMM GRANULOCYTES # BLD AUTO: 0.03 10*3/MM3 (ref 0–0.05)
IMM GRANULOCYTES NFR BLD AUTO: 0.3 % (ref 0–0.5)
LYMPHOCYTES # BLD AUTO: 1.04 10*3/MM3 (ref 0.7–3.1)
LYMPHOCYTES NFR BLD AUTO: 11.6 % (ref 19.6–45.3)
MCH RBC QN AUTO: 32.6 PG (ref 26.6–33)
MCHC RBC AUTO-ENTMCNC: 34.5 G/DL (ref 31.5–35.7)
MCV RBC AUTO: 94.7 FL (ref 79–97)
MONOCYTES # BLD AUTO: 1.02 10*3/MM3 (ref 0.1–0.9)
MONOCYTES NFR BLD AUTO: 11.3 % (ref 5–12)
NEUTROPHILS NFR BLD AUTO: 6.02 10*3/MM3 (ref 1.7–7)
NEUTROPHILS NFR BLD AUTO: 66.9 % (ref 42.7–76)
NRBC BLD AUTO-RTO: 0 /100 WBC (ref 0–0.2)
PLATELET # BLD AUTO: 142 10*3/MM3 (ref 140–450)
PMV BLD AUTO: 10.8 FL (ref 6–12)
POTASSIUM SERPL-SCNC: 4 MMOL/L (ref 3.5–5.2)
PROT SERPL-MCNC: 6.6 G/DL (ref 6–8.5)
RBC # BLD AUTO: 4.69 10*6/MM3 (ref 4.14–5.8)
SODIUM SERPL-SCNC: 138 MMOL/L (ref 136–145)
WBC NRBC COR # BLD AUTO: 9 10*3/MM3 (ref 3.4–10.8)

## 2023-11-20 PROCEDURE — 80053 COMPREHEN METABOLIC PANEL: CPT

## 2023-11-20 PROCEDURE — 85025 COMPLETE CBC W/AUTO DIFF WBC: CPT

## 2023-11-20 PROCEDURE — 25010000002 HEPARIN LOCK FLUSH PER 10 UNITS: Performed by: INTERNAL MEDICINE

## 2023-11-20 PROCEDURE — 36591 DRAW BLOOD OFF VENOUS DEVICE: CPT

## 2023-11-20 RX ORDER — NAPROXEN SODIUM 220 MG
220 TABLET ORAL 2 TIMES DAILY PRN
COMMUNITY

## 2023-11-20 RX ORDER — AMOXICILLIN AND CLAVULANATE POTASSIUM 500; 125 MG/1; MG/1
TABLET, FILM COATED ORAL
COMMUNITY
Start: 2023-11-08

## 2023-11-20 RX ORDER — SODIUM CHLORIDE 0.9 % (FLUSH) 0.9 %
10 SYRINGE (ML) INJECTION AS NEEDED
OUTPATIENT
Start: 2023-11-20

## 2023-11-20 RX ORDER — HEPARIN SODIUM (PORCINE) LOCK FLUSH IV SOLN 100 UNIT/ML 100 UNIT/ML
500 SOLUTION INTRAVENOUS AS NEEDED
Status: DISCONTINUED | OUTPATIENT
Start: 2023-11-20 | End: 2023-11-20 | Stop reason: HOSPADM

## 2023-11-20 RX ORDER — HEPARIN SODIUM (PORCINE) LOCK FLUSH IV SOLN 100 UNIT/ML 100 UNIT/ML
500 SOLUTION INTRAVENOUS AS NEEDED
OUTPATIENT
Start: 2023-11-20

## 2023-11-20 RX ORDER — SODIUM CHLORIDE 0.9 % (FLUSH) 0.9 %
10 SYRINGE (ML) INJECTION AS NEEDED
Status: DISCONTINUED | OUTPATIENT
Start: 2023-11-20 | End: 2023-11-20 | Stop reason: HOSPADM

## 2023-11-20 RX ADMIN — Medication 10 ML: at 13:12

## 2023-11-20 RX ADMIN — Medication 500 UNITS: at 13:12

## 2024-01-15 ENCOUNTER — INFUSION (OUTPATIENT)
Dept: ONCOLOGY | Facility: HOSPITAL | Age: 74
End: 2024-01-15
Payer: MEDICARE

## 2024-01-15 DIAGNOSIS — Z45.2 ENCOUNTER FOR FITTING AND ADJUSTMENT OF VASCULAR CATHETER: Primary | ICD-10-CM

## 2024-01-15 PROCEDURE — 25010000002 HEPARIN LOCK FLUSH PER 10 UNITS: Performed by: INTERNAL MEDICINE

## 2024-01-15 PROCEDURE — 96523 IRRIG DRUG DELIVERY DEVICE: CPT

## 2024-01-15 RX ORDER — HEPARIN SODIUM (PORCINE) LOCK FLUSH IV SOLN 100 UNIT/ML 100 UNIT/ML
500 SOLUTION INTRAVENOUS AS NEEDED
OUTPATIENT
Start: 2024-01-15

## 2024-01-15 RX ORDER — SODIUM CHLORIDE 0.9 % (FLUSH) 0.9 %
10 SYRINGE (ML) INJECTION AS NEEDED
OUTPATIENT
Start: 2024-01-15

## 2024-01-15 RX ORDER — SODIUM CHLORIDE 0.9 % (FLUSH) 0.9 %
10 SYRINGE (ML) INJECTION AS NEEDED
Status: DISCONTINUED | OUTPATIENT
Start: 2024-01-15 | End: 2024-01-15 | Stop reason: HOSPADM

## 2024-01-15 RX ORDER — HEPARIN SODIUM (PORCINE) LOCK FLUSH IV SOLN 100 UNIT/ML 100 UNIT/ML
500 SOLUTION INTRAVENOUS AS NEEDED
Status: DISCONTINUED | OUTPATIENT
Start: 2024-01-15 | End: 2024-01-15 | Stop reason: HOSPADM

## 2024-01-15 RX ADMIN — Medication 500 UNITS: at 13:39

## 2024-01-15 RX ADMIN — Medication 10 ML: at 13:39

## 2024-03-11 ENCOUNTER — INFUSION (OUTPATIENT)
Dept: ONCOLOGY | Facility: HOSPITAL | Age: 74
End: 2024-03-11
Payer: MEDICARE

## 2024-03-11 DIAGNOSIS — Z45.2 ENCOUNTER FOR FITTING AND ADJUSTMENT OF VASCULAR CATHETER: Primary | ICD-10-CM

## 2024-03-11 PROCEDURE — 25010000002 HEPARIN LOCK FLUSH PER 10 UNITS: Performed by: INTERNAL MEDICINE

## 2024-03-11 PROCEDURE — 96523 IRRIG DRUG DELIVERY DEVICE: CPT

## 2024-03-11 RX ORDER — SODIUM CHLORIDE 0.9 % (FLUSH) 0.9 %
10 SYRINGE (ML) INJECTION AS NEEDED
Status: DISCONTINUED | OUTPATIENT
Start: 2024-03-11 | End: 2024-03-11 | Stop reason: HOSPADM

## 2024-03-11 RX ORDER — HEPARIN SODIUM (PORCINE) LOCK FLUSH IV SOLN 100 UNIT/ML 100 UNIT/ML
500 SOLUTION INTRAVENOUS AS NEEDED
Status: DISCONTINUED | OUTPATIENT
Start: 2024-03-11 | End: 2024-03-11 | Stop reason: HOSPADM

## 2024-03-11 RX ADMIN — Medication 500 UNITS: at 13:50

## 2024-03-11 RX ADMIN — Medication 10 ML: at 13:50

## 2024-03-15 ENCOUNTER — APPOINTMENT (OUTPATIENT)
Dept: GENERAL RADIOLOGY | Facility: HOSPITAL | Age: 74
DRG: 866 | End: 2024-03-15
Payer: MEDICARE

## 2024-03-15 ENCOUNTER — HOSPITAL ENCOUNTER (INPATIENT)
Facility: HOSPITAL | Age: 74
LOS: 3 days | Discharge: HOME | DRG: 866 | End: 2024-03-19
Attending: EMERGENCY MEDICINE | Admitting: INTERNAL MEDICINE
Payer: MEDICARE

## 2024-03-15 DIAGNOSIS — J10.1 INFLUENZA A: ICD-10-CM

## 2024-03-15 DIAGNOSIS — R53.1 GENERALIZED WEAKNESS: ICD-10-CM

## 2024-03-15 DIAGNOSIS — J18.9 PNEUMONIA DUE TO INFECTIOUS ORGANISM, UNSPECIFIED LATERALITY, UNSPECIFIED PART OF LUNG: ICD-10-CM

## 2024-03-15 DIAGNOSIS — R06.02 SHORTNESS OF BREATH: Primary | ICD-10-CM

## 2024-03-15 DIAGNOSIS — J44.9 CHRONIC OBSTRUCTIVE PULMONARY DISEASE, UNSPECIFIED COPD TYPE: Chronic | ICD-10-CM

## 2024-03-15 PROBLEM — M79.604 RIGHT LEG PAIN: Status: ACTIVE | Noted: 2024-03-15

## 2024-03-15 LAB
ALBUMIN SERPL-MCNC: 4.1 G/DL (ref 3.5–5.2)
ALBUMIN/GLOB SERPL: 1.6 G/DL
ALP SERPL-CCNC: 84 U/L (ref 39–117)
ALT SERPL W P-5'-P-CCNC: 35 U/L (ref 1–41)
ANION GAP SERPL CALCULATED.3IONS-SCNC: 12 MMOL/L (ref 5–15)
AST SERPL-CCNC: 44 U/L (ref 1–40)
B PARAPERT DNA SPEC QL NAA+PROBE: NOT DETECTED
B PERT DNA SPEC QL NAA+PROBE: NOT DETECTED
BASOPHILS # BLD AUTO: 0.04 10*3/MM3 (ref 0–0.2)
BASOPHILS NFR BLD AUTO: 0.7 % (ref 0–1.5)
BILIRUB SERPL-MCNC: 0.3 MG/DL (ref 0–1.2)
BUN SERPL-MCNC: 23 MG/DL (ref 8–23)
BUN/CREAT SERPL: 22.1 (ref 7–25)
C PNEUM DNA NPH QL NAA+NON-PROBE: NOT DETECTED
CALCIUM SPEC-SCNC: 8.6 MG/DL (ref 8.6–10.5)
CHLORIDE SERPL-SCNC: 99 MMOL/L (ref 98–107)
CO2 SERPL-SCNC: 23 MMOL/L (ref 22–29)
CREAT SERPL-MCNC: 1.04 MG/DL (ref 0.76–1.27)
DEPRECATED RDW RBC AUTO: 43.3 FL (ref 37–54)
EGFRCR SERPLBLD CKD-EPI 2021: 76.3 ML/MIN/1.73
EOSINOPHIL # BLD AUTO: 0.01 10*3/MM3 (ref 0–0.4)
EOSINOPHIL NFR BLD AUTO: 0.2 % (ref 0.3–6.2)
ERYTHROCYTE [DISTWIDTH] IN BLOOD BY AUTOMATED COUNT: 13.4 % (ref 12.3–15.4)
FLUAV H1 2009 PAND RNA NPH QL NAA+PROBE: DETECTED
FLUBV RNA ISLT QL NAA+PROBE: NOT DETECTED
GLOBULIN UR ELPH-MCNC: 2.5 GM/DL
GLUCOSE SERPL-MCNC: 72 MG/DL (ref 65–99)
HADV DNA SPEC NAA+PROBE: NOT DETECTED
HCOV 229E RNA SPEC QL NAA+PROBE: NOT DETECTED
HCOV HKU1 RNA SPEC QL NAA+PROBE: NOT DETECTED
HCOV NL63 RNA SPEC QL NAA+PROBE: NOT DETECTED
HCOV OC43 RNA SPEC QL NAA+PROBE: NOT DETECTED
HCT VFR BLD AUTO: 42 % (ref 37.5–51)
HGB BLD-MCNC: 14.3 G/DL (ref 13–17.7)
HMPV RNA NPH QL NAA+NON-PROBE: NOT DETECTED
HPIV1 RNA ISLT QL NAA+PROBE: NOT DETECTED
HPIV2 RNA SPEC QL NAA+PROBE: NOT DETECTED
HPIV3 RNA NPH QL NAA+PROBE: NOT DETECTED
HPIV4 P GENE NPH QL NAA+PROBE: NOT DETECTED
LYMPHOCYTES # BLD AUTO: 0.55 10*3/MM3 (ref 0.7–3.1)
LYMPHOCYTES NFR BLD AUTO: 10.3 % (ref 19.6–45.3)
M PNEUMO IGG SER IA-ACNC: NOT DETECTED
MAGNESIUM SERPL-MCNC: 1.7 MG/DL (ref 1.6–2.4)
MCH RBC QN AUTO: 30.1 PG (ref 26.6–33)
MCHC RBC AUTO-ENTMCNC: 34 G/DL (ref 31.5–35.7)
MCV RBC AUTO: 88.4 FL (ref 79–97)
MONOCYTES # BLD AUTO: 0.68 10*3/MM3 (ref 0.1–0.9)
MONOCYTES NFR BLD AUTO: 12.7 % (ref 5–12)
NEUTROPHILS NFR BLD AUTO: 4.05 10*3/MM3 (ref 1.7–7)
NEUTROPHILS NFR BLD AUTO: 75.9 % (ref 42.7–76)
NT-PROBNP SERPL-MCNC: 98.8 PG/ML (ref 0–900)
PLATELET # BLD AUTO: 98 10*3/MM3 (ref 140–450)
PMV BLD AUTO: 10.9 FL (ref 6–12)
POTASSIUM SERPL-SCNC: 3.8 MMOL/L (ref 3.5–5.2)
PROT SERPL-MCNC: 6.6 G/DL (ref 6–8.5)
RBC # BLD AUTO: 4.75 10*6/MM3 (ref 4.14–5.8)
RHINOVIRUS RNA SPEC NAA+PROBE: NOT DETECTED
RSV RNA NPH QL NAA+NON-PROBE: NOT DETECTED
SARS-COV-2 RNA NPH QL NAA+NON-PROBE: NOT DETECTED
SODIUM SERPL-SCNC: 134 MMOL/L (ref 136–145)
TROPONIN T SERPL HS-MCNC: 14 NG/L
WBC NRBC COR # BLD AUTO: 5.34 10*3/MM3 (ref 3.4–10.8)

## 2024-03-15 PROCEDURE — 94640 AIRWAY INHALATION TREATMENT: CPT

## 2024-03-15 PROCEDURE — 93005 ELECTROCARDIOGRAM TRACING: CPT | Performed by: PHYSICIAN ASSISTANT

## 2024-03-15 PROCEDURE — 84484 ASSAY OF TROPONIN QUANT: CPT | Performed by: PHYSICIAN ASSISTANT

## 2024-03-15 PROCEDURE — 80053 COMPREHEN METABOLIC PANEL: CPT | Performed by: PHYSICIAN ASSISTANT

## 2024-03-15 PROCEDURE — 84145 PROCALCITONIN (PCT): CPT | Performed by: STUDENT IN AN ORGANIZED HEALTH CARE EDUCATION/TRAINING PROGRAM

## 2024-03-15 PROCEDURE — 0202U NFCT DS 22 TRGT SARS-COV-2: CPT | Performed by: PHYSICIAN ASSISTANT

## 2024-03-15 PROCEDURE — G0378 HOSPITAL OBSERVATION PER HR: HCPCS

## 2024-03-15 PROCEDURE — 83880 ASSAY OF NATRIURETIC PEPTIDE: CPT | Performed by: PHYSICIAN ASSISTANT

## 2024-03-15 PROCEDURE — 83735 ASSAY OF MAGNESIUM: CPT | Performed by: PHYSICIAN ASSISTANT

## 2024-03-15 PROCEDURE — 93010 ELECTROCARDIOGRAM REPORT: CPT | Performed by: INTERNAL MEDICINE

## 2024-03-15 PROCEDURE — 71045 X-RAY EXAM CHEST 1 VIEW: CPT

## 2024-03-15 PROCEDURE — 85025 COMPLETE CBC W/AUTO DIFF WBC: CPT | Performed by: PHYSICIAN ASSISTANT

## 2024-03-15 PROCEDURE — 99285 EMERGENCY DEPT VISIT HI MDM: CPT

## 2024-03-15 RX ORDER — NITROGLYCERIN 0.4 MG/1
0.4 TABLET SUBLINGUAL
Status: DISCONTINUED | OUTPATIENT
Start: 2024-03-15 | End: 2024-03-19 | Stop reason: HOSPADM

## 2024-03-15 RX ORDER — GUAIFENESIN 600 MG/1
600 TABLET, EXTENDED RELEASE ORAL EVERY 12 HOURS SCHEDULED
Status: DISCONTINUED | OUTPATIENT
Start: 2024-03-16 | End: 2024-03-19 | Stop reason: HOSPADM

## 2024-03-15 RX ORDER — HEPARIN SODIUM (PORCINE) LOCK FLUSH IV SOLN 100 UNIT/ML 100 UNIT/ML
5 SOLUTION INTRAVENOUS AS NEEDED
Status: DISCONTINUED | OUTPATIENT
Start: 2024-03-15 | End: 2024-03-19 | Stop reason: HOSPADM

## 2024-03-15 RX ORDER — SODIUM CHLORIDE 9 MG/ML
40 INJECTION, SOLUTION INTRAVENOUS AS NEEDED
Status: DISCONTINUED | OUTPATIENT
Start: 2024-03-15 | End: 2024-03-19 | Stop reason: HOSPADM

## 2024-03-15 RX ORDER — SODIUM CHLORIDE 0.9 % (FLUSH) 0.9 %
10 SYRINGE (ML) INJECTION EVERY 12 HOURS SCHEDULED
Status: DISCONTINUED | OUTPATIENT
Start: 2024-03-15 | End: 2024-03-19 | Stop reason: HOSPADM

## 2024-03-15 RX ORDER — SODIUM CHLORIDE 0.9 % (FLUSH) 0.9 %
10 SYRINGE (ML) INJECTION AS NEEDED
Status: DISCONTINUED | OUTPATIENT
Start: 2024-03-15 | End: 2024-03-19 | Stop reason: HOSPADM

## 2024-03-15 RX ORDER — ACETAMINOPHEN 325 MG/1
650 TABLET ORAL EVERY 4 HOURS PRN
Status: DISCONTINUED | OUTPATIENT
Start: 2024-03-15 | End: 2024-03-19 | Stop reason: HOSPADM

## 2024-03-15 RX ORDER — NICOTINE 21 MG/24HR
1 PATCH, TRANSDERMAL 24 HOURS TRANSDERMAL EVERY 24 HOURS
Status: DISCONTINUED | OUTPATIENT
Start: 2024-03-15 | End: 2024-03-19 | Stop reason: HOSPADM

## 2024-03-15 RX ORDER — ONDANSETRON 2 MG/ML
4 INJECTION INTRAMUSCULAR; INTRAVENOUS EVERY 6 HOURS PRN
Status: DISCONTINUED | OUTPATIENT
Start: 2024-03-15 | End: 2024-03-19 | Stop reason: HOSPADM

## 2024-03-15 RX ORDER — ONDANSETRON 4 MG/1
4 TABLET, ORALLY DISINTEGRATING ORAL EVERY 6 HOURS PRN
Status: DISCONTINUED | OUTPATIENT
Start: 2024-03-15 | End: 2024-03-19 | Stop reason: HOSPADM

## 2024-03-15 RX ORDER — SODIUM CHLORIDE 0.9 % (FLUSH) 0.9 %
20 SYRINGE (ML) INJECTION AS NEEDED
Status: DISCONTINUED | OUTPATIENT
Start: 2024-03-15 | End: 2024-03-19 | Stop reason: HOSPADM

## 2024-03-15 RX ORDER — IPRATROPIUM BROMIDE AND ALBUTEROL SULFATE 2.5; .5 MG/3ML; MG/3ML
3 SOLUTION RESPIRATORY (INHALATION)
Status: DISCONTINUED | OUTPATIENT
Start: 2024-03-16 | End: 2024-03-16

## 2024-03-15 RX ORDER — SODIUM CHLORIDE 0.9 % (FLUSH) 0.9 %
10 SYRINGE (ML) INJECTION AS NEEDED
Status: DISCONTINUED | OUTPATIENT
Start: 2024-03-15 | End: 2024-03-15

## 2024-03-15 RX ORDER — IPRATROPIUM BROMIDE AND ALBUTEROL SULFATE 2.5; .5 MG/3ML; MG/3ML
3 SOLUTION RESPIRATORY (INHALATION) ONCE
Status: COMPLETED | OUTPATIENT
Start: 2024-03-15 | End: 2024-03-15

## 2024-03-15 RX ADMIN — IPRATROPIUM BROMIDE AND ALBUTEROL SULFATE 3 ML: .5; 3 SOLUTION RESPIRATORY (INHALATION) at 21:52
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
## 2024-03-16 ENCOUNTER — APPOINTMENT (OUTPATIENT)
Dept: CARDIOLOGY | Facility: HOSPITAL | Age: 74
DRG: 866 | End: 2024-03-16
Payer: MEDICARE

## 2024-03-16 LAB
ANION GAP SERPL CALCULATED.3IONS-SCNC: 13.8 MMOL/L (ref 5–15)
BACTERIA UR QL AUTO: ABNORMAL /HPF
BH CV LOW VAS RIGHT GREATER SAPH BK VESSEL: 1
BH CV LOWER VASCULAR LEFT COMMON FEMORAL AUGMENT: NORMAL
BH CV LOWER VASCULAR LEFT COMMON FEMORAL COMPETENT: NORMAL
BH CV LOWER VASCULAR LEFT COMMON FEMORAL COMPRESS: NORMAL
BH CV LOWER VASCULAR LEFT COMMON FEMORAL PHASIC: NORMAL
BH CV LOWER VASCULAR LEFT COMMON FEMORAL SPONT: NORMAL
BH CV LOWER VASCULAR RIGHT COMMON FEMORAL AUGMENT: NORMAL
BH CV LOWER VASCULAR RIGHT COMMON FEMORAL COMPETENT: NORMAL
BH CV LOWER VASCULAR RIGHT COMMON FEMORAL COMPRESS: NORMAL
BH CV LOWER VASCULAR RIGHT COMMON FEMORAL PHASIC: NORMAL
BH CV LOWER VASCULAR RIGHT COMMON FEMORAL SPONT: NORMAL
BH CV LOWER VASCULAR RIGHT DISTAL FEMORAL COMPRESS: NORMAL
BH CV LOWER VASCULAR RIGHT GASTRONEMIUS COMPRESS: NORMAL
BH CV LOWER VASCULAR RIGHT GREATER SAPH AK COMPRESS: NORMAL
BH CV LOWER VASCULAR RIGHT GREATER SAPH BK COMPRESS: NORMAL
BH CV LOWER VASCULAR RIGHT GREATER SAPH BK THROMBUS: NORMAL
BH CV LOWER VASCULAR RIGHT LESSER SAPH COMPRESS: NORMAL
BH CV LOWER VASCULAR RIGHT MID FEMORAL AUGMENT: NORMAL
BH CV LOWER VASCULAR RIGHT MID FEMORAL COMPETENT: NORMAL
BH CV LOWER VASCULAR RIGHT MID FEMORAL COMPRESS: NORMAL
BH CV LOWER VASCULAR RIGHT MID FEMORAL PHASIC: NORMAL
BH CV LOWER VASCULAR RIGHT MID FEMORAL SPONT: NORMAL
BH CV LOWER VASCULAR RIGHT PERONEAL COMPRESS: NORMAL
BH CV LOWER VASCULAR RIGHT POPLITEAL AUGMENT: NORMAL
BH CV LOWER VASCULAR RIGHT POPLITEAL COMPETENT: NORMAL
BH CV LOWER VASCULAR RIGHT POPLITEAL COMPRESS: NORMAL
BH CV LOWER VASCULAR RIGHT POPLITEAL PHASIC: NORMAL
BH CV LOWER VASCULAR RIGHT POPLITEAL SPONT: NORMAL
BH CV LOWER VASCULAR RIGHT POSTERIOR TIBIAL COMPRESS: NORMAL
BH CV LOWER VASCULAR RIGHT PROFUNDA FEMORAL COMPRESS: NORMAL
BH CV LOWER VASCULAR RIGHT PROXIMAL FEMORAL COMPRESS: NORMAL
BH CV LOWER VASCULAR RIGHT SAPHENOFEMORAL JUNCTION COMPRESS: NORMAL
BILIRUB UR QL STRIP: NEGATIVE
BUN SERPL-MCNC: 22 MG/DL (ref 8–23)
BUN/CREAT SERPL: 18.3 (ref 7–25)
CALCIUM SPEC-SCNC: 8.3 MG/DL (ref 8.6–10.5)
CHLORIDE SERPL-SCNC: 99 MMOL/L (ref 98–107)
CLARITY UR: CLEAR
CO2 SERPL-SCNC: 22.2 MMOL/L (ref 22–29)
COLOR UR: ABNORMAL
CREAT SERPL-MCNC: 1.2 MG/DL (ref 0.76–1.27)
DEPRECATED RDW RBC AUTO: 41.4 FL (ref 37–54)
EGFRCR SERPLBLD CKD-EPI 2021: 64.3 ML/MIN/1.73
ERYTHROCYTE [DISTWIDTH] IN BLOOD BY AUTOMATED COUNT: 13.3 % (ref 12.3–15.4)
GEN 5 2HR TROPONIN T REFLEX: 12 NG/L
GLUCOSE SERPL-MCNC: 77 MG/DL (ref 65–99)
GLUCOSE UR STRIP-MCNC: NEGATIVE MG/DL
GRAN CASTS URNS QL MICRO: ABNORMAL /LPF
HCT VFR BLD AUTO: 39.2 % (ref 37.5–51)
HGB BLD-MCNC: 13.5 G/DL (ref 13–17.7)
HGB UR QL STRIP.AUTO: NEGATIVE
HYALINE CASTS UR QL AUTO: ABNORMAL /LPF
KETONES UR QL STRIP: ABNORMAL
LEUKOCYTE ESTERASE UR QL STRIP.AUTO: NEGATIVE
MCH RBC QN AUTO: 29.9 PG (ref 26.6–33)
MCHC RBC AUTO-ENTMCNC: 34.4 G/DL (ref 31.5–35.7)
MCV RBC AUTO: 86.7 FL (ref 79–97)
NITRITE UR QL STRIP: NEGATIVE
PH UR STRIP.AUTO: 5.5 [PH] (ref 5–8)
PLATELET # BLD AUTO: 93 10*3/MM3 (ref 140–450)
PMV BLD AUTO: 11.4 FL (ref 6–12)
POTASSIUM SERPL-SCNC: 3.6 MMOL/L (ref 3.5–5.2)
PROCALCITONIN SERPL-MCNC: 0.12 NG/ML (ref 0–0.25)
PROT UR QL STRIP: ABNORMAL
QT INTERVAL: 383 MS
QTC INTERVAL: 484 MS
RBC # BLD AUTO: 4.52 10*6/MM3 (ref 4.14–5.8)
RBC # UR STRIP: ABNORMAL /HPF
REF LAB TEST METHOD: ABNORMAL
SODIUM SERPL-SCNC: 135 MMOL/L (ref 136–145)
SP GR UR STRIP: 1.03 (ref 1–1.03)
SQUAMOUS #/AREA URNS HPF: ABNORMAL /HPF
TROPONIN T DELTA: -2 NG/L
UROBILINOGEN UR QL STRIP: ABNORMAL
WBC # UR STRIP: ABNORMAL /HPF
WBC NRBC COR # BLD AUTO: 3.89 10*3/MM3 (ref 3.4–10.8)

## 2024-03-16 PROCEDURE — 63710000001 PREDNISONE PER 1 MG: Performed by: INTERNAL MEDICINE

## 2024-03-16 PROCEDURE — 94799 UNLISTED PULMONARY SVC/PX: CPT

## 2024-03-16 PROCEDURE — 85027 COMPLETE CBC AUTOMATED: CPT | Performed by: STUDENT IN AN ORGANIZED HEALTH CARE EDUCATION/TRAINING PROGRAM

## 2024-03-16 PROCEDURE — 97162 PT EVAL MOD COMPLEX 30 MIN: CPT

## 2024-03-16 PROCEDURE — 93971 EXTREMITY STUDY: CPT

## 2024-03-16 PROCEDURE — 97110 THERAPEUTIC EXERCISES: CPT

## 2024-03-16 PROCEDURE — 80048 BASIC METABOLIC PNL TOTAL CA: CPT | Performed by: STUDENT IN AN ORGANIZED HEALTH CARE EDUCATION/TRAINING PROGRAM

## 2024-03-16 PROCEDURE — 94664 DEMO&/EVAL PT USE INHALER: CPT

## 2024-03-16 PROCEDURE — 81001 URINALYSIS AUTO W/SCOPE: CPT | Performed by: STUDENT IN AN ORGANIZED HEALTH CARE EDUCATION/TRAINING PROGRAM

## 2024-03-16 PROCEDURE — 94761 N-INVAS EAR/PLS OXIMETRY MLT: CPT

## 2024-03-16 RX ORDER — ASPIRIN 81 MG/1
81 TABLET ORAL DAILY
Status: DISCONTINUED | OUTPATIENT
Start: 2024-03-16 | End: 2024-03-19 | Stop reason: HOSPADM

## 2024-03-16 RX ORDER — PREDNISONE 20 MG/1
40 TABLET ORAL
Status: DISCONTINUED | OUTPATIENT
Start: 2024-03-16 | End: 2024-03-18

## 2024-03-16 RX ORDER — IPRATROPIUM BROMIDE AND ALBUTEROL SULFATE 2.5; .5 MG/3ML; MG/3ML
3 SOLUTION RESPIRATORY (INHALATION)
Status: DISCONTINUED | OUTPATIENT
Start: 2024-03-16 | End: 2024-03-19 | Stop reason: HOSPADM

## 2024-03-16 RX ORDER — IPRATROPIUM BROMIDE AND ALBUTEROL SULFATE 2.5; .5 MG/3ML; MG/3ML
3 SOLUTION RESPIRATORY (INHALATION)
Status: DISCONTINUED | OUTPATIENT
Start: 2024-03-16 | End: 2024-03-16

## 2024-03-16 RX ORDER — BUDESONIDE AND FORMOTEROL FUMARATE DIHYDRATE 160; 4.5 UG/1; UG/1
2 AEROSOL RESPIRATORY (INHALATION)
Status: DISCONTINUED | OUTPATIENT
Start: 2024-03-16 | End: 2024-03-19 | Stop reason: HOSPADM

## 2024-03-16 RX ADMIN — GUAIFENESIN 600 MG: 600 TABLET, EXTENDED RELEASE ORAL at 10:34

## 2024-03-16 RX ADMIN — NICOTINE 1 PATCH: 21 PATCH, EXTENDED RELEASE TRANSDERMAL at 22:35

## 2024-03-16 RX ADMIN — IPRATROPIUM BROMIDE AND ALBUTEROL SULFATE 3 ML: 2.5; .5 SOLUTION RESPIRATORY (INHALATION) at 19:18

## 2024-03-16 RX ADMIN — GUAIFENESIN 600 MG: 600 TABLET, EXTENDED RELEASE ORAL at 00:19

## 2024-03-16 RX ADMIN — Medication 10 ML: at 10:34

## 2024-03-16 RX ADMIN — IPRATROPIUM BROMIDE AND ALBUTEROL SULFATE 3 ML: 2.5; .5 SOLUTION RESPIRATORY (INHALATION) at 16:18

## 2024-03-16 RX ADMIN — NICOTINE 1 PATCH: 21 PATCH, EXTENDED RELEASE TRANSDERMAL at 00:19

## 2024-03-16 RX ADMIN — GUAIFENESIN 600 MG: 600 TABLET, EXTENDED RELEASE ORAL at 22:32

## 2024-03-16 RX ADMIN — PREDNISONE 40 MG: 20 TABLET ORAL at 17:53

## 2024-03-16 RX ADMIN — IPRATROPIUM BROMIDE AND ALBUTEROL SULFATE 3 ML: 2.5; .5 SOLUTION RESPIRATORY (INHALATION) at 23:21

## 2024-03-16 RX ADMIN — ASPIRIN 81 MG: 81 TABLET, COATED ORAL at 10:34

## 2024-03-16 RX ADMIN — IPRATROPIUM BROMIDE AND ALBUTEROL SULFATE 3 ML: .5; 3 SOLUTION RESPIRATORY (INHALATION) at 07:02

## 2024-03-16 NOTE — CONSULTS
CONSULT NOTE    INTERNAL MEDICINE   [facility]       Patient Identification:  Name: Julia Smith III  Age: 74 y.o.  Sex: male  :  1950  MRN: [id number]             Date of Consultation:  24          Primary Care Physician: Paty Wheeler APRN ([place])                               Requesting Physician: dr taylor  Reason for Consultation:assuming care    Chief Complaint:  74 year old gentleman was admitted to the observation unit with shortness of breath; he has a history of copd and tested positive for influenza; he was hypoxic and will need admission; he reports feeling better after he was placed on oxygen    History of Present Illness:   As above      Past Medical History:  Past Medical History:   Diagnosis Date    Atrial fibrillation with RVR     Cancer 2019    Right lung    Chronic cough     COPD (chronic obstructive pulmonary disease)     Hearing loss     History of shingles     Lower back pain     Lung cancer     Mastoiditis     Mild mitral regurgitation     Other fatigue     PAF (paroxysmal atrial fibrillation)     Pneumonia      Past Surgical History:  Past Surgical History:   Procedure Laterality Date    BRONCHOSCOPY N/A 2019    Procedure: BRONCHOSCOPY WITH WASHINGS AND BIOPSY AND ENDOBRONCHIAL ULTRASOUND WITH FNA;  Surgeon: Arslan Marquez MD;  Location: [facility] ENDOSCOPY;  Service: Pulmonary    BRONCHOSCOPY N/A 2022    Procedure: BRONCHOSCOPY WITH FLUORO, BAL, BRUSHINGS, AND BIOPSIES LLL;  Surgeon: Arslan Marquez MD;  Location: [facility] ENDOSCOPY;  Service: Pulmonary;  Laterality: N/A;  PRE: LUNG MASS  POST: SAME    INNER EAR SURGERY Left     MASTOIDECTOMY Left 1994    VENOUS ACCESS DEVICE (PORT) INSERTION Right 2019    Procedure: INSERTION OF PORTACATH;  Surgeon: Ramila Gallardo MD;  Location: [facility] OR;  Service: Cardiothoracic      [place] Meds:  Medications Prior to Admission   Medication Sig Dispense Refill Last Dose    aspirin 81 MG  tablet Take 1 tablet by mouth Daily. 30 tablet 11 3/14/2024    amoxicillin-clavulanate (AUGMENTIN) 500-125 MG per tablet TAKE 1 TABLET BY MOUTH TWICE DAILY BEFORE MEALS FOR 10 DAYS       naproxen sodium (ALEVE) 220 MG tablet Take 1 tablet by mouth 2 (Two) Times a Day As Needed.        Current Meds:     Current Facility-Administered Medications:     acetaminophen (TYLENOL) tablet 650 mg, 650 mg, Oral, Q4H PRN, Fern Best PA-JUDD    aspirin EC tablet 81 mg, 81 mg, Oral, Daily, Yessica Bestlin PHILLIP, PA-C, 81 mg at 03/16/24 1034    budesonide-formoterol (SYMBICORT) 160-4.5 MCG/ACT inhaler 2 puff, 2 puff, Inhalation, BID - RT, Arslan Marquez MD    guaiFENesin (MUCINEX) 12 hr tablet 600 mg, 600 mg, Oral, Q12H, Fern Best PA-JUDD, 600 mg at 03/16/24 1034    heparin injection 500 Units, 5 mL, Intravenous, PRN, Fern Best PA-JUDD    ipratropium-albuterol (DUO-NEB) nebulizer solution 3 mL, 3 mL, Nebulization, Q4H - RT, Arslan Marquez MD, 3 mL at 03/16/24 1918    nicotine (NICODERM CQ) 21 MG/24HR patch 1 patch, 1 patch, Transdermal, Q24H, Fern Best PA-C, 1 patch at 03/16/24 0019    nicotine polacrilex (NICORETTE) gum 2 mg, 2 mg, Mouth/Throat, Q1H PRN, Fern Best PA-JUDD    nitroglycerin (NITROSTAT) SL tablet 0.4 mg, 0.4 mg, Sublingual, Q5 Min PRN, Fern Best PA-C    ondansetron ODT (ZOFRAN-ODT) disintegrating tablet 4 mg, 4 mg, Oral, Q6H PRN **OR** ondansetron (ZOFRAN) injection 4 mg, 4 mg, Intravenous, Q6H PRN, Nasir, Fern R, PA-C    predniSONE (DELTASONE) tablet 40 mg, 40 mg, Oral, Daily With Breakfast, [place], Arslan Varela MD, 40 mg at 03/16/24 1753    sodium chloride 0.9 % flush 10 mL, 10 mL, Intravenous, Q12H, Fern Best PA-C    sodium chloride 0.9 % flush 10 mL, 10 mL, Intravenous, PRN, Fern Best PA-C    sodium chloride 0.9 % flush 10 mL, 10 mL, Intravenous, Q12H, Fern Best PA-C, 10 mL at 03/16/24 1034    sodium  "chloride 0.9 % flush 10 mL, 10 mL, Intravenous, PRN, Nasri, Fern R, PA-C    sodium chloride 0.9 % flush 20 mL, 20 mL, Intravenous, PRN, Nasir, Fern R, PA-C    sodium chloride 0.9 % infusion 40 mL, 40 mL, Intravenous, PRN, Nasir, Fern R, PA-C    sodium chloride 0.9 % infusion 40 mL, 40 mL, Intravenous, PRN, Nasir, Fern R, PA-C  Allergies:  No Known Allergies  Social History:   Social History     Socioeconomic History    Marital status:      Spouse name: Krystle    Number of children: 2   Tobacco Use    Smoking status: Every Day     Current packs/day: 1.00     Average packs/day: 1 pack/day for 40.0 years (40.0 ttl pk-yrs)     Types: Cigarettes    Smokeless tobacco: Never    Tobacco comments:     Since age 32   Vaping Use    Vaping status: Never Used   Substance and Sexual Activity    Alcohol use: Yes     Alcohol/week: 4.0 - 6.0 standard drinks of alcohol     Types: 4 - 6 Cans of beer per week     Comment: daily /caffeine use - coffee    Drug use: Yes     Types: Marijuana     Comment: last used \"few days ago\"    Sexual activity: Not Currently     Partners: Female     Family History:  Family History   Problem Relation Age of Onset    No Known Problems Mother     COPD Father             No Known Problems Sister     Heart disease Brother     No Known Problems Brother     No Known Problems Brother     No Known Problems Brother           Review of Systems  See history of present illness and past medical history.  Patient denies headache, dizziness, syncope, falls, trauma, change in vision, change in hearing, change in taste, changes in weight, changes in appetite, focal weakness, numbness, or paresthesia.  Patient denies chest pain, palpitations, dyspnea, orthopnea, PND, cough, sinus pressure, rhinorrhea, epistaxis, hemoptysis, nausea, vomiting,hematemesis, diarrhea, constipation or hematochezia. Denies cold or heat intolerance, polydipsia, polyuria, polyphagia. Denies hematuria, " "pyuria, dysuria, hesitancy, frequency or urgency. Denies consumption of raw and under cooked meats foods or change in water source.  Denies fever, chills, sweats, night sweats.  Denies missing any routine medications. Remainder of ROS is negative.      Vitals:   /63 (BP Location: Right arm, Patient Position: Sitting)   Pulse 90   Temp 97.7 °F (36.5 °C) (Oral)   Resp 16   Ht 180.3 cm (71\")   Wt 90.7 kg (200 lb)   SpO2 96%   BMI 27.89 kg/m²   I/O: No intake or output data in the 24 hours ending 03/16/24 1933  Exam:  General Appearance:    Alert, cooperative, no distress, appears stated age   Head:    Normocephalic, without obvious abnormality, atraumatic   Eyes:    PERRL, conjunctivae/corneas clear, EOM's intact, both eyes   Ears:    Normal external ear canals, both ears   Nose:   Nares normal, septum midline, mucosa normal, no drainage    or sinus tenderness   Throat:   Lips, tongue, gums normal; oral mucosa pink and moist   Neck:   Supple, symmetrical, trachea midline, no adenopathy;     thyroid:  no enlargement/tenderness/nodules; no carotid    bruit or JVD   Back:     Symmetric, no curvature, ROM normal, no CVA tenderness   Lungs:     Decreased breath sounds bilaterally, respirations unlabored   Chest Wall:    No tenderness or deformity    Heart:    Regular rate and rhythm, S1 and S2 normal, no murmur, rub   or gallop   Abdomen:     Soft, nontender, bowel sounds active all four quadrants,     no masses, no hepatomegaly, no splenomegaly   Extremities:   Extremities normal, atraumatic, no cyanosis or edema                Data Review:  Labs in chart were reviewed.  WBC   Date Value Ref Range Status   03/16/2024 3.89 3.40 - 10.80 10*3/mm3 Final     Hemoglobin   Date Value Ref Range Status   03/16/2024 13.5 13.0 - 17.7 g/dL Final     Hematocrit   Date Value Ref Range Status   03/16/2024 39.2 37.5 - 51.0 % Final     Platelets   Date Value Ref Range Status   03/16/2024 93 (L) 140 - 450 10*3/mm3 Final "     Sodium   Date Value Ref Range Status   03/16/2024 135 (L) 136 - 145 mmol/L Final     Potassium   Date Value Ref Range Status   03/16/2024 3.6 3.5 - 5.2 mmol/L Final     Chloride   Date Value Ref Range Status   03/16/2024 99 98 - 107 mmol/L Final     CO2   Date Value Ref Range Status   03/16/2024 22.2 22.0 - 29.0 mmol/L Final     BUN   Date Value Ref Range Status   03/16/2024 22 8 - 23 mg/dL Final     Creatinine   Date Value Ref Range Status   03/16/2024 1.20 0.76 - 1.27 mg/dL Final     Glucose   Date Value Ref Range Status   03/16/2024 77 65 - 99 mg/dL Final     Calcium   Date Value Ref Range Status   03/16/2024 8.3 (L) 8.6 - 10.5 mg/dL Final     Magnesium   Date Value Ref Range Status   03/15/2024 1.7 1.6 - 2.4 mg/dL Final               Imaging Results (Last 7 Days)       Procedure Component Value Units Date/Time    XR Chest 1 View [[id number]] Collected: 03/15/24 2114     Updated: 03/15/24 2118    Narrative:      SINGLE VIEW OF THE CHEST     HISTORY: Lung cancer     COMPARISON: February 21, 2022     FINDINGS:  Right internal jugular vein Mediport appears stable in position. No  pneumothorax or pleural effusion is seen. There is cardiomegaly. There  is no vascular congestion. There is some calcification of the aorta.  There is scarring versus atelectasis noted at the right lung base. There  is mild stable elevation of the right hemidiaphragm. Left lung appears  clear.       Impression:      Linear right basilar atelectasis versus scarring.     This report was finalized on 3/15/2024 9:15 PM by Dr. Kaycee Jimenez M.D on Workstation: [id number]             Past Medical History:   Diagnosis Date    Atrial fibrillation with RVR     Cancer 06/2019    Right lung    Chronic cough     COPD (chronic obstructive pulmonary disease)     Hearing loss     History of shingles     Lower back pain     Lung cancer     Mastoiditis     Mild mitral regurgitation     Other fatigue     PAF (paroxysmal atrial fibrillation)      Pneumonia        Assessment:  Active Hospital Problems    Diagnosis  POA    **Shortness of breath [R06.02]  Yes    Influenza A [J10.1]  Yes    Right leg pain [M79.604]  Yes      Resolved Hospital Problems   No resolved problems to display.   Copd  A fib   Acute hypoxic respiratory failure  Hyponatremia  Lung cancer  Tobacco use    Plan:  Will continue steroids, mininebs  Appreciate pulmonary input  Monitor on telemetry  Too late for tamiflu  Inhaled steroids initiated  Dw patient and ed obs unit provider  Thanks for involving us in his care    Hue Monique MD   3/16/2024  19:33 EDT

## 2024-03-16 NOTE — THERAPY EVALUATION
Patient Name: Julia Smith III  : 1950    MRN: [id number]                              Today's Date: 3/16/2024       Admit Date: 3/15/2024    Visit Dx:     ICD-10-CM ICD-9-CM   1. Shortness of breath  R06.02 786.05   2. Influenza A  J10.1 487.1   3. Generalized weakness  R53.1 780.79     Patient Active Problem List   Diagnosis    Encounter for screening for malignant neoplasm of colon    Small cell lung cancer    Small cell carcinoma    Atrial fibrillation with RVR    Pneumonia    COPD (chronic obstructive pulmonary disease)    PAF (paroxysmal atrial fibrillation)    High risk medication use    Tobacco abuse    Mild mitral regurgitation    Daily consumption of alcohol    Cigarette nicotine dependence without complication    Encounter for fitting and adjustment of vascular catheter    Primary malignant neoplasm of right upper lobe of lung    Other fatigue    Non-small cell lung cancer    History of lung cancer    Wound of left leg    Shortness of breath    Influenza A    Right leg pain     Past Medical History:   Diagnosis Date    Atrial fibrillation with RVR     Cancer 2019    Right lung    Chronic cough     COPD (chronic obstructive pulmonary disease)     Hearing loss     History of shingles     Lower back pain     Lung cancer     Mastoiditis     Mild mitral regurgitation     Other fatigue     PAF (paroxysmal atrial fibrillation)     Pneumonia      Past Surgical History:   Procedure Laterality Date    BRONCHOSCOPY N/A 2019    Procedure: BRONCHOSCOPY WITH WASHINGS AND BIOPSY AND ENDOBRONCHIAL ULTRASOUND WITH FNA;  Surgeon: Arslan Marquez MD;  Location: [facility] ENDOSCOPY;  Service: Pulmonary    BRONCHOSCOPY N/A 2022    Procedure: BRONCHOSCOPY WITH FLUORO, BAL, BRUSHINGS, AND BIOPSIES LLL;  Surgeon: Arslan Marquez MD;  Location: [facility] ENDOSCOPY;  Service: Pulmonary;  Laterality: N/A;  PRE: LUNG MASS  POST: SAME    INNER EAR SURGERY Left     MASTOIDECTOMY Left 1994    VENOUS ACCESS  DEVICE (PORT) INSERTION Right 8/22/2019    Procedure: INSERTION OF PORTACATH;  Surgeon: Ramila Gallardo MD;  Location: [facility] OR;  Service: Cardiothoracic      General Information       Row Name 03/16/24 1420          Physical Therapy Time and Intention    Document Type evaluation  -     Mode of Treatment physical therapy  -PC       Row Name 03/16/24 1420          General Information    Patient Profile Reviewed yes  -PC     Prior Level of Function independent:  -PC     Existing Precautions/Restrictions fall;oxygen therapy device and L/min  -PC     Barriers to Rehab medically complex;previous functional deficit  -       Row Name 03/16/24 1420          Living Environment    People in Home spouse  -PC       Row Name 03/16/24 1420          Home Main Entrance    Number of Stairs, Main Entrance none  -PC       Row Name 03/16/24 1420          Stairs Within Home, Primary    Stairs, Within Home, Primary flight of stairs to bedroom  -PC     Stair Railings, Within Home, Primary railings safe and in good condition  -       Row Name 03/16/24 1420          Cognition    Orientation Status (Cognition) oriented x 4  -PC       Row Name 03/16/24 1420          Safety Issues, Functional Mobility    Impairments Affecting Function (Mobility) endurance/activity tolerance;strength;shortness of breath  -PC               User Key  (r) = Recorded By, (t) = Taken By, (c) = Cosigned By      Initials Name Provider Type    PC Delmy Andres, PT Physical Therapist                   Mobility       Row Name 03/16/24 1421          Bed Mobility    Bed Mobility supine-sit;sit-supine  -PC     Supine-Sit [place] (Bed Mobility) minimum assist (75% patient effort)  -     Sit-Supine [place] (Bed Mobility) contact guard  -PC       Row Name 03/16/24 1421          Sit-Stand Transfer    Sit-Stand [place] (Transfers) contact guard  -PC       Row Name 03/16/24 1421          Gait/Stairs (Locomotion)    [place] Level (Gait) contact  guard  -PC     Assistive Device (Gait) walker, front-wheeled  -PC     Distance in Feet (Gait) 60  -PC     Pattern (Gait) step-to  -PC     Deviations/Abnormal Patterns (Gait) sara decreased;gait speed decreased;stride length decreased  -PC     Bilateral Gait Deviations forward flexed posture;heel strike decreased  -PC     Gait Assessment/Intervention slow gait with short steps, frequent rests, initially did not use an assistive device but pt reaching for walls and unsteady, pt did much better with rwx  -PC               User Key  (r) = Recorded By, (t) = Taken By, (c) = Cosigned By      Initials Name Provider Type    PC Delmy Andres, PT Physical Therapist                   Obj/Interventions       Row Name 03/16/24 1422          Range of Motion Comprehensive    General Range of Motion no range of motion deficits identified  -PC       Row Name 03/16/24 1422          Strength Comprehensive (MMT)    Comment, General Manual Muscle Testing (MMT) Assessment no focal deficits, very thin, frail, with general weakness  -PC       Row Name 03/16/24 1422          Balance    Balance Assessment sitting static balance;sitting dynamic balance;standing static balance;standing dynamic balance  -PC     Static Sitting Balance independent  -PC     Dynamic Sitting Balance independent  -PC     Position, Sitting Balance sitting edge of bed  -PC     Static Standing Balance standby assist  -PC     Dynamic Standing Balance contact guard  -PC     Position/Device Used, Standing Balance walker, rolling  -PC               User Key  (r) = Recorded By, (t) = Taken By, (c) = Cosigned By      Initials Name Provider Type    PC Delmy Andres, PT Physical Therapist                   Goals/Plan       Row Name 03/16/24 1427          Bed Mobility Goal 1 (PT)    Activity/Assistive Device (Bed Mobility Goal 1, PT) sit to supine/supine to sit  -PC     [place] Level/Cues Needed (Bed Mobility Goal 1, PT) independent  -PC     Time Frame (Bed Mobility  Goal 1, PT) 1 week  -PC       Row Name 03/16/24 1427          Transfer Goal 1 (PT)    Activity/Assistive Device (Transfer Goal 1, PT) sit-to-stand/stand-to-sit  -PC     [place] Level/Cues Needed (Transfer Goal 1, PT) independent  -PC     Time Frame (Transfer Goal 1, PT) 1 week  -PC       Row Name 03/16/24 1427          Gait Training Goal 1 (PT)    Activity/Assistive Device (Gait Training Goal 1, PT) gait (walking locomotion);assistive device use  -PC     [place] Level (Gait Training Goal 1, PT) supervision required  -PC     Distance (Gait Training Goal 1, PT) 80 ft  -PC     Time Frame (Gait Training Goal 1, PT) 1 week  -PC       Row Name 03/16/24 1422          Therapy Assessment/Plan (PT)    Planned Therapy Interventions (PT) bed mobility training;gait training;transfer training;balance training;strengthening  -PC               User Key  (r) = Recorded By, (t) = Taken By, (c) = Cosigned By      Initials Name Provider Type    PC Delmy Andres, PT Physical Therapist                   Clinical Impression       Row Name 03/16/24 1423          Pain    Pretreatment Pain Rating 0/10 - no pain  -PC       Row Name 03/16/24 1424          Plan of Care Review    Plan of Care Reviewed With patient  -PC     Outcome Evaluation Pt adm with weakness, SOA, dx with flu A, COPD, pt has lung cancer. Pt lives with his wife and reports being indpendent without an assistive device at baseline, he presents today with significant weakness, unsteady gait, he needed some assist to walk and was much steadier with a rolling walker, pt reports that he has a walker he can use at home if needed. Anticipate pt will be able to go home with spouse, he is appropriate for PT to follow to maximize independence with mobility, pt walked 60 ft today on 2L o2, required several rest breaks and O2 sats >90% throughout  -PC       Row Name 03/16/24 1428          Therapy Assessment/Plan (PT)    Rehab Potential (PT) good, to achieve stated therapy  goals  -PC     Criteria for Skilled Interventions Met (PT) yes;meets criteria  -PC     Therapy Frequency (PT) 5 times/wk  -PC       Row Name 03/16/24 1423          Vital Signs    Pre SpO2 (%) 93  -PC     O2 Delivery Pre Treatment supplemental O2  -PC     Intra SpO2 (%) 96  -PC     O2 Delivery Intra Treatment supplemental O2  -PC     Post SpO2 (%) 96  -PC     O2 Delivery Post Treatment supplemental O2  -PC       Row Name 03/16/24 1423          Positioning and Restraints    Pre-Treatment Position in bed  -PC     Post Treatment Position bed  -PC     In Bed supine;call light within reach;encouraged to call for assist  -PC               User Key  (r) = Recorded By, (t) = Taken By, (c) = Cosigned By      Initials Name Provider Type    PC Delmy Andres PT Physical Therapist                   Outcome Measures       Row Name 03/16/24 1428          How much help from another person do you currently need...    Turning from your back to your side while in flat bed without using bedrails? 4  -PC     Moving from lying on back to sitting on the side of a flat bed without bedrails? 3  -PC     Moving to and from a bed to a chair (including a wheelchair)? 3  -PC     Standing up from a chair using your arms (e.g., wheelchair, bedside chair)? 3  -PC     Climbing 3-5 steps with a railing? 2  -PC     To walk in hospital room? 3  -PC     AM-PAC 6 Clicks Score (PT) 18  -PC     Highest Level of Mobility Goal 6 --> Walk 10 steps or more  -PC               User Key  (r) = Recorded By, (t) = Taken By, (c) = Cosigned By      Initials Name Provider Type    Delmy Pardo PT Physical Therapist                                 Physical Therapy Education       Title: PT OT SLP Therapies (Done)       Topic: Physical Therapy (Done)       Point: Mobility training (Done)       Learning Progress Summary             Patient Acceptance, E,D, DU,NR by PC at 3/16/2024 1429                         Point: Home exercise program (Done)       Learning  Progress Summary             Patient Acceptance, E,D, DU,NR by PC at 3/16/2024 1429                         Point: Body mechanics (Done)       Learning Progress Summary             Patient Acceptance, E,D, DU,NR by  at 3/16/2024 1429                         Point: Precautions (Done)       Learning Progress Summary             Patient Acceptance, E,D, DU,NR by PC at 3/16/2024 1429                                         User Key       Initials Effective Dates Name Provider Type Discipline     06/16/21 -  Delmy Andres PT Physical Therapist PT                  PT Recommendation and Plan  Planned Therapy Interventions (PT): bed mobility training, gait training, transfer training, balance training, strengthening  Plan of Care Reviewed With: patient  Outcome Evaluation: Pt adm with weakness, SOA, dx with flu A, COPD, pt has lung cancer. Pt lives with his wife and reports being indpendent without an assistive device at baseline, he presents today with significant weakness, unsteady gait, he needed some assist to walk and was much steadier with a rolling walker, pt reports that he has a walker he can use at home if needed. Anticipate pt will be able to go home with spouse, he is appropriate for PT to follow to maximize independence with mobility, pt walked 60 ft today on 2L o2, required several rest breaks and O2 sats >90% throughout     Time Calculation:         PT Charges       Row Name 03/16/24 1429             Time Calculation    Start Time 1240  -PC      Stop Time 1309  -PC      Time Calculation (min) 29 min  -PC      PT Received On 03/16/24  -PC      PT - Next Appointment 03/18/24  -      PT Goal Re-Cert Due Date 03/23/24  -                User Key  (r) = Recorded By, (t) = Taken By, (c) = Cosigned By      Initials Name Provider Type    PC Delmy Andres PT Physical Therapist                  Therapy Charges for Today       Code Description Service Date Service Provider Modifiers Qty    31801368278  PT  EVAL MOD COMPLEXITY 2 3/16/2024 Delym Andres, PT GP 1    20550735969 HC PT THER PROC EA 15 MIN 3/16/2024 Delmy Andres, PT GP 1            PT G-Codes  AM-PAC 6 Clicks Score (PT): 18  PT Discharge Summary  Anticipated Discharge Disposition (PT): home with assist    Delmy Andres, PT  3/16/2024

## 2024-03-16 NOTE — ED NOTES
PIV stick attempt x1 unsuccessful. Pt has implanted port and requested to have it accessed. Charge RN notified.

## 2024-03-16 NOTE — PROGRESS NOTES
[facility] Attestation Note    I supervised care provided by the midlevel provider.    The ALONZO and I have discussed this patient's history, physical exam, and treatment plan. I have reviewed the documentation and personally had a face to face interaction with the patient  I affirm the documentation and agree with the treatment and plan. I provided a substantive portion of the care of this patient.  I personally performed the physical exam, in its entirety.  My personal findings are documented in below:    History:  Patient with a history of lung cancer and COPD and current tobacco use presents for evaluation of generalized weakness with cough and congestion.  Symptoms began on Monday and have persisted through the week.  He has had some loose stools and some nausea with poor appetite.  Also complaining of some right leg pain and tenderness.  Influenza A infection in the ED workup.  No new complaints this morning.    Physical Exam:  General: No acute distress.  Resting calmly in the bed  HENT: NCAT, PERRL, Nares patent.  Eyes: no scleral icterus.  Neck: trachea midline, no ROM limitations.  CV: Pink warm and well-perfused throughout  Respiratory: No distress or increased work of breathing.  No wheezes.  Few scattered rhonchi.  No coughing during my evaluation.  Abdomen: soft, no focal tenderness or rigidity  Musculoskeletal: no deformity.  Neuro: alert, moves all extremities, follows commands.  Skin: warm, dry.    Assessment and Plan:  Influenza A/COPD: Scheduled DuoNebs.  Mucinex and antitussives.  Pulmonology consult.  Wean oxygen as tolerated.    Weakness: IV fluids.  PT consulted.    Right leg pain: Ultrasound Doppler to rule out DVT.    Tobacco abuse: Nicotine patch

## 2024-03-16 NOTE — ED NOTES
Nursing report ED to floor  Ary GIA Smith III  74 y.o.  male    HPI :  HPI (Adult)  Stated Reason for Visit: Generalized weakness x 2 days  History Obtained From: family, patient  Precipitating Event(s): none  Onset of Symptoms: unknown  Associated Signs/Symptoms: weakness    Chief Complaint  Chief Complaint   Patient presents with    Weakness - Generalized     Patient complains of weakness x2 days.  Per family difficulty ambulating.  Hx lung cancer, treatment completed 1 year ago.       Admitting doctor:   Lorenzo Damon MD    Admitting diagnosis:   The primary encounter diagnosis was Shortness of breath. Diagnoses of Influenza A and Generalized weakness were also pertinent to this visit.    Code status:   Current Code Status       Date Active Code Status Order ID Comments User Context       3/15/2024 2300 CPR (Attempt to Resuscitate) [id number]  Fern Best PA-C ED        Question Answer    Code Status (Patient has no pulse and is not breathing) CPR (Attempt to Resuscitate)    Medical Interventions (Patient has pulse or is breathing) Full Support                    Allergies:   Patient has no known allergies.    Isolation:   Enhanced Droplet/Contact , Droplet    Intake and Output  No intake or output data in the 24 hours ending 03/15/24 2303    Weight:       03/15/24  1957   Weight: 90.7 kg (200 lb)       Most recent vitals:   Vitals:    03/15/24 2030 03/15/24 2050 03/15/24 2120 03/15/24 2121   BP: 112/68 120/65  122/76   BP Location:  Right arm     Patient Position:  Lying     Pulse: 98 97 95 93   Resp: 21 20  20   Temp:       TempSrc:       SpO2: 94% 91% (!) 89% 94%   Weight:       Height:           Active LDAs/IV Access:   Lines, Drains & Airways       Active LDAs       Name Placement date Placement time Site Days    Single Lumen Implantable Port 08/22/19 Right Chest 08/22/19  1446  Chest  1667                    Labs (abnormal labs have a star):   Labs Reviewed   RESPIRATORY PANEL PCR W/ COVID-19  (SARS-COV-2), NP [facility] [place], 2 HR TAT - Abnormal; Notable for the following components:       Result Value    Influenza A H1 2009 PCR Detected (*)     All other components within normal limits    Narrative:     In the setting of a positive respiratory panel with a viral infection PLUS a negative procalcitonin without other underlying concern for bacterial infection, consider observing off antibiotics or discontinuation of antibiotics and continue supportive care. If the respiratory panel is positive for atypical bacterial infection (Bordetella pertussis, Chlamydophila pneumoniae, or Mycoplasma pneumoniae), consider antibiotic de-escalation to target atypical bacterial infection.   COMPREHENSIVE METABOLIC PANEL - Abnormal; Notable for the following components:    Sodium 134 (*)     AST (SGOT) 44 (*)     All other components within normal limits    Narrative:     GFR Normal >60  Chronic Kidney Disease <60  Kidney Failure <15    The GFR formula is only valid for adults with stable renal function between ages 18 and 70.   CBC WITH AUTO DIFFERENTIAL - Abnormal; Notable for the following components:    Platelets 98 (*)     Lymphocyte % 10.3 (*)     Monocyte % 12.7 (*)     Eosinophil % 0.2 (*)     Lymphocytes, Absolute 0.55 (*)     All other components within normal limits   TROPONIN - Normal    Narrative:     High Sensitive Troponin T Reference Range:  <14.0 ng/L- Negative Female for AMI  <22.0 ng/L- Negative Male for AMI  >=14 - Abnormal Female indicating possible myocardial injury.  >=22 - Abnormal Male indicating possible myocardial injury.   Clinicians would have to utilize clinical acumen, EKG, Troponin, and serial changes to determine if it is an Acute Myocardial Infarction or myocardial injury due to an underlying chronic condition.        BNP (IN-HOUSE) - Normal    Narrative:     This assay is used as an aid in the diagnosis of individuals suspected of having heart failure. It can be used as an aid in the  diagnosis of acute decompensated heart failure (ADHF) in patients presenting with signs and symptoms of ADHF to the emergency department (ED). In addition, NT-proBNP of <300 pg/mL indicates ADHF is not likely.    Age Range Result Interpretation  NT-proBNP Concentration (pg/mL:      <50             Positive            >450                   Gray                 300-450                    Negative             <300    50-75           Positive            >900                  Gray                300-900                  Negative            <300      >75             Positive            >1800                  Gray                300-1800                  Negative            <300   MAGNESIUM - Normal   URINALYSIS W/ MICROSCOPIC IF INDICATED (NO CULTURE)   HIGH SENSITIVITIY TROPONIN T 2HR   CBC (NO DIFF)   BASIC METABOLIC PANEL   CBC AND DIFFERENTIAL    Narrative:     The following orders were created for panel order CBC & Differential.  Procedure                               Abnormality         Status                     ---------                               -----------         ------                     CBC Auto Differential[[id number]]        Abnormal            Final result                 Please view results for these tests on the individual orders.       EKG:   ECG 12 Lead Dyspnea   Preliminary Result   HEART RATE= 96  bpm   RR Interval= 625  ms   MO Interval= 159  ms   P Horizontal Axis= 0  deg   P Front Axis= 55  deg   QRSD Interval= 148  ms   QT Interval= 383  ms   QTcB= 484  ms   QRS Axis= -6  deg   T Wave Axis= 24  deg   - ABNORMAL ECG -   Sinus rhythm   Right bundle branch block   Electronically Signed By:    Date and Time of Study: 2024-03-15 21:02:46          Meds given in ED:   Medications   sodium chloride 0.9 % flush 10 mL (has no administration in time range)   sodium chloride 0.9 % flush 10 mL (has no administration in time range)   sodium chloride 0.9 % flush 20 mL (has no administration in time range)    sodium chloride 0.9 % infusion 40 mL (has no administration in time range)   heparin injection 500 Units (has no administration in time range)   sodium chloride 0.9 % flush 10 mL (has no administration in time range)   sodium chloride 0.9 % flush 10 mL (has no administration in time range)   sodium chloride 0.9 % infusion 40 mL (has no administration in time range)   ondansetron ODT (ZOFRAN-ODT) disintegrating tablet 4 mg (has no administration in time range)     Or   ondansetron (ZOFRAN) injection 4 mg (has no administration in time range)   nitroglycerin (NITROSTAT) SL tablet 0.4 mg (has no administration in time range)   acetaminophen (TYLENOL) tablet 650 mg (has no administration in time range)   nicotine (NICODERM CQ) 21 MG/24HR patch 1 patch (has no administration in time range)   nicotine polacrilex (NICORETTE) gum 2 mg (has no administration in time range)   ipratropium-albuterol (DUO-NEB) nebulizer solution 3 mL (has no administration in time range)   ipratropium-albuterol (DUO-NEB) nebulizer solution 3 mL (3 mL Nebulization Given 3/15/24 2152)       Imaging results:  XR Chest 1 View    Result Date: 3/15/2024  Linear right basilar atelectasis versus scarring.  This report was finalized on 3/15/2024 9:15 PM by Dr. Kaycee Jimenez M.D on Workstation: [id number]       Ambulatory status:   Up w/ assistance     Social issues:   Social History     Socioeconomic History    Marital status:      Spouse name: Krystle    Number of children: 2   Tobacco Use    Smoking status: Every Day     Current packs/day: 1.00     Average packs/day: 1 pack/day for 40.0 years (40.0 ttl pk-yrs)     Types: Cigarettes    Smokeless tobacco: Never    Tobacco comments:     Since age 32   Substance and Sexual Activity    Alcohol use: Yes     Alcohol/week: 4.0 - 6.0 standard drinks of alcohol     Types: 4 - 6 Cans of beer per week     Comment: daily /caffeine use - coffee    Drug use: Yes     Types: Marijuana     Comment: last used  "\"few days ago\"    Sexual activity: Not Currently     Partners: Female       Peripheral Neurovascular  Peripheral Neurovascular (Adult)  Peripheral Neurovascular WDL: WDL    Neuro Cognitive  Neuro Cognitive (Adult)  Cognitive/Neuro/Behavioral WDL: WDL (fatigue, weakness)    Learning  Learning Assessment (Adult)  Learning Readiness and Ability: no barriers identified    Respiratory  Respiratory WDL  Respiratory WDL: WDL    Abdominal Pain       Pain Assessments  Pain (Adult)  (0-10) Pain Rating: Rest: 2  (0-10) Pain Rating: Activity: 2  Pain Location: other (see comments) (generalized body aches)    NIH Stroke Scale       Melody Arevalo RN  03/15/24 23:03 EDT   "

## 2024-03-16 NOTE — H&P
[facility]   HISTORY AND PHYSICAL    Patient Name: Julia Smith III  : 1950  MRN: [id number]  Primary Care Physician:  Paty Wheeler), BERTO  Date of admission: 3/15/2024    Subjective   Subjective     Chief Complaint:   Chief Complaint   Patient presents with    Weakness - Generalized     Patient complains of weakness x2 days.  Per family difficulty ambulating.  Hx lung cancer, treatment completed 1 year ago.         HPI:    Julia Smith III is a 74 y.o. male with a history of lung cancer status postchemotherapy and radiation, COPD, paroxysmal atrial fibrillation not on anticoagulation, and tobacco abuse who presents to [facility] ER with generalized weakness.  Patient states symptoms initially started on Monday where he was feeling a lot more tired and then started to have a worsening cough.  Patient reports a chronic cough but states that it seems to worsen over this past week.  He states that he has been a little bit more short of breath when getting up to do things but denies any chest pain.  He reports some lower abdominal discomfort with nausea and diarrhea.  He denies any blood or black tarry quality to the stool.  Denies known fevers.  He also reports he has been having some worsening pain in his right leg especially at the thigh has been making it difficult to get around.  He denies any significant swelling in the leg or skin changes.  He has a chronic wound to the anterior left shin that he is following with wound care.  He reports he smokes about a pack a day and drinks about 2-3 beers daily but his last drink was on Monday.    Review of Systems   All systems were reviewed and negative except for: what is mentioned above in the HPI.    Personal History     Past Medical History:   Diagnosis Date    Atrial fibrillation with RVR     Cancer 2019    Right lung    Chronic cough     COPD (chronic obstructive pulmonary disease)     Hearing loss     History of shingles      Lower back pain     Lung cancer     Mastoiditis     Mild mitral regurgitation     Other fatigue     PAF (paroxysmal atrial fibrillation)     Pneumonia        Past Surgical History:   Procedure Laterality Date    BRONCHOSCOPY N/A 6/19/2019    Procedure: BRONCHOSCOPY WITH WASHINGS AND BIOPSY AND ENDOBRONCHIAL ULTRASOUND WITH FNA;  Surgeon: Arslan Marquez MD;  Location: [facility] ENDOSCOPY;  Service: Pulmonary    BRONCHOSCOPY N/A 2/21/2022    Procedure: BRONCHOSCOPY WITH FLUORO, BAL, BRUSHINGS, AND BIOPSIES LLL;  Surgeon: Arslan Marquez MD;  Location: [facility] ENDOSCOPY;  Service: Pulmonary;  Laterality: N/A;  PRE: LUNG MASS  POST: SAME    INNER EAR SURGERY Left     MASTOIDECTOMY Left 1994    VENOUS ACCESS DEVICE (PORT) INSERTION Right 8/22/2019    Procedure: INSERTION OF PORTACATH;  Surgeon: Ramila Gallardo MD;  Location: [facility] OR;  Service: Cardiothoracic       Family History: family history includes COPD in his father; Heart disease in his brother; No Known Problems in his brother, brother, brother, mother, and sister. Otherwise pertinent FHx was reviewed and not pertinent to current issue.    Social History:  reports that he has been smoking cigarettes. He has a 40 pack-year smoking history. He has never used smokeless tobacco. He reports current alcohol use of about 4.0 - 6.0 standard drinks of alcohol per week. He reports current drug use. Drug: Marijuana.    Home Medications:  amoxicillin-clavulanate, aspirin, and naproxen sodium    Allergies:  No Known Allergies    Objective   Objective     Vitals:   Temp:  [97.3 °F (36.3 °C)-98.5 °F (36.9 °C)] 97.3 °F (36.3 °C)  Heart Rate:  [] 97  Resp:  [16-21] 20  BP: (109-122)/(65-76) 109/72  Flow (L/min):  [2] 2  Physical Exam    Constitutional: 74-year-old male in no acute distress on 2 L via nasal cannula   Eyes: PERRLA, sclerae anicteric, no conjunctival injection   HENT: NCAT, mucous membranes moist   Neck: Supple, no thyromegaly, no  lymphadenopathy, trachea midline   Respiratory: Expiratory wheezing in the right upper lobe, nonlabored respirations    Cardiovascular: RRR, no murmurs, rubs, or gallops, palpable pedal pulses bilaterally   Gastrointestinal: Positive bowel sounds, soft, nontender, nondistended   Musculoskeletal: No bilateral ankle edema, no clubbing or cyanosis to extremities   Psychiatric: Appropriate affect, cooperative   Neurologic: Oriented x 3, strength symmetric in all extremities, Cranial Nerves grossly intact to confrontation, speech clear   Skin: No rashes     Result Review    Result Review:  I have personally reviewed the results from the time of this admission to 3/15/2024 23:46 EDT and agree with these findings:  [x]  Laboratory list / accordion  []  Microbiology  [x]  Radiology  [x]  EKG/Telemetry   []  Cardiology/Vascular   []  Pathology  [x]  Old records  []  Other:  Most notable findings include:     XR Chest 1 View    Result Date: 3/15/2024  SINGLE VIEW OF THE CHEST  HISTORY: Lung cancer  COMPARISON: February 21, 2022  FINDINGS: Right internal jugular vein Mediport appears stable in position. No pneumothorax or pleural effusion is seen. There is cardiomegaly. There is no vascular congestion. There is some calcification of the aorta. There is scarring versus atelectasis noted at the right lung base. There is mild stable elevation of the right hemidiaphragm. Left lung appears clear.      Linear right basilar atelectasis versus scarring.  This report was finalized on 3/15/2024 9:15 PM by Dr. Kaycee Jimenez M.D on Workstation: [id number]           Assessment & Plan   Assessment / Plan     Brief Patient Summary:  Julia Smith III is a 74 y.o. male who is being admitted the observation unit with generalized weakness and shortness of breath secondary to influenza.  In the ER, patient's respiratory panel detected influenza A.  A chest x-ray shows linear right basilar atelectasis versus scarring.  An EKG showed sinus  rhythm, chronic right bundle branch block with no acute ischemia.  Patient was noted with oxygen saturation dropping into 89 and was placed on 2 L via nasal cannula.  Patient reports improvement in breathing after breathing treatment given in ER.  We will continue scheduled breathing treatments and supportive treatment for influenza.  I will also order a right lower extremity Doppler to assess for DVT with patient's new onset of pain without known trauma.    Active Hospital Problems:  Active Hospital Problems    Diagnosis     **Shortness of breath     Influenza A     Right leg pain      Plan:     Shortness of breath  Influenza A  COPD  -Chest x-ray shows linear right basilar atelectasis versus scarring  -Wean oxygen as tolerated  -Scheduled DuoNebs  -Mucinex twice daily  -Antiemetics as needed  -Patient continues to have loose stools we will check a GI PCR  -IV fluid hydration  -PT consulted  -Consider pulmonology consult if patient does not show improvement overnight    Right leg pain  -Patient reports no known trauma  -Tenderness in the right thigh, no significant edema  -Will check a right lower extremity Doppler to rule out DVT    History of lung cancer  -Status post chemo and radiation  -Follows with Dr. Morel hematology and oncology and Dr. Coppola with pulmonology    Paroxysmal atrial fibrillation  -Sinus rhythm on exam  -Patient is not on anticoagulation  -Telemetry monitoring    Tobacco abuse  -Nicotine patch as needed for tobacco cessation  -Cessation education      DVT prophylaxis:  Mechanical DVT prophylaxis orders are present.        CODE STATUS:    Code Status (Patient has no pulse and is not breathing): CPR (Attempt to Resuscitate)  Medical Interventions (Patient has pulse or is breathing): Full Support    Admission Status:  I believe this patient meets observation status.    78 minutes have been spent by [facility] providers in the care of this patient while under  observation status.      Appropriate PPE worn during patient encounter.  Hand hygeine performed before and after seeing the patient.      Electronically signed by Fern Best PA-C, 03/15/24, 11:01 PM EDT.

## 2024-03-16 NOTE — PROGRESS NOTES
ED OBSERVATION PROGRESS/DISCHARGE SUMMARY    Date of Admission: 3/15/2024   LOS: 0 days   PCP: Paty Wheeler ([place]), BERTO    Final Diagnosis influenza A, COPD exacerbation, hypoxia      Subjective     Hospital Outcome:   Pleasant afebrile ambulatory 74-year-old  gentleman admitted to the ED observation unit for dyspnea.  He is on approximate day 5 of onset of symptoms.  His respiratory viral panel yesterday was positive for influenza A.    This morning his ambulatory room air saturation dropped to 83% and he was subsequently restarted back on supplemental nasal cannula and is titrated between 1 and 2 L today.    Seen and evaluate Dr. Coppola with the pulmonology service who agrees that patient is outside the window for receiving Tamiflu for influenza A.  Recommend starting patient on bronchodilators, adding Symbicort and a steroid taper for his COPD symptoms.    I discussed this with [facility] who advises patient meets criteria for inpatient admission.  I subsequently discussed this with Dr. Monique on-call for [facility] who has graciously accepted to admit patient to a telemetry bed.    ROS:  General: no fevers, chills  Respiratory: + cough, dyspnea  Cardiovascular: no chest pain, palpitations  Abdomen: No abdominal pain, nausea, vomiting, or diarrhea  Neurologic: No focal weakness    Objective   Physical Exam:  I have reviewed the vital signs.  Temp:  [97.3 °F (36.3 °C)-98.5 °F (36.9 °C)] 97.7 °F (36.5 °C)  Heart Rate:  [] 97  Resp:  [16-21] 16  BP: (101-122)/(63-76) 118/67  General Appearance:    Alert, cooperative, no distress, chronically ill-appearing  Head:    Normocephalic, atraumatic  Eyes:    Sclerae anicteric  Neck:   Supple, no mass  Lungs: Diminished breath sounds bilateral lower lobes, otherwise clear, respirations unlabored  Heart: Regular rate and rhythm, S1 and S2 normal, no murmur, rub or gallop  Abdomen:  Soft, non-tender, bowel sounds active, nondistended  Extremities: No clubbing, cyanosis,  or edema to lower extremities  Pulses:  2+ and symmetric in distal lower extremities  Skin: No rashes   Neurologic: Oriented x3, Normal strength to extremities    Results Review:    I have reviewed the labs, radiology results and diagnostic studies.    Results from last 7 days   Lab Units 03/16/24  0410   WBC 10*3/mm3 3.89   HEMOGLOBIN g/dL 13.5   HEMATOCRIT % 39.2   PLATELETS 10*3/mm3 93*     Results from last 7 days   Lab Units 03/16/24  0410 03/15/24  2204   SODIUM mmol/L 135* 134*   POTASSIUM mmol/L 3.6 3.8   CHLORIDE mmol/L 99 99   CO2 mmol/L 22.2 23.0   BUN mg/dL 22 23   CREATININE mg/dL 1.20 1.04   CALCIUM mg/dL 8.3* 8.6   BILIRUBIN mg/dL  --  0.3   ALK PHOS U/L  --  84   ALT (SGPT) U/L  --  35   AST (SGOT) U/L  --  44*   GLUCOSE mg/dL 77 72     Imaging Results (Last 24 Hours)       Procedure Component Value Units Date/Time    XR Chest 1 View [[id number]] Collected: 03/15/24 2114     Updated: 03/15/24 2118    Narrative:      SINGLE VIEW OF THE CHEST     HISTORY: Lung cancer     COMPARISON: February 21, 2022     FINDINGS:  Right internal jugular vein Mediport appears stable in position. No  pneumothorax or pleural effusion is seen. There is cardiomegaly. There  is no vascular congestion. There is some calcification of the aorta.  There is scarring versus atelectasis noted at the right lung base. There  is mild stable elevation of the right hemidiaphragm. Left lung appears  clear.       Impression:      Linear right basilar atelectasis versus scarring.     This report was finalized on 3/15/2024 9:15 PM by Dr. Kaycee Jimenez M.D on Workstation: [id number]               I have reviewed the medications.  ---------------------------------------------------------------------------------------------  Assessment & Plan   Assessment/Problem List    Shortness of breath    Influenza A    Right leg pain      Plan:    Shortness of breath  Influenza A  COPD  -Chest x-ray shows linear right basilar atelectasis versus  scarring  -Wean oxygen as tolerated  -Scheduled DuoNebs  -Mucinex twice daily  -Antiemetics as needed  -Patient continues to have loose stools we will check a GI PCR  -IV fluid hydration  -PT consulted  -Pulmonology consult, see MD note     Right leg pain  -Patient reports no known trauma  -Tenderness in the right thigh, no significant edema  -Right lower extremity Doppler negative for acute DVT     History of lung cancer  -Status post chemo and radiation  -Follows with Dr. Morel hematology and oncology and Dr. Marquez with pulmonology     Paroxysmal atrial fibrillation  -Sinus rhythm on exam  -Patient is not on anticoagulation  -Telemetry monitoring     Tobacco abuse  -Nicotine patch as needed for tobacco cessation  -Cessation education       Disposition: admitted to [facility] Dr. Monique inpatient      This note will serve as a transfer and progress note    Jazzy Gustafson, APRN 03/16/24 09:03 EDT    I have worn appropriate PPE during this patient encounter, sanitized my hands both with entering and exiting patient's room.      54 minutes has been spent by [facility] providers in the care of this patient while under observation status

## 2024-03-16 NOTE — ED PROVIDER NOTES
SHARED VISIT: This visit was performed by BOTH a physician and an APC. The substantive portion of the medical decision making was performed by this attesting physician who made or approved the management plan and takes responsibility for patient management. All studies in the APC note (if performed) were independently interpreted by me.      The ALONZO and I have discussed this patient's history, physical exam, and treatment plan.  I have reviewed the documentation and personally had a face to face interaction with the patient. I affirm the documentation and agree with the treatment and plan.  The attached note describes my personal findings.       I provided a substantive portion of the care of the patient.  I personally performed the physical exam in its entirety, and below are my findings.        History  74-year-old male with history of lung cancer, COPD presents with generalized weakness and shortness of breath.    Physical Exam  Vital Signs reviewed  GENERAL: Alert male in mild distress.  Triage vitals reviewed notable for afebrile.  O2 sats fell to the upper 80s on room air but have been running mid 90s on 2 L nasal cannula.  HENT: nares patent  EYES: no scleral icterus  CV: regular rhythm, regular rate  RESPIRATORY: normal effort, decreased air movement bilaterally with expiratory wheezes  ABDOMEN: soft  MUSCULOSKELETAL: no deformity  NEURO: Strength sensation and coordination are grossly intact.  Speech and mentation are unremarkable  SKIN: warm, dry      Assessment and Data Review    ED Course as of 03/15/24 2303   Fri Mar 15, 2024   2106 EKG independently interpreted by me    Time 9:02 PM  Sinus 96  Normal P waves and WI intervals  QRS-IVCD  ST, T wave-nonspecific changes  Not significant change when compared to 2019 [DB]   2123 SpO2(!): 89 % [DC]   2203 Chest x-ray independently interpreted by me shows some right basilar atelectasis. [DB]   2206 Influenza A H1 2009 PCR(!): Detected [DC]   2222 WBC: 5.34  [DC]   2222 Hemoglobin: 14.3 [DC]   2222 Platelets(!): 98 [DC]   2250 Discussed case with BOO Beltran ALONZO, who recommends admission to observation unit. [DC]   2254 Discussed case with GINGER Solomon AOLNZO, who will admit to observation unit under Dr. Damon. [DC]      ED Course User Index  [DB] Perry Shankar MD  [DC] Eugenia Townsend PA         I did independently interpret EKG as listed above.  I did interpret x-rays as listed above.    I discussed treatment and evaluation of this patient with JASON Townsend.  Patient with multiple medical problems including COPD and lung cancer.  He is found to be positive for influenza A and is hypoxic with generalized weakness and difficulty walking.  Will admit for supportive care to include fluids, bronchodilator and supplemental oxygen.     Perry Shankar MD  03/15/24 [id number]

## 2024-03-16 NOTE — PLAN OF CARE
Goal Outcome Evaluation:              Outcome Evaluation: patietn in room during this shift lert and oriented and able to verbalize needs, pt ambulated this AM and oxygen dropped to 83% after a couple of steps, oxygen placed back on and saturation back to 94% and above, pulmonology consulted and patient is being admitted for further treatment.

## 2024-03-16 NOTE — PLAN OF CARE
Goal Outcome Evaluation:  Plan of Care Reviewed With: patient           Outcome Evaluation: Pt adm with weakness, SOA, dx with flu A, COPD, pt has lung cancer. Pt lives with his wife and reports being indpendent without an assistive device at baseline, he presents today with significant weakness, unsteady gait, he needed some assist to walk and was much steadier with a rolling walker, pt reports that he has a walker he can use at home if needed. Anticipate pt will be able to go home with spouse, he is appropriate for PT to follow to maximize independence with mobility, pt walked 60 ft today on 2L o2, required several rest breaks and O2 sats >90% throughout      Anticipated Discharge Disposition (PT): home with assist

## 2024-03-16 NOTE — CONSULTS
Patient Identification:  Julia Smith III  74 y.o.  male  1950  [id number]          LOS 0    Requesting physician: Dr. Damon    Reason for Consultation: Influenza and hypoxemia    History of Present Illness:   74-year-old male in observation after presenting with weakness and shortness of breath.  Tests positive for influenza.  Chest x-ray shows right basilar atelectasis versus scarring.  Requiring 1 L supplemental oxygen to maintain saturations in the 90s.  Less short of breath after breathing treatments.  No purulent sputum or chest pain.    Past Medical History:  Past Medical History:   Diagnosis Date    Atrial fibrillation with RVR     Cancer 06/2019    Right lung    Chronic cough     COPD (chronic obstructive pulmonary disease)     Hearing loss     History of shingles     Lower back pain     Lung cancer     Mastoiditis     Mild mitral regurgitation     Other fatigue     PAF (paroxysmal atrial fibrillation)     Pneumonia        Past Surgical History:  Past Surgical History:   Procedure Laterality Date    BRONCHOSCOPY N/A 6/19/2019    Procedure: BRONCHOSCOPY WITH WASHINGS AND BIOPSY AND ENDOBRONCHIAL ULTRASOUND WITH FNA;  Surgeon: Arslan Marquez MD;  Location: [facility] ENDOSCOPY;  Service: Pulmonary    BRONCHOSCOPY N/A 2/21/2022    Procedure: BRONCHOSCOPY WITH FLUORO, BAL, BRUSHINGS, AND BIOPSIES LLL;  Surgeon: Arslan Marquez MD;  Location: [facility] ENDOSCOPY;  Service: Pulmonary;  Laterality: N/A;  PRE: LUNG MASS  POST: SAME    INNER EAR SURGERY Left     MASTOIDECTOMY Left 1994    VENOUS ACCESS DEVICE (PORT) INSERTION Right 8/22/2019    Procedure: INSERTION OF PORTACATH;  Surgeon: Ramila Gallardo MD;  Location: [facility];  Service: Cardiothoracic        Home Meds:  Medications Prior to Admission   Medication Sig Dispense Refill Last Dose    aspirin 81 MG tablet Take 1 tablet by mouth Daily. 30 tablet 11 3/14/2024    amoxicillin-clavulanate (AUGMENTIN) 500-125 MG per tablet TAKE 1  "TABLET BY MOUTH TWICE DAILY BEFORE MEALS FOR 10 DAYS       naproxen sodium (ALEVE) 220 MG tablet Take 1 tablet by mouth 2 (Two) Times a Day As Needed.            Allergies:  No Known Allergies    Social History:   Social History     Socioeconomic History    Marital status:      Spouse name: Krystle    Number of children: 2   Tobacco Use    Smoking status: Every Day     Current packs/day: 1.00     Average packs/day: 1 pack/day for 40.0 years (40.0 ttl pk-yrs)     Types: Cigarettes    Smokeless tobacco: Never    Tobacco comments:     Since age 32   Vaping Use    Vaping status: Never Used   Substance and Sexual Activity    Alcohol use: Yes     Alcohol/week: 4.0 - 6.0 standard drinks of alcohol     Types: 4 - 6 Cans of beer per week     Comment: daily /caffeine use - coffee    Drug use: Yes     Types: Marijuana     Comment: last used \"few days ago\"    Sexual activity: Not Currently     Partners: Female       Family History:  Family History   Problem Relation Age of Onset    No Known Problems Mother     COPD Father             No Known Problems Sister     Heart disease Brother     No Known Problems Brother     No Known Problems Brother     No Known Problems Brother        Review of Systems:  Denies fevers or chills  Denies nausea or vomiting  No new vision or hearing changes  No chest pain  No productive cough or shortness of breath  No diarrhea, hematemesis or hematochezia, no dysuria or frequency  No musculoskeletal complaints  No heat or cold intolerance  No skin rashes  No dizziness or confusion.  No seizure activity  No new anxiety or depression  12 system review of systems performed and all else negative    Objective:    PHYSICAL EXAM:    /67 (BP Location: Right arm, Patient Position: Lying)   Pulse 97   Temp 97.7 °F (36.5 °C) (Oral)   Resp 16   Ht 180.3 cm (71\")   Wt 90.7 kg (200 lb)   SpO2 90%   BMI 27.89 kg/m²  Body mass index is 27.89 kg/m². 90% 90.7 kg (200 lb)    GENERAL APPEARANCE: "   Well developed  Well nourished  No acute distress   EYES:    PERRL                                                                           Conjunctivae normal  Sclerae nonicteric.  HENT:   Atraumatic, normocephalic  External ears and nose normal  Moist mucous membranes and no ulcers  NECK:  Thyroid not enlarged  Trachea midline   RESPIRATORY:    Nonlabored breathing   Normal breath sounds  No rales.  Mild wheezing  No dullness  CARDIOVASCULAR:    RRR  Normal S1, S2  No murmur  Lower extremity edema: none    GI:   Bowel sounds normal  Abdomen soft , nondistended, nontender  No abdominal masses  MUSCULOSKELETAL:  Normal movement of extremities  No tenderness, no deformities  No clubbing or cyanosis   Skin:    No visible rashes  No palpable nodules  Cap refill normal.  No mottling.   PSYCHIATRIC:  Speech and behavior appropriate  Normal mood and affect  Oriented to person, place and time  NEUROLOGIC:  Cranial nerves II through XII grossly intact.  Sensation intact.      Lab Review:      Results from last 7 days   Lab Units 03/16/24  0410 03/15/24  2204   WBC 10*3/mm3 3.89 5.34   HEMOGLOBIN g/dL 13.5 14.3   HEMATOCRIT % 39.2 42.0   PLATELETS 10*3/mm3 93* 98*       Results from last 7 days   Lab Units 03/16/24  0410 03/15/24  2204   SODIUM mmol/L 135* 134*   POTASSIUM mmol/L 3.6 3.8   CHLORIDE mmol/L 99 99   CO2 mmol/L 22.2 23.0   BUN mg/dL 22 23   CREATININE mg/dL 1.20 1.04   CALCIUM mg/dL 8.3* 8.6   BILIRUBIN mg/dL  --  0.3   ALK PHOS U/L  --  84   ALT (SGPT) U/L  --  35   AST (SGOT) U/L  --  44*   GLUCOSE mg/dL 77 72                    Imaging reviewed  chest X-ray               Collected Updated Procedure Result Status    03/15/2024 2058 03/15/2024 2206 Respiratory Panel PCR w/COVID-19(SARS-CoV-2) JAY JAY/ONOFRE/ROSEANN/PAD/COR/JAMES In-House, NP Swab in UTM/VTM, 2 HR TAT - Swab, Nasopharynx [[id number]]    (Abnormal)   Swab from Nasopharynx    Final result Component Value   ADENOVIRUS, PCR Not Detected   Coronavirus 229E Not  Detected   Coronavirus HKU1 Not Detected   Coronavirus NL63 Not Detected   Coronavirus OC43 Not Detected   COVID19 Not Detected   Human Metapneumovirus Not Detected   Human Rhinovirus/Enterovirus Not Detected   Influenza A H1 2009 PCR Detected Abnormal    Influenza B PCR Not Detected   Parainfluenza Virus 1 Not Detected   Parainfluenza Virus 2 Not Detected   Parainfluenza Virus 3 Not Detected   Parainfluenza Virus 4 Not Detected   RSV, PCR Not Detected   Bordetella pertussis pcr Not Detected   Bordetella parapertussis PCR Not Detected   Chlamydophila pneumoniae PCR Not Detected   Mycoplasma pneumo by PCR Not Detected                  Assessment:  Acute influenza  Acute hypoxemia secondary to above  COPD with bronchospasm secondary to viral illness  History of lung cancer  Atrial fibrillation  Persistent tobacco abuse        Recommendations:  Treatment for viral process is supportive.  Too late in presentation at this time for any benefit of Tamiflu.  Bronchodilators and will add Symbicort maintenance therapy and steroid with taper for COPD symptoms with increased wheezing.  Encouraged to avoid tobacco use.      Arslan Marquez MD  [facility], [facility]  Pulmonary and Critical Care Medicine    3/16/2024  11:40 EDT

## 2024-03-16 NOTE — ED PROVIDER NOTES
EMERGENCY DEPARTMENT ENCOUNTER      PCP: Paty Wheeler APRN ([place])  Patient Care Team:  Paty Wheeler APRN ([place]) as PCP - General (Family Medicine)  Ramila Gallardo MD as Referring Physician (Thoracic Surgery)  Thai Morel MD as Consulting Physician (Hematology and Oncology)  Yessenia Elizabeth MD as Consulting Physician (Radiation Oncology)   Independent Historians: Patient, family at bedside    HPI:  Chief Complaint: Generalized weakness  A complete HPI/ROS/PMH/PSH/SH/FH are unobtainable due to: None    Chronic or social conditions impacting patient care (social determinants of health): None    Context: Julia Smith III is a 74 y.o. male with history of small cell lung cancer, COPD who presents to the ED c/o generalized weakness worsening over the past several days to the point where he has not been able to ambulate at home.  He has not had any falls.  He has had slight cough but no fevers or chills.  He states shortness of breath may be slightly worse than baseline.  He continues to smoke around a pack daily.  He is not currently being treated with any chemotherapy or radiation has follow-up CT scan of his chest at the end of April.  Family states he has not been eating or drinking much the past couple of days.    Review of prior external notes and/or external test results outside of this encounter: Encounter with Dr. Morel, oncology, on 11/20/2023.  History of small cell lung cancer, SVC syndrome.  CT imaging on 5/31/2023 without definite progression.      PAST MEDICAL HISTORY  Active Ambulatory Problems     Diagnosis Date Noted    Encounter for screening for malignant neoplasm of colon 06/04/2019    Small cell lung cancer 07/03/2019    Small cell carcinoma 07/03/2019    Atrial fibrillation with RVR 07/05/2019    Pneumonia 07/05/2019    COPD (chronic obstructive pulmonary disease) 07/05/2019    PAF (paroxysmal atrial fibrillation) 07/08/2019    High risk medication use     Tobacco abuse 07/23/2019     Mild mitral regurgitation 2019    Daily consumption of alcohol 2019    Cigarette nicotine dependence without complication 2019    Encounter for fitting and adjustment of vascular catheter 2019    Primary malignant neoplasm of right upper lobe of lung 2020    Other fatigue     Non-small cell lung cancer 2022    History of lung cancer 2023    Wound of left leg 2023     Resolved Ambulatory Problems     Diagnosis Date Noted    No Resolved Ambulatory Problems     Past Medical History:   Diagnosis Date    Cancer 2019    Chronic cough     Hearing loss     History of shingles     Lower back pain     Lung cancer     Mastoiditis        The patient has started, but not completed, their COVID-19 vaccination series.    PAST SURGICAL HISTORY  Past Surgical History:   Procedure Laterality Date    BRONCHOSCOPY N/A 2019    Procedure: BRONCHOSCOPY WITH WASHINGS AND BIOPSY AND ENDOBRONCHIAL ULTRASOUND WITH FNA;  Surgeon: Arslan Marquez MD;  Location: [facility] ENDOSCOPY;  Service: Pulmonary    BRONCHOSCOPY N/A 2022    Procedure: BRONCHOSCOPY WITH FLUORO, BAL, BRUSHINGS, AND BIOPSIES LLL;  Surgeon: Arslan Marquez MD;  Location: [facility] ENDOSCOPY;  Service: Pulmonary;  Laterality: N/A;  PRE: LUNG MASS  POST: SAME    INNER EAR SURGERY Left     MASTOIDECTOMY Left 1994    VENOUS ACCESS DEVICE (PORT) INSERTION Right 2019    Procedure: INSERTION OF PORTACATH;  Surgeon: Rmaila Gallardo MD;  Location: [facility] OR;  Service: Cardiothoracic         FAMILY HISTORY  Family History   Problem Relation Age of Onset    No Known Problems Mother     COPD Father             No Known Problems Sister     Heart disease Brother     No Known Problems Brother     No Known Problems Brother     No Known Problems Brother          SOCIAL HISTORY  Social History     Socioeconomic History    Marital status:      Spouse name: Krystle    Number of children: 2   Tobacco Use     "Smoking status: Every Day     Current packs/day: 1.00     Average packs/day: 1 pack/day for 40.0 years (40.0 ttl pk-yrs)     Types: Cigarettes    Smokeless tobacco: Never    Tobacco comments:     Since age 32   Substance and Sexual Activity    Alcohol use: Yes     Alcohol/week: 4.0 - 6.0 standard drinks of alcohol     Types: 4 - 6 Cans of beer per week     Comment: daily /caffeine use - coffee    Drug use: Yes     Types: Marijuana     Comment: last used \"few days ago\"    Sexual activity: Not Currently     Partners: Female         ALLERGIES  Patient has no known allergies.        REVIEW OF SYSTEMS  Review of Systems   Constitutional:  Positive for fatigue. Negative for fever.   Respiratory:  Positive for cough.    Cardiovascular:  Negative for chest pain and leg swelling.   Gastrointestinal:  Negative for abdominal pain, diarrhea and vomiting.   Genitourinary:  Negative for dysuria.   Neurological:  Positive for weakness.        All systems reviewed and negative except for those discussed in HPI.       PHYSICAL EXAM    I have reviewed the triage vital signs and nursing notes.    ED Triage Vitals   Temp Heart Rate Resp BP SpO2   03/15/24 1957 03/15/24 1957 03/15/24 1957 03/15/24 2030 03/15/24 1957   98.5 °F (36.9 °C) 103 16 112/68 91 %      Temp src Heart Rate Source Patient Position BP Location FiO2 (%)   03/15/24 1957 03/15/24 1957 -- -- --   Tympanic Monitor          Physical Exam  GENERAL: Chronically ill-appearing, alert, no acute distress, smells of cigarette smoke  SKIN: Warm, dry  HENT: Normocephalic, atraumatic, dry mucous membranes  EYES: no scleral icterus  CV: regular rhythm, regular rate  RESPIRATORY: normal effort, diminished breath sounds  ABDOMEN: soft, nontender, nondistended  MUSCULOSKELETAL: no deformity, no edema  NEURO: alert, moves all extremities, follows commands          LAB RESULTS  Recent Results (from the past 24 hour(s))   Respiratory Panel PCR w/COVID-19(SARS-CoV-2) " JAY JAY/ONOFRE/ROSEANN/PAD/COR/JAMES In-House, NP Swab in UTM/VTM, 2 HR TAT - Swab, Nasopharynx    Collection Time: 03/15/24  8:58 PM    Specimen: Nasopharynx; Swab   Result Value Ref Range    ADENOVIRUS, PCR Not Detected Not Detected    Coronavirus 229E Not Detected Not Detected    Coronavirus HKU1 Not Detected Not Detected    Coronavirus NL63 Not Detected Not Detected    Coronavirus OC43 Not Detected Not Detected    COVID19 Not Detected Not Detected - Ref. Range    Human Metapneumovirus Not Detected Not Detected    Human Rhinovirus/Enterovirus Not Detected Not Detected    Influenza A H1 2009 PCR Detected (A) Not Detected    Influenza B PCR Not Detected Not Detected    Parainfluenza Virus 1 Not Detected Not Detected    Parainfluenza Virus 2 Not Detected Not Detected    Parainfluenza Virus 3 Not Detected Not Detected    Parainfluenza Virus 4 Not Detected Not Detected    RSV, PCR Not Detected Not Detected    Bordetella pertussis pcr Not Detected Not Detected    Bordetella parapertussis PCR Not Detected Not Detected    Chlamydophila pneumoniae PCR Not Detected Not Detected    Mycoplasma pneumo by PCR Not Detected Not Detected   ECG 12 Lead Dyspnea    Collection Time: 03/15/24  9:02 PM   Result Value Ref Range    QT Interval 383 ms    QTC Interval 484 ms   Comprehensive Metabolic Panel    Collection Time: 03/15/24 10:04 PM    Specimen: Blood, Central Line   Result Value Ref Range    Glucose 72 65 - 99 mg/dL    BUN 23 8 - 23 mg/dL    Creatinine 1.04 0.76 - 1.27 mg/dL    Sodium 134 (L) 136 - 145 mmol/L    Potassium 3.8 3.5 - 5.2 mmol/L    Chloride 99 98 - 107 mmol/L    CO2 23.0 22.0 - 29.0 mmol/L    Calcium 8.6 8.6 - 10.5 mg/dL    Total Protein 6.6 6.0 - 8.5 g/dL    Albumin 4.1 3.5 - 5.2 g/dL    ALT (SGPT) 35 1 - 41 U/L    AST (SGOT) 44 (H) 1 - 40 U/L    Alkaline Phosphatase 84 39 - 117 U/L    Total Bilirubin 0.3 0.0 - 1.2 mg/dL    Globulin 2.5 gm/dL    A/G Ratio 1.6 g/dL    BUN/Creatinine Ratio 22.1 7.0 - 25.0    Anion Gap 12.0 5.0  - 15.0 mmol/L    eGFR 76.3 >60.0 mL/min/1.73   High Sensitivity Troponin T    Collection Time: 03/15/24 10:04 PM    Specimen: Blood, Central Line   Result Value Ref Range    HS Troponin T 14 <22 ng/L   BNP    Collection Time: 03/15/24 10:04 PM    Specimen: Blood, Central Line   Result Value Ref Range    proBNP 98.8 0.0 - 900.0 pg/mL   Magnesium    Collection Time: 03/15/24 10:04 PM    Specimen: Blood, Central Line   Result Value Ref Range    Magnesium 1.7 1.6 - 2.4 mg/dL   CBC Auto Differential    Collection Time: 03/15/24 10:04 PM    Specimen: Blood, Central Line   Result Value Ref Range    WBC 5.34 3.40 - 10.80 10*3/mm3    RBC 4.75 4.14 - 5.80 10*6/mm3    Hemoglobin 14.3 13.0 - 17.7 g/dL    Hematocrit 42.0 37.5 - 51.0 %    MCV 88.4 79.0 - 97.0 fL    MCH 30.1 26.6 - 33.0 pg    MCHC 34.0 31.5 - 35.7 g/dL    RDW 13.4 12.3 - 15.4 %    RDW-SD 43.3 37.0 - 54.0 fl    MPV 10.9 6.0 - 12.0 fL    Platelets 98 (L) 140 - 450 10*3/mm3    Neutrophil % 75.9 42.7 - 76.0 %    Lymphocyte % 10.3 (L) 19.6 - 45.3 %    Monocyte % 12.7 (H) 5.0 - 12.0 %    Eosinophil % 0.2 (L) 0.3 - 6.2 %    Basophil % 0.7 0.0 - 1.5 %    Neutrophils, Absolute 4.05 1.70 - 7.00 10*3/mm3    Lymphocytes, Absolute 0.55 (L) 0.70 - 3.10 10*3/mm3    Monocytes, Absolute 0.68 0.10 - 0.90 10*3/mm3    Eosinophils, Absolute 0.01 0.00 - 0.40 10*3/mm3    Basophils, Absolute 0.04 0.00 - 0.20 10*3/mm3       Ordered the above labs and independently reviewed and interpreted the results.        RADIOLOGY  XR Chest 1 View    Result Date: 3/15/2024  SINGLE VIEW OF THE CHEST  HISTORY: Lung cancer  COMPARISON: February 21, 2022  FINDINGS: Right internal jugular vein Mediport appears stable in position. No pneumothorax or pleural effusion is seen. There is cardiomegaly. There is no vascular congestion. There is some calcification of the aorta. There is scarring versus atelectasis noted at the right lung base. There is mild stable elevation of the right hemidiaphragm. Left lung  appears clear.      Linear right basilar atelectasis versus scarring.  This report was finalized on 3/15/2024 9:15 PM by Dr. Kaycee Jimenez M.D on Workstation: [id number]       I ordered the above noted radiological studies. Independently reviewed and interpreted by me.  See dictation for official radiology interpretation.      PROCEDURES    Procedures      MEDICATIONS GIVEN IN ER    Medications   sodium chloride 0.9 % flush 10 mL (has no administration in time range)   sodium chloride 0.9 % flush 10 mL (has no administration in time range)   sodium chloride 0.9 % flush 20 mL (has no administration in time range)   sodium chloride 0.9 % infusion 40 mL (has no administration in time range)   heparin injection 500 Units (has no administration in time range)   sodium chloride 0.9 % flush 10 mL (has no administration in time range)   sodium chloride 0.9 % flush 10 mL (has no administration in time range)   sodium chloride 0.9 % infusion 40 mL (has no administration in time range)   ondansetron ODT (ZOFRAN-ODT) disintegrating tablet 4 mg (has no administration in time range)     Or   ondansetron (ZOFRAN) injection 4 mg (has no administration in time range)   nitroglycerin (NITROSTAT) SL tablet 0.4 mg (has no administration in time range)   acetaminophen (TYLENOL) tablet 650 mg (has no administration in time range)   nicotine (NICODERM CQ) 21 MG/24HR patch 1 patch (has no administration in time range)   nicotine polacrilex (NICORETTE) gum 2 mg (has no administration in time range)   ipratropium-albuterol (DUO-NEB) nebulizer solution 3 mL (has no administration in time range)   ipratropium-albuterol (DUO-NEB) nebulizer solution 3 mL (3 mL Nebulization Given 3/15/24 2152)         PROGRESS, DATA ANALYSIS, CONSULTS, AND MEDICAL DECISION MAKING    All labs have been independently reviewed and interpreted by me.  All radiology studies have been independently reviewed and interpreted by me and discussed with radiologist  dictating the report.   EKG's independently reviewed and interpreted by me.  Discussion below represents my analysis of pertinent findings related to patient's condition, differential diagnosis, treatment plan and final disposition.    My differential diagnosis for generalized weakness includes but is not limited to:    Neuromuscular weakness, CVA, Hemorrhagic stroke, Multiple sclerosis, Spinal cord disease:Infection (Epidural abscess), Infarction/ischemia, Trauma (Spinal Cord Syndromes), Inflammation (Transverse Myelitis), Degenerative (Spinal muscular atrophy), Tumor, Peripheral nerve disease: Guillain-[place] syndrome, Toxins ([place]), Diabetic peripheral neuropathy, Organophosphate toxicity, Lambert-Eaton myasthenic syndrome, Rhabdomyolysis, Dermatomyositis, Polymyositis, Alcoholic myopathy, Non-neuromuscular weakness, ACS, Arrhythmia/Syncope, Severe infection/Sepsis, Hypoglycemia, Periodic paralysis (electrolyte disturbance, K, Mg, Ca), Thyrotoxic periodic paralysis, Respiratory failure, Symptomatic Anemia, Severe dehydration, Hypothyroidism, Polypharmacy, Malignancy          ED Course as of 03/15/24 2320   Fri Mar 15, 2024   2106 EKG independently interpreted by me    Time 9:02 PM  Sinus 96  Normal P waves and FL intervals  QRS-IVCD  ST, T wave-nonspecific changes  Not significant change when compared to 2019 [DB]   2123 SpO2(!): 89 % [DC]   2203 Chest x-ray independently interpreted by me shows some right basilar atelectasis. [DB]   2206 Influenza A H1 2009 PCR(!): Detected [DC]   2222 WBC: 5.34 [DC]   2222 Hemoglobin: 14.3 [DC]   2222 Platelets(!): 98 [DC]   2250 Discussed case with BOO Beltran ALONZO, who recommends admission to observation unit. [DC]   2254 Discussed case with GINGER Solomon ALONZO, who will admit to observation unit under Dr. Damon. [DC]      ED Course User Index  [DB] Perry Shankar MD  [DC] Eugenia Townsend PA             AS OF 23:20 EDT VITALS:    BP - 122/76  HR -  93  TEMP - 98.5 °F (36.9 °C) (Tympanic)  O2 SATS - 94%        DIAGNOSIS  Final diagnoses:   Shortness of breath   Influenza A   Generalized weakness         DISPOSITION  ED Disposition       ED Disposition   Decision to Admit    Condition   --    Comment   --                  Note Disclaimer: At [facility], we believe that sharing information builds trust and better relationships. You are receiving this note because you recently visited [facility]. It is possible you will see health information before a provider has talked with you about it. This kind of information can be easy to misunderstand. To help you fully understand what it means for your health, we urge you to discuss this note with your provider.         Eugenia Townsend PA  03/15/24 2448

## 2024-03-16 NOTE — PLAN OF CARE
Goal Outcome Evaluation:   Plan of care reviewed with patient   Outcome summary: Patient AOX4, vitals stable, standby assist for ambulation, patient currently requiring oxygen via nasal canula at 1L/min.

## 2024-03-17 LAB

## 2024-03-17 PROCEDURE — 63710000001 PREDNISONE PER 1 MG: Performed by: INTERNAL MEDICINE

## 2024-03-17 PROCEDURE — 87507 IADNA-DNA/RNA PROBE TQ 12-25: CPT | Performed by: INTERNAL MEDICINE

## 2024-03-17 PROCEDURE — 25010000002 ALTEPLASE 2 MG RECONSTITUTED SOLUTION: Performed by: INTERNAL MEDICINE

## 2024-03-17 PROCEDURE — 94799 UNLISTED PULMONARY SVC/PX: CPT

## 2024-03-17 PROCEDURE — 94760 N-INVAS EAR/PLS OXIMETRY 1: CPT

## 2024-03-17 PROCEDURE — 94664 DEMO&/EVAL PT USE INHALER: CPT

## 2024-03-17 PROCEDURE — 94761 N-INVAS EAR/PLS OXIMETRY MLT: CPT

## 2024-03-17 RX ADMIN — IPRATROPIUM BROMIDE AND ALBUTEROL SULFATE 3 ML: 2.5; .5 SOLUTION RESPIRATORY (INHALATION) at 06:58

## 2024-03-17 RX ADMIN — Medication 10 ML: at 09:39

## 2024-03-17 RX ADMIN — IPRATROPIUM BROMIDE AND ALBUTEROL SULFATE 3 ML: 2.5; .5 SOLUTION RESPIRATORY (INHALATION) at 18:54

## 2024-03-17 RX ADMIN — ACETAMINOPHEN 325MG 650 MG: 325 TABLET ORAL at 16:28

## 2024-03-17 RX ADMIN — GUAIFENESIN 600 MG: 600 TABLET, EXTENDED RELEASE ORAL at 09:39

## 2024-03-17 RX ADMIN — Medication 10 ML: at 20:25

## 2024-03-17 RX ADMIN — ALTEPLASE: 2.2 INJECTION, POWDER, LYOPHILIZED, FOR SOLUTION INTRAVENOUS at 16:28

## 2024-03-17 RX ADMIN — NICOTINE 1 PATCH: 21 PATCH, EXTENDED RELEASE TRANSDERMAL at 23:10

## 2024-03-17 RX ADMIN — IPRATROPIUM BROMIDE AND ALBUTEROL SULFATE 3 ML: 2.5; .5 SOLUTION RESPIRATORY (INHALATION) at 10:21

## 2024-03-17 RX ADMIN — GUAIFENESIN 600 MG: 600 TABLET, EXTENDED RELEASE ORAL at 20:25

## 2024-03-17 RX ADMIN — PREDNISONE 40 MG: 20 TABLET ORAL at 09:40

## 2024-03-17 RX ADMIN — BUDESONIDE AND FORMOTEROL FUMARATE DIHYDRATE 2 PUFF: 160; 4.5 AEROSOL RESPIRATORY (INHALATION) at 19:06

## 2024-03-17 RX ADMIN — IPRATROPIUM BROMIDE AND ALBUTEROL SULFATE 3 ML: 2.5; .5 SOLUTION RESPIRATORY (INHALATION) at 22:23

## 2024-03-17 RX ADMIN — IPRATROPIUM BROMIDE AND ALBUTEROL SULFATE 3 ML: 2.5; .5 SOLUTION RESPIRATORY (INHALATION) at 14:46

## 2024-03-17 RX ADMIN — ASPIRIN 81 MG: 81 TABLET, COATED ORAL at 09:39

## 2024-03-17 NOTE — PROGRESS NOTES
[facility] INPATIENT PROGRESS NOTE         [address]    3/17/2024      PATIENT IDENTIFICATION:  Name: Julia Smith III ADMIT: 3/15/2024   : 1950  PCP: Paty Wheeler APRN ([place])    MRN: [id number] LOS: 1 days   AGE/SEX: 74 y.o. male  ROOM: [facility]                     LOS 1    Reason for visit: Influenza and hypoxemia      SUBJECTIVE:      Sleeping comfortably.  No new pulmonary issues.  92% on 2 L supplemental oxygen.    Objective   OBJECTIVE:    Vital Sign Min/Max for last 24 hours  Temp  Min: 97.3 °F (36.3 °C)  Max: 97.9 °F (36.6 °C)   BP  Min: 99/60  Max: 121/63   Pulse  Min: 71  Max: 90   Resp  Min: 16  Max: 18   SpO2  Min: 92 %  Max: 96 %   No data recorded   Weight  Min: 77.1 kg (169 lb 14.4 oz)  Max: 77.1 kg (169 lb 14.4 oz)    Vitals:    24 2354 24 0410 24 0658 24 0740   BP: 112/60 119/71  110/64   BP Location: Left arm Left arm  Left arm   Patient Position: Lying Lying  Lying   Pulse: 80 74 71 77   Resp: 18 18 18 18   Temp: 97.7 °F (36.5 °C) 97.3 °F (36.3 °C)  97.5 °F (36.4 °C)   TempSrc: Oral Oral  Oral   SpO2: 96% 96% 94% 92%   Weight:       Height:                03/15/24  1957 03/16/24  2139   Weight: 90.7 kg (200 lb) 77.1 kg (169 lb 14.4 oz)       Body mass index is 23.7 kg/m².                          Body mass index is 23.7 kg/m².  No intake or output data in the 24 hours ending 24 0852      Exam:  GEN:  No distress, appears stated age  EYES:   PERRL, anicteric sclerae  ENT:    External ears/nose normal, OP clear  NECK:  No adenopathy, midline trachea  LUNGS: Normal chest on inspection, palpation and wheezing noted bilaterally  Auscultation  CV:  Normal S1S2, without murmur  ABD:  Nontender, nondistended, no hepatosplenomegaly, +BS  EXT:  No edema.  No cyanosis or clubbing.  No mottling and normal cap refill.    Assessment     Scheduled meds:  aspirin, 81 mg, Oral, Daily  budesonide-formoterol, 2 puff, Inhalation, BID - RT  guaiFENesin, 600  "mg, Oral, Q12H  ipratropium-albuterol, 3 mL, Nebulization, Q4H - RT  nicotine, 1 patch, Transdermal, Q24H  predniSONE, 40 mg, Oral, Daily With Breakfast  sodium chloride, 10 mL, Intravenous, Q12H  sodium chloride, 10 mL, Intravenous, Q12H      IV meds:                         Data Review:  Results from last 7 days   Lab Units 03/16/24  0410 03/15/24  2204   SODIUM mmol/L 135* 134*   POTASSIUM mmol/L 3.6 3.8   CHLORIDE mmol/L 99 99   CO2 mmol/L 22.2 23.0   BUN mg/dL 22 23   CREATININE mg/dL 1.20 1.04   GLUCOSE mg/dL 77 72   CALCIUM mg/dL 8.3* 8.6         Estimated Creatinine Clearance: 60.7 mL/min (by C-G formula based on SCr of 1.2 mg/dL).  Results from last 7 days   Lab Units 03/16/24  0410 03/15/24  2204   WBC 10*3/mm3 3.89 5.34   HEMOGLOBIN g/dL 13.5 14.3   PLATELETS 10*3/mm3 93* 98*         Results from last 7 days   Lab Units 03/15/24  2204   ALT (SGPT) U/L 35   AST (SGOT) U/L 44*         Results from last 7 days   Lab Units 03/15/24  2345   PROCALCITONIN ng/mL 0.12         No results found for: \"HGBA1C\", \"POCGLU\"      Imaging reviewed  Chest x-ray 3/15 reviewed: Right basilar atelectasis/scarring    Microbiology reviewed  Collected Updated Procedure Result Status    03/15/2024 2058 03/15/2024 2206 Respiratory Panel PCR w/COVID-19(SARS-CoV-2) JAY JAY/ONOFRE/ROSEANN/PAD/COR/JAMES In-House, NP Swab in UTM/VTM, 2 HR TAT - Swab, Nasopharynx [[id number]]    (Abnormal)   Swab from Nasopharynx    Final result Component Value   ADENOVIRUS, PCR Not Detected   Coronavirus 229E Not Detected   Coronavirus HKU1 Not Detected   Coronavirus NL63 Not Detected   Coronavirus OC43 Not Detected   COVID19 Not Detected   Human Metapneumovirus Not Detected   Human Rhinovirus/Enterovirus Not Detected   Influenza A H1 2009 PCR Detected Abnormal    Influenza B PCR Not Detected   Parainfluenza Virus 1 Not Detected   Parainfluenza Virus 2 Not Detected   Parainfluenza Virus 3 Not Detected   Parainfluenza Virus 4 Not Detected   RSV, PCR Not Detected "   Bordetella pertussis pcr Not Detected   Bordetella parapertussis PCR Not Detected   Chlamydophila pneumoniae PCR Not Detected   Mycoplasma pneumo by PCR Not Detected                Active Hospital Problems    Diagnosis  POA    **Shortness of breath [R06.02]  Yes    Influenza A [J10.1]  Yes    Right leg pain [M79.604]  Yes      Resolved Hospital Problems   No resolved problems to display.         ASSESSMENT:  Acute influenza  Acute hypoxemia secondary to above  COPD with bronchospasm secondary to viral illness  History of lung cancer  Atrial fibrillation  Persistent tobacco abuse      PLAN:  Treatment for viral process is supportive.  Too late in presentation at this time for any benefit of Tamiflu.  Bronchodilators nebulized and Symbicort maintenance therapy and steroid with taper for COPD symptoms with persistent wheezing.  Encouraged to avoid tobacco use.    Arslan Marquez MD  Pulmonary and Critical Care Medicine  [facility], [facility]  3/17/2024    08:52 EDT

## 2024-03-17 NOTE — PROGRESS NOTES
"    Name: Julia Smith III ADMIT: 3/15/2024   : 1950  PCP: Paty Wheeler APRN ([place])    MRN: [id number] LOS: 1 days   AGE/SEX: 74 y.o. male  ROOM: [facility]     Subjective   Subjective   Patient is lying in the bed and does not appear to be in major distress.  He is currently requiring 2 L of oxygen and is oriented x 3.  No new events overnight.       Objective   Objective   Vital Signs  Temp:  [97.3 °F (36.3 °C)-97.9 °F (36.6 °C)] 97.7 °F (36.5 °C)  Heart Rate:  [70-90] 70  Resp:  [16-18] 18  BP: ()/(59-71) 114/59  SpO2:  [92 %-97 %] 96 %  on  Flow (L/min):  [1-2] 2;   Device (Oxygen Therapy): nasal cannula  Body mass index is 23.7 kg/m².  Physical Exam  HEENT: PERRLA, extraocular movements intact, Scleras no icterus  Neck: Supple, no JVD  Cardiovascular: Regular rate and rhythm with normal S1 and S2  Respiratory: Diminished breath sounds with mild wheezes.  GI: Soft, nontender, bowel sounds present  Extremities: No edema, palpable pedal pulses.    Results Review     I reviewed the patient's new clinical results.  Results from last 7 days   Lab Units 24  0410 03/15/24  2204   WBC 10*3/mm3 3.89 5.34   HEMOGLOBIN g/dL 13.5 14.3   PLATELETS 10*3/mm3 93* 98*     Results from last 7 days   Lab Units 24  0410 03/15/24  2204   SODIUM mmol/L 135* 134*   POTASSIUM mmol/L 3.6 3.8   CHLORIDE mmol/L 99 99   CO2 mmol/L 22.2 23.0   BUN mg/dL 22 23   CREATININE mg/dL 1.20 1.04   GLUCOSE mg/dL 77 72   EGFR mL/min/1.73 64.3 76.3     Results from last 7 days   Lab Units 03/15/24  2204   ALBUMIN g/dL 4.1   BILIRUBIN mg/dL 0.3   ALK PHOS U/L 84   AST (SGOT) U/L 44*   ALT (SGPT) U/L 35     Results from last 7 days   Lab Units 24  0410 03/15/24  2204   CALCIUM mg/dL 8.3* 8.6   ALBUMIN g/dL  --  4.1   MAGNESIUM mg/dL  --  1.7     Results from last 7 days   Lab Units 03/15/24  2345   PROCALCITONIN ng/mL 0.12     No results found for: \"HGBA1C\", \"POCGLU\"    XR Chest 1 View    Result Date: 3/15/2024  Linear " right basilar atelectasis versus scarring.  This report was finalized on 3/15/2024 9:15 PM by Dr. Kaycee Jimenez M.D on Workstation: [id number]       I have personally reviewed all medications:  Scheduled Medications  aspirin, 81 mg, Oral, Daily  budesonide-formoterol, 2 puff, Inhalation, BID - RT  guaiFENesin, 600 mg, Oral, Q12H  ipratropium-albuterol, 3 mL, Nebulization, Q4H - RT  nicotine, 1 patch, Transdermal, Q24H  predniSONE, 40 mg, Oral, Daily With Breakfast  sodium chloride, 10 mL, Intravenous, Q12H  sodium chloride, 10 mL, Intravenous, Q12H    Infusions   Diet  Diet: Cardiac; Healthy Heart (2-3 Na+); Fluid Consistency: Thin (IDDSI 0)    I have personally reviewed:  [x]  Laboratory   [x]  Microbiology   [x]  Radiology   [x]  EKG/Telemetry  [x]  Cardiology/Vascular   []  Pathology    []  Records       Assessment/Plan     Active Hospital Problems    Diagnosis  POA    **Shortness of breath [R06.02]  Yes    Influenza A [J10.1]  Yes    Right leg pain [M79.604]  Yes      Resolved Hospital Problems   No resolved problems to display.       74 y.o. male admitted with Shortness of breath.    #1 acute hypoxic respiratory failure secondary to COPD/Acute influenza, will continue with budesonide-formoterol inhaler and prednisone.  Continue with DuoNebs and is on 2 L of oxygen.  I encouraged him to use incentive spirometry.  2.  History of lung cancer, advised him to keep all his oncology appointments.  3.  Tobacco abuse, counseled to quit smoking.  4.  History of atrial fibrillation, continue with home dose aspirin.  Blood pressure is soft low therefore not on any beta-blockers.  5.  On SCDs for DVT prophylaxis.  6.  CODE STATUS is full code.      Santos Fowler MD  [facility]  03/17/24  15:57 EDT

## 2024-03-17 NOTE — PLAN OF CARE
Goal Outcome Evaluation:      No acute respiratory distress noted. O2 @2l n/c. Occasional nonproductive cough. Voids per urinal. Rested well at intervals. Safety maintained.

## 2024-03-18 LAB
ANION GAP SERPL CALCULATED.3IONS-SCNC: 9.7 MMOL/L (ref 5–15)
BASOPHILS # BLD AUTO: 0.02 10*3/MM3 (ref 0–0.2)
BASOPHILS NFR BLD AUTO: 0.6 % (ref 0–1.5)
BUN SERPL-MCNC: 13 MG/DL (ref 8–23)
BUN/CREAT SERPL: 16 (ref 7–25)
CALCIUM SPEC-SCNC: 8.4 MG/DL (ref 8.6–10.5)
CHLORIDE SERPL-SCNC: 104 MMOL/L (ref 98–107)
CO2 SERPL-SCNC: 21.3 MMOL/L (ref 22–29)
CREAT SERPL-MCNC: 0.81 MG/DL (ref 0.76–1.27)
DEPRECATED RDW RBC AUTO: 45.1 FL (ref 37–54)
EGFRCR SERPLBLD CKD-EPI 2021: 93.7 ML/MIN/1.73
EOSINOPHIL # BLD AUTO: 0 10*3/MM3 (ref 0–0.4)
EOSINOPHIL NFR BLD AUTO: 0 % (ref 0.3–6.2)
ERYTHROCYTE [DISTWIDTH] IN BLOOD BY AUTOMATED COUNT: 13.4 % (ref 12.3–15.4)
GLUCOSE SERPL-MCNC: 92 MG/DL (ref 65–99)
HCT VFR BLD AUTO: 42.4 % (ref 37.5–51)
HGB BLD-MCNC: 14.2 G/DL (ref 13–17.7)
LYMPHOCYTES # BLD AUTO: 0.63 10*3/MM3 (ref 0.7–3.1)
LYMPHOCYTES NFR BLD AUTO: 18 % (ref 19.6–45.3)
MCH RBC QN AUTO: 30.6 PG (ref 26.6–33)
MCHC RBC AUTO-ENTMCNC: 33.5 G/DL (ref 31.5–35.7)
MCV RBC AUTO: 91.4 FL (ref 79–97)
MONOCYTES # BLD AUTO: 0.3 10*3/MM3 (ref 0.1–0.9)
MONOCYTES NFR BLD AUTO: 8.6 % (ref 5–12)
NEUTROPHILS NFR BLD AUTO: 2.53 10*3/MM3 (ref 1.7–7)
NEUTROPHILS NFR BLD AUTO: 72.2 % (ref 42.7–76)
PLATELET # BLD AUTO: 83 10*3/MM3 (ref 140–450)
PMV BLD AUTO: 10.9 FL (ref 6–12)
POTASSIUM SERPL-SCNC: 4 MMOL/L (ref 3.5–5.2)
RBC # BLD AUTO: 4.64 10*6/MM3 (ref 4.14–5.8)
SODIUM SERPL-SCNC: 135 MMOL/L (ref 136–145)
WBC NRBC COR # BLD AUTO: 3.5 10*3/MM3 (ref 3.4–10.8)

## 2024-03-18 PROCEDURE — 94799 UNLISTED PULMONARY SVC/PX: CPT

## 2024-03-18 PROCEDURE — 80048 BASIC METABOLIC PNL TOTAL CA: CPT | Performed by: INTERNAL MEDICINE

## 2024-03-18 PROCEDURE — 94664 DEMO&/EVAL PT USE INHALER: CPT

## 2024-03-18 PROCEDURE — 63710000001 PREDNISONE PER 1 MG: Performed by: INTERNAL MEDICINE

## 2024-03-18 PROCEDURE — 85025 COMPLETE CBC W/AUTO DIFF WBC: CPT | Performed by: INTERNAL MEDICINE

## 2024-03-18 RX ADMIN — NICOTINE 1 PATCH: 21 PATCH, EXTENDED RELEASE TRANSDERMAL at 22:29

## 2024-03-18 RX ADMIN — GUAIFENESIN 600 MG: 600 TABLET, EXTENDED RELEASE ORAL at 08:35

## 2024-03-18 RX ADMIN — GUAIFENESIN 600 MG: 600 TABLET, EXTENDED RELEASE ORAL at 20:12

## 2024-03-18 RX ADMIN — ASPIRIN 81 MG: 81 TABLET, COATED ORAL at 08:35

## 2024-03-18 RX ADMIN — Medication 10 ML: at 08:35

## 2024-03-18 RX ADMIN — Medication 10 ML: at 20:13

## 2024-03-18 RX ADMIN — PREDNISONE 40 MG: 20 TABLET ORAL at 08:35

## 2024-03-18 RX ADMIN — IPRATROPIUM BROMIDE AND ALBUTEROL SULFATE 3 ML: 2.5; .5 SOLUTION RESPIRATORY (INHALATION) at 07:14

## 2024-03-18 RX ADMIN — IPRATROPIUM BROMIDE AND ALBUTEROL SULFATE 3 ML: 2.5; .5 SOLUTION RESPIRATORY (INHALATION) at 19:20

## 2024-03-18 RX ADMIN — IPRATROPIUM BROMIDE AND ALBUTEROL SULFATE 3 ML: 2.5; .5 SOLUTION RESPIRATORY (INHALATION) at 10:53

## 2024-03-18 RX ADMIN — BUDESONIDE AND FORMOTEROL FUMARATE DIHYDRATE 2 PUFF: 160; 4.5 AEROSOL RESPIRATORY (INHALATION) at 19:21

## 2024-03-18 RX ADMIN — BUDESONIDE AND FORMOTEROL FUMARATE DIHYDRATE 2 PUFF: 160; 4.5 AEROSOL RESPIRATORY (INHALATION) at 07:19

## 2024-03-18 RX ADMIN — IPRATROPIUM BROMIDE AND ALBUTEROL SULFATE 3 ML: 2.5; .5 SOLUTION RESPIRATORY (INHALATION) at 03:11

## 2024-03-18 RX ADMIN — IPRATROPIUM BROMIDE AND ALBUTEROL SULFATE 3 ML: 2.5; .5 SOLUTION RESPIRATORY (INHALATION) at 23:53

## 2024-03-18 RX ADMIN — IPRATROPIUM BROMIDE AND ALBUTEROL SULFATE 3 ML: 2.5; .5 SOLUTION RESPIRATORY (INHALATION) at 15:13

## 2024-03-18 NOTE — PROGRESS NOTES
Name: Julia Smith III ADMIT: 3/15/2024   : 1950  PCP: Paty Wheeler APRN ([place])    MRN: [id number] LOS: 2 days   AGE/SEX: 74 y.o. male  ROOM: [facility]     Subjective   Subjective   Patient is lying in the bed and does not appear to be in major distress.  He is currently requiring 2 L of oxygen and is oriented x 3.  No new events overnight.       Objective   Objective   Vital Signs  Temp:  [97.7 °F (36.5 °C)-98.4 °F (36.9 °C)] 97.7 °F (36.5 °C)  Heart Rate:  [70-90] 87  Resp:  [16-18] 16  BP: (113-131)/(59-67) 131/67  SpO2:  [93 %-100 %] 93 %  on  Flow (L/min):  [1-2] 1;   Device (Oxygen Therapy): room air  Body mass index is 23.7 kg/m².  Physical Exam  HEENT: PERRLA, extraocular movements intact, Scleras no icterus  Neck: Supple, no JVD  Cardiovascular: Regular rate and rhythm with normal S1 and S2  Respiratory: Diminished breath sounds with mild wheezes.  GI: Soft, nontender, bowel sounds present  Extremities: No edema, palpable pedal pulses.    Results Review     I reviewed the patient's new clinical results.  Results from last 7 days   Lab Units 24  0828 24  0410 03/15/24  2204   WBC 10*3/mm3 3.50 3.89 5.34   HEMOGLOBIN g/dL 14.2 13.5 14.3   PLATELETS 10*3/mm3 83* 93* 98*     Results from last 7 days   Lab Units 24  0655 24  0410 03/15/24  2204   SODIUM mmol/L 135* 135* 134*   POTASSIUM mmol/L 4.0 3.6 3.8   CHLORIDE mmol/L 104 99 99   CO2 mmol/L 21.3* 22.2 23.0   BUN mg/dL 13 22 23   CREATININE mg/dL 0.81 1.20 1.04   GLUCOSE mg/dL 92 77 72   EGFR mL/min/1.73 93.7 64.3 76.3     Results from last 7 days   Lab Units 03/15/24  2204   ALBUMIN g/dL 4.1   BILIRUBIN mg/dL 0.3   ALK PHOS U/L 84   AST (SGOT) U/L 44*   ALT (SGPT) U/L 35     Results from last 7 days   Lab Units 24  0655 24  0410 03/15/24  2204   CALCIUM mg/dL 8.4* 8.3* 8.6   ALBUMIN g/dL  --   --  4.1   MAGNESIUM mg/dL  --   --  1.7     Results from last 7 days   Lab Units 03/15/24  2345   PROCALCITONIN ng/mL  "0.12     No results found for: \"HGBA1C\", \"POCGLU\"    No radiology results for the last day    I have personally reviewed all medications:  Scheduled Medications  aspirin, 81 mg, Oral, Daily  budesonide-formoterol, 2 puff, Inhalation, BID - RT  guaiFENesin, 600 mg, Oral, Q12H  ipratropium-albuterol, 3 mL, Nebulization, Q4H - RT  nicotine, 1 patch, Transdermal, Q24H  [START ON 3/19/2024] predniSONE, 30 mg, Oral, Daily With Breakfast  sodium chloride, 10 mL, Intravenous, Q12H  sodium chloride, 10 mL, Intravenous, Q12H    Infusions   Diet  Diet: Cardiac; Healthy Heart (2-3 Na+); Fluid Consistency: Thin (IDDSI 0)    I have personally reviewed:  [x]  Laboratory   [x]  Microbiology   [x]  Radiology   [x]  EKG/Telemetry  [x]  Cardiology/Vascular   []  Pathology    []  Records       Assessment/Plan     Active Hospital Problems    Diagnosis  POA    **Shortness of breath [R06.02]  Yes    Influenza A [J10.1]  Yes    Right leg pain [M79.604]  Yes      Resolved Hospital Problems   No resolved problems to display.       74 y.o. male admitted with Shortness of breath.    #1 acute hypoxic respiratory failure secondary to COPD/Acute influenza, will continue with budesonide-formoterol inhaler and prednisone.  Continue with DuoNebs and is on 2 L of oxygen.  I encouraged him to use incentive spirometry.    2.  History of lung cancer, advised him to keep all his oncology appointments and states he is in remission now.    3.  Tobacco abuse, counseled to quit smoking.    4.  History of atrial fibrillation, continue with home dose aspirin.  Blood pressure is soft low therefore not on any beta-blockers.    5.  On SCDs for DVT prophylaxis.    6.  CODE STATUS is full code.    Copied text on this note has been reviewed by me on 3/18/2024      Santos Fowler MD  [facility]  03/18/24  14:41 EDT      "

## 2024-03-18 NOTE — PLAN OF CARE
Problem: Adult Inpatient Plan of Care  Goal: Plan of Care Review  Flowsheets (Taken 3/18/2024 7210)  Outcome Evaluation: AOX4. Room air. R chest port, dressing c/d/i, flushed and blood return noted. Urinal at bedside, call light within reach.   Goal Outcome Evaluation:              Outcome Evaluation: AOX4. Room air. R chest port, dressing c/d/i, flushed and blood return noted. Urinal at bedside, call light within reach.

## 2024-03-18 NOTE — SIGNIFICANT NOTE
RN okd session, but pt sleeping soundly, snoring, unable to arouse for ambulation , will F/U tomorrow

## 2024-03-18 NOTE — PROGRESS NOTES
[facility] INPATIENT PROGRESS NOTE         [address]    3/18/2024      PATIENT IDENTIFICATION:  Name: Julia Smith III ADMIT: 3/15/2024   : 1950  PCP: Paty Wheeler APRN ([place])    MRN: [id number] LOS: 2 days   AGE/SEX: 74 y.o. male  ROOM: [facility]                     LOS 2    Reason for visit: Influenza and hypoxemia      SUBJECTIVE:      No new pulmonary issues overnight.  96% saturation on 1 L nasal cannula.    Objective   OBJECTIVE:    Vital Sign Min/Max for last 24 hours  Temp  Min: 97.7 °F (36.5 °C)  Max: 98.4 °F (36.9 °C)   BP  Min: 113/62  Max: 131/67   Pulse  Min: 70  Max: 90   Resp  Min: 16  Max: 18   SpO2  Min: 94 %  Max: 100 %   No data recorded   No data recorded    Vitals:    24 0714 24 0719 24 0750 24 0833   BP:   131/67    BP Location:   Right arm    Patient Position:   Lying    Pulse: 86 77 79    Resp: 16 16 16    Temp:   97.7 °F (36.5 °C)    TempSrc:   Oral    SpO2: 97%  96% 96%   Weight:       Height:                03/15/24  1957 24   Weight: 90.7 kg (200 lb) 77.1 kg (169 lb 14.4 oz)       Body mass index is 23.7 kg/m².                          Body mass index is 23.7 kg/m².    Intake/Output Summary (Last 24 hours) at 3/18/2024 1036  Last data filed at 3/18/2024 0540  Gross per 24 hour   Intake 980 ml   Output 700 ml   Net 280 ml         Exam:  GEN:  No distress, appears stated age  NECK:  No adenopathy, midline trachea  LUNGS: Nonlabored    Assessment     Scheduled meds:  aspirin, 81 mg, Oral, Daily  budesonide-formoterol, 2 puff, Inhalation, BID - RT  guaiFENesin, 600 mg, Oral, Q12H  ipratropium-albuterol, 3 mL, Nebulization, Q4H - RT  nicotine, 1 patch, Transdermal, Q24H  predniSONE, 40 mg, Oral, Daily With Breakfast  sodium chloride, 10 mL, Intravenous, Q12H  sodium chloride, 10 mL, Intravenous, Q12H      IV meds:                         Data Review:  Results from last 7 days   Lab Units 24  0655 24  041  "03/15/24  2204   SODIUM mmol/L 135* 135* 134*   POTASSIUM mmol/L 4.0 3.6 3.8   CHLORIDE mmol/L 104 99 99   CO2 mmol/L 21.3* 22.2 23.0   BUN mg/dL 13 22 23   CREATININE mg/dL 0.81 1.20 1.04   GLUCOSE mg/dL 92 77 72   CALCIUM mg/dL 8.4* 8.3* 8.6         Estimated Creatinine Clearance: 89.9 mL/min (by C-G formula based on SCr of 0.81 mg/dL).  Results from last 7 days   Lab Units 03/18/24  0828 03/16/24  0410 03/15/24  2204   WBC 10*3/mm3 3.50 3.89 5.34   HEMOGLOBIN g/dL 14.2 13.5 14.3   PLATELETS 10*3/mm3 83* 93* 98*         Results from last 7 days   Lab Units 03/15/24  2204   ALT (SGPT) U/L 35   AST (SGOT) U/L 44*         Results from last 7 days   Lab Units 03/15/24  2345   PROCALCITONIN ng/mL 0.12         No results found for: \"HGBA1C\", \"POCGLU\"      Imaging reviewed  Chest x-ray 3/15 reviewed: Right basilar atelectasis/scarring    Microbiology reviewed  Collected Updated Procedure Result Status    03/15/2024 2058 03/15/2024 2206 Respiratory Panel PCR w/COVID-19(SARS-CoV-2) JAY JAY/ONOFRE/ROSEANN/PAD/COR/JAMES In-House, NP Swab in UTM/VTM, 2 HR TAT - Swab, Nasopharynx [[id number]]    (Abnormal)   Swab from Nasopharynx    Final result Component Value   ADENOVIRUS, PCR Not Detected   Coronavirus 229E Not Detected   Coronavirus HKU1 Not Detected   Coronavirus NL63 Not Detected   Coronavirus OC43 Not Detected   COVID19 Not Detected   Human Metapneumovirus Not Detected   Human Rhinovirus/Enterovirus Not Detected   Influenza A H1 2009 PCR Detected Abnormal    Influenza B PCR Not Detected   Parainfluenza Virus 1 Not Detected   Parainfluenza Virus 2 Not Detected   Parainfluenza Virus 3 Not Detected   Parainfluenza Virus 4 Not Detected   RSV, PCR Not Detected   Bordetella pertussis pcr Not Detected   Bordetella parapertussis PCR Not Detected   Chlamydophila pneumoniae PCR Not Detected   Mycoplasma pneumo by PCR Not Detected                Active Hospital Problems    Diagnosis  POA    **Shortness of breath [R06.02]  Yes    Influenza A " [J10.1]  Yes    Right leg pain [M79.604]  Yes      Resolved Hospital Problems   No resolved problems to display.         ASSESSMENT:  Acute influenza  Acute hypoxemia secondary to above  COPD with bronchospasm secondary to viral illness  History of lung cancer  Atrial fibrillation  Persistent tobacco abuse      PLAN:  Treatment for viral process is supportive.  Bronchodilators nebulized and Symbicort maintenance therapy and steroid with taper for COPD.  Encouraged to avoid tobacco use.  Wean off oxygen as able.  No objection to discharge when other medical issues are stable.  Defer all other medical management to hospitalist service.  Following peripherally for pulmonary issues.      Arslan Marquez MD  Pulmonary and Critical Care Medicine  [facility], [facility]  3/18/2024    10:36 EDT

## 2024-03-18 NOTE — NURSING NOTE
03/18/24 1001   Wound 03/18/24 Left lower     Placement Date: 03/18/24   Present on Original Admission: Yes  Side: Left  Orientation: lower  Location: (c)   Primary Wound Type: (c)    Dressing Appearance dry;intact   Base clean;red   Periwound intact   Edges irregular   Wound Length (cm) 1 cm   Wound Width (cm) 1 cm   Wound Surface Area (cm^2) 1 cm^2   Drainage Characteristics/Odor sanguineous   Drainage Amount scant   Care, Wound cleansed with;sterile normal saline   Dressing Care dressing applied;dressing changed;hydrofiber;silver impregnated;silicone     WOCN consult: patient has wound left shin, some hemosiderin staining. States he hit it in his garage. Wound care performed. It is slightly tender. Please change every 3 days.

## 2024-03-19 ENCOUNTER — READMISSION MANAGEMENT (OUTPATIENT)
Dept: CALL CENTER | Facility: HOSPITAL | Age: 74
End: 2024-03-19
Payer: MEDICARE

## 2024-03-19 VITALS
BODY MASS INDEX: 23.79 KG/M2 | TEMPERATURE: 98.1 F | SYSTOLIC BLOOD PRESSURE: 133 MMHG | OXYGEN SATURATION: 92 % | RESPIRATION RATE: 16 BRPM | HEART RATE: 79 BPM | HEIGHT: 71 IN | DIASTOLIC BLOOD PRESSURE: 67 MMHG | WEIGHT: 169.9 LBS

## 2024-03-19 LAB
ANION GAP SERPL CALCULATED.3IONS-SCNC: 9 MMOL/L (ref 5–15)
BASOPHILS # BLD AUTO: 0.01 10*3/MM3 (ref 0–0.2)
BASOPHILS NFR BLD AUTO: 0.3 % (ref 0–1.5)
BUN SERPL-MCNC: 13 MG/DL (ref 8–23)
BUN/CREAT SERPL: 13.8 (ref 7–25)
CALCIUM SPEC-SCNC: 8.9 MG/DL (ref 8.6–10.5)
CHLORIDE SERPL-SCNC: 101 MMOL/L (ref 98–107)
CO2 SERPL-SCNC: 28 MMOL/L (ref 22–29)
CREAT SERPL-MCNC: 0.94 MG/DL (ref 0.76–1.27)
DEPRECATED RDW RBC AUTO: 42 FL (ref 37–54)
EGFRCR SERPLBLD CKD-EPI 2021: 86.1 ML/MIN/1.73
EOSINOPHIL # BLD AUTO: 0 10*3/MM3 (ref 0–0.4)
EOSINOPHIL NFR BLD AUTO: 0 % (ref 0.3–6.2)
ERYTHROCYTE [DISTWIDTH] IN BLOOD BY AUTOMATED COUNT: 13.3 % (ref 12.3–15.4)
GLUCOSE SERPL-MCNC: 81 MG/DL (ref 65–99)
HCT VFR BLD AUTO: 39.1 % (ref 37.5–51)
HGB BLD-MCNC: 13.2 G/DL (ref 13–17.7)
LYMPHOCYTES # BLD AUTO: 0.74 10*3/MM3 (ref 0.7–3.1)
LYMPHOCYTES NFR BLD AUTO: 22.8 % (ref 19.6–45.3)
MCH RBC QN AUTO: 29.4 PG (ref 26.6–33)
MCHC RBC AUTO-ENTMCNC: 33.8 G/DL (ref 31.5–35.7)
MCV RBC AUTO: 87.1 FL (ref 79–97)
MONOCYTES # BLD AUTO: 0.33 10*3/MM3 (ref 0.1–0.9)
MONOCYTES NFR BLD AUTO: 10.2 % (ref 5–12)
NEUTROPHILS NFR BLD AUTO: 2.14 10*3/MM3 (ref 1.7–7)
NEUTROPHILS NFR BLD AUTO: 65.8 % (ref 42.7–76)
PLATELET # BLD AUTO: 85 10*3/MM3 (ref 140–450)
PMV BLD AUTO: 10.2 FL (ref 6–12)
POTASSIUM SERPL-SCNC: 3.7 MMOL/L (ref 3.5–5.2)
RBC # BLD AUTO: 4.49 10*6/MM3 (ref 4.14–5.8)
SODIUM SERPL-SCNC: 138 MMOL/L (ref 136–145)
WBC NRBC COR # BLD AUTO: 3.25 10*3/MM3 (ref 3.4–10.8)

## 2024-03-19 PROCEDURE — 63710000001 PREDNISONE PER 5 MG: Performed by: INTERNAL MEDICINE

## 2024-03-19 PROCEDURE — 63710000001 PREDNISONE PER 1 MG: Performed by: INTERNAL MEDICINE

## 2024-03-19 PROCEDURE — 25010000002 HEPARIN LOCK FLUSH PER 10 UNITS: Performed by: STUDENT IN AN ORGANIZED HEALTH CARE EDUCATION/TRAINING PROGRAM

## 2024-03-19 PROCEDURE — 94664 DEMO&/EVAL PT USE INHALER: CPT

## 2024-03-19 PROCEDURE — 97110 THERAPEUTIC EXERCISES: CPT

## 2024-03-19 PROCEDURE — 94799 UNLISTED PULMONARY SVC/PX: CPT

## 2024-03-19 PROCEDURE — 85025 COMPLETE CBC W/AUTO DIFF WBC: CPT | Performed by: INTERNAL MEDICINE

## 2024-03-19 PROCEDURE — 80048 BASIC METABOLIC PNL TOTAL CA: CPT | Performed by: INTERNAL MEDICINE

## 2024-03-19 RX ORDER — IPRATROPIUM BROMIDE AND ALBUTEROL SULFATE 2.5; .5 MG/3ML; MG/3ML
3 SOLUTION RESPIRATORY (INHALATION) EVERY 6 HOURS
Qty: 360 ML | Refills: 0 | Status: SHIPPED | OUTPATIENT
Start: 2024-03-19 | End: 2024-04-18

## 2024-03-19 RX ORDER — BUDESONIDE AND FORMOTEROL FUMARATE DIHYDRATE 160; 4.5 UG/1; UG/1
2 AEROSOL RESPIRATORY (INHALATION)
Qty: 10.2 G | Refills: 0 | Status: SHIPPED | OUTPATIENT
Start: 2024-03-19

## 2024-03-19 RX ORDER — PREDNISONE 10 MG/1
TABLET ORAL
Qty: 20 TABLET | Refills: 0 | Status: SHIPPED | OUTPATIENT
Start: 2024-03-19 | End: 2024-03-31

## 2024-03-19 RX ADMIN — IPRATROPIUM BROMIDE AND ALBUTEROL SULFATE 3 ML: 2.5; .5 SOLUTION RESPIRATORY (INHALATION) at 04:14

## 2024-03-19 RX ADMIN — Medication 10 ML: at 08:09

## 2024-03-19 RX ADMIN — GUAIFENESIN 600 MG: 600 TABLET, EXTENDED RELEASE ORAL at 08:08

## 2024-03-19 RX ADMIN — ASPIRIN 81 MG: 81 TABLET, COATED ORAL at 08:08

## 2024-03-19 RX ADMIN — IPRATROPIUM BROMIDE AND ALBUTEROL SULFATE 3 ML: 2.5; .5 SOLUTION RESPIRATORY (INHALATION) at 07:41

## 2024-03-19 RX ADMIN — BUDESONIDE AND FORMOTEROL FUMARATE DIHYDRATE 2 PUFF: 160; 4.5 AEROSOL RESPIRATORY (INHALATION) at 07:46

## 2024-03-19 RX ADMIN — HEPARIN 500 UNITS: 100 SYRINGE at 11:16

## 2024-03-19 RX ADMIN — IPRATROPIUM BROMIDE AND ALBUTEROL SULFATE 3 ML: 2.5; .5 SOLUTION RESPIRATORY (INHALATION) at 11:09

## 2024-03-19 RX ADMIN — PREDNISONE 30 MG: 10 TABLET ORAL at 08:08

## 2024-03-19 NOTE — THERAPY TREATMENT NOTE
Patient Name: Julia Smith III  : 1950    MRN: [id number]                              Today's Date: 3/19/2024       Admit Date: 3/15/2024    Visit Dx:     ICD-10-CM ICD-9-CM   1. Shortness of breath  R06.02 786.05   2. Influenza A  J10.1 487.1   3. Generalized weakness  R53.1 780.79   4. Pneumonia due to infectious organism, unspecified laterality, unspecified part of lung  J18.9 486   5. Chronic obstructive pulmonary disease, unspecified COPD type  J44.9 496     Patient Active Problem List   Diagnosis    Encounter for screening for malignant neoplasm of colon    Small cell lung cancer    Small cell carcinoma    Atrial fibrillation with RVR    Pneumonia    COPD (chronic obstructive pulmonary disease)    PAF (paroxysmal atrial fibrillation)    High risk medication use    Tobacco abuse    Mild mitral regurgitation    Daily consumption of alcohol    Cigarette nicotine dependence without complication    Encounter for fitting and adjustment of vascular catheter    Primary malignant neoplasm of right upper lobe of lung    Other fatigue    Non-small cell lung cancer    History of lung cancer    Wound of left leg    Shortness of breath    Influenza A    Right leg pain     Past Medical History:   Diagnosis Date    Atrial fibrillation with RVR     Cancer 2019    Right lung    Chronic cough     COPD (chronic obstructive pulmonary disease)     Hearing loss     History of shingles     Lower back pain     Lung cancer     Mastoiditis     Mild mitral regurgitation     Other fatigue     PAF (paroxysmal atrial fibrillation)     Pneumonia      Past Surgical History:   Procedure Laterality Date    BRONCHOSCOPY N/A 2019    Procedure: BRONCHOSCOPY WITH WASHINGS AND BIOPSY AND ENDOBRONCHIAL ULTRASOUND WITH FNA;  Surgeon: Arslan Marquez MD;  Location: [facility];  Service: Pulmonary    BRONCHOSCOPY N/A 2022    Procedure: BRONCHOSCOPY WITH FLUORO, BAL, BRUSHINGS, AND BIOPSIES LLL;  Surgeon: Arslan Marquez  MD A[place];  Location: [facility] ENDOSCOPY;  Service: Pulmonary;  Laterality: N/A;  PRE: LUNG MASS  POST: SAME    INNER EAR SURGERY Left     MASTOIDECTOMY Left 1994    VENOUS ACCESS DEVICE (PORT) INSERTION Right 8/22/2019    Procedure: INSERTION OF PORTACATH;  Surgeon: Ramila Gallardo MD;  Location: [facility] OR;  Service: Cardiothoracic      General Information       Row Name 03/19/24 1002          Physical Therapy Time and Intention    Document Type therapy note (daily note)  -PC     Mode of Treatment physical therapy  -PC       Row Name 03/19/24 1002          General Information    Patient Profile Reviewed yes  -PC     Existing Precautions/Restrictions fall  -PC       Row Name 03/19/24 1002          Cognition    Orientation Status (Cognition) oriented x 4  -PC       Row Name 03/19/24 1002          Safety Issues, Functional Mobility    Impairments Affecting Function (Mobility) endurance/activity tolerance;strength;shortness of breath  -PC               User Key  (r) = Recorded By, (t) = Taken By, (c) = Cosigned By      Initials Name Provider Type    PC Delmy Andres, PT Physical Therapist                   Mobility       Row Name 03/19/24 1002          Bed Mobility    Bed Mobility supine-sit  -PC     Supine-Sit [place] (Bed Mobility) independent  -PC       Row Name 03/19/24 1002          Sit-Stand Transfer    Sit-Stand [place] (Transfers) standby assist  -PC       Row Name 03/19/24 1002          Gait/Stairs (Locomotion)    [place] Level (Gait) standby assist  -PC     Assistive Device (Gait) walker, front-wheeled  -PC     Distance in Feet (Gait) 100  -PC     Pattern (Gait) step-to  -PC     Deviations/Abnormal Patterns (Gait) sara decreased;gait speed decreased;stride length decreased;base of support, narrow  -PC     Bilateral Gait Deviations forward flexed posture;heel strike decreased  -PC               User Key  (r) = Recorded By, (t) = Taken By, (c) = Cosigned By      Initials Name Provider  Type    PC Delmy Andres, PT Physical Therapist                   Obj/Interventions    No documentation.                  Goals/Plan    No documentation.                  Clinical Impression       Row Name 03/19/24 1003          Pain    Pretreatment Pain Rating 0/10 - no pain  -PC       Row Name 03/19/24 1003          Plan of Care Review    Plan of Care Reviewed With patient  -PC     Progress improving  -PC     Outcome Evaluation Breathing improved, pt on room air, pt cont to have general weakness with unsteady gait and will benefit from a rolling walker, he stated he has one available at home, pt able to ambulate 100 ft with SBA with Rwx. Pt plans home today with assist of family  -PC       Row Name 03/19/24 1003          Positioning and Restraints    Pre-Treatment Position in bed  -PC     Post Treatment Position bed  -PC     In Bed sitting EOB;call light within reach;encouraged to call for assist;exit alarm on  -PC               User Key  (r) = Recorded By, (t) = Taken By, (c) = Cosigned By      Initials Name Provider Type    PC Delmy Andres, PT Physical Therapist                   Outcome Measures       Row Name 03/19/24 1005          How much help from another person do you currently need...    Turning from your back to your side while in flat bed without using bedrails? 4  -PC     Moving from lying on back to sitting on the side of a flat bed without bedrails? 4  -PC     Moving to and from a bed to a chair (including a wheelchair)? 3  -PC     Standing up from a chair using your arms (e.g., wheelchair, bedside chair)? 3  -PC     Climbing 3-5 steps with a railing? 3  -PC     To walk in hospital room? 3  -PC     AM-PAC 6 Clicks Score (PT) 20  -PC     Highest Level of Mobility Goal 6 --> Walk 10 steps or more  -PC               User Key  (r) = Recorded By, (t) = Taken By, (c) = Cosigned By      Initials Name Provider Type    Delmy Pardo, PT Physical Therapist                                 Physical  Therapy Education       Title: PT OT SLP Therapies (Done)       Topic: Physical Therapy (Done)       Point: Mobility training (Done)       Learning Progress Summary             Patient Acceptance, E,D, DU by PC at 3/19/2024 1006    Acceptance, E,D, DU,NR by PC at 3/16/2024 1429                         Point: Home exercise program (Done)       Learning Progress Summary             Patient Acceptance, E,D, DU,NR by PC at 3/16/2024 1429                         Point: Body mechanics (Done)       Learning Progress Summary             Patient Acceptance, E,D, DU by PC at 3/19/2024 1006    Acceptance, E,D, DU,NR by PC at 3/16/2024 1429                         Point: Precautions (Done)       Learning Progress Summary             Patient Acceptance, E,D, DU by PC at 3/19/2024 1006    Acceptance, E,D, DU,NR by PC at 3/16/2024 1429                                         User Key       Initials Effective Dates Name Provider Type Discipline     06/16/21 -  Delmy Andres PT Physical Therapist PT                  PT Recommendation and Plan  Planned Therapy Interventions (PT): bed mobility training, gait training, transfer training, balance training, strengthening  Plan of Care Reviewed With: patient  Progress: improving  Outcome Evaluation: Breathing improved, pt on room air, pt cont to have general weakness with unsteady gait and will benefit from a rolling walker, he stated he has one available at home, pt able to ambulate 100 ft with SBA with Rwx. Pt plans home today with assist of family     Time Calculation:         PT Charges       Row Name 03/19/24 1006             Time Calculation    Start Time 0934  -PC      Stop Time 0953  -PC      Time Calculation (min) 19 min  -PC      PT Received On 03/19/24  -PC      PT - Next Appointment 03/20/24  -PC                User Key  (r) = Recorded By, (t) = Taken By, (c) = Cosigned By      Initials Name Provider Type    PC Delmy Andres PT Physical Therapist                   Therapy Charges for Today       Code Description Service Date Service Provider Modifiers Qty    40924127006 HC PT THER PROC EA 15 MIN 3/19/2024 Delmy Andres, PT GP 1            PT G-Codes  AM-PAC 6 Clicks Score (PT): 20  PT Discharge Summary  Anticipated Discharge Disposition (PT): home with assist    Delmy Andres, PT  3/19/2024

## 2024-03-19 NOTE — PROGRESS NOTES
[facility] INPATIENT PROGRESS NOTE         [address]    3/19/2024      PATIENT IDENTIFICATION:  Name: Julia Smith III ADMIT: 3/15/2024   : 1950  PCP: Paty Wheeler APRN ([place])    MRN: [id number] LOS: 3 days   AGE/SEX: 74 y.o. male  ROOM: [facility]                     LOS 3    Reason for visit: Influenza and hypoxemia      SUBJECTIVE:      Resting comfortably.  On room air.  Hoping to go home today.  No objection from my standpoint.    Objective   OBJECTIVE:    Vital Sign Min/Max for last 24 hours  Temp  Min: 97.5 °F (36.4 °C)  Max: 98.1 °F (36.7 °C)   BP  Min: 125/66  Max: 133/67   Pulse  Min: 73  Max: 93   Resp  Min: 16  Max: 18   SpO2  Min: 89 %  Max: 99 %   No data recorded   No data recorded    Vitals:    24 0805 24 0810 24 0815 24 0820   BP: 133/67      BP Location: Left arm      Patient Position: Lying      Pulse: 84 85 83 83   Resp: 16      Temp: 98.1 °F (36.7 °C)      TempSrc: Oral      SpO2: 93% 91%  90%   Weight:       Height:                03/15/24  1957 03/16/24  2139   Weight: 90.7 kg (200 lb) 77.1 kg (169 lb 14.4 oz)       Body mass index is 23.7 kg/m².                          Body mass index is 23.7 kg/m².    Intake/Output Summary (Last 24 hours) at 3/19/2024 1018  Last data filed at 3/19/2024 0900  Gross per 24 hour   Intake 360 ml   Output 2700 ml   Net -2340 ml         Exam:  GEN:  No distress, appears stated age  NECK:  No adenopathy, midline trachea  LUNGS: Nonlabored    Assessment     Scheduled meds:  aspirin, 81 mg, Oral, Daily  budesonide-formoterol, 2 puff, Inhalation, BID - RT  guaiFENesin, 600 mg, Oral, Q12H  ipratropium-albuterol, 3 mL, Nebulization, Q4H - RT  nicotine, 1 patch, Transdermal, Q24H  predniSONE, 30 mg, Oral, Daily With Breakfast  sodium chloride, 10 mL, Intravenous, Q12H  sodium chloride, 10 mL, Intravenous, Q12H      IV meds:                         Data Review:  Results from last 7 days   Lab Units 24  0557  "03/18/24  0655 03/16/24  0410 03/15/24  2204   SODIUM mmol/L 138 135* 135* 134*   POTASSIUM mmol/L 3.7 4.0 3.6 3.8   CHLORIDE mmol/L 101 104 99 99   CO2 mmol/L 28.0 21.3* 22.2 23.0   BUN mg/dL 13 13 22 23   CREATININE mg/dL 0.94 0.81 1.20 1.04   GLUCOSE mg/dL 81 92 77 72   CALCIUM mg/dL 8.9 8.4* 8.3* 8.6         Estimated Creatinine Clearance: 77.5 mL/min (by C-G formula based on SCr of 0.94 mg/dL).  Results from last 7 days   Lab Units 03/19/24  0544 03/18/24  0828 03/16/24 0410 03/15/24  2204   WBC 10*3/mm3 3.25* 3.50 3.89 5.34   HEMOGLOBIN g/dL 13.2 14.2 13.5 14.3   PLATELETS 10*3/mm3 85* 83* 93* 98*         Results from last 7 days   Lab Units 03/15/24  2204   ALT (SGPT) U/L 35   AST (SGOT) U/L 44*         Results from last 7 days   Lab Units 03/15/24  2345   PROCALCITONIN ng/mL 0.12         No results found for: \"HGBA1C\", \"POCGLU\"      Imaging reviewed  Chest x-ray 3/15 reviewed: Right basilar atelectasis/scarring    Microbiology reviewed  Collected Updated Procedure Result Status    03/15/2024 2058 03/15/2024 2206 Respiratory Panel PCR w/COVID-19(SARS-CoV-2) [facility]/ONOFRE/ROSEANN/PAD/COR/JAMES In-House, NP Swab in UTM/VTM, 2 HR TAT - Swab, Nasopharynx [[id number]]    (Abnormal)   Swab from Nasopharynx    Final result Component Value   ADENOVIRUS, PCR Not Detected   Coronavirus 229E Not Detected   Coronavirus HKU1 Not Detected   Coronavirus NL63 Not Detected   Coronavirus OC43 Not Detected   COVID19 Not Detected   Human Metapneumovirus Not Detected   Human Rhinovirus/Enterovirus Not Detected   Influenza A H1 2009 PCR Detected Abnormal    Influenza B PCR Not Detected   Parainfluenza Virus 1 Not Detected   Parainfluenza Virus 2 Not Detected   Parainfluenza Virus 3 Not Detected   Parainfluenza Virus 4 Not Detected   RSV, PCR Not Detected   Bordetella pertussis pcr Not Detected   Bordetella parapertussis PCR Not Detected   Chlamydophila pneumoniae PCR Not Detected   Mycoplasma pneumo by PCR Not Detected                Active " Hospital Problems    Diagnosis  POA    **Shortness of breath [R06.02]  Yes    Influenza A [J10.1]  Yes    Right leg pain [M79.604]  Yes    History of lung cancer [Z85.118]  Not Applicable    Cigarette nicotine dependence without complication [F17.210]  Yes    COPD (chronic obstructive pulmonary disease) [J44.9]  Yes      Resolved Hospital Problems   No resolved problems to display.         ASSESSMENT:  Acute influenza  Acute hypoxemia secondary to above  COPD with bronchospasm secondary to viral illness  History of lung cancer  Atrial fibrillation  Persistent tobacco abuse      PLAN:  Treatment for viral process is supportive.  Bronchodilators nebulized and Symbicort maintenance therapy and steroid with taper for COPD.  Encouraged to avoid tobacco use.  Wean off oxygen as able.  No objection to discharge.  Following for pulmonary issues and defer other medical management to admitting service.      Arslan Marquez MD  Pulmonary and Critical Care Medicine  [facility], [facility]  3/19/2024    10:18 EDT

## 2024-03-19 NOTE — PLAN OF CARE
Problem: Adult Inpatient Plan of Care  Goal: Plan of Care Review  Flowsheets (Taken 3/19/2024 1143)  Outcome Evaluation: AOX4. Room air. Port deaccessed. Nicotine patch removed. Nebulizer and meds to bed delivered to bedside, demonstrated to patient how to use nebulizer and he verbalized understanding. Discussed new medications and side effects, emphasized not abruptly stopping steroid. Waiting for family to , home with family.   Goal Outcome Evaluation:              Outcome Evaluation: AOX4. Room air. Port deaccessed. Nicotine patch removed. Nebulizer and meds to bed delivered to bedside, demonstrated to patient how to use nebulizer and he verbalized understanding. Discussed new medications and side effects, emphasized not abruptly stopping steroid. Waiting for family to , home with family.

## 2024-03-19 NOTE — PLAN OF CARE
Problem: Adult Inpatient Plan of Care  Goal: Absence of Hospital-Acquired Illness or Injury  Intervention: Identify and Manage Fall Risk  Recent Flowsheet Documentation  Taken 3/19/2024 0400 by Kristian Camp RN  Safety Promotion/Fall Prevention:   safety round/check completed   room organization consistent   nonskid shoes/slippers when out of bed   lighting adjusted   clutter free environment maintained   assistive device/personal items within reach  Taken 3/19/2024 0200 by Kristian Camp RN  Safety Promotion/Fall Prevention:   safety round/check completed   room organization consistent   nonskid shoes/slippers when out of bed   lighting adjusted   fall prevention program maintained   clutter free environment maintained   assistive device/personal items within reach  Taken 3/19/2024 0000 by Kristian Camp RN  Safety Promotion/Fall Prevention:   safety round/check completed   room organization consistent   nonskid shoes/slippers when out of bed   lighting adjusted   clutter free environment maintained   assistive device/personal items within reach  Taken 3/18/2024 2200 by Kristian Camp RN  Safety Promotion/Fall Prevention:   safety round/check completed   room organization consistent   nonskid shoes/slippers when out of bed   lighting adjusted   clutter free environment maintained   assistive device/personal items within reach  Taken 3/18/2024 2001 by Kristian Camp RN  Safety Promotion/Fall Prevention:   safety round/check completed   room organization consistent   nonskid shoes/slippers when out of bed   lighting adjusted   clutter free environment maintained   assistive device/personal items within reach     Problem: Adult Inpatient Plan of Care  Goal: Absence of Hospital-Acquired Illness or Injury  Intervention: Prevent Skin Injury  Recent Flowsheet Documentation  Taken 3/19/2024 0400 by Kristian Camp, RN  Body Position: position changed  independently  Taken 3/19/2024 0200 by Kristian Camp RN  Body Position: position changed independently  Taken 3/19/2024 0000 by Kristian Camp RN  Body Position: position changed independently  Skin Protection:   adhesive use limited   incontinence pads utilized   tubing/devices free from skin contact  Taken 3/18/2024 2200 by Kristian Camp RN  Body Position: position changed independently  Taken 3/18/2024 2001 by Kristian Camp RN  Body Position: position changed independently  Skin Protection:   adhesive use limited   incontinence pads utilized   tubing/devices free from skin contact     Problem: Adult Inpatient Plan of Care  Goal: Absence of Hospital-Acquired Illness or Injury  Intervention: Prevent and Manage VTE (Venous Thromboembolism) Risk  Recent Flowsheet Documentation  Taken 3/19/2024 0400 by Kristian Camp RN  Activity Management: activity minimized  Taken 3/19/2024 0200 by Kristian Camp RN  Activity Management: activity minimized  Taken 3/19/2024 0000 by Kristian Camp RN  Activity Management: activity minimized  VTE Prevention/Management:   bilateral   sequential compression devices on  Range of Motion: active ROM (range of motion) encouraged  Taken 3/18/2024 2200 by Kristian Camp RN  Activity Management: activity minimized  Taken 3/18/2024 2001 by Kristian Camp RN  Activity Management: activity minimized  VTE Prevention/Management:   bilateral   sequential compression devices on  Range of Motion: active ROM (range of motion) encouraged     Problem: Adult Inpatient Plan of Care  Goal: Absence of Hospital-Acquired Illness or Injury  Intervention: Prevent Infection  Recent Flowsheet Documentation  Taken 3/19/2024 0400 by Kristian Camp RN  Infection Prevention:   single patient room provided   rest/sleep promoted   personal protective equipment utilized   hand hygiene promoted   environmental  surveillance performed  Taken 3/19/2024 0200 by Kristian Camp RN  Infection Prevention:   single patient room provided   rest/sleep promoted   hand hygiene promoted   personal protective equipment utilized   environmental surveillance performed  Taken 3/19/2024 0000 by Kristian Camp RN  Infection Prevention:   single patient room provided   rest/sleep promoted   personal protective equipment utilized   hand hygiene promoted   environmental surveillance performed  Taken 3/18/2024 2200 by Krisitan Camp RN  Infection Prevention:   single patient room provided   personal protective equipment utilized   rest/sleep promoted   hand hygiene promoted   environmental surveillance performed  Taken 3/18/2024 2001 by Kristian Camp RN  Infection Prevention:   single patient room provided   rest/sleep promoted   personal protective equipment utilized   hand hygiene promoted   environmental surveillance performed   Goal Outcome Evaluation:  Plan of Care Reviewed With: patient           Outcome Evaluation: AOx4, VSS, SR BBB on telemetry monitor, room air, SCD on both legs, CHG bath on right chest port done, dressing c/d/i, blood return noted and flushed, slept comfortably the whole night, possible d/c today, refused bath, bed alarm on, call light within reach.

## 2024-03-19 NOTE — OUTREACH NOTE
Prep Survey      Flowsheet Row Responses   [facility] patient discharged from? [facility]   Is LACE score < 7 ? No   Eligibility [facility]   Date of Admission 03/15/24   Date of Discharge 03/19/24   Discharge diagnosis Shortness of breath,  Influenza A   Does the patient have one of the following disease processes/diagnoses(primary or secondary)? Other   Is there a DME ordered? Yes   What DME was ordered? nebulizer- [facility]   Prep survey completed? Yes            Nicole HAN - Registered Nurse

## 2024-03-19 NOTE — CASE MANAGEMENT/SOCIAL WORK
Continued Stay Note  [facility]     Patient Name: Julia Smith III  MRN: [id number]  Today's Date: 3/19/2024    Admit Date: 3/15/2024    Plan: Home   Discharge Plan       Row Name 03/19/24 0816       Plan    Plan Home    Patient/Family in Agreement with Plan yes    Plan Comments Spoke with RN, pt being d/c home with nebulizing solution medications, has no nebulizer, orders received and entered. Referral to [facility], pt has no preference. Spoke with [facility], she will deliver to pt room. Pt has no further d/c needs. -Elvia YADAV                   Discharge Codes    No documentation.                 Expected Discharge Date and Time       Expected Discharge Date Expected Discharge Time    Mar 19, 2024               Elvia Griffith RN

## 2024-03-19 NOTE — DISCHARGE SUMMARY
Patient Name: Julia Smith III  : 1950  MRN: [id number]    Date of Admission: 3/15/2024  Date of Discharge:  3/19/2024  Primary Care Physician: Paty Wheeler APRN ([place])      Chief Complaint:   Weakness - Generalized (Patient complains of weakness x2 days.  Per family difficulty ambulating.  Hx lung cancer, treatment completed 1 year ago.)      Discharge Diagnoses     Active Hospital Problems    Diagnosis  POA    **Shortness of breath [R06.02]  Yes    Influenza A [J10.1]  Yes    Right leg pain [M79.604]  Yes    History of lung cancer [Z85.118]  Not Applicable    Cigarette nicotine dependence without complication [F17.210]  Yes    COPD (chronic obstructive pulmonary disease) [J44.9]  Yes      Resolved Hospital Problems   No resolved problems to display.        Hospital Course     Julia Smith III is a 74 y.o. male with a history of lung cancer status postchemotherapy and radiation, COPD, paroxysmal atrial fibrillation not on anticoagulation, and tobacco abuse who presents to [facility] ER with generalized weakness.  Patient states symptoms initially started on Monday where he was feeling a lot more tired and then started to have a worsening cough.  Patient reports a chronic cough but states that it seems to worsen over this past week.  He states that he has been a little bit more short of breath when getting up to do things but denies any chest pain.  He reports some lower abdominal discomfort with nausea and diarrhea.  He denies any blood or black tarry quality to the stool.  Denies known fevers.  He also reports he has been having some worsening pain in his right leg especially at the thigh has been making it difficult to get around.  He denies any significant swelling in the leg or skin changes.  He has a chronic wound to the anterior left shin that he is following with wound care.  He reports he smokes about a pack a day and drinks about 2-3 beers daily but his last drink was on  Monday.       1.Acute hypoxic respiratory failure secondary to COPD/Acute influenza, was treated  with budesonide-formoterol inhaler and prednisone.  Was on DuoNebs as well during this hospital stay and was initially requiring 2 L of oxygen.  He is significantly feeling better.  Was able to be weaned off of oxygen.  Will be on tapered dose of steroids upon discharge and not in any respiratory distress.     2.  History of lung cancer, advised him to keep all his oncology appointments and states he is in remission now.     3.  Tobacco abuse, counseled to quit smoking.  Does not appear to be motivated to do so.     4.  History of atrial fibrillation, continue with home dose aspirin.  Blood pressure is soft low therefore not on any beta-blockers.    Copied text on this note has been reviewed by me on 3/19/2024 for    Day of Discharge         Physical Exam:  Temp:  [97.5 °F (36.4 °C)-98.1 °F (36.7 °C)] 98.1 °F (36.7 °C)  Heart Rate:  [73-93] 79  Resp:  [16-18] 16  BP: (125-133)/(66-69) 133/67  Body mass index is 23.7 kg/m².  Physical Exam  HEENT: PERRLA, extraocular movements intact, Scleras no icterus  Neck: Supple, no JVD  Cardiovascular: Regular rate and rhythm with normal S1 and S2  Respiratory: Diminished breath sounds with mild wheezes.  GI: Soft, nontender, bowel sounds present  Extremities: No edema, palpable pedal pulses.         Consultants     Consult Orders (all) (From admission, onward)       Start     Ordered    03/16/24 1704  Inpatient Hospitalist Consult  Once,   Status:  Canceled        Specialty:  Hospitalist  Provider:  Hue Monique MD    03/16/24 1704    03/16/24 0742  Inpatient Pulmonology Consult  Once        Specialty:  Pulmonary Disease  Provider:  Medardo Miller MD    03/16/24 0741    03/15/24 2236  [facility] (on-call MD unless specified) Details  Once,   Status:  Canceled        Specialty:  Hospitalist  Provider:  (Not yet assigned)    03/15/24 2236                  Procedures     *  Surgery not found *    Imaging Results (All)       Procedure Component Value Units Date/Time    XR Chest 1 View [[id number]] Collected: 03/15/24 2114     Updated: 03/15/24 2118    Narrative:      SINGLE VIEW OF THE CHEST     HISTORY: Lung cancer     COMPARISON: February 21, 2022     FINDINGS:  Right internal jugular vein Mediport appears stable in position. No  pneumothorax or pleural effusion is seen. There is cardiomegaly. There  is no vascular congestion. There is some calcification of the aorta.  There is scarring versus atelectasis noted at the right lung base. There  is mild stable elevation of the right hemidiaphragm. Left lung appears  clear.       Impression:      Linear right basilar atelectasis versus scarring.     This report was finalized on 3/15/2024 9:15 PM by Dr. Kaycee Jimenez M.D on Workstation: [id number]             Results for orders placed during the hospital encounter of 03/15/24    Duplex Venous Lower Extremity - Right CAR    Interpretation Summary    Chronic right lower extremity superficial thrombophlebitis noted in the great saphenous (below knee).    All other right sided veins appeared normal.    Results for orders placed during the hospital encounter of 07/05/19    Adult Transthoracic Echo Complete With Contrast if Necessary Per Protocol    Interpretation Summary  · Left ventricular systolic function is normal. Calculated EF = 52.0%. Estimated EF was in agreement with the calculated EF. Normal left ventricular cavity size and wall thickness noted.  · The following segments are hypokinetic: basal anteroseptal and basal inferoseptal.  · Left ventricular diastolic function is normal.  · There is mild calcification of the aortic valve.  · Mild MAC is present.  · Mild mitral valve regurgitation is present    Pertinent Labs     Results from last 7 days   Lab Units 03/19/24  0544 03/18/24  0828 03/16/24  0410 03/15/24  2204   WBC 10*3/mm3 3.25* 3.50 3.89 5.34   HEMOGLOBIN g/dL 13.2 14.2  "13.5 14.3   PLATELETS 10*3/mm3 85* 83* 93* 98*     Results from last 7 days   Lab Units 03/19/24  0544 03/18/24  0655 03/16/24  0410 03/15/24  2204   SODIUM mmol/L 138 135* 135* 134*   POTASSIUM mmol/L 3.7 4.0 3.6 3.8   CHLORIDE mmol/L 101 104 99 99   CO2 mmol/L 28.0 21.3* 22.2 23.0   BUN mg/dL 13 13 22 23   CREATININE mg/dL 0.94 0.81 1.20 1.04   GLUCOSE mg/dL 81 92 77 72   EGFR mL/min/1.73 86.1 93.7 64.3 76.3     Results from last 7 days   Lab Units 03/15/24  2204   ALBUMIN g/dL 4.1   BILIRUBIN mg/dL 0.3   ALK PHOS U/L 84   AST (SGOT) U/L 44*   ALT (SGPT) U/L 35     Results from last 7 days   Lab Units 03/19/24  0544 03/18/24  0655 03/16/24  0410 03/15/24  2204   CALCIUM mg/dL 8.9 8.4* 8.3* 8.6   ALBUMIN g/dL  --   --   --  4.1   MAGNESIUM mg/dL  --   --   --  1.7       Results from last 7 days   Lab Units 03/15/24  2345 03/15/24  2204   HSTROP T ng/L 12 14   PROBNP pg/mL  --  98.8           Invalid input(s): \"LDLCALC\"      Results from last 7 days   Lab Units 03/15/24  2058   COVID19  Not Detected       Test Results Pending at Discharge       Discharge Details        Discharge Medications        New Medications        Instructions Start Date   budesonide-formoterol 160-4.5 MCG/ACT inhaler  Commonly known as: SYMBICORT   2 puffs, Inhalation, 2 Times Daily - RT      ipratropium-albuterol 0.5-2.5 mg/3 ml nebulizer  Commonly known as: DUO-NEB   3 mL, Nebulization, Every 6 Hours      predniSONE 10 MG tablet  Commonly known as: DELTASONE   Take 3 tablets by mouth Daily for 3 days, THEN 2 tablets Daily for 3 days, THEN 1 tablet Daily for 3 days, THEN 0.5 tablets Daily for 3 days. STOP   Start Date: March 19, 2024            Continue These Medications        Instructions Start Date   aspirin 81 MG tablet   81 mg, Oral, Daily             Stop These Medications      amoxicillin-clavulanate 500-125 MG per tablet  Commonly known as: AUGMENTIN     naproxen sodium 220 MG tablet  Commonly known as: ALEVE              No Known " Allergies    Discharge Disposition:  Home or Self Care      Discharge Diet:  No active diet order      Discharge Activity:   Activity Instructions       Activity as Tolerated              CODE STATUS:    Code Status and Medical Interventions:   Ordered at: 03/15/24 2300     Code Status (Patient has no pulse and is not breathing):    CPR (Attempt to Resuscitate)     Medical Interventions (Patient has pulse or is breathing):    Full Support       Future Appointments   Date Time Provider Department Center   4/30/2024  1:15 PM J[facility] JAY [facility] CT 1  JAY JAY CT BR None   5/7/2024  2:15 PM [facility] PORT CHAIR  [facility]   5/7/2024  2:40 PM Thai Morel MD [facility]     Additional Instructions for the Follow-ups that You Need to Schedule       Discharge Follow-up with PCP   As directed       Currently Documented PCP:    Paty Wheeler APRN ([place])    PCP Phone Number:    [phone]     Follow Up Details: 1 week               Follow-up Information       Paty Wheeler APRN ([place]) .    Specialty: Family Medicine  Why: Please make an appointment to see in 1 week for a hospital follow-up appointment  Contact information:  [address] [place] [id number] [phone]                             Additional Instructions for the Follow-ups that You Need to Schedule       Discharge Follow-up with PCP   As directed       Currently Documented PCP:    Paty Wheeler APRN ([place])    PCP Phone Number:    [phone]     Follow Up Details: 1 week            Time Spent on Discharge:  Greater than 30 minutes      Santos Fowler MD  [facility]  03/19/24  18:52 EDT

## 2024-03-19 NOTE — PLAN OF CARE
Goal Outcome Evaluation:  Plan of Care Reviewed With: patient        Progress: improving  Outcome Evaluation: Breathing improved, pt on room air, pt cont to have general weakness with unsteady gait and will benefit from a rolling walker, he stated he has one available at home, pt able to ambulate 100 ft with SBA with Rwx. Pt plans home today with assist of family      Anticipated Discharge Disposition (PT): home with assist

## 2024-03-19 NOTE — CASE MANAGEMENT/SOCIAL WORK
Case Management Discharge Note      Final Note: home no needs         Selected Continued Care - Discharged on 3/19/2024 Admission date: 3/15/2024 - Discharge disposition: Home or Self Care      Destination    No services have been selected for the patient.                Durable Medical Equipment    No services have been selected for the patient.                Dialysis/Infusion    No services have been selected for the patient.                Home Medical Care    No services have been selected for the patient.                Therapy    No services have been selected for the patient.                Community Resources    No services have been selected for the patient.                Community & DME    No services have been selected for the patient.                    Transportation Services  Private: Car    Final Discharge Disposition Code: 01 - home or self-care

## 2024-03-20 ENCOUNTER — TRANSITIONAL CARE MANAGEMENT TELEPHONE ENCOUNTER (OUTPATIENT)
Dept: CALL CENTER | Facility: HOSPITAL | Age: 74
End: 2024-03-20
Payer: MEDICARE

## 2024-03-20 NOTE — OUTREACH NOTE
Call Center TCM Note      Flowsheet Row Responses   [facility] patient discharged from? [place]   Does the patient have one of the following disease processes/diagnoses(primary or secondary)? Other   TCM attempt successful? No   Unsuccessful attempts Attempt 1            Nery Mott MA    3/20/2024, 14:05 EDT

## 2024-03-20 NOTE — OUTREACH NOTE
Call Center TCM Note      Flowsheet Row Responses   [facility] patient discharged from? [place]   Does the patient have one of the following disease processes/diagnoses(primary or secondary)? Other   TCM attempt successful? No   Unsuccessful attempts Attempt 2            Roma Watts LPN    3/20/2024, 16:55 EDT

## 2024-03-21 ENCOUNTER — TRANSITIONAL CARE MANAGEMENT TELEPHONE ENCOUNTER (OUTPATIENT)
Dept: CALL CENTER | Facility: HOSPITAL | Age: 74
End: 2024-03-21
Payer: MEDICARE

## 2024-03-21 NOTE — OUTREACH NOTE
Call Center TCM Note      Flowsheet Row Responses   [facility] patient discharged from? [place]   Does the patient have one of the following disease processes/diagnoses(primary or secondary)? Other   TCM attempt successful? Yes   Call start time 1705   Call end time 1714   Is patient permission given to speak with other caregiver? Yes   Person spoke with today (if not patient) and relationship Patience-spouse.   Meds reviewed with patient/caregiver? Yes   Is the patient having any side effects they believe may be caused by any medication additions or changes? No   Does the patient have all medications ordered at discharge? Yes   Is the patient taking all medications as directed (includes completed medication regime)? Yes   Comments Spouse states patient is f/u with CT of chest 04/30/24 with oncology appt 05/07/24. Prefers to f/u with oncology only, but willing to discuss telehealth or appt with PCP if PCP feels is needed. Will route to PCP office.   Does the patient have an appointment with their PCP within 7-14 days of discharge? No   Nursing Interventions Patient desires to follow up with specialty only, Routed TCM call to PCP office   Has home health visited the patient within 72 hours of discharge? N/A   Has all DME been delivered? Yes   DME comments Doing nebulizer treatments. States does not have a pulse oximeter.   Psychosocial issues? No   Did the patient receive a copy of their discharge instructions? Yes   Nursing interventions Reviewed instructions with patient   What is the patient's perception of their health status since discharge? Improving   Is the patient/caregiver able to teach back signs and symptoms related to disease process for when to call PCP? Yes   Is the patient/caregiver able to teach back signs and symptoms related to disease process for when to call 911? Yes   Is the patient/caregiver able to teach back the hierarchy of who to call/visit for symptoms/problems? PCP, Specialist, Home  health nurse, Urgent Care, ED, 911 Yes   If the patient is a current smoker, are they able to teach back resources for cessation? Smoking cessation medications, [id number]   TCM call completed? Yes   Wrap up additional comments Spouse states patient is improving. Denies any needs or concerns today. TCM complete.   Call end time 5424   Would this patient benefit from a Referral to [facility] Social Work? No   Is the patient interested in additional calls from an ambulatory ? No            Barbara Rider RN    3/21/2024, 17:16 EDT

## 2024-04-30 ENCOUNTER — HOSPITAL ENCOUNTER (OUTPATIENT)
Facility: HOSPITAL | Age: 74
Discharge: HOME | End: 2024-04-30
Admitting: INTERNAL MEDICINE
Payer: MEDICARE

## 2024-04-30 DIAGNOSIS — Z85.118 HISTORY OF LUNG CANCER: ICD-10-CM

## 2024-04-30 DIAGNOSIS — Z45.2 ENCOUNTER FOR FITTING AND ADJUSTMENT OF VASCULAR CATHETER: Primary | ICD-10-CM

## 2024-04-30 PROCEDURE — 71260 CT THORAX DX C+: CPT

## 2024-04-30 PROCEDURE — 25510000001 IOPAMIDOL 61 % SOLUTION: Performed by: INTERNAL MEDICINE

## 2024-04-30 PROCEDURE — 25010000002 HEPARIN LOCK FLUSH PER 10 UNITS: Performed by: INTERNAL MEDICINE

## 2024-04-30 RX ORDER — SODIUM CHLORIDE 0.9 % (FLUSH) 0.9 %
10 SYRINGE (ML) INJECTION AS NEEDED
OUTPATIENT
Start: 2024-04-30

## 2024-04-30 RX ORDER — HEPARIN SODIUM (PORCINE) LOCK FLUSH IV SOLN 100 UNIT/ML 100 UNIT/ML
500 SOLUTION INTRAVENOUS AS NEEDED
Status: DISCONTINUED | OUTPATIENT
Start: 2024-04-30 | End: 2024-05-01 | Stop reason: HOSPADM

## 2024-04-30 RX ORDER — HEPARIN SODIUM (PORCINE) LOCK FLUSH IV SOLN 100 UNIT/ML 100 UNIT/ML
500 SOLUTION INTRAVENOUS AS NEEDED
OUTPATIENT
Start: 2024-04-30

## 2024-04-30 RX ORDER — SODIUM CHLORIDE 0.9 % (FLUSH) 0.9 %
10 SYRINGE (ML) INJECTION AS NEEDED
Status: DISCONTINUED | OUTPATIENT
Start: 2024-04-30 | End: 2024-05-01 | Stop reason: HOSPADM

## 2024-04-30 RX ADMIN — HEPARIN 500 UNITS: 100 SYRINGE at 14:16

## 2024-04-30 RX ADMIN — IOPAMIDOL 100 ML: 612 INJECTION, SOLUTION INTRAVENOUS at 14:10

## 2024-04-30 RX ADMIN — Medication 10 ML: at 14:08

## 2024-05-07 ENCOUNTER — INFUSION (OUTPATIENT)
Dept: ONCOLOGY | Facility: HOSPITAL | Age: 74
End: 2024-05-07
Payer: MEDICARE

## 2024-05-07 ENCOUNTER — OFFICE VISIT (OUTPATIENT)
Dept: ONCOLOGY | Facility: CLINIC | Age: 74
End: 2024-05-07
Payer: MEDICARE

## 2024-05-07 VITALS
DIASTOLIC BLOOD PRESSURE: 77 MMHG | HEIGHT: 71 IN | SYSTOLIC BLOOD PRESSURE: 125 MMHG | BODY MASS INDEX: 24.72 KG/M2 | WEIGHT: 176.6 LBS | RESPIRATION RATE: 18 BRPM | OXYGEN SATURATION: 94 % | TEMPERATURE: 97.9 F | HEART RATE: 87 BPM

## 2024-05-07 DIAGNOSIS — Z85.118 HISTORY OF LUNG CANCER: ICD-10-CM

## 2024-05-07 DIAGNOSIS — Z45.2 ENCOUNTER FOR FITTING AND ADJUSTMENT OF VASCULAR CATHETER: Primary | ICD-10-CM

## 2024-05-07 DIAGNOSIS — Z85.118 HISTORY OF LUNG CANCER: Primary | ICD-10-CM

## 2024-05-07 LAB
ALBUMIN SERPL-MCNC: 3.8 G/DL (ref 3.5–5.2)
ALBUMIN/GLOB SERPL: 1.1 G/DL
ALP SERPL-CCNC: 87 U/L (ref 39–117)
ALT SERPL W P-5'-P-CCNC: 7 U/L (ref 1–41)
ANION GAP SERPL CALCULATED.3IONS-SCNC: 9 MMOL/L (ref 5–15)
AST SERPL-CCNC: 23 U/L (ref 1–40)
BASOPHILS # BLD AUTO: 0.14 10*3/MM3 (ref 0–0.2)
BASOPHILS NFR BLD AUTO: 2 % (ref 0–1.5)
BILIRUB SERPL-MCNC: 0.5 MG/DL (ref 0–1.2)
BUN SERPL-MCNC: 10 MG/DL (ref 8–23)
BUN/CREAT SERPL: 10.6 (ref 7–25)
CALCIUM SPEC-SCNC: 9.3 MG/DL (ref 8.6–10.5)
CHLORIDE SERPL-SCNC: 102 MMOL/L (ref 98–107)
CO2 SERPL-SCNC: 24 MMOL/L (ref 22–29)
CREAT SERPL-MCNC: 0.94 MG/DL (ref 0.76–1.27)
DEPRECATED RDW RBC AUTO: 48.4 FL (ref 37–54)
EGFRCR SERPLBLD CKD-EPI 2021: 86.1 ML/MIN/1.73
EOSINOPHIL # BLD AUTO: 0.32 10*3/MM3 (ref 0–0.4)
EOSINOPHIL NFR BLD AUTO: 4.6 % (ref 0.3–6.2)
ERYTHROCYTE [DISTWIDTH] IN BLOOD BY AUTOMATED COUNT: 14.4 % (ref 12.3–15.4)
GLOBULIN UR ELPH-MCNC: 3.5 GM/DL
GLUCOSE SERPL-MCNC: 82 MG/DL (ref 65–99)
HCT VFR BLD AUTO: 43.4 % (ref 37.5–51)
HGB BLD-MCNC: 14 G/DL (ref 13–17.7)
IMM GRANULOCYTES # BLD AUTO: 0.03 10*3/MM3 (ref 0–0.05)
IMM GRANULOCYTES NFR BLD AUTO: 0.4 % (ref 0–0.5)
LYMPHOCYTES # BLD AUTO: 1.35 10*3/MM3 (ref 0.7–3.1)
LYMPHOCYTES NFR BLD AUTO: 19.3 % (ref 19.6–45.3)
MCH RBC QN AUTO: 29.9 PG (ref 26.6–33)
MCHC RBC AUTO-ENTMCNC: 32.3 G/DL (ref 31.5–35.7)
MCV RBC AUTO: 92.7 FL (ref 79–97)
MONOCYTES # BLD AUTO: 0.8 10*3/MM3 (ref 0.1–0.9)
MONOCYTES NFR BLD AUTO: 11.4 % (ref 5–12)
NEUTROPHILS NFR BLD AUTO: 4.37 10*3/MM3 (ref 1.7–7)
NEUTROPHILS NFR BLD AUTO: 62.3 % (ref 42.7–76)
NRBC BLD AUTO-RTO: 0 /100 WBC (ref 0–0.2)
PLATELET # BLD AUTO: 159 10*3/MM3 (ref 140–450)
PMV BLD AUTO: 10.2 FL (ref 6–12)
POTASSIUM SERPL-SCNC: 4 MMOL/L (ref 3.5–5.2)
PROT SERPL-MCNC: 7.3 G/DL (ref 6–8.5)
RBC # BLD AUTO: 4.68 10*6/MM3 (ref 4.14–5.8)
SODIUM SERPL-SCNC: 135 MMOL/L (ref 136–145)
WBC NRBC COR # BLD AUTO: 7.01 10*3/MM3 (ref 3.4–10.8)

## 2024-05-07 PROCEDURE — 1160F RVW MEDS BY RX/DR IN RCRD: CPT | Performed by: INTERNAL MEDICINE

## 2024-05-07 PROCEDURE — 36591 DRAW BLOOD OFF VENOUS DEVICE: CPT

## 2024-05-07 PROCEDURE — 80053 COMPREHEN METABOLIC PANEL: CPT

## 2024-05-07 PROCEDURE — 99214 OFFICE O/P EST MOD 30 MIN: CPT | Performed by: INTERNAL MEDICINE

## 2024-05-07 PROCEDURE — 1126F AMNT PAIN NOTED NONE PRSNT: CPT | Performed by: INTERNAL MEDICINE

## 2024-05-07 PROCEDURE — 85025 COMPLETE CBC W/AUTO DIFF WBC: CPT

## 2024-05-07 PROCEDURE — 25010000002 HEPARIN LOCK FLUSH PER 10 UNITS: Performed by: INTERNAL MEDICINE

## 2024-05-07 PROCEDURE — 1159F MED LIST DOCD IN RCRD: CPT | Performed by: INTERNAL MEDICINE

## 2024-05-07 RX ORDER — HEPARIN SODIUM (PORCINE) LOCK FLUSH IV SOLN 100 UNIT/ML 100 UNIT/ML
500 SOLUTION INTRAVENOUS AS NEEDED
OUTPATIENT
Start: 2024-05-07

## 2024-05-07 RX ORDER — SODIUM CHLORIDE 0.9 % (FLUSH) 0.9 %
10 SYRINGE (ML) INJECTION AS NEEDED
OUTPATIENT
Start: 2024-05-07

## 2024-05-07 RX ORDER — HEPARIN SODIUM (PORCINE) LOCK FLUSH IV SOLN 100 UNIT/ML 100 UNIT/ML
500 SOLUTION INTRAVENOUS AS NEEDED
Status: DISCONTINUED | OUTPATIENT
Start: 2024-05-07 | End: 2024-05-07 | Stop reason: HOSPADM

## 2024-05-07 RX ORDER — SODIUM CHLORIDE 0.9 % (FLUSH) 0.9 %
10 SYRINGE (ML) INJECTION AS NEEDED
Status: DISCONTINUED | OUTPATIENT
Start: 2024-05-07 | End: 2024-05-07 | Stop reason: HOSPADM

## 2024-05-07 RX ADMIN — Medication 500 UNITS: at 14:35

## 2024-05-07 RX ADMIN — Medication 10 ML: at 14:36

## 2024-07-09 ENCOUNTER — INFUSION (OUTPATIENT)
Dept: ONCOLOGY | Facility: HOSPITAL | Age: 74
End: 2024-07-09
Payer: MEDICARE

## 2024-07-09 DIAGNOSIS — Z45.2 ENCOUNTER FOR FITTING AND ADJUSTMENT OF VASCULAR CATHETER: Primary | ICD-10-CM

## 2024-07-09 PROCEDURE — 96523 IRRIG DRUG DELIVERY DEVICE: CPT

## 2024-07-09 PROCEDURE — 25010000002 HEPARIN LOCK FLUSH PER 10 UNITS: Performed by: INTERNAL MEDICINE

## 2024-07-09 RX ORDER — HEPARIN SODIUM (PORCINE) LOCK FLUSH IV SOLN 100 UNIT/ML 100 UNIT/ML
500 SOLUTION INTRAVENOUS AS NEEDED
OUTPATIENT
Start: 2024-07-09

## 2024-07-09 RX ORDER — HEPARIN SODIUM (PORCINE) LOCK FLUSH IV SOLN 100 UNIT/ML 100 UNIT/ML
500 SOLUTION INTRAVENOUS AS NEEDED
Status: DISCONTINUED | OUTPATIENT
Start: 2024-07-09 | End: 2024-07-09 | Stop reason: HOSPADM

## 2024-07-09 RX ORDER — SODIUM CHLORIDE 0.9 % (FLUSH) 0.9 %
10 SYRINGE (ML) INJECTION AS NEEDED
OUTPATIENT
Start: 2024-07-09

## 2024-07-09 RX ORDER — SODIUM CHLORIDE 0.9 % (FLUSH) 0.9 %
10 SYRINGE (ML) INJECTION AS NEEDED
Status: DISCONTINUED | OUTPATIENT
Start: 2024-07-09 | End: 2024-07-09 | Stop reason: HOSPADM

## 2024-07-09 RX ADMIN — Medication 10 ML: at 13:38

## 2024-07-09 RX ADMIN — Medication 500 UNITS: at 13:38

## 2024-08-15 NOTE — PROGRESS NOTES
Physician Weekly Management Note    Diagnosis:     Diagnosis Plan   1. Non-small cell lung cancer, unspecified laterality            RT Details:  fx 24/30 left lung     Notes on Treatment course, Films, Medical progress:  Kps 90% doing well, no pain, cont on     Weekly Management:  Medication reviewed?   Yes  New medications given?   No  Problemlist reviewed?   Yes  Problem added?   No  Issues raised requiring referral to support services - task assigned to:  na    Technical aspects reviewed:  Weekly OBI approved?   Yes  Weekly port films approved?   Yes  Change requests noted on port film?   No  Patient setup and plan reviewed?   Yes    Chart Reviewed:  Continue current treatment plan?   Yes  Treatment plan change requested?   No  CBC reviewed?   Yes  Concurrent Chemo?   Yes    Objective     Toxicities:   none      Review of Systems   Constitutional: Negative.    Respiratory:  Positive for cough.    Musculoskeletal:  Positive for arthralgias.        There were no vitals filed for this visit.        5/7/2024     2:33 PM   Current Status   ECOG score 0       Physical Exam  Constitutional:       Appearance: Normal appearance.   Pulmonary:      Effort: Pulmonary effort is normal.   Neurological:      Mental Status: He is alert.           Problem Summary List    Diagnosis:     Diagnosis Plan   1. Non-small cell lung cancer, unspecified laterality          Pathology:   nnon small cell lung cancer    Past Medical History:   Diagnosis Date   • Atrial fibrillation with RVR    • Cancer 06/2019    Right lung   • Chronic cough    • COPD (chronic obstructive pulmonary disease)    • Hearing loss    • History of shingles    • Lower back pain    • Lung cancer    • Mastoiditis    • Mild mitral regurgitation    • Other fatigue    • PAF (paroxysmal atrial fibrillation)    • Pneumonia          Past Surgical History:   Procedure Laterality Date   • BRONCHOSCOPY N/A 6/19/2019    Procedure: BRONCHOSCOPY WITH WASHINGS AND BIOPSY AND  ENDOBRONCHIAL ULTRASOUND WITH FNA;  Surgeon: Arslan Marquez MD;  Location: [facility] ENDOSCOPY;  Service: Pulmonary   • BRONCHOSCOPY N/A 2/21/2022    Procedure: BRONCHOSCOPY WITH FLUORO, BAL, BRUSHINGS, AND BIOPSIES LLL;  Surgeon: Arslan Marquez MD;  Location: [facility] ENDOSCOPY;  Service: Pulmonary;  Laterality: N/A;  PRE: LUNG MASS  POST: SAME   • INNER EAR SURGERY Left    • MASTOIDECTOMY Left 1994   • VENOUS ACCESS DEVICE (PORT) INSERTION Right 8/22/2019    Procedure: INSERTION OF PORTACATH;  Surgeon: Ramila Gallardo MD;  Location: [facility] MAIN OR;  Service: Cardiothoracic         Current Outpatient Medications on File Prior to Visit   Medication Sig Dispense Refill   • aspirin 81 MG tablet Take 1 tablet by mouth Daily. 30 tablet 11     No current facility-administered medications on file prior to visit.       No Known Allergies      Referring Provider:    No referring provider defined for this encounter.    Oncologist:  No primary care provider on file.      Seen and approved by:  Yessenia Elizabeth MD  04/12/2022

## 2024-09-03 ENCOUNTER — HOSPITAL ENCOUNTER (OUTPATIENT)
Facility: HOSPITAL | Age: 74
Discharge: HOME | End: 2024-09-03
Admitting: INTERNAL MEDICINE
Payer: MEDICARE

## 2024-09-03 DIAGNOSIS — Z85.118 HISTORY OF LUNG CANCER: ICD-10-CM

## 2024-09-03 DIAGNOSIS — Z45.2 ENCOUNTER FOR FITTING AND ADJUSTMENT OF VASCULAR CATHETER: Primary | ICD-10-CM

## 2024-09-03 PROCEDURE — 25510000001 IOPAMIDOL 61 % SOLUTION: Performed by: INTERNAL MEDICINE

## 2024-09-03 PROCEDURE — 25010000002 HEPARIN LOCK FLUSH PER 10 UNITS: Performed by: INTERNAL MEDICINE

## 2024-09-03 PROCEDURE — 71260 CT THORAX DX C+: CPT

## 2024-09-03 RX ORDER — SODIUM CHLORIDE 0.9 % (FLUSH) 0.9 %
10 SYRINGE (ML) INJECTION AS NEEDED
OUTPATIENT
Start: 2024-09-03

## 2024-09-03 RX ORDER — IOPAMIDOL 612 MG/ML
100 INJECTION, SOLUTION INTRAVASCULAR
Status: COMPLETED | OUTPATIENT
Start: 2024-09-03 | End: 2024-09-03

## 2024-09-03 RX ORDER — HEPARIN SODIUM (PORCINE) LOCK FLUSH IV SOLN 100 UNIT/ML 100 UNIT/ML
500 SOLUTION INTRAVENOUS AS NEEDED
Status: DISCONTINUED | OUTPATIENT
Start: 2024-09-03 | End: 2024-09-04 | Stop reason: HOSPADM

## 2024-09-03 RX ORDER — HEPARIN SODIUM (PORCINE) LOCK FLUSH IV SOLN 100 UNIT/ML 100 UNIT/ML
500 SOLUTION INTRAVENOUS AS NEEDED
OUTPATIENT
Start: 2024-09-03

## 2024-09-03 RX ORDER — SODIUM CHLORIDE 0.9 % (FLUSH) 0.9 %
10 SYRINGE (ML) INJECTION AS NEEDED
Status: DISCONTINUED | OUTPATIENT
Start: 2024-09-03 | End: 2024-09-04 | Stop reason: HOSPADM

## 2024-09-03 RX ADMIN — Medication 10 ML: at 13:06

## 2024-09-03 RX ADMIN — IOPAMIDOL 75 ML: 612 INJECTION, SOLUTION INTRAVENOUS at 13:13

## 2024-09-03 RX ADMIN — HEPARIN 500 UNITS: 100 SYRINGE at 13:17

## 2024-09-09 NOTE — PROGRESS NOTES
"REASON FOR FOLLOW UP:   1.  T4N3 Small cell lung cancer  2.  SVC syndrome  3.  Initially treated with chemotherapy.  Subsequent radiation.  Subsequent PCI. ISAIAS  4. 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 male with the above mentioned history, returns the office today for follow up to review imaging.    He reports difficulty walking due to right knee pain.  He denies any worsening respiratory issues.  He continues to smoke.    Review of Systems   See the HPI    Vitals:    09/10/24 1434   BP: 133/66   Pulse: 75   Resp: 16   Temp: 97.7 °F (36.5 °C)   TempSrc: Oral   SpO2: 97%   Weight: 77.7 kg (171 lb 3.2 oz)   Height: 180.3 cm (70.98\")   PainSc: 0-No pain       General:  No acute distress, awake, alert and oriented.  Remains chronically ill-appearing.  HEENT:  Normocephalic/atraumatic.   Chest:  Normal respiratory effort.  Lungs clear to auscultation bilaterally.  Benign-appearing Mediport present in the right subclavian area, unchanged today.  Heart: Regular rate and rhythm  Lymphatics: No palpable cervical supraclavicular adenopathy  Extremities:  No visible clubbing, cyanosis, or edema  Neuro/psych:  Grossly nonfocal.  Normal mood and affect.    DIAGNOSTIC DATA:  CBC & Differential (09/10/2024 14:04)     IMAGING:  CT Chest With Contrast Diagnostic (09/03/2024 13:13)     CT images personally reviewed.  Right hilar soft tissue thickening more prominent and there is a 2.2 cm area of abnormality posterior to the right lower lobe bronchovascular bundle.  Reported thrombus along the Mediport catheter.    ASSESSMENT:  This is a 74 y.o. male with:    *Small cell lung cancer originating in the right hilum:   He has an extremely large mass there with concern for SVC compression on CT imaging from 6/4/2019.  There is some left hilar adenopathy.  Some parotid lesions which are " concerning but indeterminate, left greater than right.  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  Staging MRI of the brain 7/10/2019 negative for metastatic disease  He was discussed at multidisciplinary thoracic conference, with his T4 and 3 disease, concern for involvement of the right supraclavicular region and obvious left hilum, plans to add atezolizumab with cycle 2, we can consider future radiation to the residual mediastinal mass if necessary.  Lung lesion too large to radiate in spite of the SVC narrowing.    On 7/8/2019 we started carboplatin AUC 5 and VP16 100 mg/m2 through peripheral IVs  Cycle 2 postponed on 7/29/2019 due to heart rate of 154.  Atezolizumab added with cycle #2.  Radiation can be added for a residual mediastinal mass if necessary  CT imaging on 8/14/2019 after two cycles of therapy shows significant improvement.  Mediport anticipated by Dr. Gallardo on 8/22/2019.  On  8/20/2019 cycle 3 was delayed due to neutropenia and with cycle 3 we did incorporate Neulasta.  Cycle 4 completed 9/17/2019  Maintenance atezolizumab initiated 10/8/2019.  PET scan revealed disease response to therapy, he does have an area of hypermetabolic activity in the adrenal gland which is small and did not show up on prior exam.  We will continue to monitor this.  His MRI of the brain is also negative for metastatic disease.  CT 2/4/2020 with ongoing improvement.  Maintenance atezolizumab pursued.    CT imaging 5/19/2020 with progression.  Increase in the right hilar/mediastinal mass with enlarging adenopathy.    Given that the progression is longer than 6 months after completing carbo/VP16, proceed with further carbo/VP16.  Cycle 1 initiated 5/26/2020  PET scan after two cycles with ongoing uptake in the right hilum, otherwise somewhat improved  Radiation  initiated 7/22/2020 with plans for 30 treatments  Proceeded with cycle 4 carboplatin -16 7/28/2020.    As of 8/19/2020, discussed holding further chemotherapy since he had 4 cycles versus continuing potentially with as many as 6.  He prefers to continue with therapy.    Cycle 5 on 8/19/2020  Radiation complete 9/1/2020  As of 9/9/2020, plan no further chemotherapy  MRI brain 11/4/2020 negative for mets.  PET scan 11/4/2020. Improvement in the subcarinal and right hilar nodes with an SUV of 3.7 compared to 20.8 previously. Evidence for esophagitis. Right middle lobe changes resolved. Other stable changes that were previously noted. No new disease.     MRI brain 1/28/2021 negative  PCI pursued, complete from 2/16/2021 through 2/25/2021  CT imaging 2/19/2021 with decrease in the primary tumor, two new small noncalcified nodules in the medial LLL     CT imaging 4/13/2021 with subtle increase in left upper lobe and left perihilar nodules.  Right-sided subcarinal and infrahilar tumor unchanged.  Changes consistent with radiation pneumonitis.  CT imaging 7/14/2021 with slight interval increase in size of 2 adjacent cavitary nodules in the medial left lower lobe.  Right infrahilar/subcarinal lesion stable.  CT imaging 10/11/21 with enlarging right infrahilar mass with two enlarging left lower lobe cavitary lesions  His case was presented at the multidisciplinary thoracic conference.  Plan observation of the lung lesions with serial CT scans.  If significant change, PET imaging and bronchoscopy.  CT imaging 1/12/2022 with ongoing evolution of the left lower lobe cavitary lesions, some increase in size.  They have coalesced into one.  PET scan on 1/25/2022 showed a hypermetabolic 3 x 2 cm cavitary nodule with no other evidence of disease.  He did have a bronchoscopy performed on 2/21/2022.  BAL cytology negative for malignant cells.  Brushings with rare atypical squamous cells, nondiagnostic for malignancy.  Pathology  showed evidence for dysplastic squamous epithelium suspicious for squamous cell carcinoma.  PD-L1 60%.  He is not on active therapy for his small cell lung cancer as we manage his squamous cell carcinoma as detailed below  CT imaging on 5/31/2023 without definite progression.  CT imaging 11/13/2023 without progression  CT imaging 4/30/2024 with some increase in right perihilar consolidation, possible postradiation change versus progressive malignancy.  Otherwise, right lower lobe micronodule which is new and slight increase in left lower lobe nodule.  CT imaging 9/3/2024 with some slight increase in prominence in the right hilum, increasingly nodular component posterior to the right lower lobe bronchovascular bundle measuring 2.2 cm.    *More recent squamous cell carcinoma of the left lower lobe  Presented at thoracic conference. Plan concurrent chemoradiation with carbo/taxol  3/31/2022 week 4 concurrent chemoradiation with carboplatin Taxol  4/7/2022 week 5 concurrent therapy which will be held due to platelets of 76,000  4/14/2022: CBC remained stable.  Platelets stable at 78,000.  Proceed with cycle 5 carboplatin and paclitaxel.  Radiation complete as of 4/20.  PET scan on 6/21/2022 without obvious disease.  Excellent response to therapy.  CT imaging 10/18/2022 with no evidence of progression.  Ongoing improvement in the radiated lung lesion.  CT imaging 1/31/23 stable  CT imaging 5/31/2023 without definite progression.  A couple of tiny nodules are now visible.    *Chronic hearing loss: History of left mastoiditis.  He was not a candidate for cisplatin because of this.  Hearing loss persists, somewhat worse.    *Atrial fibrillation.  Currently asymptomatic    *Tobacco use.  Continues to smoke     *Alcohol use    *History of SVC syndrome at diagnosis of small cell lung cancer.  Improved after 2 cycles of chemotherapy.  No further evidence of this and this has resolved.    *Dyspnea on exertion: Related to COPD  and ongoing lung damage from smoking.  Stable shortness of breath    *Fatigue and tiredness: Stable    *History of malfunctioning Mediport: Functioning appropriately at this time.    *Left lower extremity wound, improved after going to wound care    *Right knee pain    *Lack of primary care    PLAN:   I do not visualize any significant changes on his CT scan compared to prior.  We will continue to closely monitor.  Port flush in about 6 weeks.  I will see him back after CT imaging in 3 months with labs and a port flush.  He is a poor candidate for any further treatment of lung cancer    Thai Morel MD  09/10/2024

## 2024-09-10 ENCOUNTER — OFFICE VISIT (OUTPATIENT)
Dept: ONCOLOGY | Facility: CLINIC | Age: 74
End: 2024-09-10
Payer: MEDICARE

## 2024-09-10 ENCOUNTER — LAB (OUTPATIENT)
Dept: LAB | Facility: HOSPITAL | Age: 74
End: 2024-09-10
Payer: MEDICARE

## 2024-09-10 ENCOUNTER — INFUSION (OUTPATIENT)
Dept: ONCOLOGY | Facility: HOSPITAL | Age: 74
End: 2024-09-10
Payer: MEDICARE

## 2024-09-10 VITALS
BODY MASS INDEX: 23.97 KG/M2 | DIASTOLIC BLOOD PRESSURE: 66 MMHG | TEMPERATURE: 97.7 F | HEART RATE: 75 BPM | WEIGHT: 171.2 LBS | SYSTOLIC BLOOD PRESSURE: 133 MMHG | HEIGHT: 71 IN | RESPIRATION RATE: 16 BRPM | OXYGEN SATURATION: 97 %

## 2024-09-10 DIAGNOSIS — Z85.118 HISTORY OF LUNG CANCER: ICD-10-CM

## 2024-09-10 DIAGNOSIS — Z45.2 ENCOUNTER FOR FITTING AND ADJUSTMENT OF VASCULAR CATHETER: Primary | ICD-10-CM

## 2024-09-10 DIAGNOSIS — Z85.118 HISTORY OF LUNG CANCER: Primary | ICD-10-CM

## 2024-09-10 LAB
ALBUMIN SERPL-MCNC: 4.3 G/DL (ref 3.5–5.2)
ALBUMIN/GLOB SERPL: 1.3 G/DL
ALP SERPL-CCNC: 92 U/L (ref 39–117)
ALT SERPL W P-5'-P-CCNC: 25 U/L (ref 1–41)
ANION GAP SERPL CALCULATED.3IONS-SCNC: 10.8 MMOL/L (ref 5–15)
AST SERPL-CCNC: 32 U/L (ref 1–40)
BASOPHILS # BLD AUTO: 0.09 10*3/MM3 (ref 0–0.2)
BASOPHILS NFR BLD AUTO: 2 % (ref 0–1.5)
BILIRUB SERPL-MCNC: 0.7 MG/DL (ref 0–1.2)
BUN SERPL-MCNC: 10 MG/DL (ref 8–23)
BUN/CREAT SERPL: 10.6 (ref 7–25)
CALCIUM SPEC-SCNC: 9.8 MG/DL (ref 8.6–10.5)
CHLORIDE SERPL-SCNC: 100 MMOL/L (ref 98–107)
CO2 SERPL-SCNC: 27.2 MMOL/L (ref 22–29)
CREAT SERPL-MCNC: 0.94 MG/DL (ref 0.76–1.27)
DEPRECATED RDW RBC AUTO: 46.8 FL (ref 37–54)
EGFRCR SERPLBLD CKD-EPI 2021: 86.1 ML/MIN/1.73
EOSINOPHIL # BLD AUTO: 0.15 10*3/MM3 (ref 0–0.4)
EOSINOPHIL NFR BLD AUTO: 3.4 % (ref 0.3–6.2)
ERYTHROCYTE [DISTWIDTH] IN BLOOD BY AUTOMATED COUNT: 13.7 % (ref 12.3–15.4)
GLOBULIN UR ELPH-MCNC: 3.4 GM/DL
GLUCOSE SERPL-MCNC: 84 MG/DL (ref 65–99)
HCT VFR BLD AUTO: 46.9 % (ref 37.5–51)
HGB BLD-MCNC: 15.5 G/DL (ref 13–17.7)
IMM GRANULOCYTES # BLD AUTO: 0.01 10*3/MM3 (ref 0–0.05)
IMM GRANULOCYTES NFR BLD AUTO: 0.2 % (ref 0–0.5)
LYMPHOCYTES # BLD AUTO: 1.13 10*3/MM3 (ref 0.7–3.1)
LYMPHOCYTES NFR BLD AUTO: 25.3 % (ref 19.6–45.3)
MCH RBC QN AUTO: 30.4 PG (ref 26.6–33)
MCHC RBC AUTO-ENTMCNC: 33 G/DL (ref 31.5–35.7)
MCV RBC AUTO: 92 FL (ref 79–97)
MONOCYTES # BLD AUTO: 0.5 10*3/MM3 (ref 0.1–0.9)
MONOCYTES NFR BLD AUTO: 11.2 % (ref 5–12)
NEUTROPHILS NFR BLD AUTO: 2.58 10*3/MM3 (ref 1.7–7)
NEUTROPHILS NFR BLD AUTO: 57.9 % (ref 42.7–76)
NRBC BLD AUTO-RTO: 0 /100 WBC (ref 0–0.2)
PLATELET # BLD AUTO: 111 10*3/MM3 (ref 140–450)
PMV BLD AUTO: 11.2 FL (ref 6–12)
POTASSIUM SERPL-SCNC: 4.6 MMOL/L (ref 3.5–5.2)
PROT SERPL-MCNC: 7.7 G/DL (ref 6–8.5)
RBC # BLD AUTO: 5.1 10*6/MM3 (ref 4.14–5.8)
SODIUM SERPL-SCNC: 138 MMOL/L (ref 136–145)
WBC NRBC COR # BLD AUTO: 4.46 10*3/MM3 (ref 3.4–10.8)

## 2024-09-10 PROCEDURE — 80053 COMPREHEN METABOLIC PANEL: CPT

## 2024-09-10 PROCEDURE — 1160F RVW MEDS BY RX/DR IN RCRD: CPT | Performed by: INTERNAL MEDICINE

## 2024-09-10 PROCEDURE — 1159F MED LIST DOCD IN RCRD: CPT | Performed by: INTERNAL MEDICINE

## 2024-09-10 PROCEDURE — 1126F AMNT PAIN NOTED NONE PRSNT: CPT | Performed by: INTERNAL MEDICINE

## 2024-09-10 PROCEDURE — G0463 HOSPITAL OUTPT CLINIC VISIT: HCPCS

## 2024-09-10 PROCEDURE — 25010000002 HEPARIN LOCK FLUSH PER 10 UNITS: Performed by: INTERNAL MEDICINE

## 2024-09-10 PROCEDURE — 85025 COMPLETE CBC W/AUTO DIFF WBC: CPT

## 2024-09-10 PROCEDURE — G2211 COMPLEX E/M VISIT ADD ON: HCPCS | Performed by: INTERNAL MEDICINE

## 2024-09-10 PROCEDURE — 96523 IRRIG DRUG DELIVERY DEVICE: CPT

## 2024-09-10 PROCEDURE — 99214 OFFICE O/P EST MOD 30 MIN: CPT | Performed by: INTERNAL MEDICINE

## 2024-09-10 RX ORDER — SODIUM CHLORIDE 0.9 % (FLUSH) 0.9 %
10 SYRINGE (ML) INJECTION AS NEEDED
Status: DISCONTINUED | OUTPATIENT
Start: 2024-09-10 | End: 2024-09-10 | Stop reason: HOSPADM

## 2024-09-10 RX ORDER — HEPARIN SODIUM (PORCINE) LOCK FLUSH IV SOLN 100 UNIT/ML 100 UNIT/ML
500 SOLUTION INTRAVENOUS AS NEEDED
OUTPATIENT
Start: 2024-09-10

## 2024-09-10 RX ORDER — HEPARIN SODIUM (PORCINE) LOCK FLUSH IV SOLN 100 UNIT/ML 100 UNIT/ML
500 SOLUTION INTRAVENOUS AS NEEDED
Status: DISCONTINUED | OUTPATIENT
Start: 2024-09-10 | End: 2024-09-10 | Stop reason: HOSPADM

## 2024-09-10 RX ORDER — SODIUM CHLORIDE 0.9 % (FLUSH) 0.9 %
10 SYRINGE (ML) INJECTION AS NEEDED
OUTPATIENT
Start: 2024-09-10

## 2024-09-10 RX ADMIN — Medication 500 UNITS: at 14:09

## 2024-09-10 RX ADMIN — Medication 10 ML: at 14:09

## 2024-10-22 ENCOUNTER — INFUSION (OUTPATIENT)
Dept: ONCOLOGY | Facility: HOSPITAL | Age: 74
End: 2024-10-22
Payer: MEDICARE

## 2024-10-22 DIAGNOSIS — Z45.2 ENCOUNTER FOR FITTING AND ADJUSTMENT OF VASCULAR CATHETER: Primary | ICD-10-CM

## 2024-10-22 PROCEDURE — 96523 IRRIG DRUG DELIVERY DEVICE: CPT

## 2024-10-22 PROCEDURE — 25010000002 HEPARIN LOCK FLUSH PER 10 UNITS: Performed by: INTERNAL MEDICINE

## 2024-10-22 RX ORDER — SODIUM CHLORIDE 0.9 % (FLUSH) 0.9 %
10 SYRINGE (ML) INJECTION AS NEEDED
OUTPATIENT
Start: 2024-10-22

## 2024-10-22 RX ORDER — SODIUM CHLORIDE 0.9 % (FLUSH) 0.9 %
10 SYRINGE (ML) INJECTION AS NEEDED
Status: DISCONTINUED | OUTPATIENT
Start: 2024-10-22 | End: 2024-10-22 | Stop reason: HOSPADM

## 2024-10-22 RX ORDER — HEPARIN SODIUM (PORCINE) LOCK FLUSH IV SOLN 100 UNIT/ML 100 UNIT/ML
500 SOLUTION INTRAVENOUS AS NEEDED
OUTPATIENT
Start: 2024-10-22

## 2024-10-22 RX ORDER — HEPARIN SODIUM (PORCINE) LOCK FLUSH IV SOLN 100 UNIT/ML 100 UNIT/ML
500 SOLUTION INTRAVENOUS AS NEEDED
Status: DISCONTINUED | OUTPATIENT
Start: 2024-10-22 | End: 2024-10-22 | Stop reason: HOSPADM

## 2024-10-22 RX ADMIN — Medication 500 UNITS: at 13:31

## 2024-10-22 RX ADMIN — Medication 10 ML: at 13:31

## 2024-12-03 ENCOUNTER — LAB (OUTPATIENT)
Facility: HOSPITAL | Age: 74
End: 2024-12-03
Payer: MEDICARE

## 2024-12-03 ENCOUNTER — HOSPITAL ENCOUNTER (OUTPATIENT)
Facility: HOSPITAL | Age: 74
Discharge: HOME | End: 2024-12-03
Payer: MEDICARE

## 2024-12-03 DIAGNOSIS — Z85.118 HISTORY OF LUNG CANCER: ICD-10-CM

## 2024-12-03 DIAGNOSIS — Z45.2 ENCOUNTER FOR FITTING AND ADJUSTMENT OF VASCULAR CATHETER: Primary | ICD-10-CM

## 2024-12-03 LAB
ALBUMIN SERPL-MCNC: 3.6 G/DL (ref 3.5–5.2)
ALBUMIN/GLOB SERPL: 1 G/DL
ALP SERPL-CCNC: 94 U/L (ref 39–117)
ALT SERPL W P-5'-P-CCNC: 21 U/L (ref 1–41)
ANION GAP SERPL CALCULATED.3IONS-SCNC: 12.4 MMOL/L (ref 5–15)
AST SERPL-CCNC: 26 U/L (ref 1–40)
BASOPHILS # BLD AUTO: 0.15 10*3/MM3 (ref 0–0.2)
BASOPHILS NFR BLD AUTO: 2.6 % (ref 0–1.5)
BILIRUB SERPL-MCNC: 0.4 MG/DL (ref 0–1.2)
BUN SERPL-MCNC: 10 MG/DL (ref 8–23)
BUN/CREAT SERPL: 10.9 (ref 7–25)
CALCIUM SPEC-SCNC: 9 MG/DL (ref 8.6–10.5)
CHLORIDE SERPL-SCNC: 100 MMOL/L (ref 98–107)
CO2 SERPL-SCNC: 21.6 MMOL/L (ref 22–29)
CREAT SERPL-MCNC: 0.92 MG/DL (ref 0.76–1.27)
DEPRECATED RDW RBC AUTO: 42.8 FL (ref 37–54)
EGFRCR SERPLBLD CKD-EPI 2021: 88.4 ML/MIN/1.73
EOSINOPHIL # BLD AUTO: 0.24 10*3/MM3 (ref 0–0.4)
EOSINOPHIL NFR BLD AUTO: 4.2 % (ref 0.3–6.2)
ERYTHROCYTE [DISTWIDTH] IN BLOOD BY AUTOMATED COUNT: 13.1 % (ref 12.3–15.4)
GLOBULIN UR ELPH-MCNC: 3.6 GM/DL
GLUCOSE SERPL-MCNC: 77 MG/DL (ref 65–99)
HCT VFR BLD AUTO: 38.1 % (ref 37.5–51)
HGB BLD-MCNC: 13.2 G/DL (ref 13–17.7)
IMM GRANULOCYTES # BLD AUTO: 0.03 10*3/MM3 (ref 0–0.05)
IMM GRANULOCYTES NFR BLD AUTO: 0.5 % (ref 0–0.5)
LYMPHOCYTES # BLD AUTO: 1.21 10*3/MM3 (ref 0.7–3.1)
LYMPHOCYTES NFR BLD AUTO: 21.3 % (ref 19.6–45.3)
MCH RBC QN AUTO: 31.4 PG (ref 26.6–33)
MCHC RBC AUTO-ENTMCNC: 34.6 G/DL (ref 31.5–35.7)
MCV RBC AUTO: 90.7 FL (ref 79–97)
MONOCYTES # BLD AUTO: 0.78 10*3/MM3 (ref 0.1–0.9)
MONOCYTES NFR BLD AUTO: 13.7 % (ref 5–12)
NEUTROPHILS NFR BLD AUTO: 3.28 10*3/MM3 (ref 1.7–7)
NEUTROPHILS NFR BLD AUTO: 57.7 % (ref 42.7–76)
NRBC BLD AUTO-RTO: 0 /100 WBC (ref 0–0.2)
PLATELET # BLD AUTO: 145 10*3/MM3 (ref 140–450)
PMV BLD AUTO: 11.6 FL (ref 6–12)
POTASSIUM SERPL-SCNC: 4.9 MMOL/L (ref 3.5–5.2)
PROT SERPL-MCNC: 7.2 G/DL (ref 6–8.5)
RBC # BLD AUTO: 4.2 10*6/MM3 (ref 4.14–5.8)
SODIUM SERPL-SCNC: 134 MMOL/L (ref 136–145)
WBC NRBC COR # BLD AUTO: 5.69 10*3/MM3 (ref 3.4–10.8)

## 2024-12-03 PROCEDURE — 85025 COMPLETE CBC W/AUTO DIFF WBC: CPT

## 2024-12-03 PROCEDURE — 71260 CT THORAX DX C+: CPT

## 2024-12-03 PROCEDURE — 80053 COMPREHEN METABOLIC PANEL: CPT | Performed by: INTERNAL MEDICINE

## 2024-12-03 PROCEDURE — 25510000001 IOPAMIDOL 61 % SOLUTION: Performed by: INTERNAL MEDICINE

## 2024-12-03 RX ORDER — SODIUM CHLORIDE 0.9 % (FLUSH) 0.9 %
10 SYRINGE (ML) INJECTION AS NEEDED
Status: DISCONTINUED | OUTPATIENT
Start: 2024-12-03 | End: 2024-12-04 | Stop reason: HOSPADM

## 2024-12-03 RX ORDER — SODIUM CHLORIDE 0.9 % (FLUSH) 0.9 %
10 SYRINGE (ML) INJECTION AS NEEDED
OUTPATIENT
Start: 2024-12-03

## 2024-12-03 RX ORDER — IOPAMIDOL 612 MG/ML
100 INJECTION, SOLUTION INTRAVASCULAR
Status: COMPLETED | OUTPATIENT
Start: 2024-12-03 | End: 2024-12-03

## 2024-12-03 RX ORDER — HEPARIN SODIUM (PORCINE) LOCK FLUSH IV SOLN 100 UNIT/ML 100 UNIT/ML
500 SOLUTION INTRAVENOUS AS NEEDED
OUTPATIENT
Start: 2024-12-03

## 2024-12-03 RX ORDER — HEPARIN SODIUM (PORCINE) LOCK FLUSH IV SOLN 100 UNIT/ML 100 UNIT/ML
500 SOLUTION INTRAVENOUS AS NEEDED
Status: DISCONTINUED | OUTPATIENT
Start: 2024-12-03 | End: 2024-12-04 | Stop reason: HOSPADM

## 2024-12-03 RX ADMIN — IOPAMIDOL 75 ML: 612 INJECTION, SOLUTION INTRAVENOUS at 14:17

## 2024-12-09 NOTE — PROGRESS NOTES
"REASON FOR FOLLOW UP:   1.  T4N3 Small cell lung cancer  2.  SVC syndrome  3.  Initially treated with chemotherapy.  Subsequent radiation.  Subsequent PCI. ISAIAS  4. 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 male with the above mentioned history, returns the office today for follow up to review imaging.    Overall doing about the same.  He remains fatigued.  Stable shortness of breath and wheezing.  He continues to smoke.    Review of Systems   See the HPI    Vitals:    12/10/24 1351   BP: 119/76   Pulse: 83   Resp: 16   Temp: 98.2 °F (36.8 °C)   TempSrc: Oral   SpO2: 95%   Weight: 79.7 kg (175 lb 9.6 oz)   Height: 180.3 cm (70.98\")   PainSc: 0-No pain       General:  No acute distress, awake, alert and oriented.  Remains chronically ill-appearing.  HEENT:  Normocephalic/atraumatic.   Chest:  Normal respiratory effort.  Lungs with scattered inspiratory wheezes benign-appearing Mediport present in the right subclavian area, unchanged today.  Heart: Regular rate and rhythm  Lymphatics: No palpable cervical supraclavicular adenopathy  Extremities:  No visible clubbing, cyanosis, or edema  Neuro/psych:  Grossly nonfocal.  Normal mood and affect.    DIAGNOSTIC DATA:  CBC & Differential (12/03/2024 13:58)  Comprehensive Metabolic Panel (12/03/2024 13:58)    IMAGING:  CT Chest With Contrast Diagnostic (12/03/2024 14:17)     CT images personally reviewed.  Stable findings.  No evidence of recurrent malignancy.    ASSESSMENT:  This is a 74 y.o. male with:    *Small cell lung cancer originating in the right hilum:   He has an extremely large mass there with concern for SVC compression on CT imaging from 6/4/2019.  There is some left hilar adenopathy.  Some parotid lesions which are concerning but indeterminate, left greater than right.  Biopsy left parotid on 7/8 shows papillary " cystadenoma  As no evidence for distant metastatic disease, if radiation oncology agreeable will plan chemotherapy and radiation with radiation to be added later.  Staging MRI of the brain 7/10/2019 negative for metastatic disease  He was discussed at multidisciplinary thoracic conference, with his T4 and 3 disease, concern for involvement of the right supraclavicular region and obvious left hilum, plans to add atezolizumab with cycle 2, we can consider future radiation to the residual mediastinal mass if necessary.  Lung lesion too large to radiate in spite of the SVC narrowing.    On 7/8/2019 we started carboplatin AUC 5 and VP16 100 mg/m2 through peripheral IVs  Cycle 2 postponed on 7/29/2019 due to heart rate of 154.  Atezolizumab added with cycle #2.  Radiation can be added for a residual mediastinal mass if necessary  CT imaging on 8/14/2019 after two cycles of therapy shows significant improvement.  Mediport anticipated by Dr. Gallardo on 8/22/2019.  On  8/20/2019 cycle 3 was delayed due to neutropenia and with cycle 3 we did incorporate Neulasta.  Cycle 4 completed 9/17/2019  Maintenance atezolizumab initiated 10/8/2019.  PET scan revealed disease response to therapy, he does have an area of hypermetabolic activity in the adrenal gland which is small and did not show up on prior exam.  We will continue to monitor this.  His MRI of the brain is also negative for metastatic disease.  CT 2/4/2020 with ongoing improvement.  Maintenance atezolizumab pursued.    CT imaging 5/19/2020 with progression.  Increase in the right hilar/mediastinal mass with enlarging adenopathy.    Given that the progression is longer than 6 months after completing carbo/VP16, proceed with further carbo/VP16.  Cycle 1 initiated 5/26/2020  PET scan after two cycles with ongoing uptake in the right hilum, otherwise somewhat improved  Radiation initiated 7/22/2020 with plans for 30 treatments  Proceeded with cycle 4 carboplatin -16  7/28/2020.    As of 8/19/2020, discussed holding further chemotherapy since he had 4 cycles versus continuing potentially with as many as 6.  He prefers to continue with therapy.    Cycle 5 on 8/19/2020  Radiation complete 9/1/2020  As of 9/9/2020, plan no further chemotherapy  MRI brain 11/4/2020 negative for mets.  PET scan 11/4/2020. Improvement in the subcarinal and right hilar nodes with an SUV of 3.7 compared to 20.8 previously. Evidence for esophagitis. Right middle lobe changes resolved. Other stable changes that were previously noted. No new disease.     MRI brain 1/28/2021 negative  PCI pursued, complete from 2/16/2021 through 2/25/2021  CT imaging 2/19/2021 with decrease in the primary tumor, two new small noncalcified nodules in the medial LLL     CT imaging 4/13/2021 with subtle increase in left upper lobe and left perihilar nodules.  Right-sided subcarinal and infrahilar tumor unchanged.  Changes consistent with radiation pneumonitis.  CT imaging 7/14/2021 with slight interval increase in size of 2 adjacent cavitary nodules in the medial left lower lobe.  Right infrahilar/subcarinal lesion stable.  CT imaging 10/11/21 with enlarging right infrahilar mass with two enlarging left lower lobe cavitary lesions  His case was presented at the multidisciplinary thoracic conference.  Plan observation of the lung lesions with serial CT scans.  If significant change, PET imaging and bronchoscopy.  CT imaging 1/12/2022 with ongoing evolution of the left lower lobe cavitary lesions, some increase in size.  They have coalesced into one.  PET scan on 1/25/2022 showed a hypermetabolic 3 x 2 cm cavitary nodule with no other evidence of disease.  He did have a bronchoscopy performed on 2/21/2022.  BAL cytology negative for malignant cells.  Brushings with rare atypical squamous cells, nondiagnostic for malignancy.  Pathology showed evidence for dysplastic squamous epithelium suspicious for squamous cell carcinoma.   PD-L1 60%.  He is not on active therapy for his small cell lung cancer as we manage his squamous cell carcinoma as detailed below  CT imaging on 5/31/2023 without definite progression.  CT imaging 11/13/2023 without progression  CT imaging 4/30/2024 with some increase in right perihilar consolidation, possible postradiation change versus progressive malignancy.  Otherwise, right lower lobe micronodule which is new and slight increase in left lower lobe nodule.  CT imaging 9/3/2024 with some slight increase in prominence in the right hilum, increasingly nodular component posterior to the right lower lobe bronchovascular bundle measuring 2.2 cm.  12/3/2024: CT imaging ISAIAS    *More recent squamous cell carcinoma of the left lower lobe  Presented at thoracic conference. Plan concurrent chemoradiation with carbo/taxol  3/31/2022 week 4 concurrent chemoradiation with carboplatin Taxol  4/7/2022 week 5 concurrent therapy which will be held due to platelets of 76,000  4/14/2022: CBC remained stable.  Platelets stable at 78,000.  Proceed with cycle 5 carboplatin and paclitaxel.  Radiation complete as of 4/20.  PET scan on 6/21/2022 without obvious disease.  Excellent response to therapy.  CT imaging 10/18/2022 with no evidence of progression.  Ongoing improvement in the radiated lung lesion.  CT imaging 1/31/23 stable  CT imaging 5/31/2023 without definite progression.  A couple of tiny nodules are now visible.    *Chronic hearing loss: History of left mastoiditis.  He was not a candidate for cisplatin because of this.  Hearing loss persists, somewhat worse.    *Atrial fibrillation.  Currently asymptomatic    *Tobacco use.  Continues to smoke     *Alcohol use    *History of SVC syndrome at diagnosis of small cell lung cancer.  Improved after 2 cycles of chemotherapy.  No further evidence of this and this has resolved.    *Dyspnea on exertion: Related to COPD and ongoing lung damage from smoking.  Stable shortness of  breath    *Fatigue and tiredness: Stable    *History of malfunctioning Mediport: Functioning appropriately at this time.    *Left lower extremity wound, improved after going to wound care    *Right knee pain    *Lack of primary care    PLAN:   Port flush in about 2 months.  CT imaging in about 4 months and I will see him back after that with labs and a port flush.  He is a poor candidate for any further treatment of lung cancer    Thai Morel MD  12/10/2024

## 2024-12-10 ENCOUNTER — INFUSION (OUTPATIENT)
Dept: ONCOLOGY | Facility: HOSPITAL | Age: 74
End: 2024-12-10
Payer: MEDICARE

## 2024-12-10 ENCOUNTER — OFFICE VISIT (OUTPATIENT)
Dept: ONCOLOGY | Facility: CLINIC | Age: 74
End: 2024-12-10
Payer: MEDICARE

## 2024-12-10 VITALS
HEART RATE: 83 BPM | BODY MASS INDEX: 24.58 KG/M2 | RESPIRATION RATE: 16 BRPM | TEMPERATURE: 98.2 F | DIASTOLIC BLOOD PRESSURE: 76 MMHG | SYSTOLIC BLOOD PRESSURE: 119 MMHG | WEIGHT: 175.6 LBS | OXYGEN SATURATION: 95 % | HEIGHT: 71 IN

## 2024-12-10 DIAGNOSIS — Z85.118 HISTORY OF LUNG CANCER: Primary | ICD-10-CM

## 2024-12-10 DIAGNOSIS — Z45.2 ENCOUNTER FOR FITTING AND ADJUSTMENT OF VASCULAR CATHETER: Primary | ICD-10-CM

## 2024-12-10 PROCEDURE — 96523 IRRIG DRUG DELIVERY DEVICE: CPT

## 2024-12-10 PROCEDURE — 25010000002 HEPARIN LOCK FLUSH PER 10 UNITS: Performed by: INTERNAL MEDICINE

## 2024-12-10 RX ORDER — HEPARIN SODIUM (PORCINE) LOCK FLUSH IV SOLN 100 UNIT/ML 100 UNIT/ML
500 SOLUTION INTRAVENOUS AS NEEDED
Status: DISCONTINUED | OUTPATIENT
Start: 2024-12-10 | End: 2024-12-10 | Stop reason: HOSPADM

## 2024-12-10 RX ORDER — SODIUM CHLORIDE 0.9 % (FLUSH) 0.9 %
10 SYRINGE (ML) INJECTION AS NEEDED
OUTPATIENT
Start: 2024-12-10

## 2024-12-10 RX ORDER — HEPARIN SODIUM (PORCINE) LOCK FLUSH IV SOLN 100 UNIT/ML 100 UNIT/ML
500 SOLUTION INTRAVENOUS AS NEEDED
OUTPATIENT
Start: 2024-12-10

## 2024-12-10 RX ADMIN — Medication 500 UNITS: at 13:29

## 2025-02-04 ENCOUNTER — INFUSION (OUTPATIENT)
Dept: ONCOLOGY | Facility: HOSPITAL | Age: 75
End: 2025-02-04
Payer: MEDICARE

## 2025-02-04 DIAGNOSIS — Z45.2 ENCOUNTER FOR FITTING AND ADJUSTMENT OF VASCULAR CATHETER: Primary | ICD-10-CM

## 2025-02-04 PROCEDURE — 96523 IRRIG DRUG DELIVERY DEVICE: CPT

## 2025-02-04 PROCEDURE — 25010000002 HEPARIN LOCK FLUSH PER 10 UNITS: Performed by: INTERNAL MEDICINE

## 2025-02-04 RX ORDER — HEPARIN SODIUM (PORCINE) LOCK FLUSH IV SOLN 100 UNIT/ML 100 UNIT/ML
500 SOLUTION INTRAVENOUS AS NEEDED
OUTPATIENT
Start: 2025-02-04

## 2025-02-04 RX ORDER — HEPARIN SODIUM (PORCINE) LOCK FLUSH IV SOLN 100 UNIT/ML 100 UNIT/ML
500 SOLUTION INTRAVENOUS AS NEEDED
Status: DISCONTINUED | OUTPATIENT
Start: 2025-02-04 | End: 2025-02-04 | Stop reason: HOSPADM

## 2025-02-04 RX ORDER — SODIUM CHLORIDE 0.9 % (FLUSH) 0.9 %
10 SYRINGE (ML) INJECTION AS NEEDED
OUTPATIENT
Start: 2025-02-04

## 2025-02-04 RX ADMIN — Medication 500 UNITS: at 14:04

## 2025-03-13 ENCOUNTER — HOSPITAL ENCOUNTER (OUTPATIENT)
Facility: HOSPITAL | Age: 75
Discharge: HOME | End: 2025-03-13
Admitting: INTERNAL MEDICINE
Payer: MEDICARE

## 2025-03-13 DIAGNOSIS — Z85.118 HISTORY OF LUNG CANCER: ICD-10-CM

## 2025-03-13 DIAGNOSIS — Z45.2 ENCOUNTER FOR FITTING AND ADJUSTMENT OF VASCULAR CATHETER: Primary | ICD-10-CM

## 2025-03-13 PROCEDURE — 25010000002 HEPARIN LOCK FLUSH PER 10 UNITS: Performed by: INTERNAL MEDICINE

## 2025-03-13 PROCEDURE — 25510000001 IOPAMIDOL 61 % SOLUTION: Performed by: INTERNAL MEDICINE

## 2025-03-13 PROCEDURE — 71260 CT THORAX DX C+: CPT

## 2025-03-13 RX ORDER — HEPARIN SODIUM (PORCINE) LOCK FLUSH IV SOLN 100 UNIT/ML 100 UNIT/ML
500 SOLUTION INTRAVENOUS AS NEEDED
OUTPATIENT
Start: 2025-03-13

## 2025-03-13 RX ORDER — IOPAMIDOL 612 MG/ML
100 INJECTION, SOLUTION INTRAVASCULAR
Status: COMPLETED | OUTPATIENT
Start: 2025-03-13 | End: 2025-03-13

## 2025-03-13 RX ORDER — HEPARIN SODIUM (PORCINE) LOCK FLUSH IV SOLN 100 UNIT/ML 100 UNIT/ML
500 SOLUTION INTRAVENOUS AS NEEDED
Status: DISCONTINUED | OUTPATIENT
Start: 2025-03-13 | End: 2025-03-14 | Stop reason: HOSPADM

## 2025-03-13 RX ORDER — SODIUM CHLORIDE 0.9 % (FLUSH) 0.9 %
10 SYRINGE (ML) INJECTION AS NEEDED
OUTPATIENT
Start: 2025-03-13

## 2025-03-13 RX ORDER — SODIUM CHLORIDE 0.9 % (FLUSH) 0.9 %
10 SYRINGE (ML) INJECTION AS NEEDED
Status: DISCONTINUED | OUTPATIENT
Start: 2025-03-13 | End: 2025-03-14 | Stop reason: HOSPADM

## 2025-03-13 RX ADMIN — Medication 10 ML: at 11:48

## 2025-03-13 RX ADMIN — Medication 10 ML: at 12:00

## 2025-03-13 RX ADMIN — HEPARIN 500 UNITS: 100 SYRINGE at 12:00

## 2025-03-13 RX ADMIN — IOPAMIDOL 75 ML: 612 INJECTION, SOLUTION INTRAVENOUS at 11:59

## 2025-03-20 ENCOUNTER — TELEPHONE (OUTPATIENT)
Dept: ONCOLOGY | Facility: CLINIC | Age: 75
End: 2025-03-20
Payer: MEDICARE

## 2025-03-21 NOTE — PROGRESS NOTES
"REASON FOR FOLLOW UP:   1.  T4N3 Small cell lung cancer  2.  SVC syndrome  3.  Initially treated with chemotherapy.  Subsequent radiation.  Subsequent PCI. ISAIAS  4. 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 male with the above mentioned history, returns the office today for follow up to review imaging.    He states that about a week ago he started to feel less short of breath and felt like something opened up.  Interestingly, CT imaging confirms this.  He states about 1 month ago he had some black stool that lasted for a few days.  He did not necessarily characterize it as tarry or melenic.  He states this resolved.  He denies any other bleeding at this time.  He drinks several beers per night.    Review of Systems   See the HPI    Vitals:    03/25/25 1048   BP: 127/73   Pulse: 87   Resp: 16   Temp: 97.5 °F (36.4 °C)   TempSrc: Oral   SpO2: 100%   Weight: 78.9 kg (173 lb 14.4 oz)   Height: 180.3 cm (70.98\")   PainSc: 0-No pain       General:  No acute distress, awake, alert and oriented.  Remains chronically ill-appearing.  HEENT:  Normocephalic/atraumatic.   Chest:  Normal respiratory effort.  Lungs with scattered inspiratory wheezes benign-appearing Mediport present in the right subclavian area, unchanged today.  Heart: Regular rate and rhythm  Lymphatics: No palpable cervical supraclavicular adenopathy  Extremities:  No visible clubbing, cyanosis, or edema  Neuro/psych:  Grossly nonfocal.  Normal mood and affect.    DIAGNOSTIC DATA:  Retic With IRF & RET-He (03/25/2025 10:28)  Iron Profile (03/25/2025 10:28)  Ferritin (03/25/2025 10:28)  Comprehensive Metabolic Panel (03/25/2025 10:28)  CBC & Differential (03/25/2025 10:28)    IMAGING:  CT Chest With Contrast Diagnostic (03/13/2025 11:58)     3/13/2025: CT imaging with a decrease in size and a right infrahilar soft " tissue mass, right lower lobe bronchus now patent, previously occluded.  Persistent occlusion at the origin of the right middle lobe bronchus.  Stable mild adenopathy.  No other evidence of progression.    Images personally reviewed    ASSESSMENT:  This is a 75 y.o. male with:    *Small cell lung cancer originating in the right hilum:   He has an extremely large mass there with concern for SVC compression on CT imaging from 6/4/2019.  There is some left hilar adenopathy.  Some parotid lesions which are concerning but indeterminate, left greater than right.  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  Staging MRI of the brain 7/10/2019 negative for metastatic disease  He was discussed at multidisciplinary thoracic conference, with his T4 and 3 disease, concern for involvement of the right supraclavicular region and obvious left hilum, plans to add atezolizumab with cycle 2, we can consider future radiation to the residual mediastinal mass if necessary.  Lung lesion too large to radiate in spite of the SVC narrowing.    On 7/8/2019 we started carboplatin AUC 5 and VP16 100 mg/m2 through peripheral IVs  Cycle 2 postponed on 7/29/2019 due to heart rate of 154.  Atezolizumab added with cycle #2.  Radiation can be added for a residual mediastinal mass if necessary  CT imaging on 8/14/2019 after two cycles of therapy shows significant improvement.  Mediport anticipated by Dr. Gallardo on 8/22/2019.  On  8/20/2019 cycle 3 was delayed due to neutropenia and with cycle 3 we did incorporate Neulasta.  Cycle 4 completed 9/17/2019  Maintenance atezolizumab initiated 10/8/2019.  PET scan revealed disease response to therapy, he does have an area of hypermetabolic activity in the adrenal gland which is small and did not show up on prior exam.  We will continue to monitor  this.  His MRI of the brain is also negative for metastatic disease.  CT 2/4/2020 with ongoing improvement.  Maintenance atezolizumab pursued.    CT imaging 5/19/2020 with progression.  Increase in the right hilar/mediastinal mass with enlarging adenopathy.    Given that the progression is longer than 6 months after completing carbo/VP16, proceed with further carbo/VP16.  Cycle 1 initiated 5/26/2020  PET scan after two cycles with ongoing uptake in the right hilum, otherwise somewhat improved  Radiation initiated 7/22/2020 with plans for 30 treatments  Proceeded with cycle 4 carboplatin -16 7/28/2020.    As of 8/19/2020, discussed holding further chemotherapy since he had 4 cycles versus continuing potentially with as many as 6.  He prefers to continue with therapy.    Cycle 5 on 8/19/2020  Radiation complete 9/1/2020  As of 9/9/2020, plan no further chemotherapy  MRI brain 11/4/2020 negative for mets.  PET scan 11/4/2020. Improvement in the subcarinal and right hilar nodes with an SUV of 3.7 compared to 20.8 previously. Evidence for esophagitis. Right middle lobe changes resolved. Other stable changes that were previously noted. No new disease.     MRI brain 1/28/2021 negative  PCI pursued, complete from 2/16/2021 through 2/25/2021  CT imaging 2/19/2021 with decrease in the primary tumor, two new small noncalcified nodules in the medial LLL     CT imaging 4/13/2021 with subtle increase in left upper lobe and left perihilar nodules.  Right-sided subcarinal and infrahilar tumor unchanged.  Changes consistent with radiation pneumonitis.  CT imaging 7/14/2021 with slight interval increase in size of 2 adjacent cavitary nodules in the medial left lower lobe.  Right infrahilar/subcarinal lesion stable.  CT imaging 10/11/21 with enlarging right infrahilar mass with two enlarging left lower lobe cavitary lesions  His case was presented at the multidisciplinary thoracic conference.  Plan observation of the lung lesions  with serial CT scans.  If significant change, PET imaging and bronchoscopy.  CT imaging 1/12/2022 with ongoing evolution of the left lower lobe cavitary lesions, some increase in size.  They have coalesced into one.  PET scan on 1/25/2022 showed a hypermetabolic 3 x 2 cm cavitary nodule with no other evidence of disease.  He did have a bronchoscopy performed on 2/21/2022.  BAL cytology negative for malignant cells.  Brushings with rare atypical squamous cells, nondiagnostic for malignancy.  Pathology showed evidence for dysplastic squamous epithelium suspicious for squamous cell carcinoma.  PD-L1 60%.  He is not on active therapy for his small cell lung cancer as we manage his squamous cell carcinoma as detailed below  CT imaging on 5/31/2023 without definite progression.  CT imaging 11/13/2023 without progression  CT imaging 4/30/2024 with some increase in right perihilar consolidation, possible postradiation change versus progressive malignancy.  Otherwise, right lower lobe micronodule which is new and slight increase in left lower lobe nodule.  CT imaging 9/3/2024 with some slight increase in prominence in the right hilum, increasingly nodular component posterior to the right lower lobe bronchovascular bundle measuring 2.2 cm.  12/3/2024: CT imaging ISAIAS  3/13/2025: CT imaging with a decrease in size and a right infrahilar soft tissue mass, right lower lobe bronchus now patent, previously occluded.  Persistent occlusion at the origin of the right middle lobe bronchus.  Stable mild adenopathy.  No other evidence of progression.    *More recent squamous cell carcinoma of the left lower lobe  Presented at thoracic conference. Plan concurrent chemoradiation with carbo/taxol  3/31/2022 week 4 concurrent chemoradiation with carboplatin Taxol  4/7/2022 week 5 concurrent therapy which will be held due to platelets of 76,000  4/14/2022: CBC remained stable.  Platelets stable at 78,000.  Proceed with cycle 5 carboplatin and  paclitaxel.  Radiation complete as of 4/20.  PET scan on 6/21/2022 without obvious disease.  Excellent response to therapy.  CT imaging 10/18/2022 with no evidence of progression.  Ongoing improvement in the radiated lung lesion.  CT imaging 1/31/23 stable  CT imaging 5/31/2023 without definite progression.  A couple of tiny nodules are now visible.    *Chronic hearing loss: History of left mastoiditis.  He was not a candidate for cisplatin because of this.  Hearing loss persists, somewhat worse.    *Atrial fibrillation.  Currently asymptomatic    *Tobacco use.  Continues to smoke     *Alcohol use, admits to several beers per night    *History of SVC syndrome at diagnosis of small cell lung cancer.  Improved after 2 cycles of chemotherapy.  No further evidence of this and this has resolved.    *Dyspnea on exertion: Related to COPD and ongoing lung damage from smoking.  Stable shortness of breath    *Fatigue and tiredness: Stable    *History of malfunctioning Mediport: Flushed without difficulty but no blood return today 3/25/2025.  This may start to work in the future.  Keep it for now.    *Left lower extremity wound, improved after going to wound care    *Right knee pain    *Lack of primary care    *Microcytic anemia  His hemoglobin has dropped today to 9.2 from 13.2.  The MCV is quite low at 76 as well.  I checked iron studies and ferritin level and his ferritin is 15 with an iron of 15.  It sounds like he had some GI bleeding a month ago.  He denies any current bleeding at this time.  He needs a GI evaluation.  Refer to gastroenterology.    PLAN:   Refer to gastroenterology  Start ferrous sulfate 325 mg twice daily.  I advised him to notify us if he does not tolerate this.  Port flush with labs in about 2 months.  Follow-up CT imaging in 4 months and I will see him back after that with labs and a port flush.  He remains a poor candidate for any further treatment of lung cancer    Thai Morel MD  03/25/2025

## 2025-03-25 ENCOUNTER — INFUSION (OUTPATIENT)
Dept: ONCOLOGY | Facility: HOSPITAL | Age: 75
End: 2025-03-25
Payer: MEDICARE

## 2025-03-25 ENCOUNTER — LAB (OUTPATIENT)
Dept: LAB | Facility: HOSPITAL | Age: 75
End: 2025-03-25
Payer: MEDICARE

## 2025-03-25 ENCOUNTER — OFFICE VISIT (OUTPATIENT)
Dept: ONCOLOGY | Facility: CLINIC | Age: 75
End: 2025-03-25
Payer: MEDICARE

## 2025-03-25 VITALS
DIASTOLIC BLOOD PRESSURE: 73 MMHG | HEIGHT: 71 IN | TEMPERATURE: 97.5 F | HEART RATE: 87 BPM | BODY MASS INDEX: 24.35 KG/M2 | RESPIRATION RATE: 16 BRPM | OXYGEN SATURATION: 100 % | WEIGHT: 173.9 LBS | SYSTOLIC BLOOD PRESSURE: 127 MMHG

## 2025-03-25 DIAGNOSIS — C34.11 PRIMARY MALIGNANT NEOPLASM OF RIGHT UPPER LOBE OF LUNG: ICD-10-CM

## 2025-03-25 DIAGNOSIS — C80.1 SMALL CELL CARCINOMA: ICD-10-CM

## 2025-03-25 DIAGNOSIS — K92.1 MELENA: ICD-10-CM

## 2025-03-25 DIAGNOSIS — D50.9 MICROCYTIC ANEMIA: Primary | ICD-10-CM

## 2025-03-25 DIAGNOSIS — Z45.2 ENCOUNTER FOR FITTING AND ADJUSTMENT OF VASCULAR CATHETER: Primary | ICD-10-CM

## 2025-03-25 DIAGNOSIS — Z85.118 HISTORY OF LUNG CANCER: ICD-10-CM

## 2025-03-25 LAB
ALBUMIN SERPL-MCNC: 4 G/DL (ref 3.5–5.2)
ALBUMIN/GLOB SERPL: 1.3 G/DL
ALP SERPL-CCNC: 88 U/L (ref 39–117)
ALT SERPL W P-5'-P-CCNC: 14 U/L (ref 1–41)
ANION GAP SERPL CALCULATED.3IONS-SCNC: 11.7 MMOL/L (ref 5–15)
AST SERPL-CCNC: 20 U/L (ref 1–40)
BASOPHILS # BLD AUTO: 0.16 10*3/MM3 (ref 0–0.2)
BASOPHILS NFR BLD AUTO: 2.7 % (ref 0–1.5)
BILIRUB SERPL-MCNC: 0.2 MG/DL (ref 0–1.2)
BUN SERPL-MCNC: 15 MG/DL (ref 8–23)
BUN/CREAT SERPL: 12.4 (ref 7–25)
CALCIUM SPEC-SCNC: 9.3 MG/DL (ref 8.6–10.5)
CHLORIDE SERPL-SCNC: 104 MMOL/L (ref 98–107)
CO2 SERPL-SCNC: 23.3 MMOL/L (ref 22–29)
CREAT SERPL-MCNC: 1.21 MG/DL (ref 0.76–1.27)
DEPRECATED RDW RBC AUTO: 48.5 FL (ref 37–54)
EGFRCR SERPLBLD CKD-EPI 2021: 63.2 ML/MIN/1.73
EOSINOPHIL # BLD AUTO: 0.25 10*3/MM3 (ref 0–0.4)
EOSINOPHIL NFR BLD AUTO: 4.2 % (ref 0.3–6.2)
ERYTHROCYTE [DISTWIDTH] IN BLOOD BY AUTOMATED COUNT: 17.6 % (ref 12.3–15.4)
FERRITIN SERPL-MCNC: 15.6 NG/ML (ref 30–400)
GLOBULIN UR ELPH-MCNC: 3.1 GM/DL
GLUCOSE SERPL-MCNC: 90 MG/DL (ref 65–99)
HCT VFR BLD AUTO: 30.7 % (ref 37.5–51)
HGB BLD-MCNC: 9.2 G/DL (ref 13–17.7)
HGB RETIC QN AUTO: 23.5 PG (ref 29.8–36.1)
IMM GRANULOCYTES # BLD AUTO: 0.02 10*3/MM3 (ref 0–0.05)
IMM GRANULOCYTES NFR BLD AUTO: 0.3 % (ref 0–0.5)
IMM RETICS NFR: 17.3 % (ref 3–15.8)
IRON 24H UR-MRATE: 15 MCG/DL (ref 59–158)
IRON SATN MFR SERPL: 4 % (ref 20–50)
LYMPHOCYTES # BLD AUTO: 1.42 10*3/MM3 (ref 0.7–3.1)
LYMPHOCYTES NFR BLD AUTO: 23.7 % (ref 19.6–45.3)
MCH RBC QN AUTO: 22.8 PG (ref 26.6–33)
MCHC RBC AUTO-ENTMCNC: 30 G/DL (ref 31.5–35.7)
MCV RBC AUTO: 76 FL (ref 79–97)
MONOCYTES # BLD AUTO: 0.74 10*3/MM3 (ref 0.1–0.9)
MONOCYTES NFR BLD AUTO: 12.4 % (ref 5–12)
NEUTROPHILS NFR BLD AUTO: 3.4 10*3/MM3 (ref 1.7–7)
NEUTROPHILS NFR BLD AUTO: 56.7 % (ref 42.7–76)
NRBC BLD AUTO-RTO: 0 /100 WBC (ref 0–0.2)
PLATELET # BLD AUTO: 174 10*3/MM3 (ref 140–450)
PMV BLD AUTO: 11 FL (ref 6–12)
POTASSIUM SERPL-SCNC: 4.9 MMOL/L (ref 3.5–5.2)
PROT SERPL-MCNC: 7.1 G/DL (ref 6–8.5)
RBC # BLD AUTO: 4.04 10*6/MM3 (ref 4.14–5.8)
RETICS # AUTO: 0.04 10*6/MM3 (ref 0.02–0.13)
RETICS/RBC NFR AUTO: 1 % (ref 0.7–1.9)
SODIUM SERPL-SCNC: 139 MMOL/L (ref 136–145)
TIBC SERPL-MCNC: 419 MCG/DL (ref 298–536)
TRANSFERRIN SERPL-MCNC: 281 MG/DL (ref 200–360)
WBC NRBC COR # BLD AUTO: 5.99 10*3/MM3 (ref 3.4–10.8)

## 2025-03-25 PROCEDURE — G2211 COMPLEX E/M VISIT ADD ON: HCPCS | Performed by: INTERNAL MEDICINE

## 2025-03-25 PROCEDURE — 83540 ASSAY OF IRON: CPT | Performed by: INTERNAL MEDICINE

## 2025-03-25 PROCEDURE — 25010000002 HEPARIN LOCK FLUSH PER 10 UNITS: Performed by: INTERNAL MEDICINE

## 2025-03-25 PROCEDURE — 1126F AMNT PAIN NOTED NONE PRSNT: CPT | Performed by: INTERNAL MEDICINE

## 2025-03-25 PROCEDURE — 99214 OFFICE O/P EST MOD 30 MIN: CPT | Performed by: INTERNAL MEDICINE

## 2025-03-25 PROCEDURE — G0463 HOSPITAL OUTPT CLINIC VISIT: HCPCS

## 2025-03-25 PROCEDURE — 84466 ASSAY OF TRANSFERRIN: CPT | Performed by: INTERNAL MEDICINE

## 2025-03-25 PROCEDURE — 80053 COMPREHEN METABOLIC PANEL: CPT

## 2025-03-25 PROCEDURE — 85046 RETICYTE/HGB CONCENTRATE: CPT | Performed by: INTERNAL MEDICINE

## 2025-03-25 PROCEDURE — 82728 ASSAY OF FERRITIN: CPT | Performed by: INTERNAL MEDICINE

## 2025-03-25 PROCEDURE — 96523 IRRIG DRUG DELIVERY DEVICE: CPT

## 2025-03-25 PROCEDURE — 85025 COMPLETE CBC W/AUTO DIFF WBC: CPT

## 2025-03-25 RX ORDER — FERROUS SULFATE 325(65) MG
325 TABLET ORAL 2 TIMES DAILY
Qty: 60 TABLET | Refills: 2 | Status: SHIPPED | OUTPATIENT
Start: 2025-03-25

## 2025-03-25 RX ORDER — SODIUM CHLORIDE 0.9 % (FLUSH) 0.9 %
10 SYRINGE (ML) INJECTION AS NEEDED
OUTPATIENT
Start: 2025-03-25

## 2025-03-25 RX ORDER — HEPARIN SODIUM (PORCINE) LOCK FLUSH IV SOLN 100 UNIT/ML 100 UNIT/ML
500 SOLUTION INTRAVENOUS AS NEEDED
OUTPATIENT
Start: 2025-03-25

## 2025-03-25 RX ORDER — HEPARIN SODIUM (PORCINE) LOCK FLUSH IV SOLN 100 UNIT/ML 100 UNIT/ML
500 SOLUTION INTRAVENOUS AS NEEDED
Status: DISCONTINUED | OUTPATIENT
Start: 2025-03-25 | End: 2025-03-25 | Stop reason: HOSPADM

## 2025-03-25 RX ADMIN — Medication 500 UNITS: at 10:37

## 2025-03-25 NOTE — PROGRESS NOTES
Port accessed and flushes without difficulty. Negative blood return, pt states this has been an issue the last few times he has been accessed. Dr. Morel made aware of situation and pt placed on lab schedule for lab draw.

## 2025-05-05 ENCOUNTER — TELEPHONE (OUTPATIENT)
Dept: ONCOLOGY | Facility: CLINIC | Age: 75
End: 2025-05-05

## 2025-05-05 ENCOUNTER — TELEPHONE (OUTPATIENT)
Dept: ONCOLOGY | Facility: CLINIC | Age: 75
End: 2025-05-05
Payer: MEDICARE

## 2025-05-05 RX ORDER — FERROUS SULFATE 325(65) MG
325 TABLET ORAL 2 TIMES DAILY
Qty: 60 TABLET | Refills: 2 | Status: SHIPPED | OUTPATIENT
Start: 2025-05-05

## 2025-05-05 NOTE — TELEPHONE ENCOUNTER
Caller: SUSANNE NAYAK    Relationship to patient: Emergency Contact    Best call back number: [phone]     Chief complaint: PATIENTCT SCAN AND FU HAVE BEEN SCHEDULED EARLY PER PROVIDER NOTE.    PATIENT CT AND FU ANTICIPATED TO BE IN 4 MONTHS AND CURRENTLY SCHEDULED FOR 2. WILL ALSO NEED AN ADDITIONAL PORT FLUSH.     REQUESTING FOR LAST WEEK OF JULY OR EARLY AUGUST

## 2025-05-05 NOTE — TELEPHONE ENCOUNTER
RETURNED PT CALLED REGARDING APPTS NOT SCHEDULED CORRECTLY - APPTS HAVE BEEN CORRECTED - MAILED OUT CORRECTED APPTS

## 2025-05-05 NOTE — TELEPHONE ENCOUNTER
Caller: SUSANNE NAYAK    Relationship: Emergency Contact    Best call back number: [phone]     Requested Prescriptions:   Requested Prescriptions     Pending Prescriptions Disp Refills    ferrous sulfate 325 (65 FE) MG tablet 60 tablet 2     Sig: Take 1 tablet by mouth 2 (Two) Times a Day.        Pharmacy where request should be sent: [id number] - [address] - [phone]  - [phone] FX     Last office visit with prescribing clinician: 3/25/2025   Last telemedicine visit with prescribing clinician: Visit date not found   Next office visit with prescribing clinician: 5/28/2025     Additional details provided by patient: PHARMACY NEVER RECEIVED ORIGINAL RX FROM [facility]    Does the patient have less than a 3 day supply:  [x] Yes  [] No    Would you like a call back once the refill request has been completed: [] Yes [x] No    If the office needs to give you a call back, can they leave a voicemail: [] Yes [x] No    Cristy Madrid Rep   05/05/25 15:20 EDT

## 2025-05-20 ENCOUNTER — INFUSION (OUTPATIENT)
Dept: ONCOLOGY | Facility: HOSPITAL | Age: 75
End: 2025-05-20
Payer: MEDICARE

## 2025-05-20 DIAGNOSIS — Z45.2 ENCOUNTER FOR FITTING AND ADJUSTMENT OF VASCULAR CATHETER: Primary | ICD-10-CM

## 2025-05-20 PROCEDURE — 25010000002 HEPARIN LOCK FLUSH PER 10 UNITS: Performed by: INTERNAL MEDICINE

## 2025-05-20 PROCEDURE — 96523 IRRIG DRUG DELIVERY DEVICE: CPT

## 2025-05-20 RX ORDER — HEPARIN SODIUM (PORCINE) LOCK FLUSH IV SOLN 100 UNIT/ML 100 UNIT/ML
500 SOLUTION INTRAVENOUS AS NEEDED
Status: DISCONTINUED | OUTPATIENT
Start: 2025-05-20 | End: 2025-05-20 | Stop reason: HOSPADM

## 2025-05-20 RX ORDER — SODIUM CHLORIDE 0.9 % (FLUSH) 0.9 %
10 SYRINGE (ML) INJECTION AS NEEDED
Status: DISCONTINUED | OUTPATIENT
Start: 2025-05-20 | End: 2025-05-20 | Stop reason: HOSPADM

## 2025-05-20 RX ADMIN — Medication 500 UNITS: at 14:04

## 2025-05-20 RX ADMIN — Medication 10 ML: at 14:04

## 2025-07-14 ENCOUNTER — TELEPHONE (OUTPATIENT)
Dept: ONCOLOGY | Facility: CLINIC | Age: 75
End: 2025-07-14

## 2025-07-15 ENCOUNTER — TELEPHONE (OUTPATIENT)
Dept: ONCOLOGY | Facility: CLINIC | Age: 75
End: 2025-07-15

## 2025-08-19 ENCOUNTER — HOSPITAL ENCOUNTER (OUTPATIENT)
Dept: PET IMAGING | Facility: HOSPITAL | Age: 75
Discharge: HOME | End: 2025-08-19
Admitting: INTERNAL MEDICINE
Payer: MEDICARE

## 2025-08-19 ENCOUNTER — INFUSION (OUTPATIENT)
Dept: ONCOLOGY | Facility: HOSPITAL | Age: 75
End: 2025-08-19
Payer: MEDICARE

## 2025-08-19 DIAGNOSIS — K92.1 MELENA: ICD-10-CM

## 2025-08-19 DIAGNOSIS — C34.11 PRIMARY MALIGNANT NEOPLASM OF RIGHT UPPER LOBE OF LUNG: ICD-10-CM

## 2025-08-19 DIAGNOSIS — C80.1 SMALL CELL CARCINOMA: ICD-10-CM

## 2025-08-19 DIAGNOSIS — D50.9 MICROCYTIC ANEMIA: ICD-10-CM

## 2025-08-19 LAB
ALBUMIN SERPL-MCNC: 4.1 G/DL (ref 3.5–5.2)
ALBUMIN/GLOB SERPL: 1.5 G/DL
ALP SERPL-CCNC: 82 U/L (ref 39–117)
ALT SERPL W P-5'-P-CCNC: 24 U/L (ref 1–41)
ANION GAP SERPL CALCULATED.3IONS-SCNC: 11.4 MMOL/L (ref 5–15)
AST SERPL-CCNC: 28 U/L (ref 1–40)
BASOPHILS # BLD AUTO: 0.1 10*3/MM3 (ref 0–0.2)
BASOPHILS NFR BLD AUTO: 1.9 % (ref 0–1.5)
BILIRUB SERPL-MCNC: 0.6 MG/DL (ref 0–1.2)
BUN SERPL-MCNC: 8.9 MG/DL (ref 8–23)
BUN/CREAT SERPL: 9.7 (ref 7–25)
CALCIUM SPEC-SCNC: 9.2 MG/DL (ref 8.6–10.5)
CHLORIDE SERPL-SCNC: 106 MMOL/L (ref 98–107)
CO2 SERPL-SCNC: 23.6 MMOL/L (ref 22–29)
CREAT SERPL-MCNC: 0.92 MG/DL (ref 0.76–1.27)
DEPRECATED RDW RBC AUTO: 49.3 FL (ref 37–54)
EGFRCR SERPLBLD CKD-EPI 2021: 87.8 ML/MIN/1.73
EOSINOPHIL # BLD AUTO: 0.21 10*3/MM3 (ref 0–0.4)
EOSINOPHIL NFR BLD AUTO: 4 % (ref 0.3–6.2)
ERYTHROCYTE [DISTWIDTH] IN BLOOD BY AUTOMATED COUNT: 15.2 % (ref 12.3–15.4)
FERRITIN SERPL-MCNC: 75.8 NG/ML (ref 30–400)
GLOBULIN UR ELPH-MCNC: 2.7 GM/DL
GLUCOSE SERPL-MCNC: 79 MG/DL (ref 65–99)
HCT VFR BLD AUTO: 39.3 % (ref 37.5–51)
HGB BLD-MCNC: 13.4 G/DL (ref 13–17.7)
HGB RETIC QN AUTO: 35.9 PG (ref 29.8–36.1)
IMM GRANULOCYTES # BLD AUTO: 0.02 10*3/MM3 (ref 0–0.05)
IMM GRANULOCYTES NFR BLD AUTO: 0.4 % (ref 0–0.5)
IMM RETICS NFR: 8.7 % (ref 3–15.8)
IRON 24H UR-MRATE: 74 MCG/DL (ref 59–158)
IRON SATN MFR SERPL: 25 % (ref 20–50)
LYMPHOCYTES # BLD AUTO: 1 10*3/MM3 (ref 0.7–3.1)
LYMPHOCYTES NFR BLD AUTO: 19 % (ref 19.6–45.3)
MCH RBC QN AUTO: 30.6 PG (ref 26.6–33)
MCHC RBC AUTO-ENTMCNC: 34.1 G/DL (ref 31.5–35.7)
MCV RBC AUTO: 89.7 FL (ref 79–97)
MONOCYTES # BLD AUTO: 0.63 10*3/MM3 (ref 0.1–0.9)
MONOCYTES NFR BLD AUTO: 12 % (ref 5–12)
NEUTROPHILS NFR BLD AUTO: 3.3 10*3/MM3 (ref 1.7–7)
NEUTROPHILS NFR BLD AUTO: 62.7 % (ref 42.7–76)
NRBC BLD AUTO-RTO: 0 /100 WBC (ref 0–0.2)
PLATELET # BLD AUTO: 103 10*3/MM3 (ref 140–450)
PMV BLD AUTO: 10.3 FL (ref 6–12)
POTASSIUM SERPL-SCNC: 4.1 MMOL/L (ref 3.5–5.2)
PROT SERPL-MCNC: 6.8 G/DL (ref 6–8.5)
RBC # BLD AUTO: 4.38 10*6/MM3 (ref 4.14–5.8)
RETICS # AUTO: 0.07 10*6/MM3 (ref 0.02–0.13)
RETICS/RBC NFR AUTO: 1.59 % (ref 0.7–1.9)
SODIUM SERPL-SCNC: 141 MMOL/L (ref 136–145)
TIBC SERPL-MCNC: 292 MCG/DL (ref 298–536)
TRANSFERRIN SERPL-MCNC: 196 MG/DL (ref 200–360)
WBC NRBC COR # BLD AUTO: 5.26 10*3/MM3 (ref 3.4–10.8)

## 2025-08-19 PROCEDURE — 80053 COMPREHEN METABOLIC PANEL: CPT

## 2025-08-19 PROCEDURE — 83540 ASSAY OF IRON: CPT

## 2025-08-19 PROCEDURE — 85025 COMPLETE CBC W/AUTO DIFF WBC: CPT

## 2025-08-19 PROCEDURE — 82728 ASSAY OF FERRITIN: CPT

## 2025-08-19 PROCEDURE — 71260 CT THORAX DX C+: CPT

## 2025-08-19 PROCEDURE — 85046 RETICYTE/HGB CONCENTRATE: CPT

## 2025-08-19 PROCEDURE — 25510000001 IOPAMIDOL 61 % SOLUTION: Performed by: INTERNAL MEDICINE

## 2025-08-19 PROCEDURE — 84466 ASSAY OF TRANSFERRIN: CPT

## 2025-08-19 RX ORDER — IOPAMIDOL 612 MG/ML
100 INJECTION, SOLUTION INTRAVASCULAR
Status: COMPLETED | OUTPATIENT
Start: 2025-08-19 | End: 2025-08-19

## 2025-08-19 RX ADMIN — IOPAMIDOL 75 ML: 612 INJECTION, SOLUTION INTRAVENOUS at 13:07

## (undated) DEVICE — SINGLE USE SUCTION VALVE MAJ-209: Brand: SINGLE USE SUCTION VALVE (STERILE)

## (undated) DEVICE — SUT SILK 2/0 SH CR5 18IN C0125

## (undated) DEVICE — MSK AIRWY LARYNG LMA PILOT SZ4

## (undated) DEVICE — NDL BIOP ECHOTIP PROCORE 22G 5CM

## (undated) DEVICE — APPL CHLORAPREP W/TINT 10.5ML PERC STRL

## (undated) DEVICE — Device: Brand: BALLOON

## (undated) DEVICE — Device

## (undated) DEVICE — SYR LUERLOK 20CC BX/50

## (undated) DEVICE — ANTIBACTERIAL UNDYED BRAIDED (POLYGLACTIN 910), SYNTHETIC ABSORBABLE SUTURE: Brand: COATED VICRYL

## (undated) DEVICE — DECANT BG O JET

## (undated) DEVICE — ADAPT SWVL FIBROPTIC BRONCH

## (undated) DEVICE — SENSR O2 OXIMAX FNGR A/ 18IN NONSTR

## (undated) DEVICE — CVR PROB 96IN LF STRL

## (undated) DEVICE — ADHS SKIN DERMABOND TOP ADVANCED

## (undated) DEVICE — VITAL SIGNS™ JACKSON-REES CIRCUITS: Brand: VITAL SIGNS™

## (undated) DEVICE — TRAP,MUCUS SPECIMEN, 80CC: Brand: MEDLINE

## (undated) DEVICE — SINGLE USE BIOPSY VALVE MAJ-210: Brand: SINGLE USE BIOPSY VALVE (STERILE)

## (undated) DEVICE — LN SMPL O2 NASL/ORL SMART/CAPNOLINE PLS A/

## (undated) DEVICE — FRCP BX RADJAW4 PULM NDL STD 1.8MM 100CM

## (undated) DEVICE — TUBING, SUCTION, 1/4" X 10', STRAIGHT: Brand: MEDLINE

## (undated) DEVICE — ADAPT CLN BIOGUARD AIR/H2O DISP

## (undated) DEVICE — SYR LUERLOK 5CC

## (undated) DEVICE — LARGE BORE STOPCOCK WITH EXTENSION SET, MALE LUER LOCK ADAPTER WITH RETRACTABLE COLLAR

## (undated) DEVICE — 3M™ IOBAN™ 2 ANTIMICROBIAL INCISE DRAPE 6650EZ: Brand: IOBAN™ 2

## (undated) DEVICE — FRCP BX RADJAW4 PULM WO NDL STD1.8X2 100

## (undated) DEVICE — NDL HYPO PRECISIONGLIDE REG 25G 1 1/2

## (undated) DEVICE — BITEBLOCK OMNI BLOC

## (undated) DEVICE — ELECTRD BLD EDGE/INSUL1P 2.4X5.1MM STRL

## (undated) DEVICE — INTENDED FOR TISSUE SEPARATION, AND OTHER PROCEDURES THAT REQUIRE A SHARP SURGICAL BLADE TO PUNCTURE OR CUT.: Brand: BARD-PARKER ® CARBON RIB-BACK BLADES

## (undated) DEVICE — GLV SURG BIOGEL LTX PF 6

## (undated) DEVICE — CONMED DISPOSABLE BRONCHIAL CYTOLOGY BRUSH, STRAIGHT HANDLE, 3 MM X 120 CM: Brand: CONMED

## (undated) DEVICE — LOU MINOR PROCEDURE: Brand: MEDLINE INDUSTRIES, INC.

## (undated) DEVICE — DRP C/ARM 41X74IN

## (undated) DEVICE — SUT MNCRYL 4/0 PS2 18 IN